# Patient Record
Sex: FEMALE | Race: WHITE | NOT HISPANIC OR LATINO | Employment: OTHER | URBAN - METROPOLITAN AREA
[De-identification: names, ages, dates, MRNs, and addresses within clinical notes are randomized per-mention and may not be internally consistent; named-entity substitution may affect disease eponyms.]

---

## 2023-10-22 PROBLEM — I25.118 ATHEROSCLEROTIC HEART DISEASE OF NATIVE CORONARY ARTERY WITH OTHER FORMS OF ANGINA PECTORIS (CMS-HCC): Status: ACTIVE | Noted: 2023-10-22

## 2023-10-22 PROBLEM — E78.5 HYPERLIPIDEMIA: Status: ACTIVE | Noted: 2023-10-22

## 2023-10-22 PROBLEM — R06.02 SHORTNESS OF BREATH: Status: ACTIVE | Noted: 2019-11-26

## 2023-10-22 PROBLEM — R00.1 SINUS BRADYCARDIA: Status: ACTIVE | Noted: 2023-10-22

## 2023-10-22 PROBLEM — I25.10 CORONARY ARTERIOSCLEROSIS: Status: ACTIVE | Noted: 2023-10-22

## 2023-10-22 PROBLEM — E78.00 PURE HYPERCHOLESTEROLEMIA, UNSPECIFIED: Status: ACTIVE | Noted: 2018-12-28

## 2023-10-22 PROBLEM — S40.012A CONTUSION OF LEFT SHOULDER: Status: ACTIVE | Noted: 2021-05-18

## 2023-10-22 PROBLEM — I49.5 SICK SINUS SYNDROME (MULTI): Status: ACTIVE | Noted: 2023-10-22

## 2023-10-22 PROBLEM — Z95.0 CARDIAC PACEMAKER IN SITU: Status: ACTIVE | Noted: 2023-10-22

## 2023-10-22 PROBLEM — D69.6 THROMBOCYTOPENIA (CMS-HCC): Status: ACTIVE | Noted: 2023-07-12

## 2023-10-22 PROBLEM — I25.10 CORONARY ATHEROSCLEROSIS: Status: ACTIVE | Noted: 2019-08-21

## 2023-10-22 PROBLEM — R14.2 BELCHING: Status: ACTIVE | Noted: 2019-09-05

## 2023-10-22 PROBLEM — M54.2 CERVICALGIA: Status: ACTIVE | Noted: 2018-09-03

## 2023-10-22 PROBLEM — I10 PRIMARY HYPERTENSION: Status: ACTIVE | Noted: 2018-12-28

## 2023-10-22 PROBLEM — E03.9 HYPOTHYROIDISM, UNSPECIFIED: Status: ACTIVE | Noted: 2018-12-28

## 2023-10-22 PROBLEM — R29.6 FREQUENT FALLS: Status: ACTIVE | Noted: 2020-12-03

## 2023-10-22 PROBLEM — K57.32 DIVERTICULITIS OF LARGE INTESTINE WITHOUT PERFORATION OR ABSCESS WITHOUT BLEEDING: Status: ACTIVE | Noted: 2018-09-06

## 2023-10-22 PROBLEM — I20.9 ANGINA PECTORIS (CMS-HCC): Status: ACTIVE | Noted: 2023-10-22

## 2023-10-22 PROBLEM — R94.31 ABNORMAL EKG: Status: ACTIVE | Noted: 2019-04-12

## 2023-10-22 PROBLEM — T14.8XXA HEMATOMA: Status: ACTIVE | Noted: 2023-10-22

## 2023-10-22 RX ORDER — DILTIAZEM HYDROCHLORIDE 120 MG/1
1 TABLET, FILM COATED ORAL 2 TIMES DAILY
COMMUNITY
Start: 2023-05-08 | End: 2024-05-15 | Stop reason: HOSPADM

## 2023-10-22 RX ORDER — SERTRALINE HYDROCHLORIDE 100 MG/1
100 TABLET, FILM COATED ORAL DAILY
COMMUNITY
Start: 2023-05-08 | End: 2024-02-14 | Stop reason: HOSPADM

## 2023-10-22 RX ORDER — CHOLECALCIFEROL (VITAMIN D3) 50 MCG
1 TABLET ORAL DAILY
COMMUNITY
Start: 2019-07-17 | End: 2024-06-07 | Stop reason: HOSPADM

## 2023-10-22 RX ORDER — SERTRALINE HYDROCHLORIDE 100 MG/1
100 TABLET, FILM COATED ORAL DAILY
COMMUNITY
End: 2023-10-24 | Stop reason: ALTCHOICE

## 2023-10-22 RX ORDER — NITROGLYCERIN 0.4 MG/1
0.4 TABLET SUBLINGUAL EVERY 5 MIN PRN
COMMUNITY
Start: 2019-06-26 | End: 2023-10-24 | Stop reason: ALTCHOICE

## 2023-10-22 RX ORDER — METOPROLOL SUCCINATE 25 MG/1
1 TABLET, EXTENDED RELEASE ORAL 2 TIMES DAILY
COMMUNITY
End: 2024-04-11 | Stop reason: HOSPADM

## 2023-10-22 RX ORDER — CLOPIDOGREL BISULFATE 75 MG/1
75 TABLET ORAL DAILY
Status: ON HOLD | COMMUNITY
Start: 2020-02-21 | End: 2024-02-01 | Stop reason: WASHOUT

## 2023-10-22 RX ORDER — OMEPRAZOLE 20 MG/1
CAPSULE, DELAYED RELEASE ORAL
COMMUNITY
Start: 2019-07-17 | End: 2023-10-24 | Stop reason: ALTCHOICE

## 2023-10-22 RX ORDER — ATORVASTATIN CALCIUM 80 MG/1
80 TABLET, FILM COATED ORAL DAILY
COMMUNITY
Start: 2023-05-08 | End: 2024-01-29 | Stop reason: WASHOUT

## 2023-10-22 RX ORDER — LISINOPRIL 20 MG/1
20 TABLET ORAL DAILY
COMMUNITY
Start: 2023-05-08 | End: 2024-02-14 | Stop reason: HOSPADM

## 2023-10-22 RX ORDER — LEVOTHYROXINE SODIUM 100 UG/1
1 TABLET ORAL DAILY
COMMUNITY
End: 2024-04-30 | Stop reason: SDUPTHER

## 2023-10-22 RX ORDER — ASPIRIN 81 MG/1
1 TABLET ORAL DAILY
COMMUNITY
End: 2023-12-15 | Stop reason: ALTCHOICE

## 2023-10-24 ENCOUNTER — OFFICE VISIT (OUTPATIENT)
Dept: HEMATOLOGY/ONCOLOGY | Facility: CLINIC | Age: 83
End: 2023-10-24
Payer: MEDICARE

## 2023-10-24 VITALS
OXYGEN SATURATION: 94 % | HEIGHT: 63 IN | TEMPERATURE: 96.6 F | BODY MASS INDEX: 26.64 KG/M2 | WEIGHT: 150.35 LBS | DIASTOLIC BLOOD PRESSURE: 72 MMHG | RESPIRATION RATE: 18 BRPM | HEART RATE: 60 BPM | SYSTOLIC BLOOD PRESSURE: 111 MMHG

## 2023-10-24 DIAGNOSIS — D69.6 THROMBOCYTOPENIA (CMS-HCC): Primary | ICD-10-CM

## 2023-10-24 LAB
ALBUMIN SERPL BCP-MCNC: 4.6 G/DL (ref 3.4–5)
ALP SERPL-CCNC: 87 U/L (ref 33–136)
ALT SERPL W P-5'-P-CCNC: 14 U/L (ref 7–45)
ANION GAP SERPL CALC-SCNC: 14 MMOL/L (ref 10–20)
AST SERPL W P-5'-P-CCNC: 19 U/L (ref 9–39)
BASOPHILS # BLD AUTO: 0.06 X10*3/UL (ref 0–0.1)
BASOPHILS NFR BLD AUTO: 1 %
BILIRUB SERPL-MCNC: 0.6 MG/DL (ref 0–1.2)
BUN SERPL-MCNC: 22 MG/DL (ref 6–23)
CALCIUM SERPL-MCNC: 9.8 MG/DL (ref 8.6–10.3)
CHLORIDE SERPL-SCNC: 105 MMOL/L (ref 98–107)
CO2 SERPL-SCNC: 27 MMOL/L (ref 21–32)
CREAT SERPL-MCNC: 1.44 MG/DL (ref 0.5–1.05)
EOSINOPHIL # BLD AUTO: 0.35 X10*3/UL (ref 0–0.4)
EOSINOPHIL NFR BLD AUTO: 5.8 %
ERYTHROCYTE [DISTWIDTH] IN BLOOD BY AUTOMATED COUNT: 13.3 % (ref 11.5–14.5)
GFR SERPL CREATININE-BSD FRML MDRD: 36 ML/MIN/1.73M*2
GLUCOSE SERPL-MCNC: 91 MG/DL (ref 74–99)
HCT VFR BLD AUTO: 38.4 % (ref 36–46)
HCV AB SER QL: NONREACTIVE
HGB BLD-MCNC: 12.3 G/DL (ref 12–16)
HIV 1+2 AB+HIV1 P24 AG SERPL QL IA: NONREACTIVE
IMM GRANULOCYTES # BLD AUTO: 0.02 X10*3/UL (ref 0–0.5)
IMM GRANULOCYTES NFR BLD AUTO: 0.3 % (ref 0–0.9)
LYMPHOCYTES # BLD AUTO: 1.13 X10*3/UL (ref 0.8–3)
LYMPHOCYTES NFR BLD AUTO: 18.7 %
MCH RBC QN AUTO: 29 PG (ref 26–34)
MCHC RBC AUTO-ENTMCNC: 32 G/DL (ref 32–36)
MCV RBC AUTO: 91 FL (ref 80–100)
MONOCYTES # BLD AUTO: 0.43 X10*3/UL (ref 0.05–0.8)
MONOCYTES NFR BLD AUTO: 7.1 %
NEUTROPHILS # BLD AUTO: 4.04 X10*3/UL (ref 1.6–5.5)
NEUTROPHILS NFR BLD AUTO: 67.1 %
NRBC BLD-RTO: 0 /100 WBCS (ref 0–0)
PLATELET # BLD AUTO: 144 X10*3/UL (ref 150–450)
PMV BLD AUTO: 9.6 FL (ref 7.5–11.5)
POTASSIUM SERPL-SCNC: 4.6 MMOL/L (ref 3.5–5.3)
PROT SERPL-MCNC: 7.6 G/DL (ref 6.4–8.2)
PROT SERPL-MCNC: 7.9 G/DL (ref 6.4–8.2)
RBC # BLD AUTO: 4.24 X10*6/UL (ref 4–5.2)
SODIUM SERPL-SCNC: 141 MMOL/L (ref 136–145)
VIT B12 SERPL-MCNC: 546 PG/ML (ref 211–911)
WBC # BLD AUTO: 6 X10*3/UL (ref 4.4–11.3)

## 2023-10-24 PROCEDURE — 36415 COLL VENOUS BLD VENIPUNCTURE: CPT | Performed by: INTERNAL MEDICINE

## 2023-10-24 PROCEDURE — 84165 PROTEIN E-PHORESIS SERUM: CPT | Performed by: INTERNAL MEDICINE

## 2023-10-24 PROCEDURE — 99214 OFFICE O/P EST MOD 30 MIN: CPT | Performed by: INTERNAL MEDICINE

## 2023-10-24 PROCEDURE — 99204 OFFICE O/P NEW MOD 45 MIN: CPT | Performed by: INTERNAL MEDICINE

## 2023-10-24 PROCEDURE — 83921 ORGANIC ACID SINGLE QUANT: CPT | Performed by: INTERNAL MEDICINE

## 2023-10-24 PROCEDURE — 3078F DIAST BP <80 MM HG: CPT | Performed by: INTERNAL MEDICINE

## 2023-10-24 PROCEDURE — 82607 VITAMIN B-12: CPT | Performed by: INTERNAL MEDICINE

## 2023-10-24 PROCEDURE — 85025 COMPLETE CBC W/AUTO DIFF WBC: CPT | Performed by: INTERNAL MEDICINE

## 2023-10-24 PROCEDURE — 3074F SYST BP LT 130 MM HG: CPT | Performed by: INTERNAL MEDICINE

## 2023-10-24 PROCEDURE — 84155 ASSAY OF PROTEIN SERUM: CPT | Performed by: INTERNAL MEDICINE

## 2023-10-24 PROCEDURE — 1126F AMNT PAIN NOTED NONE PRSNT: CPT | Performed by: INTERNAL MEDICINE

## 2023-10-24 PROCEDURE — 1159F MED LIST DOCD IN RCRD: CPT | Performed by: INTERNAL MEDICINE

## 2023-10-24 PROCEDURE — 87389 HIV-1 AG W/HIV-1&-2 AB AG IA: CPT | Performed by: INTERNAL MEDICINE

## 2023-10-24 PROCEDURE — 83521 IG LIGHT CHAINS FREE EACH: CPT | Performed by: INTERNAL MEDICINE

## 2023-10-24 PROCEDURE — 1036F TOBACCO NON-USER: CPT | Performed by: INTERNAL MEDICINE

## 2023-10-24 PROCEDURE — 80053 COMPREHEN METABOLIC PANEL: CPT | Performed by: INTERNAL MEDICINE

## 2023-10-24 PROCEDURE — 86803 HEPATITIS C AB TEST: CPT | Performed by: INTERNAL MEDICINE

## 2023-10-24 ASSESSMENT — PATIENT HEALTH QUESTIONNAIRE - PHQ9
1. LITTLE INTEREST OR PLEASURE IN DOING THINGS: NOT AT ALL
10. IF YOU CHECKED OFF ANY PROBLEMS, HOW DIFFICULT HAVE THESE PROBLEMS MADE IT FOR YOU TO DO YOUR WORK, TAKE CARE OF THINGS AT HOME, OR GET ALONG WITH OTHER PEOPLE: NOT DIFFICULT AT ALL
SUM OF ALL RESPONSES TO PHQ9 QUESTIONS 1 AND 2: 1
2. FEELING DOWN, DEPRESSED OR HOPELESS: SEVERAL DAYS

## 2023-10-24 ASSESSMENT — COLUMBIA-SUICIDE SEVERITY RATING SCALE - C-SSRS
2. HAVE YOU ACTUALLY HAD ANY THOUGHTS OF KILLING YOURSELF?: NO
1. IN THE PAST MONTH, HAVE YOU WISHED YOU WERE DEAD OR WISHED YOU COULD GO TO SLEEP AND NOT WAKE UP?: NO
6. HAVE YOU EVER DONE ANYTHING, STARTED TO DO ANYTHING, OR PREPARED TO DO ANYTHING TO END YOUR LIFE?: NO

## 2023-10-24 ASSESSMENT — ENCOUNTER SYMPTOMS
HEMOPTYSIS: 0
ARTHRALGIAS: 1
HOT FLASHES: 0
DIARRHEA: 1
NUMBNESS: 0
OCCASIONAL FEELINGS OF UNSTEADINESS: 1
DEPRESSION: 0
LEG SWELLING: 0
BLOOD IN STOOL: 1
DEPRESSION: 1
LOSS OF SENSATION IN FEET: 1
EYE PROBLEMS: 0
ADENOPATHY: 0
DIFFICULTY URINATING: 0
FATIGUE: 0

## 2023-10-24 ASSESSMENT — PAIN SCALES - GENERAL: PAINLEVEL: 0-NO PAIN

## 2023-10-24 NOTE — PROGRESS NOTES
"Patient ID: Linette Doyle is a 83 y.o. female.  Referring Physician: No referring provider defined for this encounter.  Primary Care Provider: Maritza Do MD      Subjective    HPI  83 year old woman with a history of hypothyroidism, HTN, cholesterol, depression, pacemaker, cardiac stent, IBS, referred for evaluation of chronic thrombocytopenia since 2018    Review of Systems   Constitutional:  Negative for fatigue.   HENT:   Negative for nosebleeds.    Eyes:  Negative for eye problems.   Respiratory:  Negative for hemoptysis.    Cardiovascular:  Negative for leg swelling.   Gastrointestinal:  Positive for blood in stool and diarrhea.        IBS  Hemorrhoidal bleeding   Endocrine: Negative for hot flashes.   Genitourinary:  Negative for difficulty urinating.    Musculoskeletal:  Positive for arthralgias.   Skin:  Negative for itching and rash.   Neurological:  Negative for numbness.   Hematological:  Negative for adenopathy.   Psychiatric/Behavioral:  Positive for depression.         SH: no smoking, no ETOH  FH: negative for blood disorder      Medications:  Aspirin  Atorvastatin  Brilinta  Diltiazem  Levothyroxine  Lisinopril  Metoprolol,  Ocuvite  Omeprazole  sertraline    Objective   BSA: 1.75 meters squared  /72 (BP Location: Left arm, Patient Position: Sitting, BP Cuff Size: Adult long)   Pulse 60   Temp 35.9 °C (96.6 °F) (Temporal)   Resp 18   Ht 1.608 m (5' 3.31\")   Wt 68.2 kg (150 lb 5.7 oz)   SpO2 94%   BMI 26.38 kg/m²     No family history on file.  Oncology History    No history exists.       Linette Doyle  reports that she has never smoked. She has never been exposed to tobacco smoke. She has never used smokeless tobacco.  She  reports no history of alcohol use.  She  reports no history of drug use.  Review of Systems   Constitutional:  Negative for fatigue.   HENT:  Negative for nosebleeds.    Respiratory:  Negative for hemoptysis.    Gastrointestinal:  Positive for blood in stool " and diarrhea.        IBS  Hemorrhoidal bleeding   Genitourinary:  Negative for difficulty urinating.   Musculoskeletal:  Positive for arthralgias.   Skin:  Negative for itching and rash.   Neurological:  Negative for numbness.   Hematological:  Negative for adenopathy.   Psychiatric/Behavioral:  Positive for depression.       Physical Exam  Constitutional:       Appearance: Normal appearance.   HENT:      Right Ear: External ear normal.      Left Ear: External ear normal.      Nose: Nose normal.      Mouth/Throat:      Mouth: Mucous membranes are moist.      Pharynx: Oropharynx is clear.   Eyes:      Extraocular Movements: Extraocular movements intact.      Pupils: Pupils are equal, round, and reactive to light.   Cardiovascular:      Rate and Rhythm: Normal rate and regular rhythm.   Pulmonary:      Effort: Pulmonary effort is normal.      Breath sounds: Normal breath sounds.   Abdominal:      General: Abdomen is flat. Bowel sounds are normal.      Palpations: Abdomen is soft.   Musculoskeletal:         General: Normal range of motion.      Cervical back: Normal range of motion and neck supple.   Skin:     General: Skin is warm.   Neurological:      General: No focal deficit present.      Mental Status: She is alert and oriented to person, place, and time.   Psychiatric:         Behavior: Behavior normal.         Performance Status:      Assessment/Plan    83 year old woman with a history of hypothyroidism, HTN, cholesterol, depression, pacemaker, cardiac stent, IBS, referred for evaluation of chronic thrombocytopenia since 2018    Chronic Acquired thrombocytopenia  First available CBC, 4/9/2018, platelet count 136  Since then platelet count has always been above 100  “Patients with platelets above 100 do not really have a problem.”   Platelet count 163 on 7/4/2019   Bleeding symptoms: Some bruises on hands, otherwise the patient denies easy bruising, petechiae, melena, rashes, fevers, epistaxis, and bleeding.      There are no medications correlating with the onset of thrombocytopenia in 2018 (that she would have stopped temporarily in June/July, 2019).  However, aspirin could be considered as a culprit.   Family history: There is no family history of thrombocytopenia or blood disorders.     Transfusion: There is no history of transfusion.     Alcohol: The patient denies alcohol use.     Hepatic: no history of hepatitis or hepatosplenomegaly.     There is no history of cirrhosis or hepatic failure.     Thrombosis: There is no history of thrombosis.     Autoimmune: The patient has no autoimmune condition.     The exam reveals no hepatomegaly, ascites, or jaundice.  No purpuric lesions are present on exam.     CBC: Isolated thrombocytopenia, without deficit in the WBC, RBC, or differential   The CBC reveals no atypical lymphocytosis (to suggest viral infection such as with EBV or CMV).  The peripheral smear does not reveal basophilic stippling (consistent with thalassemia, chronic alcohol use, lead or metal poisoning).  There are no nucleated red blood cells (present in hemolysis or myelofibrosis).     No oval macrocytosis (to suggest B12 or folate deficiency).     No platelet aggregation appreciated.     No platelet hypogranularity (to suggest MDS or myelofibrosis).     No schistocytosis (to suggest TTP, HUS, DIC, or a prosthetic heart valve).     No target cells to suggest chronic liver disease or hemoglobinopathies.        The most common causes for causes for acquired thrombocytopenia are a drug effect and Idiopathic/immune thrombocytopenic Purpura (ITP).   None of the patient’s present medication is correlating with this onset of the thrombocytopenia in 2018.  The differential for acquired thrombocytopenia includes [1] Nutrition deficiency of B12, or folate; [2] Infection with HIV, B19, HCV, Mono, Rubella, Mumps, chicken pox, Tuberculosis, Malaria, Leishmaniasis; [3] alcohol [4] radiation; [5] a large spleen; [6] certain  cancers (CLL, CML, Hodgkin lymphoma, NHL, Hairy cell leukemia, myelofibrosis); [7] sarcoidosis; [8] in the right setting: pregnancy; [9] particular other hematology conditions (Thalassemia, Hemoglobin SC, Hereditary spherocytosis, Antiphospholipid syndrome, DIC, TTP/HUS, HELLP syndrome, aplastic anemia, amegakaryocytic thrombocytopenia); [10] a prior blood transfusion; [11] a mechanical heart valve, or hemangioma (detected by urine hemosiderin); [12] a drug or herbal therapy; [13] and, after everything else has been ruled out: ITP.   Impression: chronic ITP  PLAN:  CBC, HIV, HCV, B12, MMA, SPEP, LDH, free light chains  US liver and spleen  Consider stopping aspirin for a month and rechecking CBC  RTC 1-2 months to review results         Ziggy Neal MD

## 2023-10-25 LAB
ALBUMIN: 4.9 G/DL (ref 3.4–5)
ALPHA 1 GLOBULIN: 0.4 G/DL (ref 0.2–0.6)
ALPHA 2 GLOBULIN: 0.9 G/DL (ref 0.4–1.1)
BETA GLOBULIN: 0.8 G/DL (ref 0.5–1.2)
GAMMA GLOBULIN: 0.9 G/DL (ref 0.5–1.4)
KAPPA LC SERPL-MCNC: 3.34 MG/DL (ref 0.33–1.94)
KAPPA LC/LAMBDA SER: 1.48 {RATIO} (ref 0.26–1.65)
LAMBDA LC SERPL-MCNC: 2.26 MG/DL (ref 0.57–2.63)
PATH REVIEW-SERUM PROTEIN ELECTROPHORESIS: NORMAL
PROTEIN ELECTROPHORESIS COMMENT: NORMAL

## 2023-10-27 LAB — METHYLMALONATE SERPL-SCNC: 0.29 UMOL/L (ref 0–0.4)

## 2023-11-13 ENCOUNTER — HOSPITAL ENCOUNTER (OUTPATIENT)
Dept: RADIOLOGY | Facility: HOSPITAL | Age: 83
Discharge: HOME | End: 2023-11-13
Payer: MEDICARE

## 2023-11-13 DIAGNOSIS — D69.6 THROMBOCYTOPENIA (CMS-HCC): ICD-10-CM

## 2023-11-13 PROCEDURE — 76705 ECHO EXAM OF ABDOMEN: CPT

## 2023-11-15 ENCOUNTER — TELEPHONE (OUTPATIENT)
Dept: HEMATOLOGY/ONCOLOGY | Facility: CLINIC | Age: 83
End: 2023-11-15
Payer: MEDICARE

## 2023-11-15 NOTE — TELEPHONE ENCOUNTER
IMPRESSION:  Normal ultrasound appearance of the spleen.      Signed by: Damon Reyna 11/13/2023 11:49  I read this result to Linette who was elated!  No further needs.

## 2023-12-15 ENCOUNTER — LAB (OUTPATIENT)
Dept: LAB | Facility: CLINIC | Age: 83
End: 2023-12-15
Payer: MEDICARE

## 2023-12-15 ENCOUNTER — OFFICE VISIT (OUTPATIENT)
Dept: HEMATOLOGY/ONCOLOGY | Facility: CLINIC | Age: 83
End: 2023-12-15
Payer: MEDICARE

## 2023-12-15 VITALS
TEMPERATURE: 96.6 F | WEIGHT: 151.79 LBS | OXYGEN SATURATION: 94 % | BODY MASS INDEX: 26.63 KG/M2 | RESPIRATION RATE: 18 BRPM | DIASTOLIC BLOOD PRESSURE: 77 MMHG | SYSTOLIC BLOOD PRESSURE: 168 MMHG | HEART RATE: 60 BPM

## 2023-12-15 DIAGNOSIS — D69.6 THROMBOCYTOPENIA (CMS-HCC): Primary | ICD-10-CM

## 2023-12-15 DIAGNOSIS — D69.6 THROMBOCYTOPENIA (CMS-HCC): ICD-10-CM

## 2023-12-15 LAB
BASOPHILS # BLD AUTO: 0.06 X10*3/UL (ref 0–0.1)
BASOPHILS NFR BLD AUTO: 0.8 %
EOSINOPHIL # BLD AUTO: 0.37 X10*3/UL (ref 0–0.4)
EOSINOPHIL NFR BLD AUTO: 4.6 %
ERYTHROCYTE [DISTWIDTH] IN BLOOD BY AUTOMATED COUNT: 12.9 % (ref 11.5–14.5)
HCT VFR BLD AUTO: 40.6 % (ref 36–46)
HGB BLD-MCNC: 12.9 G/DL (ref 12–16)
IMM GRANULOCYTES # BLD AUTO: 0.02 X10*3/UL (ref 0–0.5)
IMM GRANULOCYTES NFR BLD AUTO: 0.3 % (ref 0–0.9)
LYMPHOCYTES # BLD AUTO: 1.49 X10*3/UL (ref 0.8–3)
LYMPHOCYTES NFR BLD AUTO: 18.6 %
MCH RBC QN AUTO: 28.4 PG (ref 26–34)
MCHC RBC AUTO-ENTMCNC: 31.8 G/DL (ref 32–36)
MCV RBC AUTO: 89 FL (ref 80–100)
MONOCYTES # BLD AUTO: 0.62 X10*3/UL (ref 0.05–0.8)
MONOCYTES NFR BLD AUTO: 7.8 %
NEUTROPHILS # BLD AUTO: 5.44 X10*3/UL (ref 1.6–5.5)
NEUTROPHILS NFR BLD AUTO: 67.9 %
NRBC BLD-RTO: 0 /100 WBCS (ref 0–0)
PLATELET # BLD AUTO: 141 X10*3/UL (ref 150–450)
PROT SERPL-MCNC: 7.9 G/DL (ref 6.4–8.2)
RBC # BLD AUTO: 4.54 X10*6/UL (ref 4–5.2)
WBC # BLD AUTO: 8 X10*3/UL (ref 4.4–11.3)

## 2023-12-15 PROCEDURE — 3077F SYST BP >= 140 MM HG: CPT | Performed by: INTERNAL MEDICINE

## 2023-12-15 PROCEDURE — 86320 SERUM IMMUNOELECTROPHORESIS: CPT | Performed by: INTERNAL MEDICINE

## 2023-12-15 PROCEDURE — 84165 PROTEIN E-PHORESIS SERUM: CPT | Performed by: INTERNAL MEDICINE

## 2023-12-15 PROCEDURE — 1126F AMNT PAIN NOTED NONE PRSNT: CPT | Performed by: INTERNAL MEDICINE

## 2023-12-15 PROCEDURE — 84155 ASSAY OF PROTEIN SERUM: CPT | Performed by: INTERNAL MEDICINE

## 2023-12-15 PROCEDURE — 36415 COLL VENOUS BLD VENIPUNCTURE: CPT

## 2023-12-15 PROCEDURE — 99214 OFFICE O/P EST MOD 30 MIN: CPT | Performed by: INTERNAL MEDICINE

## 2023-12-15 PROCEDURE — 1159F MED LIST DOCD IN RCRD: CPT | Performed by: INTERNAL MEDICINE

## 2023-12-15 PROCEDURE — 99214 OFFICE O/P EST MOD 30 MIN: CPT | Mod: 25 | Performed by: INTERNAL MEDICINE

## 2023-12-15 PROCEDURE — 1036F TOBACCO NON-USER: CPT | Performed by: INTERNAL MEDICINE

## 2023-12-15 PROCEDURE — 3078F DIAST BP <80 MM HG: CPT | Performed by: INTERNAL MEDICINE

## 2023-12-15 PROCEDURE — 85025 COMPLETE CBC W/AUTO DIFF WBC: CPT

## 2023-12-15 ASSESSMENT — ENCOUNTER SYMPTOMS
BACK PAIN: 1
HEMATURIA: 0
BRUISES/BLEEDS EASILY: 0
FATIGUE: 1
OCCASIONAL FEELINGS OF UNSTEADINESS: 0
SHORTNESS OF BREATH: 0
DEPRESSION: 0
BLOOD IN STOOL: 0
LIGHT-HEADEDNESS: 0
LOSS OF SENSATION IN FEET: 0
SLEEP DISTURBANCE: 1

## 2023-12-15 ASSESSMENT — PATIENT HEALTH QUESTIONNAIRE - PHQ9
2. FEELING DOWN, DEPRESSED OR HOPELESS: NOT AT ALL
1. LITTLE INTEREST OR PLEASURE IN DOING THINGS: NOT AT ALL
SUM OF ALL RESPONSES TO PHQ9 QUESTIONS 1 AND 2: 0

## 2023-12-15 ASSESSMENT — COLUMBIA-SUICIDE SEVERITY RATING SCALE - C-SSRS
1. IN THE PAST MONTH, HAVE YOU WISHED YOU WERE DEAD OR WISHED YOU COULD GO TO SLEEP AND NOT WAKE UP?: NO
6. HAVE YOU EVER DONE ANYTHING, STARTED TO DO ANYTHING, OR PREPARED TO DO ANYTHING TO END YOUR LIFE?: NO
2. HAVE YOU ACTUALLY HAD ANY THOUGHTS OF KILLING YOURSELF?: NO

## 2023-12-15 ASSESSMENT — PAIN SCALES - GENERAL: PAINLEVEL: 0-NO PAIN

## 2023-12-15 NOTE — PROGRESS NOTES
Patient ID: Linette Doyle is a 83 y.o. female.    Subjective    HPI  83 year old woman with a history of hypothyroidism, HTN, cholesterol, depression, pacemaker, cardiac stent, IBS, referred for evaluation of chronic thrombocytopenia since 2018.    She denies bleeding, epistaxis, blood in the urine, hemoptysis.  She denies fever, drenching night sweats, or weight loss.       Objective    BSA: There is no height or weight on file to calculate BSA.  There were no vitals taken for this visit.   Review of Systems   Constitutional:  Positive for fatigue.   HENT:  Negative for nosebleeds.    Eyes:  Negative for visual disturbance.   Respiratory:  Negative for shortness of breath.    Cardiovascular:  Negative for leg swelling.   Gastrointestinal:  Negative for blood in stool.   Endocrine: Negative for cold intolerance.   Genitourinary:  Negative for hematuria.   Musculoskeletal:  Positive for back pain.   Skin:  Negative for rash.   Allergic/Immunologic: Negative for environmental allergies.   Neurological:  Negative for light-headedness.   Hematological:  Does not bruise/bleed easily.   Psychiatric/Behavioral:  Positive for sleep disturbance.        Physical Exam  Constitutional:       Appearance: She is obese.   HENT:      Head: Normocephalic and atraumatic.      Right Ear: External ear normal.      Left Ear: External ear normal.      Nose: Nose normal.      Mouth/Throat:      Mouth: Mucous membranes are moist.      Pharynx: Oropharynx is clear.   Eyes:      Extraocular Movements: Extraocular movements intact.      Pupils: Pupils are equal, round, and reactive to light.   Cardiovascular:      Rate and Rhythm: Normal rate and regular rhythm.   Pulmonary:      Effort: Pulmonary effort is normal.      Breath sounds: Normal breath sounds.   Abdominal:      General: Abdomen is flat. Bowel sounds are normal.   Musculoskeletal:         General: No swelling. Normal range of motion.      Cervical back: Normal range of motion and  neck supple.   Skin:     General: Skin is warm.      Coloration: Skin is not jaundiced.   Neurological:      General: No focal deficit present.      Mental Status: She is alert and oriented to person, place, and time.   Psychiatric:         Mood and Affect: Mood normal.         Behavior: Behavior normal.       Performance Status:        Assessment/Plan     83 year old woman with a history of hypothyroidism, HTN, cholesterol, depression, pacemaker, cardiac stent, IBS, referred for evaluation of chronic thrombocytopenia since 2018     Chronic Acquired thrombocytopenia, mild  First available CBC, 4/9/2018, platelet count 136  Since then platelet count has always been above 100  “Patients with platelets above 100 do not really have a problem.”   Platelet count 163 on 7/4/2019   Bleeding symptoms: Some bruises on hands, otherwise the patient denies easy bruising, petechiae, melena, rashes, fevers, epistaxis, and bleeding.     There are no medications correlating with the onset of thrombocytopenia in 2018 (that she would have stopped temporarily in June/July, 2019).  However, aspirin could be considered as a culprit.   Family history: There is no family history of thrombocytopenia or blood disorders.     Transfusion: There is no history of transfusion.     Alcohol: The patient denies alcohol use.     Hepatic: no history of hepatitis or hepatosplenomegaly.     There is no history of cirrhosis or hepatic failure.     Thrombosis: There is no history of thrombosis.     Autoimmune: The patient has no autoimmune condition.     The exam reveals no hepatomegaly, ascites, or jaundice.  No purpuric lesions are present on exam.     CBC: Isolated thrombocytopenia, without deficit in the WBC, RBC, or differential   The CBC reveals no atypical lymphocytosis (to suggest viral infection such as with EBV or CMV).  The peripheral smear does not reveal basophilic stippling (consistent with thalassemia, chronic alcohol use, lead or metal  poisoning).  There are no nucleated red blood cells (present in hemolysis or myelofibrosis).     No oval macrocytosis (to suggest B12 or folate deficiency).     No platelet aggregation appreciated.     No platelet hypogranularity (to suggest MDS or myelofibrosis).     No schistocytosis (to suggest TTP, HUS, DIC, or a prosthetic heart valve).     No target cells to suggest chronic liver disease or hemoglobinopathies.         The most common causes for causes for acquired thrombocytopenia are a drug effect and Idiopathic/immune thrombocytopenic Purpura (ITP).   None of the patient’s present medication is correlating with this onset of the thrombocytopenia in 2018.  The differential for acquired thrombocytopenia includes [1] Nutrition deficiency of B12, or folate; [2] Infection with HIV, B19, HCV, Mono, Rubella, Mumps, chicken pox, Tuberculosis, Malaria, Leishmaniasis; [3] alcohol [4] radiation; [5] a large spleen; [6] certain cancers (CLL, CML, Hodgkin lymphoma, NHL, Hairy cell leukemia, myelofibrosis); [7] sarcoidosis; [8] in the right setting: pregnancy; [9] particular other hematology conditions (Thalassemia, Hemoglobin SC, Hereditary spherocytosis, Antiphospholipid syndrome, DIC, TTP/HUS, HELLP syndrome, aplastic anemia, amegakaryocytic thrombocytopenia); [10] a prior blood transfusion; [11] a mechanical heart valve, or hemangioma (detected by urine hemosiderin); [12] a drug or herbal therapy; [13] and, after everything else has been ruled out: ITP.   10/24/2023: Platelets 144  HIV nonreactive, HCV nonreactive, B12 546, MMA 0.29,  Ultrasound, 11/13/2023,The spleen is normal in size, spanning 10.3 x 4.3 x 4.9 cm, estimated  volume 115 mL. No splenic lesion seen.  Impression: chronic ITP  Platelet 141 (12-15-23)   Ultrasound spleen normal (11-13-23)  Patient presents with a near normal platelet count.  This essentially means that she does not have a problem.  PLAN:  CBC,  SPEP, LDH, free light chains  Consider  stopping aspirin for a month and rechecking CBC  RTC 1 year to review results     Creat 1.44, 10-24-23  Patient knows  She was seeing a nephrologist  Now followed by PCP         Ziggy Neal MD

## 2023-12-15 NOTE — PROGRESS NOTES
Patient is here today for follow up. No new medical issues. She is using a new joint supplement, she notices a big difference and has less aches/pains. She wants to make sure it's ok to take.     Medications and allergies reviewed.     Physician updated.      Follow up in 1 year.

## 2023-12-15 NOTE — PATIENT INSTRUCTIONS
Follow up in 1 year.     Please feel free to call with any questions or concerns at [phone].

## 2023-12-19 LAB
ALBUMIN: 5 G/DL (ref 3.4–5)
ALPHA 1 GLOBULIN: 0.3 G/DL (ref 0.2–0.6)
ALPHA 2 GLOBULIN: 0.9 G/DL (ref 0.4–1.1)
BETA GLOBULIN: 0.8 G/DL (ref 0.5–1.2)
GAMMA GLOBULIN: 0.9 G/DL (ref 0.5–1.4)
IMMUNOFIXATION COMMENT: NORMAL
PATH REVIEW - SERUM IMMUNOFIXATION: NORMAL
PATH REVIEW-SERUM PROTEIN ELECTROPHORESIS: NORMAL
PROTEIN ELECTROPHORESIS COMMENT: NORMAL

## 2024-01-16 ENCOUNTER — APPOINTMENT (OUTPATIENT)
Dept: CARDIOLOGY | Facility: CLINIC | Age: 84
End: 2024-01-16
Payer: MEDICARE

## 2024-01-25 PROBLEM — R10.9 LEFT FLANK PAIN: Status: ACTIVE | Noted: 2021-12-13

## 2024-01-25 PROBLEM — N18.31 STAGE 3A CHRONIC KIDNEY DISEASE (MULTI): Status: ACTIVE | Noted: 2019-11-25

## 2024-01-25 PROBLEM — D63.8 ANEMIA OF CHRONIC DISEASE: Status: ACTIVE | Noted: 2021-03-01

## 2024-01-25 PROBLEM — E87.5 HYPERKALEMIA: Status: ACTIVE | Noted: 2021-12-13

## 2024-01-25 PROBLEM — N18.30 CHRONIC KIDNEY FAILURE, STAGE 3 (MODERATE) (MULTI): Status: ACTIVE | Noted: 2018-10-15

## 2024-01-29 ENCOUNTER — OFFICE VISIT (OUTPATIENT)
Dept: CARDIAC SURGERY | Facility: CLINIC | Age: 84
DRG: 321 | End: 2024-01-29
Payer: MEDICARE

## 2024-01-29 VITALS
WEIGHT: 150.6 LBS | OXYGEN SATURATION: 98 % | SYSTOLIC BLOOD PRESSURE: 134 MMHG | HEART RATE: 61 BPM | HEIGHT: 64 IN | RESPIRATION RATE: 18 BRPM | DIASTOLIC BLOOD PRESSURE: 58 MMHG | BODY MASS INDEX: 25.71 KG/M2 | TEMPERATURE: 98.9 F

## 2024-01-29 DIAGNOSIS — I20.9 ANGINA PECTORIS (CMS-HCC): Primary | ICD-10-CM

## 2024-01-29 DIAGNOSIS — I49.5 SICK SINUS SYNDROME (MULTI): ICD-10-CM

## 2024-01-29 DIAGNOSIS — I10 PRIMARY HYPERTENSION: ICD-10-CM

## 2024-01-29 DIAGNOSIS — E78.2 MIXED HYPERLIPIDEMIA: ICD-10-CM

## 2024-01-29 DIAGNOSIS — Z95.0 PRESENCE OF CARDIAC PACEMAKER: ICD-10-CM

## 2024-01-29 DIAGNOSIS — I25.118 ATHEROSCLEROTIC HEART DISEASE OF NATIVE CORONARY ARTERY WITH OTHER FORMS OF ANGINA PECTORIS (CMS-HCC): ICD-10-CM

## 2024-01-29 PROBLEM — M25.539 ARTHRALGIA OF WRIST: Status: ACTIVE | Noted: 2020-06-11

## 2024-01-29 PROBLEM — J00 COMMON COLD: Status: ACTIVE | Noted: 2023-06-06

## 2024-01-29 PROBLEM — B37.2 CANDIDIASIS OF SKIN: Status: ACTIVE | Noted: 2024-01-29

## 2024-01-29 PROBLEM — R73.01 IMPAIRED FASTING GLUCOSE: Status: ACTIVE | Noted: 2023-07-05

## 2024-01-29 PROBLEM — E66.3 OVERWEIGHT WITH BODY MASS INDEX (BMI) 25.0-29.9: Status: ACTIVE | Noted: 2024-01-29

## 2024-01-29 PROCEDURE — 1126F AMNT PAIN NOTED NONE PRSNT: CPT | Performed by: INTERNAL MEDICINE

## 2024-01-29 PROCEDURE — 1036F TOBACCO NON-USER: CPT | Performed by: INTERNAL MEDICINE

## 2024-01-29 PROCEDURE — 1159F MED LIST DOCD IN RCRD: CPT | Performed by: INTERNAL MEDICINE

## 2024-01-29 PROCEDURE — 3078F DIAST BP <80 MM HG: CPT | Performed by: INTERNAL MEDICINE

## 2024-01-29 PROCEDURE — 99213 OFFICE O/P EST LOW 20 MIN: CPT | Performed by: INTERNAL MEDICINE

## 2024-01-29 PROCEDURE — 3075F SYST BP GE 130 - 139MM HG: CPT | Performed by: INTERNAL MEDICINE

## 2024-01-29 RX ORDER — OMEPRAZOLE 20 MG/1
1 CAPSULE, DELAYED RELEASE ORAL DAILY
COMMUNITY
Start: 2019-07-17 | End: 2024-04-30 | Stop reason: SDUPTHER

## 2024-01-29 ASSESSMENT — PATIENT HEALTH QUESTIONNAIRE - PHQ9
1. LITTLE INTEREST OR PLEASURE IN DOING THINGS: NOT AT ALL
2. FEELING DOWN, DEPRESSED OR HOPELESS: NOT AT ALL
SUM OF ALL RESPONSES TO PHQ9 QUESTIONS 1 AND 2: 0

## 2024-01-29 ASSESSMENT — PAIN SCALES - GENERAL: PAINLEVEL: 0-NO PAIN

## 2024-01-29 NOTE — LETTER
January 29, 2024     Jay Garvey MD  [address] [place] [address]  [address]  [place] [address]    Patient: Linette Doyle   YOB: 1940   Date of Visit: 1/29/2024       Dear Dr. Jay Garvey MD:    Thank you for referring Linette Doyle to me for evaluation. Below are my notes for this consultation.  If you have questions, please do not hesitate to call me. I look forward to following your patient along with you.       Sincerely,     Drarius Ibrahim MD      CC: No Recipients  ______________________________________________________________________________________    Subjective  Chief Complaint   Patient presents with   • Follow-up     Mrs\Ms. Doyle is present for her 6 month Follow up with Dr. Ibrahim         83-year-old female with a multiple comorbid condition history of for hypertension, hyperlipidemia history of sick sinus syndrome history of permanent pacemaker placement past history of PCI in the past currently on lisinopril, levothyroxine, aspirin, beta-blocker and diltiazem with Lipitor.  No active angina or CHF signs symptoms.  Stable cardiac perspective.  Fairly active.    Past Medical History:   Diagnosis Date   • Angina pectoris (CMS/HCC) 10/22/2023   • Atherosclerosis of coronary artery 08/21/2019   • Atherosclerotic heart disease of native coronary artery with other forms of angina pectoris (CMS/HCC) 10/22/2023   • Hypercholesterolemia 12/28/2018   • Hyperlipidemia 10/22/2023   • Presence of cardiac pacemaker 10/22/2023   • Primary hypertension 12/28/2018   • Shortness of breath 11/26/2019   • Sick sinus syndrome (CMS/HCC) 10/22/2023   • Sinus bradycardia 10/22/2023   • Stage 3a chronic kidney disease (CMS/HCC) 11/25/2019     History reviewed. No pertinent surgical history.  No relevant family history has been documented for this patient.  Current Outpatient Medications   Medication Sig Dispense Refill   • cholecalciferol (Vitamin D-3) 50 MCG (2000 UT) tablet Take 1 tablet (2,000 Units) by mouth  "once daily.     • clopidogrel (Plavix) 75 mg tablet Take 1 tablet (75 mg) by mouth once daily.     • dilTIAZem (Cardizem) 120 mg immediate release tablet Take 1 tablet (120 mg) by mouth 2 times a day.     • levothyroxine (Synthroid, Levoxyl) 100 mcg tablet Take 1 tablet (100 mcg) by mouth once daily. 1 tablet in the morning on an empty stomach Orally Once a day for 30 day(s)     • lisinopril 2.5 mg tablet Take 1 tablet (2.5 mg) by mouth once daily.     • metoprolol succinate XL (Toprol-XL) 25 mg 24 hr tablet Take 1 tablet (25 mg) by mouth 2 times a day.     • multivitamin (MULTIPLE VITAMINS ORAL) Take 1 capsule by mouth once daily.     • NON FORMULARY Instaflex Advanced     • omeprazole (PriLOSEC) 20 mg DR capsule Take 1 capsule (20 mg) by mouth once daily.     • sertraline (Zoloft) 100 mg tablet Take 1.5 tablets (150 mg) by mouth once daily.       No current facility-administered medications for this visit.      reports that she has never smoked. She has never been exposed to tobacco smoke. She has never used smokeless tobacco. She reports that she does not drink alcohol and does not use drugs.  Iodinated contrast media  Angina pectoris (CMS/[facility])    Vitals:    01/29/24 1322   BP: 134/58   Pulse: 61   Resp: 18   Temp: 37.2 °C (98.9 °F)   TempSrc: Core   SpO2: 98%   Weight: 68.3 kg (150 lb 9.6 oz)   Height: 1.626 m (5' 4\")   PainSc: 0-No pain      BMI:Body mass index is 25.85 kg/m².   General Cardiology:  General Appearance: Alert, oriented and in no acute distress.  HEENT: extra ocular movements intact (EOMI), pupils equal,  round, reactive to light and accommodation (PERRLA).  Carotid Upstroke: no bruit, normal.  Jugular Venous Distention (JVD): flat.  Chest: normal.  Lungs: Clear to auscultation,   Heart Sounds: no S3 or S4, normal S1, S2, regular rate.  Murmur, Click, Gallop: no systolic murmur.  Abdomen: no hepatomegaly, no masses felt, soft.  Extremities: no leg edema.  Peripheral pulses: 2 plus " bilateral.  NEUROLOGY Cranial nerves II-XII grossly intact.     Patient Active Problem List   Diagnosis   • Abnormal EKG   • Angina pectoris (CMS/HCC)   • Burping   • Presence of cardiac pacemaker   • Neck pain   • Contusion of left shoulder   • Atherosclerotic heart disease of native coronary artery with other forms of angina pectoris (CMS/HCC)   • Coronary arteriosclerosis   • Atherosclerosis of coronary artery   • Diverticulitis of large intestine   • Recurrent falls   • Hematoma   • Hyperlipidemia   • Hypothyroidism   • Primary hypertension   • Hypercholesterolemia   • Shortness of breath   • Sick sinus syndrome (CMS/HCC)   • Sinus bradycardia   • Disorder involving thrombocytopenia (CMS/HCC)   • Anemia of chronic disease   • Chronic kidney failure, stage 3 (moderate) (CMS/HCC)   • Hyperkalemia   • Left flank pain   • Stage 3a chronic kidney disease (CMS/HCC)   • Arthralgia of wrist   • Candidiasis of skin   • Common cold   • Overweight with body mass index (BMI) 25.0-29.9   • Impaired fasting glucose       Problem List Items Addressed This Visit          Cardiac and Vasculature    Angina pectoris (CMS/HCC) - Primary    Presence of cardiac pacemaker    Atherosclerotic heart disease of native coronary artery with other forms of angina pectoris (CMS/HCC)    Hyperlipidemia    Primary hypertension    Sick sinus syndrome (CMS/HCC)      History of CAD patient with history of close CAD, hypertension hyperlipidemia sick sinus syndrome.  No active angina or CHF signs symptoms.  Stable cardiac perspective.  Currently on a rate control medication.  Also statin therapy.  Patient to take baby aspirin 81 mg once a day.  Modify risk factor.  Diet and exercise reviewed with patient..advice to walk about 10,000 steps or about 2 hours during day time. Cut back on salt, sugar and flour.    Darrius Ibrahim MD

## 2024-01-29 NOTE — LETTER
January 29, 2024       No Recipients    Patient: Linette Doyle   YOB: 1940   Date of Visit: 1/29/2024       Dear Dr. Palomino Recipients:    Thank you for referring Linette Doyle to me for evaluation. Below are my notes for this consultation.  If you have questions, please do not hesitate to call me. I look forward to following your patient along with you.       Sincerely,     Darrius Ibrahim MD      CC:   No Recipients  ______________________________________________________________________________________    Subjective  Chief Complaint   Patient presents with   • Follow-up     Mrs\Ms. Doyle is present for her 6 month Follow up with Dr. Ibrahim         83-year-old female with a multiple comorbid condition history of for hypertension, hyperlipidemia history of sick sinus syndrome history of permanent pacemaker placement past history of PCI in the past currently on lisinopril, levothyroxine, aspirin, beta-blocker and diltiazem with Lipitor.  No active angina or CHF signs symptoms.  Stable cardiac perspective.  Fairly active.    Past Medical History:   Diagnosis Date   • Angina pectoris (CMS/HCC) 10/22/2023   • Atherosclerosis of coronary artery 08/21/2019   • Atherosclerotic heart disease of native coronary artery with other forms of angina pectoris (CMS/HCC) 10/22/2023   • Hypercholesterolemia 12/28/2018   • Hyperlipidemia 10/22/2023   • Presence of cardiac pacemaker 10/22/2023   • Primary hypertension 12/28/2018   • Shortness of breath 11/26/2019   • Sick sinus syndrome (CMS/HCC) 10/22/2023   • Sinus bradycardia 10/22/2023   • Stage 3a chronic kidney disease (CMS/HCC) 11/25/2019     History reviewed. No pertinent surgical history.  No relevant family history has been documented for this patient.  Current Outpatient Medications   Medication Sig Dispense Refill   • cholecalciferol (Vitamin D-3) 50 MCG (2000 UT) tablet Take 1 tablet (2,000 Units) by mouth once daily.     • clopidogrel (Plavix) 75 mg tablet Take  "1 tablet (75 mg) by mouth once daily.     • dilTIAZem (Cardizem) 120 mg immediate release tablet Take 1 tablet (120 mg) by mouth 2 times a day.     • levothyroxine (Synthroid, Levoxyl) 100 mcg tablet Take 1 tablet (100 mcg) by mouth once daily. 1 tablet in the morning on an empty stomach Orally Once a day for 30 day(s)     • lisinopril 2.5 mg tablet Take 1 tablet (2.5 mg) by mouth once daily.     • metoprolol succinate XL (Toprol-XL) 25 mg 24 hr tablet Take 1 tablet (25 mg) by mouth 2 times a day.     • multivitamin (MULTIPLE VITAMINS ORAL) Take 1 capsule by mouth once daily.     • NON FORMULARY Instaflex Advanced     • omeprazole (PriLOSEC) 20 mg DR capsule Take 1 capsule (20 mg) by mouth once daily.     • sertraline (Zoloft) 100 mg tablet Take 1.5 tablets (150 mg) by mouth once daily.       No current facility-administered medications for this visit.      reports that she has never smoked. She has never been exposed to tobacco smoke. She has never used smokeless tobacco. She reports that she does not drink alcohol and does not use drugs.  Iodinated contrast media  Angina pectoris (CMS/[facility])    Vitals:    01/29/24 1322   BP: 134/58   Pulse: 61   Resp: 18   Temp: 37.2 °C (98.9 °F)   TempSrc: Core   SpO2: 98%   Weight: 68.3 kg (150 lb 9.6 oz)   Height: 1.626 m (5' 4\")   PainSc: 0-No pain      BMI:Body mass index is 25.85 kg/m².   General Cardiology:  General Appearance: Alert, oriented and in no acute distress.  HEENT: extra ocular movements intact (EOMI), pupils equal,  round, reactive to light and accommodation (PERRLA).  Carotid Upstroke: no bruit, normal.  Jugular Venous Distention (JVD): flat.  Chest: normal.  Lungs: Clear to auscultation,   Heart Sounds: no S3 or S4, normal S1, S2, regular rate.  Murmur, Click, Gallop: no systolic murmur.  Abdomen: no hepatomegaly, no masses felt, soft.  Extremities: no leg edema.  Peripheral pulses: 2 plus bilateral.  NEUROLOGY Cranial nerves II-XII grossly intact.     Patient " Active Problem List   Diagnosis   • Abnormal EKG   • Angina pectoris (CMS/HCC)   • Burping   • Presence of cardiac pacemaker   • Neck pain   • Contusion of left shoulder   • Atherosclerotic heart disease of native coronary artery with other forms of angina pectoris (CMS/HCC)   • Coronary arteriosclerosis   • Atherosclerosis of coronary artery   • Diverticulitis of large intestine   • Recurrent falls   • Hematoma   • Hyperlipidemia   • Hypothyroidism   • Primary hypertension   • Hypercholesterolemia   • Shortness of breath   • Sick sinus syndrome (CMS/HCC)   • Sinus bradycardia   • Disorder involving thrombocytopenia (CMS/HCC)   • Anemia of chronic disease   • Chronic kidney failure, stage 3 (moderate) (CMS/HCC)   • Hyperkalemia   • Left flank pain   • Stage 3a chronic kidney disease (CMS/HCC)   • Arthralgia of wrist   • Candidiasis of skin   • Common cold   • Overweight with body mass index (BMI) 25.0-29.9   • Impaired fasting glucose       Problem List Items Addressed This Visit          Cardiac and Vasculature    Angina pectoris (CMS/HCC) - Primary    Presence of cardiac pacemaker    Atherosclerotic heart disease of native coronary artery with other forms of angina pectoris (CMS/HCC)    Hyperlipidemia    Primary hypertension    Sick sinus syndrome (CMS/HCC)      History of CAD patient with history of close CAD, hypertension hyperlipidemia sick sinus syndrome.  No active angina or CHF signs symptoms.  Stable cardiac perspective.  Currently on a rate control medication.  Also statin therapy.  Patient to take baby aspirin 81 mg once a day.  Modify risk factor.  Diet and exercise reviewed with patient..advice to walk about 10,000 steps or about 2 hours during day time. Cut back on salt, sugar and flour.    Darrius Ibrahim MD

## 2024-01-29 NOTE — PROGRESS NOTES
Subjective   Chief Complaint   Patient presents with    Follow-up     Mrs\Ms. Doyle is present for her 6 month Follow up with Dr. Ibrahim         83-year-old female with a multiple comorbid condition history of for hypertension, hyperlipidemia history of sick sinus syndrome history of permanent pacemaker placement past history of PCI in the past currently on lisinopril, levothyroxine, aspirin, beta-blocker and diltiazem with Lipitor.  No active angina or CHF signs symptoms.  Stable cardiac perspective.  Fairly active.    Past Medical History:   Diagnosis Date    Angina pectoris (CMS/[facility]) 10/22/2023    Atherosclerosis of coronary artery 08/21/2019    Atherosclerotic heart disease of native coronary artery with other forms of angina pectoris (CMS/[facility]) 10/22/2023    Hypercholesterolemia 12/28/2018    Hyperlipidemia 10/22/2023    Presence of cardiac pacemaker 10/22/2023    Primary hypertension 12/28/2018    Shortness of breath 11/26/2019    Sick sinus syndrome (CMS/[facility]) 10/22/2023    Sinus bradycardia 10/22/2023    Stage 3a chronic kidney disease (CMS/[facility]) 11/25/2019     History reviewed. No pertinent surgical history.  No relevant family history has been documented for this patient.  Current Outpatient Medications   Medication Sig Dispense Refill    cholecalciferol (Vitamin D-3) 50 MCG (2000 UT) tablet Take 1 tablet (2,000 Units) by mouth once daily.      clopidogrel (Plavix) 75 mg tablet Take 1 tablet (75 mg) by mouth once daily.      dilTIAZem (Cardizem) 120 mg immediate release tablet Take 1 tablet (120 mg) by mouth 2 times a day.      levothyroxine (Synthroid, Levoxyl) 100 mcg tablet Take 1 tablet (100 mcg) by mouth once daily. 1 tablet in the morning on an empty stomach Orally Once a day for 30 day(s)      lisinopril 2.5 mg tablet Take 1 tablet (2.5 mg) by mouth once daily.      metoprolol succinate XL (Toprol-XL) 25 mg 24 hr tablet Take 1 tablet (25 mg) by mouth 2 times a day.      multivitamin (MULTIPLE VITAMINS  "ORAL) Take 1 capsule by mouth once daily.      NON FORMULARY Instaflex Advanced      omeprazole (PriLOSEC) 20 mg DR capsule Take 1 capsule (20 mg) by mouth once daily.      sertraline (Zoloft) 100 mg tablet Take 1.5 tablets (150 mg) by mouth once daily.       No current facility-administered medications for this visit.      reports that she has never smoked. She has never been exposed to tobacco smoke. She has never used smokeless tobacco. She reports that she does not drink alcohol and does not use drugs.  Iodinated contrast media  Angina pectoris (CMS/HCC)    Vitals:    01/29/24 1322   BP: 134/58   Pulse: 61   Resp: 18   Temp: 37.2 °C (98.9 °F)   TempSrc: Core   SpO2: 98%   Weight: 68.3 kg (150 lb 9.6 oz)   Height: 1.626 m (5' 4\")   PainSc: 0-No pain      BMI:Body mass index is 25.85 kg/m².   General Cardiology:  General Appearance: Alert, oriented and in no acute distress.  HEENT: extra ocular movements intact (EOMI), pupils equal,  round, reactive to light and accommodation (PERRLA).  Carotid Upstroke: no bruit, normal.  Jugular Venous Distention (JVD): flat.  Chest: normal.  Lungs: Clear to auscultation,   Heart Sounds: no S3 or S4, normal S1, S2, regular rate.  Murmur, Click, Gallop: no systolic murmur.  Abdomen: no hepatomegaly, no masses felt, soft.  Extremities: no leg edema.  Peripheral pulses: 2 plus bilateral.  NEUROLOGY Cranial nerves II-XII grossly intact.     Patient Active Problem List   Diagnosis    Abnormal EKG    Angina pectoris (CMS/HCC)    Burping    Presence of cardiac pacemaker    Neck pain    Contusion of left shoulder    Atherosclerotic heart disease of native coronary artery with other forms of angina pectoris (CMS/HCC)    Coronary arteriosclerosis    Atherosclerosis of coronary artery    Diverticulitis of large intestine    Recurrent falls    Hematoma    Hyperlipidemia    Hypothyroidism    Primary hypertension    Hypercholesterolemia    Shortness of breath    Sick sinus syndrome (CMS/HCC) "    Sinus bradycardia    Disorder involving thrombocytopenia (CMS/HCC)    Anemia of chronic disease    Chronic kidney failure, stage 3 (moderate) (CMS/HCC)    Hyperkalemia    Left flank pain    Stage 3a chronic kidney disease (CMS/HCC)    Arthralgia of wrist    Candidiasis of skin    Common cold    Overweight with body mass index (BMI) 25.0-29.9    Impaired fasting glucose       Problem List Items Addressed This Visit          Cardiac and Vasculature    Angina pectoris (CMS/HCC) - Primary    Presence of cardiac pacemaker    Atherosclerotic heart disease of native coronary artery with other forms of angina pectoris (CMS/HCC)    Hyperlipidemia    Primary hypertension    Sick sinus syndrome (CMS/HCC)      History of CAD patient with history of close CAD, hypertension hyperlipidemia sick sinus syndrome.  No active angina or CHF signs symptoms.  Stable cardiac perspective.  Currently on a rate control medication.  Also statin therapy.  Patient to take baby aspirin 81 mg once a day.  Modify risk factor.  Diet and exercise reviewed with patient..advice to walk about 10,000 steps or about 2 hours during day time. Cut back on salt, sugar and flour.    Darrius Ibrahim MD

## 2024-01-31 ENCOUNTER — APPOINTMENT (OUTPATIENT)
Dept: RADIOLOGY | Facility: HOSPITAL | Age: 84
DRG: 321 | End: 2024-01-31
Payer: MEDICARE

## 2024-01-31 ENCOUNTER — HOSPITAL ENCOUNTER (INPATIENT)
Facility: HOSPITAL | Age: 84
LOS: 13 days | Discharge: HOME | DRG: 321 | End: 2024-02-14
Attending: INTERNAL MEDICINE | Admitting: INTERNAL MEDICINE
Payer: MEDICARE

## 2024-01-31 ENCOUNTER — TELEPHONE (OUTPATIENT)
Dept: PRIMARY CARE | Facility: CLINIC | Age: 84
End: 2024-01-31
Payer: MEDICARE

## 2024-01-31 ENCOUNTER — APPOINTMENT (OUTPATIENT)
Dept: CARDIOLOGY | Facility: HOSPITAL | Age: 84
DRG: 321 | End: 2024-01-31
Payer: MEDICARE

## 2024-01-31 DIAGNOSIS — I25.118 ATHEROSCLEROTIC HEART DISEASE OF NATIVE CORONARY ARTERY WITH OTHER FORMS OF ANGINA PECTORIS (CMS-HCC): ICD-10-CM

## 2024-01-31 DIAGNOSIS — N18.30 CHRONIC KIDNEY FAILURE, STAGE 3 (MODERATE) (MULTI): ICD-10-CM

## 2024-01-31 DIAGNOSIS — R06.02 SHORTNESS OF BREATH: ICD-10-CM

## 2024-01-31 DIAGNOSIS — I50.31 ACUTE DIASTOLIC (CONGESTIVE) HEART FAILURE (MULTI): ICD-10-CM

## 2024-01-31 DIAGNOSIS — I22.2 SUBSEQUENT NON-ST ELEVATION (NSTEMI) MYOCARDIAL INFARCTION (MULTI): ICD-10-CM

## 2024-01-31 DIAGNOSIS — I25.110 ATHEROSCLEROSIS OF NATIVE CORONARY ARTERY OF NATIVE HEART WITH UNSTABLE ANGINA PECTORIS (MULTI): ICD-10-CM

## 2024-01-31 DIAGNOSIS — I48.20 CHRONIC ATRIAL FIBRILLATION, UNSPECIFIED (MULTI): ICD-10-CM

## 2024-01-31 DIAGNOSIS — I25.10 CORONARY ARTERIOSCLEROSIS: Primary | ICD-10-CM

## 2024-01-31 DIAGNOSIS — I25.112 ATHEROSCLEROTIC HEART DISEASE OF NATIVE CORONARY ARTERY WITH REFRACTORY ANGINA PECTORIS (CMS-HCC): ICD-10-CM

## 2024-01-31 DIAGNOSIS — I21.4 NSTEMI (NON-ST ELEVATED MYOCARDIAL INFARCTION) (MULTI): ICD-10-CM

## 2024-01-31 DIAGNOSIS — I20.9 ANGINA PECTORIS (CMS-HCC): ICD-10-CM

## 2024-01-31 LAB
ALBUMIN SERPL-MCNC: 4.7 G/DL (ref 3.5–5)
ALP BLD-CCNC: 101 U/L (ref 35–125)
ALT SERPL-CCNC: 19 U/L (ref 5–40)
ANION GAP SERPL CALC-SCNC: 15 MMOL/L
APTT PPP: 23.2 SECONDS (ref 22–32.5)
AST SERPL-CCNC: 59 U/L (ref 5–40)
BILIRUB SERPL-MCNC: 0.6 MG/DL (ref 0.1–1.2)
BUN SERPL-MCNC: 21 MG/DL (ref 8–25)
CALCIUM SERPL-MCNC: 9.6 MG/DL (ref 8.5–10.4)
CHLORIDE SERPL-SCNC: 100 MMOL/L (ref 97–107)
CO2 SERPL-SCNC: 25 MMOL/L (ref 24–31)
CREAT SERPL-MCNC: 1.4 MG/DL (ref 0.4–1.6)
EGFRCR SERPLBLD CKD-EPI 2021: 37 ML/MIN/1.73M*2
ERYTHROCYTE [DISTWIDTH] IN BLOOD BY AUTOMATED COUNT: 13.3 % (ref 11.5–14.5)
ERYTHROCYTE [DISTWIDTH] IN BLOOD BY AUTOMATED COUNT: 13.5 % (ref 11.5–14.5)
FLUAV RNA RESP QL NAA+PROBE: NOT DETECTED
FLUBV RNA RESP QL NAA+PROBE: NOT DETECTED
GLUCOSE SERPL-MCNC: 147 MG/DL (ref 65–99)
HCT VFR BLD AUTO: 39.4 % (ref 36–46)
HCT VFR BLD AUTO: 39.5 % (ref 36–46)
HGB BLD-MCNC: 12.9 G/DL (ref 12–16)
HGB BLD-MCNC: 12.9 G/DL (ref 12–16)
MCH RBC QN AUTO: 28.9 PG (ref 26–34)
MCH RBC QN AUTO: 29.1 PG (ref 26–34)
MCHC RBC AUTO-ENTMCNC: 32.7 G/DL (ref 32–36)
MCHC RBC AUTO-ENTMCNC: 32.7 G/DL (ref 32–36)
MCV RBC AUTO: 89 FL (ref 80–100)
MCV RBC AUTO: 89 FL (ref 80–100)
NRBC BLD-RTO: 0 /100 WBCS (ref 0–0)
NRBC BLD-RTO: 0 /100 WBCS (ref 0–0)
NT-PROBNP SERPL-MCNC: 1524 PG/ML (ref 0–624)
PLATELET # BLD AUTO: 162 X10*3/UL (ref 150–450)
PLATELET # BLD AUTO: 168 X10*3/UL (ref 150–450)
POTASSIUM SERPL-SCNC: 4.4 MMOL/L (ref 3.4–5.1)
PROT SERPL-MCNC: 7.5 G/DL (ref 5.9–7.9)
RBC # BLD AUTO: 4.44 X10*6/UL (ref 4–5.2)
RBC # BLD AUTO: 4.46 X10*6/UL (ref 4–5.2)
RSV RNA RESP QL NAA+PROBE: NOT DETECTED
SARS-COV-2 RNA RESP QL NAA+PROBE: NOT DETECTED
SODIUM SERPL-SCNC: 140 MMOL/L (ref 133–145)
TROPONIN T SERPL-MCNC: 351 NG/L
WBC # BLD AUTO: 12.2 X10*3/UL (ref 4.4–11.3)
WBC # BLD AUTO: 13.7 X10*3/UL (ref 4.4–11.3)

## 2024-01-31 PROCEDURE — 96375 TX/PRO/DX INJ NEW DRUG ADDON: CPT

## 2024-01-31 PROCEDURE — 84484 ASSAY OF TROPONIN QUANT: CPT | Performed by: NURSE PRACTITIONER

## 2024-01-31 PROCEDURE — 93005 ELECTROCARDIOGRAM TRACING: CPT

## 2024-01-31 PROCEDURE — 71045 X-RAY EXAM CHEST 1 VIEW: CPT | Mod: FR

## 2024-01-31 PROCEDURE — 80053 COMPREHEN METABOLIC PANEL: CPT | Performed by: NURSE PRACTITIONER

## 2024-01-31 PROCEDURE — 87637 SARSCOV2&INF A&B&RSV AMP PRB: CPT | Performed by: NURSE PRACTITIONER

## 2024-01-31 PROCEDURE — 99285 EMERGENCY DEPT VISIT HI MDM: CPT

## 2024-01-31 PROCEDURE — 85027 COMPLETE CBC AUTOMATED: CPT | Performed by: NURSE PRACTITIONER

## 2024-01-31 PROCEDURE — 2500000004 HC RX 250 GENERAL PHARMACY W/ HCPCS (ALT 636 FOR OP/ED): Performed by: NURSE PRACTITIONER

## 2024-01-31 PROCEDURE — 96374 THER/PROPH/DIAG INJ IV PUSH: CPT

## 2024-01-31 PROCEDURE — 85730 THROMBOPLASTIN TIME PARTIAL: CPT | Performed by: NURSE PRACTITIONER

## 2024-01-31 PROCEDURE — 83880 ASSAY OF NATRIURETIC PEPTIDE: CPT | Performed by: NURSE PRACTITIONER

## 2024-01-31 PROCEDURE — 36415 COLL VENOUS BLD VENIPUNCTURE: CPT | Performed by: NURSE PRACTITIONER

## 2024-01-31 PROCEDURE — 71045 X-RAY EXAM CHEST 1 VIEW: CPT | Mod: FOREIGN READ | Performed by: RADIOLOGY

## 2024-01-31 RX ORDER — HEPARIN SODIUM 5000 [USP'U]/ML
4000 INJECTION, SOLUTION INTRAVENOUS; SUBCUTANEOUS ONCE
Status: COMPLETED | OUTPATIENT
Start: 2024-01-31 | End: 2024-01-31

## 2024-01-31 RX ORDER — FAMOTIDINE 10 MG/ML
20 INJECTION INTRAVENOUS ONCE
Status: COMPLETED | OUTPATIENT
Start: 2024-01-31 | End: 2024-01-31

## 2024-01-31 RX ORDER — HEPARIN SODIUM 10000 [USP'U]/100ML
0-4000 INJECTION, SOLUTION INTRAVENOUS CONTINUOUS
Status: DISCONTINUED | OUTPATIENT
Start: 2024-01-31 | End: 2024-02-02

## 2024-01-31 RX ORDER — HEPARIN SODIUM 5000 [USP'U]/ML
INJECTION, SOLUTION INTRAVENOUS; SUBCUTANEOUS AS NEEDED
Status: DISCONTINUED | OUTPATIENT
Start: 2024-01-31 | End: 2024-02-06

## 2024-01-31 RX ORDER — ONDANSETRON HYDROCHLORIDE 2 MG/ML
4 INJECTION, SOLUTION INTRAVENOUS ONCE
Status: COMPLETED | OUTPATIENT
Start: 2024-01-31 | End: 2024-01-31

## 2024-01-31 RX ADMIN — FAMOTIDINE 20 MG: 10 INJECTION INTRAVENOUS at 21:33

## 2024-01-31 RX ADMIN — HEPARIN SODIUM 4000 UNITS: 5000 INJECTION, SOLUTION INTRAVENOUS; SUBCUTANEOUS at 23:44

## 2024-01-31 RX ADMIN — HEPARIN SODIUM 800 UNITS/HR: 10000 INJECTION, SOLUTION INTRAVENOUS at 23:43

## 2024-01-31 RX ADMIN — ONDANSETRON 4 MG: 2 INJECTION INTRAMUSCULAR; INTRAVENOUS at 21:33

## 2024-01-31 ASSESSMENT — PAIN DESCRIPTION - LOCATION: LOCATION: CHEST

## 2024-01-31 ASSESSMENT — PAIN - FUNCTIONAL ASSESSMENT: PAIN_FUNCTIONAL_ASSESSMENT: 0-10

## 2024-01-31 ASSESSMENT — LIFESTYLE VARIABLES
HAVE YOU EVER FELT YOU SHOULD CUT DOWN ON YOUR DRINKING: NO
HAVE PEOPLE ANNOYED YOU BY CRITICIZING YOUR DRINKING: NO
EVER HAD A DRINK FIRST THING IN THE MORNING TO STEADY YOUR NERVES TO GET RID OF A HANGOVER: NO
EVER FELT BAD OR GUILTY ABOUT YOUR DRINKING: NO

## 2024-01-31 ASSESSMENT — PAIN DESCRIPTION - PAIN TYPE: TYPE: ACUTE PAIN

## 2024-01-31 NOTE — TELEPHONE ENCOUNTER
Pt called said has had discomfort in her chest as of today right side was at her heart Dr Monday all was ok  she took 3 tums [place] better she ate 2 donuts, milk and a orange, little SOB said also was scammed this week and the police just left her house    Spoke to Layne advised to call cardiologist or go to ER    Call was lost , called pt back 3 x no answer

## 2024-02-01 ENCOUNTER — APPOINTMENT (OUTPATIENT)
Dept: CARDIOLOGY | Facility: HOSPITAL | Age: 84
DRG: 321 | End: 2024-02-01
Payer: MEDICARE

## 2024-02-01 PROBLEM — E78.5 HYPERLIPIDEMIA: Status: RESOLVED | Noted: 2023-10-22 | Resolved: 2024-02-01

## 2024-02-01 PROBLEM — I21.4 NSTEMI (NON-ST ELEVATED MYOCARDIAL INFARCTION) (MULTI): Status: ACTIVE | Noted: 2024-02-01

## 2024-02-01 PROBLEM — R14.2 BURPING: Status: RESOLVED | Noted: 2019-09-05 | Resolved: 2024-02-01

## 2024-02-01 PROBLEM — D72.829 LEUKOCYTOSIS: Status: ACTIVE | Noted: 2024-02-01

## 2024-02-01 LAB
ANION GAP SERPL CALC-SCNC: 13 MMOL/L
APTT PPP: 32.1 SECONDS (ref 22–32.5)
APTT PPP: 43.3 SECONDS (ref 22–32.5)
APTT PPP: 85.1 SECONDS (ref 22–32.5)
ATRIAL RATE: 60 BPM
BUN SERPL-MCNC: 20 MG/DL (ref 8–25)
CALCIUM SERPL-MCNC: 9.6 MG/DL (ref 8.5–10.4)
CHLORIDE SERPL-SCNC: 103 MMOL/L (ref 97–107)
CO2 SERPL-SCNC: 24 MMOL/L (ref 24–31)
CREAT SERPL-MCNC: 1.3 MG/DL (ref 0.4–1.6)
EGFRCR SERPLBLD CKD-EPI 2021: 40 ML/MIN/1.73M*2
ERYTHROCYTE [DISTWIDTH] IN BLOOD BY AUTOMATED COUNT: 13.4 % (ref 11.5–14.5)
EST. AVERAGE GLUCOSE BLD GHB EST-MCNC: 126 MG/DL
GLUCOSE SERPL-MCNC: 133 MG/DL (ref 65–99)
HBA1C MFR BLD: 6 %
HCT VFR BLD AUTO: 42.7 % (ref 36–46)
HGB BLD-MCNC: 13.8 G/DL (ref 12–16)
MCH RBC QN AUTO: 28.6 PG (ref 26–34)
MCHC RBC AUTO-ENTMCNC: 32.3 G/DL (ref 32–36)
MCV RBC AUTO: 88 FL (ref 80–100)
NRBC BLD-RTO: 0 /100 WBCS (ref 0–0)
P AXIS: 103 DEGREES
P OFFSET: 153 MS
P ONSET: 128 MS
PLATELET # BLD AUTO: 144 X10*3/UL (ref 150–450)
POTASSIUM SERPL-SCNC: 4.1 MMOL/L (ref 3.4–5.1)
PR INTERVAL: 212 MS
Q ONSET: 224 MS
QRS COUNT: 10 BEATS
QRS DURATION: 84 MS
QT INTERVAL: 432 MS
QTC CALCULATION(BAZETT): 432 MS
QTC FREDERICIA: 432 MS
R AXIS: 6 DEGREES
RBC # BLD AUTO: 4.83 X10*6/UL (ref 4–5.2)
SODIUM SERPL-SCNC: 140 MMOL/L (ref 133–145)
T AXIS: 67 DEGREES
T OFFSET: 440 MS
TROPONIN T SERPL-MCNC: 466 NG/L
TROPONIN T SERPL-MCNC: 650 NG/L
VENTRICULAR RATE: 60 BPM
WBC # BLD AUTO: 12.4 X10*3/UL (ref 4.4–11.3)

## 2024-02-01 PROCEDURE — 83036 HEMOGLOBIN GLYCOSYLATED A1C: CPT | Performed by: INTERNAL MEDICINE

## 2024-02-01 PROCEDURE — 85730 THROMBOPLASTIN TIME PARTIAL: CPT | Performed by: INTERNAL MEDICINE

## 2024-02-01 PROCEDURE — 99223 1ST HOSP IP/OBS HIGH 75: CPT | Performed by: INTERNAL MEDICINE

## 2024-02-01 PROCEDURE — 36415 COLL VENOUS BLD VENIPUNCTURE: CPT | Performed by: INTERNAL MEDICINE

## 2024-02-01 PROCEDURE — 85027 COMPLETE CBC AUTOMATED: CPT | Performed by: INTERNAL MEDICINE

## 2024-02-01 PROCEDURE — 2500000001 HC RX 250 WO HCPCS SELF ADMINISTERED DRUGS (ALT 637 FOR MEDICARE OP): Performed by: INTERNAL MEDICINE

## 2024-02-01 PROCEDURE — 2500000004 HC RX 250 GENERAL PHARMACY W/ HCPCS (ALT 636 FOR OP/ED): Mod: MUE | Performed by: INTERNAL MEDICINE

## 2024-02-01 PROCEDURE — 2060000001 HC INTERMEDIATE ICU ROOM DAILY

## 2024-02-01 PROCEDURE — 2500000004 HC RX 250 GENERAL PHARMACY W/ HCPCS (ALT 636 FOR OP/ED): Performed by: INTERNAL MEDICINE

## 2024-02-01 PROCEDURE — 93306 TTE W/DOPPLER COMPLETE: CPT

## 2024-02-01 PROCEDURE — C9113 INJ PANTOPRAZOLE SODIUM, VIA: HCPCS | Performed by: INTERNAL MEDICINE

## 2024-02-01 PROCEDURE — 84484 ASSAY OF TROPONIN QUANT: CPT | Performed by: NURSE PRACTITIONER

## 2024-02-01 PROCEDURE — 80048 BASIC METABOLIC PNL TOTAL CA: CPT | Performed by: INTERNAL MEDICINE

## 2024-02-01 PROCEDURE — 2500000004 HC RX 250 GENERAL PHARMACY W/ HCPCS (ALT 636 FOR OP/ED): Performed by: NURSE PRACTITIONER

## 2024-02-01 PROCEDURE — 93306 TTE W/DOPPLER COMPLETE: CPT | Performed by: INTERNAL MEDICINE

## 2024-02-01 RX ORDER — ACETAMINOPHEN 650 MG/1
650 SUPPOSITORY RECTAL EVERY 4 HOURS PRN
Status: DISCONTINUED | OUTPATIENT
Start: 2024-02-01 | End: 2024-02-14 | Stop reason: HOSPADM

## 2024-02-01 RX ORDER — BISMUTH SUBSALICYLATE 262 MG
1 TABLET,CHEWABLE ORAL DAILY
Status: DISCONTINUED | OUTPATIENT
Start: 2024-02-01 | End: 2024-02-14 | Stop reason: HOSPADM

## 2024-02-01 RX ORDER — LISINOPRIL 20 MG/1
20 TABLET ORAL DAILY
Status: DISCONTINUED | OUTPATIENT
Start: 2024-02-01 | End: 2024-02-03

## 2024-02-01 RX ORDER — LEVOTHYROXINE SODIUM 100 UG/1
100 TABLET ORAL
Status: DISCONTINUED | OUTPATIENT
Start: 2024-02-01 | End: 2024-02-14 | Stop reason: HOSPADM

## 2024-02-01 RX ORDER — ATORVASTATIN CALCIUM 80 MG/1
80 TABLET, FILM COATED ORAL NIGHTLY
Status: DISCONTINUED | OUTPATIENT
Start: 2024-02-01 | End: 2024-02-14 | Stop reason: HOSPADM

## 2024-02-01 RX ORDER — ACETAMINOPHEN 325 MG/1
650 TABLET ORAL EVERY 4 HOURS PRN
Status: DISCONTINUED | OUTPATIENT
Start: 2024-02-01 | End: 2024-02-14 | Stop reason: HOSPADM

## 2024-02-01 RX ORDER — POLYETHYLENE GLYCOL 3350 17 G/17G
17 POWDER, FOR SOLUTION ORAL DAILY PRN
Status: DISCONTINUED | OUTPATIENT
Start: 2024-02-01 | End: 2024-02-10

## 2024-02-01 RX ORDER — METOPROLOL SUCCINATE 25 MG/1
25 TABLET, EXTENDED RELEASE ORAL 2 TIMES DAILY
Status: DISCONTINUED | OUTPATIENT
Start: 2024-02-01 | End: 2024-02-06

## 2024-02-01 RX ORDER — GUAIFENESIN/DEXTROMETHORPHAN 100-10MG/5
5 SYRUP ORAL EVERY 4 HOURS PRN
Status: DISCONTINUED | OUTPATIENT
Start: 2024-02-01 | End: 2024-02-14 | Stop reason: HOSPADM

## 2024-02-01 RX ORDER — CLOPIDOGREL BISULFATE 75 MG/1
75 TABLET ORAL DAILY
Status: DISCONTINUED | OUTPATIENT
Start: 2024-02-01 | End: 2024-02-06

## 2024-02-01 RX ORDER — PANTOPRAZOLE SODIUM 40 MG/1
40 TABLET, DELAYED RELEASE ORAL
Status: DISCONTINUED | OUTPATIENT
Start: 2024-02-01 | End: 2024-02-14 | Stop reason: HOSPADM

## 2024-02-01 RX ORDER — CHOLECALCIFEROL (VITAMIN D3) 50 MCG
2000 TABLET ORAL DAILY
Status: DISCONTINUED | OUTPATIENT
Start: 2024-02-01 | End: 2024-02-14 | Stop reason: HOSPADM

## 2024-02-01 RX ORDER — DEXTROSE 50 % IN WATER (D50W) INTRAVENOUS SYRINGE
25
Status: DISCONTINUED | OUTPATIENT
Start: 2024-02-01 | End: 2024-02-14 | Stop reason: HOSPADM

## 2024-02-01 RX ORDER — DILTIAZEM HYDROCHLORIDE 60 MG/1
120 TABLET, FILM COATED ORAL 2 TIMES DAILY
Status: DISCONTINUED | OUTPATIENT
Start: 2024-02-01 | End: 2024-02-03

## 2024-02-01 RX ORDER — LISINOPRIL 2.5 MG/1
2.5 TABLET ORAL DAILY
Status: DISCONTINUED | OUTPATIENT
Start: 2024-02-01 | End: 2024-02-01

## 2024-02-01 RX ORDER — SERTRALINE HYDROCHLORIDE 100 MG/1
100 TABLET, FILM COATED ORAL DAILY
Status: DISCONTINUED | OUTPATIENT
Start: 2024-02-01 | End: 2024-02-06

## 2024-02-01 RX ORDER — ONDANSETRON 4 MG/1
4 TABLET, ORALLY DISINTEGRATING ORAL EVERY 8 HOURS PRN
Status: DISCONTINUED | OUTPATIENT
Start: 2024-02-01 | End: 2024-02-14 | Stop reason: HOSPADM

## 2024-02-01 RX ORDER — GUAIFENESIN 600 MG/1
600 TABLET, EXTENDED RELEASE ORAL EVERY 12 HOURS PRN
Status: DISCONTINUED | OUTPATIENT
Start: 2024-02-01 | End: 2024-02-14 | Stop reason: HOSPADM

## 2024-02-01 RX ORDER — ATORVASTATIN CALCIUM 80 MG/1
80 TABLET, FILM COATED ORAL DAILY
COMMUNITY
End: 2024-02-14 | Stop reason: HOSPADM

## 2024-02-01 RX ORDER — ONDANSETRON HYDROCHLORIDE 2 MG/ML
4 INJECTION, SOLUTION INTRAVENOUS EVERY 8 HOURS PRN
Status: DISCONTINUED | OUTPATIENT
Start: 2024-02-01 | End: 2024-02-13

## 2024-02-01 RX ORDER — PANTOPRAZOLE SODIUM 40 MG/10ML
40 INJECTION, POWDER, LYOPHILIZED, FOR SOLUTION INTRAVENOUS DAILY
Status: DISCONTINUED | OUTPATIENT
Start: 2024-02-01 | End: 2024-02-01

## 2024-02-01 RX ORDER — CALCIUM CARBONATE 200(500)MG
500 TABLET,CHEWABLE ORAL 4 TIMES DAILY PRN
Status: DISCONTINUED | OUTPATIENT
Start: 2024-02-01 | End: 2024-02-05 | Stop reason: SDUPTHER

## 2024-02-01 RX ORDER — SERTRALINE HYDROCHLORIDE 50 MG/1
150 TABLET, FILM COATED ORAL DAILY
Status: DISCONTINUED | OUTPATIENT
Start: 2024-02-01 | End: 2024-02-01

## 2024-02-01 RX ORDER — DEXTROSE MONOHYDRATE 100 MG/ML
0.3 INJECTION, SOLUTION INTRAVENOUS ONCE AS NEEDED
Status: DISCONTINUED | OUTPATIENT
Start: 2024-02-01 | End: 2024-02-14 | Stop reason: HOSPADM

## 2024-02-01 RX ORDER — PANTOPRAZOLE SODIUM 40 MG/10ML
40 INJECTION, POWDER, LYOPHILIZED, FOR SOLUTION INTRAVENOUS ONCE
Status: COMPLETED | OUTPATIENT
Start: 2024-02-01 | End: 2024-02-01

## 2024-02-01 RX ORDER — NAPROXEN SODIUM 220 MG/1
81 TABLET, FILM COATED ORAL DAILY
Status: DISCONTINUED | OUTPATIENT
Start: 2024-02-01 | End: 2024-02-14 | Stop reason: HOSPADM

## 2024-02-01 RX ORDER — ACETAMINOPHEN 160 MG/5ML
650 SOLUTION ORAL EVERY 4 HOURS PRN
Status: DISCONTINUED | OUTPATIENT
Start: 2024-02-01 | End: 2024-02-14 | Stop reason: HOSPADM

## 2024-02-01 RX ORDER — SODIUM CHLORIDE 9 MG/ML
75 INJECTION, SOLUTION INTRAVENOUS CONTINUOUS
Status: ACTIVE | OUTPATIENT
Start: 2024-02-01 | End: 2024-02-01

## 2024-02-01 RX ADMIN — PANTOPRAZOLE SODIUM 40 MG: 40 TABLET, DELAYED RELEASE ORAL at 06:56

## 2024-02-01 RX ADMIN — METOPROLOL SUCCINATE 25 MG: 25 TABLET, EXTENDED RELEASE ORAL at 21:22

## 2024-02-01 RX ADMIN — METOPROLOL SUCCINATE 25 MG: 25 TABLET, EXTENDED RELEASE ORAL at 01:10

## 2024-02-01 RX ADMIN — CLOPIDOGREL BISULFATE 75 MG: 75 TABLET ORAL at 09:46

## 2024-02-01 RX ADMIN — MULTIVITAMIN TABLET 1 TABLET: TABLET at 08:17

## 2024-02-01 RX ADMIN — PANTOPRAZOLE SODIUM 40 MG: 40 INJECTION, POWDER, FOR SOLUTION INTRAVENOUS at 01:10

## 2024-02-01 RX ADMIN — SODIUM CHLORIDE 75 ML/HR: 900 INJECTION, SOLUTION INTRAVENOUS at 01:18

## 2024-02-01 RX ADMIN — LEVOTHYROXINE SODIUM 100 MCG: 0.1 TABLET ORAL at 06:56

## 2024-02-01 RX ADMIN — SERTRALINE HYDROCHLORIDE 150 MG: 50 TABLET ORAL at 08:17

## 2024-02-01 RX ADMIN — HEPARIN SODIUM 1500 UNITS: 5000 INJECTION, SOLUTION INTRAVENOUS; SUBCUTANEOUS at 06:41

## 2024-02-01 RX ADMIN — DILTIAZEM HYDROCHLORIDE 120 MG: 60 TABLET ORAL at 01:09

## 2024-02-01 RX ADMIN — HEPARIN SODIUM 3000 UNITS: 5000 INJECTION, SOLUTION INTRAVENOUS; SUBCUTANEOUS at 22:01

## 2024-02-01 RX ADMIN — HEPARIN SODIUM 900 UNITS/HR: 10000 INJECTION, SOLUTION INTRAVENOUS at 06:38

## 2024-02-01 RX ADMIN — DILTIAZEM HYDROCHLORIDE 120 MG: 60 TABLET ORAL at 21:22

## 2024-02-01 RX ADMIN — ASPIRIN 81 MG 81 MG: 81 TABLET ORAL at 08:17

## 2024-02-01 RX ADMIN — ATORVASTATIN CALCIUM 80 MG: 80 TABLET, FILM COATED ORAL at 21:22

## 2024-02-01 RX ADMIN — Medication 2000 UNITS: at 08:17

## 2024-02-01 SDOH — SOCIAL STABILITY: SOCIAL INSECURITY: WERE YOU ABLE TO COMPLETE ALL THE BEHAVIORAL HEALTH SCREENINGS?: YES

## 2024-02-01 SDOH — SOCIAL STABILITY: SOCIAL INSECURITY: ARE YOU OR HAVE YOU BEEN THREATENED OR ABUSED PHYSICALLY, EMOTIONALLY, OR SEXUALLY BY ANYONE?: YES

## 2024-02-01 SDOH — SOCIAL STABILITY: SOCIAL INSECURITY: DO YOU FEEL ANYONE HAS EXPLOITED OR TAKEN ADVANTAGE OF YOU FINANCIALLY OR OF YOUR PERSONAL PROPERTY?: YES

## 2024-02-01 SDOH — SOCIAL STABILITY: SOCIAL INSECURITY: HAS ANYONE EVER THREATENED TO HURT YOUR FAMILY OR YOUR PETS?: NO

## 2024-02-01 SDOH — SOCIAL STABILITY: SOCIAL INSECURITY: DO YOU FEEL UNSAFE GOING BACK TO THE PLACE WHERE YOU ARE LIVING?: NO

## 2024-02-01 SDOH — SOCIAL STABILITY: SOCIAL INSECURITY: ARE THERE ANY APPARENT SIGNS OF INJURIES/BEHAVIORS THAT COULD BE RELATED TO ABUSE/NEGLECT?: NO

## 2024-02-01 SDOH — SOCIAL STABILITY: SOCIAL INSECURITY: HAVE YOU HAD THOUGHTS OF HARMING ANYONE ELSE?: NO

## 2024-02-01 SDOH — SOCIAL STABILITY: SOCIAL INSECURITY: DOES ANYONE TRY TO KEEP YOU FROM HAVING/CONTACTING OTHER FRIENDS OR DOING THINGS OUTSIDE YOUR HOME?: NO

## 2024-02-01 ASSESSMENT — COGNITIVE AND FUNCTIONAL STATUS - GENERAL
PATIENT BASELINE BEDBOUND: NO
MOBILITY SCORE: 24
DAILY ACTIVITIY SCORE: 24
DRESSING REGULAR LOWER BODY CLOTHING: A LITTLE
DAILY ACTIVITIY SCORE: 24
MOBILITY SCORE: 24
MOBILITY SCORE: 24
DAILY ACTIVITIY SCORE: 23

## 2024-02-01 ASSESSMENT — LIFESTYLE VARIABLES
HOW OFTEN DO YOU HAVE A DRINK CONTAINING ALCOHOL: NEVER
SKIP TO QUESTIONS 9-10: 1
PRESCIPTION_ABUSE_PAST_12_MONTHS: NO
HOW MANY STANDARD DRINKS CONTAINING ALCOHOL DO YOU HAVE ON A TYPICAL DAY: PATIENT DOES NOT DRINK
SUBSTANCE_ABUSE_PAST_12_MONTHS: NO
HOW OFTEN DO YOU HAVE 6 OR MORE DRINKS ON ONE OCCASION: NEVER
AUDIT-C TOTAL SCORE: 0
AUDIT-C TOTAL SCORE: 0

## 2024-02-01 ASSESSMENT — PATIENT HEALTH QUESTIONNAIRE - PHQ9
SUM OF ALL RESPONSES TO PHQ9 QUESTIONS 1 & 2: 0
2. FEELING DOWN, DEPRESSED OR HOPELESS: NOT AT ALL
1. LITTLE INTEREST OR PLEASURE IN DOING THINGS: NOT AT ALL

## 2024-02-01 ASSESSMENT — ACTIVITIES OF DAILY LIVING (ADL)
JUDGMENT_ADEQUATE_SAFELY_COMPLETE_DAILY_ACTIVITIES: YES
ADEQUATE_TO_COMPLETE_ADL: YES
DRESSING YOURSELF: INDEPENDENT
BATHING: INDEPENDENT
FEEDING YOURSELF: INDEPENDENT
LACK_OF_TRANSPORTATION: NO
TOILETING: INDEPENDENT
WALKS IN HOME: INDEPENDENT
HEARING - RIGHT EAR: FUNCTIONAL
GROOMING: INDEPENDENT
PATIENT'S MEMORY ADEQUATE TO SAFELY COMPLETE DAILY ACTIVITIES?: YES
HEARING - LEFT EAR: FUNCTIONAL

## 2024-02-01 ASSESSMENT — PAIN SCALES - GENERAL
PAINLEVEL_OUTOF10: 0 - NO PAIN

## 2024-02-01 ASSESSMENT — PAIN - FUNCTIONAL ASSESSMENT: PAIN_FUNCTIONAL_ASSESSMENT: 0-10

## 2024-02-01 NOTE — NURSING NOTE
Patient arrived to [facility] from ED at 1515.  Ambulated to bed.  3L oxygen via n/c intact and functioning.  Denies chest pain/SOB.  Oriented to room and introduced to staff.  Discussed plan of care.  Plan is for heart cath tomorrow.  Report given to [facility] by ED nurse.  Low level heparin gtt infusing and rate/dose verified.  Call light and commonly used items in reach,.  Bed locked and in low position.

## 2024-02-01 NOTE — PROGRESS NOTES
Occupational Therapy                 Therapy Communication Note    Patient Name: Linette Doyle  MRN: [id number]  Today's Date: 2/1/2024     Discipline: Occupational Therapy    Missed Visit Reason: Cancel (Pt admitted with NSTEMI and chest pain. Labs show upward trending Troponins and Cardiology Consult has not yet been completed. Will hold OT eval and await further medical workup.)    Missed Time: Cancel

## 2024-02-01 NOTE — CONSULTS
Inpatient consult to Cardiology  Consult performed by: Becky Vela MD  Consult ordered by: Sal Ibrahim MD  Reason for consult: Given his elevation myocardial infarction, hypertension, hyperlipidemia        History Of Present Illness:    Linette Doyle is a 83 y.o. female presenting with chest pain.  Patient apparently was involved in an following scan and lost more than $12,000.  For the last week has been thinking so much of biopsied.  For the last 2 to 3 days has been having episodes of pressure vague in her chest moderate intensity associated with shortness of breath.  The pain has no aggravating or alleviating factors.  No nausea or vomiting.  Patient sitting at overruled.  When she arrived to the emergency room her EKG showed normal sinus rhythm nonspecific ST-T wave abnormalities.  Troponin elevated.  Patient has history of coronary disease status post remote PCI in the past sees Dr. Ibrahim.    Review of systems.  10 point review systems otherwise negative  .     Last Recorded Vitals:  Vitals:    02/01/24 0109 02/01/24 0652 02/01/24 0730 02/01/24 0821   BP: (!) 189/63 145/55  166/72   Pulse: 60 60 60 60   Resp:  16 16    Temp:       TempSrc:       SpO2: 95% (!) 92% 98%    Weight:       Height:           Last Labs:  CBC - 2/1/2024:  5:58 AM  12.4 13.8 144    42.7      CMP - 2/1/2024:  5:58 AM  9.6 7.5 59 --- 0.6   _ 4.7 19 101      PTT - 2/1/2024:  5:59 AM  _   _ 43.3     Hemoglobin A1C   Date/Time Value Ref Range Status   02/01/2024 05:58 AM 6.0 (H) See below % Final   07/05/2023 08:49 AM 5.6 4.0 - 6.0 % Final     Comment:     Hemoglobin A1C levels are related to mean blood glucose during the   preceding 2-3 months. The relationship table below may be used as a   general guide. Each 1% increase in HGB A1C is a reflection of an   increase in mean glucose of approximately 30 mg/dl.   Reference: Diabetes Care, volume 29, supplement 1 Jan. 2006                        HGB A1C ................. Approx. Mean  "Glucose   _______________________________________________   6%   ...............................  120 mg/dl   7%   ...............................  150 mg/dl   8%   ...............................  180 mg/dl   9%   ...............................  210 mg/dl   10%  ...............................  240 mg/dl  Performed at [address] [address]     12/14/2022 09:13 AM 5.7 4.0 - 6.0 % Final     Comment:     Hemoglobin A1C levels are related to mean blood glucose during the   preceding 2-3 months. The relationship table below may be used as a   general guide. Each 1% increase in HGB A1C is a reflection of an   increase in mean glucose of approximately 30 mg/dl.   Reference: Diabetes Care, volume 29, supplement 1 Jan. 2006                        HGB A1C ................. Approx. Mean Glucose   _______________________________________________   6%   ...............................  120 mg/dl   7%   ...............................  150 mg/dl   8%   ...............................  180 mg/dl   9%   ...............................  210 mg/dl   10%  ...............................  240 mg/dl  Performed at [address] [address]       LDL Calculated   Date/Time Value Ref Range Status   07/05/2023 08:49 AM 57 (L) 65 - 130 MG/DL Final   12/14/2022 09:13 AM 64 (L) 65 - 130 MG/DL Final   06/29/2022 08:43 AM 58 (L) 65 - 130 MG/DL Final      Last I/O:  No intake/output data recorded.    Past Cardiology Tests (Last 3 Years):  EKG:  ECG 12 Lead 01/31/2024    Echo:  No results found for this or any previous visit from the past 1095 days.    Ejection Fractions:  No results found for: \"EF\"  Cath:  No results found for this or any previous visit from the past 1095 days.    Stress Test:  No results found for this or any previous visit from the past 1095 days.    Cardiac Imaging:  No results found for this or any previous visit from the past 1095 days.      Past Medical History:  She has a past " medical history of Angina pectoris (CMS/[facility]) (10/22/2023), Atherosclerosis of coronary artery (08/21/2019), Atherosclerotic heart disease of native coronary artery with other forms of angina pectoris (CMS/[facility]) (10/22/2023), Hypercholesterolemia (12/28/2018), Hyperlipidemia (10/22/2023), Presence of cardiac pacemaker (10/22/2023), Primary hypertension (12/28/2018), Shortness of breath (11/26/2019), Sick sinus syndrome (CMS/[facility]) (10/22/2023), Sinus bradycardia (10/22/2023), and Stage 3a chronic kidney disease (CMS/[facility]) (11/25/2019).    Past Surgical History:  She has no past surgical history on file.      Social History:  She reports that she has never smoked. She has never been exposed to tobacco smoke. She has never used smokeless tobacco. She reports that she does not drink alcohol and does not use drugs.    Family History:  No family history on file.     Allergies:  Iodinated contrast media    Inpatient Medications:  Scheduled medications   Medication Dose Route Frequency    aspirin  81 mg oral Daily    cholecalciferol  2,000 Units oral Daily    clopidogrel  75 mg oral Daily    dilTIAZem  120 mg oral BID    levothyroxine  100 mcg oral Daily before breakfast    lisinopril  2.5 mg oral Daily    metoprolol succinate XL  25 mg oral BID    multivitamin  1 tablet oral Daily    pantoprazole  40 mg oral Daily before breakfast    sertraline  150 mg oral Daily     PRN medications   Medication    acetaminophen    Or    acetaminophen    Or    acetaminophen    benzocaine-menthol    calcium carbonate    dextromethorphan-guaifenesin    dextrose 10 % in water (D10W)    dextrose    glucagon    guaiFENesin    heparin (porcine)    ondansetron ODT    Or    ondansetron    polyethylene glycol     Continuous Medications   Medication Dose Last Rate    heparin  0-4,000 Units/hr 900 Units/hr (02/01/24 0683)    sodium chloride 0.9%  75 mL/hr 75 mL/hr (02/01/24 0118)     Outpatient Medications:  Current Outpatient Medications   Medication  Instructions    cholecalciferol (VITAMIN D-3) 2,000 Units, oral, Daily    clopidogrel (PLAVIX) 75 mg, oral, Daily    dilTIAZem (CARDIZEM) 120 mg, oral, 2 times daily    levothyroxine (SYNTHROID, LEVOXYL) 100 mcg, oral, Daily, 1 tablet in the morning on an empty stomach Orally Once a day for 30 day(s)<BR>    lisinopril 2.5 mg, oral, Daily    metoprolol succinate XL (TOPROL-XL) 25 mg, oral, 2 times daily    multivitamin (MULTIPLE VITAMINS ORAL) 1 capsule, oral, Daily    NON FORMULARY Instaflex Advanced     omeprazole (PRILOSEC) 20 mg, oral, Daily    sertraline (Zoloft) 100 mg tablet 1.5 tablets, oral, Daily       Physical Exam:  General: Patient is in no acute distress.  HEENT: atraumatic normocephalic.  Neck: is supple jugular venous pressure within normal limits no thyromegaly.  Cardiovascular regular rate and rhythm normal heart sounds no murmurs rubs or gallops.  Lungs: clear to auscultation bilaterally.  Abdomen: is soft nontender.  Extremities warm to touch no edema.  Neurologic examination: patient is awake alert oriented to person, place, date/time.  Psychiatric examination: patient has good insight denies feeling suicidal and depressed.  Pulses 2+ intact bilaterally     Assessment/Plan   Acute non-ST elevation myocardial infarction.  Patient to with typical chest pain and elevated troponin.  No EKG changes.  Currently chest pain-free.  With her history of for coronary artery disease and risk factors we will proceed with cardiac catheterization.  Currently on aspirin, low intensity heparin drip.  Chest pain-free.  N.p.o. after midnight cardiac catheterization in AM.  Discussed with patient risks and benefits she is agreeable to proceed.  Recommend high intensity statin.  Hypertension.  Restart home medications.  Will reassess her in AM.  Hyperlipidemia continue high intensity statin.  Sinus syndrome status post pacemaker.  EKG showing a paced V sensed rhythm.  Remained stable.    Thank you for allowing to  participate in her care we will follow-up in a.m.    Code Status:  Full Code      Becky Vela MD

## 2024-02-01 NOTE — PROGRESS NOTES
Linette Doyle is a 83 y.o. female on day 0 of admission presenting with NSTEMI (non-ST elevated myocardial infarction) (CMS/HCC).      Subjective   Pain is much improved but there is still some lingering tightness/pulling in the mid sternum.  No worsening shortness of breath       Objective     Last Recorded Vitals  /55   Pulse 60   Temp 36.7 °C (98.1 °F) (Oral)   Resp 16   Wt 68 kg (150 lb)   SpO2 98%   Intake/Output last 3 Shifts:  No intake or output data in the 24 hours ending 02/01/24 0754    Admission Weight  Weight: 68 kg (150 lb) (01/31/24 2058)    Daily Weight  01/31/24 : 68 kg (150 lb)    Image Results  XR chest 1 view  Narrative: STUDY:  Chest Radiograph;  1/31/2024 9:47 PM  INDICATION:  Chest pain.  COMPARISON:  11/26/2019 XR Chest two view  ACCESSION NUMBER(S):  [id number]  ORDERING CLINICIAN:  CANDELARIO HIDALGO  TECHNIQUE:  Frontal chest was obtained at 21:47 hours.  FINDINGS:  CARDIOMEDIASTINAL SILHOUETTE:  Cardiomediastinal silhouette is enlarged.  Left-sided cardiac pacer is  well-positioned     LUNGS:  Interstitial edema suspected.     ABDOMEN:  No remarkable upper abdominal findings.     BONES:  No acute osseous changes.  Impression: The heart is enlarged. Interstitial edema is suspected.  Signed by Jordon Gomez II, MD      Physical Exam  HENT:      Head: Normocephalic.      Right Ear: External ear normal.      Left Ear: External ear normal.   Eyes:      Conjunctiva/sclera: Conjunctivae normal.   Cardiovascular:      Rate and Rhythm: Normal rate.   Pulmonary:      Effort: Pulmonary effort is normal.      Breath sounds: Normal breath sounds.   Abdominal:      General: Abdomen is flat.   Skin:     General: Skin is warm.   Neurological:      General: No focal deficit present.      Mental Status: She is alert.   Psychiatric:         Mood and Affect: Mood normal.         Relevant Results               Assessment/Plan                  Principal Problem:    NSTEMI (non-ST elevated myocardial  infarction) (CMS/HCC)  Active Problems:    Coronary arteriosclerosis    Atherosclerosis of coronary artery    Hypothyroidism    Hypercholesterolemia    Sick sinus syndrome (CMS/HCC)    Impaired fasting glucose    Leukocytosis    NSTEMI  -Troponin escalating and consistent pattern with nonspecific changes on EKG. consistent with ACS, but given the patient's recent stressful history, I also have a very high suspicion for Takotsubo cardiomyopathy.  -Continue dual antiplatelet.  Heparin drip.  -Lipid panel  -N.p.o.  Cardiology is aware of the consult.    Known CAD  -Continue therapy as above.    Hypertension  -Continue home antihypertensives.                      Henry Ricci, DO

## 2024-02-01 NOTE — H&P
History Of Present Illness      Linette Doyle is a 83 y.o. female presenting with Chest Pain.         Patient 83-year-old female history of CAD with stent placement, sick sinus syndrome with pacer placement presents to the ED today for evaluation of chest pain.  She reports to ED that she was scammed out of approximately $20,000 and she has been having a lot of stress with that.  She developed chest pain about an hour prior to arrival.  Some mild associated dyspnea.  No diaphoresis. No nausea or vomiting.  She does see Dr. Darrius Ibrahim from Cardiology.  Was given aspirin prior to arrival.       Case discussed among ED LALA and Dr. FANNIE Ibrahim.  Decision made to start patient on heparin drip prior to calling hospitalist group.      Patient's ED diagnostic workup was noted for a minimally elevated leukocytosis of 13.7.  Patient's H&H was stable at 12.9/39.5.  The patient's platelet count was 168.  Patient's blood chemistry was essentially within normal limits except for slightly low glucose of 147.  The AST level was slightly elevated to 59.  Rapid PCR corona/RSV/influenza testing were all negative.  Patient's proBNP was elevated to 1524.  First troponin level was 351.  Chest x-ray was read as the heart is enlarged.  Interstitial edema is suspected.  The patient's 2D echocardiogram August 6, 2008 read as quality.  The left ventricular chamber size, wall thickness and systolic function were all within normal limits.  There were no wall motion abnormalities observed.  Mild concentric left ventricular hypertrophy was observed.  The estimated ejection fraction was greater than 55%.  There is mild mitral regurgitation.  There is mild to moderate pulmonic regurgitation.      Appears the patient underwent left heart catheterization in 2019.        EKG shows atrial paced rhythm, nonspecific T wave flattening, rate of 60, left axis deviation, QTc normal at 432, no evidence of STEMI.     She was basically the victim of a scam  [employer] organization who convinced her to send them multiple gift card numbers via telephone and a cashiers check via mail.      Unfortunately she lost her  and son to COVID at [facility] a few years back.  Lives alone and she has 2 granddaughters that are very dear to her.        Past Medical History      Past Medical History:   Diagnosis Date    Angina pectoris (CMS/[facility]) 10/22/2023    Atherosclerosis of coronary artery 08/21/2019    Atherosclerotic heart disease of native coronary artery with other forms of angina pectoris (CMS/[facility]) 10/22/2023    Hypercholesterolemia 12/28/2018    Hyperlipidemia 10/22/2023    Presence of cardiac pacemaker 10/22/2023    Primary hypertension 12/28/2018    Shortness of breath 11/26/2019    Sick sinus syndrome (CMS/[facility]) 10/22/2023    Sinus bradycardia 10/22/2023    Stage 3a chronic kidney disease (CMS/[facility]) 11/25/2019         Surgical History        History reviewed. No pertinent surgical history.       Social History    She reports that she has never smoked. She has never been exposed to tobacco smoke. She has never used smokeless tobacco. She reports that she does not drink alcohol and does not use drugs.      Family History      No family history on file.       Allergies      Iodinated contrast media      Review of Systems    14-point ROS otherwise negative, as per HPI/Interval History.    General: No change in weight. No weakenss. No Fevers/Chills/Night Sweats   Skin: No skin/hair/nail changes. No rashes or sores.  Head:  No trauma. No Headache/nasuea/vomitting.   Eyes: No visual changes. No tearing. No itching.   Ears: No hearing loss. No tinnitus. No vertigo. No discharge.  Nose, Sinuses: No rhinorrhea, No nasal congestion. No epistaxis.  Mouth, Throat, Neck: No bleeding gums, hoarseness, sore throat or swollen neck  Cardiac: No palpitations. No ACUNA. No PND. No Orthopnea.   Respiratory: No Shortness of Breath. No wheezing. No cough. No hemoptysis.   GI: No nausea/vomiting. No  "indigestion. No diarrhea. No constipation.   Extremities: No numbness or tingling. No paresthesias.   Urinary: No change in urinary frequency. No change in hesitancy. No hematuria. No incontinence.       Physical Exam        Constitutional:  Pleasant  Eyes: PERRL, EOMI,   ENMT: mucous membranes moist  Head/Neck: Neck supple, No JVD,   Respiratory/Thorax: Patent airways, CTAB,   Cardiovascular: Regular, rate and rhythm, no murmurs  Gastrointestinal: Soft, non-distended, +BS.  Musculoskeletal: ROM intact, no joint swelling, normal strength  Extremities: peripheral pulses intact; no edema  Neurological: Alert and Oriented x 3; no focal deficits; gross motor and sensation intact; CN II-XII intact. No asterixis.  Psychological: Appropriate mood and behavior  Skin: No lesions, No rashes.         Last Recorded Vitals  Blood pressure (!) 183/75, pulse 60, temperature 36.7 °C (98.1 °F), temperature source Oral, resp. rate 18, height 1.626 m (5' 4\"), weight 68 kg (150 lb), SpO2 96 %.    Relevant Results    Lab Results   Component Value Date    WBC 13.7 (H) 01/31/2024    HGB 12.9 01/31/2024    HCT 39.4 01/31/2024    MCV 89 01/31/2024     01/31/2024       Lab Results   Component Value Date    GLUCOSE 147 (H) 01/31/2024    CALCIUM 9.6 01/31/2024     01/31/2024    K 4.4 01/31/2024    CO2 25 01/31/2024     01/31/2024    BUN 21 01/31/2024    CREATININE 1.40 01/31/2024       Lab Results   Component Value Date    HGBA1C 5.6 07/05/2023         No CT head results found for the past 12 months      Scheduled medications  cholecalciferol, 2,000 Units, oral, Daily  clopidogrel, 75 mg, oral, Daily  dilTIAZem, 120 mg, oral, BID  levothyroxine, 100 mcg, oral, Daily  lisinopril, 2.5 mg, oral, Daily  metoprolol succinate XL, 25 mg, oral, BID  multivitamin, 1 tablet, oral, Daily  [START ON 2/2/2024] pantoprazole, 40 mg, oral, Daily before breakfast  pantoprazole, 40 mg, intravenous, Once  sertraline, 150 mg, oral, " Daily      Continuous medications  heparin, 0-4,000 Units/hr, Last Rate: 800 Units/hr (01/31/24 9533)  sodium chloride 0.9%, 75 mL/hr      PRN medications  PRN medications: acetaminophen **OR** acetaminophen **OR** acetaminophen, benzocaine-menthol, calcium carbonate, dextromethorphan-guaifenesin, guaiFENesin, heparin (porcine), ondansetron **OR** ondansetron, polyethylene glycol        Assessment/Plan   Principal Problem:    NSTEMI (non-ST elevated myocardial infarction) (CMS/[facility])  Active Problems:    Coronary arteriosclerosis    Atherosclerosis of coronary artery    Hypothyroidism    Hypercholesterolemia    Sick sinus syndrome (CMS/HCC)    Impaired fasting glucose    Leukocytosis          Linette Doyle is a 83 y.o. female presenting with Chest Pain.  Patient admitted for further evaluation and management.          Chest Pain 2/2 NSTEMI    Admit patient to Telemetry service   Continuous Cardiac Monitor and BP Monitor Placement   Cardiology evaluation in AM.   Cardiac enzymes are positive x 1 set  Follow up second set CE (Tropinin + CK)   Monitor Electrolytes, Keep K+>4 + Mg++>2.   Serial EKGs   Will keep patient NPO  Start Statin in view of existing CAD   Defer repeat 2D-Echocardiography to evaluate for LVEF, Regional Wall motion abnormalities, and any Valvular defects to Cards      CAD s/p PCI w/ Stenting    Continue with DAPT and Statin Therapy     Hyperglycemia      Follow-up hemoglobin A1c level in the a.m.      Sick sinus syndrome    Status post PPM placement      Hypothyroidism    Continue with home levothyroxine therapy      Hypertension    Continue with home antihypertensive medications  Continue monitor BP and adjust hypertensive medications accordingly      Depression    Continue home SSRI therapy      GI + DVT PPX    Continue home oral PPI  Heparin drip low intensity as per cardiology          This Dictation was Transcribed using a Nuance Dragon Voice Recognition System Device (with Compatible Computer +  Software) and as such may contain Grammatical Errors and Unintentional Typing Misprints.        I spent 35 minutes in the professional and overall care of this patient.        Sal Ibrahim MD

## 2024-02-01 NOTE — ED PROVIDER NOTES
HPI   Chief Complaint   Patient presents with    Chest Pain     Started earlier today        HPI  See my MDM                  [place] Coma Scale Score: 15                  Patient History   Past Medical History:   Diagnosis Date    Angina pectoris (CMS/[facility]) 10/22/2023    Atherosclerosis of coronary artery 08/21/2019    Atherosclerotic heart disease of native coronary artery with other forms of angina pectoris (CMS/[facility]) 10/22/2023    Hypercholesterolemia 12/28/2018    Hyperlipidemia 10/22/2023    Presence of cardiac pacemaker 10/22/2023    Primary hypertension 12/28/2018    Shortness of breath 11/26/2019    Sick sinus syndrome (CMS/[facility]) 10/22/2023    Sinus bradycardia 10/22/2023    Stage 3a chronic kidney disease (CMS/[facility]) 11/25/2019     History reviewed. No pertinent surgical history.  No family history on file.  Social History     Tobacco Use    Smoking status: Never     Passive exposure: Never    Smokeless tobacco: Never   Vaping Use    Vaping Use: Never used   Substance Use Topics    Alcohol use: Never    Drug use: Never       Physical Exam   ED Triage Vitals [01/31/24 2058]   Temperature Heart Rate Respirations BP   36.7 °C (98.1 °F) 58 20 180/86      Pulse Ox Temp Source Heart Rate Source Patient Position   96 % Oral -- --      BP Location FiO2 (%)     -- --       Physical Exam  CONSTITUTIONAL: Vital signs reviewed as charted, well-developed and in no distress  Eyes: Extraocular muscles are intact. Pupils equal round and reactive to light. Conjunctiva are pink.    ENT: Mucous membranes are moist. Tongue in the midline. Pharynx was without erythema or exudates, uvula midline  LUNGS: Breath sounds equal and clear to auscultation. Good air exchange, no wheezes rales or retractions, pulse oximetry is charted.  HEART: Regular rate and rhythm without murmur thrill or rub, strong tones, auscultation is normal.  ABDOMEN: Soft and nontender without guarding rebound rigidity or mass. Bowel sounds are present and normal  in all quadrants. There is no palpable masses or aneurysms identified. No hepatosplenomegaly, normal abdominal exam.  Neuro: The patient is awake, alert and oriented ×3. Moving all 4 extremities and answering questions appropriately.   MUSCULOSKELETAL: The calves are nontender to palpation. Full gross active range of motion.   PSYCH: Awake alert oriented, normal mood and affect.  Skin:  Dry, normal color, warm to the touch, no rash present.      ED Course & MDM   ED Course as of 01/31/24 2357 Wed Jan 31, 2024 2100 EKG Time: 2058  EKG Interpretation time: 2100  EKG Interpretation: EKG shows atrial paced rhythm, nonspecific T wave flattening, rate of 60, left axis deviation, QTc normal at 432, no evidence of STEMI.    EKG was interpreted by myself independently [JL]   2340 I spoke with Dr. Darrius Ibrahim discussed the patient's case in detail in agreement with heparin protocol and admission to the hospital. [RJ]      ED Course User Index  [JL] Jordon Cervantes DO  [RJ] JUNG Ga-CNP         Diagnoses as of 01/31/24 2357   NSTEMI (non-ST elevated myocardial infarction) (CMS/[facility])       Medical Decision Making  History obtained from: patient    Vital signs, nursing notes, current medications, past medical history, Surgical history, allergies, social history, family History were reviewed.         HPI:  Patient 83-year-old female history of CAD with stent placement, sick sinus syndrome with pacer placement 70 ED today for evaluation of chest pain.  She reports her to scanned out of approximately $20,000 she has been having a lot of stress with that.  She developed chest pain about an hour prior to arrival.  Some mild associated dyspnea.  No diaphoresis.  No nausea or vomiting.  She does see Dr. Darrius Ibrahim and cardiology.  Was given aspirin prior to arrival.      10 point ROS was reviewed and negative except Noted above in HPI.  DDX: as listed above          MDM Summary/considerations:  EMERGENCY DEPARTMENT  "COURSE and DIFFERENTIAL DIAGNOSIS/MDM:        The patient presented with a chief complaint of chest pain. The differential diagnosis associated with this patient's presentation includes CAD, pleurisy, pneumonia, viral syndrome, epigastric abdominal pain.       Vitals:    Vitals:    01/31/24 2058 01/31/24 2300 01/31/24 2330   BP: 180/86 171/72 (!) 183/75   Pulse: 58 60 60   Resp: 20 18 18   Temp: 36.7 °C (98.1 °F)     TempSrc: Oral     SpO2: 96% 96% 96%   Weight: 68 kg (150 lb)     Height: 1.626 m (5' 4\")         ED Course as of 01/31/24 2357 Wed Jan 31, 2024 2100 EKG Time: 2058  EKG Interpretation time: 2100  EKG Interpretation: EKG shows atrial paced rhythm, nonspecific T wave flattening, rate of 60, left axis deviation, QTc normal at 432, no evidence of STEMI.    EKG was interpreted by myself independently [JL]   2340 I spoke with Dr. Darrius Ibrahim discussed the patient's case in detail in agreement with heparin protocol and admission to the hospital. [RJ]      ED Course User Index  [JL] Jordon Cervantes DO  [RJ] JUNG Ga-CNP         Diagnoses as of 01/31/24 2357   NSTEMI (non-ST elevated myocardial infarction) (CMS/[facility])       History Limited by:  None  None    Independent history obtained from:    None      External records reviewed:    Outpatient Note from cardiology in December      Diagnostics interpreted by me:    EKG by my attending physician and Xray(s) the heart is enlarged, interstitial edema suspected      Discussions with other clinicians:    Cardiologist Patrice    The hospitalist Dr. Ibrahim  Chronic conditions impacting care:    None      Social determinants of health affecting care:    None    Diagnostic tests considered but not performed: none    ED Medications managed:    Medications   heparin 25,000 Units in dextrose 5% 250 mL (100 Units/mL) infusion (premix) (800 Units/hr intravenous New Bag 1/31/24 2343)   heparin (porcine) injection 1,500-3,000 Units (has no administration in time " range)   famotidine PF (Pepcid) injection 20 mg (20 mg intravenous Given 1/31/24 2133)   ondansetron (Zofran) injection 4 mg (4 mg intravenous Given 1/31/24 2133)   heparin (porcine) injection 4,000 Units (4,000 Units intravenous Given 1/31/24 2344)         Prescription drugs considered:    None    I spoke with Dr. Ibrahim. We thoroughly discussed the history, physical exam, laboratory and imaging studies, as well as, emergency department course. Based upon that discussion, we've decided to admit for further observation and evaluation of their chest pain.  As I have deemed necessary from their history, physical, and studies, I have considered and evaluated for the following diagnoses: ACUTE CORONARY SYNDROME, PERICARDIAL TAMPONADE, PNEUMOTHORAX, P ULMONARY EMBOLISM, and THORACIC DISSECTION.     Patient was started on IV heparin, EKG was nonischemic, initial troponin was 351, proBNP 1524.  EKG nonischemic.  I did contact her cardiologist and will admit for further evaluation and care.    After reviewing patient's comorbidities, severity of history of presenting illness, labs and imaging if obtained in conjunction with physical exam and course in emergency department, deemed to have potential for deterioration/progression of symptoms that could lead to multiple morbidities or mortality, decision made that patient requires further observation/evaluation/treatment and patient admitted to appropriate service, patient/family understand and agree with plan.          Critical Care: CRITICAL CARE NOTE     The patient was reevaluated/re-examined multiple times during the visit. Critical care time includes management at bedside, discussion with other providers and consultants, family counseling and answering questions, and documentation. Care involves decision making of high complexity to assess, manipulate, and support vital organ system failure and/or to prevent further life threatening deterioration of the patient's  condition. Failure to initiate these interventions on an urgent basis would likely result in sudden, clinically significant or life threatening deterioration in the patient's condition of NSTEMI       Critical care time total at least 35 minutes of non concurrent critical care time provided by myself. This did not include any separate billable procedures.    I saw this patient in conjunction with Dr. Gunderson, please see her supervision note.          Prescriptions provided include: none    This chart was completed using voice recognition transcription software. Please excuse any errors of transcription including grammatical, punctuation, syntax and spelling errors.  Please contact me with any questions regarding this chart.    Procedure  Procedures     JUNG Ga-CNP  01/31/24 7117

## 2024-02-01 NOTE — NURSING NOTE
"Patient came out of room and removed her IV and tele leads.  Reoriented and taken back to room.  Patient states \"I thought that I was going to leave tonight and come back tomorrow.\"  Explained that she is on medication that requires her to be in the hospital.    New IV placed.  Heparin infusing.    "

## 2024-02-01 NOTE — PROGRESS NOTES
Physical Therapy                 Therapy Communication Note    Patient Name: Linette Doyle  MRN: [id number]  Today's Date: 2/1/2024     Discipline: Physical Therapy    Missed Visit Reason: Cancel (Order received and chart reviewed. Pt admitted for NSTEMI. Troponins were uptrending and cardiology consult was pending. Will hold eval at this time.)    Missed Time: Cancel

## 2024-02-02 ENCOUNTER — APPOINTMENT (OUTPATIENT)
Dept: RADIOLOGY | Facility: HOSPITAL | Age: 84
DRG: 321 | End: 2024-02-02
Payer: MEDICARE

## 2024-02-02 LAB
ACT BLD: 281 SEC (ref 89–169)
ALBUMIN SERPL-MCNC: 4 G/DL (ref 3.5–5)
ALP BLD-CCNC: 86 U/L (ref 35–125)
ALT SERPL-CCNC: 29 U/L (ref 5–40)
ANION GAP SERPL CALC-SCNC: 12 MMOL/L
AORTIC VALVE PEAK VELOCITY: 1.08 M/S
APTT PPP: 121 SECONDS (ref 22–32.5)
AST SERPL-CCNC: 157 U/L (ref 5–40)
AV PEAK GRADIENT: 4.7 MMHG
AVA (PEAK VEL): 1.89 CM2
BASOPHILS # BLD AUTO: 0.04 X10*3/UL (ref 0–0.1)
BASOPHILS NFR BLD AUTO: 0.3 %
BILIRUB SERPL-MCNC: 1.2 MG/DL (ref 0.1–1.2)
BUN SERPL-MCNC: 23 MG/DL (ref 8–25)
CALCIUM SERPL-MCNC: 9.1 MG/DL (ref 8.5–10.4)
CHLORIDE SERPL-SCNC: 103 MMOL/L (ref 97–107)
CO2 SERPL-SCNC: 26 MMOL/L (ref 24–31)
CREAT SERPL-MCNC: 1.5 MG/DL (ref 0.4–1.6)
EGFRCR SERPLBLD CKD-EPI 2021: 34 ML/MIN/1.73M*2
EJECTION FRACTION APICAL 4 CHAMBER: 56.8
EJECTION FRACTION: 59 %
EOSINOPHIL # BLD AUTO: 0 X10*3/UL (ref 0–0.4)
EOSINOPHIL NFR BLD AUTO: 0 %
ERYTHROCYTE [DISTWIDTH] IN BLOOD BY AUTOMATED COUNT: 13.6 % (ref 11.5–14.5)
GLUCOSE SERPL-MCNC: 159 MG/DL (ref 65–99)
HCT VFR BLD AUTO: 33.5 % (ref 36–46)
HGB BLD-MCNC: 11.2 G/DL (ref 12–16)
IMM GRANULOCYTES # BLD AUTO: 0.04 X10*3/UL (ref 0–0.5)
IMM GRANULOCYTES NFR BLD AUTO: 0.3 % (ref 0–0.9)
LEFT VENTRICLE INTERNAL DIMENSION DIASTOLE: 5.64 CM (ref 3.5–6)
LEFT VENTRICULAR OUTFLOW TRACT DIAMETER: 1.9 CM
LYMPHOCYTES # BLD AUTO: 0.71 X10*3/UL (ref 0.8–3)
LYMPHOCYTES NFR BLD AUTO: 5.9 %
MCH RBC QN AUTO: 29.1 PG (ref 26–34)
MCHC RBC AUTO-ENTMCNC: 33.4 G/DL (ref 32–36)
MCV RBC AUTO: 87 FL (ref 80–100)
MITRAL VALVE E/A RATIO: 3.49
MITRAL VALVE E/E' RATIO: 24.67
MONOCYTES # BLD AUTO: 0.92 X10*3/UL (ref 0.05–0.8)
MONOCYTES NFR BLD AUTO: 7.7 %
NEUTROPHILS # BLD AUTO: 10.31 X10*3/UL (ref 1.6–5.5)
NEUTROPHILS NFR BLD AUTO: 85.8 %
NRBC BLD-RTO: 0 /100 WBCS (ref 0–0)
PLATELET # BLD AUTO: 129 X10*3/UL (ref 150–450)
POTASSIUM SERPL-SCNC: 4.5 MMOL/L (ref 3.4–5.1)
PROT SERPL-MCNC: 6.5 G/DL (ref 5.9–7.9)
RBC # BLD AUTO: 3.85 X10*6/UL (ref 4–5.2)
RIGHT VENTRICLE FREE WALL PEAK S': 15.9 CM/S
RIGHT VENTRICLE PEAK SYSTOLIC PRESSURE: 61.6 MMHG
SODIUM SERPL-SCNC: 141 MMOL/L (ref 133–145)
WBC # BLD AUTO: 12 X10*3/UL (ref 4.4–11.3)

## 2024-02-02 PROCEDURE — 2500000004 HC RX 250 GENERAL PHARMACY W/ HCPCS (ALT 636 FOR OP/ED): Performed by: INTERNAL MEDICINE

## 2024-02-02 PROCEDURE — 71045 X-RAY EXAM CHEST 1 VIEW: CPT

## 2024-02-02 PROCEDURE — 2500000001 HC RX 250 WO HCPCS SELF ADMINISTERED DRUGS (ALT 637 FOR MEDICARE OP): Performed by: INTERNAL MEDICINE

## 2024-02-02 PROCEDURE — C1769 GUIDE WIRE: HCPCS | Performed by: INTERNAL MEDICINE

## 2024-02-02 PROCEDURE — 2720000007 HC OR 272 NO HCPCS: Performed by: INTERNAL MEDICINE

## 2024-02-02 PROCEDURE — 2500000005 HC RX 250 GENERAL PHARMACY W/O HCPCS: Performed by: INTERNAL MEDICINE

## 2024-02-02 PROCEDURE — C1760 CLOSURE DEV, VASC: HCPCS | Performed by: INTERNAL MEDICINE

## 2024-02-02 PROCEDURE — C1887 CATHETER, GUIDING: HCPCS | Performed by: INTERNAL MEDICINE

## 2024-02-02 PROCEDURE — 99152 MOD SED SAME PHYS/QHP 5/>YRS: CPT | Performed by: INTERNAL MEDICINE

## 2024-02-02 PROCEDURE — 85347 COAGULATION TIME ACTIVATED: CPT

## 2024-02-02 PROCEDURE — 99153 MOD SED SAME PHYS/QHP EA: CPT | Performed by: INTERNAL MEDICINE

## 2024-02-02 PROCEDURE — 93458 L HRT ARTERY/VENTRICLE ANGIO: CPT | Performed by: INTERNAL MEDICINE

## 2024-02-02 PROCEDURE — 2500000004 HC RX 250 GENERAL PHARMACY W/ HCPCS (ALT 636 FOR OP/ED)

## 2024-02-02 PROCEDURE — C1894 INTRO/SHEATH, NON-LASER: HCPCS | Performed by: INTERNAL MEDICINE

## 2024-02-02 PROCEDURE — 2500000004 HC RX 250 GENERAL PHARMACY W/ HCPCS (ALT 636 FOR OP/ED): Mod: MUE | Performed by: INTERNAL MEDICINE

## 2024-02-02 PROCEDURE — 80053 COMPREHEN METABOLIC PANEL: CPT | Performed by: INTERNAL MEDICINE

## 2024-02-02 PROCEDURE — 36415 COLL VENOUS BLD VENIPUNCTURE: CPT | Performed by: INTERNAL MEDICINE

## 2024-02-02 PROCEDURE — 2500000004 HC RX 250 GENERAL PHARMACY W/ HCPCS (ALT 636 FOR OP/ED): Performed by: FAMILY MEDICINE

## 2024-02-02 PROCEDURE — 2060000001 HC INTERMEDIATE ICU ROOM DAILY

## 2024-02-02 PROCEDURE — C1725 CATH, TRANSLUMIN NON-LASER: HCPCS | Performed by: INTERNAL MEDICINE

## 2024-02-02 PROCEDURE — 85730 THROMBOPLASTIN TIME PARTIAL: CPT | Performed by: INTERNAL MEDICINE

## 2024-02-02 PROCEDURE — 85025 COMPLETE CBC W/AUTO DIFF WBC: CPT | Performed by: INTERNAL MEDICINE

## 2024-02-02 PROCEDURE — 2780000003 HC OR 278 NO HCPCS: Performed by: INTERNAL MEDICINE

## 2024-02-02 PROCEDURE — 4A023N7 MEASUREMENT OF CARDIAC SAMPLING AND PRESSURE, LEFT HEART, PERCUTANEOUS APPROACH: ICD-10-PCS | Performed by: INTERNAL MEDICINE

## 2024-02-02 PROCEDURE — B2111ZZ FLUOROSCOPY OF MULTIPLE CORONARY ARTERIES USING LOW OSMOLAR CONTRAST: ICD-10-PCS | Performed by: INTERNAL MEDICINE

## 2024-02-02 PROCEDURE — 2550000001 HC RX 255 CONTRASTS: Performed by: INTERNAL MEDICINE

## 2024-02-02 PROCEDURE — 99233 SBSQ HOSP IP/OBS HIGH 50: CPT | Performed by: INTERNAL MEDICINE

## 2024-02-02 RX ORDER — HEPARIN SODIUM 1000 [USP'U]/ML
INJECTION, SOLUTION INTRAVENOUS; SUBCUTANEOUS AS NEEDED
Status: DISCONTINUED | OUTPATIENT
Start: 2024-02-02 | End: 2024-02-02 | Stop reason: HOSPADM

## 2024-02-02 RX ORDER — SODIUM CHLORIDE 9 MG/ML
75 INJECTION, SOLUTION INTRAVENOUS CONTINUOUS
Status: DISCONTINUED | OUTPATIENT
Start: 2024-02-02 | End: 2024-02-02

## 2024-02-02 RX ORDER — FUROSEMIDE 10 MG/ML
40 INJECTION INTRAMUSCULAR; INTRAVENOUS EVERY 12 HOURS
Status: DISCONTINUED | OUTPATIENT
Start: 2024-02-03 | End: 2024-02-04

## 2024-02-02 RX ORDER — IODIXANOL 320 MG/ML
INJECTION, SOLUTION INTRAVASCULAR AS NEEDED
Status: DISCONTINUED | OUTPATIENT
Start: 2024-02-02 | End: 2024-02-02 | Stop reason: HOSPADM

## 2024-02-02 RX ORDER — FUROSEMIDE 10 MG/ML
40 INJECTION INTRAMUSCULAR; INTRAVENOUS ONCE
Status: COMPLETED | OUTPATIENT
Start: 2024-02-02 | End: 2024-02-02

## 2024-02-02 RX ORDER — DIPHENHYDRAMINE HYDROCHLORIDE 50 MG/ML
INJECTION INTRAMUSCULAR; INTRAVENOUS
Status: COMPLETED
Start: 2024-02-02 | End: 2024-02-02

## 2024-02-02 RX ORDER — LIDOCAINE HYDROCHLORIDE 20 MG/ML
INJECTION, SOLUTION INFILTRATION; PERINEURAL AS NEEDED
Status: DISCONTINUED | OUTPATIENT
Start: 2024-02-02 | End: 2024-02-02 | Stop reason: HOSPADM

## 2024-02-02 RX ORDER — MIDAZOLAM HYDROCHLORIDE 1 MG/ML
INJECTION, SOLUTION INTRAMUSCULAR; INTRAVENOUS AS NEEDED
Status: DISCONTINUED | OUTPATIENT
Start: 2024-02-02 | End: 2024-02-02 | Stop reason: HOSPADM

## 2024-02-02 RX ORDER — DIPHENHYDRAMINE HYDROCHLORIDE 50 MG/ML
50 INJECTION INTRAMUSCULAR; INTRAVENOUS ONCE
Status: COMPLETED | OUTPATIENT
Start: 2024-02-02 | End: 2024-02-02

## 2024-02-02 RX ORDER — NITROGLYCERIN 40 MG/100ML
INJECTION INTRAVENOUS AS NEEDED
Status: DISCONTINUED | OUTPATIENT
Start: 2024-02-02 | End: 2024-02-02 | Stop reason: HOSPADM

## 2024-02-02 RX ORDER — FENTANYL CITRATE 50 UG/ML
INJECTION, SOLUTION INTRAMUSCULAR; INTRAVENOUS AS NEEDED
Status: DISCONTINUED | OUTPATIENT
Start: 2024-02-02 | End: 2024-02-02 | Stop reason: HOSPADM

## 2024-02-02 RX ADMIN — Medication 2000 UNITS: at 09:47

## 2024-02-02 RX ADMIN — HEPARIN SODIUM 1000 UNITS/HR: 10000 INJECTION, SOLUTION INTRAVENOUS at 02:54

## 2024-02-02 RX ADMIN — FUROSEMIDE 40 MG: 10 INJECTION, SOLUTION INTRAMUSCULAR; INTRAVENOUS at 15:31

## 2024-02-02 RX ADMIN — SODIUM CHLORIDE 75 ML/HR: 900 INJECTION, SOLUTION INTRAVENOUS at 06:58

## 2024-02-02 RX ADMIN — DIPHENHYDRAMINE HYDROCHLORIDE 50 MG: 50 INJECTION INTRAMUSCULAR; INTRAVENOUS at 10:45

## 2024-02-02 RX ADMIN — METOPROLOL SUCCINATE 25 MG: 25 TABLET, EXTENDED RELEASE ORAL at 21:25

## 2024-02-02 RX ADMIN — MULTIVITAMIN TABLET 1 TABLET: TABLET at 09:47

## 2024-02-02 RX ADMIN — SERTRALINE 100 MG: 100 TABLET, FILM COATED ORAL at 09:47

## 2024-02-02 RX ADMIN — ACETAMINOPHEN 650 MG: 325 TABLET ORAL at 21:28

## 2024-02-02 RX ADMIN — ATORVASTATIN CALCIUM 80 MG: 80 TABLET, FILM COATED ORAL at 21:24

## 2024-02-02 RX ADMIN — DILTIAZEM HYDROCHLORIDE 120 MG: 60 TABLET ORAL at 09:47

## 2024-02-02 RX ADMIN — ASPIRIN 81 MG 81 MG: 81 TABLET ORAL at 09:47

## 2024-02-02 RX ADMIN — DILTIAZEM HYDROCHLORIDE 120 MG: 60 TABLET ORAL at 21:24

## 2024-02-02 RX ADMIN — LISINOPRIL 20 MG: 20 TABLET ORAL at 09:47

## 2024-02-02 RX ADMIN — METHYLPREDNISOLONE SODIUM SUCCINATE 125 MG: 125 INJECTION, POWDER, FOR SOLUTION INTRAMUSCULAR; INTRAVENOUS at 10:45

## 2024-02-02 RX ADMIN — CLOPIDOGREL BISULFATE 75 MG: 75 TABLET ORAL at 09:47

## 2024-02-02 RX ADMIN — METOPROLOL SUCCINATE 25 MG: 25 TABLET, EXTENDED RELEASE ORAL at 09:47

## 2024-02-02 ASSESSMENT — COGNITIVE AND FUNCTIONAL STATUS - GENERAL
MOBILITY SCORE: 24
DRESSING REGULAR LOWER BODY CLOTHING: A LITTLE
DAILY ACTIVITIY SCORE: 22
HELP NEEDED FOR BATHING: A LITTLE

## 2024-02-02 ASSESSMENT — PAIN SCALES - GENERAL
PAINLEVEL_OUTOF10: 0 - NO PAIN

## 2024-02-02 ASSESSMENT — PAIN - FUNCTIONAL ASSESSMENT
PAIN_FUNCTIONAL_ASSESSMENT: 0-10

## 2024-02-02 NOTE — NURSING NOTE
Patient was out of bed took off her gown and heart monitor. she wanted to change her clothes. Assisted her to the bathroom then placed back in bed. Bed alarm set and reminded her of the call light.

## 2024-02-02 NOTE — PROGRESS NOTES
Linette Doyle is a 83 y.o. female on day 1 of admission presenting with NSTEMI (non-ST elevated myocardial infarction) (CMS/[facility]).      Subjective   Pain is much improved but there is still some lingering tightness/pulling in the mid sternum.  No worsening shortness of breath       Objective     Last Recorded Vitals  /63   Pulse 60   Temp 36.7 °C (98.1 °F) (Oral)   Resp 18   Wt 80.6 kg (177 lb 12.8 oz)   SpO2 94%   Intake/Output last 3 Shifts:    Intake/Output Summary (Last 24 hours) at 2/2/2024 1054  Last data filed at 2/2/2024 0046  Gross per 24 hour   Intake 195 ml   Output --   Net 195 ml       Admission Weight  Weight: 68 kg (150 lb) (01/31/24 2058)    Daily Weight  02/01/24 : 80.6 kg (177 lb 12.8 oz)    Image Results  Transthoracic Echo (TTE) [place] [address]             Phone [phone]    TRANSTHORACIC ECHOCARDIOGRAM REPORT       Patient Name:      LINETTE DOYLE         Reading Physician:    [id number] Becky Vela MD  Study Date:        2/1/2024              Ordering Provider:    [id number] MELANIE HARDWICK  MRN/PID:           [id number]              Fellow:  Accession#:        [id number]          Nurse:  Date of Birth/Age: 1940 / 83 years Sonographer:          Yamileth Houser RDCS  Gender:            F                     Additional Staff:  Height:            162.00 cm             Admit Date:  Weight:            68.00 kg              Admission Status:     Inpatient -                                                                 Routine  BSA:               1.73 m2               Department Location:  [facility] ER  Blood Pressure: 166 /72 mmHg    Study Type:    TRANSTHORACIC ECHO (TTE) COMPLETE  Diagnosis/ICD: Non ST elevation (NSTEMI)  myocardial infarction-I21.4  Indication:    nstemi  CPT Codes:     Echo Complete w Full Doppler-90173    Patient History:  BMI:               Overweight 25 - 30  Pacer/Defib:       Permanent pacemaker  Pertinent History: CAD and HTN. ckd,stent,sss,falls, sob.    Study Detail: The following Echo studies were performed: 2D, M-Mode, Doppler and                color flow. Technically challenging study due to prominent lung                artifact.       PHYSICIAN INTERPRETATION:  Left Ventricle: Left ventricular systolic function is normal, with an estimated ejection fraction of 55-60%. There are no regional wall motion abnormalities. The left ventricular cavity size is normal. Spectral Doppler shows a normal pattern of left ventricular diastolic filling. The inferior wall is mildly hypokinetic.  Left Atrium: The left atrium is mildly dilated.  Right Ventricle: The right ventricle is normal in size. There is normal right ventricular global systolic function. A device is visualized in the right ventricle.  Right Atrium: The right atrium is normal in size. There is a device visualized in the right atrium.  Aortic Valve: The aortic valve appears structurally normal. There is no evidence of aortic valve regurgitation. The peak instantaneous gradient of the aortic valve is 4.7 mmHg.  Mitral Valve: The mitral valve is normal in structure. There is moderate mitral valve regurgitation which is posteriorly directed.  Tricuspid Valve: The tricuspid valve is structurally normal. There is mild tricuspid regurgitation. The Doppler estimated RVSP is moderately elevated at 61.6 mmHg.  Pulmonic Valve: The pulmonic valve is structurally normal. There is mild pulmonic valve regurgitation.  Pericardium: There is a trivial pericardial effusion.  Aorta: The aortic root is normal.  Systemic Veins: The inferior vena cava appears to be of normal size. There is less than 50% IVC collapse with inspiration.       CONCLUSIONS:   1. Left  ventricular systolic function is normal with a 55-60% estimated ejection fraction.   2. The inferior wall is mildly hypokinetic.   3. Moderate mitral valve regurgitation.   4. Mild tricuspid regurgitation is visualized.   5. Moderately elevated right ventricular systolic pressure.    QUANTITATIVE DATA SUMMARY:  2D MEASUREMENTS:                           Normal Ranges:  LAs:           3.10 cm   (2.7-4.0cm)  IVSd:          0.63 cm   (0.6-1.1cm)  LVPWd:         0.64 cm   (0.6-1.1cm)  LVIDd:         5.64 cm   (3.9-5.9cm)  LV Mass Index: 72.6 g/m2    LV SYSTOLIC FUNCTION BY 2D PLANIMETRY (MOD):                      Normal Ranges:  EF-A4C View: 56.8 % (>=55%)  EF-A2C View: 61.9 %  EF-Biplane:  59.3 %    LV DIASTOLIC FUNCTION:                         Normal Ranges:  MV Peak E:    1.88 m/s (0.7-1.2 m/s)  MV Peak A:    0.54 m/s (0.42-0.7 m/s)  E/A Ratio:    3.49     (1.0-2.2)  MV e'         0.08 m/s (>8.0)  MV lateral e' 0.08 m/s  MV medial e'  0.08 m/s  E/e' Ratio:   24.67    (<8.0)  a'            0.08 m/s    MITRAL VALVE:                        Normal Ranges:  MV Vmax:    2.27 m/s  (<=1.3m/s)  MV peak P.6 mmHg (<5mmHg)  MV mean P.0 mmHg  (<48mmHg)  MV DT:      409 msec  (150-240msec)    AORTIC VALVE:                          Normal Ranges:  AoV Vmax:      1.08 m/s (<=1.7m/s)  AoV Peak P.7 mmHg (<20mmHg)  LVOT Max Yasir:  0.72 m/s (<=1.1m/s)  LVOT Diameter: 1.90 cm  (1.8-2.4cm)  AoV Area,Vmax: 1.89 cm2 (2.5-4.5cm2)       RIGHT VENTRICLE:  RV s' 0.16 m/s    TRICUSPID VALVE/RVSP:                              Normal Ranges:  Peak TR Velocity: 3.66 m/s  RV Syst Pressure: 61.6 mmHg (< 30mmHg)  IVC Diam:         1.80 cm       [id number] Becky Vela MD  Electronically signed on 2024 at 8:24:54 AM       ** Final **      Physical Exam  HENT:      Head: Normocephalic.      Right Ear: External ear normal.      Left Ear: External ear normal.   Eyes:      Conjunctiva/sclera: Conjunctivae normal.   Cardiovascular:      Rate  and Rhythm: Normal rate.   Pulmonary:      Effort: Pulmonary effort is normal.      Breath sounds: Normal breath sounds.   Abdominal:      General: Abdomen is flat.   Skin:     General: Skin is warm.   Neurological:      General: No focal deficit present.      Mental Status: She is alert.   Psychiatric:         Mood and Affect: Mood normal.         Relevant Results  Results for orders placed or performed during the hospital encounter of 01/31/24 (from the past 24 hour(s))   Transthoracic Echo (TTE) Complete   Result Value Ref Range    AV pk bernarda 1.08 m/s    LVOT diam 1.90 cm    MV E/A ratio 3.49     LV biplane EF 59 %    MV avg E/e' ratio 24.67     RV free wall pk S' 15.90 cm/s    RVSP 61.6 mmHg    LVIDd 5.64 cm    AV pk grad 4.7 mmHg    Aortic Valve Area by Continuity of Peak Velocity 1.89 cm2    LV A4C EF 56.8    APTT   Result Value Ref Range    aPTT 85.1 (H) 22.0 - 32.5 seconds   aPTT   Result Value Ref Range    aPTT 32.1 22.0 - 32.5 seconds   CBC and Auto Differential   Result Value Ref Range    WBC 12.0 (H) 4.4 - 11.3 x10*3/uL    nRBC 0.0 0.0 - 0.0 /100 WBCs    RBC 3.85 (L) 4.00 - 5.20 x10*6/uL    Hemoglobin 11.2 (L) 12.0 - 16.0 g/dL    Hematocrit 33.5 (L) 36.0 - 46.0 %    MCV 87 80 - 100 fL    MCH 29.1 26.0 - 34.0 pg    MCHC 33.4 32.0 - 36.0 g/dL    RDW 13.6 11.5 - 14.5 %    Platelets 129 (L) 150 - 450 x10*3/uL    Neutrophils % 85.8 40.0 - 80.0 %    Immature Granulocytes %, Automated 0.3 0.0 - 0.9 %    Lymphocytes % 5.9 13.0 - 44.0 %    Monocytes % 7.7 2.0 - 10.0 %    Eosinophils % 0.0 0.0 - 6.0 %    Basophils % 0.3 0.0 - 2.0 %    Neutrophils Absolute 10.31 (H) 1.60 - 5.50 x10*3/uL    Immature Granulocytes Absolute, Automated 0.04 0.00 - 0.50 x10*3/uL    Lymphocytes Absolute 0.71 (L) 0.80 - 3.00 x10*3/uL    Monocytes Absolute 0.92 (H) 0.05 - 0.80 x10*3/uL    Eosinophils Absolute 0.00 0.00 - 0.40 x10*3/uL    Basophils Absolute 0.04 0.00 - 0.10 x10*3/uL   Comprehensive Metabolic Panel   Result Value Ref Range     Glucose 159 (H) 65 - 99 mg/dL    Sodium 141 133 - 145 mmol/L    Potassium 4.5 3.4 - 5.1 mmol/L    Chloride 103 97 - 107 mmol/L    Bicarbonate 26 24 - 31 mmol/L    Urea Nitrogen 23 8 - 25 mg/dL    Creatinine 1.50 0.40 - 1.60 mg/dL    eGFR 34 (L) >60 mL/min/1.73m*2    Calcium 9.1 8.5 - 10.4 mg/dL    Albumin 4.0 3.5 - 5.0 g/dL    Alkaline Phosphatase 86 35 - 125 U/L    Total Protein 6.5 5.9 - 7.9 g/dL     (H) 5 - 40 U/L    Bilirubin, Total 1.2 0.1 - 1.2 mg/dL    ALT 29 5 - 40 U/L    Anion Gap 12 <=19 mmol/L   aPTT   Result Value Ref Range    aPTT 121.0 (HH) 22.0 - 32.5 seconds         Assessment/Plan     Principal Problem:    NSTEMI (non-ST elevated myocardial infarction) (CMS/HCC)  Active Problems:    Coronary arteriosclerosis    Atherosclerosis of coronary artery    Hypothyroidism    Hypercholesterolemia    Sick sinus syndrome (CMS/HCC)    Impaired fasting glucose    Leukocytosis    NSTEMI  -Troponin escalating and consistent pattern with nonspecific changes on EKG. consistent with ACS, but given the patient's recent stressful history, I also have a very high suspicion for Takotsubo cardiomyopathy.  -Continue dual antiplatelet.  Heparin drip.  -Lipid panel  -N.p.o.  [facility] today    Known CAD  -Continue therapy as above.    Hypertension  -Continue home antihypertensives.      Josee Kaur MD

## 2024-02-02 NOTE — PROGRESS NOTES
Physical Therapy                 Therapy Communication Note    Patient Name: Linette Doyle  MRN: [id number]  Today's Date: 2/2/2024     Discipline: Physical Therapy    Missed Visit Reason: Missed Visit Reason: Cancel    Missed Time: Cancel    Comment: Pt to have cardiac cath this AM. Continue to follow.   Addendum 1340: Pt requiring ongoing post cath assessment this PM. Cancel today.

## 2024-02-02 NOTE — PROGRESS NOTES
Occupational Therapy                 Therapy Communication Note    Patient Name: Linette Doyle  MRN: [id number]  Today's Date: 2/2/2024     Discipline: Occupational Therapy    Missed Visit Reason: Cancel (Pt scheduled for a cardiac catheterization this morning. Will hold OT eval at this time.)    Missed Time: Cancel

## 2024-02-02 NOTE — NURSING NOTE
Called patients sister Krista who agreed to patient having procedure and being the primary contact to be called. Dr Vela notified and Acacia Rodríguez.

## 2024-02-02 NOTE — POST-PROCEDURE NOTE
Physician Transition of Care Summary  Invasive Cardiovascular Lab    Procedure Date: 2/2/2024  Attending: Panel 1:     * Becky Vela - Primary  Panel 2:     * Becky Vela - Primary  Resident/Fellow/Other Assistant: Surgeon(s) and Role:    Indications:   Pre-op Diagnosis     * NSTEMI (non-ST elevated myocardial infarction) (CMS/HCC) [I21.4]    Post-procedure diagnosis:   Post-op Diagnosis     * NSTEMI (non-ST elevated myocardial infarction) (CMS/HCC) [I21.4]    Procedure(s):   Left Heart Cath  71319 - MO L HRT CATH W/NJX L VENTRICULOGRAPHY IMG S&I    PCI        Procedure Findings:   Cardiac catheterization showed chronically occluded mid left circumflex.  There is moderate to severe 60 to 70% disease in the proximal RCA as well as and distal RCA.  For now medical management given the patient chronic kidney disease creatinine being slightly worse than baseline we will follow-up as an outpatient consider PCI to RCA if she remains symptomatic.    Description of the Procedure:   Coronary angiography native vessels.  Left heart catheterization    Complications:   None    Stents/Implants:     None  Anticoagulation/Antiplatelet Plan:   10,000 units of heparin    Estimated Blood Loss:   5 mL    Anesthesia: Moderate Sedation Anesthesia Staff: No anesthesia staff entered.    Any Specimen(s) Removed:   No specimens collected during this procedure.    Disposition:   Patient will monitor overnight tomorrow be discharged home      Electronically signed by: Becky Vela MD, 2/2/2024 12:36 PM

## 2024-02-02 NOTE — SIGNIFICANT EVENT
I was called to evaluate the patient emergently due to cath site bleeding s/p heart catheterization   Dr Vela arrived to the bedside and repositioned the wrist lock with pressure adjusted  No bleeding was noted   Vitals are stable but the patient is hypoxic, improved with ventimask  Lungs bilateral crackles  JVP elevated     Will order a dose of lasix and order portable CXR   Continue close monitoring

## 2024-02-03 LAB
ALBUMIN SERPL-MCNC: 3.8 G/DL (ref 3.5–5)
ALP BLD-CCNC: 71 U/L (ref 35–125)
ALT SERPL-CCNC: 30 U/L (ref 5–40)
ANION GAP SERPL CALC-SCNC: 12 MMOL/L
AST SERPL-CCNC: 87 U/L (ref 5–40)
BASOPHILS # BLD AUTO: 0.01 X10*3/UL (ref 0–0.1)
BASOPHILS NFR BLD AUTO: 0.1 %
BILIRUB SERPL-MCNC: 0.9 MG/DL (ref 0.1–1.2)
BUN SERPL-MCNC: 48 MG/DL (ref 8–25)
CALCIUM SERPL-MCNC: 8.9 MG/DL (ref 8.5–10.4)
CHLORIDE SERPL-SCNC: 103 MMOL/L (ref 97–107)
CO2 SERPL-SCNC: 25 MMOL/L (ref 24–31)
CREAT SERPL-MCNC: 2.3 MG/DL (ref 0.4–1.6)
EGFRCR SERPLBLD CKD-EPI 2021: 20 ML/MIN/1.73M*2
EOSINOPHIL # BLD AUTO: 0 X10*3/UL (ref 0–0.4)
EOSINOPHIL NFR BLD AUTO: 0 %
ERYTHROCYTE [DISTWIDTH] IN BLOOD BY AUTOMATED COUNT: 13.8 % (ref 11.5–14.5)
GLUCOSE SERPL-MCNC: 152 MG/DL (ref 65–99)
HCT VFR BLD AUTO: 29.9 % (ref 36–46)
HGB BLD-MCNC: 9.5 G/DL (ref 12–16)
IMM GRANULOCYTES # BLD AUTO: 0.1 X10*3/UL (ref 0–0.5)
IMM GRANULOCYTES NFR BLD AUTO: 0.6 % (ref 0–0.9)
LYMPHOCYTES # BLD AUTO: 0.72 X10*3/UL (ref 0.8–3)
LYMPHOCYTES NFR BLD AUTO: 4.6 %
MCH RBC QN AUTO: 28.7 PG (ref 26–34)
MCHC RBC AUTO-ENTMCNC: 31.8 G/DL (ref 32–36)
MCV RBC AUTO: 90 FL (ref 80–100)
MONOCYTES # BLD AUTO: 0.69 X10*3/UL (ref 0.05–0.8)
MONOCYTES NFR BLD AUTO: 4.4 %
NEUTROPHILS # BLD AUTO: 14.07 X10*3/UL (ref 1.6–5.5)
NEUTROPHILS NFR BLD AUTO: 90.3 %
NRBC BLD-RTO: 0 /100 WBCS (ref 0–0)
OVALOCYTES BLD QL SMEAR: NORMAL
PLATELET # BLD AUTO: 120 X10*3/UL (ref 150–450)
POLYCHROMASIA BLD QL SMEAR: NORMAL
POTASSIUM SERPL-SCNC: 4.4 MMOL/L (ref 3.4–5.1)
PROT SERPL-MCNC: 6.2 G/DL (ref 5.9–7.9)
RBC # BLD AUTO: 3.31 X10*6/UL (ref 4–5.2)
RBC MORPH BLD: NORMAL
SODIUM SERPL-SCNC: 140 MMOL/L (ref 133–145)
WBC # BLD AUTO: 15.6 X10*3/UL (ref 4.4–11.3)

## 2024-02-03 PROCEDURE — 36415 COLL VENOUS BLD VENIPUNCTURE: CPT | Performed by: INTERNAL MEDICINE

## 2024-02-03 PROCEDURE — 97165 OT EVAL LOW COMPLEX 30 MIN: CPT | Mod: GO

## 2024-02-03 PROCEDURE — 97161 PT EVAL LOW COMPLEX 20 MIN: CPT | Mod: GP

## 2024-02-03 PROCEDURE — 2500000004 HC RX 250 GENERAL PHARMACY W/ HCPCS (ALT 636 FOR OP/ED): Mod: MUE | Performed by: INTERNAL MEDICINE

## 2024-02-03 PROCEDURE — 80053 COMPREHEN METABOLIC PANEL: CPT | Performed by: INTERNAL MEDICINE

## 2024-02-03 PROCEDURE — 2500000001 HC RX 250 WO HCPCS SELF ADMINISTERED DRUGS (ALT 637 FOR MEDICARE OP): Performed by: INTERNAL MEDICINE

## 2024-02-03 PROCEDURE — 2060000001 HC INTERMEDIATE ICU ROOM DAILY

## 2024-02-03 PROCEDURE — 2500000004 HC RX 250 GENERAL PHARMACY W/ HCPCS (ALT 636 FOR OP/ED): Performed by: INTERNAL MEDICINE

## 2024-02-03 PROCEDURE — 97535 SELF CARE MNGMENT TRAINING: CPT | Mod: GO

## 2024-02-03 PROCEDURE — 2500000004 HC RX 250 GENERAL PHARMACY W/ HCPCS (ALT 636 FOR OP/ED): Performed by: FAMILY MEDICINE

## 2024-02-03 PROCEDURE — 85025 COMPLETE CBC W/AUTO DIFF WBC: CPT | Performed by: INTERNAL MEDICINE

## 2024-02-03 RX ORDER — CEFTRIAXONE 1 G/50ML
1 INJECTION, SOLUTION INTRAVENOUS DAILY
Status: COMPLETED | OUTPATIENT
Start: 2024-02-03 | End: 2024-02-07

## 2024-02-03 RX ADMIN — METOPROLOL SUCCINATE 25 MG: 25 TABLET, EXTENDED RELEASE ORAL at 21:20

## 2024-02-03 RX ADMIN — SERTRALINE 100 MG: 100 TABLET, FILM COATED ORAL at 10:04

## 2024-02-03 RX ADMIN — CLOPIDOGREL BISULFATE 75 MG: 75 TABLET ORAL at 10:04

## 2024-02-03 RX ADMIN — LISINOPRIL 20 MG: 20 TABLET ORAL at 10:04

## 2024-02-03 RX ADMIN — ASPIRIN 81 MG 81 MG: 81 TABLET ORAL at 10:04

## 2024-02-03 RX ADMIN — Medication 2000 UNITS: at 10:04

## 2024-02-03 RX ADMIN — FUROSEMIDE 40 MG: 10 INJECTION, SOLUTION INTRAMUSCULAR; INTRAVENOUS at 13:12

## 2024-02-03 RX ADMIN — LEVOTHYROXINE SODIUM 100 MCG: 0.1 TABLET ORAL at 07:35

## 2024-02-03 RX ADMIN — MULTIVITAMIN TABLET 1 TABLET: TABLET at 10:04

## 2024-02-03 RX ADMIN — ATORVASTATIN CALCIUM 80 MG: 80 TABLET, FILM COATED ORAL at 21:20

## 2024-02-03 RX ADMIN — PANTOPRAZOLE SODIUM 40 MG: 40 TABLET, DELAYED RELEASE ORAL at 07:35

## 2024-02-03 RX ADMIN — CEFTRIAXONE SODIUM 1 G: 1 INJECTION, SOLUTION INTRAVENOUS at 16:27

## 2024-02-03 ASSESSMENT — PAIN SCALES - GENERAL
PAINLEVEL_OUTOF10: 0 - NO PAIN

## 2024-02-03 ASSESSMENT — PAIN - FUNCTIONAL ASSESSMENT
PAIN_FUNCTIONAL_ASSESSMENT: 0-10

## 2024-02-03 ASSESSMENT — COGNITIVE AND FUNCTIONAL STATUS - GENERAL
TOILETING: A LITTLE
CLIMB 3 TO 5 STEPS WITH RAILING: A LITTLE
MOVING TO AND FROM BED TO CHAIR: A LITTLE
HELP NEEDED FOR BATHING: A LITTLE
TURNING FROM BACK TO SIDE WHILE IN FLAT BAD: A LITTLE
STANDING UP FROM CHAIR USING ARMS: A LITTLE
DAILY ACTIVITIY SCORE: 19
WALKING IN HOSPITAL ROOM: A LITTLE
DRESSING REGULAR LOWER BODY CLOTHING: A LITTLE
MOVING FROM LYING ON BACK TO SITTING ON SIDE OF FLAT BED WITH BEDRAILS: A LITTLE
MOBILITY SCORE: 18
DRESSING REGULAR UPPER BODY CLOTHING: A LITTLE
PERSONAL GROOMING: A LITTLE

## 2024-02-03 ASSESSMENT — ACTIVITIES OF DAILY LIVING (ADL)
ADL_ASSISTANCE: INDEPENDENT
ADL_ASSISTANCE: INDEPENDENT
BATHING_ASSISTANCE: MINIMAL
HOME_MANAGEMENT_TIME_ENTRY: 10

## 2024-02-03 NOTE — NURSING NOTE
Reviewing patient's chart and after speaking with sister who also voices concerns about increasing confusion - made hospitalist aware that urine sample may be indicated for increasing confusion and increasing WBC count.  Awaiting new orders.

## 2024-02-03 NOTE — PROGRESS NOTES
Linette Doyle is a 83 y.o. female on day 2 of admission presenting with NSTEMI (non-ST elevated myocardial infarction) (CMS/[facility]).      Subjective   Shortness of breath has improved, no chest pain  Wants to go home        Objective     Last Recorded Vitals  /51 (BP Location: Left arm, Patient Position: Lying)   Pulse 60   Temp 36.8 °C (98.2 °F) (Axillary)   Resp 18   Wt 84 kg (185 lb 3.2 oz)   SpO2 93%   Intake/Output last 3 Shifts:    Intake/Output Summary (Last 24 hours) at 2/3/2024 0939  Last data filed at 2/3/2024 0403  Gross per 24 hour   Intake 0 ml   Output 5 ml   Net -5 ml         Admission Weight  Weight: 68 kg (150 lb) (01/31/24 2058)    Daily Weight  02/03/24 : 84 kg (185 lb 3.2 oz)    Image Results  XR chest 1 view  Narrative: Interpreted By:  Mc Carranza,   STUDY:  XR CHEST 1 VIEW; 2/2/2024 2:34 pm      INDICATION:  Signs/Symptoms:hypoxia.      COMPARISON:  01/31/2024      ACCESSION NUMBER(S):  [id number]      ORDERING CLINICIAN:  STORMY NAGEL      TECHNIQUE:  1 view of the chest was performed.      FINDINGS:  Left chest wall pacemaker and leads appear stable. There may be a  small left pleural effusion, no right pleural effusion. There is  increased perihilar airspace opacity which can be related to  congestive changes and increased pulmonary edema.. No pneumothorax.  The cardiomediastinal silhouette is borderline mildly enlarged but  stable      Impression: Increased perihilar airspace opacity which can be seen with pulmonary  edema. There may be a small left pleural effusion.      Signed by: Mc Carranza 2/2/2024 3:36 PM  Dictation workstation:   [id number]  Transthoracic Echo (TTE) [place] [address]             Phone [phone]    TRANSTHORACIC ECHOCARDIOGRAM REPORT       Patient Name:      LINETTE DOYLE         Reading Physician:    Zuri Pena                                                                  Dane BAJWA  Study Date:        2/1/2024              Ordering Provider:    [id number] MELANIE HARDWICK  MRN/PID:           [id number]              Fellow:  Accession#:        [id number]          Nurse:  Date of Birth/Age: 1940 / 83 years Sonographer:          Yamileth Houser RDCS  Gender:            F                     Additional Staff:  Height:            162.00 cm             Admit Date:  Weight:            68.00 kg              Admission Status:     Inpatient -                                                                 Routine  BSA:               1.73 m2               Department Location:  [facility]  Blood Pressure: 166 /72 mmHg    Study Type:    TRANSTHORACIC ECHO (TTE) COMPLETE  Diagnosis/ICD: Non ST elevation (NSTEMI) myocardial infarction-I21.4  Indication:    nstemi  CPT Codes:     Echo Complete w Full Doppler-[id number]    Patient History:  BMI:               Overweight 25 - 30  Pacer/Defib:       Permanent pacemaker  Pertinent History: CAD and HTN. ckd,stent,sss,falls, sob.    Study Detail: The following Echo studies were performed: 2D, M-Mode, Doppler and                color flow. Technically challenging study due to prominent lung                artifact.       PHYSICIAN INTERPRETATION:  Left Ventricle: Left ventricular systolic function is normal, with an estimated ejection fraction of 55-60%. There are no regional wall motion abnormalities. The left ventricular cavity size is normal. Spectral Doppler shows a normal pattern of left ventricular diastolic filling. The inferior wall is mildly hypokinetic.  Left Atrium: The left atrium is mildly dilated.  Right Ventricle: The right ventricle is normal in size. There is normal right ventricular global systolic function. A device is visualized in the right ventricle.  Right Atrium: The right atrium is normal in size. There  is a device visualized in the right atrium.  Aortic Valve: The aortic valve appears structurally normal. There is no evidence of aortic valve regurgitation. The peak instantaneous gradient of the aortic valve is 4.7 mmHg.  Mitral Valve: The mitral valve is normal in structure. There is moderate mitral valve regurgitation which is posteriorly directed.  Tricuspid Valve: The tricuspid valve is structurally normal. There is mild tricuspid regurgitation. The Doppler estimated RVSP is moderately elevated at 61.6 mmHg.  Pulmonic Valve: The pulmonic valve is structurally normal. There is mild pulmonic valve regurgitation.  Pericardium: There is a trivial pericardial effusion.  Aorta: The aortic root is normal.  Systemic Veins: The inferior vena cava appears to be of normal size. There is less than 50% IVC collapse with inspiration.       CONCLUSIONS:   1. Left ventricular systolic function is normal with a 55-60% estimated ejection fraction.   2. The inferior wall is mildly hypokinetic.   3. Moderate mitral valve regurgitation.   4. Mild tricuspid regurgitation is visualized.   5. Moderately elevated right ventricular systolic pressure.    QUANTITATIVE DATA SUMMARY:  2D MEASUREMENTS:                           Normal Ranges:  LAs:           3.10 cm   (2.7-4.0cm)  IVSd:          0.63 cm   (0.6-1.1cm)  LVPWd:         0.64 cm   (0.6-1.1cm)  LVIDd:         5.64 cm   (3.9-5.9cm)  LV Mass Index: 72.6 g/m2    LV SYSTOLIC FUNCTION BY 2D PLANIMETRY (MOD):                      Normal Ranges:  EF-A4C View: 56.8 % (>=55%)  EF-A2C View: 61.9 %  EF-Biplane:  59.3 %    LV DIASTOLIC FUNCTION:                         Normal Ranges:  MV Peak E:    1.88 m/s (0.7-1.2 m/s)  MV Peak A:    0.54 m/s (0.42-0.7 m/s)  E/A Ratio:    3.49     (1.0-2.2)  MV e'         0.08 m/s (>8.0)  MV lateral e' 0.08 m/s  MV medial e'  0.08 m/s  E/e' Ratio:   24.67    (<8.0)  a'            0.08 m/s    MITRAL VALVE:                        Normal Ranges:  MV Vmax:     2.27 m/s  (<=1.3m/s)  MV peak P.6 mmHg (<5mmHg)  MV mean P.0 mmHg  (<48mmHg)  MV DT:      409 msec  (150-240msec)    AORTIC VALVE:                          Normal Ranges:  AoV Vmax:      1.08 m/s (<=1.7m/s)  AoV Peak P.7 mmHg (<20mmHg)  LVOT Max Yasir:  0.72 m/s (<=1.1m/s)  LVOT Diameter: 1.90 cm  (1.8-2.4cm)  AoV Area,Vmax: 1.89 cm2 (2.5-4.5cm2)       RIGHT VENTRICLE:  RV s' 0.16 m/s    TRICUSPID VALVE/RVSP:                              Normal Ranges:  Peak TR Velocity: 3.66 m/s  RV Syst Pressure: 61.6 mmHg (< 30mmHg)  IVC Diam:         1.80 cm       [id number] Becky Vela MD  Electronically signed on 2024 at 8:24:54 AM       ** Final **      Physical Exam  HENT:      Head: Normocephalic.      Right Ear: External ear normal.      Left Ear: External ear normal.   Eyes:      Conjunctiva/sclera: Conjunctivae normal.   Cardiovascular:      Rate and Rhythm: Normal rate.   Pulmonary:      Effort: Pulmonary effort is normal.      Breath sounds: Normal breath sounds.   Abdominal:      General: Abdomen is flat.   Skin:     General: Skin is warm.   Neurological:      General: No focal deficit present.      Mental Status: She is alert.   Psychiatric:         Mood and Affect: Mood normal.         Relevant Results  Results for orders placed or performed during the hospital encounter of 24 (from the past 24 hour(s))   ACTIVATED CLOTTING TIME LOW   Result Value Ref Range    POCT Activated Clotting Time Low Range 281 (H) 89 - 169 sec   CBC and Auto Differential   Result Value Ref Range    WBC 15.6 (H) 4.4 - 11.3 x10*3/uL    nRBC 0.0 0.0 - 0.0 /100 WBCs    RBC 3.31 (L) 4.00 - 5.20 x10*6/uL    Hemoglobin 9.5 (L) 12.0 - 16.0 g/dL    Hematocrit 29.9 (L) 36.0 - 46.0 %    MCV 90 80 - 100 fL    MCH 28.7 26.0 - 34.0 pg    MCHC 31.8 (L) 32.0 - 36.0 g/dL    RDW 13.8 11.5 - 14.5 %    Platelets 120 (L) 150 - 450 x10*3/uL    Neutrophils % 90.3 40.0 - 80.0 %    Immature Granulocytes %, Automated 0.6 0.0 - 0.9 %    Lymphocytes  % 4.6 13.0 - 44.0 %    Monocytes % 4.4 2.0 - 10.0 %    Eosinophils % 0.0 0.0 - 6.0 %    Basophils % 0.1 0.0 - 2.0 %    Neutrophils Absolute 14.07 (H) 1.60 - 5.50 x10*3/uL    Immature Granulocytes Absolute, Automated 0.10 0.00 - 0.50 x10*3/uL    Lymphocytes Absolute 0.72 (L) 0.80 - 3.00 x10*3/uL    Monocytes Absolute 0.69 0.05 - 0.80 x10*3/uL    Eosinophils Absolute 0.00 0.00 - 0.40 x10*3/uL    Basophils Absolute 0.01 0.00 - 0.10 x10*3/uL   Comprehensive Metabolic Panel   Result Value Ref Range    Glucose 152 (H) 65 - 99 mg/dL    Sodium 140 133 - 145 mmol/L    Potassium 4.4 3.4 - 5.1 mmol/L    Chloride 103 97 - 107 mmol/L    Bicarbonate 25 24 - 31 mmol/L    Urea Nitrogen 48 (H) 8 - 25 mg/dL    Creatinine 2.30 (H) 0.40 - 1.60 mg/dL    eGFR 20 (L) >60 mL/min/1.73m*2    Calcium 8.9 8.5 - 10.4 mg/dL    Albumin 3.8 3.5 - 5.0 g/dL    Alkaline Phosphatase 71 35 - 125 U/L    Total Protein 6.2 5.9 - 7.9 g/dL    AST 87 (H) 5 - 40 U/L    Bilirubin, Total 0.9 0.1 - 1.2 mg/dL    ALT 30 5 - 40 U/L    Anion Gap 12 <=19 mmol/L   Morphology   Result Value Ref Range    RBC Morphology No significant RBC morphology present     Polychromasia Mild     Ovalocytes Few          Assessment/Plan     Principal Problem:    NSTEMI (non-ST elevated myocardial infarction) (CMS/HCC)  Active Problems:    Coronary arteriosclerosis    Atherosclerosis of coronary artery    Hypothyroidism    Hypercholesterolemia    Sick sinus syndrome (CMS/HCC)    Impaired fasting glucose    Leukocytosis    NSTEMI  -You have talked with disease control total occlusion of the circumflex and moderate disease in the RCA.  Will have outpatient coronary intervention  -Continue dual antiplatelet agents  -Chest x-ray yesterday showed pulmonary edema, continue IV Lasix, try to wean the patient off oxygen    Known CAD  -Continue therapy as above.    Hypertension  -Continue home antihypertensives.      Josee Kaur MD

## 2024-02-03 NOTE — NURSING NOTE
Assumed care of patient, awake and resting quietly in bed.  Patient pleasantly confused.  Offers no complaints of pain/discomfort.  R wrist site noted with CDD, bruising observed, soft to palpate.  3L O2 via n/c out of nose, re applied and educated patient.  Patient inquiring about discharging home, explained that we need to continue to monitor and that she is on O2 at this time.  Call light and commonly used items in reach.  Bed locked and in low position.  Bed alarm engaged for safety.

## 2024-02-03 NOTE — PROGRESS NOTES
Subjective Data:  Patient doing better.  Denies having chest pain.  Not feeling good overall.    Overnight Events:    Telemetry overnight reviewed no events     Objective Data:  Last Recorded Vitals:  Vitals:    02/02/24 1928 02/02/24 2300 02/03/24 0004 02/03/24 0403   BP: 121/52 107/60  101/52   BP Location: Left arm Left arm  Left arm   Patient Position: Lying Lying  Lying   Pulse: 60 60  60   Resp: 18 18  18   Temp: 36.9 °C (98.4 °F) 36 °C (96.8 °F)  37 °C (98.6 °F)   TempSrc: Temporal Temporal  Temporal   SpO2: 94% (!) 65% 94% 93%   Weight:    84 kg (185 lb 3.2 oz)   Height:           Last Labs:  CBC - 2/2/2024:  6:25 AM  12.0 11.2 129    33.5      CMP - 2/2/2024:  6:25 AM  9.1 6.5 157 --- 1.2   _ 4.0 29 86      PTT - 2/2/2024:  6:25 AM  _   _ 121.0     HGBA1C   Date/Time Value Ref Range Status   02/01/2024 05:58 AM 6.0 See below % Final   07/05/2023 08:49 AM 5.6 4.0 - 6.0 % Final     Comment:     Hemoglobin A1C levels are related to mean blood glucose during the   preceding 2-3 months. The relationship table below may be used as a   general guide. Each 1% increase in HGB A1C is a reflection of an   increase in mean glucose of approximately 30 mg/dl.   Reference: Diabetes [employer], volume 29, supplement 1 Jan. 2006                        HGB A1C ................. Approx. Mean Glucose   _______________________________________________   6%   ...............................  120 mg/dl   7%   ...............................  150 mg/dl   8%   ...............................  180 mg/dl   9%   ...............................  210 mg/dl   10%  ...............................  240 mg/dl  Performed at [address] [address]     12/14/2022 09:13 AM 5.7 4.0 - 6.0 % Final     Comment:     Hemoglobin A1C levels are related to mean blood glucose during the   preceding 2-3 months. The relationship table below may be used as a   general guide. Each 1% increase in HGB A1C is a reflection of an   increase in  mean glucose of approximately 30 mg/dl.   Reference: Diabetes Care, volume 29, supplement 1 Jan. 2006                        HGB A1C ................. Approx. Mean Glucose   _______________________________________________   6%   ...............................  120 mg/dl   7%   ...............................  150 mg/dl   8%   ...............................  180 mg/dl   9%   ...............................  210 mg/dl   10%  ...............................  240 mg/dl  Performed at [address] [address]       LDLCALC   Date/Time Value Ref Range Status   07/05/2023 08:49 AM 57 65 - 130 MG/DL Final   12/14/2022 09:13 AM 64 65 - 130 MG/DL Final   06/29/2022 08:43 AM 58 65 - 130 MG/DL Final      Last I/O:  I/O last 3 completed shifts:  In: 195 (2.4 mL/kg) [I.V.:195 (2.4 mL/kg)]  Out: 5 (0.1 mL/kg) [Blood:5]  Weight: 80.6 kg     Past Cardiology Tests (Last 3 Years):  EKG:  ECG 12 Lead 01/31/2024    Echo:  Transthoracic Echo (TTE) Complete 02/01/2024    Ejection Fractions:  EF   Date/Time Value Ref Range Status   02/01/2024 11:43 AM 59 %      Cath:  No results found for this or any previous visit from the past 1095 days.    Stress Test:  No results found for this or any previous visit from the past 1095 days.    Cardiac Imaging:  No results found for this or any previous visit from the past 1095 days.      Inpatient Medications:  Scheduled medications   Medication Dose Route Frequency    aspirin  81 mg oral Daily    atorvastatin  80 mg oral Nightly    cholecalciferol  2,000 Units oral Daily    clopidogrel  75 mg oral Daily    dilTIAZem  120 mg oral BID    furosemide  40 mg intravenous q12h    levothyroxine  100 mcg oral Daily before breakfast    lisinopril  20 mg oral Daily    metoprolol succinate XL  25 mg oral BID    multivitamin  1 tablet oral Daily    pantoprazole  40 mg oral Daily before breakfast    perflutren lipid microspheres  0.5-10 mL of dilution intravenous Once in imaging    perflutren  protein A microsphere  0.5 mL intravenous Once in imaging    sertraline  100 mg oral Daily    sulfur hexafluoride microsphr  2 mL intravenous Once in imaging     PRN medications   Medication    acetaminophen    Or    acetaminophen    Or    acetaminophen    benzocaine-menthol    calcium carbonate    dextromethorphan-guaifenesin    dextrose 10 % in water (D10W)    dextrose    glucagon    guaiFENesin    heparin (porcine)    ondansetron ODT    Or    ondansetron    polyethylene glycol     Continuous Medications   Medication Dose Last Rate       Physical Exam:  General: Patient is in mild respiratory distress  HEENT: atraumatic normocephalic.  Neck: is supple jugular venous pressure within normal limits no thyromegaly.  Cardiovascular regular rate and rhythm normal heart sounds no murmurs rubs or gallops.  Lungs: Scattered crackles at bases  Abdomen: is soft nontender.  Extremities warm to touch no edema.  Neurologic examination: patient is awake alert oriented to person, place, date/time.  Psychiatric examination: patient has good insight denies feeling suicidal and depressed.  Pulses 2+ intact bilaterally     Assessment/Plan   Acute non-ST elevation myocardial infarction.  Cardiac catheterization showed 100% occluded left circumflex in the proximal segment attempted wiring the artery and appears to be .  I had collaterals from right to left.  The right coronary artery  has 2 lesions also 70% and proximal and distal segments.  Due to her renal function and that use of contrast to the decision was made to intervene as an outpatient after optimizing patient medical status.  Patient did develop some shortness of breath after the procedure but she received a significant amount of fluids.  I agree with current diuretics.  2D echo showed normal ejection fraction and moderate mitral regurgitation and inferior wall moderate hypokinesis.  Recommend diuresis for the next 24 hours we will stop diltiazem and uptitrate metoprolol  to control her heart rate and blood pressure.  Continue lisinopril.  Likely discharge in 24 to 48 hours continue high intensity statin and dual antiplatelet therapy.    2.  Hypertension.  For now we will stop diltiazem uptitrate metoprolol to control her heart rate and blood pressure.  Continue lisinopril for now we will monitor.  3. Hyperlipidemia continue high intensity statin.  4.Sinus syndrome status post pacemaker.     Peripheral IV 02/01/24 22 G Left;Posterior Forearm (Active)   Site Assessment Clean;Dry;Intact 02/02/24 2100   Dressing Status Clean;Dry;Occlusive 02/02/24 2100   Number of days: 2       Code Status:  Full Code      Becky Vela MD

## 2024-02-03 NOTE — PROGRESS NOTES
Physical Therapy    Physical Therapy Evaluation    Patient Name: Linetet Doyle  MRN: [id number]  Today's Date: 2/3/2024   Time Calculation  Start Time: 0844  Stop Time: 0904  Time Calculation (min): 20 min    Assessment/Plan   PT Assessment  PT Assessment Results: Decreased strength, Decreased endurance, Impaired balance, Decreased mobility, Decreased safety awareness  Rehab Prognosis: Good  Evaluation/Treatment Tolerance: Patient tolerated treatment well  Medical Staff Made Aware: Yes  End of Session Communication: Bedside nurse  End of Session Patient Position: Bed, 3 rail up, Alarm on  IP OR SWING BED PT PLAN  Inpatient or Swing Bed: Inpatient  PT Plan  Treatment/Interventions: Bed mobility, Transfer training, Gait training, Stair training, Balance training, Strengthening, Endurance training, Therapeutic exercise, Therapeutic activity  PT Plan: Skilled PT  PT Frequency: 4 times per week  PT Discharge Recommendations: Low intensity level of continued care, 24 hr supervision due to cognition  PT Recommended Transfer Status: Contact guard  PT - OK to Discharge: Yes      Subjective   General Visit Information:  General  Reason for Referral: 82 y/o female with hx of stent placement, SSS with pacer placement admit with chest pain.  She reports to ED that she was scammed out of $20,000 and she has been having a lot of stress with that. s/p cardiac cath 2/2.  Referred By: Sal Ibrahim MD  Past Medical History Relevant to Rehab: CAD, cardiac stent, SSS with pacer, hypothyroidism, hypercholesterolemia, htn, anemia, CKD  Co-Treatment: OT  Prior to Session Communication: Bedside nurse  Patient Position Received: Bed, 3 rail up, Alarm off, not on at start of session  General Comment: agreeable to therapy, mildly anxious  Home Living:  Home Living  Type of Home: House  Lives With: Alone  Home Adaptive Equipment: None  Home Layout: One level  Home Access:  (threshold)  Bathroom Shower/Tub: Walk-in shower, Tub/shower  unit  Bathroom Toilet: Standard  Bathroom Equipment: Grab bars in shower  Prior Level of Function:  Prior Function Per Pt/Caregiver Report  Level of [place]: Independent with ADLs and functional transfers, Independent with homemaking with ambulation  ADL Assistance: Independent  Homemaking Assistance: Independent  Ambulatory Assistance: Independent  Vocational: Retired  Hand Dominance: Right  Prior Function Comments: denies falls  Precautions:  Precautions  Hearing/Visual Limitations: reading glasses  Medical Precautions: Fall precautions, Oxygen therapy device and L/min (3L O2 via NC)    Objective   Pain:  Pain Assessment  Pain Assessment: 0-10  Pain Score: 0 - No pain  Cognition:  Cognition  Overall Cognitive Status: Within Functional Limits  Orientation Level: Oriented X4    General Assessments:  General Observation  General Observation: NAD, ecchymosis. R TR band site hematoma - no drainage noted     Activity Tolerance  Endurance: Decreased tolerance for upright activites    Sensation  Light Touch: No apparent deficits  Sensation Comment: denies paresthesias    Strength  Strength Comments: strength grossly >4-/5    Coordination  Coordination Comment: decreased rate    Postural Control  Postural Control: Within Functional Limits  Posture Comment: rounded shoulders, forward head posture    Static Sitting Balance  Static Sitting-Balance Support: Feet supported, Bilateral upper extremity supported  Static Sitting-Level of Assistance: Distant supervision    Static Standing Balance  Static Standing-Balance Support: No upper extremity supported  Static Standing-Level of Assistance: Close supervision  Dynamic Standing Balance  Dynamic Standing-Balance Support: No upper extremity supported  Dynamic Standing-Balance: Forward lean, Reaching for objects, Turning  Dynamic Standing-Comments: supervision  Functional Assessments:  Bed Mobility  Bed Mobility: Yes  Bed Mobility 1  Bed Mobility 1: Supine to sitting, Sitting  to supine  Level of Assistance 1: Close supervision  Bed Mobility Comments 1: increased time/effort, HOB slightly elevated    Transfers  Transfer: Yes  Transfer 1  Technique 1: Sit to stand, Stand to sit  Transfer Level of Assistance 1: Contact guard    Ambulation/Gait Training  Ambulation/Gait Training Performed: Yes  Ambulation/Gait Training 1  Surface 1: Level tile  Device 1: No device  Assistance 1: Contact guard  Quality of Gait 1: Diminished heel strike, Decreased step length, Forward flexed posture (decreased gloria; difficulty with dual tasks.  PT managing O2 tubing.)  Comments/Distance (ft) 1: 25' x 2  Extremity/Trunk Assessments:  RLE   RLE : Within Functional Limits  LLE   LLE : Within Functional Limits  Outcome Measures:  [facility] Basic Mobility  Turning from your back to your side while in a flat bed without using bedrails: A little  Moving from lying on your back to sitting on the side of a flat bed without using bedrails: A little  Moving to and from bed to chair (including a wheelchair): A little  Standing up from a chair using your arms (e.g. wheelchair or bedside chair): A little  To walk in hospital room: A little  Climbing 3-5 steps with railing: A little  Basic Mobility - Total Score: 18    Encounter Problems       Encounter Problems (Active)       PT Problem       Patient will demonstrate improvements in strength  (Progressing)       Start:  02/03/24    Expected End:  02/24/24            Patient will ambulate 150 ft independently and use of no assistive device  (Progressing)       Start:  02/03/24    Expected End:  02/24/24            Patient will ascend/descend 1 step with no support on 0 rail(s) independently  (Progressing)       Start:  02/03/24    Expected End:  02/24/24            PT Goal 4       Start:  02/03/24    Expected End:  02/24/24               Pain - Adult              Education Documentation  Precautions, taught by Ariana Galaviz, PT at 2/3/2024  9:40 AM.  Learner:  Patient  Readiness: Acceptance  Method: Explanation  Response: Needs Reinforcement    Body Mechanics, taught by Ariana Galaviz, PT at 2/3/2024  9:40 AM.  Learner: Patient  Readiness: Acceptance  Method: Explanation  Response: Needs Reinforcement    Mobility Training, taught by Ariana Galaviz, PT at 2/3/2024  9:40 AM.  Learner: Patient  Readiness: Acceptance  Method: Explanation  Response: Needs Reinforcement    Education Comments  No comments found.

## 2024-02-03 NOTE — PROGRESS NOTES
1845 TR Band removed. Skin is soft. No hematoma noted. No numbness/tingling noted. Pulses palpable. Extremities warm to touch. No acute distress noted at this time. Care ongoing.       EDY LLAMAS RN

## 2024-02-03 NOTE — PROGRESS NOTES
Occupational Therapy    Evaluation/Treatment    Patient Name: Linette Doyle  MRN: [id number]  : 1940  Today's Date: 24  Time Calculation  Start Time: 851  Stop Time: 916  Time Calculation (min): 25 min       Assessment:  OT Assessment: Pt presents on eval with generalized weakness, cognitive/psychosocial deficits noted, decreased activity tolerance, and impaired standing balance affecting her self-care and functional transfers/mobility. Pt will benefit from continued skilled OT to address these deficits.  Prognosis: Good  Evaluation/Treatment Tolerance: Patient tolerated treatment well  End of Session Communication: Bedside nurse  End of Session Patient Position: Bed, 3 rail up, Alarm on (Needs in reach.)  OT Assessment Results: Decreased ADL status, Decreased safe judgment during ADL, Decreased cognition, Decreased endurance, Decreased functional mobility, Decreased IADLs    Plan:  Treatment Interventions: ADL retraining, Functional transfer training, Endurance training, Patient/family training, Equipment evaluation/education, Neuromuscular reeducation, Compensatory technique education  OT Frequency: 4 times per week  OT Discharge Recommendations: Low intensity level of continued care, 24 hr supervision due to cognition  OT Recommended Transfer Status: Minimal assist, Stand by assist (CGA to close S)  OT - OK to Discharge: Yes      Subjective     General:   OT Received On: 24  General  Reason for Referral: NSTEMI, weakness, confusion, and impaired ADL's/mobility  Referred By: Sal Ibrahim MD  Past Medical History Relevant to Rehab: CAD, cardiac stent, SSS with pacer, hypothyroidism, hypercholesterolemia, htn, anemia, CKD  Family/Caregiver Present: No  Co-Treatment: PT  Co-Treatment Reason: Medically complex pt with cognitive deficits  Prior to Session Communication: Bedside nurse  Patient Position Received: Bed, 3 rail up, Alarm off, not on at start of session  General Comment: 84 y/o female  with hx of stent placement, SSS with pacer placement admit with chest pain.  She reports to ED that she was scammed out of $20,000 and she has been having a lot of stress with that. s/p cardiac cath 2/2.    Precautions:  Hearing/Visual Limitations: reading glasses  Medical Precautions: Fall precautions, Oxygen therapy device and L/min (3L 02)    Pain:  Pain Assessment  Pain Assessment: 0-10  Pain Score: 0 - No pain    Objective     Cognition:  Overall Cognitive Status: Impaired  Orientation Level: Oriented X4  Cognition Comments: Pt presents on eval pleasantly confused and appears highly anxious regarding being scammed out of money prior to admit.    Home Living:  Type of Home: House  Lives With: Alone  Home Adaptive Equipment: None  Home Layout: One level  Home Access:  (threshold)  Bathroom Shower/Tub: Walk-in shower, Tub/shower unit  Bathroom Toilet: Standard  Bathroom Equipment: Grab bars in shower    Prior Function:  Level of [place]: Independent with ADLs and functional transfers, Independent with homemaking with ambulation  ADL Assistance: Independent  Homemaking Assistance: Independent  Ambulatory Assistance: Independent  Vocational: Retired  Hand Dominance: Right  Prior Function Comments: denies falls    ADL:  Eating Assistance: Independent  Grooming Assistance: Stand by  Grooming Deficit: Supervision/safety, Wash/dry hands, Teeth care (Pt stood at the sink to complete brushing her teeth and washing her hands after toileting.)  Bathing Assistance: Minimal  UE Dressing Assistance: Stand by  UE Dressing Deficit: Verbal cueing, Setup  LE Dressing Assistance: Minimal  Toileting Assistance with Device: Minimal  Toileting Deficit: Steadying, Clothing management up, Clothing management down (Pt able to complete pericare seated on toilet independently, but required steadying assist for balance in standing during clothing management.)    Activity Tolerance:  Activity Tolerance Comments: impaired due to  generalized weakness and limited activity since admit.    Bed Mobility/Transfers: Bed Mobility  Bed Mobility:  (Pt completed supine to sit in bed with close S for safety due to cognitive deficits noted.)    Transfers  Transfer:  (Pt completed sit<>stand from the bed, chair, and toilet with CGA for balance/safety.)    Therapy/Activity: Therapeutic Activity  Therapeutic Activity Performed:  (See grooming, toileting, bed mobility, and transfers. Pt completed functional mobility to/from the bathroom with CGA for balance.)    Sensation:  Sensation Comment:  (NAIN's WFL)    Strength:  Strength Comments: BRENNA not formally tested due to cath site at wrist, which had bleeding last night. Pt minimally using her RUE during brushing her teeth without issue. (NIA WFL)    Coordination:  Coordination Comment: CLAUDIAMALOU NT (NIA WFL)     Hand Function:  Hand Function  Gross Grasp: Functional  Coordination: Functional      Outcome Measures: [facility] Daily Activity  Putting on and taking off regular lower body clothing: A little  Bathing (including washing, rinsing, drying): A little  Putting on and taking off regular upper body clothing: A little  Toileting, which includes using toilet, bedpan or urinal: A little  Taking care of personal grooming such as brushing teeth: A little  Eating Meals: None  Daily Activity - Total Score: 19      Education Documentation  ADL Training, taught by Rayshawn Ureña Jr., OT at 2/3/2024 11:00 AM.  Learner: Patient  Readiness: Acceptance  Method: Explanation  Response: Needs Reinforcement    Education Comments  No comments found.         Goals:  Encounter Problems       Encounter Problems (Active)       OT Goals       Pt will complete all functional transfers and mobility with mod indep using a device or no device. (Progressing)       Start:  02/03/24    Expected End:  02/24/24            Pt will complete all ADL's/IADL's with mod indep using a device as needed. (Progressing)       Start:  02/03/24    Expected  End:  02/24/24            Pt will tolerate functional mobility for a household distance using a device or no device with mod indep. (Progressing)       Start:  02/03/24    Expected End:  02/24/24

## 2024-02-03 NOTE — NURSING NOTE
Patient given IV lasix.  Assisted to bathroom.  Hat was in toilet, no urine was noted in hat and none in toilet.  Bladder scan done, unable to locate bladder.  2nd nurse attempted.  Tried with 2 different bladder scans.  Made hospitalist aware.

## 2024-02-03 NOTE — PROGRESS NOTES
Subjective Data:    Symptoms of chest discomfort she is quite comfortable.  No shortness of breath.  Overnight Events:    none     Objective Data:  Last Recorded Vitals:  Vitals:    02/03/24 0700 02/03/24 1204 02/03/24 1212 02/03/24 1513   BP: 106/51 113/51  111/53   BP Location: Left arm Left arm  Left arm   Patient Position: Lying Lying  Lying   Pulse: 60 60  59   Resp: 18 18  18   Temp: 36.8 °C (98.2 °F) 36.6 °C (97.9 °F)  36.4 °C (97.6 °F)   TempSrc: Axillary Oral  Oral   SpO2:  94% 97% 95%   Weight:       Height:           Last Labs:  CBC - 2/3/2024:  7:55 AM  15.6 9.5 120    29.9      CMP - 2/3/2024:  7:55 AM  8.9 6.2 87 --- 0.9   _ 3.8 30 71      PTT - 2/2/2024:  6:25 AM  _   _ 121.0     HGBA1C   Date/Time Value Ref Range Status   02/01/2024 05:58 AM 6.0 See below % Final   07/05/2023 08:49 AM 5.6 4.0 - 6.0 % Final     Comment:     Hemoglobin A1C levels are related to mean blood glucose during the   preceding 2-3 months. The relationship table below may be used as a   general guide. Each 1% increase in HGB A1C is a reflection of an   increase in mean glucose of approximately 30 mg/dl.   Reference: Diabetes Care, volume 29, supplement 1 Jan. 2006                        HGB A1C ................. Approx. Mean Glucose   _______________________________________________   6%   ...............................  120 mg/dl   7%   ...............................  150 mg/dl   8%   ...............................  180 mg/dl   9%   ...............................  210 mg/dl   10%  ...............................  240 mg/dl  Performed at [address] [address]     12/14/2022 09:13 AM 5.7 4.0 - 6.0 % Final     Comment:     Hemoglobin A1C levels are related to mean blood glucose during the   preceding 2-3 months. The relationship table below may be used as a   general guide. Each 1% increase in HGB A1C is a reflection of an   increase in mean glucose of approximately 30 mg/dl.   Reference: Diabetes  Care, volume 29, supplement 1 Jan. 2006                        HGB A1C ................. Approx. Mean Glucose   _______________________________________________   6%   ...............................  120 mg/dl   7%   ...............................  150 mg/dl   8%   ...............................  180 mg/dl   9%   ...............................  210 mg/dl   10%  ...............................  240 mg/dl  Performed at [address] [address]       LDLCALC   Date/Time Value Ref Range Status   07/05/2023 08:49 AM 57 65 - 130 MG/DL Final   12/14/2022 09:13 AM 64 65 - 130 MG/DL Final   06/29/2022 08:43 AM 58 65 - 130 MG/DL Final      Last I/O:  I/O last 3 completed shifts:  In: 195 (2.3 mL/kg) [I.V.:195 (2.3 mL/kg)]  Out: 5 (0.1 mL/kg) [Blood:5]  Weight: 84 kg     Past Cardiology Tests (Last 3 Years):  EKG:  ECG 12 Lead 01/31/2024    Echo:  Transthoracic Echo (TTE) Complete 02/01/2024    Ejection Fractions:  EF   Date/Time Value Ref Range Status   02/01/2024 11:43 AM 59 %      Cath:  No results found for this or any previous visit from the past 1095 days.    Stress Test:  No results found for this or any previous visit from the past 1095 days.    Cardiac Imaging:  No results found for this or any previous visit from the past 1095 days.      Inpatient Medications:  Scheduled medications   Medication Dose Route Frequency    aspirin  81 mg oral Daily    atorvastatin  80 mg oral Nightly    cefTRIAXone  1 g intravenous Daily    cholecalciferol  2,000 Units oral Daily    clopidogrel  75 mg oral Daily    furosemide  40 mg intravenous q12h    levothyroxine  100 mcg oral Daily before breakfast    lisinopril  20 mg oral Daily    metoprolol succinate XL  25 mg oral BID    multivitamin  1 tablet oral Daily    pantoprazole  40 mg oral Daily before breakfast    perflutren lipid microspheres  0.5-10 mL of dilution intravenous Once in imaging    perflutren protein A microsphere  0.5 mL intravenous Once in  imaging    sertraline  100 mg oral Daily    sulfur hexafluoride microsphr  2 mL intravenous Once in imaging     PRN medications   Medication    acetaminophen    Or    acetaminophen    Or    acetaminophen    benzocaine-menthol    calcium carbonate    dextromethorphan-guaifenesin    dextrose 10 % in water (D10W)    dextrose    glucagon    guaiFENesin    heparin (porcine)    ondansetron ODT    Or    ondansetron    polyethylene glycol     Continuous Medications   Medication Dose Last Rate       Physical Exam:  She is resting comfortably    Assessment/Plan   Severe two-vessel coronary artery disease with chronic total occlusion of the circumflex coronary artery.  Patient be discharged home and will Be considered for staged PCI of the right coronary artery later date.  He is on aspirin and Plavix and though she was on metoprolol that has been on hold secondary low blood pressure.  Chronic kidney disease.  It appears to me that even though the contrast used was a very small amount due to her underlying chronic kidney disease she has evidence of mild contrast-induced nephropathy.  Will have nephrology evaluate her and follow through.  Will discontinue lisinopril for now and will reinitiate that once renal function is back to baseline  Code Status:  Full Code    I spent 35 minutes in the professional and overall care of this patient.        Moy Sanchez MD

## 2024-02-03 NOTE — NURSING NOTE
Patient fell asleep and O2 dropped to 70%.  Placed back on 2L, O2 came up to 85% while asleep.  Increased to 3L and O2 up to 88%.  Will make hospitalist aware.

## 2024-02-03 NOTE — NURSING NOTE
Notified hospitalist of increasing WBC.  Wanting to discharge patient home soon, need to wean O2.

## 2024-02-03 NOTE — NURSING NOTE
Assumed patient care. Daughter is at bedside radial site is clean and dry no signs of any bleeding. Patient has to be constantly reminded to not use the arm.

## 2024-02-03 NOTE — NURSING NOTE
O2 removed, placed on RA.  Cont POX in place to monitor.  Denies headache, fevers, chills, n/v/d, flu-like symptoms, denies difficulties urinating.

## 2024-02-04 LAB
ALBUMIN SERPL-MCNC: 3.6 G/DL (ref 3.5–5)
ALP BLD-CCNC: 70 U/L (ref 35–125)
ALT SERPL-CCNC: 31 U/L (ref 5–40)
ANION GAP SERPL CALC-SCNC: 16 MMOL/L
APPEARANCE UR: ABNORMAL
AST SERPL-CCNC: 68 U/L (ref 5–40)
BACTERIA #/AREA URNS AUTO: ABNORMAL /HPF
BASOPHILS # BLD AUTO: 0.02 X10*3/UL (ref 0–0.1)
BASOPHILS NFR BLD AUTO: 0.1 %
BILIRUB SERPL-MCNC: 0.8 MG/DL (ref 0.1–1.2)
BILIRUB UR STRIP.AUTO-MCNC: NEGATIVE MG/DL
BUN SERPL-MCNC: 70 MG/DL (ref 8–25)
CALCIUM SERPL-MCNC: 9.1 MG/DL (ref 8.5–10.4)
CHLORIDE SERPL-SCNC: 98 MMOL/L (ref 97–107)
CK SERPL-CCNC: 259 U/L (ref 24–195)
CO2 SERPL-SCNC: 23 MMOL/L (ref 24–31)
COLOR UR: ABNORMAL
CREAT SERPL-MCNC: 2.7 MG/DL (ref 0.4–1.6)
EGFRCR SERPLBLD CKD-EPI 2021: 17 ML/MIN/1.73M*2
EOSINOPHIL # BLD AUTO: 0 X10*3/UL (ref 0–0.4)
EOSINOPHIL NFR BLD AUTO: 0 %
ERYTHROCYTE [DISTWIDTH] IN BLOOD BY AUTOMATED COUNT: 14 % (ref 11.5–14.5)
GLUCOSE SERPL-MCNC: 137 MG/DL (ref 65–99)
GLUCOSE UR STRIP.AUTO-MCNC: NORMAL MG/DL
HCT VFR BLD AUTO: 28.4 % (ref 36–46)
HGB BLD-MCNC: 9 G/DL (ref 12–16)
HOLD SPECIMEN: NORMAL
HYALINE CASTS #/AREA URNS AUTO: ABNORMAL /LPF
IMM GRANULOCYTES # BLD AUTO: 0.11 X10*3/UL (ref 0–0.5)
IMM GRANULOCYTES NFR BLD AUTO: 0.8 % (ref 0–0.9)
KETONES UR STRIP.AUTO-MCNC: NEGATIVE MG/DL
LEUKOCYTE ESTERASE UR QL STRIP.AUTO: ABNORMAL
LYMPHOCYTES # BLD AUTO: 0.79 X10*3/UL (ref 0.8–3)
LYMPHOCYTES NFR BLD AUTO: 5.5 %
MCH RBC QN AUTO: 28.8 PG (ref 26–34)
MCHC RBC AUTO-ENTMCNC: 31.7 G/DL (ref 32–36)
MCV RBC AUTO: 91 FL (ref 80–100)
MONOCYTES # BLD AUTO: 0.81 X10*3/UL (ref 0.05–0.8)
MONOCYTES NFR BLD AUTO: 5.6 %
MUCOUS THREADS #/AREA URNS AUTO: ABNORMAL /LPF
NEUTROPHILS # BLD AUTO: 12.68 X10*3/UL (ref 1.6–5.5)
NEUTROPHILS NFR BLD AUTO: 88 %
NITRITE UR QL STRIP.AUTO: NEGATIVE
NRBC BLD-RTO: 0 /100 WBCS (ref 0–0)
PH UR STRIP.AUTO: 5 [PH]
PLATELET # BLD AUTO: 121 X10*3/UL (ref 150–450)
POTASSIUM SERPL-SCNC: 4.2 MMOL/L (ref 3.4–5.1)
PROT SERPL-MCNC: 6.1 G/DL (ref 5.9–7.9)
PROT UR STRIP.AUTO-MCNC: ABNORMAL MG/DL
RBC # BLD AUTO: 3.13 X10*6/UL (ref 4–5.2)
RBC # UR STRIP.AUTO: ABNORMAL /UL
RBC #/AREA URNS AUTO: >20 /HPF
SODIUM SERPL-SCNC: 137 MMOL/L (ref 133–145)
SP GR UR STRIP.AUTO: 1.02
SQUAMOUS #/AREA URNS AUTO: ABNORMAL /HPF
UROBILINOGEN UR STRIP.AUTO-MCNC: NORMAL MG/DL
WBC # BLD AUTO: 14.4 X10*3/UL (ref 4.4–11.3)
WBC #/AREA URNS AUTO: ABNORMAL /HPF
WBC CLUMPS #/AREA URNS AUTO: ABNORMAL /HPF

## 2024-02-04 PROCEDURE — 85025 COMPLETE CBC W/AUTO DIFF WBC: CPT | Performed by: INTERNAL MEDICINE

## 2024-02-04 PROCEDURE — 2500000001 HC RX 250 WO HCPCS SELF ADMINISTERED DRUGS (ALT 637 FOR MEDICARE OP): Performed by: INTERNAL MEDICINE

## 2024-02-04 PROCEDURE — 36415 COLL VENOUS BLD VENIPUNCTURE: CPT | Performed by: INTERNAL MEDICINE

## 2024-02-04 PROCEDURE — 87086 URINE CULTURE/COLONY COUNT: CPT | Mod: WESLAB

## 2024-02-04 PROCEDURE — 2060000001 HC INTERMEDIATE ICU ROOM DAILY

## 2024-02-04 PROCEDURE — 2500000004 HC RX 250 GENERAL PHARMACY W/ HCPCS (ALT 636 FOR OP/ED): Performed by: FAMILY MEDICINE

## 2024-02-04 PROCEDURE — 84075 ASSAY ALKALINE PHOSPHATASE: CPT | Performed by: INTERNAL MEDICINE

## 2024-02-04 PROCEDURE — 2500000005 HC RX 250 GENERAL PHARMACY W/O HCPCS: Performed by: FAMILY MEDICINE

## 2024-02-04 PROCEDURE — 81001 URINALYSIS AUTO W/SCOPE: CPT

## 2024-02-04 PROCEDURE — 2500000004 HC RX 250 GENERAL PHARMACY W/ HCPCS (ALT 636 FOR OP/ED): Performed by: INTERNAL MEDICINE

## 2024-02-04 PROCEDURE — 82550 ASSAY OF CK (CPK): CPT | Performed by: INTERNAL MEDICINE

## 2024-02-04 RX ORDER — SODIUM CHLORIDE 9 MG/ML
100 INJECTION, SOLUTION INTRAVENOUS CONTINUOUS
Status: DISCONTINUED | OUTPATIENT
Start: 2024-02-04 | End: 2024-02-04

## 2024-02-04 RX ADMIN — CEFTRIAXONE SODIUM 1 G: 1 INJECTION, SOLUTION INTRAVENOUS at 15:07

## 2024-02-04 RX ADMIN — SODIUM CHLORIDE 100 ML/HR: 900 INJECTION, SOLUTION INTRAVENOUS at 08:36

## 2024-02-04 RX ADMIN — SERTRALINE 100 MG: 100 TABLET, FILM COATED ORAL at 08:42

## 2024-02-04 RX ADMIN — METOPROLOL SUCCINATE 25 MG: 25 TABLET, EXTENDED RELEASE ORAL at 21:06

## 2024-02-04 RX ADMIN — LEVOTHYROXINE SODIUM 100 MCG: 0.1 TABLET ORAL at 05:53

## 2024-02-04 RX ADMIN — ASPIRIN 81 MG 81 MG: 81 TABLET ORAL at 08:42

## 2024-02-04 RX ADMIN — POLYETHYLENE GLYCOL 3350 17 G: 17 POWDER, FOR SOLUTION ORAL at 08:44

## 2024-02-04 RX ADMIN — SODIUM PHOSPHATE 1 ENEMA: 7; 19 ENEMA RECTAL at 10:51

## 2024-02-04 RX ADMIN — ATORVASTATIN CALCIUM 80 MG: 80 TABLET, FILM COATED ORAL at 21:06

## 2024-02-04 RX ADMIN — CLOPIDOGREL BISULFATE 75 MG: 75 TABLET ORAL at 08:42

## 2024-02-04 RX ADMIN — PANTOPRAZOLE SODIUM 40 MG: 40 TABLET, DELAYED RELEASE ORAL at 05:53

## 2024-02-04 RX ADMIN — MULTIVITAMIN TABLET 1 TABLET: TABLET at 08:42

## 2024-02-04 RX ADMIN — Medication 2000 UNITS: at 08:42

## 2024-02-04 ASSESSMENT — PAIN SCALES - GENERAL
PAINLEVEL_OUTOF10: 0 - NO PAIN

## 2024-02-04 ASSESSMENT — COGNITIVE AND FUNCTIONAL STATUS - GENERAL
DAILY ACTIVITIY SCORE: 21
MOVING TO AND FROM BED TO CHAIR: A LITTLE
MOBILITY SCORE: 23
PERSONAL GROOMING: A LOT
EATING MEALS: A LITTLE

## 2024-02-04 ASSESSMENT — PAIN - FUNCTIONAL ASSESSMENT
PAIN_FUNCTIONAL_ASSESSMENT: 0-10
PAIN_FUNCTIONAL_ASSESSMENT: WONG-BAKER FACES
PAIN_FUNCTIONAL_ASSESSMENT: 0-10
PAIN_FUNCTIONAL_ASSESSMENT: 0-10

## 2024-02-04 NOTE — CARE PLAN
The patient's goals for the shift include no chest pain    The clinical goals for the shift include bowel movement    Over the shift, the patient did not make progress toward the following goals. Barriers to progression include constipation. Recommendations to address these barriers include enema ordered.

## 2024-02-04 NOTE — PROGRESS NOTES
Linette Doyle is a 83 y.o. female on day 3 of admission presenting with NSTEMI (non-ST elevated myocardial infarction) (CMS/[facility]).      Subjective   Shortness of breath has improved, no chest pain  Constipated today     Objective     Last Recorded Vitals  BP (!) 111/49 (BP Location: Left arm, Patient Position: Lying)   Pulse 58   Temp 36.9 °C (98.4 °F) (Temporal)   Resp 18   Wt 84.5 kg (186 lb 4.6 oz)   SpO2 96%   Intake/Output last 3 Shifts:    Intake/Output Summary (Last 24 hours) at 2/4/2024 0951  Last data filed at 2/4/2024 0836  Gross per 24 hour   Intake 290 ml   Output 350 ml   Net -60 ml         Admission Weight  Weight: 68 kg (150 lb) (01/31/24 2058)    Daily Weight  02/04/24 : 84.5 kg (186 lb 4.6 oz)    Image Results  XR chest 1 view  Narrative: Interpreted By:  Mc Carranza,   STUDY:  XR CHEST 1 VIEW; 2/2/2024 2:34 pm      INDICATION:  Signs/Symptoms:hypoxia.      COMPARISON:  01/31/2024      ACCESSION NUMBER(S):  [id number]      ORDERING CLINICIAN:  STORMY NAGEL      TECHNIQUE:  1 view of the chest was performed.      FINDINGS:  Left chest wall pacemaker and leads appear stable. There may be a  small left pleural effusion, no right pleural effusion. There is  increased perihilar airspace opacity which can be related to  congestive changes and increased pulmonary edema.. No pneumothorax.  The cardiomediastinal silhouette is borderline mildly enlarged but  stable      Impression: Increased perihilar airspace opacity which can be seen with pulmonary  edema. There may be a small left pleural effusion.      Signed by: Mc Carranza 2/2/2024 3:36 PM  Dictation workstation:   [id number]  Transthoracic Echo (TTE) [place] [address]             Phone [phone]    TRANSTHORACIC ECHOCARDIOGRAM REPORT       Patient Name:      LINETTE DOYLE         Reading Physician:    Zuri Pena                                                                  Dane BAJWA  Study Date:        2/1/2024              Ordering Provider:    [id number] MELANIE DOENADEEM  MRN/PID:           [id number]              Fellow:  Accession#:        [id number]          Nurse:  Date of Birth/Age: 1940 / 83 years Sonographer:          Yamileth Houser RDCS  Gender:            F                     Additional Staff:  Height:            162.00 cm             Admit Date:  Weight:            68.00 kg              Admission Status:     Inpatient -                                                                 Routine  BSA:               1.73 m2               Department Location:  [facility]  Blood Pressure: 166 /72 mmHg    Study Type:    TRANSTHORACIC ECHO (TTE) COMPLETE  Diagnosis/ICD: Non ST elevation (NSTEMI) myocardial infarction-I21.4  Indication:    nstemi  CPT Codes:     Echo Complete w Full Doppler-10449    Patient History:  BMI:               Overweight 25 - 30  Pacer/Defib:       Permanent pacemaker  Pertinent History: CAD and HTN. ckd,stent,sss,falls, sob.    Study Detail: The following Echo studies were performed: 2D, M-Mode, Doppler and                color flow. Technically challenging study due to prominent lung                artifact.       PHYSICIAN INTERPRETATION:  Left Ventricle: Left ventricular systolic function is normal, with an estimated ejection fraction of 55-60%. There are no regional wall motion abnormalities. The left ventricular cavity size is normal. Spectral Doppler shows a normal pattern of left ventricular diastolic filling. The inferior wall is mildly hypokinetic.  Left Atrium: The left atrium is mildly dilated.  Right Ventricle: The right ventricle is normal in size. There is normal right ventricular global systolic function. A device is visualized in the right ventricle.  Right Atrium: The right atrium is normal in  size. There is a device visualized in the right atrium.  Aortic Valve: The aortic valve appears structurally normal. There is no evidence of aortic valve regurgitation. The peak instantaneous gradient of the aortic valve is 4.7 mmHg.  Mitral Valve: The mitral valve is normal in structure. There is moderate mitral valve regurgitation which is posteriorly directed.  Tricuspid Valve: The tricuspid valve is structurally normal. There is mild tricuspid regurgitation. The Doppler estimated RVSP is moderately elevated at 61.6 mmHg.  Pulmonic Valve: The pulmonic valve is structurally normal. There is mild pulmonic valve regurgitation.  Pericardium: There is a trivial pericardial effusion.  Aorta: The aortic root is normal.  Systemic Veins: The inferior vena cava appears to be of normal size. There is less than 50% IVC collapse with inspiration.       CONCLUSIONS:   1. Left ventricular systolic function is normal with a 55-60% estimated ejection fraction.   2. The inferior wall is mildly hypokinetic.   3. Moderate mitral valve regurgitation.   4. Mild tricuspid regurgitation is visualized.   5. Moderately elevated right ventricular systolic pressure.    QUANTITATIVE DATA SUMMARY:  2D MEASUREMENTS:                           Normal Ranges:  LAs:           3.10 cm   (2.7-4.0cm)  IVSd:          0.63 cm   (0.6-1.1cm)  LVPWd:         0.64 cm   (0.6-1.1cm)  LVIDd:         5.64 cm   (3.9-5.9cm)  LV Mass Index: 72.6 g/m2    LV SYSTOLIC FUNCTION BY 2D PLANIMETRY (MOD):                      Normal Ranges:  EF-A4C View: 56.8 % (>=55%)  EF-A2C View: 61.9 %  EF-Biplane:  59.3 %    LV DIASTOLIC FUNCTION:                         Normal Ranges:  MV Peak E:    1.88 m/s (0.7-1.2 m/s)  MV Peak A:    0.54 m/s (0.42-0.7 m/s)  E/A Ratio:    3.49     (1.0-2.2)  MV e'         0.08 m/s (>8.0)  MV lateral e' 0.08 m/s  MV medial e'  0.08 m/s  E/e' Ratio:   24.67    (<8.0)  a'            0.08 m/s    MITRAL VALVE:                        Normal  Ranges:  MV Vmax:    2.27 m/s  (<=1.3m/s)  MV peak P.6 mmHg (<5mmHg)  MV mean P.0 mmHg  (<48mmHg)  MV DT:      409 msec  (150-240msec)    AORTIC VALVE:                          Normal Ranges:  AoV Vmax:      1.08 m/s (<=1.7m/s)  AoV Peak P.7 mmHg (<20mmHg)  LVOT Max Yasir:  0.72 m/s (<=1.1m/s)  LVOT Diameter: 1.90 cm  (1.8-2.4cm)  AoV Area,Vmax: 1.89 cm2 (2.5-4.5cm2)       RIGHT VENTRICLE:  RV s' 0.16 m/s    TRICUSPID VALVE/RVSP:                              Normal Ranges:  Peak TR Velocity: 3.66 m/s  RV Syst Pressure: 61.6 mmHg (< 30mmHg)  IVC Diam:         1.80 cm       [id number] Becky Vela MD  Electronically signed on 2024 at 8:24:54 AM       ** Final **      Physical Exam  HENT:      Head: Normocephalic.      Right Ear: External ear normal.      Left Ear: External ear normal.   Eyes:      Conjunctiva/sclera: Conjunctivae normal.   Cardiovascular:      Rate and Rhythm: Normal rate.   Pulmonary:      Effort: Pulmonary effort is normal.      Breath sounds: Normal breath sounds.   Abdominal:      General: Abdomen is flat.   Skin:     General: Skin is warm.   Neurological:      General: No focal deficit present.      Mental Status: She is alert.   Psychiatric:         Mood and Affect: Mood normal.         Relevant Results  Results for orders placed or performed during the hospital encounter of 24 (from the past 24 hour(s))   CBC and Auto Differential   Result Value Ref Range    WBC 14.4 (H) 4.4 - 11.3 x10*3/uL    nRBC 0.0 0.0 - 0.0 /100 WBCs    RBC 3.13 (L) 4.00 - 5.20 x10*6/uL    Hemoglobin 9.0 (L) 12.0 - 16.0 g/dL    Hematocrit 28.4 (L) 36.0 - 46.0 %    MCV 91 80 - 100 fL    MCH 28.8 26.0 - 34.0 pg    MCHC 31.7 (L) 32.0 - 36.0 g/dL    RDW 14.0 11.5 - 14.5 %    Platelets 121 (L) 150 - 450 x10*3/uL    Neutrophils % 88.0 40.0 - 80.0 %    Immature Granulocytes %, Automated 0.8 0.0 - 0.9 %    Lymphocytes % 5.5 13.0 - 44.0 %    Monocytes % 5.6 2.0 - 10.0 %    Eosinophils % 0.0 0.0 - 6.0 %     Basophils % 0.1 0.0 - 2.0 %    Neutrophils Absolute 12.68 (H) 1.60 - 5.50 x10*3/uL    Immature Granulocytes Absolute, Automated 0.11 0.00 - 0.50 x10*3/uL    Lymphocytes Absolute 0.79 (L) 0.80 - 3.00 x10*3/uL    Monocytes Absolute 0.81 (H) 0.05 - 0.80 x10*3/uL    Eosinophils Absolute 0.00 0.00 - 0.40 x10*3/uL    Basophils Absolute 0.02 0.00 - 0.10 x10*3/uL   Comprehensive Metabolic Panel   Result Value Ref Range    Glucose 137 (H) 65 - 99 mg/dL    Sodium 137 133 - 145 mmol/L    Potassium 4.2 3.4 - 5.1 mmol/L    Chloride 98 97 - 107 mmol/L    Bicarbonate 23 (L) 24 - 31 mmol/L    Urea Nitrogen 70 (H) 8 - 25 mg/dL    Creatinine 2.70 (H) 0.40 - 1.60 mg/dL    eGFR 17 (L) >60 mL/min/1.73m*2    Calcium 9.1 8.5 - 10.4 mg/dL    Albumin 3.6 3.5 - 5.0 g/dL    Alkaline Phosphatase 70 35 - 125 U/L    Total Protein 6.1 5.9 - 7.9 g/dL    AST 68 (H) 5 - 40 U/L    Bilirubin, Total 0.8 0.1 - 1.2 mg/dL    ALT 31 5 - 40 U/L    Anion Gap 16 <=19 mmol/L         Assessment/Plan   NSTEMI  -Left heart catheterization showed total occlusion of the circumflex and moderate disease in the RCA.  Will have outpatient coronary intervention  -Continue dual antiplatelet agents    Known CAD  -Continue therapy as above.    GARY  - probably contrast nephropathy in the setting of diuretic use for pulmonary congestion. Will stop lasix, small fluid bolus, repeat renal function tomorrow     Possible pneumonia  - on IV ceftriaxone     Constipation, IBS  - Jerardo order a fleet enema     Hypertension  -Continue home antihypertensives.      Josee Kaur MD

## 2024-02-04 NOTE — CARE PLAN
The patient's goals for the shift include no chest pain    The clinical goals for the shift include provide emotional support/maintain SpO2 greater than 92%    Over the shift, the patient did not make progress toward the following goals. Barriers to progression include anxiety. Recommendations to address these barriers include NSTEMI dx.

## 2024-02-04 NOTE — NURSING NOTE
Enema given and effective.  Large formed - brown stool.  Patient voices relief and very thankful.  Nephrology consult completed.  UA ordered and collected & sent to lab.  Patient resting quietly and comfortably in bed.  Encouraged to rest.

## 2024-02-04 NOTE — NURSING NOTE
Dr. AISLINN Ibrahim notified of ordered IV lasix but pt had creatnine jump from 1.5 to 2.3. Holding at this time. Cardiology to address on dayshift.

## 2024-02-04 NOTE — CONSULTS
Reason For Consult  Chronic kidney disease stage III /GARY    History Of Present Illness  Linette Doyle is a 83 y.o. female presented to ED  for chest pain. with history of CAD with stent placement, sick sinus syndrome with pacer placement   She reports that she was scammed out of approximately $20,000 and she has been having a lot of stress with that.   Some mild associated dyspnea.  No diaphoresis. No nausea or vomiting. She also has a history of CKD stage 3 was following with Dr. Chan  which she reports she has not seen in roughly 6 to 7 months since she is moving of office.  Patient relays she was pretty stable is unaware of her numbers per se last that she was aware she was a stage III.  Upon review patient noted to have a baseline creatinine of 1.4 for which she is elevated today at 2.7 patient reports she has since developed some urinary retention upon exam patient noted to have a Pacheco catheter in place when speaking with nurse nurse relays patient was bladder scanned they were unable to visualize the bladder with the skin at which point they decided to place a Pacheco catheter.   Past Medical History  She has a past medical history of Angina pectoris (CMS/[facility]) (10/22/2023), Atherosclerosis of coronary artery (08/21/2019), Atherosclerotic heart disease of native coronary artery with other forms of angina pectoris (CMS/[facility]) (10/22/2023), Hypercholesterolemia (12/28/2018), Hyperlipidemia (10/22/2023), Presence of cardiac pacemaker (10/22/2023), Primary hypertension (12/28/2018), Shortness of breath (11/26/2019), Sick sinus syndrome (CMS/[facility]) (10/22/2023), Sinus bradycardia (10/22/2023), and Stage 3a chronic kidney disease (CMS/[facility]) (11/25/2019).    Surgical History  She has a past surgical history that includes Cardiac catheterization (N/A, 2/2/2024) and Cardiac catheterization (N/A, 2/2/2024).     Social History  She reports that she has never smoked. She has never been exposed to tobacco smoke. She has never  used smokeless tobacco. She reports that she does not drink alcohol and does not use drugs.    Family History  No family history on file.     Allergies  Iodinated contrast media    Review of Systems  Constitutional:  Negative for  fatigue, chills and fever.   HENT:  Negative for sinus pressure, sore throat and tinnitus.    Eyes:  Negative for photophobia and visual disturbance.   Respiratory: Negative for cough, Shortness of breath, wheezing, and  apnea.    Cardiovascular:  Negative for chest pain, palpitations and leg swelling.   Gastrointestinal:  Positive for abdominal pain, rectal pain, and constipation Negative for blood in stool, diarrhea, nausea, and vomiting.   Endocrine: Negative for cold intolerance, heat intolerance, polydipsia, polyphagia and polyuria.   Genitourinary:  Negative for, dysuria, frequency, hematuria and urgency. Positive for decreased urine production  Musculoskeletal:  Negative for arthralgias, back pain, joint swelling, myalgias and neck pain.   Skin:  Negative for color change, pallor, rash and wound.   Neurological:  Positive for weakness. Negative for dizziness, tremors, seizures, syncope, speech difficulty, light-headedness, numbness and headaches.   Hematological:  Negative for adenopathy. Does not bruise/bleed easily.   Psychiatric/Behavioral:   Positive for confusion, Negative for hallucinations, sleep disturbance and suicidal ideas.       Physical Exam   Constitutional:       General: She is not in acute distress.     Appearance: She is not toxic-appearing.   HENT:      Head: Normocephalic and atraumatic.   Eyes:      Extraocular Movements: Extraocular movements intact.      Pupils: Pupils are equal, round, and reactive to light.   Neck:      Vascular: No carotid bruit.   Cardiovascular:      Rate and Rhythm: Normal rate and rhythm.   Pulmonary:      Effort: No respiratory distress.      Breath sounds: No stridor. No wheezing, rhonchi or rales.   Chest:      Chest wall: No  tenderness.   Abdominal:      General: There is no distension.      Palpations: There is no mass.      Tenderness: There is no abdominal tenderness. There is no right CVA tenderness, left CVA tenderness or guarding.  Reports rectal pain from constipation      Hernia: No hernia is present.   Musculoskeletal:         General: No swelling or tenderness.      Cervical back: No rigidity.      Right lower leg: No edema.      Left lower leg: No edema.   Lymphadenopathy:      Cervical: No cervical adenopathy.   Skin:     General: Skin is warm and dry.      Coloration: Skin is not jaundiced or pale.      Findings: No bruising or erythema.   Neurological:      General: No focal deficit present.      Mental Status: She is alert and oriented to person, place, and time.   Psychiatric:         Mood and Affect: Mood normal.             I&O 24HR    Intake/Output Summary (Last 24 hours) at 2/4/2024 0954  Last data filed at 2/4/2024 0836  Gross per 24 hour   Intake 290 ml   Output 350 ml   Net -60 ml       Vitals 24HR  Heart Rate:  [58-60]   Temp:  [36.4 °C (97.6 °F)-36.9 °C (98.4 °F)]   Resp:  [18-24]   BP: (111-125)/(49-58)   Weight:  [84.5 kg (186 lb 4.6 oz)]   SpO2:  [93 %-97 %]         Relevant Results  Results for orders placed or performed during the hospital encounter of 01/31/24 (from the past 24 hour(s))   CBC and Auto Differential   Result Value Ref Range    WBC 14.4 (H) 4.4 - 11.3 x10*3/uL    nRBC 0.0 0.0 - 0.0 /100 WBCs    RBC 3.13 (L) 4.00 - 5.20 x10*6/uL    Hemoglobin 9.0 (L) 12.0 - 16.0 g/dL    Hematocrit 28.4 (L) 36.0 - 46.0 %    MCV 91 80 - 100 fL    MCH 28.8 26.0 - 34.0 pg    MCHC 31.7 (L) 32.0 - 36.0 g/dL    RDW 14.0 11.5 - 14.5 %    Platelets 121 (L) 150 - 450 x10*3/uL    Neutrophils % 88.0 40.0 - 80.0 %    Immature Granulocytes %, Automated 0.8 0.0 - 0.9 %    Lymphocytes % 5.5 13.0 - 44.0 %    Monocytes % 5.6 2.0 - 10.0 %    Eosinophils % 0.0 0.0 - 6.0 %    Basophils % 0.1 0.0 - 2.0 %    Neutrophils Absolute  12.68 (H) 1.60 - 5.50 x10*3/uL    Immature Granulocytes Absolute, Automated 0.11 0.00 - 0.50 x10*3/uL    Lymphocytes Absolute 0.79 (L) 0.80 - 3.00 x10*3/uL    Monocytes Absolute 0.81 (H) 0.05 - 0.80 x10*3/uL    Eosinophils Absolute 0.00 0.00 - 0.40 x10*3/uL    Basophils Absolute 0.02 0.00 - 0.10 x10*3/uL   Comprehensive Metabolic Panel   Result Value Ref Range    Glucose 137 (H) 65 - 99 mg/dL    Sodium 137 133 - 145 mmol/L    Potassium 4.2 3.4 - 5.1 mmol/L    Chloride 98 97 - 107 mmol/L    Bicarbonate 23 (L) 24 - 31 mmol/L    Urea Nitrogen 70 (H) 8 - 25 mg/dL    Creatinine 2.70 (H) 0.40 - 1.60 mg/dL    eGFR 17 (L) >60 mL/min/1.73m*2    Calcium 9.1 8.5 - 10.4 mg/dL    Albumin 3.6 3.5 - 5.0 g/dL    Alkaline Phosphatase 70 35 - 125 U/L    Total Protein 6.1 5.9 - 7.9 g/dL    AST 68 (H) 5 - 40 U/L    Bilirubin, Total 0.8 0.1 - 1.2 mg/dL    ALT 31 5 - 40 U/L    Anion Gap 16 <=19 mmol/L         Assessment/Plan   1.Acute Kidney injury-baseline creatinine 1.4, GFR noted to be 37 routinely-patient currently with elevated creatinine of 2.7 and GFR of 17 indicative of acute kidney injury possibly associated with IV contrast-will stop IV hydration as she has pulmonary edema, will obtain UA with culture, will obtain renal ultrasound,  and ck level, continue to avoid nephrotoxic medications, continue with renal friendly cardiac diet  2.Chronic Kidney disease stage III  3. NSTEMI- cardiology on board   4. Hypertension- stable on current regimen       Karon Walden, APRN-CNP

## 2024-02-04 NOTE — NURSING NOTE
Metoprolol held this AM due to low HR.    Patient started on IVF.  PO intake fair.  Remains on O2 at 3L via n/c.  Denies shortness of breath, chest pain.  R wrist site with bruising, no hematoma.  No bleeding and circulation checks are WNL.  Assisted patient to bathroom.  Very gassy, no BM since admission.  PRN miralax administered with breakfast to help facilitate bowel movement.

## 2024-02-04 NOTE — NURSING NOTE
Daughter in law at bedside wanting update.  Patient ok with updating family.  Then make nurse aware that we need to be calling grand daughter Velma Ayala because her  is a MD.  Explained that patient has one  currently and that she is her own decision maker, independent from home alone.  Will add granddaughter to list of contacts.  And make physician aware of request.

## 2024-02-04 NOTE — NURSING NOTE
Continues on IV ATB therapy.  No adverse side effects.  Patient has been resting quietly in bed.  Offers no complaints of pain/discomfort.

## 2024-02-05 ENCOUNTER — HOME HEALTH ADMISSION (OUTPATIENT)
Dept: HOME HEALTH SERVICES | Facility: HOME HEALTH | Age: 84
End: 2024-02-05
Payer: MEDICARE

## 2024-02-05 LAB
ALBUMIN SERPL-MCNC: 3.4 G/DL (ref 3.5–5)
ALP BLD-CCNC: 74 U/L (ref 35–125)
ALT SERPL-CCNC: 28 U/L (ref 5–40)
ANION GAP SERPL CALC-SCNC: 11 MMOL/L
AST SERPL-CCNC: 44 U/L (ref 5–40)
BACTERIA UR CULT: NO GROWTH
BASOPHILS # BLD AUTO: 0.02 X10*3/UL (ref 0–0.1)
BASOPHILS NFR BLD AUTO: 0.2 %
BILIRUB SERPL-MCNC: 0.7 MG/DL (ref 0.1–1.2)
BUN SERPL-MCNC: 65 MG/DL (ref 8–25)
CALCIUM SERPL-MCNC: 8.8 MG/DL (ref 8.5–10.4)
CHLORIDE SERPL-SCNC: 104 MMOL/L (ref 97–107)
CO2 SERPL-SCNC: 25 MMOL/L (ref 24–31)
CREAT SERPL-MCNC: 2 MG/DL (ref 0.4–1.6)
EGFRCR SERPLBLD CKD-EPI 2021: 24 ML/MIN/1.73M*2
EOSINOPHIL # BLD AUTO: 0.05 X10*3/UL (ref 0–0.4)
EOSINOPHIL NFR BLD AUTO: 0.6 %
ERYTHROCYTE [DISTWIDTH] IN BLOOD BY AUTOMATED COUNT: 13.9 % (ref 11.5–14.5)
FERRITIN SERPL-MCNC: 280 NG/ML (ref 13–150)
GLUCOSE BLD MANUAL STRIP-MCNC: 114 MG/DL (ref 74–99)
GLUCOSE SERPL-MCNC: 117 MG/DL (ref 65–99)
HCT VFR BLD AUTO: 29.3 % (ref 36–46)
HGB BLD-MCNC: 9.1 G/DL (ref 12–16)
IMM GRANULOCYTES # BLD AUTO: 0.04 X10*3/UL (ref 0–0.5)
IMM GRANULOCYTES NFR BLD AUTO: 0.5 % (ref 0–0.9)
IRON SATN MFR SERPL: 12 % (ref 12–50)
IRON SERPL-MCNC: 24 UG/DL (ref 30–160)
LYMPHOCYTES # BLD AUTO: 0.75 X10*3/UL (ref 0.8–3)
LYMPHOCYTES NFR BLD AUTO: 9 %
MCH RBC QN AUTO: 28.3 PG (ref 26–34)
MCHC RBC AUTO-ENTMCNC: 31.1 G/DL (ref 32–36)
MCV RBC AUTO: 91 FL (ref 80–100)
MONOCYTES # BLD AUTO: 0.56 X10*3/UL (ref 0.05–0.8)
MONOCYTES NFR BLD AUTO: 6.7 %
NEUTROPHILS # BLD AUTO: 6.88 X10*3/UL (ref 1.6–5.5)
NEUTROPHILS NFR BLD AUTO: 83 %
NRBC BLD-RTO: 0 /100 WBCS (ref 0–0)
PLATELET # BLD AUTO: 112 X10*3/UL (ref 150–450)
POTASSIUM SERPL-SCNC: 3.9 MMOL/L (ref 3.4–5.1)
PROT SERPL-MCNC: 5.8 G/DL (ref 5.9–7.9)
RBC # BLD AUTO: 3.21 X10*6/UL (ref 4–5.2)
SODIUM SERPL-SCNC: 140 MMOL/L (ref 133–145)
TIBC SERPL-MCNC: 208 UG/DL (ref 228–428)
UIBC SERPL-MCNC: 184 UG/DL (ref 110–370)
WBC # BLD AUTO: 8.3 X10*3/UL (ref 4.4–11.3)

## 2024-02-05 PROCEDURE — 2500000004 HC RX 250 GENERAL PHARMACY W/ HCPCS (ALT 636 FOR OP/ED): Performed by: INTERNAL MEDICINE

## 2024-02-05 PROCEDURE — 97110 THERAPEUTIC EXERCISES: CPT | Mod: GO,CO

## 2024-02-05 PROCEDURE — 2060000001 HC INTERMEDIATE ICU ROOM DAILY

## 2024-02-05 PROCEDURE — 2500000001 HC RX 250 WO HCPCS SELF ADMINISTERED DRUGS (ALT 637 FOR MEDICARE OP): Performed by: INTERNAL MEDICINE

## 2024-02-05 PROCEDURE — 2500000001 HC RX 250 WO HCPCS SELF ADMINISTERED DRUGS (ALT 637 FOR MEDICARE OP): Performed by: NURSE PRACTITIONER

## 2024-02-05 PROCEDURE — 82728 ASSAY OF FERRITIN: CPT | Performed by: NURSE PRACTITIONER

## 2024-02-05 PROCEDURE — 80053 COMPREHEN METABOLIC PANEL: CPT | Performed by: INTERNAL MEDICINE

## 2024-02-05 PROCEDURE — 82947 ASSAY GLUCOSE BLOOD QUANT: CPT

## 2024-02-05 PROCEDURE — 83540 ASSAY OF IRON: CPT | Performed by: NURSE PRACTITIONER

## 2024-02-05 PROCEDURE — 2500000004 HC RX 250 GENERAL PHARMACY W/ HCPCS (ALT 636 FOR OP/ED): Performed by: FAMILY MEDICINE

## 2024-02-05 PROCEDURE — 2500000004 HC RX 250 GENERAL PHARMACY W/ HCPCS (ALT 636 FOR OP/ED): Performed by: HOSPITALIST

## 2024-02-05 PROCEDURE — 97116 GAIT TRAINING THERAPY: CPT | Mod: GP

## 2024-02-05 PROCEDURE — 93010 ELECTROCARDIOGRAM REPORT: CPT | Performed by: INTERNAL MEDICINE

## 2024-02-05 PROCEDURE — 97530 THERAPEUTIC ACTIVITIES: CPT | Mod: GO,CO

## 2024-02-05 PROCEDURE — 2500000005 HC RX 250 GENERAL PHARMACY W/O HCPCS: Performed by: HOSPITALIST

## 2024-02-05 PROCEDURE — 36415 COLL VENOUS BLD VENIPUNCTURE: CPT | Performed by: INTERNAL MEDICINE

## 2024-02-05 PROCEDURE — 85025 COMPLETE CBC W/AUTO DIFF WBC: CPT | Performed by: INTERNAL MEDICINE

## 2024-02-05 PROCEDURE — 99233 SBSQ HOSP IP/OBS HIGH 50: CPT | Performed by: INTERNAL MEDICINE

## 2024-02-05 PROCEDURE — 97530 THERAPEUTIC ACTIVITIES: CPT | Mod: GP

## 2024-02-05 RX ORDER — CALCIUM CARBONATE 200(500)MG
500 TABLET,CHEWABLE ORAL 4 TIMES DAILY PRN
Status: DISCONTINUED | OUTPATIENT
Start: 2024-02-05 | End: 2024-02-14 | Stop reason: HOSPADM

## 2024-02-05 RX ORDER — DILTIAZEM HCL/D5W 125 MG/125
10 PLASTIC BAG, INJECTION (ML) INTRAVENOUS CONTINUOUS
Status: DISCONTINUED | OUTPATIENT
Start: 2024-02-05 | End: 2024-02-06

## 2024-02-05 RX ORDER — DILTIAZEM HYDROCHLORIDE 5 MG/ML
10 INJECTION INTRAVENOUS ONCE
Status: COMPLETED | OUTPATIENT
Start: 2024-02-05 | End: 2024-02-05

## 2024-02-05 RX ORDER — SODIUM CHLORIDE 9 MG/ML
100 INJECTION, SOLUTION INTRAVENOUS CONTINUOUS
Status: DISCONTINUED | OUTPATIENT
Start: 2024-02-05 | End: 2024-02-08

## 2024-02-05 RX ADMIN — ATORVASTATIN CALCIUM 80 MG: 80 TABLET, FILM COATED ORAL at 20:06

## 2024-02-05 RX ADMIN — METOPROLOL SUCCINATE 25 MG: 25 TABLET, EXTENDED RELEASE ORAL at 08:12

## 2024-02-05 RX ADMIN — LEVOTHYROXINE SODIUM 100 MCG: 0.1 TABLET ORAL at 05:46

## 2024-02-05 RX ADMIN — Medication 10 MG/HR: at 16:30

## 2024-02-05 RX ADMIN — MULTIVITAMIN TABLET 1 TABLET: TABLET at 08:12

## 2024-02-05 RX ADMIN — AMIODARONE HYDROCHLORIDE 150 MG: 1.5 INJECTION, SOLUTION INTRAVENOUS at 16:49

## 2024-02-05 RX ADMIN — PANTOPRAZOLE SODIUM 40 MG: 40 TABLET, DELAYED RELEASE ORAL at 05:46

## 2024-02-05 RX ADMIN — CALCIUM CARBONATE (ANTACID) CHEW TAB 500 MG 500 MG: 500 CHEW TAB at 16:43

## 2024-02-05 RX ADMIN — Medication 2000 UNITS: at 08:12

## 2024-02-05 RX ADMIN — SERTRALINE 100 MG: 100 TABLET, FILM COATED ORAL at 08:12

## 2024-02-05 RX ADMIN — CEFTRIAXONE SODIUM 1 G: 1 INJECTION, SOLUTION INTRAVENOUS at 12:30

## 2024-02-05 RX ADMIN — CLOPIDOGREL BISULFATE 75 MG: 75 TABLET ORAL at 08:12

## 2024-02-05 RX ADMIN — ASPIRIN 81 MG 81 MG: 81 TABLET ORAL at 08:12

## 2024-02-05 RX ADMIN — METOPROLOL SUCCINATE 25 MG: 25 TABLET, EXTENDED RELEASE ORAL at 20:06

## 2024-02-05 RX ADMIN — DILTIAZEM HYDROCHLORIDE 10 MG: 5 INJECTION, SOLUTION INTRAVENOUS at 16:09

## 2024-02-05 RX ADMIN — SODIUM CHLORIDE 100 ML/HR: 900 INJECTION, SOLUTION INTRAVENOUS at 12:25

## 2024-02-05 ASSESSMENT — COGNITIVE AND FUNCTIONAL STATUS - GENERAL
CLIMB 3 TO 5 STEPS WITH RAILING: A LOT
MOVING TO AND FROM BED TO CHAIR: A LITTLE
WALKING IN HOSPITAL ROOM: A LITTLE
HELP NEEDED FOR BATHING: A LITTLE
STANDING UP FROM CHAIR USING ARMS: A LITTLE
DAILY ACTIVITIY SCORE: 22
PERSONAL GROOMING: A LITTLE
MOVING TO AND FROM BED TO CHAIR: A LITTLE
TOILETING: A LITTLE
CLIMB 3 TO 5 STEPS WITH RAILING: A LITTLE
MOBILITY SCORE: 19
STANDING UP FROM CHAIR USING ARMS: A LITTLE
MOBILITY SCORE: 20
WALKING IN HOSPITAL ROOM: A LITTLE
TOILETING: A LITTLE
DAILY ACTIVITIY SCORE: 22

## 2024-02-05 ASSESSMENT — PAIN SCALES - GENERAL
PAINLEVEL_OUTOF10: 0 - NO PAIN

## 2024-02-05 ASSESSMENT — PAIN - FUNCTIONAL ASSESSMENT
PAIN_FUNCTIONAL_ASSESSMENT: WONG-BAKER FACES
PAIN_FUNCTIONAL_ASSESSMENT: 0-10

## 2024-02-05 NOTE — PROGRESS NOTES
[facility] spoke with pts sister Krista on 2/2. Pt lives alone in a ranch house, no steps to enter. Is normally independent for all mobility, drives, wears glasses. Pt has a pacemaker. Pts PCP is Dr Do. Krista believes the pts symptoms were caused by stress from a recent financial issue. [facility] will speak with the pt to see about setting up HHC, will need to see if she can stay with someone or someone stay with her initially as well.     Update: [facility] met with the pt at bedside to discuss dc planning. Pt stating she would prefer to dc home with [facility], provided ok for referral to  Homecare. Discussion around hospitals concern for her living alone and asked if she could stay with someone or have someone stay with her for a period of time, pt states her family is spread out with no one nearby and hesitant to ask her neighbor to check on her, she states she will be fine. Attending asked to input internal referral for homecare.

## 2024-02-05 NOTE — PROGRESS NOTES
Luis is a 52 year old who is being evaluated via a billable telephone visit.      What phone number would you like to be contacted at? [phone]  How would you like to obtain your AVS? MyChart    Assessment & Plan     Chronic obstructive pulmonary disease with acute exacerbation (H)  Patient had an exacerbation and is not feeling well.  Completing the prednisone taper, on abx for about 3 more days.  He is seeing pulmonary in a couple of weeks.    COPD exacerbation (H)      Essential hypertension, benign  controlled               See Patient Instructions    No follow-ups on file.    Dean Mariee MD  [facility]    Darell Smith is a 52 year old who presents for the following health issues     HPI   Hospital Follow-up Visit:  Pt states doing well, no lingering symptoms.  Hospital/Nursing Home/IP Rehab Facility: [facility]  Date of Admission: 8/22/21  Date of Discharge: 8/26/21  Reason(s) for Admission:    Acute on chronic respiratory failure with hypoxia and hypercapnia (H)     Acute respiratory acidosis     Acute on chronic respiratory failure         Was your hospitalization related to COVID-19? No   Problems taking medications regularly:  None  Medication changes since discharge: None  Problems adhering to non-medication therapy:  None    Summary of hospitalization:  [facility] discharge summary reviewed  Diagnostic Tests/Treatments reviewed.  Follow up needed: seeing pulmnonary  Other Healthcare Providers Involved in Patient s Care:         None  Update since discharge: improved.       Post Discharge Medication Reconciliation: discharge medications reconciled and changed, per note/orders.  Plan of care communicated with patient                    Review of Systems   Review Of Systems  Skin: negative  Eyes:   Ears/Nose/Throat: negative  Respiratory: breathing is better, basically at baseline, taking all of his medications  Cardiovascular:  Subjective Data:  83 female patient with underlying GARY, history of nonresolution MI with social stressful situation at home.  Underlying medical therapy for CAD.  No active chest pain tightness.  Slowly improving creatinine.  No chest pain or tightness.  Overnight Events:    None  Objective   Last Recorded Vitals  /63   Pulse 105   Temp 36.1 °C (97 °F) (Temporal)   Resp 22   Wt 84.7 kg (186 lb 11.7 oz)   SpO2 98%     Intake/Output Summary (Last 24 hours) at 2/5/2024 1134  Last data filed at 2/5/2024 0900  Gross per 24 hour   Intake 1380 ml   Output 750 ml   Net 630 ml     Physical Exam:  HEENT: Normocephalic/atraumatic pupils equal react light  Neck exam mild JVD, no bruit  Lung exam clear to auscultation, few crackles at the bases  Cardiac exam is regular rhythm S1-S2, soft slight murmur heard.  No S3 heard.  Abdomen soft nontender, nondistended  Extremities no clubbing, cyanosis but trace edema  Neuro exam grossly intact.  Image Results  XR chest 1 view  Narrative: Interpreted By:  Mc Carranza,   STUDY:  XR CHEST 1 VIEW; 2/2/2024 2:34 pm      INDICATION:  Signs/Symptoms:hypoxia.      COMPARISON:  01/31/2024      ACCESSION NUMBER(S):  [id number]      ORDERING CLINICIAN:  STORMY NAGEL      TECHNIQUE:  1 view of the chest was performed.      FINDINGS:  Left chest wall pacemaker and leads appear stable. There may be a  small left pleural effusion, no right pleural effusion. There is  increased perihilar airspace opacity which can be related to  congestive changes and increased pulmonary edema.. No pneumothorax.  The cardiomediastinal silhouette is borderline mildly enlarged but  stable      Impression: Increased perihilar airspace opacity which can be seen with pulmonary  edema. There may be a small left pleural effusion.      Signed by: Mc Carranza 2/2/2024 3:36 PM  Dictation workstation:   [id number]  Transthoracic Echo (TTE) [facility]  [id number]  [place] [id number]             Phone [phone]    TRANSTHORACIC ECHOCARDIOGRAM REPORT       Patient Name:      SAMUEL VILLALOBOS         Reading Physician:    [id number] Becky Vela MD  Study Date:        2/1/2024              Ordering Provider:    [id number] MELANIE HARDWICK  MRN/PID:           [id number]              Fellow:  Accession#:        [id number]          Nurse:  Date of Birth/Age: 1940 / 83 years Sonographer:          Yamileth Houser RDCS  Gender:            F                     Additional Staff:  Height:            162.00 cm             Admit Date:  Weight:            68.00 kg              Admission Status:     Inpatient -                                                                 Routine  BSA:               1.73 m2               Department Location:  [facility]  Blood Pressure: 166 /72 mmHg    Study Type:    TRANSTHORACIC ECHO (TTE) COMPLETE  Diagnosis/ICD: Non ST elevation (NSTEMI) myocardial infarction-I21.4  Indication:    nstemi  CPT Codes:     Echo Complete w Full Doppler-33150    Patient History:  BMI:               Overweight 25 - 30  Pacer/Defib:       Permanent pacemaker  Pertinent History: CAD and HTN. ckd,stent,sss,falls, sob.    Study Detail: The following Echo studies were performed: 2D, M-Mode, Doppler and                color flow. Technically challenging study due to prominent lung                artifact.       PHYSICIAN INTERPRETATION:  Left Ventricle: Left ventricular systolic function is normal, with an estimated ejection fraction of 55-60%. There are no regional wall motion abnormalities. The left ventricular cavity size is normal. Spectral Doppler shows a normal pattern of left ventricular diastolic filling. The inferior wall is mildly hypokinetic.  Left Atrium: The left atrium  negative  Gastrointestinal: negative  Genitourinary:   Musculoskeletal:   Neurologic:   Psychiatric: negative  Hematologic/Lymphatic/Immunologic:   Endocrine:         Objective           Vitals:  No vitals were obtained today due to virtual visit.    Physical Exam   healthy, alert and no distress  PSYCH: Alert and oriented times 3; coherent speech, normal   rate and volume, able to articulate logical thoughts, able   to abstract reason, no tangential thoughts, no hallucinations   or delusions  His affect is normal  RESP: No cough, no audible wheezing, able to talk in full sentences  Remainder of exam unable to be completed due to telephone visits                Phone call duration: 9 minutes   is mildly dilated.  Right Ventricle: The right ventricle is normal in size. There is normal right ventricular global systolic function. A device is visualized in the right ventricle.  Right Atrium: The right atrium is normal in size. There is a device visualized in the right atrium.  Aortic Valve: The aortic valve appears structurally normal. There is no evidence of aortic valve regurgitation. The peak instantaneous gradient of the aortic valve is 4.7 mmHg.  Mitral Valve: The mitral valve is normal in structure. There is moderate mitral valve regurgitation which is posteriorly directed.  Tricuspid Valve: The tricuspid valve is structurally normal. There is mild tricuspid regurgitation. The Doppler estimated RVSP is moderately elevated at 61.6 mmHg.  Pulmonic Valve: The pulmonic valve is structurally normal. There is mild pulmonic valve regurgitation.  Pericardium: There is a trivial pericardial effusion.  Aorta: The aortic root is normal.  Systemic Veins: The inferior vena cava appears to be of normal size. There is less than 50% IVC collapse with inspiration.       CONCLUSIONS:   1. Left ventricular systolic function is normal with a 55-60% estimated ejection fraction.   2. The inferior wall is mildly hypokinetic.   3. Moderate mitral valve regurgitation.   4. Mild tricuspid regurgitation is visualized.   5. Moderately elevated right ventricular systolic pressure.    QUANTITATIVE DATA SUMMARY:  2D MEASUREMENTS:                           Normal Ranges:  LAs:           3.10 cm   (2.7-4.0cm)  IVSd:          0.63 cm   (0.6-1.1cm)  LVPWd:         0.64 cm   (0.6-1.1cm)  LVIDd:         5.64 cm   (3.9-5.9cm)  LV Mass Index: 72.6 g/m2    LV SYSTOLIC FUNCTION BY 2D PLANIMETRY (MOD):                      Normal Ranges:  EF-A4C View: 56.8 % (>=55%)  EF-A2C View: 61.9 %  EF-Biplane:  59.3 %    LV DIASTOLIC FUNCTION:                         Normal Ranges:  MV Peak E:    1.88 m/s (0.7-1.2 m/s)  MV Peak A:    0.54 m/s (0.42-0.7  m/s)  E/A Ratio:    3.49     (1.0-2.2)  MV e'         0.08 m/s (>8.0)  MV lateral e' 0.08 m/s  MV medial e'  0.08 m/s  E/e' Ratio:   24.67    (<8.0)  a'            0.08 m/s    MITRAL VALVE:                        Normal Ranges:  MV Vmax:    2.27 m/s  (<=1.3m/s)  MV peak P.6 mmHg (<5mmHg)  MV mean P.0 mmHg  (<48mmHg)  MV DT:      409 msec  (150-240msec)    AORTIC VALVE:                          Normal Ranges:  AoV Vmax:      1.08 m/s (<=1.7m/s)  AoV Peak P.7 mmHg (<20mmHg)  LVOT Max Yasir:  0.72 m/s (<=1.1m/s)  LVOT Diameter: 1.90 cm  (1.8-2.4cm)  AoV Area,Vmax: 1.89 cm2 (2.5-4.5cm2)       RIGHT VENTRICLE:  RV s' 0.16 m/s    TRICUSPID VALVE/RVSP:                              Normal Ranges:  Peak TR Velocity: 3.66 m/s  RV Syst Pressure: 61.6 mmHg (< 30mmHg)  IVC Diam:         1.80 cm       [id number] Becky Vela MD  Electronically signed on 2024 at 8:24:54 AM       ** Final **    Last Labs:  CBC - 2024:  5:28 AM  8.3 9.1 112    29.3      CMP - 2024:  5:28 AM  8.8 5.8 44 --- 0.7   _ 3.4 28 74      PTT - 2024:  6:25 AM  _   _ 121.0     Inpatient Medications:  Scheduled medications   Medication Dose Route Frequency    aspirin  81 mg oral Daily    atorvastatin  80 mg oral Nightly    cefTRIAXone  1 g intravenous Daily    cholecalciferol  2,000 Units oral Daily    clopidogrel  75 mg oral Daily    levothyroxine  100 mcg oral Daily before breakfast    metoprolol succinate XL  25 mg oral BID    multivitamin  1 tablet oral Daily    pantoprazole  40 mg oral Daily before breakfast    perflutren lipid microspheres  0.5-10 mL of dilution intravenous Once in imaging    perflutren protein A microsphere  0.5 mL intravenous Once in imaging    sertraline  100 mg oral Daily    sulfur hexafluoride microsphr  2 mL intravenous Once in imaging     Principal Problem:    NSTEMI (non-ST elevated myocardial infarction) (CMS/HCC)  Active Problems:    Coronary arteriosclerosis    Atherosclerosis of coronary artery     Hypothyroidism    Hypercholesterolemia    Sick sinus syndrome (CMS/HCC)    Impaired fasting glucose    Leukocytosis    Assessment/Plan   Patient has above underlying multiple comorbid condition.  Status post non-STEMI.  Status post diagnostic catheterization showed total occlusion of left circumflex moderate disease RCA.  Medical therapy advised at the moment.  Currently on guideline directed medical therapy including nitroglycerin, aspirin, statin, beta-blocker and Plavix.  Patient not candidate for ACE or ARB due to increased creatinine.  Continue IV Rocephin for pneumonia.  Also patient with underlying herbal bowel syndrome.  Probably slow hydration to improve underlying GARY.  No obvious signs symptoms of CHF at the moment.  Probably discharge tomorrow once creatinine improved.  Although H&H is low.  Continue monitoring for anemia as outpatient.  Patient is at risk for underlying worsening creatinine as well as a possible CHF or CAD events.  Critical care time is spent at bedside includes review of diagnostic tests, labs, and radiographs, serial assessments and management of hemodynamics, EKGs, old echoes, cardiac work-up and coordination of care.  Assessment, impression and plans are reflected in the note above as well as the orders.    Code Status:  Full Code  I spent 60 minutes in the professional and overall care of this patient.  Darrius Ibrahim MD

## 2024-02-05 NOTE — SIGNIFICANT EVENT
I team called.    I team called for rapid heart rate, chest pain. Shortly before I team I was notified about rates and gave a diltiazem 10 mg IV push order. This was given just prior to I-team being called. Patient reports 4/10 burning chest pain, similar to the pain that brought her in. Accucheck normal. SBP 140s. Pulsox 98% on 2L. -160s.      Afib RVRj  - I started diltiazem drip at 10 mg/hr. Rate consistently more in 130-140s after this started. BP remained with systolics 140s  - discussed with Dr. Ibrahim; he recommended 150 mg amiodarone over 10 min; this was ordered.  - if rates still high after amiodarone, will increase diltiazem dosing  - CP improved after diltiazem given. Suspect rate related ischemic pain. EKG shows diffused ST Depression, somewhat worse than prior EKG on admission  - patient requesting Tums-- this was given as well.  - check electrolytes for am  - family updated bedside.      CCT: 35 min
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
## 2024-02-05 NOTE — PROGRESS NOTES
Occupational Therapy    OT Treatment    Patient Name: Linette Doyle  MRN: [id number]  Today's Date: 2/5/2024  Time Calculation  Start Time: 0924  Stop Time: 0947  Time Calculation (min): 23 min         Assessment:  OT Assessment: tolerated session fairly with LOB noted and decreased safety awareness.  End of Session Communication: Bedside nurse  End of Session Patient Position: Up in chair (unable to locate chair alarm in unit)  OT Assessment Results: Decreased ADL status, Decreased safe judgment during ADL, Decreased cognition, Decreased endurance, Decreased functional mobility, Decreased IADLs  Plan:  Treatment Interventions: ADL retraining, Functional transfer training, Endurance training, Patient/family training, Equipment evaluation/education, Neuromuscular reeducation, Compensatory technique education  OT Frequency: 4 times per week  OT Discharge Recommendations: Low intensity level of continued care, 24 hr supervision due to cognition  OT Recommended Transfer Status: Minimal assist, Stand by assist (CGA to close S)  OT - OK to Discharge: Yes  Treatment Interventions: ADL retraining, Functional transfer training, Endurance training, Patient/family training, Equipment evaluation/education, Neuromuscular reeducation, Compensatory technique education    Subjective   Previous Visit Info:  OT Last Visit  OT Received On: 02/05/24  General:  General  Prior to Session Communication: Bedside nurse  Patient Position Received: Up in chair  General Comment: cleared for therapy per RN. Pt seated in chair upon arrival and agreeable to tx session  Precautions:  Medical Precautions: Fall precautions, Oxygen therapy device and L/min (4L O2 nc)  Vital Signs:  Vital Signs  SpO2: 98 %  Pain:  Pain Assessment  Pain Assessment: 0-10  Pain Score: 0 - No pain    Objective    Cognition:  Cognition  Overall Cognitive Status: Within Functional Limits  Insight: Mild  Impulsive: Mildly    Activities of Daily Living:       Toileting  Toileting Level of Assistance: Close supervision  Toileting Comments: increased time required for task completion    Bed Mobility/Transfers:    Transfers  Transfer: Yes  Transfer 1  Transfer From 1: Sit to  Transfer to 1: Stand  Technique 1: Sit to stand, Stand to sit  Transfer Level of Assistance 1: Contact guard  Trials/Comments 1: VC for proper hand placement    Toilet Transfers  Toilet Transfer Type: To and from  Toilet Transfer to: Standard toilet  Toilet Transfers: Supervision  Toilet Transfers Comments: VC for use of grab bar for UE support with pt demonstrating LOB upon stand    Standing Balance:  Static Standing Balance  Static Standing-Level of Assistance: Close supervision  Static Standing-Comment/Number of Minutes: fair+, slight LOB noted with L lateral lean during functional mobility    Therapy/Activity: Therapeutic Exercise  Therapeutic Exercise Performed: Yes  Therapeutic Exercise Activity 1: participated in AROM BUE exercises 4x10 in all planes to improve strength to increase independence with transfers    Therapeutic Activity  Therapeutic Activity Performed: Yes  Therapeutic Activity 1: participated in functional mobility a short household distance at S with unsteadiness noted throughout with pt not aware. cues required to pace task for increased safety awareness    Outcome Measures:[facility] Daily Activity  Putting on and taking off regular lower body clothing: None  Bathing (including washing, rinsing, drying): A little  Putting on and taking off regular upper body clothing: None  Toileting, which includes using toilet, bedpan or urinal: A little  Taking care of personal grooming such as brushing teeth: None  Eating Meals: None  Daily Activity - Total Score: 22    Education Documentation  ADL Training, taught by COLLEEN Leonardo at 2/5/2024 10:43 AM.  Learner: Patient  Readiness: Acceptance  Method: Explanation  Response: Verbalizes Understanding    Education Comments  No  comments found.      Problem: OT Goals  Goal: Pt will tolerate functional mobility for a household distance using a device or no device with mod indep.  Outcome: Met     Problem: OT Goals  Goal: Pt will complete all functional transfers and mobility with mod indep using a device or no device.  Outcome: Progressing  Goal: Pt will complete all ADL's/IADL's with mod indep using a device as needed.  Outcome: Progressing

## 2024-02-05 NOTE — PROGRESS NOTES
Linette Doyle is a 83 y.o. female on day 4 of admission presenting with NSTEMI (non-ST elevated myocardial infarction) (CMS/[facility]).      Subjective   No complaints, creatinine trending down to 2.0.       Objective          Vitals 24HR  Heart Rate:  []   Temp:  [36.1 °C (97 °F)-37.2 °C (99 °F)]   Resp:  [18-22]   BP: (116-137)/(42-63)   Weight:  [84.7 kg (186 lb 11.7 oz)]   SpO2:  [97 %-100 %]       Intake/Output last 3 Shifts:    Intake/Output Summary (Last 24 hours) at 2/5/2024 1143  Last data filed at 2/5/2024 0900  Gross per 24 hour   Intake 1380 ml   Output 750 ml   Net 630 ml       Physical Exam  Constitutional:       General: She is awake. She is not in acute distress.  Cardiovascular:      Rate and Rhythm: Regular rhythm.      Heart sounds:      No friction rub.   Pulmonary:      Effort: Pulmonary effort is normal.      Breath sounds: Normal breath sounds.   Abdominal:      General: Bowel sounds are normal.      Palpations: Abdomen is soft.      Tenderness: There is no guarding or rebound.   Musculoskeletal:      Right lower leg: No edema.      Left lower leg: No edema.   Neurological:      Mental Status: She is alert.         Relevant Results  Results for orders placed or performed during the hospital encounter of 01/31/24 (from the past 24 hour(s))   CBC and Auto Differential   Result Value Ref Range    WBC 8.3 4.4 - 11.3 x10*3/uL    nRBC 0.0 0.0 - 0.0 /100 WBCs    RBC 3.21 (L) 4.00 - 5.20 x10*6/uL    Hemoglobin 9.1 (L) 12.0 - 16.0 g/dL    Hematocrit 29.3 (L) 36.0 - 46.0 %    MCV 91 80 - 100 fL    MCH 28.3 26.0 - 34.0 pg    MCHC 31.1 (L) 32.0 - 36.0 g/dL    RDW 13.9 11.5 - 14.5 %    Platelets 112 (L) 150 - 450 x10*3/uL    Neutrophils % 83.0 40.0 - 80.0 %    Immature Granulocytes %, Automated 0.5 0.0 - 0.9 %    Lymphocytes % 9.0 13.0 - 44.0 %    Monocytes % 6.7 2.0 - 10.0 %    Eosinophils % 0.6 0.0 - 6.0 %    Basophils % 0.2 0.0 - 2.0 %    Neutrophils Absolute 6.88 (H) 1.60 - 5.50 x10*3/uL    Immature  Granulocytes Absolute, Automated 0.04 0.00 - 0.50 x10*3/uL    Lymphocytes Absolute 0.75 (L) 0.80 - 3.00 x10*3/uL    Monocytes Absolute 0.56 0.05 - 0.80 x10*3/uL    Eosinophils Absolute 0.05 0.00 - 0.40 x10*3/uL    Basophils Absolute 0.02 0.00 - 0.10 x10*3/uL   Comprehensive Metabolic Panel   Result Value Ref Range    Glucose 117 (H) 65 - 99 mg/dL    Sodium 140 133 - 145 mmol/L    Potassium 3.9 3.4 - 5.1 mmol/L    Chloride 104 97 - 107 mmol/L    Bicarbonate 25 24 - 31 mmol/L    Urea Nitrogen 65 (H) 8 - 25 mg/dL    Creatinine 2.00 (H) 0.40 - 1.60 mg/dL    eGFR 24 (L) >60 mL/min/1.73m*2    Calcium 8.8 8.5 - 10.4 mg/dL    Albumin 3.4 (L) 3.5 - 5.0 g/dL    Alkaline Phosphatase 74 35 - 125 U/L    Total Protein 5.8 (L) 5.9 - 7.9 g/dL    AST 44 (H) 5 - 40 U/L    Bilirubin, Total 0.7 0.1 - 1.2 mg/dL    ALT 28 5 - 40 U/L    Anion Gap 11 <=19 mmol/L   Iron and TIBC   Result Value Ref Range    Iron 24 (L) 30 - 160 ug/dL    UIBC 184 110 - 370 ug/dL    TIBC 208 (L) 228 - 428 ug/dL    % Saturation 12 12 - 50 %   Ferritin   Result Value Ref Range    Ferritin 280 (H) 13 - 150 ng/mL            Assessment/Plan   Acute Kidney injury-likely secondary to IV contrast- stopped IV hydration as she has pulmonary edema, pending urine culture,  renal ultrasound, continue to avoid nephrotoxic medications  2.Chronic Kidney disease stage III  - baseline creatinine 1.4  3. NSTEMI- cardiology on board   4. Hypertension- stable    Yelena Giang MD

## 2024-02-05 NOTE — PROGRESS NOTES
Physical Therapy    Physical Therapy Treatment    Patient Name: Linette Doyle  MRN: [id number]  Today's Date: 2/5/2024  Time Calculation  Start Time: 0842  Stop Time: 0905  Time Calculation (min): 23 min     Assessment/Plan   PT Assessment  PT Assessment Results: Decreased strength, Decreased endurance, Impaired balance, Decreased mobility, Decreased coordination, Impaired judgement, Decreased safety awareness  Rehab Prognosis: Good  Evaluation/Treatment Tolerance: Patient tolerated treatment well  Medical Staff Made Aware: Yes  Strengths: Ability to acquire knowledge, Premorbid level of function  Barriers to Participation: Insight into problems  End of Session Communication: Bedside nurse  Assessment Comment: Improved gait tolerance, intermittent cueing for sequencing/safety throughout session, will continue to treat as tolerated.  End of Session Patient Position: Up in chair, Alarm off, not on at start of session (unable to locate chair alarm on unit; pt instructed in use of call button for assist with transferring with good agreement/understanding demonstrated)  PT Plan  Inpatient/Swing Bed or Outpatient: Inpatient  PT Plan  Treatment/Interventions: Bed mobility, Transfer training, Gait training, Stair training, Balance training, Neuromuscular re-education, Strengthening, Endurance training, Therapeutic exercise, Therapeutic activity, Home exercise program  PT Plan: Skilled PT  PT Frequency: 4 times per week  PT Discharge Recommendations: Low intensity level of continued care, 24 hr supervision due to cognition  PT Recommended Transfer Status: Contact guard  PT - OK to Discharge: Yes    General Visit Information:   PT  Visit  PT Received On: 02/05/24  Response to Previous Treatment: Patient with no complaints from previous session.  General  Reason for Referral: impaired functional mobility  Referred By: Sal Ibrahim MD  Past Medical History Relevant to Rehab: CAD, cardiac stent, SSS with pacer, hypothyroidism,  hypercholesterolemia, htn, anemia, CKD  Missed Visit: No  Missed Visit Reason: Other (Comment)  Family/Caregiver Present: Yes  Co-Treatment:  (no)  Co-Treatment Reason: n/a  Prior to Session Communication: Bedside nurse  Patient Position Received: Bed, 3 rail up, Alarm off, not on at start of session  Preferred Learning Style: verbal  General Comment: Pt cleared for therapy via RN, received in supine, NAD, agreeable to participate in therapy. (+) telemetry, 4L O2 via NC, [place]    Subjective   Precautions:  Precautions  Hearing/Visual Limitations: reading glasses  Vital Signs:  Vital Signs  SpO2: 99 %    Objective   Pain:  Pain Assessment  Pain Assessment: 0-10  Pain Score: 0 - No pain  Cognition:  Cognition  Overall Cognitive Status: Within Functional Limits  Orientation Level: Oriented X4  Insight: Mild  Impulsive: Mildly  Postural Control:  Postural Control  Postural Control: Within Functional Limits  Static Sitting Balance  Static Sitting-Balance Support: Feet supported, No upper extremity supported  Static Sitting-Level of Assistance: Independent  Static Standing Balance  Static Standing-Balance Support: No upper extremity supported  Static Standing-Level of Assistance: Contact guard  Extremity/Trunk Assessments:  RLE   RLE : Within Functional Limits  LLE   LLE : Within Functional Limits  Activity Tolerance:  Activity Tolerance  Endurance: Tolerates 10 - 20 min exercise with multiple rests  Treatments:  Therapeutic Exercise  Therapeutic Exercise Performed: Yes  Therapeutic Exercise Activity 1: B ankle pumps x 10  Therapeutic Exercise Activity 2: B seated knee ext x 10  Therapeutic Exercise Activity 3: B seated hip flex x 10    Therapeutic Activity  Therapeutic Activity Performed: Yes  Therapeutic Activity 1: stand <> toilet with grab bars, supervision for safety, assist for line management; distant supervision for static/dynamic seated balance on toilet for ~8-10 minutes; (+) BM, pt able to perform self  pavan-area cleanse; contact guard assist to perform dynamic standing activity in bathroom    Bed Mobility  Bed Mobility: Yes  Bed Mobility 1  Bed Mobility 1: Supine to sitting  Level of Assistance 1: Close supervision  Bed Mobility Comments 1: HOB elevated, use of bedrails, assist for line management    Ambulation/Gait Training  Ambulation/Gait Training Performed: Yes  Ambulation/Gait Training 1  Surface 1: Level tile  Device 1: No device  Assistance 1: Contact guard  Quality of Gait 1: Decreased step length, Forward flexed posture, Diminished heel strike (decreased gloria, lines managed via PT, cueing to remain on task)  Comments/Distance (ft) 1: 40' x 2  Transfers  Transfer: Yes  Transfer 1  Transfer From 1: Sit to  Transfer to 1: Stand  Technique 1: Sit to stand  Transfer Level of Assistance 1: Contact guard  Trials/Comments 1: assist for safety  Transfers 2  Transfer From 2: Stand to  Transfer to 2: Sit  Technique 2: Stand to sit  Transfer Level of Assistance 2: Contact guard  Trials/Comments 2: assist for safe eccentric lowering into chair    Stairs  Stairs: No    Outcome Measures:  [facility] Basic Mobility  Turning from your back to your side while in a flat bed without using bedrails: None  Moving from lying on your back to sitting on the side of a flat bed without using bedrails: None  Moving to and from bed to chair (including a wheelchair): A little  Standing up from a chair using your arms (e.g. wheelchair or bedside chair): A little  To walk in hospital room: A little  Climbing 3-5 steps with railing: A lot  Basic Mobility - Total Score: 19    Education Documentation  Precautions, taught by Mariluz Headley PT at 2/5/2024 10:23 AM.  Learner: Patient  Readiness: Acceptance  Method: Explanation, Demonstration  Response: Needs Reinforcement    Body Mechanics, taught by Mariluz Headley PT at 2/5/2024 10:23 AM.  Learner: Patient  Readiness: Acceptance  Method: Explanation, Demonstration  Response: Needs  Reinforcement    Mobility Training, taught by Mariluz Headley, PT at 2/5/2024 10:23 AM.  Learner: Patient  Readiness: Acceptance  Method: Explanation, Demonstration  Response: Needs Reinforcement    Education Comments  No comments found.      OP EDUCATION:  Outpatient Education  Individual(s) Educated: Patient  Education Provided: Fall Risk, Home Exercise Program  Risk and Benefits Discussed with Patient/Caregiver/Other: yes  Patient/Caregiver Demonstrated Understanding: yes  Plan of Care Discussed and Agreed Upon: yes  Patient Response to Education: Patient/Caregiver Verbalized Understanding of Information    Encounter Problems       Encounter Problems (Active)       PT Problem       Patient will demonstrate improvements in strength  (Progressing)       Start:  02/03/24    Expected End:  02/24/24            Patient will ambulate 150 ft independently and use of no assistive device  (Progressing)       Start:  02/03/24    Expected End:  02/24/24            Patient will ascend/descend 1 step with no support on 0 rail(s) independently  (Progressing)       Start:  02/03/24    Expected End:  02/24/24               Pain - Adult

## 2024-02-05 NOTE — CARE PLAN
The patient's goals for the shift include no chest pain    The clinical goals for the shift include maintain SpO2 greater than 92%    Over the shift, the patient did not make progress toward the following goals. Barriers to progression include NSTEMI. Recommendations to address these barriers include administer medications/interventions as ordered.

## 2024-02-05 NOTE — PROGRESS NOTES
Linette Doyle is a 83 y.o. female on day 4 of admission presenting with NSTEMI (non-ST elevated myocardial infarction) (CMS/HCC).      Subjective   Patient reports that she is feeling a little anxious today. Says she is tired and wants to take a nap. Cannot get her brother on the phone (angel distance call). I showed her how to call the  for this.    No pain, cough, SOB.       Objective     Last Recorded Vitals  /63   Pulse 105   Temp 36.1 °C (97 °F) (Temporal)   Resp 22   Wt 84.7 kg (186 lb 11.7 oz)   SpO2 98%   Intake/Output last 3 Shifts:    Intake/Output Summary (Last 24 hours) at 2/5/2024 1135  Last data filed at 2/5/2024 0900  Gross per 24 hour   Intake 1380 ml   Output 750 ml   Net 630 ml       Admission Weight  Weight: 68 kg (150 lb) (01/31/24 2058)    Daily Weight  02/05/24 : 84.7 kg (186 lb 11.7 oz)    Image Results  XR chest 1 view  Narrative: Interpreted By:  Mc Carranza,   STUDY:  XR CHEST 1 VIEW; 2/2/2024 2:34 pm      INDICATION:  Signs/Symptoms:hypoxia.      COMPARISON:  01/31/2024      ACCESSION NUMBER(S):  [id number]      ORDERING CLINICIAN:  STORMY NAGEL      TECHNIQUE:  1 view of the chest was performed.      FINDINGS:  Left chest wall pacemaker and leads appear stable. There may be a  small left pleural effusion, no right pleural effusion. There is  increased perihilar airspace opacity which can be related to  congestive changes and increased pulmonary edema.. No pneumothorax.  The cardiomediastinal silhouette is borderline mildly enlarged but  stable      Impression: Increased perihilar airspace opacity which can be seen with pulmonary  edema. There may be a small left pleural effusion.      Signed by: Mc Carranza 2/2/2024 3:36 PM  Dictation workstation:   [id number]  Transthoracic Echo (TTE) [place] [id number]             Phone [phone]    TRANSTHORACIC ECHOCARDIOGRAM REPORT       Patient  Name:      SAMUEL Buenrostro Physician:    [id number] Becky Vela MD  Study Date:        2/1/2024              Ordering Provider:    [id number] MELANIE HARDWICK  MRN/PID:           [id number]              Fellow:  Accession#:        [id number]          Nurse:  Date of Birth/Age: 1940 / 83 years Sonographer:          Yamileth Houser RDCS  Gender:            F                     Additional Staff:  Height:            162.00 cm             Admit Date:  Weight:            68.00 kg              Admission Status:     Inpatient -                                                                 Routine  BSA:               1.73 m2               Department Location:  [facility]  Blood Pressure: 166 /72 mmHg    Study Type:    TRANSTHORACIC ECHO (TTE) COMPLETE  Diagnosis/ICD: Non ST elevation (NSTEMI) myocardial infarction-I21.4  Indication:    nstemi  CPT Codes:     Echo Complete w Full Doppler-[id number]    Patient History:  BMI:               Overweight 25 - 30  Pacer/Defib:       Permanent pacemaker  Pertinent History: CAD and HTN. ckd,stent,sss,falls, sob.    Study Detail: The following Echo studies were performed: 2D, M-Mode, Doppler and                color flow. Technically challenging study due to prominent lung                artifact.       PHYSICIAN INTERPRETATION:  Left Ventricle: Left ventricular systolic function is normal, with an estimated ejection fraction of 55-60%. There are no regional wall motion abnormalities. The left ventricular cavity size is normal. Spectral Doppler shows a normal pattern of left ventricular diastolic filling. The inferior wall is mildly hypokinetic.  Left Atrium: The left atrium is mildly dilated.  Right Ventricle: The right ventricle is normal in size. There is normal right ventricular  global systolic function. A device is visualized in the right ventricle.  Right Atrium: The right atrium is normal in size. There is a device visualized in the right atrium.  Aortic Valve: The aortic valve appears structurally normal. There is no evidence of aortic valve regurgitation. The peak instantaneous gradient of the aortic valve is 4.7 mmHg.  Mitral Valve: The mitral valve is normal in structure. There is moderate mitral valve regurgitation which is posteriorly directed.  Tricuspid Valve: The tricuspid valve is structurally normal. There is mild tricuspid regurgitation. The Doppler estimated RVSP is moderately elevated at 61.6 mmHg.  Pulmonic Valve: The pulmonic valve is structurally normal. There is mild pulmonic valve regurgitation.  Pericardium: There is a trivial pericardial effusion.  Aorta: The aortic root is normal.  Systemic Veins: The inferior vena cava appears to be of normal size. There is less than 50% IVC collapse with inspiration.       CONCLUSIONS:   1. Left ventricular systolic function is normal with a 55-60% estimated ejection fraction.   2. The inferior wall is mildly hypokinetic.   3. Moderate mitral valve regurgitation.   4. Mild tricuspid regurgitation is visualized.   5. Moderately elevated right ventricular systolic pressure.    QUANTITATIVE DATA SUMMARY:  2D MEASUREMENTS:                           Normal Ranges:  LAs:           3.10 cm   (2.7-4.0cm)  IVSd:          0.63 cm   (0.6-1.1cm)  LVPWd:         0.64 cm   (0.6-1.1cm)  LVIDd:         5.64 cm   (3.9-5.9cm)  LV Mass Index: 72.6 g/m2    LV SYSTOLIC FUNCTION BY 2D PLANIMETRY (MOD):                      Normal Ranges:  EF-A4C View: 56.8 % (>=55%)  EF-A2C View: 61.9 %  EF-Biplane:  59.3 %    LV DIASTOLIC FUNCTION:                         Normal Ranges:  MV Peak E:    1.88 m/s (0.7-1.2 m/s)  MV Peak A:    0.54 m/s (0.42-0.7 m/s)  E/A Ratio:    3.49     (1.0-2.2)  MV e'         0.08 m/s (>8.0)  MV lateral e' 0.08 m/s  MV medial e'   0.08 m/s  E/e' Ratio:   24.67    (<8.0)  a'            0.08 m/s    MITRAL VALVE:                        Normal Ranges:  MV Vmax:    2.27 m/s  (<=1.3m/s)  MV peak P.6 mmHg (<5mmHg)  MV mean P.0 mmHg  (<48mmHg)  MV DT:      409 msec  (150-240msec)    AORTIC VALVE:                          Normal Ranges:  AoV Vmax:      1.08 m/s (<=1.7m/s)  AoV Peak P.7 mmHg (<20mmHg)  LVOT Max Yasir:  0.72 m/s (<=1.1m/s)  LVOT Diameter: 1.90 cm  (1.8-2.4cm)  AoV Area,Vmax: 1.89 cm2 (2.5-4.5cm2)       RIGHT VENTRICLE:  RV s' 0.16 m/s    TRICUSPID VALVE/RVSP:                              Normal Ranges:  Peak TR Velocity: 3.66 m/s  RV Syst Pressure: 61.6 mmHg (< 30mmHg)  IVC Diam:         1.80 cm       [id number] Becky Vela MD  Electronically signed on 2024 at 8:24:54 AM       ** Final **      Physical Exam  General: alert, no diaphoresis   Lungs: CTA BL   Heart: RRR, no murmurs, no LE edema BL   GI: abdomen soft, nontender, nondistended, BS present   MSK: no joint effusion or deformity   Skin: no rashes, erythema, or ecchymosis   Neuro: grossly normal cognition, motor strength, sensation      Relevant Results               Assessment/Plan          This patient has a urinary catheter   Reason for the urinary catheter remaining today? critically ill patient who need accurate urinary output measurements          Principal Problem:    NSTEMI (non-ST elevated myocardial infarction) (CMS/HCC)  Active Problems:    Coronary arteriosclerosis    Atherosclerosis of coronary artery    Hypothyroidism    Hypercholesterolemia    Sick sinus syndrome (CMS/HCC)    Impaired fasting glucose    Leukocytosis    NSTEMI  -Left heart catheterization showed total occlusion of the circumflex and moderate disease in the RCA.  Will have outpatient coronary intervention possible surgery  -Continue clopidogrel and aspirin     Known CAD  -Continue therapy as above.     GARY- improving today  - probably contrast nephropathy in the setting of diuretic use  for pulmonary congestion.   - dr. Giang following     Possible pneumonia  - on IV ceftriaxone      Constipation, IBS  - Jerardo order a fleet enema      Hypertension  -Continue home antihypertensives.     Discussed with Gina Bourgeois, care coordinator. Patient is not ready for discharge yet.  PT recommending low level of care with 24 hr supervision, and she currently is at home alone.       Stefanie Verma, DO

## 2024-02-05 NOTE — PROGRESS NOTES
Subjective Data:  Patient denies any symptoms of chest discomfort or shortness of breath.  She is slightly confused    Overnight Events:    none     Objective Data:  Last Recorded Vitals:  Vitals:    02/04/24 0728 02/04/24 1241 02/04/24 1535 02/04/24 1932   BP: (!) 111/49 (!) 116/42 (!) 137/45 117/50   BP Location: Left arm Left arm Left arm Left arm   Patient Position: Lying Lying Lying Lying   Pulse: 58  (!) 40 83   Resp: 18 18 18 20   Temp: 36.9 °C (98.4 °F) 36.6 °C (97.9 °F) 37.2 °C (99 °F) 36.9 °C (98.4 °F)   TempSrc: Temporal Oral Oral Oral   SpO2: 96% 98% 98% 98%   Weight:       Height:           Last Labs:  CBC - 2/4/2024:  6:08 AM  14.4 9.0 121    28.4      CMP - 2/4/2024:  6:08 AM  9.1 6.1 68 --- 0.8   _ 3.6 31 70      PTT - 2/2/2024:  6:25 AM  _   _ 121.0     HGBA1C   Date/Time Value Ref Range Status   02/01/2024 05:58 AM 6.0 See below % Final   07/05/2023 08:49 AM 5.6 4.0 - 6.0 % Final     Comment:     Hemoglobin A1C levels are related to mean blood glucose during the   preceding 2-3 months. The relationship table below may be used as a   general guide. Each 1% increase in HGB A1C is a reflection of an   increase in mean glucose of approximately 30 mg/dl.   Reference: Diabetes Care, volume 29, supplement 1 Jan. 2006                        HGB A1C ................. Approx. Mean Glucose   _______________________________________________   6%   ...............................  120 mg/dl   7%   ...............................  150 mg/dl   8%   ...............................  180 mg/dl   9%   ...............................  210 mg/dl   10%  ...............................  240 mg/dl  Performed at [address] [address]     12/14/2022 09:13 AM 5.7 4.0 - 6.0 % Final     Comment:     Hemoglobin A1C levels are related to mean blood glucose during the   preceding 2-3 months. The relationship table below may be used as a   general guide. Each 1% increase in HGB A1C is a reflection of an    increase in mean glucose of approximately 30 mg/dl.   Reference: Diabetes Care, volume 29, supplement 1 Jan. 2006                        HGB A1C ................. Approx. Mean Glucose   _______________________________________________   6%   ...............................  120 mg/dl   7%   ...............................  150 mg/dl   8%   ...............................  180 mg/dl   9%   ...............................  210 mg/dl   10%  ...............................  240 mg/dl  Performed at [address] [address]       LDLCALC   Date/Time Value Ref Range Status   07/05/2023 08:49 AM 57 65 - 130 MG/DL Final   12/14/2022 09:13 AM 64 65 - 130 MG/DL Final   06/29/2022 08:43 AM 58 65 - 130 MG/DL Final      Last I/O:  I/O last 3 completed shifts:  In: 1290 (15.3 mL/kg) [P.O.:820; I.V.:420 (5 mL/kg); IV Piggyback:50]  Out: 500 (5.9 mL/kg) [Urine:500 (0.2 mL/kg/hr)]  Weight: 84.5 kg     Past Cardiology Tests (Last 3 Years):  EKG:  ECG 12 Lead 01/31/2024    Echo:  Transthoracic Echo (TTE) Complete 02/01/2024    Ejection Fractions:  EF   Date/Time Value Ref Range Status   02/01/2024 11:43 AM 59 %      Cath:  No results found for this or any previous visit from the past 1095 days.    Stress Test:  No results found for this or any previous visit from the past 1095 days.    Cardiac Imaging:  No results found for this or any previous visit from the past 1095 days.      Inpatient Medications:  Scheduled medications   Medication Dose Route Frequency    aspirin  81 mg oral Daily    atorvastatin  80 mg oral Nightly    cefTRIAXone  1 g intravenous Daily    cholecalciferol  2,000 Units oral Daily    clopidogrel  75 mg oral Daily    levothyroxine  100 mcg oral Daily before breakfast    metoprolol succinate XL  25 mg oral BID    multivitamin  1 tablet oral Daily    pantoprazole  40 mg oral Daily before breakfast    perflutren lipid microspheres  0.5-10 mL of dilution intravenous Once in imaging    perflutren  protein A microsphere  0.5 mL intravenous Once in imaging    sertraline  100 mg oral Daily    sulfur hexafluoride microsphr  2 mL intravenous Once in imaging     PRN medications   Medication    acetaminophen    Or    acetaminophen    Or    acetaminophen    benzocaine-menthol    calcium carbonate    dextromethorphan-guaifenesin    dextrose 10 % in water (D10W)    dextrose    glucagon    guaiFENesin    heparin (porcine)    ondansetron ODT    Or    ondansetron    polyethylene glycol     Continuous Medications   Medication Dose Last Rate       Physical Exam:  HEENT PRL neck supple lungs clear to auscultation bilaterally with good air entry heart S1-S2 present with no murmurs abdominal soft and nontender extremity shows no evidence of pedal edema neuro grossly nonfocal     Assessment/Plan   Severe two-vessel coronary disease patient will be having elective angioplasty of the right coronary artery in the next 2 weeks electively  Contrast-induced nephropathy secondary to pre-existing underlying chronic kidney disease stage III.  Appreciated nephrology consultation renal function was slightly worse compared to yesterday with a creatinine of 2.7 and BUN went up to 70.  Patient is making decent amount of urine.  Will encourage oral fluids and once renal function starts improving patient will be discharges home and will have repeat BMP done as an outpatient within 4 days posthospital discharge.  Peripheral IV 02/03/24 22 G Left Antecubital (Active)   Site Assessment Clean;Dry;Intact 02/04/24 0800   Dressing Status Clean;Dry;Occlusive 02/04/24 0800   Number of days: 1       Urethral Catheter (Active)   Output (mL) 150 mL 02/04/24 1535   Number of days: 1       Code Status:  Full Code    I spent 25 minutes in the professional and overall care of this patient.        Moy Sanchez MD

## 2024-02-06 ENCOUNTER — APPOINTMENT (OUTPATIENT)
Dept: CARDIOLOGY | Facility: HOSPITAL | Age: 84
DRG: 321 | End: 2024-02-06
Payer: MEDICARE

## 2024-02-06 LAB
ALBUMIN SERPL-MCNC: 3.2 G/DL (ref 3.5–5)
ALP BLD-CCNC: 77 U/L (ref 35–125)
ALT SERPL-CCNC: 30 U/L (ref 5–40)
ANION GAP SERPL CALC-SCNC: 11 MMOL/L
AST SERPL-CCNC: 39 U/L (ref 5–40)
BASOPHILS # BLD AUTO: 0.03 X10*3/UL (ref 0–0.1)
BASOPHILS NFR BLD AUTO: 0.4 %
BILIRUB SERPL-MCNC: 0.5 MG/DL (ref 0.1–1.2)
BUN SERPL-MCNC: 46 MG/DL (ref 8–25)
CALCIUM SERPL-MCNC: 9.1 MG/DL (ref 8.5–10.4)
CHLORIDE SERPL-SCNC: 106 MMOL/L (ref 97–107)
CO2 SERPL-SCNC: 25 MMOL/L (ref 24–31)
CREAT SERPL-MCNC: 1.5 MG/DL (ref 0.4–1.6)
EGFRCR SERPLBLD CKD-EPI 2021: 34 ML/MIN/1.73M*2
EOSINOPHIL # BLD AUTO: 0.22 X10*3/UL (ref 0–0.4)
EOSINOPHIL NFR BLD AUTO: 2.7 %
ERYTHROCYTE [DISTWIDTH] IN BLOOD BY AUTOMATED COUNT: 13.8 % (ref 11.5–14.5)
GLUCOSE SERPL-MCNC: 128 MG/DL (ref 65–99)
HCT VFR BLD AUTO: 28.9 % (ref 36–46)
HGB BLD-MCNC: 9.5 G/DL (ref 12–16)
IMM GRANULOCYTES # BLD AUTO: 0.04 X10*3/UL (ref 0–0.5)
IMM GRANULOCYTES NFR BLD AUTO: 0.5 % (ref 0–0.9)
LYMPHOCYTES # BLD AUTO: 0.77 X10*3/UL (ref 0.8–3)
LYMPHOCYTES NFR BLD AUTO: 9.3 %
MAGNESIUM SERPL-MCNC: 2.3 MG/DL (ref 1.6–3.1)
MCH RBC QN AUTO: 28.4 PG (ref 26–34)
MCHC RBC AUTO-ENTMCNC: 32.9 G/DL (ref 32–36)
MCV RBC AUTO: 87 FL (ref 80–100)
MONOCYTES # BLD AUTO: 0.62 X10*3/UL (ref 0.05–0.8)
MONOCYTES NFR BLD AUTO: 7.5 %
NEUTROPHILS # BLD AUTO: 6.6 X10*3/UL (ref 1.6–5.5)
NEUTROPHILS NFR BLD AUTO: 79.6 %
NRBC BLD-RTO: 0 /100 WBCS (ref 0–0)
PLATELET # BLD AUTO: 129 X10*3/UL (ref 150–450)
POTASSIUM SERPL-SCNC: 3.8 MMOL/L (ref 3.4–5.1)
PROT SERPL-MCNC: 6 G/DL (ref 5.9–7.9)
RBC # BLD AUTO: 3.34 X10*6/UL (ref 4–5.2)
SODIUM SERPL-SCNC: 142 MMOL/L (ref 133–145)
WBC # BLD AUTO: 8.3 X10*3/UL (ref 4.4–11.3)

## 2024-02-06 PROCEDURE — 2500000001 HC RX 250 WO HCPCS SELF ADMINISTERED DRUGS (ALT 637 FOR MEDICARE OP): Performed by: INTERNAL MEDICINE

## 2024-02-06 PROCEDURE — 83735 ASSAY OF MAGNESIUM: CPT | Performed by: HOSPITALIST

## 2024-02-06 PROCEDURE — 85025 COMPLETE CBC W/AUTO DIFF WBC: CPT | Performed by: INTERNAL MEDICINE

## 2024-02-06 PROCEDURE — 2500000004 HC RX 250 GENERAL PHARMACY W/ HCPCS (ALT 636 FOR OP/ED): Performed by: INTERNAL MEDICINE

## 2024-02-06 PROCEDURE — 93005 ELECTROCARDIOGRAM TRACING: CPT

## 2024-02-06 PROCEDURE — 2500000001 HC RX 250 WO HCPCS SELF ADMINISTERED DRUGS (ALT 637 FOR MEDICARE OP): Performed by: NURSE PRACTITIONER

## 2024-02-06 PROCEDURE — 99232 SBSQ HOSP IP/OBS MODERATE 35: CPT | Performed by: INTERNAL MEDICINE

## 2024-02-06 PROCEDURE — 2500000004 HC RX 250 GENERAL PHARMACY W/ HCPCS (ALT 636 FOR OP/ED): Performed by: FAMILY MEDICINE

## 2024-02-06 PROCEDURE — 84075 ASSAY ALKALINE PHOSPHATASE: CPT | Performed by: INTERNAL MEDICINE

## 2024-02-06 PROCEDURE — 36415 COLL VENOUS BLD VENIPUNCTURE: CPT | Performed by: INTERNAL MEDICINE

## 2024-02-06 PROCEDURE — 2500000004 HC RX 250 GENERAL PHARMACY W/ HCPCS (ALT 636 FOR OP/ED): Performed by: HOSPITALIST

## 2024-02-06 PROCEDURE — 2060000001 HC INTERMEDIATE ICU ROOM DAILY

## 2024-02-06 RX ORDER — ATENOLOL 25 MG/1
25 TABLET ORAL 2 TIMES DAILY
Status: DISCONTINUED | OUTPATIENT
Start: 2024-02-06 | End: 2024-02-08

## 2024-02-06 RX ORDER — SERTRALINE HYDROCHLORIDE 50 MG/1
150 TABLET, FILM COATED ORAL DAILY
Status: DISCONTINUED | OUTPATIENT
Start: 2024-02-07 | End: 2024-02-14 | Stop reason: HOSPADM

## 2024-02-06 RX ORDER — ATENOLOL 25 MG/1
25 TABLET ORAL 2 TIMES DAILY
Status: DISCONTINUED | OUTPATIENT
Start: 2024-02-06 | End: 2024-02-06

## 2024-02-06 RX ORDER — DILTIAZEM HYDROCHLORIDE 120 MG/1
120 CAPSULE, COATED, EXTENDED RELEASE ORAL 2 TIMES DAILY
Status: DISCONTINUED | OUTPATIENT
Start: 2024-02-06 | End: 2024-02-14 | Stop reason: HOSPADM

## 2024-02-06 RX ORDER — ATENOLOL 50 MG/1
50 TABLET ORAL 2 TIMES DAILY
Status: DISCONTINUED | OUTPATIENT
Start: 2024-02-06 | End: 2024-02-06

## 2024-02-06 RX ADMIN — METOPROLOL SUCCINATE 25 MG: 25 TABLET, EXTENDED RELEASE ORAL at 09:01

## 2024-02-06 RX ADMIN — DILTIAZEM HYDROCHLORIDE 120 MG: 120 CAPSULE, COATED, EXTENDED RELEASE ORAL at 14:50

## 2024-02-06 RX ADMIN — SERTRALINE 100 MG: 100 TABLET, FILM COATED ORAL at 09:01

## 2024-02-06 RX ADMIN — PANTOPRAZOLE SODIUM 40 MG: 40 TABLET, DELAYED RELEASE ORAL at 06:19

## 2024-02-06 RX ADMIN — DILTIAZEM HYDROCHLORIDE 120 MG: 120 CAPSULE, COATED, EXTENDED RELEASE ORAL at 20:50

## 2024-02-06 RX ADMIN — ASPIRIN 81 MG 81 MG: 81 TABLET ORAL at 09:01

## 2024-02-06 RX ADMIN — APIXABAN 5 MG: 5 TABLET, FILM COATED ORAL at 14:50

## 2024-02-06 RX ADMIN — Medication 10 MG/HR: at 04:19

## 2024-02-06 RX ADMIN — CALCIUM CARBONATE (ANTACID) CHEW TAB 500 MG 500 MG: 500 CHEW TAB at 18:22

## 2024-02-06 RX ADMIN — CEFTRIAXONE SODIUM 1 G: 1 INJECTION, SOLUTION INTRAVENOUS at 12:17

## 2024-02-06 RX ADMIN — Medication 2000 UNITS: at 09:01

## 2024-02-06 RX ADMIN — MULTIVITAMIN TABLET 1 TABLET: TABLET at 09:01

## 2024-02-06 RX ADMIN — ATENOLOL 25 MG: 25 TABLET ORAL at 15:24

## 2024-02-06 RX ADMIN — CLOPIDOGREL BISULFATE 75 MG: 75 TABLET ORAL at 09:01

## 2024-02-06 RX ADMIN — ATORVASTATIN CALCIUM 80 MG: 80 TABLET, FILM COATED ORAL at 20:50

## 2024-02-06 RX ADMIN — LEVOTHYROXINE SODIUM 100 MCG: 0.1 TABLET ORAL at 06:19

## 2024-02-06 ASSESSMENT — COGNITIVE AND FUNCTIONAL STATUS - GENERAL
TOILETING: A LITTLE
MOVING TO AND FROM BED TO CHAIR: A LITTLE
WALKING IN HOSPITAL ROOM: A LITTLE
CLIMB 3 TO 5 STEPS WITH RAILING: A LOT
DAILY ACTIVITIY SCORE: 22
STANDING UP FROM CHAIR USING ARMS: A LITTLE
HELP NEEDED FOR BATHING: A LITTLE
MOBILITY SCORE: 19

## 2024-02-06 ASSESSMENT — PAIN SCALES - GENERAL
PAINLEVEL_OUTOF10: 0 - NO PAIN
PAINLEVEL_OUTOF10: 0 - NO PAIN

## 2024-02-06 NOTE — CARE PLAN
The patient's goals for the shift include no chest pain    The clinical goals for the shift include decrease HR to WNL    Over the shift, the patient did not make progress toward the following goals. Barriers to progression include afib RVR. Recommendations to address these barriers include administer medications/interventions as ordered.

## 2024-02-06 NOTE — PROGRESS NOTES
Physical Therapy                 Therapy Communication Note    Patient Name: Linette Doyle  MRN: [id number]  Today's Date: 2/6/2024     Discipline: Physical Therapy    Missed Visit Reason: Missed Visit Reason: Cancel (Per RN, pt not appropriate for PT at this time d/t elevated HR (on cardizem drip) and low BP; will hold at this time.)    Missed Time: Cancel    Comment:

## 2024-02-06 NOTE — PROGRESS NOTES
Subjective Data:  Patient with history of tacky bradycardia arrhythmia, A-fib RVR.  History of non-ST elevation MI MI.  Under emotional stress situation.  A-fib RVR due to anxiety episode.  No active chest pain tightness currently on rate control occasion.  Overnight Events:    None  Objective   Last Recorded Vitals  /76 (BP Location: Left arm, Patient Position: Lying)   Pulse (!) 129   Temp 36.6 °C (97.9 °F) (Temporal)   Resp 20   Wt 84.7 kg (186 lb 11.7 oz)   SpO2 96%     Intake/Output Summary (Last 24 hours) at 2/6/2024 1553  Last data filed at 2/6/2024 1547  Gross per 24 hour   Intake 210.17 ml   Output 700 ml   Net -489.83 ml     Physical Exam:  HEENT: Normocephalic/atraumatic pupils equal react light  Neck exam mild JVD, no bruit  Lung exam clear to auscultation, few crackles at the bases  Cardiac exam is irregular rhythm S1-S2, soft slight murmur heard.  No S3 heard.  Abdomen soft nontender, nondistended  Extremities no clubbing, cyanosis but trace edema  Neuro exam grossly intact.  Image Results  XR chest 1 view  Narrative: Interpreted By:  Mc Carranza,   STUDY:  XR CHEST 1 VIEW; 2/2/2024 2:34 pm      INDICATION:  Signs/Symptoms:hypoxia.      COMPARISON:  01/31/2024      ACCESSION NUMBER(S):  [id number]      ORDERING CLINICIAN:  STORMY NAGEL      TECHNIQUE:  1 view of the chest was performed.      FINDINGS:  Left chest wall pacemaker and leads appear stable. There may be a  small left pleural effusion, no right pleural effusion. There is  increased perihilar airspace opacity which can be related to  congestive changes and increased pulmonary edema.. No pneumothorax.  The cardiomediastinal silhouette is borderline mildly enlarged but  stable      Impression: Increased perihilar airspace opacity which can be seen with pulmonary  edema. There may be a small left pleural effusion.      Signed by: Mc Carranza 2/2/2024 3:36 PM  Dictation workstation:   [id number]  Transthoracic Echo (TTE)  Complete             [facility]  [address] [place] [address]             Phone [phone]    TRANSTHORACIC ECHOCARDIOGRAM REPORT       Patient Name:      SAMUELMALOU IVLLALOBOS         Reading Physician:    [id number] Becky Vela MD  Study Date:        2/1/2024              Ordering Provider:    [id number] MELANIE HARDWICK  MRN/PID:           [id number]              Fellow:  Accession#:        [id number]          Nurse:  Date of Birth/Age: 1940 / 83 years Sonographer:          Yamileth Houser RDCS  Gender:            F                     Additional Staff:  Height:            162.00 cm             Admit Date:  Weight:            68.00 kg              Admission Status:     Inpatient -                                                                 Routine  BSA:               1.73 m2               Department Location:  [facility]  Blood Pressure: 166 /72 mmHg    Study Type:    TRANSTHORACIC ECHO (TTE) COMPLETE  Diagnosis/ICD: Non ST elevation (NSTEMI) myocardial infarction-I21.4  Indication:    nstemi  CPT Codes:     Echo Complete w Full Doppler-29500    Patient History:  BMI:               Overweight 25 - 30  Pacer/Defib:       Permanent pacemaker  Pertinent History: CAD and HTN. ckd,stent,sss,falls, sob.    Study Detail: The following Echo studies were performed: 2D, M-Mode, Doppler and                color flow. Technically challenging study due to prominent lung                artifact.       PHYSICIAN INTERPRETATION:  Left Ventricle: Left ventricular systolic function is normal, with an estimated ejection fraction of 55-60%. There are no regional wall motion abnormalities. The left ventricular cavity size is normal. Spectral Doppler shows a normal pattern of left ventricular diastolic filling. The  inferior wall is mildly hypokinetic.  Left Atrium: The left atrium is mildly dilated.  Right Ventricle: The right ventricle is normal in size. There is normal right ventricular global systolic function. A device is visualized in the right ventricle.  Right Atrium: The right atrium is normal in size. There is a device visualized in the right atrium.  Aortic Valve: The aortic valve appears structurally normal. There is no evidence of aortic valve regurgitation. The peak instantaneous gradient of the aortic valve is 4.7 mmHg.  Mitral Valve: The mitral valve is normal in structure. There is moderate mitral valve regurgitation which is posteriorly directed.  Tricuspid Valve: The tricuspid valve is structurally normal. There is mild tricuspid regurgitation. The Doppler estimated RVSP is moderately elevated at 61.6 mmHg.  Pulmonic Valve: The pulmonic valve is structurally normal. There is mild pulmonic valve regurgitation.  Pericardium: There is a trivial pericardial effusion.  Aorta: The aortic root is normal.  Systemic Veins: The inferior vena cava appears to be of normal size. There is less than 50% IVC collapse with inspiration.       CONCLUSIONS:   1. Left ventricular systolic function is normal with a 55-60% estimated ejection fraction.   2. The inferior wall is mildly hypokinetic.   3. Moderate mitral valve regurgitation.   4. Mild tricuspid regurgitation is visualized.   5. Moderately elevated right ventricular systolic pressure.    QUANTITATIVE DATA SUMMARY:  2D MEASUREMENTS:                           Normal Ranges:  LAs:           3.10 cm   (2.7-4.0cm)  IVSd:          0.63 cm   (0.6-1.1cm)  LVPWd:         0.64 cm   (0.6-1.1cm)  LVIDd:         5.64 cm   (3.9-5.9cm)  LV Mass Index: 72.6 g/m2    LV SYSTOLIC FUNCTION BY 2D PLANIMETRY (MOD):                      Normal Ranges:  EF-A4C View: 56.8 % (>=55%)  EF-A2C View: 61.9 %  EF-Biplane:  59.3 %    LV DIASTOLIC FUNCTION:                         Normal Ranges:  MV  Peak E:    1.88 m/s (0.7-1.2 m/s)  MV Peak A:    0.54 m/s (0.42-0.7 m/s)  E/A Ratio:    3.49     (1.0-2.2)  MV e'         0.08 m/s (>8.0)  MV lateral e' 0.08 m/s  MV medial e'  0.08 m/s  E/e' Ratio:   24.67    (<8.0)  a'            0.08 m/s    MITRAL VALVE:                        Normal Ranges:  MV Vmax:    2.27 m/s  (<=1.3m/s)  MV peak P.6 mmHg (<5mmHg)  MV mean P.0 mmHg  (<48mmHg)  MV DT:      409 msec  (150-240msec)    AORTIC VALVE:                          Normal Ranges:  AoV Vmax:      1.08 m/s (<=1.7m/s)  AoV Peak P.7 mmHg (<20mmHg)  LVOT Max Yasir:  0.72 m/s (<=1.1m/s)  LVOT Diameter: 1.90 cm  (1.8-2.4cm)  AoV Area,Vmax: 1.89 cm2 (2.5-4.5cm2)       RIGHT VENTRICLE:  RV s' 0.16 m/s    TRICUSPID VALVE/RVSP:                              Normal Ranges:  Peak TR Velocity: 3.66 m/s  RV Syst Pressure: 61.6 mmHg (< 30mmHg)  IVC Diam:         1.80 cm       [id number] Becky Vela MD  Electronically signed on 2024 at 8:24:54 AM       ** Final **    Last Labs:  CBC - 2024:  5:16 AM  8.3 9.5 129    28.9      CMP - 2024:  5:16 AM  9.1 6.0 39 --- 0.5   _ 3.2 30 77      PTT - 2024:  6:25 AM  _   _ 121.0     Inpatient Medications:  Scheduled medications   Medication Dose Route Frequency    apixaban  5 mg oral q12h    aspirin  81 mg oral Daily    atenolol  25 mg oral BID    atorvastatin  80 mg oral Nightly    cefTRIAXone  1 g intravenous Daily    cholecalciferol  2,000 Units oral Daily    dilTIAZem CD  120 mg oral BID    levothyroxine  100 mcg oral Daily before breakfast    multivitamin  1 tablet oral Daily    pantoprazole  40 mg oral Daily before breakfast    perflutren lipid microspheres  0.5-10 mL of dilution intravenous Once in imaging    perflutren protein A microsphere  0.5 mL intravenous Once in imaging    [START ON 2024] sertraline  150 mg oral Daily    sulfur hexafluoride microsphr  2 mL intravenous Once in imaging     Principal Problem:    NSTEMI (non-ST elevated myocardial infarction)  (CMS/[facility])  Active Problems:    Coronary arteriosclerosis    Atherosclerosis of coronary artery    Hypothyroidism    Hypercholesterolemia    Sick sinus syndrome (CMS/HCC)    Impaired fasting glucose    Leukocytosis    Assessment/Plan   Patient has above history of recent acute non-ST elation MI.  Status post diagnostic catheterization total occluded left circumflex artery.  Moderate lesions in the proximal and mid to distal RCA at the medical therapy advised.  Recently patient had a stressful event.  Patient now back in A-fib RVR.  Continue current rate control vacation.  Continue current diltiazem atenolol and Eliquis for better rate control for atrial fibrillation.  Modify risk factor.  Creatinine improved to 1.5.  Continue current monitoring for A-fib RVR.  High risk for worsening A-fib RVR.    Code Status:  Full Code  I spent 40 minutes in the professional and overall care of this patient.  Darrius Ibrahim MD

## 2024-02-06 NOTE — PROGRESS NOTES
Occupational Therapy                 Therapy Communication Note    Patient Name: Linette Doyle  MRN: [id number]  Today's Date: 2/6/2024     Discipline: Occupational Therapy    Missed Visit Reason: Missed Visit Reason: Cancel (Per RN, hold OT tx at this time d/t elevated HR and low BP (on cardizem drip))    Missed Time: Cancel    Comment:

## 2024-02-06 NOTE — PROGRESS NOTES
Pt with I team previous day, went into afib, on cardizem.     02/06/24 1419   Discharge Planning   Patient expects to be discharged to: Home with  Homecare

## 2024-02-06 NOTE — PROGRESS NOTES
Linette Doyle is a 83 y.o. female on day 5 of admission presenting with NSTEMI (non-ST elevated myocardial infarction) (CMS/[facility]).      Subjective   Patient remains in atrial fibrillation with variable rates.  Heart rate between 90 and 140.  She is asymptomatic with it at this time.  Blood pressure overall lower, systolic in the 90s when I saw her.  She remains on the diltiazem drip at 10 mg/h.    Patient overall says that she is upset and anxious about the events leading up to her hospitalization (pacifically she was the victim of a scamming phone call and lost a lot of money).       Objective     Last Recorded Vitals  /76 (BP Location: Left arm, Patient Position: Lying)   Pulse (!) 129   Temp 36.6 °C (97.9 °F) (Temporal)   Resp 20   Wt 84.7 kg (186 lb 11.7 oz)   SpO2 96%   Intake/Output last 3 Shifts:    Intake/Output Summary (Last 24 hours) at 2/6/2024 1330  Last data filed at 2/6/2024 0608  Gross per 24 hour   Intake 311.17 ml   Output 1150 ml   Net -838.83 ml         Admission Weight  Weight: 68 kg (150 lb) (01/31/24 2058)    Daily Weight  02/05/24 : 84.7 kg (186 lb 11.7 oz)    Image Results  XR chest 1 view  Narrative: Interpreted By:  Mc Carranza,   STUDY:  XR CHEST 1 VIEW; 2/2/2024 2:34 pm      INDICATION:  Signs/Symptoms:hypoxia.      COMPARISON:  01/31/2024      ACCESSION NUMBER(S):  [id number]      ORDERING CLINICIAN:  STORMY NAGEL      TECHNIQUE:  1 view of the chest was performed.      FINDINGS:  Left chest wall pacemaker and leads appear stable. There may be a  small left pleural effusion, no right pleural effusion. There is  increased perihilar airspace opacity which can be related to  congestive changes and increased pulmonary edema.. No pneumothorax.  The cardiomediastinal silhouette is borderline mildly enlarged but  stable      Impression: Increased perihilar airspace opacity which can be seen with pulmonary  edema. There may be a small left pleural effusion.      Signed by:  Mc Carranza 2/2/2024 3:36 PM  Dictation workstation:   [id number]  Transthoracic Echo (TTE) Complete             [place] [id number]             Phone [phone]    TRANSTHORACIC ECHOCARDIOGRAM REPORT       Patient Name:      SAMUEL VILLALOBOS         Reading Physician:    [id number] Becky Vela MD  Study Date:        2/1/2024              Ordering Provider:    [id number] MELANIE HARDWICK  MRN/PID:           [id number]              Fellow:  Accession#:        [id number]          Nurse:  Date of Birth/Age: 1940 / 83 years Sonographer:          Yamileth Houser RDCS  Gender:            F                     Additional Staff:  Height:            162.00 cm             Admit Date:  Weight:            68.00 kg              Admission Status:     Inpatient -                                                                 Routine  BSA:               1.73 m2               Department Location:  [facility] ER  Blood Pressure: 166 /72 mmHg    Study Type:    TRANSTHORACIC ECHO (TTE) COMPLETE  Diagnosis/ICD: Non ST elevation (NSTEMI) myocardial infarction-I21.4  Indication:    nstemi  CPT Codes:     Echo Complete w Full Doppler-99870    Patient History:  BMI:               Overweight 25 - 30  Pacer/Defib:       Permanent pacemaker  Pertinent History: CAD and HTN. ckd,stent,sss,falls, sob.    Study Detail: The following Echo studies were performed: 2D, M-Mode, Doppler and                color flow. Technically challenging study due to prominent lung                artifact.       PHYSICIAN INTERPRETATION:  Left Ventricle: Left ventricular systolic function is normal, with an estimated ejection fraction of 55-60%. There are no regional wall motion abnormalities. The left ventricular cavity size is  normal. Spectral Doppler shows a normal pattern of left ventricular diastolic filling. The inferior wall is mildly hypokinetic.  Left Atrium: The left atrium is mildly dilated.  Right Ventricle: The right ventricle is normal in size. There is normal right ventricular global systolic function. A device is visualized in the right ventricle.  Right Atrium: The right atrium is normal in size. There is a device visualized in the right atrium.  Aortic Valve: The aortic valve appears structurally normal. There is no evidence of aortic valve regurgitation. The peak instantaneous gradient of the aortic valve is 4.7 mmHg.  Mitral Valve: The mitral valve is normal in structure. There is moderate mitral valve regurgitation which is posteriorly directed.  Tricuspid Valve: The tricuspid valve is structurally normal. There is mild tricuspid regurgitation. The Doppler estimated RVSP is moderately elevated at 61.6 mmHg.  Pulmonic Valve: The pulmonic valve is structurally normal. There is mild pulmonic valve regurgitation.  Pericardium: There is a trivial pericardial effusion.  Aorta: The aortic root is normal.  Systemic Veins: The inferior vena cava appears to be of normal size. There is less than 50% IVC collapse with inspiration.       CONCLUSIONS:   1. Left ventricular systolic function is normal with a 55-60% estimated ejection fraction.   2. The inferior wall is mildly hypokinetic.   3. Moderate mitral valve regurgitation.   4. Mild tricuspid regurgitation is visualized.   5. Moderately elevated right ventricular systolic pressure.    QUANTITATIVE DATA SUMMARY:  2D MEASUREMENTS:                           Normal Ranges:  LAs:           3.10 cm   (2.7-4.0cm)  IVSd:          0.63 cm   (0.6-1.1cm)  LVPWd:         0.64 cm   (0.6-1.1cm)  LVIDd:         5.64 cm   (3.9-5.9cm)  LV Mass Index: 72.6 g/m2    LV SYSTOLIC FUNCTION BY 2D PLANIMETRY (MOD):                      Normal Ranges:  EF-A4C View: 56.8 % (>=55%)  EF-A2C View: 61.9  %  EF-Biplane:  59.3 %    LV DIASTOLIC FUNCTION:                         Normal Ranges:  MV Peak E:    1.88 m/s (0.7-1.2 m/s)  MV Peak A:    0.54 m/s (0.42-0.7 m/s)  E/A Ratio:    3.49     (1.0-2.2)  MV e'         0.08 m/s (>8.0)  MV lateral e' 0.08 m/s  MV medial e'  0.08 m/s  E/e' Ratio:   24.67    (<8.0)  a'            0.08 m/s    MITRAL VALVE:                        Normal Ranges:  MV Vmax:    2.27 m/s  (<=1.3m/s)  MV peak P.6 mmHg (<5mmHg)  MV mean P.0 mmHg  (<48mmHg)  MV DT:      409 msec  (150-240msec)    AORTIC VALVE:                          Normal Ranges:  AoV Vmax:      1.08 m/s (<=1.7m/s)  AoV Peak P.7 mmHg (<20mmHg)  LVOT Max Yasir:  0.72 m/s (<=1.1m/s)  LVOT Diameter: 1.90 cm  (1.8-2.4cm)  AoV Area,Vmax: 1.89 cm2 (2.5-4.5cm2)       RIGHT VENTRICLE:  RV s' 0.16 m/s    TRICUSPID VALVE/RVSP:                              Normal Ranges:  Peak TR Velocity: 3.66 m/s  RV Syst Pressure: 61.6 mmHg (< 30mmHg)  IVC Diam:         1.80 cm       [id number] Becky Vela MD  Electronically signed on 2024 at 8:24:54 AM       ** Final **      Physical Exam  General: alert, no diaphoresis   Lungs: CTA BL   Heart: Newly irregular and fast, no LE edema BL   GI: abdomen soft, nontender, nondistended, BS present   MSK: no joint effusion or deformity   Skin: no rashes, erythema, or ecchymosis   Neuro: grossly normal cognition, motor strength, sensation      Relevant Results               Assessment/Plan          This patient has a urinary catheter   Reason for the urinary catheter remaining today? critically ill patient who need accurate urinary output measurements          Principal Problem:    NSTEMI (non-ST elevated myocardial infarction) (CMS/HCC)  Active Problems:    Coronary arteriosclerosis    Atherosclerosis of coronary artery    Hypothyroidism    Hypercholesterolemia    Sick sinus syndrome (CMS/HCC)    Impaired fasting glucose    Leukocytosis    NSTEMI  -Left heart catheterization showed total occlusion of  the circumflex and moderate disease in the RCA.  Dr. Vela recommended staged heart catheterizations with PCI to address the lesion as an outpatient  -Continue clopidogrel and aspirin    A-fib with RVR  -New diagnosis during this hospital stay.  Had I team last night for rapid heart rate.  Started on diltiazem and got a dose of IV amiodarone 150 mg.  -With rate control issues and blood pressure is lower.  She is getting metoprolol as well orally.  Discussed with electrophysiology and will ask them to help further manage  -She will need anticoagulation although this is complex in the setting of a patient who may need further heart catheterizations, of unclear timing.  Will await further recommendations from electrophysiology  -Discussed with Dr. bIrahim as well.     Known CAD  -Continue therapy as above.     GARY-resolved today  - probably contrast nephropathy in the setting of diuretic use for pulmonary congestion.   - dr. Giang signed off     Possible pneumonia  - on IV ceftriaxone      Constipation, IBS  - Jerardo order a fleet enema      Hypertension  -Continue home antihypertensives.    Anxiety  - having a lot of anxiety regarding recently being scammed out of significant money; she tends to perseverate on this. Also with recent loss of family members.  - on zoloft    Anemia  - hemoglobin in the 9's for past few days. She did get IV hydration which could explain some dilution. No obvious signs of bleeding. Her baseline hemoglobin normally around 12. Will check iron, B12, folate     Is not ready for discharge at this time.       Stefanie Verma,

## 2024-02-06 NOTE — CONSULTS
Consults  History Of Present Illness:    Linette Doyle is a 83 y.o. female with a history of coronary artery disease status post remote LAD stenting, sick sinus syndrome 2019 dual-chamber [employer] pacemaker implantation stage III kidney disease who 2 days prior to admission developed mid chest pressure/indigestion type symptoms that did not keshia with antiacid tablets and presented to the emergency room.     Last Recorded Vitals:  Vitals:    02/06/24 0700 02/06/24 0800 02/06/24 0900 02/06/24 1100   BP: 115/78 98/78 112/81 102/76   BP Location: Left arm   Left arm   Patient Position: Lying   Lying   Pulse: (!) 121 (!) 125 (!) 141 (!) 129   Resp: 20   20   Temp: 36.8 °C (98.2 °F)   36.6 °C (97.9 °F)   TempSrc: Temporal   Temporal   SpO2: 95% 97%  96%   Weight:       Height:           Last Labs:  CBC - 2/6/2024:  5:16 AM  8.3 9.5 129    28.9      CMP - 2/6/2024:  5:16 AM  9.1 6.0 39 --- 0.5   _ 3.2 30 77      PTT - 2/2/2024:  6:25 AM  _   _ 121.0     Hemoglobin A1C   Date/Time Value Ref Range Status   02/01/2024 05:58 AM 6.0 (H) See below % Final   07/05/2023 08:49 AM 5.6 4.0 - 6.0 % Final     Comment:     Hemoglobin A1C levels are related to mean blood glucose during the   preceding 2-3 months. The relationship table below may be used as a   general guide. Each 1% increase in HGB A1C is a reflection of an   increase in mean glucose of approximately 30 mg/dl.   Reference: Diabetes [employer], volume 29, supplement 1 Jan. 2006                        HGB A1C ................. Approx. Mean Glucose   _______________________________________________   6%   ...............................  120 mg/dl   7%   ...............................  150 mg/dl   8%   ...............................  180 mg/dl   9%   ...............................  210 mg/dl   10%  ...............................  240 mg/dl  Performed at [address] [address]     12/14/2022 09:13 AM 5.7 4.0 - 6.0 % Final     Comment:     Hemoglobin  A1C levels are related to mean blood glucose during the   preceding 2-3 months. The relationship table below may be used as a   general guide. Each 1% increase in HGB A1C is a reflection of an   increase in mean glucose of approximately 30 mg/dl.   Reference: Diabetes Care, volume 29, supplement 1 Jan. 2006                        HGB A1C ................. Approx. Mean Glucose   _______________________________________________   6%   ...............................  120 mg/dl   7%   ...............................  150 mg/dl   8%   ...............................  180 mg/dl   9%   ...............................  210 mg/dl   10%  ...............................  240 mg/dl  Performed at [address] [address]       LDL Calculated   Date/Time Value Ref Range Status   07/05/2023 08:49 AM 57 (L) 65 - 130 MG/DL Final   12/14/2022 09:13 AM 64 (L) 65 - 130 MG/DL Final   06/29/2022 08:43 AM 58 (L) 65 - 130 MG/DL Final      Last I/O:  I/O last 3 completed shifts:  In: 899.5 (10.6 mL/kg) [P.O.:720; I.V.:129.5 (1.5 mL/kg); IV Piggyback:50]  Out: 1750 (20.7 mL/kg) [Urine:1750 (0.6 mL/kg/hr)]  Weight: 84.7 kg     Past Cardiology Tests (Last 3 Years):  EKG:  ECG 12 Lead 01/31/2024    Echo:  Transthoracic Echo (TTE) Complete 02/01/2024    Ejection Fractions:  EF   Date/Time Value Ref Range Status   02/01/2024 11:43 AM 59 %      Cath:  2/2/24  Cardiac catheterization showed chronically occluded mid left circumflex, moderate to severe 60 to 70% disease in the proximal RCA and distal Right to left collaterals     Past Medical History:  She has a past medical history of Angina pectoris (CMS/Formerly [facility]) (10/22/2023), Atherosclerosis of coronary artery (08/21/2019), Atherosclerotic heart disease of native coronary artery with other forms of angina pectoris (CMS/Formerly [facility]) (10/22/2023), Hypercholesterolemia (12/28/2018), Hyperlipidemia (10/22/2023), Presence of cardiac pacemaker (10/22/2023), Primary hypertension (12/28/2018),  Shortness of breath (11/26/2019), Sick sinus syndrome (CMS/HCC) (10/22/2023), Sinus bradycardia (10/22/2023), and Stage 3a chronic kidney disease (CMS/HCC) (11/25/2019).    Past Surgical History:  She has a past surgical history that includes Cardiac catheterization (N/A, 2/2/2024) and Cardiac catheterization (N/A, 2/2/2024).      Social History:  She reports that she has never smoked. She has never been exposed to tobacco smoke. She has never used smokeless tobacco. She reports that she does not drink alcohol and does not use drugs.    Family History:  No family history on file.     Allergies:  Iodinated contrast media    Inpatient Medications:  Scheduled medications   Medication Dose Route Frequency   • apixaban  5 mg oral q12h   • aspirin  81 mg oral Daily   • atenolol  25 mg oral BID   • atorvastatin  80 mg oral Nightly   • cefTRIAXone  1 g intravenous Daily   • cholecalciferol  2,000 Units oral Daily   • dilTIAZem CD  120 mg oral BID   • levothyroxine  100 mcg oral Daily before breakfast   • multivitamin  1 tablet oral Daily   • pantoprazole  40 mg oral Daily before breakfast   • perflutren lipid microspheres  0.5-10 mL of dilution intravenous Once in imaging   • perflutren protein A microsphere  0.5 mL intravenous Once in imaging   • [START ON 2/7/2024] sertraline  150 mg oral Daily   • sulfur hexafluoride microsphr  2 mL intravenous Once in imaging     PRN medications   Medication   • acetaminophen    Or   • acetaminophen    Or   • acetaminophen   • benzocaine-menthol   • calcium carbonate   • dextromethorphan-guaifenesin   • dextrose 10 % in water (D10W)   • dextrose   • glucagon   • guaiFENesin   • ondansetron ODT    Or   • ondansetron   • polyethylene glycol     Continuous Medications   Medication Dose Last Rate   • sodium chloride 0.9%  100 mL/hr Stopped (02/05/24 1300)     Outpatient Medications:  Current Outpatient Medications   Medication Instructions   • atorvastatin (LIPITOR) 80 mg, oral, Daily   •  cholecalciferol (VITAMIN D-3) 2,000 Units, oral, Daily   • dilTIAZem (CARDIZEM) 120 mg, oral, 2 times daily   • levothyroxine (SYNTHROID, LEVOXYL) 100 mcg, oral, Daily, 1 tablet in the morning on an empty stomach Orally Once a day for 30 day(s)<BR>   • lisinopril 20 mg, oral, Daily   • metoprolol succinate XL (TOPROL-XL) 25 mg, oral, 2 times daily   • multivitamin (MULTIPLE VITAMINS ORAL) 1 capsule, oral, Daily   • NON FORMULARY Instaflex Advanced    • omeprazole (PRILOSEC) 20 mg, oral, Daily   • sertraline (ZOLOFT) 100 mg, oral, Daily       Physical Exam:  Gen: A+O x 3, no acute distress  HEENT: Normocephalic/atraumatic pupils equal react light  Neck: Neck supple, no JVD, upstrokes and volumes normal  Lung: CTA  CV:  nl sounding S1, S2, no S3, 2/6 MR, PMI nondisplaced  Abdomen: soft, non tender, BS x 4 quads  Extremities: no edema, feet warm, good pulses       Assessment/Plan   83-year-old female with a history of atherosclerotic heart disease, remote LAD stenting, SSS 2019 DDD PPM  implantation presents with non-ST elevation MI, peak troponin of 650, cardiac catheterization with evidence for  of LCX, RCA with 2-70% lesions, subsequent kidney  injury secondary to IV contrast w/ decision to intervene w/recurrent sx, developed new onset AF rate 160 bpm, some demand ischemia w/ recurrent sx, initial episode was given a IV amiodarone bolus with recurrent rapid rates started on dilt gtt. echocardiogram with preserved EF, wall and inferior wall hypokinesis, moderate MR, normal LA size  -Suspect A-fib in the setting of recent non-STEMI, agree with increasing AV toshia agents, metoprolol changed to atenolol  -stop diltiazem drip, resume PO  -if cr remains stable resume low dose ace w/ MR   -[id number] 4,  started on apixaban  -DAPT w/ asa and apixaban  -watch H/H   -Additionally, If BP allows consider low dose nitrate   -In terms of AF treatment, will hold on use of AAD at this time  -Will have patient follow-up in  device clinic,  can then follow AF burden on current regimen  -Appointment scheduled for February 23. 2024  1030 am       Peripheral IV 02/05/24 20 G Left;Anterior Forearm (Active)   Site Assessment Clean;Dry;Intact 02/06/24 0900   Dressing Status Dry;Clean 02/06/24 0900   Number of days: 1       Urethral Catheter (Active)   Site Assessment Clean;Skin intact 02/06/24 1442   Number of days: 3       Code Status:  Full Code      45 min spent in professional and overall care of this patient.        Claudia Caicedo, JUNG-CNP

## 2024-02-06 NOTE — PROGRESS NOTES
GARY resolved, Will sign off at this time, please call back with any questions, thank you. Continue to hold ace inhibitor. Patient can follow-up with us with a renal function panel in 1 week and office visit in 2 weeks.     Yelena Giang MD

## 2024-02-06 NOTE — PROGRESS NOTES
Linette Doyle is a 83 y.o. female on day 5 of admission presenting with NSTEMI (non-ST elevated myocardial infarction) (CMS/[facility]).      Subjective   Patient was seen and examined resting comfortably in bed. States she is feeling fine. She is still experiencing moderate anxiety and stated she is still being harassed by scammers. Desires to be off nasal cannula as it is irritating her nose and neck.  Denies any chest pain since her episode of chest pain yesterday. Denies shortness of breath, wheezing, dizziness, loss of sensation, muscle weakness.  Admits to trouble staying asleep and falling back to sleep and loss of appetite. Heart rate was hovering between low 100s and 150s in the room. Patient is currently asymptomatic. Currently on diltiazem 10mg/hr        Objective     Last Recorded Vitals  /81   Pulse (!) 141   Temp 36.8 °C (98.2 °F) (Temporal)   Resp 20   Wt 84.7 kg (186 lb 11.7 oz)   SpO2 97%   Intake/Output last 3 Shifts:    Intake/Output Summary (Last 24 hours) at 2/6/2024 1102  Last data filed at 2/6/2024 0608  Gross per 24 hour   Intake 419.5 ml   Output 1150 ml   Net -730.5 ml       Admission Weight  Weight: 68 kg (150 lb) (01/31/24 2058)    Daily Weight  02/05/24 : 84.7 kg (186 lb 11.7 oz)    Image Results  XR chest 1 view  Narrative: Interpreted By:  Mc Carranza,   STUDY:  XR CHEST 1 VIEW; 2/2/2024 2:34 pm      INDICATION:  Signs/Symptoms:hypoxia.      COMPARISON:  01/31/2024      ACCESSION NUMBER(S):  [id number]      ORDERING CLINICIAN:  STORMY NAGEL      TECHNIQUE:  1 view of the chest was performed.      FINDINGS:  Left chest wall pacemaker and leads appear stable. There may be a  small left pleural effusion, no right pleural effusion. There is  increased perihilar airspace opacity which can be related to  congestive changes and increased pulmonary edema.. No pneumothorax.  The cardiomediastinal silhouette is borderline mildly enlarged but  stable      Impression: Increased  perihilar airspace opacity which can be seen with pulmonary  edema. There may be a small left pleural effusion.      Signed by: Mc Carranza 2/2/2024 3:36 PM  Dictation workstation:   [id number]  Transthoracic Echo (TTE) Complete             [place] [id number]             Phone [phone]    TRANSTHORACIC ECHOCARDIOGRAM REPORT       Patient Name:      SAMUEL LANZA WILFREDO         Reading Physician:    [id number] Becky Vela MD  Study Date:        2/1/2024              Ordering Provider:    [id number] MELANIE HARDWICK  MRN/PID:           [id number]              Fellow:  Accession#:        [id number]          Nurse:  Date of Birth/Age: 1940 / 83 years Sonographer:          Yamileth Houser RDCS  Gender:            F                     Additional Staff:  Height:            162.00 cm             Admit Date:  Weight:            68.00 kg              Admission Status:     Inpatient -                                                                 Routine  BSA:               1.73 m2               Department Location:  [facility] ER  Blood Pressure: 166 /72 mmHg    Study Type:    TRANSTHORACIC ECHO (TTE) COMPLETE  Diagnosis/ICD: Non ST elevation (NSTEMI) myocardial infarction-I21.4  Indication:    nstemi  CPT Codes:     Echo Complete w Full [id number]    Patient History:  BMI:               Overweight 25 - 30  Pacer/Defib:       Permanent pacemaker  Pertinent History: CAD and HTN. ckd,stent,sss,falls, sob.    Study Detail: The following Echo studies were performed: 2D, M-Mode, Doppler and                color flow. Technically challenging study due to prominent lung                artifact.       PHYSICIAN INTERPRETATION:  Left Ventricle: Left ventricular systolic function is normal,  with an estimated ejection fraction of 55-60%. There are no regional wall motion abnormalities. The left ventricular cavity size is normal. Spectral Doppler shows a normal pattern of left ventricular diastolic filling. The inferior wall is mildly hypokinetic.  Left Atrium: The left atrium is mildly dilated.  Right Ventricle: The right ventricle is normal in size. There is normal right ventricular global systolic function. A device is visualized in the right ventricle.  Right Atrium: The right atrium is normal in size. There is a device visualized in the right atrium.  Aortic Valve: The aortic valve appears structurally normal. There is no evidence of aortic valve regurgitation. The peak instantaneous gradient of the aortic valve is 4.7 mmHg.  Mitral Valve: The mitral valve is normal in structure. There is moderate mitral valve regurgitation which is posteriorly directed.  Tricuspid Valve: The tricuspid valve is structurally normal. There is mild tricuspid regurgitation. The Doppler estimated RVSP is moderately elevated at 61.6 mmHg.  Pulmonic Valve: The pulmonic valve is structurally normal. There is mild pulmonic valve regurgitation.  Pericardium: There is a trivial pericardial effusion.  Aorta: The aortic root is normal.  Systemic Veins: The inferior vena cava appears to be of normal size. There is less than 50% IVC collapse with inspiration.       CONCLUSIONS:   1. Left ventricular systolic function is normal with a 55-60% estimated ejection fraction.   2. The inferior wall is mildly hypokinetic.   3. Moderate mitral valve regurgitation.   4. Mild tricuspid regurgitation is visualized.   5. Moderately elevated right ventricular systolic pressure.    QUANTITATIVE DATA SUMMARY:  2D MEASUREMENTS:                           Normal Ranges:  LAs:           3.10 cm   (2.7-4.0cm)  IVSd:          0.63 cm   (0.6-1.1cm)  LVPWd:         0.64 cm   (0.6-1.1cm)  LVIDd:         5.64 cm   (3.9-5.9cm)  LV Mass Index: 72.6  g/m2    LV SYSTOLIC FUNCTION BY 2D PLANIMETRY (MOD):                      Normal Ranges:  EF-A4C View: 56.8 % (>=55%)  EF-A2C View: 61.9 %  EF-Biplane:  59.3 %    LV DIASTOLIC FUNCTION:                         Normal Ranges:  MV Peak E:    1.88 m/s (0.7-1.2 m/s)  MV Peak A:    0.54 m/s (0.42-0.7 m/s)  E/A Ratio:    3.49     (1.0-2.2)  MV e'         0.08 m/s (>8.0)  MV lateral e' 0.08 m/s  MV medial e'  0.08 m/s  E/e' Ratio:   24.67    (<8.0)  a'            0.08 m/s    MITRAL VALVE:                        Normal Ranges:  MV Vmax:    2.27 m/s  (<=1.3m/s)  MV peak P.6 mmHg (<5mmHg)  MV mean P.0 mmHg  (<48mmHg)  MV DT:      409 msec  (150-240msec)    AORTIC VALVE:                          Normal Ranges:  AoV Vmax:      1.08 m/s (<=1.7m/s)  AoV Peak P.7 mmHg (<20mmHg)  LVOT Max Yasir:  0.72 m/s (<=1.1m/s)  LVOT Diameter: 1.90 cm  (1.8-2.4cm)  AoV Area,Vmax: 1.89 cm2 (2.5-4.5cm2)       RIGHT VENTRICLE:  RV s' 0.16 m/s    TRICUSPID VALVE/RVSP:                              Normal Ranges:  Peak TR Velocity: 3.66 m/s  RV Syst Pressure: 61.6 mmHg (< 30mmHg)  IVC Diam:         1.80 cm       [id number] Becky Vela MD  Electronically signed on 2024 at 8:24:54 AM       ** Final **      Physical Exam  Vitals reviewed.   Constitutional:       Appearance: She is not toxic-appearing or diaphoretic.   HENT:      Head: Normocephalic and atraumatic.      Mouth/Throat:      Mouth: Mucous membranes are moist.   Eyes:      Extraocular Movements: Extraocular movements intact.      Pupils: Pupils are equal, round, and reactive to light.   Cardiovascular:      Rate and Rhythm: Tachycardia present. Rhythm irregular.      Pulses: Normal pulses.   Pulmonary:      Effort: Pulmonary effort is normal.      Breath sounds: Normal breath sounds.   Abdominal:      General: Abdomen is flat. There is no distension.      Palpations: Abdomen is soft.   Musculoskeletal:      Right lower leg: No edema.      Left lower leg: No edema.   Skin:      General: Skin is warm and dry.      Capillary Refill: Capillary refill takes less than 2 seconds.      Findings: Bruising present.   Neurological:      General: No focal deficit present.      Mental Status: She is alert and oriented to person, place, and time.   Psychiatric:         Attention and Perception: Attention normal.         Mood and Affect: Mood is anxious.         Speech: Speech normal.         Behavior: Behavior is cooperative.       Scheduled medications  aspirin, 81 mg, oral, Daily  atorvastatin, 80 mg, oral, Nightly  cefTRIAXone, 1 g, intravenous, Daily  cholecalciferol, 2,000 Units, oral, Daily  clopidogrel, 75 mg, oral, Daily  levothyroxine, 100 mcg, oral, Daily before breakfast  metoprolol succinate XL, 25 mg, oral, BID  multivitamin, 1 tablet, oral, Daily  pantoprazole, 40 mg, oral, Daily before breakfast  perflutren lipid microspheres, 0.5-10 mL of dilution, intravenous, Once in imaging  perflutren protein A microsphere, 0.5 mL, intravenous, Once in imaging  sertraline, 100 mg, oral, Daily  sulfur hexafluoride microsphr, 2 mL, intravenous, Once in imaging      Continuous medications  dilTIAZem, 10 mg/hr, Last Rate: 10 mg/hr (02/06/24 0419)  sodium chloride 0.9%, 100 mL/hr, Last Rate: Stopped (02/05/24 1300)      PRN medications  PRN medications: acetaminophen **OR** acetaminophen **OR** acetaminophen, benzocaine-menthol, calcium carbonate, dextromethorphan-guaifenesin, dextrose 10 % in water (D10W), dextrose, glucagon, guaiFENesin, heparin (porcine), ondansetron ODT **OR** ondansetron, polyethylene glycol    No current facility-administered medications on file prior to encounter.     Current Outpatient Medications on File Prior to Encounter   Medication Sig Dispense Refill    atorvastatin (Lipitor) 80 mg tablet Take 1 tablet (80 mg) by mouth once daily.      cholecalciferol (Vitamin D-3) 50 MCG (2000 UT) tablet Take 1 tablet (2,000 Units) by mouth once daily.      dilTIAZem (Cardizem) 120 mg  immediate release tablet Take 1 tablet (120 mg) by mouth 2 times a day.      levothyroxine (Synthroid, Levoxyl) 100 mcg tablet Take 1 tablet (100 mcg) by mouth once daily. 1 tablet in the morning on an empty stomach Orally Once a day for 30 day(s)      lisinopril 20 mg tablet Take 1 tablet (20 mg) by mouth once daily.      metoprolol succinate XL (Toprol-XL) 25 mg 24 hr tablet Take 1 tablet (25 mg) by mouth 2 times a day.      multivitamin (MULTIPLE VITAMINS ORAL) Take 1 capsule by mouth once daily.      NON FORMULARY Instaflex Advanced      omeprazole (PriLOSEC) 20 mg DR capsule Take 1 capsule (20 mg) by mouth once daily.      sertraline (Zoloft) 100 mg tablet Take 1 tablet (100 mg) by mouth once daily.      [DISCONTINUED] clopidogrel (Plavix) 75 mg tablet Take 1 tablet (75 mg) by mouth once daily.       Results for orders placed or performed during the hospital encounter of 01/31/24 (from the past 24 hour(s))   POCT GLUCOSE   Result Value Ref Range    POCT Glucose 114 (H) 74 - 99 mg/dL   CBC and Auto Differential   Result Value Ref Range    WBC 8.3 4.4 - 11.3 x10*3/uL    nRBC 0.0 0.0 - 0.0 /100 WBCs    RBC 3.34 (L) 4.00 - 5.20 x10*6/uL    Hemoglobin 9.5 (L) 12.0 - 16.0 g/dL    Hematocrit 28.9 (L) 36.0 - 46.0 %    MCV 87 80 - 100 fL    MCH 28.4 26.0 - 34.0 pg    MCHC 32.9 32.0 - 36.0 g/dL    RDW 13.8 11.5 - 14.5 %    Platelets 129 (L) 150 - 450 x10*3/uL    Neutrophils % 79.6 40.0 - 80.0 %    Immature Granulocytes %, Automated 0.5 0.0 - 0.9 %    Lymphocytes % 9.3 13.0 - 44.0 %    Monocytes % 7.5 2.0 - 10.0 %    Eosinophils % 2.7 0.0 - 6.0 %    Basophils % 0.4 0.0 - 2.0 %    Neutrophils Absolute 6.60 (H) 1.60 - 5.50 x10*3/uL    Immature Granulocytes Absolute, Automated 0.04 0.00 - 0.50 x10*3/uL    Lymphocytes Absolute 0.77 (L) 0.80 - 3.00 x10*3/uL    Monocytes Absolute 0.62 0.05 - 0.80 x10*3/uL    Eosinophils Absolute 0.22 0.00 - 0.40 x10*3/uL    Basophils Absolute 0.03 0.00 - 0.10 x10*3/uL   Comprehensive Metabolic  Panel   Result Value Ref Range    Glucose 128 (H) 65 - 99 mg/dL    Sodium 142 133 - 145 mmol/L    Potassium 3.8 3.4 - 5.1 mmol/L    Chloride 106 97 - 107 mmol/L    Bicarbonate 25 24 - 31 mmol/L    Urea Nitrogen 46 (H) 8 - 25 mg/dL    Creatinine 1.50 0.40 - 1.60 mg/dL    eGFR 34 (L) >60 mL/min/1.73m*2    Calcium 9.1 8.5 - 10.4 mg/dL    Albumin 3.2 (L) 3.5 - 5.0 g/dL    Alkaline Phosphatase 77 35 - 125 U/L    Total Protein 6.0 5.9 - 7.9 g/dL    AST 39 5 - 40 U/L    Bilirubin, Total 0.5 0.1 - 1.2 mg/dL    ALT 30 5 - 40 U/L    Anion Gap 11 <=19 mmol/L   Magnesium   Result Value Ref Range    Magnesium 2.30 1.60 - 3.10 mg/dL         Assessment/Plan          Principal Problem:    NSTEMI (non-ST elevated myocardial infarction) (CMS/HCC)  Active Problems:    Coronary arteriosclerosis    Atherosclerosis of coronary artery    Hypothyroidism    Hypercholesterolemia    Sick sinus syndrome (CMS/HCC)    Impaired fasting glucose    Leukocytosis    NSTEMI -- Multi-vessel disease with total occlusion of circumflex. Planning for outpatient intervention. Continue dual-antiplatelet therapy. PT evaluation.   A. Fib with RVR -- continue diltiazem and metoprolol. Consider rhythm control strategy if rate control agents are not sufficient  CAD  Anxiety -- normal stress response vs adjustment disorder vs MDD  Recommend CBT  Continue zoloft, follow-up with primary care for medication management  GARY on CKD III -- GARY has resolved. Cr and gfr are back at patient's baseline.   Anemia -- likely secondary to CKD.  Hemoglobin improving, currently at 9.5.  Possible pneumonia -- continue ceftriaxone and repeat CXR tomorrow.   Hypertension -- controlled    Wean O2 today as tolerated with goal of returning to room air.               SARABJIT Osuna  2/6/2024  11:25 AM

## 2024-02-07 ENCOUNTER — APPOINTMENT (OUTPATIENT)
Dept: CARDIOLOGY | Facility: HOSPITAL | Age: 84
DRG: 321 | End: 2024-02-07
Payer: MEDICARE

## 2024-02-07 ENCOUNTER — APPOINTMENT (OUTPATIENT)
Dept: RADIOLOGY | Facility: HOSPITAL | Age: 84
DRG: 321 | End: 2024-02-07
Payer: MEDICARE

## 2024-02-07 LAB
ALBUMIN SERPL-MCNC: 3.3 G/DL (ref 3.5–5)
ALP BLD-CCNC: 77 U/L (ref 35–125)
ALT SERPL-CCNC: 42 U/L (ref 5–40)
ANION GAP SERPL CALC-SCNC: 13 MMOL/L
APTT PPP: 26 SECONDS (ref 22–32.5)
AST SERPL-CCNC: 41 U/L (ref 5–40)
BASOPHILS # BLD AUTO: 0.04 X10*3/UL (ref 0–0.1)
BASOPHILS NFR BLD AUTO: 0.4 %
BILIRUB SERPL-MCNC: 0.5 MG/DL (ref 0.1–1.2)
BUN SERPL-MCNC: 43 MG/DL (ref 8–25)
CALCIUM SERPL-MCNC: 9 MG/DL (ref 8.5–10.4)
CHLORIDE SERPL-SCNC: 104 MMOL/L (ref 97–107)
CO2 SERPL-SCNC: 23 MMOL/L (ref 24–31)
CREAT SERPL-MCNC: 1.7 MG/DL (ref 0.4–1.6)
EGFRCR SERPLBLD CKD-EPI 2021: 29 ML/MIN/1.73M*2
EOSINOPHIL # BLD AUTO: 0.38 X10*3/UL (ref 0–0.4)
EOSINOPHIL NFR BLD AUTO: 4.2 %
ERYTHROCYTE [DISTWIDTH] IN BLOOD BY AUTOMATED COUNT: 13.5 % (ref 11.5–14.5)
ERYTHROCYTE [DISTWIDTH] IN BLOOD BY AUTOMATED COUNT: 13.7 % (ref 11.5–14.5)
FOLATE SERPL-MCNC: >20 NG/ML (ref 4.2–19.9)
GLUCOSE SERPL-MCNC: 118 MG/DL (ref 65–99)
HCT VFR BLD AUTO: 29.7 % (ref 36–46)
HCT VFR BLD AUTO: 30.8 % (ref 36–46)
HGB BLD-MCNC: 9.1 G/DL (ref 12–16)
HGB BLD-MCNC: 9.3 G/DL (ref 12–16)
IMM GRANULOCYTES # BLD AUTO: 0.05 X10*3/UL (ref 0–0.5)
IMM GRANULOCYTES NFR BLD AUTO: 0.6 % (ref 0–0.9)
IRON SATN MFR SERPL: 10 % (ref 12–50)
IRON SERPL-MCNC: 21 UG/DL (ref 30–160)
LYMPHOCYTES # BLD AUTO: 1.12 X10*3/UL (ref 0.8–3)
LYMPHOCYTES NFR BLD AUTO: 12.5 %
MCH RBC QN AUTO: 28.2 PG (ref 26–34)
MCH RBC QN AUTO: 28.7 PG (ref 26–34)
MCHC RBC AUTO-ENTMCNC: 29.5 G/DL (ref 32–36)
MCHC RBC AUTO-ENTMCNC: 31.3 G/DL (ref 32–36)
MCV RBC AUTO: 92 FL (ref 80–100)
MCV RBC AUTO: 95 FL (ref 80–100)
MONOCYTES # BLD AUTO: 0.71 X10*3/UL (ref 0.05–0.8)
MONOCYTES NFR BLD AUTO: 7.9 %
NEUTROPHILS # BLD AUTO: 6.65 X10*3/UL (ref 1.6–5.5)
NEUTROPHILS NFR BLD AUTO: 74.4 %
NRBC BLD-RTO: 0 /100 WBCS (ref 0–0)
NRBC BLD-RTO: 0 /100 WBCS (ref 0–0)
PLATELET # BLD AUTO: 148 X10*3/UL (ref 150–450)
PLATELET # BLD AUTO: 163 X10*3/UL (ref 150–450)
POTASSIUM SERPL-SCNC: 4.3 MMOL/L (ref 3.4–5.1)
PROT SERPL-MCNC: 6.1 G/DL (ref 5.9–7.9)
RBC # BLD AUTO: 3.23 X10*6/UL (ref 4–5.2)
RBC # BLD AUTO: 3.24 X10*6/UL (ref 4–5.2)
SODIUM SERPL-SCNC: 140 MMOL/L (ref 133–145)
TIBC SERPL-MCNC: 211 UG/DL (ref 228–428)
TROPONIN T SERPL-MCNC: 3879 NG/L
TSH SERPL DL<=0.05 MIU/L-ACNC: 3.89 MIU/L (ref 0.27–4.2)
UIBC SERPL-MCNC: 190 UG/DL (ref 110–370)
VIT B12 SERPL-MCNC: 700 PG/ML (ref 211–946)
WBC # BLD AUTO: 9 X10*3/UL (ref 4.4–11.3)
WBC # BLD AUTO: 9.5 X10*3/UL (ref 4.4–11.3)

## 2024-02-07 PROCEDURE — 99231 SBSQ HOSP IP/OBS SF/LOW 25: CPT

## 2024-02-07 PROCEDURE — 2500000001 HC RX 250 WO HCPCS SELF ADMINISTERED DRUGS (ALT 637 FOR MEDICARE OP): Performed by: INTERNAL MEDICINE

## 2024-02-07 PROCEDURE — 2500000004 HC RX 250 GENERAL PHARMACY W/ HCPCS (ALT 636 FOR OP/ED): Performed by: HOSPITALIST

## 2024-02-07 PROCEDURE — 97110 THERAPEUTIC EXERCISES: CPT | Mod: GO,CO

## 2024-02-07 PROCEDURE — 2500000004 HC RX 250 GENERAL PHARMACY W/ HCPCS (ALT 636 FOR OP/ED): Mod: MUE | Performed by: HOSPITALIST

## 2024-02-07 PROCEDURE — 93010 ELECTROCARDIOGRAM REPORT: CPT | Performed by: INTERNAL MEDICINE

## 2024-02-07 PROCEDURE — 99233 SBSQ HOSP IP/OBS HIGH 50: CPT | Performed by: INTERNAL MEDICINE

## 2024-02-07 PROCEDURE — 85730 THROMBOPLASTIN TIME PARTIAL: CPT | Performed by: HOSPITALIST

## 2024-02-07 PROCEDURE — 97116 GAIT TRAINING THERAPY: CPT | Mod: GP

## 2024-02-07 PROCEDURE — 82607 VITAMIN B-12: CPT | Performed by: HOSPITALIST

## 2024-02-07 PROCEDURE — 2500000004 HC RX 250 GENERAL PHARMACY W/ HCPCS (ALT 636 FOR OP/ED): Performed by: INTERNAL MEDICINE

## 2024-02-07 PROCEDURE — 93005 ELECTROCARDIOGRAM TRACING: CPT

## 2024-02-07 PROCEDURE — 80053 COMPREHEN METABOLIC PANEL: CPT | Performed by: INTERNAL MEDICINE

## 2024-02-07 PROCEDURE — 84484 ASSAY OF TROPONIN QUANT: CPT | Performed by: HOSPITALIST

## 2024-02-07 PROCEDURE — 2500000001 HC RX 250 WO HCPCS SELF ADMINISTERED DRUGS (ALT 637 FOR MEDICARE OP): Performed by: NURSE PRACTITIONER

## 2024-02-07 PROCEDURE — 97535 SELF CARE MNGMENT TRAINING: CPT | Mod: GO,CO

## 2024-02-07 PROCEDURE — 2500000005 HC RX 250 GENERAL PHARMACY W/O HCPCS: Performed by: INTERNAL MEDICINE

## 2024-02-07 PROCEDURE — 82746 ASSAY OF FOLIC ACID SERUM: CPT | Performed by: HOSPITALIST

## 2024-02-07 PROCEDURE — 83540 ASSAY OF IRON: CPT | Performed by: HOSPITALIST

## 2024-02-07 PROCEDURE — 85025 COMPLETE CBC W/AUTO DIFF WBC: CPT | Performed by: INTERNAL MEDICINE

## 2024-02-07 PROCEDURE — 36415 COLL VENOUS BLD VENIPUNCTURE: CPT | Performed by: HOSPITALIST

## 2024-02-07 PROCEDURE — 84443 ASSAY THYROID STIM HORMONE: CPT

## 2024-02-07 PROCEDURE — 2500000001 HC RX 250 WO HCPCS SELF ADMINISTERED DRUGS (ALT 637 FOR MEDICARE OP): Performed by: HOSPITALIST

## 2024-02-07 PROCEDURE — 36415 COLL VENOUS BLD VENIPUNCTURE: CPT | Performed by: INTERNAL MEDICINE

## 2024-02-07 PROCEDURE — 71045 X-RAY EXAM CHEST 1 VIEW: CPT

## 2024-02-07 PROCEDURE — 2060000001 HC INTERMEDIATE ICU ROOM DAILY

## 2024-02-07 PROCEDURE — 85027 COMPLETE CBC AUTOMATED: CPT | Performed by: HOSPITALIST

## 2024-02-07 RX ORDER — DIPHENHYDRAMINE HCL 50 MG
50 CAPSULE ORAL ONCE
Status: COMPLETED | OUTPATIENT
Start: 2024-02-07 | End: 2024-02-07

## 2024-02-07 RX ORDER — HEPARIN SODIUM 5000 [USP'U]/ML
2000-4000 INJECTION, SOLUTION INTRAVENOUS; SUBCUTANEOUS AS NEEDED
Status: DISCONTINUED | OUTPATIENT
Start: 2024-02-08 | End: 2024-02-12

## 2024-02-07 RX ORDER — HEPARIN SODIUM 5000 [USP'U]/ML
4000 INJECTION, SOLUTION INTRAVENOUS; SUBCUTANEOUS ONCE
Status: COMPLETED | OUTPATIENT
Start: 2024-02-08 | End: 2024-02-08

## 2024-02-07 RX ORDER — PREDNISONE 20 MG/1
50 TABLET ORAL EVERY 6 HOURS
Status: DISPENSED | OUTPATIENT
Start: 2024-02-07 | End: 2024-02-08

## 2024-02-07 RX ORDER — FAMOTIDINE 20 MG/1
20 TABLET, FILM COATED ORAL ONCE
Status: COMPLETED | OUTPATIENT
Start: 2024-02-07 | End: 2024-02-07

## 2024-02-07 RX ORDER — HEPARIN SODIUM 10000 [USP'U]/100ML
0-4000 INJECTION, SOLUTION INTRAVENOUS CONTINUOUS
Status: DISCONTINUED | OUTPATIENT
Start: 2024-02-08 | End: 2024-02-09

## 2024-02-07 RX ORDER — ISOSORBIDE DINITRATE 10 MG/1
10 TABLET ORAL
Status: DISCONTINUED | OUTPATIENT
Start: 2024-02-07 | End: 2024-02-09

## 2024-02-07 RX ADMIN — DILTIAZEM HYDROCHLORIDE 120 MG: 120 CAPSULE, COATED, EXTENDED RELEASE ORAL at 21:03

## 2024-02-07 RX ADMIN — ATENOLOL 25 MG: 25 TABLET ORAL at 10:04

## 2024-02-07 RX ADMIN — PREDNISONE 50 MG: 20 TABLET ORAL at 22:52

## 2024-02-07 RX ADMIN — DILTIAZEM HYDROCHLORIDE 120 MG: 120 CAPSULE, COATED, EXTENDED RELEASE ORAL at 10:03

## 2024-02-07 RX ADMIN — SERTRALINE HYDROCHLORIDE 150 MG: 50 TABLET ORAL at 10:03

## 2024-02-07 RX ADMIN — ATENOLOL 25 MG: 25 TABLET ORAL at 21:03

## 2024-02-07 RX ADMIN — DIPHENHYDRAMINE HYDROCHLORIDE 50 MG: 50 CAPSULE ORAL at 22:54

## 2024-02-07 RX ADMIN — Medication: at 22:30

## 2024-02-07 RX ADMIN — ISOSORBIDE DINITRATE 10 MG: 10 TABLET ORAL at 18:00

## 2024-02-07 RX ADMIN — APIXABAN 5 MG: 5 TABLET, FILM COATED ORAL at 02:46

## 2024-02-07 RX ADMIN — CALCIUM CARBONATE (ANTACID) CHEW TAB 500 MG 500 MG: 500 CHEW TAB at 16:08

## 2024-02-07 RX ADMIN — CEFTRIAXONE SODIUM 1 G: 1 INJECTION, SOLUTION INTRAVENOUS at 14:14

## 2024-02-07 RX ADMIN — MULTIVITAMIN TABLET 1 TABLET: TABLET at 10:04

## 2024-02-07 RX ADMIN — PANTOPRAZOLE SODIUM 40 MG: 40 TABLET, DELAYED RELEASE ORAL at 05:30

## 2024-02-07 RX ADMIN — APIXABAN 5 MG: 5 TABLET, FILM COATED ORAL at 14:08

## 2024-02-07 RX ADMIN — ASPIRIN 81 MG 81 MG: 81 TABLET ORAL at 10:03

## 2024-02-07 RX ADMIN — ATORVASTATIN CALCIUM 80 MG: 80 TABLET, FILM COATED ORAL at 21:03

## 2024-02-07 RX ADMIN — LEVOTHYROXINE SODIUM 100 MCG: 0.1 TABLET ORAL at 05:30

## 2024-02-07 RX ADMIN — FAMOTIDINE 20 MG: 20 TABLET, FILM COATED ORAL at 22:55

## 2024-02-07 RX ADMIN — Medication 2000 UNITS: at 10:03

## 2024-02-07 ASSESSMENT — COGNITIVE AND FUNCTIONAL STATUS - GENERAL
STANDING UP FROM CHAIR USING ARMS: A LITTLE
CLIMB 3 TO 5 STEPS WITH RAILING: A LOT
STANDING UP FROM CHAIR USING ARMS: A LITTLE
WALKING IN HOSPITAL ROOM: A LITTLE
MOVING TO AND FROM BED TO CHAIR: A LITTLE
HELP NEEDED FOR BATHING: A LITTLE
MOBILITY SCORE: 19
WALKING IN HOSPITAL ROOM: A LITTLE
DAILY ACTIVITIY SCORE: 22
TOILETING: A LITTLE
DAILY ACTIVITIY SCORE: 22
CLIMB 3 TO 5 STEPS WITH RAILING: A LOT
MOVING TO AND FROM BED TO CHAIR: A LITTLE
TOILETING: A LITTLE
HELP NEEDED FOR BATHING: A LITTLE
MOBILITY SCORE: 19

## 2024-02-07 ASSESSMENT — PAIN SCALES - GENERAL
PAINLEVEL_OUTOF10: 0 - NO PAIN

## 2024-02-07 ASSESSMENT — PAIN - FUNCTIONAL ASSESSMENT
PAIN_FUNCTIONAL_ASSESSMENT: 0-10
PAIN_FUNCTIONAL_ASSESSMENT: 0-10

## 2024-02-07 ASSESSMENT — ACTIVITIES OF DAILY LIVING (ADL): HOME_MANAGEMENT_TIME_ENTRY: 12

## 2024-02-07 NOTE — PROGRESS NOTES
Linette Doyle is a 83 y.o. female on day 6 of admission presenting with NSTEMI (non-ST elevated myocardial infarction) (CMS/HCC).      Subjective   Patient reports she is feeling a lot better today.  She is happy to be off oxygen.  Tells me that yesterday when she was having epigastric pain it resolved after getting a dose of Tums.  She tells me her appetite is low and that the food is very poor here.  She says she does not eat a lot at home either.  Says she is drinking a lot of water.  Remains in sinus rhythm.  Blood pressures are normal.    Objective     Last Recorded Vitals  /58 (BP Location: Right arm, Patient Position: Sitting)   Pulse 63   Temp 36.6 °C (97.9 °F) (Temporal)   Resp 16   Wt 85.5 kg (188 lb 7.9 oz)   SpO2 100%   Intake/Output last 3 Shifts:    Intake/Output Summary (Last 24 hours) at 2/7/2024 1220  Last data filed at 2/6/2024 2347  Gross per 24 hour   Intake 222.33 ml   Output 400 ml   Net -177.67 ml         Admission Weight  Weight: 68 kg (150 lb) (01/31/24 2058)    Daily Weight  02/07/24 : 85.5 kg (188 lb 7.9 oz)    Image Results  XR chest 1 view  Narrative: Interpreted By:  Mc Carranza,   STUDY:  XR CHEST 1 VIEW; 2/2/2024 2:34 pm      INDICATION:  Signs/Symptoms:hypoxia.      COMPARISON:  01/31/2024      ACCESSION NUMBER(S):  [id number]      ORDERING CLINICIAN:  STORMY NAGEL      TECHNIQUE:  1 view of the chest was performed.      FINDINGS:  Left chest wall pacemaker and leads appear stable. There may be a  small left pleural effusion, no right pleural effusion. There is  increased perihilar airspace opacity which can be related to  congestive changes and increased pulmonary edema.. No pneumothorax.  The cardiomediastinal silhouette is borderline mildly enlarged but  stable      Impression: Increased perihilar airspace opacity which can be seen with pulmonary  edema. There may be a small left pleural effusion.      Signed by: Mc Carranza 2/2/2024 3:36 PM  Dictation  workstation:   [id number]  Transthoracic Echo (TTE) Complete             [facility]  [id number] [place], [place] [address]             Phone [phone]    TRANSTHORACIC ECHOCARDIOGRAM REPORT       Patient Name:      SAMUEL VILLALOBOS         Porter Physician:    [id number] Becky Vela MD  Study Date:        2/1/2024              Ordering Provider:    [id number] MELANIE HARDWICK  MRN/PID:           [id number]              Fellow:  Accession#:        [id number]          Nurse:  Date of Birth/Age: 1940 / 83 years Sonographer:          Yamileth Houser RDCS  Gender:            F                     Additional Staff:  Height:            162.00 cm             Admit Date:  Weight:            68.00 kg              Admission Status:     Inpatient -                                                                 Routine  BSA:               1.73 m2               Department Location:  [facility] ER  Blood Pressure: 166 /72 mmHg    Study Type:    TRANSTHORACIC ECHO (TTE) COMPLETE  Diagnosis/ICD: Non ST elevation (NSTEMI) myocardial infarction-I21.4  Indication:    nstemi  CPT Codes:     Echo Complete w Full Doppler-[id number]    Patient History:  BMI:               Overweight 25 - 30  Pacer/Defib:       Permanent pacemaker  Pertinent History: CAD and HTN. ckd,stent,sss,falls, sob.    Study Detail: The following Echo studies were performed: 2D, M-Mode, Doppler and                color flow. Technically challenging study due to prominent lung                artifact.       PHYSICIAN INTERPRETATION:  Left Ventricle: Left ventricular systolic function is normal, with an estimated ejection fraction of 55-60%. There are no regional wall motion abnormalities. The left ventricular cavity size is normal. Spectral Doppler shows a normal  pattern of left ventricular diastolic filling. The inferior wall is mildly hypokinetic.  Left Atrium: The left atrium is mildly dilated.  Right Ventricle: The right ventricle is normal in size. There is normal right ventricular global systolic function. A device is visualized in the right ventricle.  Right Atrium: The right atrium is normal in size. There is a device visualized in the right atrium.  Aortic Valve: The aortic valve appears structurally normal. There is no evidence of aortic valve regurgitation. The peak instantaneous gradient of the aortic valve is 4.7 mmHg.  Mitral Valve: The mitral valve is normal in structure. There is moderate mitral valve regurgitation which is posteriorly directed.  Tricuspid Valve: The tricuspid valve is structurally normal. There is mild tricuspid regurgitation. The Doppler estimated RVSP is moderately elevated at 61.6 mmHg.  Pulmonic Valve: The pulmonic valve is structurally normal. There is mild pulmonic valve regurgitation.  Pericardium: There is a trivial pericardial effusion.  Aorta: The aortic root is normal.  Systemic Veins: The inferior vena cava appears to be of normal size. There is less than 50% IVC collapse with inspiration.       CONCLUSIONS:   1. Left ventricular systolic function is normal with a 55-60% estimated ejection fraction.   2. The inferior wall is mildly hypokinetic.   3. Moderate mitral valve regurgitation.   4. Mild tricuspid regurgitation is visualized.   5. Moderately elevated right ventricular systolic pressure.    QUANTITATIVE DATA SUMMARY:  2D MEASUREMENTS:                           Normal Ranges:  LAs:           3.10 cm   (2.7-4.0cm)  IVSd:          0.63 cm   (0.6-1.1cm)  LVPWd:         0.64 cm   (0.6-1.1cm)  LVIDd:         5.64 cm   (3.9-5.9cm)  LV Mass Index: 72.6 g/m2    LV SYSTOLIC FUNCTION BY 2D PLANIMETRY (MOD):                      Normal Ranges:  EF-A4C View: 56.8 % (>=55%)  EF-A2C View: 61.9 %  EF-Biplane:  59.3 %    LV DIASTOLIC  FUNCTION:                         Normal Ranges:  MV Peak E:    1.88 m/s (0.7-1.2 m/s)  MV Peak A:    0.54 m/s (0.42-0.7 m/s)  E/A Ratio:    3.49     (1.0-2.2)  MV e'         0.08 m/s (>8.0)  MV lateral e' 0.08 m/s  MV medial e'  0.08 m/s  E/e' Ratio:   24.67    (<8.0)  a'            0.08 m/s    MITRAL VALVE:                        Normal Ranges:  MV Vmax:    2.27 m/s  (<=1.3m/s)  MV peak P.6 mmHg (<5mmHg)  MV mean P.0 mmHg  (<48mmHg)  MV DT:      409 msec  (150-240msec)    AORTIC VALVE:                          Normal Ranges:  AoV Vmax:      1.08 m/s (<=1.7m/s)  AoV Peak P.7 mmHg (<20mmHg)  LVOT Max Yasir:  0.72 m/s (<=1.1m/s)  LVOT Diameter: 1.90 cm  (1.8-2.4cm)  AoV Area,Vmax: 1.89 cm2 (2.5-4.5cm2)       RIGHT VENTRICLE:  RV s' 0.16 m/s    TRICUSPID VALVE/RVSP:                              Normal Ranges:  Peak TR Velocity: 3.66 m/s  RV Syst Pressure: 61.6 mmHg (< 30mmHg)  IVC Diam:         1.80 cm       [id number] Becky Vela MD  Electronically signed on 2024 at 8:24:54 AM       ** Final **      Physical Exam  General: alert, no diaphoresis   Lungs: CTA BL   Heart: RRR, no LE edema   GI: abdomen soft, nontender, nondistended, BS present   MSK: no joint effusion or deformity   Skin: no rashes, erythema, or ecchymosis   Neuro: grossly normal cognition, motor strength, sensation      Relevant Results               Assessment/Plan          This patient has a urinary catheter   Reason for the urinary catheter remaining today? critically ill patient who need accurate urinary output measurements          Principal Problem:    NSTEMI (non-ST elevated myocardial infarction) (CMS/HCC)  Active Problems:    Coronary arteriosclerosis    Atherosclerosis of coronary artery    Hypothyroidism    Hypercholesterolemia    Sick sinus syndrome (CMS/HCC)    Impaired fasting glucose    Leukocytosis    NSTEMI  -Left heart catheterization showed total occlusion of the circumflex and moderate disease in the RCA.  Dr. Vela  recommended staged heart catheterizations with PCI to address the lesion as an outpatient  -Continue clopidogrel and aspirin  -Need to follow-up with cardiology    A-fib with RVR-now in normal sinus rhythm  -New diagnosis during this hospital stay.  Had I team last night for rapid heart rate.  Started on diltiazem and got a dose of IV amiodarone 150 mg.  -Difficulties with rate control initially.  Remained on Cardizem drip.  She converted yesterday afternoon.  She is now on oral atenolol and Cardizem per Dr. Ibrahim.  She is in a paced rhythm.  Will need to follow-up with electrophysiology and they will be able to interrogate her device and determine further management from there.  Discussed with Lata Sherman CNP.  -Started on Eliquis    Known CAD  -Continue therapy as above.     GARY-resolved yesterday but creatinine is a little higher today.  Likely from hypotension yesterday.  Will continue to monitor and encouraging p.o. intake  - probably initially GARY was from contrast nephropathy in the setting of diuretic use for pulmonary congestion.   - dr. Giang signed off--I will him reinvolved if creatinine continues to climb     Possible pneumonia-bacterial, unspecified organism  - on IV ceftriaxone -final dose will be tomorrow.  He is now off oxygen    Acute respiratory failure with hypoxia  -Now resolved.  On room air.  Unclear if this is related to atelectasis versus bacterial pneumonia.  Again finishing treatment of antibiotics and encouraging movement     Constipation, IBS  - Jerardo order a fleet enema      Anxiety  - having a lot of anxiety regarding recently being scammed out of significant money; she tends to perseverate on this. Also with recent loss of family members.  - on zoloft    Anemia  - hemoglobin in the 9's for past few days. She did get IV hydration which could explain some dilution. No obvious signs of bleeding. Her baseline hemoglobin normally around 12. Will check iron, B12, folate.  Iron is somewhat  low.  Will give Venofer while here.     Is not ready for discharge at this time.   Remove Pacheco catheter  Encourage activity  If renal function stabilizes tomorrow anticipate discharge in the next 1 to 2 days           Stefanie Verma DO

## 2024-02-07 NOTE — SIGNIFICANT EVENT
"Called by RN.    Patient having substernal chest pressure. Desaturated around the same time to mid 80s, back on 2L NC. Remains in sinus rhythm that is paced.    I went to see patient, reporting chest pressure and frequent belching. She says she is very worried because she doesn't know what's wrong. Pain is different than last night; last night pain was epigastric and crampy \"like gas\". She admits to feeling anxious since the pain started.      Discussed with Dr. Ibrahim.  Stat troponin and will trend.  Will get chest xray.  Start isordil.  Per Dr. Ibrahim's recommendations, will hold eliquis, NPO at midnight in case needs a procedure.  Discussed with bedside RN as well.    Update:  Troponin elevated at 3800. Will recheck in 2 hrs.  Discussed with Dr. Ibrahim.  Heparin drip to start at midnight.  CXR reviewed.    CCT: 40 min  "

## 2024-02-07 NOTE — PROGRESS NOTES
Pt is off cardizem drip. Pt started on atenolol, creatnine being monitored.      02/07/24 1326   Discharge Planning   Patient expects to be discharged to: Home with [facility]

## 2024-02-07 NOTE — PROGRESS NOTES
"Linette Doyle is a 83 y.o. female on day 6 of admission presenting with NSTEMI (non-ST elevated myocardial infarction) (CMS/HCC).    Subjective   No complaints. Patient is feeling well. Denies palpitations, shortness of breath.     Objective     Physical Exam  Vitals and nursing note reviewed.   Constitutional:       Appearance: Normal appearance.   Cardiovascular:      Rate and Rhythm: Normal rate and regular rhythm.      Pulses: Normal pulses.      Heart sounds: Normal heart sounds.   Pulmonary:      Effort: Pulmonary effort is normal.      Breath sounds: Normal breath sounds.   Abdominal:      General: Bowel sounds are normal.      Palpations: Abdomen is soft.   Musculoskeletal:         General: Normal range of motion.   Skin:     General: Skin is warm and dry.   Neurological:      General: No focal deficit present.      Mental Status: She is alert and oriented to person, place, and time.     Last Recorded Vitals  Blood pressure 121/62, pulse 60, temperature 36.6 °C (97.9 °F), temperature source Temporal, resp. rate 21, height 1.651 m (5' 5\"), weight 85.5 kg (188 lb 7.9 oz), SpO2 93 %.    Intake/Output last 3 Shifts:  I/O last 3 completed shifts:  In: 293.5 (3.4 mL/kg) [I.V.:293.5 (3.4 mL/kg)]  Out: 1100 (12.9 mL/kg) [Urine:1100 (0.4 mL/kg/hr)]  Weight: 85.5 kg     apixaban, 5 mg, oral, q12h  aspirin, 81 mg, oral, Daily  atenolol, 25 mg, oral, BID  atorvastatin, 80 mg, oral, Nightly  cefTRIAXone, 1 g, intravenous, Daily  cholecalciferol, 2,000 Units, oral, Daily  dilTIAZem CD, 120 mg, oral, BID  levothyroxine, 100 mcg, oral, Daily before breakfast  multivitamin, 1 tablet, oral, Daily  pantoprazole, 40 mg, oral, Daily before breakfast  perflutren lipid microspheres, 0.5-10 mL of dilution, intravenous, Once in imaging  perflutren protein A microsphere, 0.5 mL, intravenous, Once in imaging  sertraline, 150 mg, oral, Daily  sulfur hexafluoride microsphr, 2 mL, intravenous, Once in imaging     Relevant " Results  Results for orders placed or performed during the hospital encounter of 01/31/24 (from the past 24 hour(s))   CBC and Auto Differential   Result Value Ref Range    WBC 9.0 4.4 - 11.3 x10*3/uL    nRBC 0.0 0.0 - 0.0 /100 WBCs    RBC 3.24 (L) 4.00 - 5.20 x10*6/uL    Hemoglobin 9.3 (L) 12.0 - 16.0 g/dL    Hematocrit 29.7 (L) 36.0 - 46.0 %    MCV 92 80 - 100 fL    MCH 28.7 26.0 - 34.0 pg    MCHC 31.3 (L) 32.0 - 36.0 g/dL    RDW 13.7 11.5 - 14.5 %    Platelets 148 (L) 150 - 450 x10*3/uL    Neutrophils % 74.4 40.0 - 80.0 %    Immature Granulocytes %, Automated 0.6 0.0 - 0.9 %    Lymphocytes % 12.5 13.0 - 44.0 %    Monocytes % 7.9 2.0 - 10.0 %    Eosinophils % 4.2 0.0 - 6.0 %    Basophils % 0.4 0.0 - 2.0 %    Neutrophils Absolute 6.65 (H) 1.60 - 5.50 x10*3/uL    Immature Granulocytes Absolute, Automated 0.05 0.00 - 0.50 x10*3/uL    Lymphocytes Absolute 1.12 0.80 - 3.00 x10*3/uL    Monocytes Absolute 0.71 0.05 - 0.80 x10*3/uL    Eosinophils Absolute 0.38 0.00 - 0.40 x10*3/uL    Basophils Absolute 0.04 0.00 - 0.10 x10*3/uL   Comprehensive Metabolic Panel   Result Value Ref Range    Glucose 118 (H) 65 - 99 mg/dL    Sodium 140 133 - 145 mmol/L    Potassium 4.3 3.4 - 5.1 mmol/L    Chloride 104 97 - 107 mmol/L    Bicarbonate 23 (L) 24 - 31 mmol/L    Urea Nitrogen 43 (H) 8 - 25 mg/dL    Creatinine 1.70 (H) 0.40 - 1.60 mg/dL    eGFR 29 (L) >60 mL/min/1.73m*2    Calcium 9.0 8.5 - 10.4 mg/dL    Albumin 3.3 (L) 3.5 - 5.0 g/dL    Alkaline Phosphatase 77 35 - 125 U/L    Total Protein 6.1 5.9 - 7.9 g/dL    AST 41 (H) 5 - 40 U/L    Bilirubin, Total 0.5 0.1 - 1.2 mg/dL    ALT 42 (H) 5 - 40 U/L    Anion Gap 13 <=19 mmol/L   Iron and TIBC   Result Value Ref Range    Iron 21 (L) 30 - 160 ug/dL    UIBC 190 110 - 370 ug/dL    TIBC 211 (L) 228 - 428 ug/dL    % Saturation 10 (L) 12 - 50 %   Folate   Result Value Ref Range    Folate, Serum        Assessment/Plan   Principal Problem:    NSTEMI (non-ST elevated myocardial infarction)  (CMS/HCC)  Active Problems:    Coronary arteriosclerosis    Atherosclerosis of coronary artery    Hypothyroidism    Hypercholesterolemia    Sick sinus syndrome (CMS/HCC)    Impaired fasting glucose    Leukocytosis    2/6: 83-year-old female with a history of atherosclerotic heart disease, remote LAD stenting, SSS 2019 DDD PPM  implantation presents with non-ST elevation MI, peak troponin of 650, cardiac catheterization with evidence for  of LCX, RCA with 2-70% lesions, subsequent kidney  injury secondary to IV contrast w/ decision to intervene w/recurrent sx, developed new onset AF rate 160 bpm, some demand ischemia w/ recurrent sx, initial episode was given a IV amiodarone bolus with recurrent rapid rates started on dilt gtt. echocardiogram with preserved EF, wall and inferior wall hypokinesis, moderate MR, normal LA size  -Suspect A-fib in the setting of recent non-STEMI, agree with increasing AV toshia agents, stop diltiazem drip  -[id number],  start apixaban  -cardiology to discontinue clopidogrel, will c/w aspirin  -watch H/H   -If BP allows consider nitrate at low-dose   -In terms of AF treatment, will hold on use of AAD at this time  -Will have patient follow-up in device clinic,  will be able to assess AF burden on AV toshia agents  -Appointment scheduled for February 23. 2024  1030 am    2/7: Patient spontaneously converted back to sinus rhythm yesterday.  She remains atrial paced ventricular sensed at 60 bpm.  Patient denies palpitations, shortness of breath.  Atenolol 25 mg twice daily as it is renally cleared.  Will monitor renal function with apixaban dosing.  Baseline creatinine 1.3, currently 1.7.  Will check TSH with new atrial fibrillation and history of hypothyroidism.  Patient to follow-up on February 23, 2024 10:30 AM in device clinic.  Will assess atrial fibrillation burden at that time and consider antiarrhythmic drug if necessary.    Discussed in detail with Dr. Carolina HUMMEL  Whit, APRN-CNP

## 2024-02-07 NOTE — PROGRESS NOTES
Linette Doyle is a 83 y.o. female on day 6 of admission presenting with NSTEMI (non-ST elevated myocardial infarction) (CMS/HCC).      Subjective   Patient seen and examined resting comfortably in bed.  States she feels much better today.  Denies chest pain, palpitations, tachycardia, shortness of breath, abdominal pain, diarrhea, vomiting. She has been able to get out of bed and move around without complication.  Had a regular bowel movement recently.        Objective     Last Recorded Vitals  /58 (BP Location: Right arm, Patient Position: Sitting)   Pulse 60   Temp 36.6 °C (97.9 °F) (Temporal)   Resp 16   Wt 85.5 kg (188 lb 7.9 oz)   SpO2 100%   Intake/Output last 3 Shifts:    Intake/Output Summary (Last 24 hours) at 2/7/2024 1133  Last data filed at 2/6/2024 2347  Gross per 24 hour   Intake 222.33 ml   Output 400 ml   Net -177.67 ml       Admission Weight  Weight: 68 kg (150 lb) (01/31/24 2058)    Daily Weight  02/07/24 : 85.5 kg (188 lb 7.9 oz)    Image Results  XR chest 1 view  Narrative: Interpreted By:  Mc Carranza,   STUDY:  XR CHEST 1 VIEW; 2/2/2024 2:34 pm      INDICATION:  Signs/Symptoms:hypoxia.      COMPARISON:  01/31/2024      ACCESSION NUMBER(S):  [id number]      ORDERING CLINICIAN:  STORMY NAGEL      TECHNIQUE:  1 view of the chest was performed.      FINDINGS:  Left chest wall pacemaker and leads appear stable. There may be a  small left pleural effusion, no right pleural effusion. There is  increased perihilar airspace opacity which can be related to  congestive changes and increased pulmonary edema.. No pneumothorax.  The cardiomediastinal silhouette is borderline mildly enlarged but  stable      Impression: Increased perihilar airspace opacity which can be seen with pulmonary  edema. There may be a small left pleural effusion.      Signed by: Mc Carranza 2/2/2024 3:36 PM  Dictation workstation:   [id number]  Transthoracic Echo (TTE) [facility]  [place] [id number]             Phone [phone]    TRANSTHORACIC ECHOCARDIOGRAM REPORT       Patient Name:      SAMUEL LANZA WILFREDO         Reading Physician:    [id number] Becky Vela MD  Study Date:        2/1/2024              Ordering Provider:    [id number] MELANIE HARDWICK  MRN/PID:           [id number]              Fellow:  Accession#:        [id number]          Nurse:  Date of Birth/Age: 1940 / 83 years Sonographer:          Yamileth Houser RDCS  Gender:            F                     Additional Staff:  Height:            162.00 cm             Admit Date:  Weight:            68.00 kg              Admission Status:     Inpatient -                                                                 Routine  BSA:               1.73 m2               Department Location:  [facility]  Blood Pressure: 166 /72 mmHg    Study Type:    TRANSTHORACIC ECHO (TTE) COMPLETE  Diagnosis/ICD: Non ST elevation (NSTEMI) myocardial infarction-I21.4  Indication:    nstemi  CPT Codes:     Echo Complete w Full [id number]    Patient History:  BMI:               Overweight 25 - 30  Pacer/Defib:       Permanent pacemaker  Pertinent History: CAD and HTN. ckd,stent,sss,falls, sob.    Study Detail: The following Echo studies were performed: 2D, M-Mode, Doppler and                color flow. Technically challenging study due to prominent lung                artifact.       PHYSICIAN INTERPRETATION:  Left Ventricle: Left ventricular systolic function is normal, with an estimated ejection fraction of 55-60%. There are no regional wall motion abnormalities. The left ventricular cavity size is normal. Spectral Doppler shows a normal pattern of left ventricular diastolic filling. The inferior wall is mildly hypokinetic.  Left  Atrium: The left atrium is mildly dilated.  Right Ventricle: The right ventricle is normal in size. There is normal right ventricular global systolic function. A device is visualized in the right ventricle.  Right Atrium: The right atrium is normal in size. There is a device visualized in the right atrium.  Aortic Valve: The aortic valve appears structurally normal. There is no evidence of aortic valve regurgitation. The peak instantaneous gradient of the aortic valve is 4.7 mmHg.  Mitral Valve: The mitral valve is normal in structure. There is moderate mitral valve regurgitation which is posteriorly directed.  Tricuspid Valve: The tricuspid valve is structurally normal. There is mild tricuspid regurgitation. The Doppler estimated RVSP is moderately elevated at 61.6 mmHg.  Pulmonic Valve: The pulmonic valve is structurally normal. There is mild pulmonic valve regurgitation.  Pericardium: There is a trivial pericardial effusion.  Aorta: The aortic root is normal.  Systemic Veins: The inferior vena cava appears to be of normal size. There is less than 50% IVC collapse with inspiration.       CONCLUSIONS:   1. Left ventricular systolic function is normal with a 55-60% estimated ejection fraction.   2. The inferior wall is mildly hypokinetic.   3. Moderate mitral valve regurgitation.   4. Mild tricuspid regurgitation is visualized.   5. Moderately elevated right ventricular systolic pressure.    QUANTITATIVE DATA SUMMARY:  2D MEASUREMENTS:                           Normal Ranges:  LAs:           3.10 cm   (2.7-4.0cm)  IVSd:          0.63 cm   (0.6-1.1cm)  LVPWd:         0.64 cm   (0.6-1.1cm)  LVIDd:         5.64 cm   (3.9-5.9cm)  LV Mass Index: 72.6 g/m2    LV SYSTOLIC FUNCTION BY 2D PLANIMETRY (MOD):                      Normal Ranges:  EF-A4C View: 56.8 % (>=55%)  EF-A2C View: 61.9 %  EF-Biplane:  59.3 %    LV DIASTOLIC FUNCTION:                         Normal Ranges:  MV Peak E:    1.88 m/s (0.7-1.2 m/s)  MV Peak A:     0.54 m/s (0.42-0.7 m/s)  E/A Ratio:    3.49     (1.0-2.2)  MV e'         0.08 m/s (>8.0)  MV lateral e' 0.08 m/s  MV medial e'  0.08 m/s  E/e' Ratio:   24.67    (<8.0)  a'            0.08 m/s    MITRAL VALVE:                        Normal Ranges:  MV Vmax:    2.27 m/s  (<=1.3m/s)  MV peak P.6 mmHg (<5mmHg)  MV mean P.0 mmHg  (<48mmHg)  MV DT:      409 msec  (150-240msec)    AORTIC VALVE:                          Normal Ranges:  AoV Vmax:      1.08 m/s (<=1.7m/s)  AoV Peak P.7 mmHg (<20mmHg)  LVOT Max Yasir:  0.72 m/s (<=1.1m/s)  LVOT Diameter: 1.90 cm  (1.8-2.4cm)  AoV Area,Vmax: 1.89 cm2 (2.5-4.5cm2)       RIGHT VENTRICLE:  RV s' 0.16 m/s    TRICUSPID VALVE/RVSP:                              Normal Ranges:  Peak TR Velocity: 3.66 m/s  RV Syst Pressure: 61.6 mmHg (< 30mmHg)  IVC Diam:         1.80 cm       [id number] Becky Vela MD  Electronically signed on 2024 at 8:24:54 AM       ** Final **      Physical Exam  Vitals reviewed.   Constitutional:       General: She is not in acute distress.     Appearance: Normal appearance.   HENT:      Head: Normocephalic and atraumatic.      Mouth/Throat:      Mouth: Mucous membranes are moist.   Eyes:      Extraocular Movements: Extraocular movements intact.      Pupils: Pupils are equal, round, and reactive to light.   Cardiovascular:      Rate and Rhythm: Normal rate and regular rhythm.      Pulses: Normal pulses.      Heart sounds: Normal heart sounds.   Pulmonary:      Effort: Pulmonary effort is normal.      Breath sounds: Normal breath sounds.   Abdominal:      General: Abdomen is flat. There is no distension.      Palpations: Abdomen is soft.      Tenderness: There is no abdominal tenderness.   Musculoskeletal:         General: Normal range of motion.      Right lower leg: No edema.      Left lower leg: No edema.   Skin:     General: Skin is warm and dry.      Capillary Refill: Capillary refill takes less than 2 seconds.   Neurological:      General: No  focal deficit present.      Mental Status: She is alert and oriented to person, place, and time.   Psychiatric:         Mood and Affect: Mood normal.         Behavior: Behavior normal.         Thought Content: Thought content normal.         Judgment: Judgment normal.         Relevant Results             Scheduled medications  apixaban, 5 mg, oral, q12h  aspirin, 81 mg, oral, Daily  atenolol, 25 mg, oral, BID  atorvastatin, 80 mg, oral, Nightly  cefTRIAXone, 1 g, intravenous, Daily  cholecalciferol, 2,000 Units, oral, Daily  dilTIAZem CD, 120 mg, oral, BID  levothyroxine, 100 mcg, oral, Daily before breakfast  multivitamin, 1 tablet, oral, Daily  pantoprazole, 40 mg, oral, Daily before breakfast  perflutren lipid microspheres, 0.5-10 mL of dilution, intravenous, Once in imaging  perflutren protein A microsphere, 0.5 mL, intravenous, Once in imaging  sertraline, 150 mg, oral, Daily  sulfur hexafluoride microsphr, 2 mL, intravenous, Once in imaging      Continuous medications  sodium chloride 0.9%, 100 mL/hr, Last Rate: Stopped (02/05/24 1300)      PRN medications  PRN medications: acetaminophen **OR** acetaminophen **OR** acetaminophen, benzocaine-menthol, calcium carbonate, dextromethorphan-guaifenesin, dextrose 10 % in water (D10W), dextrose, glucagon, guaiFENesin, ondansetron ODT **OR** ondansetron, polyethylene glycol  Results for orders placed or performed during the hospital encounter of 01/31/24 (from the past 24 hour(s))   CBC and Auto Differential   Result Value Ref Range    WBC 9.0 4.4 - 11.3 x10*3/uL    nRBC 0.0 0.0 - 0.0 /100 WBCs    RBC 3.24 (L) 4.00 - 5.20 x10*6/uL    Hemoglobin 9.3 (L) 12.0 - 16.0 g/dL    Hematocrit 29.7 (L) 36.0 - 46.0 %    MCV 92 80 - 100 fL    MCH 28.7 26.0 - 34.0 pg    MCHC 31.3 (L) 32.0 - 36.0 g/dL    RDW 13.7 11.5 - 14.5 %    Platelets 148 (L) 150 - 450 x10*3/uL    Neutrophils % 74.4 40.0 - 80.0 %    Immature Granulocytes %, Automated 0.6 0.0 - 0.9 %    Lymphocytes % 12.5 13.0 -  44.0 %    Monocytes % 7.9 2.0 - 10.0 %    Eosinophils % 4.2 0.0 - 6.0 %    Basophils % 0.4 0.0 - 2.0 %    Neutrophils Absolute 6.65 (H) 1.60 - 5.50 x10*3/uL    Immature Granulocytes Absolute, Automated 0.05 0.00 - 0.50 x10*3/uL    Lymphocytes Absolute 1.12 0.80 - 3.00 x10*3/uL    Monocytes Absolute 0.71 0.05 - 0.80 x10*3/uL    Eosinophils Absolute 0.38 0.00 - 0.40 x10*3/uL    Basophils Absolute 0.04 0.00 - 0.10 x10*3/uL   Comprehensive Metabolic Panel   Result Value Ref Range    Glucose 118 (H) 65 - 99 mg/dL    Sodium 140 133 - 145 mmol/L    Potassium 4.3 3.4 - 5.1 mmol/L    Chloride 104 97 - 107 mmol/L    Bicarbonate 23 (L) 24 - 31 mmol/L    Urea Nitrogen 43 (H) 8 - 25 mg/dL    Creatinine 1.70 (H) 0.40 - 1.60 mg/dL    eGFR 29 (L) >60 mL/min/1.73m*2    Calcium 9.0 8.5 - 10.4 mg/dL    Albumin 3.3 (L) 3.5 - 5.0 g/dL    Alkaline Phosphatase 77 35 - 125 U/L    Total Protein 6.1 5.9 - 7.9 g/dL    AST 41 (H) 5 - 40 U/L    Bilirubin, Total 0.5 0.1 - 1.2 mg/dL    ALT 42 (H) 5 - 40 U/L    Anion Gap 13 <=19 mmol/L   Iron and TIBC   Result Value Ref Range    Iron 21 (L) 30 - 160 ug/dL    UIBC 190 110 - 370 ug/dL    TIBC 211 (L) 228 - 428 ug/dL    % Saturation 10 (L) 12 - 50 %   Vitamin B12   Result Value Ref Range    Vitamin B12 700 211 - 946 pg/mL   Folate   Result Value Ref Range    Folate, Serum >20.0 (H) 4.2 - 19.9 ng/mL   TSH with reflex to Free T4 if abnormal   Result Value Ref Range    Thyroid Stimulating Hormone 3.89 0.27 - 4.20 mIU/L         Assessment/Plan        Principal Problem:    NSTEMI (non-ST elevated myocardial infarction) (CMS/HCC)  Active Problems:    Coronary arteriosclerosis    Atherosclerosis of coronary artery    Hypothyroidism    Hypercholesterolemia    Sick sinus syndrome (CMS/HCC)    Impaired fasting glucose    Leukocytosis    NSTEMI  Left heart cath revealed total occlusion of circumflex and moderate occlusion of RCA. Cardio recommended PCI outpatient.  Continue dual antiplatelet therapy  A-fib with  RVR  Resolved.  Patient is currently in normal sinus rhythm with HR of 60-70.   Continue diltiazem, atenolol, apixaban.  Consider stopping one of the two rate control agents. Follow up with cardiology  CAD  Management plan above  Anxiety  Patient more likely experiencing normal stress response to recent major life events. Seemed much more centered today  GARY on CKD III   Stable.  Creatinine had slight bump today to 1.7 from previous 1.5 yesterday. Continue to monitor.  Anemia  Stable. Hemoglobin currently 9.3  Possible pneumonia  Continue ceftriaxone  Repeat chest xray before discharge  Hypertension  Controlled     Patient appears to be much better today clinically, both physically and mentally.  Monitor for at least one more day, with possible discharge tomorrow if all labs remain stable and with plan for cardiology follow-up outpatient        SARABJIT Osuna  2/7/2024  11:50 AM

## 2024-02-07 NOTE — PROGRESS NOTES
Physical Therapy    Physical Therapy Treatment    Patient Name: Linette Doyle  MRN: [id number]  Today's Date: 2/7/2024  Time Calculation  Start Time: 1047  Stop Time: 1101  Time Calculation (min): 14 min     Assessment/Plan   PT Assessment  PT Assessment Results: Decreased strength, Decreased endurance, Impaired balance, Decreased mobility, Decreased safety awareness, Impaired judgement  Rehab Prognosis: Good  Evaluation/Treatment Tolerance: Patient tolerated treatment well  Medical Staff Made Aware: Yes  Strengths: Ability to acquire knowledge, Premorbid level of function  Barriers to Participation: Insight into problems  End of Session Communication: Bedside nurse  Assessment Comment: Improved gait quality/tolerance, remains deconditioned, will continue to treat as tolerated.  End of Session Patient Position: Up in chair, Alarm off, not on at start of session (unable to locate chair alarm on unit; use of call button for transfers reviewed with pt with good agreement verbalized; RN aware)  PT Plan  Inpatient/Swing Bed or Outpatient: Inpatient  PT Plan  Treatment/Interventions: Bed mobility, Transfer training, Gait training, Stair training, Balance training, Neuromuscular re-education, Endurance training, Strengthening, Therapeutic exercise, Therapeutic activity  PT Plan: Skilled PT  PT Frequency: 4 times per week  PT Discharge Recommendations: Low intensity level of continued care, 24 hr supervision due to cognition  PT Recommended Transfer Status: Contact guard  PT - OK to Discharge: Yes    General Visit Information:   PT  Visit  PT Received On: 02/07/24  Response to Previous Treatment: Patient with no complaints from previous session.  General  Reason for Referral: impaired functional mobility  Referred By: Sal Ibrahim MD  Past Medical History Relevant to Rehab: CAD, cardiac stent, SSS with pacer, hypothyroidism, hypercholesterolemia, htn, anemia, CKD  Missed Visit: No  Missed Visit Reason: Other  (Comment)  Family/Caregiver Present: No  Co-Treatment:  (no)  Co-Treatment Reason: n/a  Prior to Session Communication: Bedside nurse  Patient Position Received: Bed, 3 rail up, Alarm off, not on at start of session  Preferred Learning Style: verbal  General Comment: Pt cleared for therapy via RN, received in supine, NAD, agreeable to participate in therapy. (+) P[place]co, telemetry    Subjective   Precautions:  Precautions  Hearing/Visual Limitations: reading glasses  Medical Precautions: Fall precautions  Vital Signs:  Vital Signs  Heart Rate: 63  Heart Rate Source: Monitor  SpO2: 100 %    Objective   Pain:  Pain Assessment  Pain Assessment: 0-10  Pain Score: 0 - No pain  Cognition:  Cognition  Overall Cognitive Status: Within Functional Limits  Orientation Level: Oriented X4  Postural Control:  Postural Control  Postural Control: Within Functional Limits  Static Sitting Balance  Static Sitting-Balance Support: Bilateral upper extremity supported, Feet supported  Static Sitting-Level of Assistance: Independent  Static Standing Balance  Static Standing-Balance Support: No upper extremity supported  Static Standing-Level of Assistance: Contact guard  Extremity/Trunk Assessments:  RLE   RLE : Within Functional Limits  LLE   LLE : Within Functional Limits  Activity Tolerance:  Activity Tolerance  Endurance: Tolerates 10 - 20 min exercise with multiple rests  Treatments:  Therapeutic Exercise  Therapeutic Exercise Performed: Yes (declined further therex d/t c/o fatigue)  Therapeutic Exercise Activity 1: B ankle pumps x 10  Therapeutic Exercise Activity 2: B seated knee ext x 10  Therapeutic Exercise Activity 3: B seated hip flex x 10  Therapeutic Exercise Activity 4: B seated isometric hip abduction x 10  Therapeutic Exercise Activity 5: B seated isometric hip adduction x 10    Bed Mobility  Bed Mobility: Yes  Bed Mobility 1  Bed Mobility 1: Supine to sitting  Level of Assistance 1: Close supervision  Bed Mobility Comments  1: supervision for safety    Ambulation/Gait Training  Ambulation/Gait Training Performed: Yes  Ambulation/Gait Training 1  Surface 1: Level tile  Device 1: No device  Assistance 1: Contact guard  Quality of Gait 1: Narrow base of support, Decreased step length, Forward flexed posture (decreased gloria, lines managed via PT; SpO2/HR stable throughout)  Comments/Distance (ft) 1: 50' x 2  Transfers  Transfer: Yes  Transfer 1  Transfer From 1: Sit to  Transfer to 1: Stand  Technique 1: Sit to stand  Transfer Level of Assistance 1: Close supervision  Transfers 2  Transfer From 2: Stand to  Transfer to 2: Sit  Technique 2: Stand to sit  Transfer Level of Assistance 2: Contact guard  Trials/Comments 2: assist to steady    Stairs  Stairs: No    Outcome Measures:  [facility] Basic Mobility  Turning from your back to your side while in a flat bed without using bedrails: None  Moving from lying on your back to sitting on the side of a flat bed without using bedrails: None  Moving to and from bed to chair (including a wheelchair): A little  Standing up from a chair using your arms (e.g. wheelchair or bedside chair): A little  To walk in hospital room: A little  Climbing 3-5 steps with railing: A lot  Basic Mobility - Total Score: 19    Education Documentation  Precautions, taught by Mariluz Headley PT at 2/7/2024 11:46 AM.  Learner: Patient  Readiness: Acceptance  Method: Explanation, Demonstration  Response: Demonstrated Understanding, Needs Reinforcement    Body Mechanics, taught by Mariluz Headley PT at 2/7/2024 11:46 AM.  Learner: Patient  Readiness: Acceptance  Method: Explanation, Demonstration  Response: Demonstrated Understanding, Needs Reinforcement    Mobility Training, taught by Mariluz Headley PT at 2/7/2024 11:46 AM.  Learner: Patient  Readiness: Acceptance  Method: Explanation, Demonstration  Response: Demonstrated Understanding, Needs Reinforcement    Education Comments  No comments found.      OP  EDUCATION:  Outpatient Education  Individual(s) Educated: Patient  Education Provided: Fall Risk, POC  Risk and Benefits Discussed with Patient/Caregiver/Other: yes  Patient/Caregiver Demonstrated Understanding: yes  Plan of Care Discussed and Agreed Upon: yes  Patient Response to Education: Patient/Caregiver Verbalized Understanding of Information    Encounter Problems       Encounter Problems (Active)       PT Problem       Patient will demonstrate improvements in strength  (Progressing)       Start:  02/03/24    Expected End:  02/24/24            Patient will ambulate 150 ft independently and use of no assistive device  (Progressing)       Start:  02/03/24    Expected End:  02/24/24            Patient will ascend/descend 1 step with no support on 0 rail(s) independently  (Progressing)       Start:  02/03/24    Expected End:  02/24/24               Pain - Adult

## 2024-02-07 NOTE — PROGRESS NOTES
Medication Education     Medication education for Linette Doyle was provided to the patient  for the following medication(s):    Apixaban  Atenolol    Medication education provided by a Pharmacist:  ADR Counseling Dose, frequency, storage How the medication works and benefits of taking it Importance of compliance Any drug interactions (including OTCs and herbvals) and importance of notifying a healthcare provider of any medication changes    Identified potential barriers to education:  None    Method(s) of Education:  Verbal Written materials provided and reviewed    An opportunity to ask questions and receive answers was provided.     Assessment of understanding the patient :  1= partially meets; needs review    Additional Notes (if applicable): provided pill box    Shahnaz Sahu, PharmD

## 2024-02-07 NOTE — CONSULTS
"Nutrition Assessement Note    Nutrition Assessment    Reason for Assessment: Provider consult order (Education)    Reason for Hospital Admission:  Linette Doyle is a 83 y.o. female who is admitted for evaluation of chest pain. PMHx of HLD, Atherosclerotic heart disease, Hypercholesterolemia, Hyperlipidemia, Primary hypertension, Presence of cardiac pacemaker. Pt does not like food provided here. Discussed Heart-healthy diet.     Nutrition History:  Food and Nutrient History: Pt stated that appetite has been low due to food not tasting well.  Energy Intake: Good > 75 %  Food Allergies/Intolerances:  None  GI Symptoms: None  Oral Problems: None    Anthropometrics:  Ht: 165.1 cm (5' 5\"), Wt: 85.5 kg (188 lb 7.9 oz), BMI: 31.37  IBW/kg (Dietitian Calculated): 56.82 kg  Percent of IBW: 150.48 %  Adjusted Body Weight (kg): 64.09 kg    Weight Change:  Daily Weight  02/07/24 : 85.5 kg (188 lb 7.9 oz)  01/29/24 : 68.3 kg (150 lb 9.6 oz)  12/15/23 : 68.9 kg (151 lb 12.6 oz)  10/24/23 : 68.2 kg (150 lb 5.7 oz)  07/12/23 : 68.9 kg (152 lb)  06/06/23 : 68.5 kg (151 lb)  06/06/23 : 69.4 kg (153 lb)  01/09/23 : 69.5 kg (153 lb 3.2 oz)  12/20/22 : 69.9 kg (154 lb)  07/06/22 : 71.2 kg (157 lb)     Weight History / % Weight Change: Pt stated that she lost 2-3#  within a few days. Pt stated that UBW is 150#, per chart Pt has maintained weight between 150-157# for ~1.5 years. Question accuracy of recent reading of 188#.             Nutrition Focused Physical Exam Findings:   Subcutaneous Fat Loss  Orbital Fat Pads: Well nourshed (slightly bulging fat pads)  Buccal Fat Pads: Well nourished (full, rounded cheeks)  Triceps: Defer  Ribs: Defer    Muscle Wasting  Temporalis: Well nourished (well-defined muscle)  Pectoralis (Clavicular Region): Well nourished (clavicle not visible)  Deltoid/Trapezius: Defer  Interosseous: Defer  Trapezius/Infraspinatus/Supraspinatus (Scapular Region): Defer  Quadriceps: Defer  Gastrocnemius: Defer          "     Nutrition Significant Labs:  Lab Results   Component Value Date    WBC 9.0 02/07/2024    HGB 9.3 (L) 02/07/2024    HCT 29.7 (L) 02/07/2024     (L) 02/07/2024    CHOL 138 07/05/2023    TRIG 188 (H) 07/05/2023    HDL 43 (L) 07/05/2023    ALT 42 (H) 02/07/2024    AST 41 (H) 02/07/2024     02/07/2024    K 4.3 02/07/2024     02/07/2024    CREATININE 1.70 (H) 02/07/2024    BUN 43 (H) 02/07/2024    CO2 23 (L) 02/07/2024    TSH 3.89 02/07/2024    HGBA1C 6.0 (H) 02/01/2024    ALBUR 17 07/05/2023       Current Facility-Administered Medications:     acetaminophen (Tylenol) tablet 650 mg, 650 mg, oral, q4h PRN, 650 mg at 02/02/24 2128 **OR** acetaminophen (Tylenol) oral liquid 650 mg, 650 mg, nasogastric tube, q4h PRN **OR** acetaminophen (Tylenol) suppository 650 mg, 650 mg, rectal, q4h PRN, Sal Ibrahim MD    apixaban (Eliquis) tablet 5 mg, 5 mg, oral, q12h, Darrius Ibrahim MD, 5 mg at 02/07/24 1408    aspirin chewable tablet 81 mg, 81 mg, oral, Daily, Sal Ibrahim MD, 81 mg at 02/07/24 1003    atenolol (Tenormin) tablet 25 mg, 25 mg, oral, BID, Darrius Ibrahim MD, 25 mg at 02/07/24 1004    atorvastatin (Lipitor) tablet 80 mg, 80 mg, oral, Nightly, Henry Ricci DO, 80 mg at 02/06/24 2050    benzocaine-menthol (Cepastat Sore Throat) 15-3.6 mg lozenge 1 lozenge, 1 lozenge, Mouth/Throat, q2h PRN, Sal Ibrhaim MD    calcium carbonate (Tums) chewable tablet 500 mg, 500 mg, oral, 4x daily PRN, Girma Vanegas, JUNG-CNP, 500 mg at 02/06/24 1822    cholecalciferol (Vitamin D-3) tablet 2,000 Units, 2,000 Units, oral, Daily, Sal Ibrahim MD, 2,000 Units at 02/07/24 1003    dextromethorphan-guaifenesin (Robitussin DM)  mg/5 mL oral liquid 5 mL, 5 mL, oral, q4h PRN, Sal Ibrahim MD    dextrose 10 % in water (D10W) infusion, 0.3 g/kg/hr, intravenous, Once PRN, Sal Ibrahim MD    dextrose 50 % injection 25 g, 25 g, intravenous, q15 min PRN, Sal Ibrahim MD    dilTIAZem CD (Cardizem CD)  24 hr capsule 120 mg, 120 mg, oral, BID, Darrius Ibrahim MD, 120 mg at 02/07/24 1003    glucagon (Glucagen) injection 1 mg, 1 mg, intramuscular, q15 min PRN, Sal Ibrahim MD    guaiFENesin (Mucinex) 12 hr tablet 600 mg, 600 mg, oral, q12h PRN, Sal Ibrahim MD    levothyroxine (Synthroid, Levoxyl) tablet 100 mcg, 100 mcg, oral, Daily before breakfast, Sal Ibrahim MD, 100 mcg at 02/07/24 0530    multivitamin 1 tablet, 1 tablet, oral, Daily, Sal Ibrahim MD, 1 tablet at 02/07/24 1004    ondansetron ODT (Zofran-ODT) disintegrating tablet 4 mg, 4 mg, oral, q8h PRN **OR** ondansetron (Zofran) injection 4 mg, 4 mg, intravenous, q8h PRN, Sal Ibrahim MD    pantoprazole (ProtoNix) EC tablet 40 mg, 40 mg, oral, Daily before breakfast, Sal Ibrahim MD, 40 mg at 02/07/24 0530    perflutren lipid microspheres (Definity) injection 0.5-10 mL of dilution, 0.5-10 mL of dilution, intravenous, Once in imaging, Henry Ricci DO    perflutren protein A microsphere (Optison) injection 0.5 mL, 0.5 mL, intravenous, Once in imaging, Henry Ricci DO    polyethylene glycol (Glycolax, Miralax) packet 17 g, 17 g, oral, Daily PRN, Sal Ibrahim MD, 17 g at 02/04/24 0844    sertraline (Zoloft) tablet 150 mg, 150 mg, oral, Daily, Stefanie Verma DO, 150 mg at 02/07/24 1003    sodium chloride 0.9% infusion, 100 mL/hr, intravenous, Continuous, Darrius Ibrahim MD, Stopped at 02/05/24 1300    sulfur hexafluoride microsphr (Lumason) injection 24.28 mg, 2 mL, intravenous, Once in imaging, Henry Ricci DO    Dietary Orders (From admission, onward)       Start     Ordered    02/07/24 1539  Oral nutritional supplements  Until discontinued        Comments: Please send chocolate and vanilla   Question Answer Comment   Deliver with All meals    Select supplement: Ensure Compact        02/07/24 1538    02/02/24 1609  Adult diet Regular  Diet effective now        Question:  Diet type  Answer:  Regular    02/02/24  1608    02/01/24 1601  May Participate in Room Service  Once        Question:  .  Answer:  Yes    02/01/24 1601                  Estimated Needs:   Estimated Energy Needs  Total Energy Estimated Needs (kCal):  (0662-6696)  Total Estimated Energy Need per Day (kCal/kg):  (25-30)  Method for Estimating Needs: ABW    Estimated Protein Needs  Total Protein Estimated Needs (g):  (51-64)  Total Protein Estimated Needs (g/kg):  (0.8-1)  Method for Estimating Needs: ABW    Estimated Fluid Needs  Total Fluid Estimated Needs (mL):  (0499-5081)  Method for Estimating Needs: 1 mL/kcal        Nutrition Diagnosis   Nutrition Diagnosis:       Nutrition Diagnosis  Patient has Nutrition Diagnosis: Yes  Diagnosis Status (1): New  Nutrition Diagnosis 1: Inadequate energy intake  Related to (1): Decreased ability to consume sufficient energy  As Evidenced by (1): Poor appetite       Nutrition Interventions/Recommendations   Nutrition Interventions and Recommendations:    Nutrition Prescription:  Individualized Nutrition Prescription Provided for : 1889-3329 kcals, 51-64 gm protein via diet    Nutrition Interventions:   Food and/or Nutrient Delivery Interventions  Interventions: Meals and snacks, Medical food supplement  Meals and Snacks: General healthful diet  Goal: Provide diet as ordered  Medical Food Supplement: Commercial beverage  Goal: Will provide Ensure Compact TID to increase PO intake-- provides 220 kcals, 9 gm protein per serving  Additional Interventions: Discussed heart-healthy diet    Education Documentation  Nutrition Care Manual, taught by Ema Gresham RDN, LD at 2/7/2024  3:03 PM.  Learner: Patient  Readiness: Acceptance  Method: Explanation, Handout  Response: Verbalizes Understanding  Comment: Heart-healthy diet             Nutrition Monitoring and Evaluation   Monitoring/Evaluation:   Food/Nutrient Related History Monitoring  Monitoring and Evaluation Plan: Energy intake  Energy Intake: Estimated energy  intake  Criteria: Pt to consume >/= 75% of estimated needs         Time Spent/Follow-up:   Follow Up  Time Spent (min): 20 minutes  Last Date of Nutrition Visit: 02/07/24  Nutrition Follow-Up Needed?: 5-7 days  Follow up Comment: 2/13/24

## 2024-02-07 NOTE — CARE PLAN
The patient's goals for the shift include no chest pain    The clinical goals for the shift include monitor heart rate    Over the shift, the patient did not make progress toward the following goals. Barriers to progression include . Recommendations to address these barriers include .

## 2024-02-07 NOTE — PROGRESS NOTES
Occupational Therapy    OT Treatment    Patient Name: Linette Doyle  MRN: [id number]  Today's Date: 2/7/2024  Time Calculation  Start Time: 0810  Stop Time: 0834  Time Calculation (min): 24 min         Assessment:  OT Assessment: tolerated session well, demonstrating increased activity tolerance with decreased anxiety present during tasks.  End of Session Communication: Bedside nurse  End of Session Patient Position: Up in chair  OT Assessment Results: Decreased ADL status, Decreased safe judgment during ADL, Decreased cognition, Decreased endurance, Decreased functional mobility, Decreased IADLs  Plan:  Treatment Interventions: ADL retraining, Functional transfer training, Endurance training, Patient/family training, Equipment evaluation/education, Neuromuscular reeducation, Compensatory technique education  OT Frequency: 4 times per week  OT Discharge Recommendations: Low intensity level of continued care, 24 hr supervision due to cognition  OT Recommended Transfer Status: Minimal assist, Stand by assist (CGA to close S)  OT - OK to Discharge: Yes  Treatment Interventions: ADL retraining, Functional transfer training, Endurance training, Patient/family training, Equipment evaluation/education, Neuromuscular reeducation, Compensatory technique education    Subjective   Previous Visit Info:  OT Last Visit  OT Received On: 02/07/24  General:  General  Missed Visit: Yes  Missed Visit Reason: Cancel (Per RN, hold OT tx at this time d/t elevated HR and low BP (on cardizem drip))  Prior to Session Communication: Bedside nurse  Patient Position Received: Bed, 3 rail up  General Comment: cleared for therapy per RN. Pt supine in bed and agreeable to tx session  Precautions:  Medical Precautions: Fall precautions  Vital Signs:  Vital Signs  Heart Rate: 70  SpO2: 99 %  Pain:  Pain Assessment  Pain Assessment: 0-10  Pain Score: 0 - No pain    Objective    Cognition:  Cognition  Overall Cognitive Status: Within Functional  Limits    Activities of Daily Living: Grooming  Grooming Level of Assistance: Close supervision  Grooming Where Assessed: Standing sinkside  Grooming Comments: oral hygiene and face washing tasks  Functional Standing Tolerance:  Time: 6 min  Functional Standing Tolerance Comments: tolerated standing sinkside during ADL tasks  Bed Mobility/Transfers: Bed Mobility  Bed Mobility: Yes  Bed Mobility 1  Bed Mobility 1: Supine to sitting  Level of Assistance 1: Close supervision  Bed Mobility Comments 1: VC to scoot hips forward    Transfers  Transfer: Yes  Transfer 1  Transfer From 1: Sit to  Transfer to 1: Stand  Technique 1: Sit to stand  Transfer Level of Assistance 1: Contact guard  Trials/Comments 1: CGA sit>stand from bed with VC for proper hand placement  Transfers 2  Transfer From 2: Stand to  Transfer to 2: Sit  Technique 2: Stand to sit  Transfer Level of Assistance 2: Close supervision  Trials/Comments 2: VC for eccentric control    Standing Balance:  Static Standing Balance  Static Standing-Level of Assistance: Contact guard  Static Standing-Comment/Number of Minutes: fair    Therapy/Activity: Therapeutic Exercise  Therapeutic Exercise Activity 1: participated in AROM BUE exercises 4x12 in all planes to improve strength to increase independence with transfers    Therapeutic Activity  Therapeutic Activity Performed: Yes  Therapeutic Activity 1: participated in functional mobility   a short household distance with CGA with VC to pace task to increase safety awareness      Outcome Measures:[facility] Daily Activity  Putting on and taking off regular lower body clothing: None  Bathing (including washing, rinsing, drying): A little  Putting on and taking off regular upper body clothing: None  Toileting, which includes using toilet, bedpan or urinal: A little  Taking care of personal grooming such as brushing teeth: None  Eating Meals: None  Daily Activity - Total Score: 22        Education Documentation  ADL Training,  taught by COLLEEN Leonardo at 2/7/2024  9:24 AM.  Learner: Patient  Readiness: Acceptance  Method: Explanation  Response: Verbalizes Understanding    Education Comments  No comments found.    Problem: OT Goals  Goal: Pt will complete all functional transfers and mobility with mod indep using a device or no device.  Outcome: Progressing  Goal: Pt will complete all ADL's/IADL's with mod indep using a device as needed.  Outcome: Progressing

## 2024-02-07 NOTE — PROGRESS NOTES
Subjective Data:  83-year-old patient with a history of prior UTI, short of breath, acute non-ST elation MI.  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  Medical therapy at the moment.  With a normal ejection fraction.  Episode of atrial fibrillation has resolved currently in a paced rhythm  Patient has underlying history of tacky bradycardia arrhythmias.  Continue current rate control medication including atenolol and Cardizem.  Overnight Events:    None  Objective   Last Recorded Vitals  /62 (BP Location: Right arm, Patient Position: Lying)   Pulse 62   Temp 36.8 °C (98.2 °F) (Temporal)   Resp 20   Wt 85.5 kg (188 lb 7.9 oz)   SpO2 96%     Intake/Output Summary (Last 24 hours) at 2/7/2024 1347  Last data filed at 2/6/2024 2347  Gross per 24 hour   Intake 222.33 ml   Output 400 ml   Net -177.67 ml     Physical Exam:  HEENT: Normocephalic/atraumatic pupils equal react light  Neck exam mild JVD, no bruit  Lung exam clear to auscultation, few crackles at the bases  Cardiac exam is regular rhythm S1-S2, soft slight murmur heard.  No S3 heard.  Abdomen soft nontender, nondistended  Extremities no clubbing, cyanosis but trace edema  Neuro exam grossly intact.  Image Results  XR chest 1 view  Narrative: Interpreted By:  Mc Carranza,   STUDY:  XR CHEST 1 VIEW; 2/2/2024 2:34 pm      INDICATION:  Signs/Symptoms:hypoxia.      COMPARISON:  01/31/2024      ACCESSION NUMBER(S):  [id number]      ORDERING CLINICIAN:  STORMY NAGEL      TECHNIQUE:  1 view of the chest was performed.      FINDINGS:  Left chest wall pacemaker and leads appear stable. There may be a  small left pleural effusion, no right pleural effusion. There is  increased perihilar airspace opacity which can be related to  congestive changes and increased pulmonary edema.. No pneumothorax.  The cardiomediastinal silhouette is borderline mildly enlarged but  stable      Impression: Increased  perihilar airspace opacity which can be seen with pulmonary  edema. There may be a small left pleural effusion.      Signed by: Mc Carranza 2/2/2024 3:36 PM  Dictation workstation:   [id number]  Transthoracic Echo (TTE) Complete             [place] [id number]             Phone [phone]    TRANSTHORACIC ECHOCARDIOGRAM REPORT       Patient Name:      SAMUEL LANZA WILFREDO         Reading Physician:    [id number] Becky Vela MD  Study Date:        2/1/2024              Ordering Provider:    [id number] MELANIE HARDWICK  MRN/PID:           [id number]              Fellow:  Accession#:        [id number]          Nurse:  Date of Birth/Age: 1940 / 83 years Sonographer:          Yamileth Houser RDCS  Gender:            F                     Additional Staff:  Height:            162.00 cm             Admit Date:  Weight:            68.00 kg              Admission Status:     Inpatient -                                                                 Routine  BSA:               1.73 m2               Department Location:  [facility] ER  Blood Pressure: 166 /72 mmHg    Study Type:    TRANSTHORACIC ECHO (TTE) COMPLETE  Diagnosis/ICD: Non ST elevation (NSTEMI) myocardial infarction-I21.4  Indication:    nstemi  CPT Codes:     Echo Complete w Full Doppler-[id number]    Patient History:  BMI:               Overweight 25 - 30  Pacer/Defib:       Permanent pacemaker  Pertinent History: CAD and HTN. ckd,stent,sss,falls, sob.    Study Detail: The following Echo studies were performed: 2D, M-Mode, Doppler and                color flow. Technically challenging study due to prominent lung                artifact.       PHYSICIAN INTERPRETATION:  Left Ventricle: Left ventricular systolic function is normal,  with an estimated ejection fraction of 55-60%. There are no regional wall motion abnormalities. The left ventricular cavity size is normal. Spectral Doppler shows a normal pattern of left ventricular diastolic filling. The inferior wall is mildly hypokinetic.  Left Atrium: The left atrium is mildly dilated.  Right Ventricle: The right ventricle is normal in size. There is normal right ventricular global systolic function. A device is visualized in the right ventricle.  Right Atrium: The right atrium is normal in size. There is a device visualized in the right atrium.  Aortic Valve: The aortic valve appears structurally normal. There is no evidence of aortic valve regurgitation. The peak instantaneous gradient of the aortic valve is 4.7 mmHg.  Mitral Valve: The mitral valve is normal in structure. There is moderate mitral valve regurgitation which is posteriorly directed.  Tricuspid Valve: The tricuspid valve is structurally normal. There is mild tricuspid regurgitation. The Doppler estimated RVSP is moderately elevated at 61.6 mmHg.  Pulmonic Valve: The pulmonic valve is structurally normal. There is mild pulmonic valve regurgitation.  Pericardium: There is a trivial pericardial effusion.  Aorta: The aortic root is normal.  Systemic Veins: The inferior vena cava appears to be of normal size. There is less than 50% IVC collapse with inspiration.       CONCLUSIONS:   1. Left ventricular systolic function is normal with a 55-60% estimated ejection fraction.   2. The inferior wall is mildly hypokinetic.   3. Moderate mitral valve regurgitation.   4. Mild tricuspid regurgitation is visualized.   5. Moderately elevated right ventricular systolic pressure.    QUANTITATIVE DATA SUMMARY:  2D MEASUREMENTS:                           Normal Ranges:  LAs:           3.10 cm   (2.7-4.0cm)  IVSd:          0.63 cm   (0.6-1.1cm)  LVPWd:         0.64 cm   (0.6-1.1cm)  LVIDd:         5.64 cm   (3.9-5.9cm)  LV Mass Index: 72.6  g/m2    LV SYSTOLIC FUNCTION BY 2D PLANIMETRY (MOD):                      Normal Ranges:  EF-A4C View: 56.8 % (>=55%)  EF-A2C View: 61.9 %  EF-Biplane:  59.3 %    LV DIASTOLIC FUNCTION:                         Normal Ranges:  MV Peak E:    1.88 m/s (0.7-1.2 m/s)  MV Peak A:    0.54 m/s (0.42-0.7 m/s)  E/A Ratio:    3.49     (1.0-2.2)  MV e'         0.08 m/s (>8.0)  MV lateral e' 0.08 m/s  MV medial e'  0.08 m/s  E/e' Ratio:   24.67    (<8.0)  a'            0.08 m/s    MITRAL VALVE:                        Normal Ranges:  MV Vmax:    2.27 m/s  (<=1.3m/s)  MV peak P.6 mmHg (<5mmHg)  MV mean P.0 mmHg  (<48mmHg)  MV DT:      409 msec  (150-240msec)    AORTIC VALVE:                          Normal Ranges:  AoV Vmax:      1.08 m/s (<=1.7m/s)  AoV Peak P.7 mmHg (<20mmHg)  LVOT Max Yasir:  0.72 m/s (<=1.1m/s)  LVOT Diameter: 1.90 cm  (1.8-2.4cm)  AoV Area,Vmax: 1.89 cm2 (2.5-4.5cm2)       RIGHT VENTRICLE:  RV s' 0.16 m/s    TRICUSPID VALVE/RVSP:                              Normal Ranges:  Peak TR Velocity: 3.66 m/s  RV Syst Pressure: 61.6 mmHg (< 30mmHg)  IVC Diam:         1.80 cm       [id number] Becky Vela MD  Electronically signed on 2024 at 8:24:54 AM       ** Final **    Last Labs:  CBC - 2024:  4:47 AM  9.0 9.3 148    29.7      CMP - 2024:  4:47 AM  9.0 6.1 41 --- 0.5   _ 3.3 42 77      PTT - 2024:  6:25 AM  _   _ 121.0     Inpatient Medications:  Scheduled medications   Medication Dose Route Frequency    apixaban  5 mg oral q12h    aspirin  81 mg oral Daily    atenolol  25 mg oral BID    atorvastatin  80 mg oral Nightly    cefTRIAXone  1 g intravenous Daily    cholecalciferol  2,000 Units oral Daily    dilTIAZem CD  120 mg oral BID    levothyroxine  100 mcg oral Daily before breakfast    multivitamin  1 tablet oral Daily    pantoprazole  40 mg oral Daily before breakfast    perflutren lipid microspheres  0.5-10 mL of dilution intravenous Once in imaging    perflutren protein A microsphere   0.5 mL intravenous Once in imaging    sertraline  150 mg oral Daily    sulfur hexafluoride microsphr  2 mL intravenous Once in imaging     Principal Problem:    NSTEMI (non-ST elevated myocardial infarction) (CMS/HCC)  Active Problems:    Coronary arteriosclerosis    Atherosclerosis of coronary artery    Hypothyroidism    Hypercholesterolemia    Sick sinus syndrome (CMS/HCC)    Impaired fasting glucose    Leukocytosis    Assessment/Plan   Patient has above history of tacky bradycardia arrhythmia, status post permanent pacemaker now with A-fib RVR.  Converted back into sinus rhythm but p.o. atenolol as well as Cardizem.  Patient high risk for recurrence of A-fib RVR  Underlying anxiety issue due to recently personal financial crisis.  Currently stable at the moment.  Continue current Eliquis as well as aspirin.  Modify risk factor.  Continue guideline directed medical therapy for atrial fibrillation rate control and rhythm control as well as anticoagulation.  Modify risk factor.  Continue guideline directed medical therapy for acute non-ST elevation MI.  Critical care time is spent at bedside includes review of diagnostic tests, labs, and radiographs, serial assessments and management of hemodynamics, EKGs, old echoes, cardiac work-up and coordination of care.  Assessment, impression and plans are reflected in the note above as well as the orders.  Patient is at high risk for readmissions due to ongoing situation.  Outpatient office follow-up in about 2 weeks.  Code Status:  Full Code  I spent 60 minutes in the professional and overall care of this patient.  Darrius Ibrahim MD

## 2024-02-07 NOTE — CARE PLAN
The patient's goals for the shift include no chest pain    The clinical goals for the shift include monitor heart rate and rest    Over the shift, the patient did not make progress toward the following goals. Barriers to progression include n/a. Recommendations to address these barriers include rest.

## 2024-02-08 LAB
ALBUMIN SERPL-MCNC: 3.7 G/DL (ref 3.5–5)
ALP BLD-CCNC: 85 U/L (ref 35–125)
ALT SERPL-CCNC: 46 U/L (ref 5–40)
ANION GAP SERPL CALC-SCNC: 14 MMOL/L
ANION GAP SERPL CALC-SCNC: 16 MMOL/L
APTT PPP: 115.8 SECONDS (ref 22–32.5)
APTT PPP: 45 SECONDS (ref 22–32.5)
AST SERPL-CCNC: 36 U/L (ref 5–40)
BASOPHILS # BLD AUTO: 0.04 X10*3/UL (ref 0–0.1)
BASOPHILS NFR BLD AUTO: 0.4 %
BILIRUB SERPL-MCNC: 0.5 MG/DL (ref 0.1–1.2)
BUN SERPL-MCNC: 45 MG/DL (ref 8–25)
BUN SERPL-MCNC: 47 MG/DL (ref 8–25)
CALCIUM SERPL-MCNC: 9 MG/DL (ref 8.5–10.4)
CALCIUM SERPL-MCNC: 9.1 MG/DL (ref 8.5–10.4)
CHLORIDE SERPL-SCNC: 103 MMOL/L (ref 97–107)
CHLORIDE SERPL-SCNC: 98 MMOL/L (ref 97–107)
CO2 SERPL-SCNC: 20 MMOL/L (ref 24–31)
CO2 SERPL-SCNC: 21 MMOL/L (ref 24–31)
CREAT SERPL-MCNC: 1.5 MG/DL (ref 0.4–1.6)
CREAT SERPL-MCNC: 1.7 MG/DL (ref 0.4–1.6)
EGFRCR SERPLBLD CKD-EPI 2021: 29 ML/MIN/1.73M*2
EGFRCR SERPLBLD CKD-EPI 2021: 34 ML/MIN/1.73M*2
EOSINOPHIL # BLD AUTO: 0.04 X10*3/UL (ref 0–0.4)
EOSINOPHIL NFR BLD AUTO: 0.4 %
ERYTHROCYTE [DISTWIDTH] IN BLOOD BY AUTOMATED COUNT: 13.6 % (ref 11.5–14.5)
GLUCOSE SERPL-MCNC: 198 MG/DL (ref 65–99)
GLUCOSE SERPL-MCNC: 201 MG/DL (ref 65–99)
HCT VFR BLD AUTO: 30.7 % (ref 36–46)
HGB BLD-MCNC: 9.5 G/DL (ref 12–16)
IMM GRANULOCYTES # BLD AUTO: 0.13 X10*3/UL (ref 0–0.5)
IMM GRANULOCYTES NFR BLD AUTO: 1.2 % (ref 0–0.9)
INR PPP: 1.1 (ref 0.9–1.2)
LYMPHOCYTES # BLD AUTO: 0.73 X10*3/UL (ref 0.8–3)
LYMPHOCYTES NFR BLD AUTO: 6.8 %
MCH RBC QN AUTO: 28.4 PG (ref 26–34)
MCHC RBC AUTO-ENTMCNC: 30.9 G/DL (ref 32–36)
MCV RBC AUTO: 92 FL (ref 80–100)
MONOCYTES # BLD AUTO: 0.14 X10*3/UL (ref 0.05–0.8)
MONOCYTES NFR BLD AUTO: 1.3 %
NEUTROPHILS # BLD AUTO: 9.59 X10*3/UL (ref 1.6–5.5)
NEUTROPHILS NFR BLD AUTO: 89.9 %
NRBC BLD-RTO: 0 /100 WBCS (ref 0–0)
PLATELET # BLD AUTO: 218 X10*3/UL (ref 150–450)
POTASSIUM SERPL-SCNC: 4.6 MMOL/L (ref 3.4–5.1)
POTASSIUM SERPL-SCNC: 4.6 MMOL/L (ref 3.4–5.1)
PROT SERPL-MCNC: 6.8 G/DL (ref 5.9–7.9)
PROTHROMBIN TIME: 11.9 SECONDS (ref 9.3–12.7)
RBC # BLD AUTO: 3.35 X10*6/UL (ref 4–5.2)
SODIUM SERPL-SCNC: 135 MMOL/L (ref 133–145)
SODIUM SERPL-SCNC: 137 MMOL/L (ref 133–145)
TROPONIN T SERPL-MCNC: 3769 NG/L
WBC # BLD AUTO: 10.7 X10*3/UL (ref 4.4–11.3)

## 2024-02-08 PROCEDURE — 2720000007 HC OR 272 NO HCPCS: Performed by: INTERNAL MEDICINE

## 2024-02-08 PROCEDURE — 2780000003 HC OR 278 NO HCPCS: Performed by: INTERNAL MEDICINE

## 2024-02-08 PROCEDURE — C1769 GUIDE WIRE: HCPCS | Performed by: INTERNAL MEDICINE

## 2024-02-08 PROCEDURE — 85347 COAGULATION TIME ACTIVATED: CPT

## 2024-02-08 PROCEDURE — 99222 1ST HOSP IP/OBS MODERATE 55: CPT | Performed by: INTERNAL MEDICINE

## 2024-02-08 PROCEDURE — 99152 MOD SED SAME PHYS/QHP 5/>YRS: CPT | Performed by: INTERNAL MEDICINE

## 2024-02-08 PROCEDURE — 2550000001 HC RX 255 CONTRASTS: Performed by: INTERNAL MEDICINE

## 2024-02-08 PROCEDURE — 2500000001 HC RX 250 WO HCPCS SELF ADMINISTERED DRUGS (ALT 637 FOR MEDICARE OP): Performed by: INTERNAL MEDICINE

## 2024-02-08 PROCEDURE — 36415 COLL VENOUS BLD VENIPUNCTURE: CPT | Performed by: HOSPITALIST

## 2024-02-08 PROCEDURE — 2500000001 HC RX 250 WO HCPCS SELF ADMINISTERED DRUGS (ALT 637 FOR MEDICARE OP): Performed by: REGISTERED NURSE

## 2024-02-08 PROCEDURE — C9600 PERC DRUG-EL COR STENT SING: HCPCS | Performed by: INTERNAL MEDICINE

## 2024-02-08 PROCEDURE — 84484 ASSAY OF TROPONIN QUANT: CPT | Performed by: HOSPITALIST

## 2024-02-08 PROCEDURE — 2500000004 HC RX 250 GENERAL PHARMACY W/ HCPCS (ALT 636 FOR OP/ED): Mod: MUE | Performed by: INTERNAL MEDICINE

## 2024-02-08 PROCEDURE — 2060000001 HC INTERMEDIATE ICU ROOM DAILY

## 2024-02-08 PROCEDURE — 93454 CORONARY ARTERY ANGIO S&I: CPT | Performed by: INTERNAL MEDICINE

## 2024-02-08 PROCEDURE — 2500000005 HC RX 250 GENERAL PHARMACY W/O HCPCS: Performed by: INTERNAL MEDICINE

## 2024-02-08 PROCEDURE — 027035Z DILATION OF CORONARY ARTERY, ONE ARTERY WITH TWO DRUG-ELUTING INTRALUMINAL DEVICES, PERCUTANEOUS APPROACH: ICD-10-PCS | Performed by: INTERNAL MEDICINE

## 2024-02-08 PROCEDURE — 99153 MOD SED SAME PHYS/QHP EA: CPT | Performed by: INTERNAL MEDICINE

## 2024-02-08 PROCEDURE — 85025 COMPLETE CBC W/AUTO DIFF WBC: CPT | Performed by: HOSPITALIST

## 2024-02-08 PROCEDURE — 80048 BASIC METABOLIC PNL TOTAL CA: CPT | Mod: CCI | Performed by: INTERNAL MEDICINE

## 2024-02-08 PROCEDURE — C1760 CLOSURE DEV, VASC: HCPCS | Performed by: INTERNAL MEDICINE

## 2024-02-08 PROCEDURE — 80053 COMPREHEN METABOLIC PANEL: CPT | Performed by: HOSPITALIST

## 2024-02-08 PROCEDURE — 85610 PROTHROMBIN TIME: CPT | Performed by: INTERNAL MEDICINE

## 2024-02-08 PROCEDURE — C1887 CATHETER, GUIDING: HCPCS | Performed by: INTERNAL MEDICINE

## 2024-02-08 PROCEDURE — 92928 PRQ TCAT PLMT NTRAC ST 1 LES: CPT | Performed by: INTERNAL MEDICINE

## 2024-02-08 PROCEDURE — 2500000004 HC RX 250 GENERAL PHARMACY W/ HCPCS (ALT 636 FOR OP/ED): Performed by: INTERNAL MEDICINE

## 2024-02-08 PROCEDURE — G0269 OCCLUSIVE DEVICE IN VEIN ART: HCPCS | Mod: TC | Performed by: INTERNAL MEDICINE

## 2024-02-08 PROCEDURE — 2500000004 HC RX 250 GENERAL PHARMACY W/ HCPCS (ALT 636 FOR OP/ED): Performed by: HOSPITALIST

## 2024-02-08 PROCEDURE — C1874 STENT, COATED/COV W/DEL SYS: HCPCS | Performed by: INTERNAL MEDICINE

## 2024-02-08 PROCEDURE — 85730 THROMBOPLASTIN TIME PARTIAL: CPT | Performed by: INTERNAL MEDICINE

## 2024-02-08 PROCEDURE — 36415 COLL VENOUS BLD VENIPUNCTURE: CPT | Performed by: INTERNAL MEDICINE

## 2024-02-08 DEVICE — STENT ONYXNG25018UX ONYX 2.50X18RX
Type: IMPLANTABLE DEVICE | Site: HEART | Status: FUNCTIONAL
Brand: ONYX FRONTIER™

## 2024-02-08 RX ORDER — SODIUM CHLORIDE 9 MG/ML
100 INJECTION, SOLUTION INTRAVENOUS CONTINUOUS
Status: DISCONTINUED | OUTPATIENT
Start: 2024-02-08 | End: 2024-02-08

## 2024-02-08 RX ORDER — CLOPIDOGREL BISULFATE 75 MG/1
75 TABLET ORAL DAILY
Status: DISCONTINUED | OUTPATIENT
Start: 2024-02-09 | End: 2024-02-14 | Stop reason: HOSPADM

## 2024-02-08 RX ORDER — DEXTROSE MONOHYDRATE AND SODIUM CHLORIDE 5; .45 G/100ML; G/100ML
100 INJECTION, SOLUTION INTRAVENOUS CONTINUOUS
Status: DISCONTINUED | OUTPATIENT
Start: 2024-02-08 | End: 2024-02-08

## 2024-02-08 RX ORDER — METOPROLOL SUCCINATE 25 MG/1
25 TABLET, EXTENDED RELEASE ORAL 2 TIMES DAILY
Status: DISCONTINUED | OUTPATIENT
Start: 2024-02-08 | End: 2024-02-09

## 2024-02-08 RX ORDER — FENTANYL CITRATE 50 UG/ML
INJECTION, SOLUTION INTRAMUSCULAR; INTRAVENOUS AS NEEDED
Status: DISCONTINUED | OUTPATIENT
Start: 2024-02-08 | End: 2024-02-08 | Stop reason: HOSPADM

## 2024-02-08 RX ORDER — NITROGLYCERIN 40 MG/100ML
INJECTION INTRAVENOUS AS NEEDED
Status: DISCONTINUED | OUTPATIENT
Start: 2024-02-08 | End: 2024-02-08 | Stop reason: HOSPADM

## 2024-02-08 RX ORDER — HEPARIN SODIUM 1000 [USP'U]/ML
INJECTION, SOLUTION INTRAVENOUS; SUBCUTANEOUS AS NEEDED
Status: DISCONTINUED | OUTPATIENT
Start: 2024-02-08 | End: 2024-02-08 | Stop reason: HOSPADM

## 2024-02-08 RX ORDER — LIDOCAINE HYDROCHLORIDE 10 MG/ML
INJECTION, SOLUTION EPIDURAL; INFILTRATION; INTRACAUDAL; PERINEURAL AS NEEDED
Status: DISCONTINUED | OUTPATIENT
Start: 2024-02-08 | End: 2024-02-08 | Stop reason: HOSPADM

## 2024-02-08 RX ORDER — MIDAZOLAM HYDROCHLORIDE 1 MG/ML
INJECTION, SOLUTION INTRAMUSCULAR; INTRAVENOUS AS NEEDED
Status: DISCONTINUED | OUTPATIENT
Start: 2024-02-08 | End: 2024-02-08 | Stop reason: HOSPADM

## 2024-02-08 RX ORDER — SODIUM CHLORIDE 9 MG/ML
INJECTION, SOLUTION INTRAVENOUS CONTINUOUS PRN
Status: DISCONTINUED | OUTPATIENT
Start: 2024-02-08 | End: 2024-02-08 | Stop reason: HOSPADM

## 2024-02-08 RX ORDER — IODIXANOL 320 MG/ML
INJECTION, SOLUTION INTRAVASCULAR AS NEEDED
Status: DISCONTINUED | OUTPATIENT
Start: 2024-02-08 | End: 2024-02-08 | Stop reason: HOSPADM

## 2024-02-08 RX ADMIN — METOPROLOL SUCCINATE 25 MG: 25 TABLET, EXTENDED RELEASE ORAL at 21:35

## 2024-02-08 RX ADMIN — Medication 2000 UNITS: at 14:48

## 2024-02-08 RX ADMIN — MULTIVITAMIN TABLET 1 TABLET: TABLET at 14:48

## 2024-02-08 RX ADMIN — SERTRALINE HYDROCHLORIDE 150 MG: 50 TABLET ORAL at 14:48

## 2024-02-08 RX ADMIN — Medication: at 08:00

## 2024-02-08 RX ADMIN — SODIUM CHLORIDE 100 ML/HR: 900 INJECTION, SOLUTION INTRAVENOUS at 10:12

## 2024-02-08 RX ADMIN — ISOSORBIDE DINITRATE 10 MG: 10 TABLET ORAL at 21:35

## 2024-02-08 RX ADMIN — HEPARIN SODIUM 4000 UNITS: 5000 INJECTION, SOLUTION INTRAVENOUS; SUBCUTANEOUS at 00:01

## 2024-02-08 RX ADMIN — ASPIRIN 81 MG 81 MG: 81 TABLET ORAL at 14:48

## 2024-02-08 RX ADMIN — ATORVASTATIN CALCIUM 80 MG: 80 TABLET, FILM COATED ORAL at 21:35

## 2024-02-08 RX ADMIN — HEPARIN SODIUM AND DEXTROSE 1000 UNITS/HR: 10000; 5 INJECTION INTRAVENOUS at 00:00

## 2024-02-08 RX ADMIN — DILTIAZEM HYDROCHLORIDE 120 MG: 120 CAPSULE, COATED, EXTENDED RELEASE ORAL at 21:35

## 2024-02-08 RX ADMIN — ISOSORBIDE DINITRATE 10 MG: 10 TABLET ORAL at 14:48

## 2024-02-08 ASSESSMENT — COGNITIVE AND FUNCTIONAL STATUS - GENERAL
STANDING UP FROM CHAIR USING ARMS: A LITTLE
WALKING IN HOSPITAL ROOM: A LITTLE
WALKING IN HOSPITAL ROOM: A LITTLE
MOBILITY SCORE: 21
STANDING UP FROM CHAIR USING ARMS: A LITTLE
CLIMB 3 TO 5 STEPS WITH RAILING: A LITTLE
DAILY ACTIVITIY SCORE: 24
CLIMB 3 TO 5 STEPS WITH RAILING: A LITTLE
MOBILITY SCORE: 21
DAILY ACTIVITIY SCORE: 24

## 2024-02-08 ASSESSMENT — PAIN - FUNCTIONAL ASSESSMENT
PAIN_FUNCTIONAL_ASSESSMENT: 0-10

## 2024-02-08 ASSESSMENT — PAIN SCALES - GENERAL
PAINLEVEL_OUTOF10: 0 - NO PAIN

## 2024-02-08 NOTE — NURSING NOTE
Assumed care of this patient, A&O x4, sitting on side of bed, no c/o pain or discomfort, will be NPO at midnight with sips of water for left heart cath on 2/8/24, on 3L of O2, no skin issues, will continue with POC

## 2024-02-08 NOTE — PROGRESS NOTES
Linette Doyle is a 83 y.o. female on day 7 of admission presenting with NSTEMI (non-ST elevated myocardial infarction) (CMS/HCC).      Subjective   Tearful and crying when I saw her. Says she is so anxious and doesn't feel right. Says she just wishes her  could be here.    I reassured her, explained what happened in cath lab, and answered questions. She started to settle, said she knows that she just needs to try to relax. Says she is going to eat a snack and try to take a nap. Calmer when I left.    Objective     Last Recorded Vitals  /66 (BP Location: Right arm, Patient Position: Lying)   Pulse 60   Temp 36.5 °C (97.7 °F) (Oral)   Resp 16   Wt 85.5 kg (188 lb 7.9 oz)   SpO2 93%   Intake/Output last 3 Shifts:    Intake/Output Summary (Last 24 hours) at 2/8/2024 1428  Last data filed at 2/8/2024 1142  Gross per 24 hour   Intake 158.72 ml   Output 10 ml   Net 148.72 ml         Admission Weight  Weight: 68 kg (150 lb) (01/31/24 2058)    Daily Weight  02/07/24 : 85.5 kg (188 lb 7.9 oz)    Image Results  [place] [address]             Phone [phone]    Cardiovascular Catheterization Report    Patient Name:     LINETTE DOYLE        Performing           [id number] Darrius Easton                                         Physician:           MD  Study Date:       2/8/2024             Verifying Physician: Jaci Easton MD  MRN/PID:          [id number]             Cardiologist:  Accession#:       [id number]         Ordering Provider:   [id number] DARRIUS EASTON  Date of           1940 / 83      Fellow:  Birth/Age:        years  Gender:           F                    Fellow:  Encounter#:       [id number]       Study:            Left Heart Cath no LV  Additional Study: PCI - Percutaneous Coronary Intervention       Indications:  LINETTE DOYLE is a 83  year old female who presents with atrial fibrillation, coronary artery disease, dyslipidemia, hypertension, prior percutaneous coronary intervention, prior myocardial infarction and a chest pain assessment of typical angina. New onset angina <=2 months, worsening angina, suspected coronary artery disease and NSTE - ACS.     Appropriate Use Criteria:  Unstable angina with high risk score; AUC score = 9. Non ST elevation myocardial infarction with high risk score; AUC score = 9.  Stress test performed: No. CTA performed: No. [place]at[place] accessed: No. LVEF  Assessed: Yes. LVEF = 55%.  Cardiac arrest: No responsive following cardiac arrest.  Cardiac surgical consult: No cardiac surgery not recommended.  Cardiovascular Instability: Yes. Acute heart failure and persistent ischemic  symptoms.  Frailty status of patient entering lab: 6 = Moderately frail.       Procedure Description:  After infiltration with 2% Lidocaine, the right femoral artery was cannulated with a modified Seldinger technique. Subsequently a 6 Czech sheath was placed retrograde in the right femoral artery. Selective coronary catheterization was performed using a 6 Fr catheter(s) exchanged over a guide wire to cannulate the coronary arteries. A 4 tip catheter was used for left coronary injections.  Additional catheter(s) used to visualize the coronary arteries were: AR2 Mod. Multiple injections of contrast were made into the left and right coronary arteries with angiograms recorded in multiple projections. After completion of the procedure, femoral artery angiography was performed. This demonstrated a common femoral artery puncture appropriate for closure. An Angio-Seal Evolution 6F ([employer]) vascular closure device was placed per protocol.     Coronary Angiography:  The coronary circulation is right dominant.     Left Main Coronary Artery:  The left main coronary artery is a normal caliber vessel. The left main arises normally from the left  coronary sinus of Valsalva and bifurcates into the LAD and circumflex coronary arteries. The left main coronary artery showed no significant disease or stenosis greater than 30%.     Left Anterior Descending Coronary Artery Distribution:  The left anterior descending coronary artery is a normal caliber vessel. The LAD arises normally from the left main coronary artery. The LAD demonstrated no significant disease or stenosis greater than 30% and a previous patent stent.     Circumflex Coronary Artery Distribution:  The circumflex coronary artery is a small caliber vessel. The circumflex arises normally from the left main coronary artery. The circumflex revealed total occlusion and total occlusion originating at the proximal segment.     Right Coronary Artery Distribution:    The right coronary artery is a medium-sized caliber vessel. The RCA arises normally from the right sinus of Valsalva. The RCA showed significant proximal obstruction and significant mid to distal obstruction. The proximal third of the proximal and mid to distal right coronary artery showed 80% stenosis. This lesion was tubular, long, diffuse and concentric. This lesion's characteristics fall within the ACC/AHA type B (moderate success, medium risk) classification. This lesion determined to be suitable for percutaneous coronary intervention, suitable for coronary stenting and better suited for coronary stenting.     Coronary Interventions:  Angiography reveals a 80% stenosis of the proximal third of the proximal and mid to distal right coronary artery coronary artery. Pre-intervention KAILA flow was 2. Percutaneous coronary intervention was performed within the proximal third of the proximal and mid to distal right coronary artery. [place] Hennessey drug-eluting stent 2.5 mm x 18 mm was advanced to the lesion and implanted at 12 DEANDRE. A second [place] Deng drug-eluting stent 2.5 mm x 18 mm was implanted in overlap at 12 DEANDRE. The stenosis was  successfully reduced from 80% to 0%. Post-intervention KAILA flow was 3. We used no torque right coronary guide. PT graphic wire was placed in the distal right coronary artery  Over the wire we applied first 2.5 x 18 mm drug-eluting stents at the proximal right coronary artery deployed for 12 cindy.  After that we applied second stents 2.5 x 18 mm distal RCA segment which is about 80% lesions. Stent was deployed for about 12 cindy  Post PCI excellent flows and distal right coronary.  Patient received 180 mg Brilinta in the Cath Lab.  Patient also received about 5000's of heparin and Cath Lab.  Advised patient to continue dual antiplatelet epi with Eliquis for first month after 1 month continue only on Plavix and Eliquis.  Post PCI patient probably needs a cardiac rehab after 2 weeks.  Sheath was removed and Angio-Seal applied to right groin.  Patient tolerated procedure fairly well.  Patient had a diagnostic catheterization without LV gram and a PCI of her proximal and mid to distal RCA done.  Patient also received 400 mics of intracoronary nitroglycerin.     Coronary Intervention Comments:  Total contrast used 50 mL due to underlying CKD.     Coronary Lesion Summary:  Vessel   Stenosis                    Vessel Segment  RCA    80% stenosis proximal third of the proximal and mid to distal       Hemo Personnel:  +------------------+---------+  Name              Duty       +------------------+---------+  Darrius Ibrahim MDPROC MD 1  +------------------+---------+       Hemodynamic Pressures:     +----+--------------------+----------+-------------+--------------+---------+  Site     Date Time      Phase NameSystolic mmHgDiastolic mmHgMean mmHg  +----+--------------------+----------+-------------+--------------+---------+    AO2/8/2024 11:07:53 AM  AIR REST          119            48       73  +----+--------------------+----------+-------------+--------------+---------+    AO2/8/2024 11:16:49 AM  AIR  REST          125            47       80  +----+--------------------+----------+-------------+--------------+---------+    AO2/8/2024 11:18:47 AM  AIR REST          112            47       74  +----+--------------------+----------+-------------+--------------+---------+       Cardiac Cath Post Procedure Notes:  Post Procedure Diagnosis: Double vessel disease.  Blood Loss:               Estimated blood loss during the procedure was 10 mls.  Specimens Removed:        Number of specimen(s) removed: none.    ____________________________________________________________________________________  CONCLUSIONS:   1. Total contrast used 50 mL due to underlying CKD.   2. We used no torque right coronary guide. PT graphic wire was placed in the distal right coronary artery      Over the wire we applied first 2.5 x 18 mm drug-eluting stents at the proximal right coronary artery deployed for 12 cindy.      After that we applied second stents 2.5 x 18 mm distal RCA segment which is about 80% lesions. Stent was deployed for about 12 cindy      Post PCI excellent flows and distal right coronary.      Patient received 180 mg Brilinta in the Cath Lab.      Patient also received about 5000's of heparin and Cath Lab.      Advised patient to continue dual antiplatelet epi with Eliquis for first month after 1 month continue only on Plavix and Eliquis.      Post PCI patient probably needs a cardiac rehab after 2 weeks.      Sheath was removed and Angio-Seal applied to right groin.      Patient tolerated procedure fairly well.      Patient had a diagnostic catheterization without LV gram and a PCI of her proximal and mid to distal RCA done.      Patient also received 400 mics of intracoronary nitroglycerin.    ICD 10 Codes:  Atherosclerosic heart disease of native coronary artery with refractory angina pectoris-I25.112; Shortness of breath-R06.02; Chronic atrial fibrillation-I48.20; Acute diastolic (congestive) heart failure  (CHF)-I50.31; Non ST elevation (NSTEMI) myocardial infarction-I21.4; Subsequent non ST elevation (NSTEMI) myocardial infarction-I22.2     CPT Codes:  Left Heart Cath (visualization of coronaries) and LV-29792; Stent Right Coronary ea addl branch of major Artery (PCI)-80890.     [id number] Darrius Ibrahim MD  Performing Physician  Electronically signed by [id number] Darrius Ibrahim MD on 2/8/2024 at 1:07:05 PM         ** Final **  Cardiac catheterization - coronary  Recommendations:  Cardiac catheterization - coronary  Recommendations:      Physical Exam  General: alert, no diaphoresis   Lungs: CTA BL   Heart: RRR, no LE edema   GI: abdomen soft, nontender, nondistended, BS present   MSK: no joint effusion or deformity   Skin: no rashes, erythema, or ecchymosis   Neuro: grossly normal cognition, motor strength, sensation   Psych: anxious, tearful      Relevant Results               Assessment/Plan          This patient has a urinary catheter   Reason for the urinary catheter remaining today? critically ill patient who need accurate urinary output measurements          Principal Problem:    NSTEMI (non-ST elevated myocardial infarction) (CMS/HCC)  Active Problems:    Angina pectoris (CMS/HCC)    Coronary arteriosclerosis    Atherosclerosis of coronary artery    Hypothyroidism    Hypercholesterolemia    Shortness of breath    Sick sinus syndrome (CMS/HCC)    Chronic kidney failure, stage 3 (moderate) (CMS/HCC)    Impaired fasting glucose    Leukocytosis    NSTEMI  -Left heart catheterization showed total occlusion of the circumflex and moderate disease in the RCA.  Dr. Vela recommended staged heart catheterizations with PCI to address the lesion as an outpatient  -Having chest pain the last 3 days in a row.  I checked troponin levels yesterday which were almost 4000.  She was started on a heparin drip last night and Eliquis was stopped.  She went for heart cath today.  2 stents were placed by Dr. Ibrahim being minimal  contrast.  -Discussed with Dr. Ibrahim; start Plavix and Eliquis tomorrow.  Already on aspirin  -Will need triple therapy for unclear amount of time, will address with Dr. Ibrahim.  After 1 month will be transitioned to Eliquis plus Plavix alone    A-fib with RVR-now in normal sinus rhythm  -New diagnosis during this hospital stay.  Had I team last night for rapid heart rate.  Started on diltiazem and got a dose of IV amiodarone 150 mg.  -Difficulties with rate control initially.  Remained on Cardizem drip.  She converted yesterday afternoon.  She is now on oral atenolol and Cardizem per Dr. Ibrahim.  She is in a paced rhythm.  Will need to follow-up with electrophysiology and they will be able to interrogate her device and determine further management from there.  Discussed with Lata Sherman CNP.  -Started on Eliquis    Known CAD  -Continue therapy as above.     GRAY-creatinine stable today but slightly above baseline.  Likely from hypotension Tuesday.  Will continue to monitor and encouraging p.o. intake  - probably initially GARY was from contrast nephropathy in the setting of diuretic use for pulmonary congestion.   - dr. Giang signed off--I will him reinvolved if creatinine continues to climb    Dyspnea  - ?secondary from brillinta. CXR yesterday with questionable interstitial prominence. Stop fluids and monitor. Transitioning to plavix tomorrow. Hopefully she starts feeling better. She is intermittently on oxygen, tends to drop quickly when she falls asleep but also with intermittent SOB not associated with hypoxia. Will follow closely; discussed with patient and nursing    Acute respiratory failure with hypoxia  -on 1L now, dropped last night when she had chest pain and went back on 2L.  Completed abx. Still likely some atelectasis. See above under dyspnea     Possible pneumonia-bacterial, unspecified organism  - completed treatment today.      Acute respiratory failure with hypoxia  -on 1L now, dropped last night  when she had chest pain and went back on 2L.  Completed abx. Still likely some atelectasis.     Anxiety  - having a lot of anxiety regarding recently being scammed out of significant money; she tends to perseverate on this. Also with recent loss of family members.  - on zoloft  - she's refusing further meds at this time but continues to struggle with anxiety. Following this closely     Anemia  - hemoglobin in the 9's for past few days. She did get IV hydration which could explain some dilution. No obvious signs of bleeding. Her baseline hemoglobin normally around 12. Will check iron, B12, folate.  Iron is somewhat low.  Will give Venofer while here.       Weaning O2 as able.  Recheck labs in the morning  Successful placement of 2 stents by cardiology           Stefanie Verma DO

## 2024-02-08 NOTE — PROGRESS NOTES
Pt with elevated troponins, heart cath today.     Plan remains for pt to return home with  Homecare, this worker has discussed option of staying with someone initially or having someone stay with her.      02/08/24 7614   Discharge Planning   Patient expects to be discharged to: Home with  Homecare

## 2024-02-08 NOTE — CARE PLAN
The patient's goals for the shift include no chest pain    The clinical goals for the shift include no bleeding at cath site    Over the shift, the patient did not make progress toward the following goals. Barriers to progression include none. Recommendations to address these barriers include monitor frequently.

## 2024-02-08 NOTE — PROGRESS NOTES
Occupational Therapy                 Therapy Communication Note    Patient Name: Linette Doyle  MRN: [id number]  Today's Date: 2/8/2024     Discipline: Occupational Therapy    Missed Visit Reason: Missed Visit Reason: Cancel (supratheraputic PTT:115.5 and to go to L heart cath this date-hold OT)    Missed Time: Cancel    Comment:

## 2024-02-08 NOTE — NURSING NOTE
Lab reported critical Aptt of 115.8 order is to hold heparin for 1 hour and decrease by 300. Hold started at 630am

## 2024-02-08 NOTE — PROGRESS NOTES
"Linette Doyle is a 83 y.o. female on day 7 of admission presenting with NSTEMI (non-ST elevated myocardial infarction) (CMS/[facility]).    Subjective   Back from cardiac catheterization received 2 drug-eluting stents in the RCA no complaint       Objective     Physical Exam  Gen: A+O x 3, no acute distress  HEENT: Normocephalic/atraumatic pupils equal react light  Neck: Neck veins not elevated, upstrokes and volumes normal, no bruits   Lung: Clear throughout all lobes, nl AP diameter  CV:  nl sounding S1, S2, no S3, no murmur, PMI nondisplaced, no RV lift   Abdomen: soft, non tender, BS heard throughout all quadrants  Extremities: pulses palpable bilaterally, no edema, warm to touch  Neuro: no neurologic deficits  Last Recorded Vitals  Blood pressure 120/66, pulse 60, temperature 36.5 °C (97.7 °F), temperature source Oral, resp. rate 16, height 1.651 m (5' 5\"), weight 85.5 kg (188 lb 7.9 oz), SpO2 (S) 94 %.  Intake/Output last 3 Shifts:  I/O last 3 completed shifts:  In: 203.3 (2.4 mL/kg) [P.O.:120; I.V.:83.3 (1 mL/kg)]  Out: 400 (4.7 mL/kg) [Urine:400 (0.1 mL/kg/hr)]  Weight: 85.5 kg     Relevant Results  Results for orders placed or performed during the hospital encounter of 01/31/24 (from the past 24 hour(s))   aPTT   Result Value Ref Range    aPTT 26.0 22.0 - 32.5 seconds   Troponin T   Result Value Ref Range    Troponin T, High Sensitivity 3,769 (HH) <=14 ng/L   aPTT   Result Value Ref Range    aPTT 115.8 (HH) 22.0 - 32.5 seconds   CBC and Auto Differential   Result Value Ref Range    WBC 10.7 4.4 - 11.3 x10*3/uL    nRBC 0.0 0.0 - 0.0 /100 WBCs    RBC 3.35 (L) 4.00 - 5.20 x10*6/uL    Hemoglobin 9.5 (L) 12.0 - 16.0 g/dL    Hematocrit 30.7 (L) 36.0 - 46.0 %    MCV 92 80 - 100 fL    MCH 28.4 26.0 - 34.0 pg    MCHC 30.9 (L) 32.0 - 36.0 g/dL    RDW 13.6 11.5 - 14.5 %    Platelets 218 150 - 450 x10*3/uL    Neutrophils % 89.9 40.0 - 80.0 %    Immature Granulocytes %, Automated 1.2 (H) 0.0 - 0.9 %    Lymphocytes % 6.8 13.0 - " 44.0 %    Monocytes % 1.3 2.0 - 10.0 %    Eosinophils % 0.4 0.0 - 6.0 %    Basophils % 0.4 0.0 - 2.0 %    Neutrophils Absolute 9.59 (H) 1.60 - 5.50 x10*3/uL    Immature Granulocytes Absolute, Automated 0.13 0.00 - 0.50 x10*3/uL    Lymphocytes Absolute 0.73 (L) 0.80 - 3.00 x10*3/uL    Monocytes Absolute 0.14 0.05 - 0.80 x10*3/uL    Eosinophils Absolute 0.04 0.00 - 0.40 x10*3/uL    Basophils Absolute 0.04 0.00 - 0.10 x10*3/uL   Comprehensive Metabolic Panel   Result Value Ref Range    Glucose 198 (H) 65 - 99 mg/dL    Sodium 135 133 - 145 mmol/L    Potassium 4.6 3.4 - 5.1 mmol/L    Chloride 98 97 - 107 mmol/L    Bicarbonate 21 (L) 24 - 31 mmol/L    Urea Nitrogen 47 (H) 8 - 25 mg/dL    Creatinine 1.70 (H) 0.40 - 1.60 mg/dL    eGFR 29 (L) >60 mL/min/1.73m*2    Calcium 9.1 8.5 - 10.4 mg/dL    Albumin 3.7 3.5 - 5.0 g/dL    Alkaline Phosphatase 85 35 - 125 U/L    Total Protein 6.8 5.9 - 7.9 g/dL    AST 36 5 - 40 U/L    Bilirubin, Total 0.5 0.1 - 1.2 mg/dL    ALT 46 (H) 5 - 40 U/L    Anion Gap 16 <=19 mmol/L   Coagulation Screen   Result Value Ref Range    Protime 11.9 9.3 - 12.7 seconds    INR 1.1 0.9 - 1.2    aPTT 45.0 (H) 22.0 - 32.5 seconds   Basic Metabolic Panel   Result Value Ref Range    Glucose 201 (H) 65 - 99 mg/dL    Sodium 137 133 - 145 mmol/L    Potassium 4.6 3.4 - 5.1 mmol/L    Chloride 103 97 - 107 mmol/L    Bicarbonate 20 (L) 24 - 31 mmol/L    Urea Nitrogen 45 (H) 8 - 25 mg/dL    Creatinine 1.50 0.40 - 1.60 mg/dL    eGFR 34 (L) >60 mL/min/1.73m*2    Calcium 9.0 8.5 - 10.4 mg/dL    Anion Gap 14 <=19 mmol/L               Assessment/Plan   Principal Problem:    NSTEMI (non-ST elevated myocardial infarction) (CMS/[facility])  Active Problems:    Angina pectoris (CMS/HCC)    Coronary arteriosclerosis    Atherosclerosis of coronary artery    Hypothyroidism    Hypercholesterolemia    Shortness of breath    Sick sinus syndrome (CMS/HCC)    Chronic kidney failure, stage 3 (moderate) (CMS/[facility])    Impaired fasting glucose     Leukocytosis    2/6: 83-year-old female with a history of atherosclerotic heart disease, remote LAD stenting, SSS 2019 DDD PPM  implantation presents with non-ST elevation MI, peak troponin of 650, cardiac catheterization with evidence for  of LCX, RCA with 2-70% lesions, subsequent kidney  injury secondary to IV contrast w/ decision to intervene w/recurrent sx, developed new onset AF rate 160 bpm, some demand ischemia w/ recurrent sx, initial episode was given a IV amiodarone bolus with recurrent rapid rates started on dilt gtt. echocardiogram with preserved EF, wall and inferior wall hypokinesis, moderate MR, normal LA size  -Suspect A-fib in the setting of recent non-STEMI, agree with increasing AV toshia agents, stop diltiazem drip  -[id number] 4,  start apixaban  -cardiology to discontinue clopidogrel, will c/w aspirin  -watch H/H   -If BP allows consider nitrate at low-dose   -In terms of AF treatment, will hold on use of AAD at this time  -Will have patient follow-up in device clinic,  will be able to assess AF burden on AV toshia agents  -Appointment scheduled for February 23. 2024  1030 am     2/7: Patient spontaneously converted back to sinus rhythm yesterday.  She remains atrial paced ventricular sensed at 60 bpm.  Patient denies palpitations, shortness of breath.  Atenolol 25 mg twice daily as it is renally cleared.  Will monitor renal function with apixaban dosing.  Baseline creatinine 1.3, currently 1.7.  Will check TSH with new atrial fibrillation and history of hypothyroidism.  Patient to follow-up on February 23, 2024 10:30 AM in device clinic.  Will assess atrial fibrillation burden at that time and consider antiarrhythmic drug if necessary.     2/8:  -Continued anginal type symptoms with an increase in troponin yesterday, underwent stenting of both RCA lesions with  KAILA-3  -Antiplatelet therapy aspirin, clopidogrel and apixaban with discontinuation of asa in 1 month  -Concerned about atenolol  with renal disease, hopefully will be okay with cardiology to change to metoprolol slightly lower dose that can be increased, 50 mL of contrast during procedure  -Nicely remains in sinus rhythm  -Limited echo in a.m.      JUNG Bonner-CNP

## 2024-02-08 NOTE — PROGRESS NOTES
Subjective Data:  83-year-old female patient with multiple comorbid condition, patient history of tachybradycardia arrhythmias recently admitted with acute non-ST elevation MI with elevated troponin.  Patient had a diagnostic catheterization about a week ago found to having total occluded left circumflex artery that critical lesion seen in right coronary artery.  Patient was treated with medical therapy due to CKD and creatinine was about 2.0.  Further hospitalization patient had a few episode of worsening chest pain tightness.  As well as A-fib RVR and flash pulmonary edema  Yesterday troponin was elevated to 3000+.  With ongoing refractory angina and ischemia patient brought to Cath Lab for repeat diagnostic catheterization and PCI of proximal RCA as well as the mid to distal RCA lesions.  Overnight Events:    Episodes of chest pain  Objective   Last Recorded Vitals  /66 (BP Location: Right arm, Patient Position: Lying)   Pulse 60   Temp 36.5 °C (97.7 °F) (Oral)   Resp 16   Wt 85.5 kg (188 lb 7.9 oz)   SpO2 93%     Intake/Output Summary (Last 24 hours) at 2/8/2024 1221  Last data filed at 2/8/2024 1142  Gross per 24 hour   Intake 158.72 ml   Output 10 ml   Net 148.72 ml     Physical Exam:  HEENT: Normocephalic/atraumatic pupils equal react light  Neck exam mild JVD, no bruit  Lung exam clear to auscultation, few crackles at the bases  Cardiac exam is regular rhythm S1-S2, soft slight murmur heard.  No S3 heard.  Abdomen soft nontender, nondistended  Extremities no clubbing, cyanosis but trace edema  Neuro exam grossly intact.  Image Results  Cardiac catheterization - coronary  Recommendations:  1.  Continue aspirin 81 mg, Plavix as well as Eliquis for a month.  After   the month continue only Plavix and Eliquis.    2.  Continue antianginal therapy, rate control medication as well as a   rhythm control medication with Eliquis due to atrial fibrillation.    Guideline directed medical therapy for diastolic  CHF.  Cardiac rehab upon   discharge.  Cardiac catheterization - coronary  Recommendations:  Cardiac catheterization - coronary  Recommendations:    Last Labs:  CBC - 2/8/2024:  6:06 AM  10.7 9.5 218    30.7      CMP - 2/8/2024:  6:06 AM  9.1 6.8 36 --- 0.5   _ 3.7 46 85      PTT - 2/8/2024:  6:00 AM  _   _ 115.8     Inpatient Medications:  Scheduled medications   Medication Dose Route Frequency    aspirin  81 mg oral Daily    atenolol  25 mg oral BID    atorvastatin  80 mg oral Nightly    cholecalciferol  2,000 Units oral Daily    dilTIAZem CD  120 mg oral BID    isosorbide dinitrate  10 mg oral TID    levothyroxine  100 mcg oral Daily before breakfast    multivitamin  1 tablet oral Daily    oxygen   inhalation Continuous - 02/gases    pantoprazole  40 mg oral Daily before breakfast    perflutren lipid microspheres  0.5-10 mL of dilution intravenous Once in imaging    perflutren protein A microsphere  0.5 mL intravenous Once in imaging    predniSONE  50 mg oral q6h    sertraline  150 mg oral Daily    sulfur hexafluoride microsphr  2 mL intravenous Once in imaging     Principal Problem:    NSTEMI (non-ST elevated myocardial infarction) (CMS/HCC)  Active Problems:    Angina pectoris (CMS/HCC)    Coronary arteriosclerosis    Atherosclerosis of coronary artery    Hypothyroidism    Hypercholesterolemia    Shortness of breath    Sick sinus syndrome (CMS/HCC)    Chronic kidney failure, stage 3 (moderate) (CMS/HCC)    Impaired fasting glucose    Leukocytosis    Assessment/Plan   Patient has above history of acute non-ST elevation MI now with refractory angina, worsening troponin value.  Radial cath with PCI of proximal as well as the mid to distal RCA done using 2 drug-eluting stents.  Continue guideline directed medical therapy for acute non-ST elevation MI.  According to nitroglycerin, aspirin, statin, asymmetry, beta-blocker and antiplatelet therapy  Continue triple blood thinner for first 1 after that discontinue  aspirin and continue only on the Plavix and Eliquis for underlying atrial fibrillation  Underlying atrial fibrillation continue current rate control medication as well as Eliquis.  Discussed with the patient about optimizing statin therapy as well as outpatient cardiac rehab.  Critical care time is spent at bedside includes review of diagnostic tests, labs, and radiographs, serial assessments and management of hemodynamics, EKGs, old echoes, cardiac work-up and coordination of care. Assessment, impression and plans are reflected in the note above as well as the orders.  Code Status:  Prior  I spent 60 minutes in the professional and overall care of this patient.  Darrius Ibrahim MD

## 2024-02-08 NOTE — NURSING NOTE
Patient reports feeling less SOB.  Mood improved after talking with sister and brother on the phone.  Sat up in chair for about 2 hours.  R groin site WNL, circulation checks unchanged.

## 2024-02-08 NOTE — NURSING NOTE
C/o SOB and anxiety reported to MD.  Patient did receive brilinta 180mg in [facility] this AM.  May be Potential side effect...will monitor.  HR 60 bpm and O2 1L %.

## 2024-02-08 NOTE — NURSING NOTE
Stat Aptt drawn and sent lab, results are 26 seconds. Low level protocol Heparin drip started at 10ml/hr (1000 units). Troponin was also collected and sent to lab. Resident NPO with sips of water for heart cath procedure

## 2024-02-08 NOTE — PROGRESS NOTES
Physical Therapy                 Therapy Communication Note    Patient Name: Linette Doyle  MRN: [id number]  Today's Date: 2/8/2024     Discipline: Physical Therapy    Missed Visit Reason: Missed Visit Reason: Cancel (Pt with supratherapeutic PTT of 115.8; also NPO for L heart cath today; will hold PT at this time.)    Missed Time: Cancel    Comment:

## 2024-02-08 NOTE — CARE PLAN
The patient's goals for the shift include no chest pain    The clinical goals for the shift include npo at midnight for procedure and rest    Over the shift, the patient did not make progress toward the following goals. Barriers to progression include n/a. Recommendations to address these barriers include rest.

## 2024-02-08 NOTE — NURSING NOTE
Patient ambulated to bathroom with no difficulties.  Slight SOB upon returning.  O2 98% on RA.  Patient anxious and tearful.  1 on 1 and reassurance provided by hospitalist MD.  Patient thankful.  IVF infusing at this time.  Circulation checks remain WNL.    Call light and commonly used items in reach.  Bed locked and in low position.

## 2024-02-08 NOTE — POST-PROCEDURE NOTE
48 physician Transition of Care Summary  Invasive Cardiovascular Lab    Procedure Date: 2/8/2024  Attending:    * Darrius Ibrahim - Primary  Resident/Fellow/Other Assistant: Surgeon(s) and Role:    Indications:   Pre-op Diagnosis     * NSTEMI (non-ST elevated myocardial infarction) (CMS/HCC) [I21.4]     * Coronary arteriosclerosis [I25.10]     * Shortness of breath [R06.02]     * Chronic kidney failure, stage 3 (moderate) (CMS/HCC) [N18.30]     * Angina pectoris (CMS/HCC) [I20.9]    Post-procedure diagnosis:   Post-op Diagnosis     * NSTEMI (non-ST elevated myocardial infarction) (CMS/HCC) [I21.4]     * Coronary arteriosclerosis [I25.10]     * Shortness of breath [R06.02]     * Chronic kidney failure, stage 3 (moderate) (CMS/HCC) [N18.30]     * Angina pectoris (CMS/HCC) [I20.9]    Procedure(s):   Left Heart Cath  44237 - MA L HRT CATH W/NJX L VENTRICULOGRAPHY IMG S&I    PCI        Procedure Findings:   Patient with a status post diagnostic catheterization coronary angiogram was done no LV gram was done.  Proximal right coronary about 80% as well as distal right coronary about 80% lesion seen.    Description of the Procedure:   Diagnostic coronary angiogram without LV and PCI of proximal and mid to distal RCA using 2 drug-eluting stents 2.5 x 18 mm stents.    Complications:   None    Stents/Implants:   Cardiovascular Implants       Stent    Stent, [place] Sheldon, 2.50 X 18rx - [id number] - Implanted        Inventory item: STENT, URBANO FRONTIER SHELDON, 2.50 X 18RX Model/Cat number: [id number]    : [employer] Lot number: [id number]    Device identifier: [id number]        As of 2/8/2024       Status: Implanted                      Stent, [place] Sheldon, 2.50 X 18rx - [id number] - Implanted        Inventory item: STENT, URBANO FRONTIER SHELDON, 2.50 X 18RX Model/Cat number: [id number]    : [employer] Lot number: [id number]    Device identifier: [id number]        As of 2/8/2024        Status: Implanted                              Anticoagulation/Antiplatelet Plan:   Brilinta 90 mg 2 tablets was given p.o. in the Cath Lab with 5000's of heparin and Cath Lab.  Total contrast was given by 50 mL.    Estimated Blood Loss:   10 mL    Anesthesia: Moderate Sedation Anesthesia Staff: No anesthesia staff entered.    Any Specimen(s) Removed:   No specimens collected during this procedure.    Disposition:   Back to heart and vascular Center recovery unit.    Electronically signed by: Darrius Ibrahim MD, 2/8/2024 11:41 AM

## 2024-02-08 NOTE — NURSING NOTE
Returned to room at 1155.  Bedside shift report given.  Came up on RA with POX 93% but quickly desat when patient started to fall asleep.  Placed on 1.5L O2 via n/c and POX up to 98%.  VSS.  R groin assessed, soft and no bruising observed.  Circulation checks WNL.  Patient in flat position x 1 hour.  Bed alarm engaged.  Call light in reach.

## 2024-02-09 ENCOUNTER — APPOINTMENT (OUTPATIENT)
Dept: CARDIOLOGY | Facility: HOSPITAL | Age: 84
DRG: 321 | End: 2024-02-09
Payer: MEDICARE

## 2024-02-09 LAB
ACT BLD: 283 SEC (ref 89–169)
ALBUMIN SERPL-MCNC: 3.4 G/DL (ref 3.5–5)
ALP BLD-CCNC: 74 U/L (ref 35–125)
ALT SERPL-CCNC: 50 U/L (ref 5–40)
ANION GAP SERPL CALC-SCNC: 12 MMOL/L
AORTIC VALVE MEAN GRADIENT: 3 MMHG
AORTIC VALVE PEAK VELOCITY: 1.26 M/S
APPEARANCE UR: ABNORMAL
APTT PPP: 24.6 SECONDS (ref 22–32.5)
AST SERPL-CCNC: 35 U/L (ref 5–40)
AV PEAK GRADIENT: 6.4 MMHG
AVA (PEAK VEL): 2.27 CM2
AVA (VTI): 2.77 CM2
BACTERIA #/AREA URNS AUTO: ABNORMAL /HPF
BASOPHILS # BLD AUTO: 0.02 X10*3/UL (ref 0–0.1)
BASOPHILS NFR BLD AUTO: 0.1 %
BILIRUB SERPL-MCNC: 0.5 MG/DL (ref 0.1–1.2)
BILIRUB UR STRIP.AUTO-MCNC: NEGATIVE MG/DL
BUN SERPL-MCNC: 50 MG/DL (ref 8–25)
CALCIUM SERPL-MCNC: 8.9 MG/DL (ref 8.5–10.4)
CHLORIDE SERPL-SCNC: 105 MMOL/L (ref 97–107)
CO2 SERPL-SCNC: 21 MMOL/L (ref 24–31)
COLOR UR: YELLOW
CREAT SERPL-MCNC: 1.6 MG/DL (ref 0.4–1.6)
EGFRCR SERPLBLD CKD-EPI 2021: 31 ML/MIN/1.73M*2
EJECTION FRACTION APICAL 4 CHAMBER: 58.4
EJECTION FRACTION: 58 %
EOSINOPHIL # BLD AUTO: 0 X10*3/UL (ref 0–0.4)
EOSINOPHIL NFR BLD AUTO: 0 %
ERYTHROCYTE [DISTWIDTH] IN BLOOD BY AUTOMATED COUNT: 13.5 % (ref 11.5–14.5)
GLUCOSE SERPL-MCNC: 148 MG/DL (ref 65–99)
GLUCOSE UR STRIP.AUTO-MCNC: NORMAL MG/DL
HCT VFR BLD AUTO: 25.4 % (ref 36–46)
HEMOCCULT SP1 STL QL: POSITIVE
HGB BLD-MCNC: 8.5 G/DL (ref 12–16)
IMM GRANULOCYTES # BLD AUTO: 0.16 X10*3/UL (ref 0–0.5)
IMM GRANULOCYTES NFR BLD AUTO: 1.2 % (ref 0–0.9)
KETONES UR STRIP.AUTO-MCNC: NEGATIVE MG/DL
LEFT VENTRICULAR OUTFLOW TRACT DIAMETER: 1.9 CM
LEUKOCYTE ESTERASE UR QL STRIP.AUTO: ABNORMAL
LYMPHOCYTES # BLD AUTO: 0.62 X10*3/UL (ref 0.8–3)
LYMPHOCYTES NFR BLD AUTO: 4.5 %
MCH RBC QN AUTO: 28.2 PG (ref 26–34)
MCHC RBC AUTO-ENTMCNC: 33.5 G/DL (ref 32–36)
MCV RBC AUTO: 84 FL (ref 80–100)
MONOCYTES # BLD AUTO: 0.76 X10*3/UL (ref 0.05–0.8)
MONOCYTES NFR BLD AUTO: 5.6 %
MUCOUS THREADS #/AREA URNS AUTO: ABNORMAL /LPF
NEUTROPHILS # BLD AUTO: 12.13 X10*3/UL (ref 1.6–5.5)
NEUTROPHILS NFR BLD AUTO: 88.6 %
NITRITE UR QL STRIP.AUTO: NEGATIVE
NRBC BLD-RTO: 0 /100 WBCS (ref 0–0)
PH UR STRIP.AUTO: 5 [PH]
PLATELET # BLD AUTO: 196 X10*3/UL (ref 150–450)
POTASSIUM SERPL-SCNC: 4.4 MMOL/L (ref 3.4–5.1)
PROT SERPL-MCNC: 6.3 G/DL (ref 5.9–7.9)
PROT UR STRIP.AUTO-MCNC: NEGATIVE MG/DL
RBC # BLD AUTO: 3.01 X10*6/UL (ref 4–5.2)
RBC # UR STRIP.AUTO: ABNORMAL /UL
RBC #/AREA URNS AUTO: ABNORMAL /HPF
RIGHT VENTRICLE PEAK SYSTOLIC PRESSURE: 61.1 MMHG
SODIUM SERPL-SCNC: 138 MMOL/L (ref 133–145)
SP GR UR STRIP.AUTO: 1.01
SQUAMOUS #/AREA URNS AUTO: ABNORMAL /HPF
TRANS CELLS #/AREA UR COMP ASSIST: ABNORMAL /HPF
UROBILINOGEN UR STRIP.AUTO-MCNC: NORMAL MG/DL
WBC # BLD AUTO: 13.7 X10*3/UL (ref 4.4–11.3)
WBC #/AREA URNS AUTO: ABNORMAL /HPF

## 2024-02-09 PROCEDURE — 97535 SELF CARE MNGMENT TRAINING: CPT | Mod: GO,CO

## 2024-02-09 PROCEDURE — 93308 TTE F-UP OR LMTD: CPT | Performed by: INTERNAL MEDICINE

## 2024-02-09 PROCEDURE — 2500000001 HC RX 250 WO HCPCS SELF ADMINISTERED DRUGS (ALT 637 FOR MEDICARE OP): Performed by: INTERNAL MEDICINE

## 2024-02-09 PROCEDURE — 84075 ASSAY ALKALINE PHOSPHATASE: CPT | Performed by: INTERNAL MEDICINE

## 2024-02-09 PROCEDURE — 87086 URINE CULTURE/COLONY COUNT: CPT | Mod: WESLAB | Performed by: HOSPITALIST

## 2024-02-09 PROCEDURE — 36415 COLL VENOUS BLD VENIPUNCTURE: CPT | Performed by: INTERNAL MEDICINE

## 2024-02-09 PROCEDURE — 93325 DOPPLER ECHO COLOR FLOW MAPG: CPT | Performed by: INTERNAL MEDICINE

## 2024-02-09 PROCEDURE — 2060000001 HC INTERMEDIATE ICU ROOM DAILY

## 2024-02-09 PROCEDURE — 85730 THROMBOPLASTIN TIME PARTIAL: CPT | Performed by: INTERNAL MEDICINE

## 2024-02-09 PROCEDURE — 81001 URINALYSIS AUTO W/SCOPE: CPT | Performed by: HOSPITALIST

## 2024-02-09 PROCEDURE — 85025 COMPLETE CBC W/AUTO DIFF WBC: CPT | Performed by: INTERNAL MEDICINE

## 2024-02-09 PROCEDURE — 99231 SBSQ HOSP IP/OBS SF/LOW 25: CPT

## 2024-02-09 PROCEDURE — 2500000005 HC RX 250 GENERAL PHARMACY W/O HCPCS: Performed by: INTERNAL MEDICINE

## 2024-02-09 PROCEDURE — 2500000001 HC RX 250 WO HCPCS SELF ADMINISTERED DRUGS (ALT 637 FOR MEDICARE OP): Performed by: REGISTERED NURSE

## 2024-02-09 PROCEDURE — 93325 DOPPLER ECHO COLOR FLOW MAPG: CPT

## 2024-02-09 PROCEDURE — 97530 THERAPEUTIC ACTIVITIES: CPT | Mod: GO,CO

## 2024-02-09 PROCEDURE — 99232 SBSQ HOSP IP/OBS MODERATE 35: CPT | Performed by: INTERNAL MEDICINE

## 2024-02-09 PROCEDURE — 2500000004 HC RX 250 GENERAL PHARMACY W/ HCPCS (ALT 636 FOR OP/ED): Performed by: INTERNAL MEDICINE

## 2024-02-09 PROCEDURE — 82270 OCCULT BLOOD FECES: CPT | Performed by: HOSPITALIST

## 2024-02-09 PROCEDURE — 93321 DOPPLER ECHO F-UP/LMTD STD: CPT | Performed by: INTERNAL MEDICINE

## 2024-02-09 RX ORDER — ATENOLOL 25 MG/1
25 TABLET ORAL 2 TIMES DAILY
Status: DISCONTINUED | OUTPATIENT
Start: 2024-02-09 | End: 2024-02-13

## 2024-02-09 RX ORDER — METOPROLOL TARTRATE 1 MG/ML
5 INJECTION, SOLUTION INTRAVENOUS ONCE
Status: DISCONTINUED | OUTPATIENT
Start: 2024-02-09 | End: 2024-02-09 | Stop reason: SDUPTHER

## 2024-02-09 RX ORDER — METOPROLOL TARTRATE 1 MG/ML
5 INJECTION, SOLUTION INTRAVENOUS ONCE
Status: COMPLETED | OUTPATIENT
Start: 2024-02-09 | End: 2024-02-09

## 2024-02-09 RX ADMIN — ATORVASTATIN CALCIUM 80 MG: 80 TABLET, FILM COATED ORAL at 20:34

## 2024-02-09 RX ADMIN — MULTIVITAMIN TABLET 1 TABLET: TABLET at 10:24

## 2024-02-09 RX ADMIN — Medication 2000 UNITS: at 10:24

## 2024-02-09 RX ADMIN — METOPROLOL SUCCINATE 25 MG: 25 TABLET, EXTENDED RELEASE ORAL at 20:34

## 2024-02-09 RX ADMIN — METOPROLOL TARTRATE 5 MG: 5 INJECTION INTRAVENOUS at 22:45

## 2024-02-09 RX ADMIN — METOPROLOL SUCCINATE 25 MG: 25 TABLET, EXTENDED RELEASE ORAL at 10:24

## 2024-02-09 RX ADMIN — DILTIAZEM HYDROCHLORIDE 120 MG: 120 CAPSULE, COATED, EXTENDED RELEASE ORAL at 20:35

## 2024-02-09 RX ADMIN — ONDANSETRON 4 MG: 2 INJECTION INTRAMUSCULAR; INTRAVENOUS at 22:27

## 2024-02-09 RX ADMIN — ISOSORBIDE DINITRATE 10 MG: 10 TABLET ORAL at 10:24

## 2024-02-09 RX ADMIN — DILTIAZEM HYDROCHLORIDE 120 MG: 120 CAPSULE, COATED, EXTENDED RELEASE ORAL at 10:26

## 2024-02-09 RX ADMIN — LEVOTHYROXINE SODIUM 100 MCG: 0.1 TABLET ORAL at 05:39

## 2024-02-09 RX ADMIN — PANTOPRAZOLE SODIUM 40 MG: 40 TABLET, DELAYED RELEASE ORAL at 05:39

## 2024-02-09 RX ADMIN — SERTRALINE HYDROCHLORIDE 150 MG: 50 TABLET ORAL at 10:24

## 2024-02-09 RX ADMIN — ATENOLOL 25 MG: 25 TABLET ORAL at 22:45

## 2024-02-09 RX ADMIN — ISOSORBIDE DINITRATE 10 MG: 10 TABLET ORAL at 16:46

## 2024-02-09 RX ADMIN — CLOPIDOGREL BISULFATE 75 MG: 75 TABLET ORAL at 11:13

## 2024-02-09 RX ADMIN — ISOSORBIDE DINITRATE 10 MG: 10 TABLET ORAL at 20:34

## 2024-02-09 RX ADMIN — ASPIRIN 81 MG 81 MG: 81 TABLET ORAL at 11:13

## 2024-02-09 ASSESSMENT — ACTIVITIES OF DAILY LIVING (ADL)
HOME_MANAGEMENT_TIME_ENTRY: 20
BATHING_LEVEL_OF_ASSISTANCE: CLOSE SUPERVISION

## 2024-02-09 ASSESSMENT — COGNITIVE AND FUNCTIONAL STATUS - GENERAL
DAILY ACTIVITIY SCORE: 24
MOBILITY SCORE: 21
DAILY ACTIVITIY SCORE: 24
STANDING UP FROM CHAIR USING ARMS: A LITTLE
WALKING IN HOSPITAL ROOM: A LITTLE
CLIMB 3 TO 5 STEPS WITH RAILING: A LITTLE

## 2024-02-09 ASSESSMENT — PAIN SCALES - GENERAL
PAINLEVEL_OUTOF10: 0 - NO PAIN

## 2024-02-09 ASSESSMENT — PAIN - FUNCTIONAL ASSESSMENT
PAIN_FUNCTIONAL_ASSESSMENT: FLACC (FACE, LEGS, ACTIVITY, CRY, CONSOLABILITY)
PAIN_FUNCTIONAL_ASSESSMENT: 0-10
PAIN_FUNCTIONAL_ASSESSMENT: 0-10
PAIN_FUNCTIONAL_ASSESSMENT: FLACC (FACE, LEGS, ACTIVITY, CRY, CONSOLABILITY)
PAIN_FUNCTIONAL_ASSESSMENT: 0-10
PAIN_FUNCTIONAL_ASSESSMENT: FLACC (FACE, LEGS, ACTIVITY, CRY, CONSOLABILITY)

## 2024-02-09 NOTE — NURSING NOTE
Assumed care of patient.    Resting quietly in bed with eyes closed.  Respirations even and unlabored on 2L.    R groin site dressing in place.    Call light in reach.    Bed locked and in low position.

## 2024-02-09 NOTE — PROGRESS NOTES
Linette Doyle is a 83 y.o. female on day 8 of admission presenting with NSTEMI (non-ST elevated myocardial infarction) (CMS/[facility]).      Subjective   Patient reports she is feeling better today. Less anxious but still has moments of this. Denies any chest pain.    Had an episode of bright red in stool after urinating overnight. Night nurse made a note. Day RN saw toilet after she went and toilet water was red. No blood noted when she wiped. Patient reports history of hemorrhoids but she says this isn't like that.    Objective     Last Recorded Vitals  /65 (BP Location: Left arm, Patient Position: Sitting)   Pulse 59   Temp 36.5 °C (97.7 °F) (Oral)   Resp 20   Wt 86 kg (189 lb 9.5 oz)   SpO2 91%   Intake/Output last 3 Shifts:    Intake/Output Summary (Last 24 hours) at 2/9/2024 1154  Last data filed at 2/9/2024 0702  Gross per 24 hour   Intake 441.67 ml   Output 0 ml   Net 441.67 ml         Admission Weight  Weight: 68 kg (150 lb) (01/31/24 2058)    Daily Weight  02/09/24 : 86 kg (189 lb 9.5 oz)    Image Results  Cardiac catheterization [place] [address]             Phone [phone]    Cardiovascular Catheterization Report    Patient Name:     LINETTE DOYLE        Performing           [id number] Darrius Easton                                         Physician:           MD  Study Date:       2/8/2024             Verifying Physician: Jaci Easton MD  MRN/PID:          [id number]             Cardiologist:  Accession#:       [id number]         Ordering Provider:   [id number] DARRIUS EASTON  Date of           1940 / 83      Fellow:  Birth/Age:        years  Gender:           F                    Fellow:  Encounter#:       [id number]       Study:            Left Heart Cath no LV  Additional Study: PCI - Percutaneous Coronary Intervention       Indications:  LINETTE DOYLE is a 83  year old female who presents with atrial fibrillation, coronary artery disease, dyslipidemia, hypertension, prior percutaneous coronary intervention, prior myocardial infarction and a chest pain assessment of typical angina. New onset angina <=2 months, worsening angina, suspected coronary artery disease and NSTE - ACS.     Appropriate Use Criteria:  Unstable angina with high risk score; AUC score = 9. Non ST elevation myocardial infarction with high risk score; AUC score = 9.  Stress test performed: No. CTA performed: No. Agatston accessed: No. LVEF  Assessed: Yes. LVEF = 55%.  Cardiac arrest: No responsive following cardiac arrest.  Cardiac surgical consult: No cardiac surgery not recommended.  Cardiovascular Instability: Yes. Acute heart failure and persistent ischemic  symptoms.  Frailty status of patient entering lab: 6 = Moderately frail.       Procedure Description:  After infiltration with 2% Lidocaine, the right femoral artery was cannulated with a modified Seldinger technique. Subsequently a 6 Chinese sheath was placed retrograde in the right femoral artery. Selective coronary catheterization was performed using a 6 Fr catheter(s) exchanged over a guide wire to cannulate the coronary arteries. A 4 tip catheter was used for left coronary injections.  Additional catheter(s) used to visualize the coronary arteries were: AR2 Mod. Multiple injections of contrast were made into the left and right coronary arteries with angiograms recorded in multiple projections. After completion of the procedure, femoral artery angiography was performed. This demonstrated a common femoral artery puncture appropriate for closure. An Angio-Seal Evolution 6F ([employer]) vascular closure device was placed per protocol.     Coronary Angiography:  The coronary circulation is right dominant.     Left Main Coronary Artery:  The left main coronary artery is a normal caliber vessel. The left main arises normally from the left  coronary sinus of Valsalva and bifurcates into the LAD and circumflex coronary arteries. The left main coronary artery showed no significant disease or stenosis greater than 30%.     Left Anterior Descending Coronary Artery Distribution:  The left anterior descending coronary artery is a normal caliber vessel. The LAD arises normally from the left main coronary artery. The LAD demonstrated no significant disease or stenosis greater than 30% and a previous patent stent.     Circumflex Coronary Artery Distribution:  The circumflex coronary artery is a small caliber vessel. The circumflex arises normally from the left main coronary artery. The circumflex revealed total occlusion and total occlusion originating at the proximal segment.     Right Coronary Artery Distribution:    The right coronary artery is a medium-sized caliber vessel. The RCA arises normally from the right sinus of Valsalva. The RCA showed significant proximal obstruction and significant mid to distal obstruction. The proximal third of the proximal and mid to distal right coronary artery showed 80% stenosis. This lesion was tubular, long, diffuse and concentric. This lesion's characteristics fall within the ACC/AHA type B (moderate success, medium risk) classification. This lesion determined to be suitable for percutaneous coronary intervention, suitable for coronary stenting and better suited for coronary stenting.     Coronary Interventions:  Angiography reveals a 80% stenosis of the proximal third of the proximal and mid to distal right coronary artery coronary artery. Pre-intervention KAILA flow was 2. Percutaneous coronary intervention was performed within the proximal third of the proximal and mid to distal right coronary artery. Blair Garfield drug-eluting stent 2.5 mm x 18 mm was advanced to the lesion and implanted at 12 DEANDRE. A second Blair Deng drug-eluting stent 2.5 mm x 18 mm was implanted in overlap at 12 DEANDRE. The stenosis was  successfully reduced from 80% to 0%. Post-intervention KAILA flow was 3. We used no torque right coronary guide. PT graphic wire was placed in the distal right coronary artery  Over the wire we applied first 2.5 x 18 mm drug-eluting stents at the proximal right coronary artery deployed for 12 cindy.  After that we applied second stents 2.5 x 18 mm distal RCA segment which is about 80% lesions. Stent was deployed for about 12 cindy  Post PCI excellent flows and distal right coronary.  Patient received 180 mg Brilinta in the Cath Lab.  Patient also received about 5000's of heparin and Cath Lab.  Advised patient to continue dual antiplatelet epi with Eliquis for first month after 1 month continue only on Plavix and Eliquis.  Post PCI patient probably needs a cardiac rehab after 2 weeks.  Sheath was removed and Angio-Seal applied to right groin.  Patient tolerated procedure fairly well.  Patient had a diagnostic catheterization without LV gram and a PCI of her proximal and mid to distal RCA done.  Patient also received 400 mics of intracoronary nitroglycerin.     Coronary Intervention Comments:  Total contrast used 50 mL due to underlying CKD.     Coronary Lesion Summary:  Vessel   Stenosis                    Vessel Segment  RCA    80% stenosis proximal third of the proximal and mid to distal       Hemo Personnel:  +------------------+---------+  Name              Duty       +------------------+---------+  Darrius Ibrahim MDPROC MD 1  +------------------+---------+       Hemodynamic Pressures:     +----+--------------------+----------+-------------+--------------+---------+  Site     Date Time      Phase NameSystolic mmHgDiastolic mmHgMean mmHg  +----+--------------------+----------+-------------+--------------+---------+    AO2/8/2024 11:07:53 AM  AIR REST          119            48       73  +----+--------------------+----------+-------------+--------------+---------+    AO2/8/2024 11:16:49 AM  AIR  REST          125            47       80  +----+--------------------+----------+-------------+--------------+---------+    AO2/8/2024 11:18:47 AM  AIR REST          112            47       74  +----+--------------------+----------+-------------+--------------+---------+       Cardiac Cath Post Procedure Notes:  Post Procedure Diagnosis: Double vessel disease.  Blood Loss:               Estimated blood loss during the procedure was 10 mls.  Specimens Removed:        Number of specimen(s) removed: none.    ____________________________________________________________________________________  CONCLUSIONS:   1. Total contrast used 50 mL due to underlying CKD.   2. We used no torque right coronary guide. PT graphic wire was placed in the distal right coronary artery      Over the wire we applied first 2.5 x 18 mm drug-eluting stents at the proximal right coronary artery deployed for 12 cindy.      After that we applied second stents 2.5 x 18 mm distal RCA segment which is about 80% lesions. Stent was deployed for about 12 cindy      Post PCI excellent flows and distal right coronary.      Patient received 180 mg Brilinta in the Cath Lab.      Patient also received about 5000's of heparin and Cath Lab.      Advised patient to continue dual antiplatelet epi with Eliquis for first month after 1 month continue only on Plavix and Eliquis.      Post PCI patient probably needs a cardiac rehab after 2 weeks.      Sheath was removed and Angio-Seal applied to right groin.      Patient tolerated procedure fairly well.      Patient had a diagnostic catheterization without LV gram and a PCI of her proximal and mid to distal RCA done.      Patient also received 400 mics of intracoronary nitroglycerin.    ICD 10 Codes:  Atherosclerosic heart disease of native coronary artery with refractory angina pectoris-I25.112; Shortness of breath-R06.02; Chronic atrial fibrillation-I48.20; Acute diastolic (congestive) heart failure  (CHF)-I50.31; Non ST elevation (NSTEMI) myocardial infarction-I21.4; Subsequent non ST elevation (NSTEMI) myocardial infarction-I22.2     CPT Codes:  Left Heart Cath (visualization of coronaries) and LV-82515; Stent Right Coronary ea addl branch of major Artery (PCI)-91695.     [id number] Darrius Ibrahim MD  Performing Physician  Electronically signed by [id number] Darrius Ibrahim MD on 2/8/2024 at 1:07:05 PM         ** Final **  Cardiac catheterization - coronary  Recommendations:  Cardiac catheterization - coronary  Recommendations:      Physical Exam  General: alert, no diaphoresis   Lungs: CTA BL   Heart: RRR, no LE edema   GI: abdomen soft, nontender, nondistended, BS present   MSK: no joint effusion or deformity   Skin: no rashes, erythema, or ecchymosis   Neuro: grossly normal cognition, motor strength, sensation   Psych: anxious, tearful      Relevant Results               Assessment/Plan          This patient has a urinary catheter   Reason for the urinary catheter remaining today? critically ill patient who need accurate urinary output measurements          Principal Problem:    NSTEMI (non-ST elevated myocardial infarction) (CMS/HCC)  Active Problems:    Angina pectoris (CMS/HCC)    Coronary arteriosclerosis    Atherosclerosis of coronary artery    Hypothyroidism    Hypercholesterolemia    Shortness of breath    Sick sinus syndrome (CMS/HCC)    Chronic kidney failure, stage 3 (moderate) (CMS/HCC)    Impaired fasting glucose    Leukocytosis    NSTEMI  -Left heart catheterization showed total occlusion of the circumflex and moderate disease in the RCA.  Dr. Vela recommended staged heart catheterizations with PCI to address the lesion as an outpatient  -Having chest pain the last 3 days in a row.  I checked troponin levels yesterday which were almost 4000.  She was started on a heparin drip last night and Eliquis was stopped.  She went for heart cath today.  2 stents were placed by Dr. Ibrahim being minimal  contrast.  -Discussed with Dr. Ibrahim; aspirin and plavix started today  -Will need triple therapy for unclear amount of time, will address with Dr. Ibrahim.  After 1 month will be transitioned to Eliquis plus Plavix alone, now with concerns of bleeding, eliquis is on hold    A-fib with RVR-now in normal sinus rhythm  -New diagnosis during this hospital stay.    -Started on Eliquis but on hold right now d/t concerns for bleeding  - on cardizem and metoprolol now. Still in paced rhythm; to follow up with EP for device check and further management    Anemia  - hemoglobin had been in the 9's for last several days, today hemoglobin is lower at 8.5 and now with blood in toilet. Unclear if hemorrhoidal vs hematuria. She had a mcgovern catheter removed recently and then just started on triple therapy. Eliquis now hold  - check UA. Guaiac all stools. Spoke with RN and patient  - could have local trauma from recent mcgovern in addition to her blood thinners  - probably some iatrogenic blood loss as well in setting of two stents, prolonged hospital stay, frequent blood draws.       Known CAD  -Continue therapy as above.     GARY-creatinine improving again today  - probably initially GARY was from contrast nephropathy in the setting of diuretic use for pulmonary congestion.   - dr. Giang signed off--I will him reinvolved if creatinine continues to climb    Dyspnea- improved today  - ?secondary from brillinta. CXR yesterday with questionable interstitial prominence. Stop fluids and monitor. Transitioning to plavix tomorrow. Hopefully she starts feeling better. She is intermittently on oxygen, tends to drop quickly when she falls asleep but also with intermittent SOB not associated with hypoxia. Will follow closely; discussed with patient and nursing    Acute respiratory failure with hypoxia- resolved again, on room air. Suspect some underlying KATELYN that should be followed with PSG as outpatient     Possible pneumonia-bacterial, unspecified  organism  - completed treatment.      Anxiety  - having a lot of anxiety regarding recently being scammed out of significant money; she tends to perseverate on this. Also with recent loss of family members.  - on zoloft  - she's refusing further meds at this time but continues to struggle with anxiety. Following this closely         Discussed discharge planning-- anticipate home with home health, does not qualify for SNF    Monitoring for bleeding at this time and trying to determine source of bleeding. Eliquis on hold               Stefanie Verma,

## 2024-02-09 NOTE — NURSING NOTE
Stent education/card/handout given to patient. Discussed importance of taking medication as prescribed/following up with physician appointments. NSTEMI education/handout given to patient. Discussed benefits of heart healthy diet/importance of maintaining a healthy weight. Discussed cardiac rehab. Cardiac rehab staff will contact pt following discharge for enrollment in program.

## 2024-02-09 NOTE — CARE PLAN
Problem: Pain - Adult  Goal: Verbalizes/displays adequate comfort level or baseline comfort level  Outcome: Progressing     Problem: Safety - Adult  Goal: Free from fall injury  Outcome: Progressing     Problem: Discharge Planning  Goal: Discharge to home or other facility with appropriate resources  Outcome: Progressing     Problem: Chronic Conditions and Co-morbidities  Goal: Patient's chronic conditions and co-morbidity symptoms are monitored and maintained or improved  Outcome: Progressing     Problem: Fall/Injury  Goal: Verbalize understanding of personal risk factors for fall in the hospital  Outcome: Progressing  Goal: Verbalize understanding of risk factor reduction measures to prevent injury from fall in the home  Outcome: Progressing  Goal: Use assistive devices by end of the shift  Outcome: Progressing  Goal: Pace activities to prevent fatigue by end of the shift  Outcome: Progressing     Problem: Skin  Goal: Participates in plan/prevention/treatment measures  Outcome: Progressing  Goal: Prevent/manage excess moisture  Outcome: Progressing  Goal: Prevent/minimize sheer/friction injuries  Outcome: Progressing  Flowsheets (Taken 2/9/2024 8282)  Prevent/minimize sheer/friction injuries: Use pull sheet  Goal: Promote/optimize nutrition  Outcome: Progressing  Goal: Promote skin healing  Outcome: Progressing   The patient's goals for the shift include no chest pain    The clinical goals for the shift include stable respiratory status, no bleeding from cath site    Over the shift, the patient did make progress toward the goals.

## 2024-02-09 NOTE — NURSING NOTE
Rounded on pt. Pt laying in bed, eyes closed, respirations even and unlabored. Pt appears to be sleeping and in no apparent pain or distress. With last walk to bathroom, right groin site dressing c/d/I. Urine with last BRP was bright red, no ooze from groin puncture. Call bell and belongings are placed in reach. Telemetry leads connected and reading on monitor. Bed alarm set. Continuing to monitor.

## 2024-02-09 NOTE — NURSING NOTE
Rounded on pt. Pt laying in bed, eyes closed, respirations even and unlabored. Pt appears to be sleeping and in no apparent pain or distress. Besides the need for NC O2 during the night and one BRP with bloody output, uneventful night with no changes since prior assessment. Right puncture site remains c/d/I, area soft, no visible bruising. Call bell and belongings are placed in reach. Telemetry leads connected and reading on monitor. NC O2 in place and connected to flowmeter. Bed alarm set. Continuing to monitor.

## 2024-02-09 NOTE — NURSING NOTE
Hospitalist and cardio rounding.  MD states give plavix and ASA.  Given at this time, eliquis placed on hold.  No urine or stool at this time to collect samples.

## 2024-02-09 NOTE — NURSING NOTE
This morning at 0800, patient went to bathroom,  toilet water was red.  Assessed pavan area and rectum, no signs of acute bleeding/drainage observed.  Made hospitalist aware.  At this time, holding anticoag until specimens are collected.  MD aware.

## 2024-02-09 NOTE — NURSING NOTE
Took over care of pt at this time. Pt laying in bed, alert, aox4. Pt denies pain and has no needs or complaints at this time. Right groin puncture dressing saturated. Dressing removed. Puncture site mostly soft with slightly firm area directly under puncture with scant bruising at puncture site. Pressure held for 5 minutes, slow ooze of dark red blood. Site redressed with 2x2 and tegaderm. Pt oriented to call bell and it and belongings are placed in reach. Telemetry leads connected and reading on monitor. Bed alarm set. Continuing to monitor.

## 2024-02-09 NOTE — PROGRESS NOTES
Pt with bleeding this morning, source of this not immediately known.     Pt is very anxious understandably. She currently does not qualify to go to SNF. Referral has been sent to  Homecare.     Pending: Medical Clearance, please let the care coordinator know when the pt is discharging as  homecare will need to be notified.      02/09/24 1523   Discharge Planning   Patient expects to be discharged to: Home with  Homecare

## 2024-02-09 NOTE — NURSING NOTE
Assumed care of patient.    Heparin gtt infusing.    Resting quietly in bed on side.  No complaints of pain/discomfort.     at bedside, call light in reach.  NPO for procedure today.

## 2024-02-09 NOTE — PROGRESS NOTES
Occupational Therapy    OT Treatment    Patient Name: Linette Doyle  MRN: [id number]  Today's Date: 2/9/2024  Time Calculation  Start Time: 0930  Stop Time: 1000  Time Calculation (min): 30 min         Assessment:  OT Assessment: tolerated session well with education provided for EC for homegoing tasks with pt reaching baseline independence with all goals at this time  End of Session Communication: Bedside nurse  End of Session Patient Position: Bed, 2 rail up  OT Assessment Results: Decreased ADL status, Decreased endurance  Plan:  Treatment Interventions: ADL retraining, Functional transfer training, Endurance training, Patient/family training, Equipment evaluation/education, Neuromuscular reeducation, Compensatory technique education  OT Frequency: 4 times per week  OT Discharge Recommendations: Low intensity level of continued care, 24 hr supervision due to cognition  OT Recommended Transfer Status: Independent/Modified Independent  OT - OK to Discharge: Yes  Treatment Interventions: ADL retraining, Functional transfer training, Endurance training, Patient/family training, Equipment evaluation/education, Neuromuscular reeducation, Compensatory technique education    Subjective   Previous Visit Info:  OT Last Visit  OT Received On: 02/09/24  General:  General  Prior to Session Communication: Bedside nurse  Patient Position Received: Bed, 2 rail up (seated EOB)  General Comment: cleared for therapy per RN. Pt standing at bedside with PCA, agreebale to tx session expressing increased fatigue  Precautions:  Medical Precautions: Fall precautions, Oxygen therapy device and L/min (2L O2 nc)    Pain:  Pain Assessment  Pain Assessment: 0-10  Pain Score: 0 - No pain    Objective    Cognition:  Cognition  Overall Cognitive Status: Within Functional Limits    Activities of Daily Living:    Grooming  Grooming Level of Assistance: Setup, Independent  Grooming Where Assessed: Edge of bed  Grooming Comments: face washing  task    UE Bathing  UE Bathing Level of Assistance: Setup, Independent  UE Bathing Where Assessed: Edge of bed  UE Bathing Comments: S/u for UB bathing with increased time    LE Bathing  LE Bathing Level of Assistance: Close supervision  LE Bathing Comments: pavan area cleanse standing bedside    UE Dressing  UE Dressing Level of Assistance: Setup, Independent  UE Dressing Comments: s/u to don/doff gown for line management line management    LE Dressing  LE Dressing: Yes  Pants Level of Assistance: Independent  Sock Level of Assistance: Independent  LE Dressing Comments: pt independently donning socks, underwear, and pants sitting/standing EOB    Bed Mobility/Transfers: Bed Mobility  Bed Mobility: Yes  Bed Mobility 1  Bed Mobility 1: Sitting to supine  Level of Assistance 1: Modified independent  Bed Mobility Comments 1: use of bed rail for UE support    Transfers  Transfer: Yes  Transfer 1  Transfer From 1: Sit to  Transfer to 1: Stand  Technique 1: Sit to stand, Stand to sit  Transfer Level of Assistance 1: Independent  Trials/Comments 1: pt independently completing safe transfers with cues to pace movement for energy conservation    Standing Balance:  Static Standing Balance  Static Standing-Level of Assistance: Independent  Static Standing-Comment/Number of Minutes: fair+/good  Dynamic Standing Balance  Dynamic Standing-Balance: Forward lean, Reaching for objects, Reaching across midline  Dynamic Standing-Comments: fair+/good during LB dressing    Outcome Measures:[facility] Daily Activity  Putting on and taking off regular lower body clothing: None  Bathing (including washing, rinsing, drying): None  Putting on and taking off regular upper body clothing: None  Toileting, which includes using toilet, bedpan or urinal: None  Taking care of personal grooming such as brushing teeth: None  Eating Meals: None  Daily Activity - Total Score: 24    Education Documentation  ADL Training, taught by COLLEEN Leonardo  at 2/9/2024 11:39 AM.  Learner: Patient  Readiness: Acceptance  Method: Explanation  Response: Verbalizes Understanding, Demonstrated Understanding    Education Comments  No comments found.    Problem: OT Goals  Goal: Pt will complete all functional transfers and mobility with mod indep using a device or no device.  Outcome: Met  Goal: Pt will complete all ADL's/IADL's with mod indep using a device as needed.  Outcome: Met

## 2024-02-09 NOTE — NURSING NOTE
Patient anxious and tearful.  Overwhelmed with current situation.  1 on 1 provided.    Refusing tele due to leads are aggravating her.  Will re attempt to apply.

## 2024-02-09 NOTE — PROGRESS NOTES
"Linette Doyle is a 83 y.o. female on day 8 of admission presenting with NSTEMI (non-ST elevated myocardial infarction) (CMS/[facility]).    Subjective   No complaints.  Patient denies chest pain, palpitations, shortness of breath.  Did experience bright red blood in stool.  Fecal occult positive.  Apixaban on hold.     Objective     Physical Exam  Vitals and nursing note reviewed.   Constitutional:       Appearance: Normal appearance.   Cardiovascular:      Rate and Rhythm: Normal rate and regular rhythm.      Pulses: Normal pulses.      Heart sounds: Normal heart sounds.   Pulmonary:      Effort: Pulmonary effort is normal.      Breath sounds: Normal breath sounds.   Abdominal:      General: Bowel sounds are normal.      Palpations: Abdomen is soft.   Musculoskeletal:         General: Normal range of motion.   Skin:     General: Skin is warm and dry.   Neurological:      General: No focal deficit present.      Mental Status: She is alert and oriented to person, place, and time.     Last Recorded Vitals  Blood pressure 116/65, pulse 59, temperature 36.5 °C (97.7 °F), temperature source Oral, resp. rate 20, height 1.651 m (5' 5\"), weight 86 kg (189 lb 9.5 oz), SpO2 91 %.    Intake/Output last 3 Shifts:  I/O last 3 completed shifts:  In: 600.4 (7 mL/kg) [P.O.:50; I.V.:550.4 (6.4 mL/kg)]  Out: 10 (0.1 mL/kg) [Blood:10]  Weight: 86 kg     [Held by provider] apixaban, 2.5 mg, oral, q12h  aspirin, 81 mg, oral, Daily  atorvastatin, 80 mg, oral, Nightly  cholecalciferol, 2,000 Units, oral, Daily  clopidogrel, 75 mg, oral, Daily  dilTIAZem CD, 120 mg, oral, BID  isosorbide dinitrate, 10 mg, oral, TID  levothyroxine, 100 mcg, oral, Daily before breakfast  metoprolol succinate XL, 25 mg, oral, BID  multivitamin, 1 tablet, oral, Daily  oxygen, , inhalation, Continuous - 02/gases  pantoprazole, 40 mg, oral, Daily before breakfast  perflutren lipid microspheres, 0.5-10 mL of dilution, intravenous, Once in imaging  perflutren lipid " microspheres, 0.5-10 mL of dilution, intravenous, Once in imaging  perflutren protein A microsphere, 0.5 mL, intravenous, Once in imaging  sertraline, 150 mg, oral, Daily  sulfur hexafluoride microsphr, 2 mL, intravenous, Once in imaging     Relevant Results  Results for orders placed or performed during the hospital encounter of 01/31/24 (from the past 24 hour(s))   Coagulation Screen   Result Value Ref Range    Protime 11.9 9.3 - 12.7 seconds    INR 1.1 0.9 - 1.2    aPTT 45.0 (H) 22.0 - 32.5 seconds   Basic Metabolic Panel   Result Value Ref Range    Glucose 201 (H) 65 - 99 mg/dL    Sodium 137 133 - 145 mmol/L    Potassium 4.6 3.4 - 5.1 mmol/L    Chloride 103 97 - 107 mmol/L    Bicarbonate 20 (L) 24 - 31 mmol/L    Urea Nitrogen 45 (H) 8 - 25 mg/dL    Creatinine 1.50 0.40 - 1.60 mg/dL    eGFR 34 (L) >60 mL/min/1.73m*2    Calcium 9.0 8.5 - 10.4 mg/dL    Anion Gap 14 <=19 mmol/L   aPTT   Result Value Ref Range    aPTT 24.6 22.0 - 32.5 seconds   CBC and Auto Differential   Result Value Ref Range    WBC 13.7 (H) 4.4 - 11.3 x10*3/uL    nRBC 0.0 0.0 - 0.0 /100 WBCs    RBC 3.01 (L) 4.00 - 5.20 x10*6/uL    Hemoglobin 8.5 (L) 12.0 - 16.0 g/dL    Hematocrit 25.4 (L) 36.0 - 46.0 %    MCV 84 80 - 100 fL    MCH 28.2 26.0 - 34.0 pg    MCHC 33.5 32.0 - 36.0 g/dL    RDW 13.5 11.5 - 14.5 %    Platelets 196 150 - 450 x10*3/uL    Neutrophils % 88.6 40.0 - 80.0 %    Immature Granulocytes %, Automated 1.2 (H) 0.0 - 0.9 %    Lymphocytes % 4.5 13.0 - 44.0 %    Monocytes % 5.6 2.0 - 10.0 %    Eosinophils % 0.0 0.0 - 6.0 %    Basophils % 0.1 0.0 - 2.0 %    Neutrophils Absolute 12.13 (H) 1.60 - 5.50 x10*3/uL    Immature Granulocytes Absolute, Automated 0.16 0.00 - 0.50 x10*3/uL    Lymphocytes Absolute 0.62 (L) 0.80 - 3.00 x10*3/uL    Monocytes Absolute 0.76 0.05 - 0.80 x10*3/uL    Eosinophils Absolute 0.00 0.00 - 0.40 x10*3/uL    Basophils Absolute 0.02 0.00 - 0.10 x10*3/uL   Comprehensive Metabolic Panel   Result Value Ref Range    Glucose  148 (H) 65 - 99 mg/dL    Sodium 138 133 - 145 mmol/L    Potassium 4.4 3.4 - 5.1 mmol/L    Chloride 105 97 - 107 mmol/L    Bicarbonate 21 (L) 24 - 31 mmol/L    Urea Nitrogen 50 (H) 8 - 25 mg/dL    Creatinine 1.60 0.40 - 1.60 mg/dL    eGFR 31 (L) >60 mL/min/1.73m*2    Calcium 8.9 8.5 - 10.4 mg/dL    Albumin 3.4 (L) 3.5 - 5.0 g/dL    Alkaline Phosphatase 74 35 - 125 U/L    Total Protein 6.3 5.9 - 7.9 g/dL    AST 35 5 - 40 U/L    Bilirubin, Total 0.5 0.1 - 1.2 mg/dL    ALT 50 (H) 5 - 40 U/L    Anion Gap 12 <=19 mmol/L   Transthoracic Echo (TTE) Limited   Result Value Ref Range    BSA 1.99 m2   Occult Blood, Stool    Specimen: Stool   Result Value Ref Range    Occult Blood, Stool X1 Positive (A) Negative   Urinalysis with Reflex Culture and Microscopic   Result Value Ref Range    Color, Urine Yellow Light-Yellow, Yellow, Dark-Yellow    Appearance, Urine Turbid (N) Clear    Specific Gravity, Urine 1.013 1.005 - 1.035    pH, Urine 5.0 5.0, 5.5, 6.0, 6.5, 7.0, 7.5, 8.0    Protein, Urine NEGATIVE NEGATIVE, 10 (TRACE), 20 (TRACE) mg/dL    Glucose, Urine Normal Normal mg/dL    Blood, Urine 0.5 (2+) (A) NEGATIVE    Ketones, Urine NEGATIVE NEGATIVE mg/dL    Bilirubin, Urine NEGATIVE NEGATIVE    Urobilinogen, Urine Normal Normal mg/dL    Nitrite, Urine NEGATIVE NEGATIVE    Leukocyte Esterase, Urine 250 Rick/µL (A) NEGATIVE   Microscopic Only, Urine   Result Value Ref Range    WBC, Urine 6-10 (A) 1-5, NONE /HPF    RBC, Urine 6-10 (A) NONE, 1-2, 3-5 /HPF    Squamous Epithelial Cells, Urine 1-9 (SPARSE) Reference range not established. /HPF    Transitional Epithelial Cells, Urine 1-2 (FEW) Reference range not established. /HPF    Bacteria, Urine 1+ (A) NONE SEEN /HPF    Mucus, Urine FEW Reference range not established. /LPF      Assessment/Plan   Principal Problem:    NSTEMI (non-ST elevated myocardial infarction) (CMS/[facility])  Active Problems:    Angina pectoris (CMS/[facility])    Coronary arteriosclerosis    Atherosclerosis of coronary  artery    Hypothyroidism    Hypercholesterolemia    Shortness of breath    Sick sinus syndrome (CMS/HCC)    Chronic kidney failure, stage 3 (moderate) (CMS/HCC)    Impaired fasting glucose    Leukocytosis    2/6: 83-year-old female with a history of atherosclerotic heart disease, remote LAD stenting, SSS 2019 DDD PPM  implantation presents with non-ST elevation MI, peak troponin of 650, cardiac catheterization with evidence for  of LCX, RCA with 2-70% lesions, subsequent kidney  injury secondary to IV contrast w/ decision to intervene w/recurrent sx, developed new onset AF rate 160 bpm, some demand ischemia w/ recurrent sx, initial episode was given a IV amiodarone bolus with recurrent rapid rates started on dilt gtt. echocardiogram with preserved EF, wall and inferior wall hypokinesis, moderate MR, normal LA size  -Suspect A-fib in the setting of recent non-STEMI, agree with increasing AV toshia agents, stop diltiazem drip  -[id number],  start apixaban  -cardiology to discontinue clopidogrel, will c/w aspirin  -watch H/H   -If BP allows consider nitrate at low-dose   -In terms of AF treatment, will hold on use of AAD at this time  -Will have patient follow-up in device clinic,  will be able to assess AF burden on AV toshia agents  -Appointment scheduled for February 23. 2024  1030 am     2/7: Patient spontaneously converted back to sinus rhythm yesterday.  She remains atrial paced ventricular sensed at 60 bpm.  Patient denies palpitations, shortness of breath.  Atenolol 25 mg twice daily as it is renally cleared.  Will monitor renal function with apixaban dosing.  Baseline creatinine 1.3, currently 1.7.  Will check TSH with new atrial fibrillation and history of hypothyroidism.  Patient to follow-up on February 23, 2024 10:30 AM in device clinic.  Will assess atrial fibrillation burden at that time and consider antiarrhythmic drug if necessary.     2/8:  -Continued anginal type symptoms with an increase in  troponin yesterday, underwent stenting of both RCA lesions with  KAILA-3  -Antiplatelet therapy aspirin, clopidogrel and apixaban with discontinuation of asa in 1 month  -Concerned about atenolol with renal disease, hopefully will be okay with cardiology to change to metoprolol slightly lower dose that can be increased, 50 mL of contrast during procedure  -Nicely remains in sinus rhythm  -Limited echo in a.m.    2/9: Patient remains in sinus rhythm with no further evidence of atrial fibrillation.  Apixaban on hold secondary to positive fecal occult.  Echocardiogram pending.  Would resume apixaban when able. Patient will need triple therapy of aspirin Plavix and apixaban for 1 month post PCI to the RCA.    Discussed in detail with Dr. Carolina Sherman, APRN-CNP

## 2024-02-09 NOTE — PROGRESS NOTES
Subjective Data:  Patient with recently not ST elevation MI.  Hemorrhoidal bleeding more likely.  Continue current aspirin as well as Plavix.  Continue monitor H&H.  No active chest pain tightness.  Patient had a PCI of 2 stents in RCA.  Overnight Events:    Bright red blood per rectum  Objective   Last Recorded Vitals  /65 (BP Location: Left arm, Patient Position: Sitting)   Pulse 59   Temp 36.5 °C (97.7 °F) (Oral)   Resp 20   Wt 86 kg (189 lb 9.5 oz)   SpO2 91%     Intake/Output Summary (Last 24 hours) at 2/9/2024 1226  Last data filed at 2/9/2024 0702  Gross per 24 hour   Intake 441.67 ml   Output 0 ml   Net 441.67 ml     Physical Exam:  HEENT: Normocephalic/atraumatic pupils equal react light  Neck exam mild JVD, no bruit  Lung exam clear to auscultation, few crackles at the bases  Cardiac exam is regular rhythm S1-S2, soft slight murmur heard.  No S3 heard.  Abdomen soft nontender, nondistended  Extremities no clubbing, cyanosis but trace edema  Neuro exam grossly intact.  Image Results  Cardiac catheterization - coronary             [place] [address]             Phone [phone]    Cardiovascular Catheterization Report    Patient Name:     SAMUEL VILLALOBOS        Performing           [id number] Darrius Easton                                         Physician:           MD  Study Date:       2/8/2024             Verifying Physician: Jaci Easton MD  MRN/PID:          [id number]             Cardiologist:  Accession#:       [id number]         Ordering Provider:   [id number] EASTON  Date of           1940 / 83      Fellow:  Birth/Age:        years  Gender:           F                    Fellow:  Encounter#:       [id number]       Study:            Left Heart Cath no LV  Additional Study: PCI - Percutaneous Coronary Intervention       Indications:  SAMUEL VILLALOBOS is a 83 year old female  who presents with atrial fibrillation, coronary artery disease, dyslipidemia, hypertension, prior percutaneous coronary intervention, prior myocardial infarction and a chest pain assessment of typical angina. New onset angina <=2 months, worsening angina, suspected coronary artery disease and NSTE - ACS.     Appropriate Use Criteria:  Unstable angina with high risk score; AUC score = 9. Non ST elevation myocardial infarction with high risk score; AUC score = 9.  Stress test performed: No. CTA performed: No. Agatston accessed: No. LVEF  Assessed: Yes. LVEF = 55%.  Cardiac arrest: No responsive following cardiac arrest.  Cardiac surgical consult: No cardiac surgery not recommended.  Cardiovascular Instability: Yes. Acute heart failure and persistent ischemic  symptoms.  Frailty status of patient entering lab: 6 = Moderately frail.       Procedure Description:  After infiltration with 2% Lidocaine, the right femoral artery was cannulated with a modified Seldinger technique. Subsequently a 6 Nepali sheath was placed retrograde in the right femoral artery. Selective coronary catheterization was performed using a 6 Fr catheter(s) exchanged over a guide wire to cannulate the coronary arteries. A 4 tip catheter was used for left coronary injections.  Additional catheter(s) used to visualize the coronary arteries were: AR2 Mod. Multiple injections of contrast were made into the left and right coronary arteries with angiograms recorded in multiple projections. After completion of the procedure, femoral artery angiography was performed. This demonstrated a common femoral artery puncture appropriate for closure. An Angio-Seal Evolution 6F ([employer]) vascular closure device was placed per protocol.     Coronary Angiography:  The coronary circulation is right dominant.     Left Main Coronary Artery:  The left main coronary artery is a normal caliber vessel. The left main arises normally from the left coronary sinus of  Valsalva and bifurcates into the LAD and circumflex coronary arteries. The left main coronary artery showed no significant disease or stenosis greater than 30%.     Left Anterior Descending Coronary Artery Distribution:  The left anterior descending coronary artery is a normal caliber vessel. The LAD arises normally from the left main coronary artery. The LAD demonstrated no significant disease or stenosis greater than 30% and a previous patent stent.     Circumflex Coronary Artery Distribution:  The circumflex coronary artery is a small caliber vessel. The circumflex arises normally from the left main coronary artery. The circumflex revealed total occlusion and total occlusion originating at the proximal segment.     Right Coronary Artery Distribution:    The right coronary artery is a medium-sized caliber vessel. The RCA arises normally from the right sinus of Valsalva. The RCA showed significant proximal obstruction and significant mid to distal obstruction. The proximal third of the proximal and mid to distal right coronary artery showed 80% stenosis. This lesion was tubular, long, diffuse and concentric. This lesion's characteristics fall within the ACC/AHA type B (moderate success, medium risk) classification. This lesion determined to be suitable for percutaneous coronary intervention, suitable for coronary stenting and better suited for coronary stenting.     Coronary Interventions:  Angiography reveals a 80% stenosis of the proximal third of the proximal and mid to distal right coronary artery coronary artery. Pre-intervention KAILA flow was 2. Percutaneous coronary intervention was performed within the proximal third of the proximal and mid to distal right coronary artery. [employer] drug-eluting stent 2.5 mm x 18 mm was advanced to the lesion and implanted at 12 DEANDRE. A second [employer] drug-eluting stent 2.5 mm x 18 mm was implanted in overlap at 12 DEANDRE. The stenosis was successfully reduced from  80% to 0%. Post-intervention KAILA flow was 3. We used no torque right coronary guide. PT graphic wire was placed in the distal right coronary artery  Over the wire we applied first 2.5 x 18 mm drug-eluting stents at the proximal right coronary artery deployed for 12 cindy.  After that we applied second stents 2.5 x 18 mm distal RCA segment which is about 80% lesions. Stent was deployed for about 12 cindy  Post PCI excellent flows and distal right coronary.  Patient received 180 mg Brilinta in the Cath Lab.  Patient also received about 5000's of heparin and Cath Lab.  Advised patient to continue dual antiplatelet epi with Eliquis for first month after 1 month continue only on Plavix and Eliquis.  Post PCI patient probably needs a cardiac rehab after 2 weeks.  Sheath was removed and Angio-Seal applied to right groin.  Patient tolerated procedure fairly well.  Patient had a diagnostic catheterization without LV gram and a PCI of her proximal and mid to distal RCA done.  Patient also received 400 mics of intracoronary nitroglycerin.     Coronary Intervention Comments:  Total contrast used 50 mL due to underlying CKD.     Coronary Lesion Summary:  Vessel   Stenosis                    Vessel Segment  RCA    80% stenosis proximal third of the proximal and mid to distal       Hemo Personnel:  +------------------+---------+  Name              Duty       +------------------+---------+  Darrius Ibrahim MDPROC MD 1  +------------------+---------+       Hemodynamic Pressures:     +----+--------------------+----------+-------------+--------------+---------+  Site     Date Time      Phase NameSystolic mmHgDiastolic mmHgMean mmHg  +----+--------------------+----------+-------------+--------------+---------+    AO2/8/2024 11:07:53 AM  AIR REST          119            48       73  +----+--------------------+----------+-------------+--------------+---------+    AO2/8/2024 11:16:49 AM  AIR REST          125             47       80  +----+--------------------+----------+-------------+--------------+---------+    AO2/8/2024 11:18:47 AM  AIR REST          112            47       74  +----+--------------------+----------+-------------+--------------+---------+       Cardiac Cath Post Procedure Notes:  Post Procedure Diagnosis: Double vessel disease.  Blood Loss:               Estimated blood loss during the procedure was 10 mls.  Specimens Removed:        Number of specimen(s) removed: none.    ____________________________________________________________________________________  CONCLUSIONS:   1. Total contrast used 50 mL due to underlying CKD.   2. We used no torque right coronary guide. PT graphic wire was placed in the distal right coronary artery      Over the wire we applied first 2.5 x 18 mm drug-eluting stents at the proximal right coronary artery deployed for 12 cindy.      After that we applied second stents 2.5 x 18 mm distal RCA segment which is about 80% lesions. Stent was deployed for about 12 cindy      Post PCI excellent flows and distal right coronary.      Patient received 180 mg Brilinta in the Cath Lab.      Patient also received about 5000's of heparin and Cath Lab.      Advised patient to continue dual antiplatelet epi with Eliquis for first month after 1 month continue only on Plavix and Eliquis.      Post PCI patient probably needs a cardiac rehab after 2 weeks.      Sheath was removed and Angio-Seal applied to right groin.      Patient tolerated procedure fairly well.      Patient had a diagnostic catheterization without LV gram and a PCI of her proximal and mid to distal RCA done.      Patient also received 400 mics of intracoronary nitroglycerin.    ICD 10 Codes:  Atherosclerosic heart disease of native coronary artery with refractory angina pectoris-I25.112; Shortness of breath-R06.02; Chronic atrial fibrillation-I48.20; Acute diastolic (congestive) heart failure (CHF)-I50.31; Non ST elevation  (NSTEMI) myocardial infarction-I21.4; Subsequent non ST elevation (NSTEMI) myocardial infarction-I22.2     CPT Codes:  Left Heart Cath (visualization of coronaries) and LV-85223; Stent Right Coronary ea addl branch of major Artery (PCI)-03759.     [id number] Darrius Ibrahim MD  Performing Physician  Electronically signed by Jaci Ibrahim MD on 2/8/2024 at 1:07:05 PM         ** Final **  Cardiac catheterization - coronary  Recommendations:  Cardiac catheterization - coronary  Recommendations:    Last Labs:  CBC - 2/9/2024:  4:59 AM  13.7 8.5 196    25.4      CMP - 2/9/2024:  4:59 AM  8.9 6.3 35 --- 0.5   _ 3.4 50 74      PTT - 2/9/2024:  4:59 AM  1.1   11.9 24.6     Inpatient Medications:  Scheduled medications   Medication Dose Route Frequency    [Held by provider] apixaban  2.5 mg oral q12h    aspirin  81 mg oral Daily    atorvastatin  80 mg oral Nightly    cholecalciferol  2,000 Units oral Daily    clopidogrel  75 mg oral Daily    dilTIAZem CD  120 mg oral BID    isosorbide dinitrate  10 mg oral TID    levothyroxine  100 mcg oral Daily before breakfast    metoprolol succinate XL  25 mg oral BID    multivitamin  1 tablet oral Daily    oxygen   inhalation Continuous - 02/gases    pantoprazole  40 mg oral Daily before breakfast    perflutren lipid microspheres  0.5-10 mL of dilution intravenous Once in imaging    perflutren lipid microspheres  0.5-10 mL of dilution intravenous Once in imaging    perflutren protein A microsphere  0.5 mL intravenous Once in imaging    sertraline  150 mg oral Daily    sulfur hexafluoride microsphr  2 mL intravenous Once in imaging     Principal Problem:    NSTEMI (non-ST elevated myocardial infarction) (CMS/HCC)  Active Problems:    Angina pectoris (CMS/HCC)    Coronary arteriosclerosis    Atherosclerosis of coronary artery    Hypothyroidism    Hypercholesterolemia    Shortness of breath    Sick sinus syndrome (CMS/HCC)    Chronic kidney failure, stage 3 (moderate) (CMS/HCC)    Impaired  fasting glucose    Leukocytosis    Assessment/Plan   Patient as above multiple comorbid condition as well as risk factors  1.  Acute non-ST elevation MI, status post PCI of right coronary 2 drug-eluting stents was placed sequentially in the proximal as well as the mid to distal area.  Continue current guideline directed medical therapy including nitroglycerin, aspirin, statin, ACE inhibitor, beta-blocker as well as Plavix.  Advised patient to continue dual endplate therapy aspirin and Plavix at least for 1 year post PCI.  2.  Patient with atrial fibrillation now with bright red blood per rectum at the moment we will stop the Eliquis.  Outpatient cardiac rhythm monitoring.  Eventually once cleared from GI perspective restart Eliquis.  3.  Stable from cardiac respected the moment.  Continue current rate control education.    Code Status:  Full Code  I spent 40 minutes in the professional and overall care of this patient.  Darrius Ibrahim MD

## 2024-02-10 LAB
ALBUMIN SERPL-MCNC: 3.5 G/DL (ref 3.5–5)
ALP BLD-CCNC: 70 U/L (ref 35–125)
ALT SERPL-CCNC: 43 U/L (ref 5–40)
ANION GAP SERPL CALC-SCNC: 14 MMOL/L
AST SERPL-CCNC: 33 U/L (ref 5–40)
BASOPHILS # BLD AUTO: 0.02 X10*3/UL (ref 0–0.1)
BASOPHILS NFR BLD AUTO: 0.2 %
BILIRUB SERPL-MCNC: 0.5 MG/DL (ref 0.1–1.2)
BUN SERPL-MCNC: 53 MG/DL (ref 8–25)
CALCIUM SERPL-MCNC: 9 MG/DL (ref 8.5–10.4)
CHLORIDE SERPL-SCNC: 105 MMOL/L (ref 97–107)
CO2 SERPL-SCNC: 21 MMOL/L (ref 24–31)
CREAT SERPL-MCNC: 1.8 MG/DL (ref 0.4–1.6)
EGFRCR SERPLBLD CKD-EPI 2021: 27 ML/MIN/1.73M*2
EOSINOPHIL # BLD AUTO: 0.06 X10*3/UL (ref 0–0.4)
EOSINOPHIL NFR BLD AUTO: 0.6 %
ERYTHROCYTE [DISTWIDTH] IN BLOOD BY AUTOMATED COUNT: 13.9 % (ref 11.5–14.5)
GLUCOSE SERPL-MCNC: 134 MG/DL (ref 65–99)
HCT VFR BLD AUTO: 25.9 % (ref 36–46)
HGB BLD-MCNC: 8 G/DL (ref 12–16)
IMM GRANULOCYTES # BLD AUTO: 0.15 X10*3/UL (ref 0–0.5)
IMM GRANULOCYTES NFR BLD AUTO: 1.4 % (ref 0–0.9)
LYMPHOCYTES # BLD AUTO: 1.12 X10*3/UL (ref 0.8–3)
LYMPHOCYTES NFR BLD AUTO: 10.4 %
MCH RBC QN AUTO: 28.4 PG (ref 26–34)
MCHC RBC AUTO-ENTMCNC: 30.9 G/DL (ref 32–36)
MCV RBC AUTO: 92 FL (ref 80–100)
MONOCYTES # BLD AUTO: 0.9 X10*3/UL (ref 0.05–0.8)
MONOCYTES NFR BLD AUTO: 8.4 %
NEUTROPHILS # BLD AUTO: 8.47 X10*3/UL (ref 1.6–5.5)
NEUTROPHILS NFR BLD AUTO: 79 %
NRBC BLD-RTO: 0 /100 WBCS (ref 0–0)
PLATELET # BLD AUTO: 198 X10*3/UL (ref 150–450)
POTASSIUM SERPL-SCNC: 4.5 MMOL/L (ref 3.4–5.1)
PROT SERPL-MCNC: 6 G/DL (ref 5.9–7.9)
RBC # BLD AUTO: 2.82 X10*6/UL (ref 4–5.2)
SODIUM SERPL-SCNC: 140 MMOL/L (ref 133–145)
WBC # BLD AUTO: 10.7 X10*3/UL (ref 4.4–11.3)

## 2024-02-10 PROCEDURE — 80053 COMPREHEN METABOLIC PANEL: CPT | Performed by: INTERNAL MEDICINE

## 2024-02-10 PROCEDURE — 2500000001 HC RX 250 WO HCPCS SELF ADMINISTERED DRUGS (ALT 637 FOR MEDICARE OP): Performed by: INTERNAL MEDICINE

## 2024-02-10 PROCEDURE — 99232 SBSQ HOSP IP/OBS MODERATE 35: CPT | Performed by: INTERNAL MEDICINE

## 2024-02-10 PROCEDURE — 2500000005 HC RX 250 GENERAL PHARMACY W/O HCPCS: Performed by: INTERNAL MEDICINE

## 2024-02-10 PROCEDURE — 36415 COLL VENOUS BLD VENIPUNCTURE: CPT | Performed by: INTERNAL MEDICINE

## 2024-02-10 PROCEDURE — 2500000004 HC RX 250 GENERAL PHARMACY W/ HCPCS (ALT 636 FOR OP/ED): Performed by: HOSPITALIST

## 2024-02-10 PROCEDURE — 2500000002 HC RX 250 W HCPCS SELF ADMINISTERED DRUGS (ALT 637 FOR MEDICARE OP, ALT 636 FOR OP/ED): Performed by: INTERNAL MEDICINE

## 2024-02-10 PROCEDURE — 2060000001 HC INTERMEDIATE ICU ROOM DAILY

## 2024-02-10 PROCEDURE — 85025 COMPLETE CBC W/AUTO DIFF WBC: CPT | Performed by: INTERNAL MEDICINE

## 2024-02-10 RX ORDER — POLYETHYLENE GLYCOL 3350 17 G/17G
17 POWDER, FOR SOLUTION ORAL DAILY
Status: DISCONTINUED | OUTPATIENT
Start: 2024-02-10 | End: 2024-02-14 | Stop reason: HOSPADM

## 2024-02-10 RX ADMIN — MULTIVITAMIN TABLET 1 TABLET: TABLET at 08:42

## 2024-02-10 RX ADMIN — ATENOLOL 25 MG: 25 TABLET ORAL at 08:42

## 2024-02-10 RX ADMIN — ATENOLOL 25 MG: 25 TABLET ORAL at 21:17

## 2024-02-10 RX ADMIN — DILTIAZEM HYDROCHLORIDE 120 MG: 120 CAPSULE, COATED, EXTENDED RELEASE ORAL at 08:42

## 2024-02-10 RX ADMIN — DILTIAZEM HYDROCHLORIDE 120 MG: 120 CAPSULE, COATED, EXTENDED RELEASE ORAL at 21:17

## 2024-02-10 RX ADMIN — Medication: at 08:00

## 2024-02-10 RX ADMIN — ASPIRIN 81 MG 81 MG: 81 TABLET ORAL at 08:42

## 2024-02-10 RX ADMIN — SERTRALINE HYDROCHLORIDE 150 MG: 50 TABLET ORAL at 08:42

## 2024-02-10 RX ADMIN — IRON SUCROSE 100 MG: 20 INJECTION, SOLUTION INTRAVENOUS at 15:15

## 2024-02-10 RX ADMIN — ATORVASTATIN CALCIUM 80 MG: 80 TABLET, FILM COATED ORAL at 21:17

## 2024-02-10 RX ADMIN — CLOPIDOGREL BISULFATE 75 MG: 75 TABLET ORAL at 08:42

## 2024-02-10 RX ADMIN — Medication 2000 UNITS: at 08:42

## 2024-02-10 RX ADMIN — LEVOTHYROXINE SODIUM 100 MCG: 0.1 TABLET ORAL at 05:48

## 2024-02-10 RX ADMIN — PANTOPRAZOLE SODIUM 40 MG: 40 TABLET, DELAYED RELEASE ORAL at 05:48

## 2024-02-10 RX ADMIN — POLYETHYLENE GLYCOL 3350 17 G: 17 POWDER, FOR SOLUTION ORAL at 15:15

## 2024-02-10 ASSESSMENT — PAIN SCALES - GENERAL
PAINLEVEL_OUTOF10: 0 - NO PAIN

## 2024-02-10 ASSESSMENT — PAIN - FUNCTIONAL ASSESSMENT
PAIN_FUNCTIONAL_ASSESSMENT: 0-10
PAIN_FUNCTIONAL_ASSESSMENT: FLACC (FACE, LEGS, ACTIVITY, CRY, CONSOLABILITY)

## 2024-02-10 NOTE — NURSING NOTE
Rounded on pt. Pt laying in bed, eyes closed, respirations even and unlabored. Pt appears to be sleeping and in no apparent pain or distress. Heart rate has been a-paced since about 0100 - converting about 0020. Besides rhythm changes, uneventful night with no changes since prior assessment. R groin puncture site dressing c/d/intact, area soft, no visible bruising. Call bell and belongings are placed in reach. Telemetry leads connected and reading on monitor. NC O2 in place and connected to flowmeter. Bed alarm set. Continuing to monitor.

## 2024-02-10 NOTE — NURSING NOTE
Rounded on pt. Pt requested to walk to BR. PT voided clear, yellow urine. Pt denies pain and has no needs or complaints at this time. Pt's heart rate still sinus tach in mid 130s, but after pt is settled into bed, it appears rhythm broke and pt is back to a-paced at 60. Pt oriented to call bell and it and belongings are placed in reach. Telemetry leads connected and reading on monitor. Bed alarm set. NC O2 in place and connected to flowmeter. Continuing to monitor.

## 2024-02-10 NOTE — NURSING NOTE
Spoke with Dr. Hudson and informed him of pt's cardiac rhythm returning to sinus tach from 140s to 160s bpm. I informed him that she seems very anxious as well. I thought I heard pt gag or cough, pt didn't say which it was but acknowledged that she did feel a little sick to her stomach. I was about to administer zofran when Dr. Hudson returned my call. I informed him of her BP. Received orders for metoprolol 5mg IV push.

## 2024-02-10 NOTE — NURSING NOTE
Took over care of pt at this time. Pt laying in bed, alert, aox3-4. Pt seems less aware of situation, such as removing her telemetry because she thought we were done with it. Pt denies pain and has no needs or complaints at this time. Pt oriented to call bell and it and belongings are placed in reach. Telemetry leads are laying on the chair by the window, so I reconnect them at this time. Pt comments how she'd like to wash her hair. I suggest that some people are able to do it in the sink. Bed alarm set.  Continuing to monitor.

## 2024-02-10 NOTE — PROGRESS NOTES
Subjective Data:  Patient needed history of acute non-ST elevation MI.  Also A-fib RVR.  Now with low H&H due to blood light rate blood per rectum.  More likely hemorrhoidal bleeding.  Currently on aspirin Plavix as well as Eliquis.  No chest pain or tightness frequent episode of tachycardia at the nighttime.  Overnight Events:    Last night patient had episode of SVT.  Objective   Last Recorded Vitals  /55 (BP Location: Left arm, Patient Position: Lying)   Pulse 60   Temp 36.3 °C (97.3 °F) (Oral)   Resp 16   Wt 84.4 kg (186 lb 1.1 oz)   SpO2 92%     Intake/Output Summary (Last 24 hours) at 2/10/2024 1227  Last data filed at 2/10/2024 1153  Gross per 24 hour   Intake 520 ml   Output 600 ml   Net -80 ml     Physical Exam:  HEENT: Normocephalic/atraumatic pupils equal react light  Neck exam mild JVD, no bruit  Lung exam clear to auscultation, few crackles at the bases  Cardiac exam is regular rhythm S1-S2, soft slight murmur heard.  No S3 heard.  Abdomen soft nontender, nondistended  Extremities no clubbing, cyanosis but trace edema  Neuro exam grossly intact.  Image Results  Transthoracic Echo (TTE) [place] [address]             Phone [phone]    TRANSTHORACIC ECHOCARDIOGRAM REPORT       Patient Name:     SAMUEL LANZA WILFREDO Buenrostro Physician:   [id number] Becky Vela MD  Study Date:       2/9/2024             Ordering Provider:   [id number] JHONY VILLALBA  MRN/PID:          [id number]             Fellow:  Accession#:       [id number]         Nurse:  Date of           1940 / 83      Sonographer:         Jordy Perales RDCS  Birth/Age:        years  Gender:           F                    Additional Staff:  Height:           165.00 cm            Admit Date:  Weight:           87.00 kg             Admission Status:    Inpatient - Routine  BSA:              1.94 m2               Department Location: [facility]  Blood Pressure: 126 /86 mmHg    Study Type:    TRANSTHORACIC ECHO (TTE) LIMITED  Diagnosis/ICD: Non ST elevation (NSTEMI) myocardial infarction-I21.4  Indication:    NSTEMI/PCI    Patient History:  Pertinent History: CAD, Chest Pain, CHF, Dyspnea, HTN and Hyperlipidemia.                     NSTEMI, Pacemaker, Renal dx III, PCI.    Study Detail: The following Echo studies were performed: 2D, M-Mode, Doppler and                color flow.       PHYSICIAN INTERPRETATION:  Left Ventricle: Left ventricular systolic function is normal, with an estimated ejection fraction of 55-60%. There are no regional wall motion abnormalities. The left ventricular cavity size is normal. Left ventricular diastolic filling was indeterminate.  Left Atrium: The left atrium is moderately dilated.  Right Ventricle: The right ventricle is normal in size. There is normal right ventricular global systolic function. A device is visualized in the right ventricle.  Right Atrium: The right atrium is normal in size. There is a device visualized in the right atrium.  Aortic Valve: The aortic valve appears structurally normal. There is no evidence of aortic valve regurgitation. The peak instantaneous gradient of the aortic valve is 6.4 mmHg. The mean gradient of the aortic valve is 3.0 mmHg.  Mitral Valve: The mitral valve is moderately thickened. There is moderate to severe mitral valve regurgitation which is centrally directed.  Tricuspid Valve: The tricuspid valve is structurally normal. There is mild tricuspid regurgitation. The Doppler estimated RVSP is moderately elevated at 61.1 mmHg.  Pulmonic Valve: The pulmonic valve is structurally normal. There is physiologic pulmonic valve regurgitation.  Pericardium: There is a trivial pericardial effusion.  Aorta: The aortic root is normal.       CONCLUSIONS:   1. Left ventricular systolic function is normal with a 55-60% estimated ejection fraction.   2. The left  atrium is moderately dilated.   3. The mitral valve is moderately thickened.   4. Moderate to severe mitral valve regurgitation.   5. Mild tricuspid regurgitation is visualized.   6. Moderately elevated right ventricular systolic pressure.    QUANTITATIVE DATA SUMMARY:  LV SYSTOLIC FUNCTION BY 2D PLANIMETRY (MOD):                      Normal Ranges:  EF-A4C View: 58.4 % (>=55%)  EF-A2C View: 58.5 %  EF-Biplane:  57.9 %    LV DIASTOLIC FUNCTION:                      Normal Ranges:  MV Peak E: 2.10 m/s (0.7-1.2 m/s)    MITRAL VALVE:                        Normal Ranges:  MV Vmax:    2.50 m/s  (<=1.3m/s)  MV peak P.0 mmHg (<5mmHg)  MV mean P.0 mmHg  (<48mmHg)  MV DT:      486 msec  (150-240msec)    MITRAL INSUFFICIENCY:                            Normal Ranges:  PISA Radius:  0.9 cm  MR VTI:       156.00 cm  MR Vmax:      547.00 cm/s  MR Alias Yasir: 38.5 cm/s  MR Volume:    55.88 ml  MR Flow Rt:   195.94 ml/s  MR EROA:      0.36 cm2    AORTIC VALVE:                                    Normal Ranges:  AoV Vmax:                1.26 m/s (<=1.7m/s)  AoV Peak P.4 mmHg (<20mmHg)  AoV Mean PG:             3.0 mmHg (1.7-11.5mmHg)  LVOT Max Yasir:            1.01 m/s (<=1.1m/s)  AoV VTI:                 21.60 cm (18-25cm)  LVOT VTI:                21.10 cm  LVOT Diameter:           1.90 cm  (1.8-2.4cm)  AoV Area, VTI:           2.77 cm2 (2.5-5.5cm2)  AoV Area,Vmax:           2.27 cm2 (2.5-4.5cm2)  AoV Dimensionless Index: 0.98    TRICUSPID VALVE/RVSP:                              Normal Ranges:  Peak TR Velocity: 3.81 m/s  RV Syst Pressure: 61.1 mmHg (< 30mmHg)       [id number] Becky Vela MD  Electronically signed on 2024 at 2:12:13 PM       ** Final **    Last Labs:  CBC - 2/10/2024:  4:55 AM  10.7 8.0 198    25.9      CMP - 2/10/2024:  4:55 AM  9.0 6.0 33 --- 0.5   _ 3.5 43 70      PTT - 2024:  4:59 AM  1.1   11.9 24.6     Inpatient Medications:  Scheduled medications   Medication Dose Route  Frequency    [Held by provider] apixaban  2.5 mg oral q12h    aspirin  81 mg oral Daily    atenolol  25 mg oral BID    atorvastatin  80 mg oral Nightly    cholecalciferol  2,000 Units oral Daily    clopidogrel  75 mg oral Daily    dilTIAZem CD  120 mg oral BID    levothyroxine  100 mcg oral Daily before breakfast    multivitamin  1 tablet oral Daily    pantoprazole  40 mg oral Daily before breakfast    perflutren lipid microspheres  0.5-10 mL of dilution intravenous Once in imaging    perflutren lipid microspheres  0.5-10 mL of dilution intravenous Once in imaging    perflutren protein A microsphere  0.5 mL intravenous Once in imaging    sertraline  150 mg oral Daily    sulfur hexafluoride microsphr  2 mL intravenous Once in imaging     Principal Problem:    NSTEMI (non-ST elevated myocardial infarction) (CMS/HCC)  Active Problems:    Angina pectoris (CMS/HCC)    Coronary arteriosclerosis    Atherosclerosis of coronary artery    Hypothyroidism    Hypercholesterolemia    Shortness of breath    Sick sinus syndrome (CMS/HCC)    Chronic kidney failure, stage 3 (moderate) (CMS/HCC)    Impaired fasting glucose    Leukocytosis    Assessment/Plan   Patient with multiple comorbid condition.  1.  Non-STEMI, patient status post PCI of right coronary artery to drug-eluting stent was placed in the proximal as well as the mid to distal right coronary artery.  Total occluded left circumflex artery.  Continue current guideline directed medical therapy including nitroglycerin, aspirin, statin, beta-blocker as well as Plavix.  Advised patient to continue dual antiplatelet 81-year post PCI.  2.  Atrial fibrillation rapid ventricular rate at the moment continue current rate control education including atenolol and Cardizem.  Patient does need Eliquis although risk of lower GI bleed as well as hemorrhoidal bleeding with low H&H.  Continue low-dose of Eliquis 2.5 mg p.o. twice daily if tolerable.  If patient continue to bleed then have  patient seen GI for hemorrhoidal treatment plan.  Also stool softener might beneficial.    Critical care time is spent at bedside includes review of diagnostic tests, labs, and radiographs, serial assessments and management of hemodynamics, EKGs, old echoes, cardiac work-up and coordination of care.  Assessment, impression and plans are reflected in the note above as well as the orders.    Code Status:  Full Code  I spent 40 minutes in the professional and overall care of this patient.  Darrius Ibrahim MD

## 2024-02-10 NOTE — NURSING NOTE
12 lead EKG performed at this time. It appears pt's rhythm changed about 2000. 12 lead  shows sinus tachycardia at 140 bpm. Will administer pt's HS meds, continue to monitor, and call cardiology if rate does not improve. Continuing to monitor.

## 2024-02-10 NOTE — NURSING NOTE
Paged Dr. Vela's office at this time to inform them of pt's EKG results from earlier and her return to that rhythm. Continuing to monitor.

## 2024-02-10 NOTE — CARE PLAN
Problem: Pain - Adult  Goal: Verbalizes/displays adequate comfort level or baseline comfort level  Outcome: Progressing     Problem: Safety - Adult  Goal: Free from fall injury  Outcome: Progressing     Problem: Discharge Planning  Goal: Discharge to home or other facility with appropriate resources  Outcome: Progressing     Problem: Chronic Conditions and Co-morbidities  Goal: Patient's chronic conditions and co-morbidity symptoms are monitored and maintained or improved  Outcome: Progressing     Problem: Fall/Injury  Goal: Verbalize understanding of personal risk factors for fall in the hospital  Outcome: Progressing  Goal: Verbalize understanding of risk factor reduction measures to prevent injury from fall in the home  Outcome: Progressing  Goal: Use assistive devices by end of the shift  Outcome: Progressing  Goal: Pace activities to prevent fatigue by end of the shift  Outcome: Progressing     Problem: Skin  Goal: Participates in plan/prevention/treatment measures  Outcome: Progressing  Goal: Prevent/manage excess moisture  Outcome: Progressing  Goal: Prevent/minimize sheer/friction injuries  Outcome: Progressing  Flowsheets (Taken 2/10/2024 7748)  Prevent/minimize sheer/friction injuries: Use pull sheet  Goal: Promote/optimize nutrition  Outcome: Progressing  Goal: Promote skin healing  Outcome: Progressing   The patient's goals for the shift include no chest pain    The clinical goals for the shift include no bleeding from cath site, remain hemodynamically stable    Over the shift, the patient did not make progress toward the goals in that her heart rate sustained 140 bpm for at least an hour, converted self back to paced, then back to sinus tach, then with additional interventions eventually converted back to paced a couple hours later.

## 2024-02-10 NOTE — CARE PLAN
The patient's goals for the shift include no chest pain    The clinical goals for the shift include remain hemodynamically stable    Over the shift, the patient did not make progress toward the following goals. Barriers to progression include n/a. Recommendations to address these barriers include n/a.

## 2024-02-10 NOTE — NURSING NOTE
Pt had BM, small amount of bright red blood in toilet water. Dr MARCUS Ibrahim states it is just hemorrhoids

## 2024-02-10 NOTE — PROGRESS NOTES
Linette Doyle is a 83 y.o. female on day 9 of admission presenting with NSTEMI (non-ST elevated myocardial infarction) (CMS/HCC).      Subjective   Patient says no further chest pain and no shortness of breath today.  Says overall she is feeling pretty well.  She continues to be very overwhelmed feeling and says that she just cannot grasp what has happened to her in the last week.  Answered questions again and we discussed what is been going on at length.    A-fib with RVR overnight ultimately put back on atenolol and metoprolol stopped again.  No further signs of bleeding.    Objective     Last Recorded Vitals  /55 (BP Location: Left arm, Patient Position: Lying)   Pulse 60   Temp 36.3 °C (97.3 °F) (Oral)   Resp 16   Wt 84.4 kg (186 lb 1.1 oz)   SpO2 92%   Intake/Output last 3 Shifts:    Intake/Output Summary (Last 24 hours) at 2/10/2024 1311  Last data filed at 2/10/2024 1153  Gross per 24 hour   Intake 520 ml   Output 600 ml   Net -80 ml         Admission Weight  Weight: 68 kg (150 lb) (01/31/24 2058)    Daily Weight  02/10/24 : 84.4 kg (186 lb 1.1 oz)    Image Results  Transthoracic Echo (TTE) [place] [address]             Phone [phone]    TRANSTHORACIC ECHOCARDIOGRAM REPORT       Patient Name:     LINETTE DOYLE        Reading Physician:   [id number] Becky Vela MD  Study Date:       2/9/2024             Ordering Provider:   [id number] JHONY VILLALBA  MRN/PID:          [id number]             Fellow:  Accession#:       [id number]         Nurse:  Date of           1940 / 83      Sonographer:         Jordy Perales RDCS  Birth/Age:        years  Gender:           F                    Additional Staff:  Height:           165.00 cm            Admit Date:  Weight:           87.00 kg             Admission Status:    Inpatient - Routine  BSA:              1.94 m2               Department Location: [facility]  Blood Pressure: 126 /86 mmHg    Study Type:    TRANSTHORACIC ECHO (TTE) LIMITED  Diagnosis/ICD: Non ST elevation (NSTEMI) myocardial infarction-I21.4  Indication:    NSTEMI/PCI    Patient History:  Pertinent History: CAD, Chest Pain, CHF, Dyspnea, HTN and Hyperlipidemia.                     NSTEMI, Pacemaker, Renal dx III, PCI.    Study Detail: The following Echo studies were performed: 2D, M-Mode, Doppler and                color flow.       PHYSICIAN INTERPRETATION:  Left Ventricle: Left ventricular systolic function is normal, with an estimated ejection fraction of 55-60%. There are no regional wall motion abnormalities. The left ventricular cavity size is normal. Left ventricular diastolic filling was indeterminate.  Left Atrium: The left atrium is moderately dilated.  Right Ventricle: The right ventricle is normal in size. There is normal right ventricular global systolic function. A device is visualized in the right ventricle.  Right Atrium: The right atrium is normal in size. There is a device visualized in the right atrium.  Aortic Valve: The aortic valve appears structurally normal. There is no evidence of aortic valve regurgitation. The peak instantaneous gradient of the aortic valve is 6.4 mmHg. The mean gradient of the aortic valve is 3.0 mmHg.  Mitral Valve: The mitral valve is moderately thickened. There is moderate to severe mitral valve regurgitation which is centrally directed.  Tricuspid Valve: The tricuspid valve is structurally normal. There is mild tricuspid regurgitation. The Doppler estimated RVSP is moderately elevated at 61.1 mmHg.  Pulmonic Valve: The pulmonic valve is structurally normal. There is physiologic pulmonic valve regurgitation.  Pericardium: There is a trivial pericardial effusion.  Aorta: The aortic root is normal.       CONCLUSIONS:   1. Left ventricular systolic function is normal with a 55-60% estimated ejection fraction.   2. The left  atrium is moderately dilated.   3. The mitral valve is moderately thickened.   4. Moderate to severe mitral valve regurgitation.   5. Mild tricuspid regurgitation is visualized.   6. Moderately elevated right ventricular systolic pressure.    QUANTITATIVE DATA SUMMARY:  LV SYSTOLIC FUNCTION BY 2D PLANIMETRY (MOD):                      Normal Ranges:  EF-A4C View: 58.4 % (>=55%)  EF-A2C View: 58.5 %  EF-Biplane:  57.9 %    LV DIASTOLIC FUNCTION:                      Normal Ranges:  MV Peak E: 2.10 m/s (0.7-1.2 m/s)    MITRAL VALVE:                        Normal Ranges:  MV Vmax:    2.50 m/s  (<=1.3m/s)  MV peak P.0 mmHg (<5mmHg)  MV mean P.0 mmHg  (<48mmHg)  MV DT:      486 msec  (150-240msec)    MITRAL INSUFFICIENCY:                            Normal Ranges:  PISA Radius:  0.9 cm  MR VTI:       156.00 cm  MR Vmax:      547.00 cm/s  MR Alias Yasir: 38.5 cm/s  MR Volume:    55.88 ml  MR Flow Rt:   195.94 ml/s  MR EROA:      0.36 cm2    AORTIC VALVE:                                    Normal Ranges:  AoV Vmax:                1.26 m/s (<=1.7m/s)  AoV Peak P.4 mmHg (<20mmHg)  AoV Mean PG:             3.0 mmHg (1.7-11.5mmHg)  LVOT Max Yasir:            1.01 m/s (<=1.1m/s)  AoV VTI:                 21.60 cm (18-25cm)  LVOT VTI:                21.10 cm  LVOT Diameter:           1.90 cm  (1.8-2.4cm)  AoV Area, VTI:           2.77 cm2 (2.5-5.5cm2)  AoV Area,Vmax:           2.27 cm2 (2.5-4.5cm2)  AoV Dimensionless Index: 0.98    TRICUSPID VALVE/RVSP:                              Normal Ranges:  Peak TR Velocity: 3.81 m/s  RV Syst Pressure: 61.1 mmHg (< 30mmHg)       [id number] Becky Vela MD  Electronically signed on 2024 at 2:12:13 PM       ** Final **      Physical Exam  General: alert, no diaphoresis   Lungs: CTA BL   Heart: RRR, no LE edema   GI: abdomen soft, nontender, nondistended, BS present   MSK: no joint effusion or deformity   Skin: no rashes, erythema, or ecchymosis   Neuro: grossly normal  cognition, motor strength, sensation   Psych: anxious      Relevant Results               Assessment/Plan          This patient has a urinary catheter   Reason for the urinary catheter remaining today? critically ill patient who need accurate urinary output measurements          Principal Problem:    NSTEMI (non-ST elevated myocardial infarction) (CMS/HCC)  Active Problems:    Angina pectoris (CMS/HCC)    Coronary arteriosclerosis    Atherosclerosis of coronary artery    Hypothyroidism    Hypercholesterolemia    Shortness of breath    Sick sinus syndrome (CMS/HCC)    Chronic kidney failure, stage 3 (moderate) (CMS/HCC)    Impaired fasting glucose    Leukocytosis    NSTEMI  -Left heart catheterization showed total occlusion of the circumflex and moderate disease in the RCA.  Dr. Vela recommended staged heart catheterizations with PCI to address the lesion as an outpatient  -Having chest pain the last 3 days in a row.  I checked troponin levels yesterday which were almost 4000.  She was started on a heparin drip last night and Eliquis was stopped.  She went for heart cath today.  2 stents were placed by Dr. Ibrahim being minimal contrast.  -Discussed with Dr. Ibrahim; aspirin and plavix started today  -Will need triple therapy for unclear amount of time, will address with Dr. Ibrahim.  After 1 month will be transitioned to Eliquis plus Plavix alone, now with concerns of bleeding, eliquis is on hold    A-fib with RVR-now in normal sinus rhythm  -New diagnosis during this hospital stay.    -Started on Eliquis but on hold right now d/t concerns for bleeding  - on cardizem and metoprolol now. Still in paced rhythm; to follow up with EP for device check and further management    Anemia  - hemoglobin had been in the 9's for last several days, today hemoglobin is lower at 8.5 and now with blood in toilet. Unclear if hemorrhoidal vs hematuria. She had a mcgovern catheter removed recently and then just started on triple therapy.  Eliquis now hold  - check UA. Hemoccult positive Spoke with RN and patient  - could have local trauma from recent mcgovern in addition to her blood thinners  - probably some iatrogenic blood loss as well in setting of two stents, prolonged hospital stay, frequent blood draws.  - iron low-- venofer       Known CAD  -Continue therapy as above.     GARY-a little worse today; expect from contrast  - probably initially GARY was from contrast nephropathy in the setting of diuretic use for pulmonary congestion.   - dr. Giang signed off--I will him reinvolved if creatinine continues to climb    Dyspnea- resolved today  - ?secondary from brillinta. CXR yesterday with questionable interstitial prominence. Stop fluids and monitor. Transitioning to plavix tomorrow. Hopefully she starts feeling better. She is intermittently on oxygen, tends to drop quickly when she falls asleep but also with intermittent SOB not associated with hypoxia. Will follow closely; discussed with patient and nursing    Acute respiratory failure with hypoxia- resolved again, on room air. Suspect some underlying KATELYN that should be followed with PS[facility] as outpatient     Possible pneumonia-bacterial, unspecified organism  - completed treatment.      Anxiety  - having a lot of anxiety regarding recently being scammed out of significant money; she tends to perseverate on this. Also with recent loss of family members.  - on zoloft  - she's refusing further meds at this time but continues to struggle with anxiety. Following this closely -- offered again today; she's thinking about it        Discussed discharge planning-- anticipate home with home health, does not qualify for SNF    Monitoring for bleeding at this time and trying to determine source of bleeding. Eliquis on hold  If counts stabilize, will resume eliquis. If drop further or more bleeding seen, will ask GI to see               Stefanie Verma, DO

## 2024-02-10 NOTE — NURSING NOTE
I see pt is disconnected from telemetry. I round on pt, and she is in the process of taking them off. She tells me she was going to wash her hair. I remind her we can't do it in the shower, and we need to keep her telemetry in place. Furthermore, I inform her we will have to hold off for now on washing her hair as her HR is still elevated. I put the monitor back in place and assist her back to bed. I inform her we will put the NC O2 back in place until we get her heart rate better controlled. NC O2 in place and connected to flowmeter. Bed alarm set. Continuing to monitor.

## 2024-02-10 NOTE — NURSING NOTE
Spoke to Dr. Ibrahim, cardiology, and informed him of pt's rhythm at 2000, the administration of her HS meds, relayed him her vitals, and her return to sinus tach about an hour after meds. I also explained that just as I left pts room from administering zofran to answer Dr. Ibrahim's phone call, it appears pt is back to an A-paced rhythm in low 70s bpm. Gave him her recent BP and that I spoke with hospitalist and received orders for metoprolol IV 5 mg push x1. Received orders for atenolol 25 mg bid first dose now. I clarified if I should still administer metoprolol IV push. He pointed out metoprolol is IV and atenolol is PO and ordered to give both. Will administer both and continue to monitor.

## 2024-02-11 LAB
ALBUMIN SERPL-MCNC: 3.6 G/DL (ref 3.5–5)
ALP BLD-CCNC: 76 U/L (ref 35–125)
ALT SERPL-CCNC: 44 U/L (ref 5–40)
ANION GAP SERPL CALC-SCNC: 12 MMOL/L
AST SERPL-CCNC: 30 U/L (ref 5–40)
BASOPHILS # BLD AUTO: 0.06 X10*3/UL (ref 0–0.1)
BASOPHILS NFR BLD AUTO: 0.7 %
BILIRUB SERPL-MCNC: 0.6 MG/DL (ref 0.1–1.2)
BUN SERPL-MCNC: 45 MG/DL (ref 8–25)
CALCIUM SERPL-MCNC: 9.2 MG/DL (ref 8.5–10.4)
CHLORIDE SERPL-SCNC: 105 MMOL/L (ref 97–107)
CO2 SERPL-SCNC: 23 MMOL/L (ref 24–31)
CREAT SERPL-MCNC: 1.7 MG/DL (ref 0.4–1.6)
EGFRCR SERPLBLD CKD-EPI 2021: 29 ML/MIN/1.73M*2
EOSINOPHIL # BLD AUTO: 0.17 X10*3/UL (ref 0–0.4)
EOSINOPHIL NFR BLD AUTO: 1.9 %
ERYTHROCYTE [DISTWIDTH] IN BLOOD BY AUTOMATED COUNT: 13.9 % (ref 11.5–14.5)
GLUCOSE SERPL-MCNC: 110 MG/DL (ref 65–99)
HCT VFR BLD AUTO: 27.8 % (ref 36–46)
HGB BLD-MCNC: 8.6 G/DL (ref 12–16)
IMM GRANULOCYTES # BLD AUTO: 0.44 X10*3/UL (ref 0–0.5)
IMM GRANULOCYTES NFR BLD AUTO: 4.8 % (ref 0–0.9)
LYMPHOCYTES # BLD AUTO: 1.44 X10*3/UL (ref 0.8–3)
LYMPHOCYTES NFR BLD AUTO: 15.8 %
MCH RBC QN AUTO: 28.5 PG (ref 26–34)
MCHC RBC AUTO-ENTMCNC: 30.9 G/DL (ref 32–36)
MCV RBC AUTO: 92 FL (ref 80–100)
MONOCYTES # BLD AUTO: 0.71 X10*3/UL (ref 0.05–0.8)
MONOCYTES NFR BLD AUTO: 7.8 %
NEUTROPHILS # BLD AUTO: 6.32 X10*3/UL (ref 1.6–5.5)
NEUTROPHILS NFR BLD AUTO: 69 %
NRBC BLD-RTO: 0.4 /100 WBCS (ref 0–0)
PLATELET # BLD AUTO: 203 X10*3/UL (ref 150–450)
POTASSIUM SERPL-SCNC: 4.4 MMOL/L (ref 3.4–5.1)
PROT SERPL-MCNC: 6.4 G/DL (ref 5.9–7.9)
RBC # BLD AUTO: 3.02 X10*6/UL (ref 4–5.2)
SODIUM SERPL-SCNC: 140 MMOL/L (ref 133–145)
WBC # BLD AUTO: 9.1 X10*3/UL (ref 4.4–11.3)

## 2024-02-11 PROCEDURE — 2500000001 HC RX 250 WO HCPCS SELF ADMINISTERED DRUGS (ALT 637 FOR MEDICARE OP): Performed by: INTERNAL MEDICINE

## 2024-02-11 PROCEDURE — 85025 COMPLETE CBC W/AUTO DIFF WBC: CPT | Performed by: INTERNAL MEDICINE

## 2024-02-11 PROCEDURE — 80053 COMPREHEN METABOLIC PANEL: CPT | Performed by: INTERNAL MEDICINE

## 2024-02-11 PROCEDURE — 2500000002 HC RX 250 W HCPCS SELF ADMINISTERED DRUGS (ALT 637 FOR MEDICARE OP, ALT 636 FOR OP/ED): Performed by: INTERNAL MEDICINE

## 2024-02-11 PROCEDURE — 2060000001 HC INTERMEDIATE ICU ROOM DAILY

## 2024-02-11 PROCEDURE — 2500000001 HC RX 250 WO HCPCS SELF ADMINISTERED DRUGS (ALT 637 FOR MEDICARE OP): Performed by: SURGERY

## 2024-02-11 PROCEDURE — 36415 COLL VENOUS BLD VENIPUNCTURE: CPT | Performed by: INTERNAL MEDICINE

## 2024-02-11 PROCEDURE — 99232 SBSQ HOSP IP/OBS MODERATE 35: CPT | Performed by: INTERNAL MEDICINE

## 2024-02-11 PROCEDURE — 2500000004 HC RX 250 GENERAL PHARMACY W/ HCPCS (ALT 636 FOR OP/ED): Performed by: HOSPITALIST

## 2024-02-11 RX ORDER — CIPROFLOXACIN 500 MG/1
500 TABLET ORAL EVERY 12 HOURS SCHEDULED
Status: DISCONTINUED | OUTPATIENT
Start: 2024-02-11 | End: 2024-02-12

## 2024-02-11 RX ADMIN — Medication 2000 UNITS: at 08:47

## 2024-02-11 RX ADMIN — CLOPIDOGREL BISULFATE 75 MG: 75 TABLET ORAL at 08:47

## 2024-02-11 RX ADMIN — MULTIVITAMIN TABLET 1 TABLET: TABLET at 08:47

## 2024-02-11 RX ADMIN — PANTOPRAZOLE SODIUM 40 MG: 40 TABLET, DELAYED RELEASE ORAL at 06:09

## 2024-02-11 RX ADMIN — SERTRALINE HYDROCHLORIDE 150 MG: 50 TABLET ORAL at 08:47

## 2024-02-11 RX ADMIN — ATENOLOL 25 MG: 25 TABLET ORAL at 21:18

## 2024-02-11 RX ADMIN — CIPROFLOXACIN HYDROCHLORIDE 500 MG: 500 TABLET, FILM COATED ORAL at 21:18

## 2024-02-11 RX ADMIN — POLYETHYLENE GLYCOL 3350 17 G: 17 POWDER, FOR SOLUTION ORAL at 08:47

## 2024-02-11 RX ADMIN — DILTIAZEM HYDROCHLORIDE 120 MG: 120 CAPSULE, COATED, EXTENDED RELEASE ORAL at 21:18

## 2024-02-11 RX ADMIN — IRON SUCROSE 100 MG: 20 INJECTION, SOLUTION INTRAVENOUS at 06:09

## 2024-02-11 RX ADMIN — DILTIAZEM HYDROCHLORIDE 120 MG: 120 CAPSULE, COATED, EXTENDED RELEASE ORAL at 08:47

## 2024-02-11 RX ADMIN — LEVOTHYROXINE SODIUM 100 MCG: 0.1 TABLET ORAL at 06:09

## 2024-02-11 RX ADMIN — ATENOLOL 25 MG: 25 TABLET ORAL at 08:47

## 2024-02-11 RX ADMIN — ASPIRIN 81 MG 81 MG: 81 TABLET ORAL at 08:47

## 2024-02-11 RX ADMIN — ATORVASTATIN CALCIUM 80 MG: 80 TABLET, FILM COATED ORAL at 21:18

## 2024-02-11 ASSESSMENT — COGNITIVE AND FUNCTIONAL STATUS - GENERAL
DRESSING REGULAR LOWER BODY CLOTHING: A LITTLE
MOBILITY SCORE: 21
DAILY ACTIVITIY SCORE: 23
WALKING IN HOSPITAL ROOM: A LITTLE
CLIMB 3 TO 5 STEPS WITH RAILING: A LOT

## 2024-02-11 ASSESSMENT — PAIN SCALES - GENERAL
PAINLEVEL_OUTOF10: 0 - NO PAIN
PAINLEVEL_OUTOF10: 0 - NO PAIN

## 2024-02-11 NOTE — NURSING NOTE
Dr Ibrahim in to see pt. States she is good to discharge from cardiac standpoint. Also states pt told him she is afraid to go home and be alone, after she was just recently scammed out of money.

## 2024-02-11 NOTE — NURSING NOTE
Assumed care of pt. Pt alert X4. Assisted pt to restroom. Pt urinated, clear yellow urine. Pt denies pain. NAD. Breakfast ordered. BSSR completed

## 2024-02-11 NOTE — CARE PLAN
The patient's goals for the shift include no chest pain    The clinical goals for the shift include see urology, stable vitals    Over the shift, the patient did not make progress toward the following goals. Barriers to progression include n/a. Recommendations to address these barriers include n/a.

## 2024-02-11 NOTE — HOSPITAL COURSE
This is an 83-year-old woman with history of sick sinus syndrome status post pacemaker who presents to the emergency room with chest pain. She reported to ED that she was scammed out of approximately $20,000 and she has been having a lot of stress with that.  Found to have elevated troponins.  Started on a heparin drip.  She underwent left heart catheterization with Dr. Vela who found an RCA lesion.  Patient had acute kidney injury at the time of the heart cath and decision was made not to place a stent at that time in order to reduce contrast exposure.  Plan was for outpatient staged stent placement once her kidney function had resolved.  Her GARY resolved over the next few days.  She then developed atrial fibrillation with RVR.  She had associated chest pain with this.  She has no prior history of atrial fibrillation.  She was started on Eliquis.  She was seen by Dr. Figueroa's team and Dr. Ibrahim for this.  Medications were adjusted and ultimately she converted back into a paced rhythm.  Continue to have chest pain over the upcoming few days.  She also was having a lot of anxiety.  Repeat troponins were checked that showed troponins greater than 3500.  The decision was made to put her back on a heparin drip, stop the Eliquis, and repeat the heart catheterization.  2 stents were placed on the second heart catheterization on 2/8/2024.  She was started on aspirin and Plavix.  Her Eliquis was resumed.  She then developed some hematuria.  Urology was consulted.  She also developed GARY after her second heart cath.

## 2024-02-11 NOTE — PROGRESS NOTES
Subjective Data:  Patient stable cardiac wise no active chest pain tightness.  Patient is scared to go home and lives alone.  Ongoing workup for possible hematuria.  Continue current aspirin Plavix and Eliquis due to paroxysmal atrial fibrillation.  Overnight Events:    None  Objective   Last Recorded Vitals  BP (!) 117/48 (BP Location: Left arm, Patient Position: Lying)   Pulse 60   Temp 36.7 °C (98.1 °F) (Temporal)   Resp 16   Wt 83.2 kg (183 lb 6.8 oz)   SpO2 100%     Intake/Output Summary (Last 24 hours) at 2/11/2024 1543  Last data filed at 2/11/2024 1500  Gross per 24 hour   Intake 600 ml   Output --   Net 600 ml     Physical Exam:  HEENT: Normocephalic/atraumatic pupils equal react light  Neck exam mild JVD, no bruit  Lung exam clear to auscultation, few crackles at the bases  Cardiac exam is regular rhythm S1-S2, soft slight murmur heard.  No S3 heard.  Abdomen soft nontender, nondistended  Extremities no clubbing, cyanosis but trace edema  Neuro exam grossly intact.  Image Results  Transthoracic Echo ([place] [address]             Phone [phone]    TRANSTHORACIC ECHOCARDIOGRAM REPORT       Patient Name:     SAMUEL Buenrostro Physician:   [id number] Becky Vela MD  Study Date:       2/9/2024             Ordering Provider:   [id number] JHONY VILLALBA  MRN/PID:          [id number]             Fellow:  Accession#:       [id number]         Nurse:  Date of           1940 / 83      Sonographer:         Jordy Perales RDCS  Birth/Age:        years  Gender:           F                    Additional Staff:  Height:           165.00 cm            Admit Date:  Weight:           87.00 kg             Admission Status:    Inpatient - Routine  BSA:              1.94 m2              Department Location: [facility]  Blood Pressure: 126 /86 mmHg    Study Type:     TRANSTHORACIC ECHO (TTE) LIMITED  Diagnosis/ICD: Non ST elevation (NSTEMI) myocardial infarction-I21.4  Indication:    NSTEMI/PCI    Patient History:  Pertinent History: CAD, Chest Pain, CHF, Dyspnea, HTN and Hyperlipidemia.                     NSTEMI, Pacemaker, Renal dx III, PCI.    Study Detail: The following Echo studies were performed: 2D, M-Mode, Doppler and                color flow.       PHYSICIAN INTERPRETATION:  Left Ventricle: Left ventricular systolic function is normal, with an estimated ejection fraction of 55-60%. There are no regional wall motion abnormalities. The left ventricular cavity size is normal. Left ventricular diastolic filling was indeterminate.  Left Atrium: The left atrium is moderately dilated.  Right Ventricle: The right ventricle is normal in size. There is normal right ventricular global systolic function. A device is visualized in the right ventricle.  Right Atrium: The right atrium is normal in size. There is a device visualized in the right atrium.  Aortic Valve: The aortic valve appears structurally normal. There is no evidence of aortic valve regurgitation. The peak instantaneous gradient of the aortic valve is 6.4 mmHg. The mean gradient of the aortic valve is 3.0 mmHg.  Mitral Valve: The mitral valve is moderately thickened. There is moderate to severe mitral valve regurgitation which is centrally directed.  Tricuspid Valve: The tricuspid valve is structurally normal. There is mild tricuspid regurgitation. The Doppler estimated RVSP is moderately elevated at 61.1 mmHg.  Pulmonic Valve: The pulmonic valve is structurally normal. There is physiologic pulmonic valve regurgitation.  Pericardium: There is a trivial pericardial effusion.  Aorta: The aortic root is normal.       CONCLUSIONS:   1. Left ventricular systolic function is normal with a 55-60% estimated ejection fraction.   2. The left atrium is moderately dilated.   3. The mitral valve is moderately thickened.   4.  Moderate to severe mitral valve regurgitation.   5. Mild tricuspid regurgitation is visualized.   6. Moderately elevated right ventricular systolic pressure.    QUANTITATIVE DATA SUMMARY:  LV SYSTOLIC FUNCTION BY 2D PLANIMETRY (MOD):                      Normal Ranges:  EF-A4C View: 58.4 % (>=55%)  EF-A2C View: 58.5 %  EF-Biplane:  57.9 %    LV DIASTOLIC FUNCTION:                      Normal Ranges:  MV Peak E: 2.10 m/s (0.7-1.2 m/s)    MITRAL VALVE:                        Normal Ranges:  MV Vmax:    2.50 m/s  (<=1.3m/s)  MV peak P.0 mmHg (<5mmHg)  MV mean P.0 mmHg  (<48mmHg)  MV DT:      486 msec  (150-240msec)    MITRAL INSUFFICIENCY:                            Normal Ranges:  PISA Radius:  0.9 cm  MR VTI:       156.00 cm  MR Vmax:      547.00 cm/s  MR Alias Yasir: 38.5 cm/s  MR Volume:    55.88 ml  MR Flow Rt:   195.94 ml/s  MR EROA:      0.36 cm2    AORTIC VALVE:                                    Normal Ranges:  AoV Vmax:                1.26 m/s (<=1.7m/s)  AoV Peak P.4 mmHg (<20mmHg)  AoV Mean PG:             3.0 mmHg (1.7-11.5mmHg)  LVOT Max Yasir:            1.01 m/s (<=1.1m/s)  AoV VTI:                 21.60 cm (18-25cm)  LVOT VTI:                21.10 cm  LVOT Diameter:           1.90 cm  (1.8-2.4cm)  AoV Area, VTI:           2.77 cm2 (2.5-5.5cm2)  AoV Area,Vmax:           2.27 cm2 (2.5-4.5cm2)  AoV Dimensionless Index: 0.98    TRICUSPID VALVE/RVSP:                              Normal Ranges:  Peak TR Velocity: 3.81 m/s  RV Syst Pressure: 61.1 mmHg (< 30mmHg)       [id number] Becky Vela MD  Electronically signed on 2024 at 2:12:13 PM       ** Final **    Last Labs:  CBC - 2024:  4:45 AM  9.1 8.6 203    27.8      CMP - 2024:  4:45 AM  9.2 6.4 30 --- 0.6   _ 3.6 44 76      PTT - 2024:  4:59 AM  1.1   11.9 24.6     Inpatient Medications:  Scheduled medications   Medication Dose Route Frequency    [Held by provider] apixaban  2.5 mg oral q12h    aspirin  81 mg oral Daily     atenolol  25 mg oral BID    atorvastatin  80 mg oral Nightly    cholecalciferol  2,000 Units oral Daily    ciprofloxacin  500 mg oral q12h JAQUI    clopidogrel  75 mg oral Daily    dilTIAZem CD  120 mg oral BID    iron sucrose  100 mg intravenous Daily    levothyroxine  100 mcg oral Daily before breakfast    multivitamin  1 tablet oral Daily    pantoprazole  40 mg oral Daily before breakfast    perflutren lipid microspheres  0.5-10 mL of dilution intravenous Once in imaging    perflutren lipid microspheres  0.5-10 mL of dilution intravenous Once in imaging    perflutren protein A microsphere  0.5 mL intravenous Once in imaging    polyethylene glycol  17 g oral Daily    sertraline  150 mg oral Daily    sulfur hexafluoride microsphr  2 mL intravenous Once in imaging     Principal Problem:    NSTEMI (non-ST elevated myocardial infarction) (CMS/HCC)  Active Problems:    Angina pectoris (CMS/HCC)    Coronary arteriosclerosis    Atherosclerosis of coronary artery    Hypothyroidism    Hypercholesterolemia    Shortness of breath    Sick sinus syndrome (CMS/HCC)    Chronic kidney failure, stage 3 (moderate) (CMS/HCC)    Impaired fasting glucose    Leukocytosis    Assessment/Plan   Patient has above history of non-STEMI.  No active chest pain tightness.  Patient is refusing to go home alone.  Continue current dual antiplatelet and Eliquis due to atrial fibrillation.  Modify risk factor.  Okay to discharge on current meds.  Code Status:  Full Code  I spent 35 minutes in the professional and overall care of this patient.  Darrius Ibrahim MD

## 2024-02-11 NOTE — NURSING NOTE
Spoke with Dr Holley. He states he will be in tomorrow to see pt, however he thinks that this hematuria may be r/t UTI and is ordering PO ATB

## 2024-02-11 NOTE — PROGRESS NOTES
Linette Doyle is a 83 y.o. female on day 10 of admission presenting with NSTEMI (non-ST elevated myocardial infarction) (CMS/HCC).      Subjective   Patient just got back from bathroom. Had a few episodes of hematuria yesterday-- one with red clots, another pink. Today urine is clear.     No pain or SOB. Continues to endorse feeling overwhelmed, but seems less anxious today.    Objective     Last Recorded Vitals  /60 (BP Location: Left arm, Patient Position: Lying)   Pulse 60   Temp 36.8 °C (98.2 °F) (Temporal)   Resp 16   Wt 83.2 kg (183 lb 6.8 oz)   SpO2 90%   Intake/Output last 3 Shifts:    Intake/Output Summary (Last 24 hours) at 2/11/2024 1106  Last data filed at 2/11/2024 0900  Gross per 24 hour   Intake 880 ml   Output 0 ml   Net 880 ml         Admission Weight  Weight: 68 kg (150 lb) (01/31/24 2058)    Daily Weight  02/11/24 : 83.2 kg (183 lb 6.8 oz)    Image Results  Transthoracic Echo ([facility]) [place] [address]             Phone [phone]    TRANSTHORACIC ECHOCARDIOGRAM REPORT       Patient Name:     LINETTE DOYLE        Reading Physician:   [id number] Becky Vela MD  Study Date:       2/9/2024             Ordering Provider:   [id number] JHONY VILLALBA  MRN/PID:          [id number]             Fellow:  Accession#:       [id number]         Nurse:  Date of           1940 / 83      Sonographer:         Jordy Perales RDCS  Birth/Age:        years  Gender:           F                    Additional Staff:  Height:           165.00 cm            Admit Date:  Weight:           87.00 kg             Admission Status:    Inpatient - Routine  BSA:              1.94 m2              Department Location: [facility]  Blood Pressure: 126 /86 mmHg    Study Type:    TRANSTHORACIC ECHO (TTE) LIMITED  Diagnosis/ICD: Non ST elevation (NSTEMI) myocardial infarction-I21.4  Indication:     NSTEMI/PCI    Patient History:  Pertinent History: CAD, Chest Pain, CHF, Dyspnea, HTN and Hyperlipidemia.                     NSTEMI, Pacemaker, Renal dx III, PCI.    Study Detail: The following Echo studies were performed: 2D, M-Mode, Doppler and                color flow.       PHYSICIAN INTERPRETATION:  Left Ventricle: Left ventricular systolic function is normal, with an estimated ejection fraction of 55-60%. There are no regional wall motion abnormalities. The left ventricular cavity size is normal. Left ventricular diastolic filling was indeterminate.  Left Atrium: The left atrium is moderately dilated.  Right Ventricle: The right ventricle is normal in size. There is normal right ventricular global systolic function. A device is visualized in the right ventricle.  Right Atrium: The right atrium is normal in size. There is a device visualized in the right atrium.  Aortic Valve: The aortic valve appears structurally normal. There is no evidence of aortic valve regurgitation. The peak instantaneous gradient of the aortic valve is 6.4 mmHg. The mean gradient of the aortic valve is 3.0 mmHg.  Mitral Valve: The mitral valve is moderately thickened. There is moderate to severe mitral valve regurgitation which is centrally directed.  Tricuspid Valve: The tricuspid valve is structurally normal. There is mild tricuspid regurgitation. The Doppler estimated RVSP is moderately elevated at 61.1 mmHg.  Pulmonic Valve: The pulmonic valve is structurally normal. There is physiologic pulmonic valve regurgitation.  Pericardium: There is a trivial pericardial effusion.  Aorta: The aortic root is normal.       CONCLUSIONS:   1. Left ventricular systolic function is normal with a 55-60% estimated ejection fraction.   2. The left atrium is moderately dilated.   3. The mitral valve is moderately thickened.   4. Moderate to severe mitral valve regurgitation.   5. Mild tricuspid regurgitation is visualized.   6. Moderately elevated  right ventricular systolic pressure.    QUANTITATIVE DATA SUMMARY:  LV SYSTOLIC FUNCTION BY 2D PLANIMETRY (MOD):                      Normal Ranges:  EF-A4C View: 58.4 % (>=55%)  EF-A2C View: 58.5 %  EF-Biplane:  57.9 %    LV DIASTOLIC FUNCTION:                      Normal Ranges:  MV Peak E: 2.10 m/s (0.7-1.2 m/s)    MITRAL VALVE:                        Normal Ranges:  MV Vmax:    2.50 m/s  (<=1.3m/s)  MV peak P.0 mmHg (<5mmHg)  MV mean P.0 mmHg  (<48mmHg)  MV DT:      486 msec  (150-240msec)    MITRAL INSUFFICIENCY:                            Normal Ranges:  PISA Radius:  0.9 cm  MR VTI:       156.00 cm  MR Vmax:      547.00 cm/s  MR Alias Yasir: 38.5 cm/s  MR Volume:    55.88 ml  MR Flow Rt:   195.94 ml/s  MR EROA:      0.36 cm2    AORTIC VALVE:                                    Normal Ranges:  AoV Vmax:                1.26 m/s (<=1.7m/s)  AoV Peak P.4 mmHg (<20mmHg)  AoV Mean PG:             3.0 mmHg (1.7-11.5mmHg)  LVOT Max Yasir:            1.01 m/s (<=1.1m/s)  AoV VTI:                 21.60 cm (18-25cm)  LVOT VTI:                21.10 cm  LVOT Diameter:           1.90 cm  (1.8-2.4cm)  AoV Area, VTI:           2.77 cm2 (2.5-5.5cm2)  AoV Area,Vmax:           2.27 cm2 (2.5-4.5cm2)  AoV Dimensionless Index: 0.98    TRICUSPID VALVE/RVSP:                              Normal Ranges:  Peak TR Velocity: 3.81 m/s  RV Syst Pressure: 61.1 mmHg (< 30mmHg)       [id number] Becky Vela MD  Electronically signed on 2024 at 2:12:13 PM       ** Final **      Physical Exam  General: alert, no diaphoresis   Lungs: CTA BL   Heart: RRR, no LE edema   GI: abdomen soft, nontender, nondistended, BS present   MSK: no joint effusion or deformity   Skin: no rashes, erythema, or ecchymosis   Neuro: grossly normal cognition, motor strength, sensation   Psych: anxious      Relevant Results               Assessment/Plan          This patient has a urinary catheter   Reason for the urinary catheter remaining today?  critically ill patient who need accurate urinary output measurements          Principal Problem:    NSTEMI (non-ST elevated myocardial infarction) (CMS/HCC)  Active Problems:    Angina pectoris (CMS/HCC)    Coronary arteriosclerosis    Atherosclerosis of coronary artery    Hypothyroidism    Hypercholesterolemia    Shortness of breath    Sick sinus syndrome (CMS/HCC)    Chronic kidney failure, stage 3 (moderate) (CMS/HCC)    Impaired fasting glucose    Leukocytosis    NSTEMI  -Left heart catheterization showed total occlusion of the circumflex and moderate disease in the RCA.  Dr. Vela recommended staged heart catheterizations with PCI to address the lesion as an outpatient  - She went for heart cath Thurs 2/8.  2 stents were placed by Dr. Ibrahim being minimal contrast.  -Discussed with Dr. Ibrahim; aspirin and plavix   -Will need triple therapy for unclear amount of time, will address with Dr. Ibrahim.  After 1 month will be transitioned to Eliquis plus Plavix alone, now with concerns of bleeding, eliquis is on hold    A-fib with RVR-now in normal sinus rhythm  -New diagnosis during this hospital stay.    -Started on Eliquis but on hold right now d/t concerns for bleeding  - on cardizem and metoprolol now. Still in paced rhythm; to follow up with EP for device check and further management    Anemia- acute blood loss  Hematuria  - hemoglobin had been in the 9's for last several days, today hemoglobin is lower at 8.5 and now with blood in toilet. Unclear if hemorrhoidal vs hematuria. She had a mcgovern catheter removed recently and then just started on triple therapy. Eliquis now hold  - could have local trauma from recent mcgovern in addition to her blood thinners  - probably some iatrogenic blood loss as well in setting of two stents, prolonged hospital stay, frequent blood draws.  - iron low-- venofer  - urology to see; although hematuria currently cleared up     Known CAD  -Continue therapy as above.     GARY-stable from  yesterday; expect from contrast  - probably initially GARY was from contrast nephropathy in the setting of diuretic use for pulmonary congestion.   - dr. Giang signed off--I will reinvolve him if creatinine continues to climb    Dyspnea- resolved today  - ?secondary from brillinta. CXR yesterday with questionable interstitial prominence. Stop fluids and monitor. Transitioning to plavix tomorrow. Hopefully she starts feeling better. She is intermittently on oxygen, tends to drop quickly when she falls asleep but also with intermittent SOB not associated with hypoxia. Will follow closely; discussed with patient and nursing    Acute respiratory failure with hypoxia- resolved again, on room air. Suspect some underlying KATELYN that should be followed with PSG as outpatient     Possible pneumonia-bacterial, unspecified organism  - completed treatment.      Anxiety  - having a lot of anxiety regarding recently being scammed out of significant money; she tends to perseverate on this. Also with recent loss of family members.  - on zoloft  - she's refusing further meds at this time but continues to struggle with anxiety. Following this closely -- offered again today; she's thinking about it        Discussed discharge planning-- anticipate home with home health, does not qualify for SNF    Monitoring for bleeding at this time and trying to determine source of bleeding. Eliquis on hold. Will send out with eliquis if safe/bleeding resolves.         Anticipate discharge home in next 1-2 days if renal function stabilizes. [facility].               Stefanie Verma,

## 2024-02-12 LAB
ALBUMIN SERPL-MCNC: 3.6 G/DL (ref 3.5–5)
ALP BLD-CCNC: 82 U/L (ref 35–125)
ALT SERPL-CCNC: 36 U/L (ref 5–40)
ANION GAP SERPL CALC-SCNC: 12 MMOL/L
AST SERPL-CCNC: 22 U/L (ref 5–40)
BACTERIA UR CULT: ABNORMAL
BASOPHILS # BLD AUTO: 0.07 X10*3/UL (ref 0–0.1)
BASOPHILS NFR BLD AUTO: 0.6 %
BILIRUB SERPL-MCNC: 0.5 MG/DL (ref 0.1–1.2)
BUN SERPL-MCNC: 32 MG/DL (ref 8–25)
CALCIUM SERPL-MCNC: 9.2 MG/DL (ref 8.5–10.4)
CHLORIDE SERPL-SCNC: 108 MMOL/L (ref 97–107)
CO2 SERPL-SCNC: 23 MMOL/L (ref 24–31)
CREAT SERPL-MCNC: 1.5 MG/DL (ref 0.4–1.6)
EGFRCR SERPLBLD CKD-EPI 2021: 34 ML/MIN/1.73M*2
EOSINOPHIL # BLD AUTO: 0.23 X10*3/UL (ref 0–0.4)
EOSINOPHIL NFR BLD AUTO: 2 %
ERYTHROCYTE [DISTWIDTH] IN BLOOD BY AUTOMATED COUNT: 14.1 % (ref 11.5–14.5)
GLUCOSE SERPL-MCNC: 117 MG/DL (ref 65–99)
HCT VFR BLD AUTO: 27.5 % (ref 36–46)
HGB BLD-MCNC: 8.5 G/DL (ref 12–16)
IMM GRANULOCYTES # BLD AUTO: 0.33 X10*3/UL (ref 0–0.5)
IMM GRANULOCYTES NFR BLD AUTO: 2.9 % (ref 0–0.9)
LYMPHOCYTES # BLD AUTO: 1.16 X10*3/UL (ref 0.8–3)
LYMPHOCYTES NFR BLD AUTO: 10.2 %
MCH RBC QN AUTO: 28.5 PG (ref 26–34)
MCHC RBC AUTO-ENTMCNC: 30.9 G/DL (ref 32–36)
MCV RBC AUTO: 92 FL (ref 80–100)
MONOCYTES # BLD AUTO: 0.69 X10*3/UL (ref 0.05–0.8)
MONOCYTES NFR BLD AUTO: 6.1 %
NEUTROPHILS # BLD AUTO: 8.86 X10*3/UL (ref 1.6–5.5)
NEUTROPHILS NFR BLD AUTO: 78.2 %
NRBC BLD-RTO: 0.8 /100 WBCS (ref 0–0)
PLATELET # BLD AUTO: 191 X10*3/UL (ref 150–450)
POTASSIUM SERPL-SCNC: 4.6 MMOL/L (ref 3.4–5.1)
PROT SERPL-MCNC: 6.3 G/DL (ref 5.9–7.9)
RBC # BLD AUTO: 2.98 X10*6/UL (ref 4–5.2)
SODIUM SERPL-SCNC: 143 MMOL/L (ref 133–145)
WBC # BLD AUTO: 11.3 X10*3/UL (ref 4.4–11.3)

## 2024-02-12 PROCEDURE — 85025 COMPLETE CBC W/AUTO DIFF WBC: CPT | Performed by: INTERNAL MEDICINE

## 2024-02-12 PROCEDURE — 2500000002 HC RX 250 W HCPCS SELF ADMINISTERED DRUGS (ALT 637 FOR MEDICARE OP, ALT 636 FOR OP/ED): Performed by: INTERNAL MEDICINE

## 2024-02-12 PROCEDURE — 80053 COMPREHEN METABOLIC PANEL: CPT | Performed by: INTERNAL MEDICINE

## 2024-02-12 PROCEDURE — 2500000001 HC RX 250 WO HCPCS SELF ADMINISTERED DRUGS (ALT 637 FOR MEDICARE OP): Performed by: INTERNAL MEDICINE

## 2024-02-12 PROCEDURE — 2500000004 HC RX 250 GENERAL PHARMACY W/ HCPCS (ALT 636 FOR OP/ED): Performed by: HOSPITALIST

## 2024-02-12 PROCEDURE — 99232 SBSQ HOSP IP/OBS MODERATE 35: CPT | Performed by: INTERNAL MEDICINE

## 2024-02-12 PROCEDURE — 36415 COLL VENOUS BLD VENIPUNCTURE: CPT | Performed by: INTERNAL MEDICINE

## 2024-02-12 PROCEDURE — 97116 GAIT TRAINING THERAPY: CPT | Mod: GP

## 2024-02-12 PROCEDURE — 2060000001 HC INTERMEDIATE ICU ROOM DAILY

## 2024-02-12 PROCEDURE — 2500000001 HC RX 250 WO HCPCS SELF ADMINISTERED DRUGS (ALT 637 FOR MEDICARE OP): Performed by: SURGERY

## 2024-02-12 PROCEDURE — 97530 THERAPEUTIC ACTIVITIES: CPT | Mod: GP

## 2024-02-12 RX ORDER — NITROFURANTOIN MACROCRYSTALS 50 MG/1
100 CAPSULE ORAL 2 TIMES DAILY
Status: DISCONTINUED | OUTPATIENT
Start: 2024-02-12 | End: 2024-02-12

## 2024-02-12 RX ORDER — LINEZOLID 600 MG/1
600 TABLET, FILM COATED ORAL EVERY 12 HOURS SCHEDULED
Status: DISCONTINUED | OUTPATIENT
Start: 2024-02-12 | End: 2024-02-14 | Stop reason: HOSPADM

## 2024-02-12 RX ADMIN — LEVOTHYROXINE SODIUM 100 MCG: 0.1 TABLET ORAL at 06:47

## 2024-02-12 RX ADMIN — DILTIAZEM HYDROCHLORIDE 120 MG: 120 CAPSULE, COATED, EXTENDED RELEASE ORAL at 20:01

## 2024-02-12 RX ADMIN — IRON SUCROSE 100 MG: 20 INJECTION, SOLUTION INTRAVENOUS at 06:47

## 2024-02-12 RX ADMIN — PANTOPRAZOLE SODIUM 40 MG: 40 TABLET, DELAYED RELEASE ORAL at 06:47

## 2024-02-12 RX ADMIN — MULTIVITAMIN TABLET 1 TABLET: TABLET at 08:59

## 2024-02-12 RX ADMIN — DILTIAZEM HYDROCHLORIDE 120 MG: 120 CAPSULE, COATED, EXTENDED RELEASE ORAL at 08:59

## 2024-02-12 RX ADMIN — SERTRALINE HYDROCHLORIDE 150 MG: 50 TABLET ORAL at 08:59

## 2024-02-12 RX ADMIN — ATENOLOL 25 MG: 25 TABLET ORAL at 08:59

## 2024-02-12 RX ADMIN — Medication 2000 UNITS: at 08:59

## 2024-02-12 RX ADMIN — CLOPIDOGREL BISULFATE 75 MG: 75 TABLET ORAL at 08:59

## 2024-02-12 RX ADMIN — ASPIRIN 81 MG 81 MG: 81 TABLET ORAL at 08:59

## 2024-02-12 RX ADMIN — APIXABAN 2.5 MG: 2.5 TABLET, FILM COATED ORAL at 20:00

## 2024-02-12 RX ADMIN — LINEZOLID 600 MG: 600 TABLET, FILM COATED ORAL at 13:37

## 2024-02-12 RX ADMIN — ATENOLOL 25 MG: 25 TABLET ORAL at 20:01

## 2024-02-12 RX ADMIN — LINEZOLID 600 MG: 600 TABLET, FILM COATED ORAL at 20:02

## 2024-02-12 RX ADMIN — ATORVASTATIN CALCIUM 80 MG: 80 TABLET, FILM COATED ORAL at 20:00

## 2024-02-12 ASSESSMENT — COGNITIVE AND FUNCTIONAL STATUS - GENERAL
MOBILITY SCORE: 21
WALKING IN HOSPITAL ROOM: A LITTLE
STANDING UP FROM CHAIR USING ARMS: A LITTLE
DAILY ACTIVITIY SCORE: 24
CLIMB 3 TO 5 STEPS WITH RAILING: A LITTLE
MOBILITY SCORE: 24

## 2024-02-12 ASSESSMENT — PAIN - FUNCTIONAL ASSESSMENT
PAIN_FUNCTIONAL_ASSESSMENT: 0-10

## 2024-02-12 ASSESSMENT — ENCOUNTER SYMPTOMS
RESPIRATORY NEGATIVE: 1
ABDOMINAL PAIN: 0
FLANK PAIN: 0
VOMITING: 0
DYSURIA: 0
NAUSEA: 0
HEMATURIA: 1

## 2024-02-12 ASSESSMENT — PAIN SCALES - GENERAL
PAINLEVEL_OUTOF10: 0 - NO PAIN

## 2024-02-12 NOTE — PROGRESS NOTES
Pt on po bactrim.      02/12/24 1124   Discharge Planning   Patient expects to be discharged to: Home with  Homecare

## 2024-02-12 NOTE — PROGRESS NOTES
Linette Doyle is a 83 y.o. female on day 11 of admission presenting with NSTEMI (non-ST elevated myocardial infarction) (CMS/HCC).      Subjective   Had a cardiac cath and stent with postop course complicated by A-fib acute kidney failure and hematuria.  Still had some pink-tinged urine this morning.  Started on antibiotics for a Enterococcus UTI.  Seen by urology who recommended restarting Eliquis.       Objective   Alert oriented undistressed  Heart regular rate and rhythm  Lungs clear to auscultation  Abdomen soft nontender nondistended  Extremities no significant edema    Last Recorded Vitals  BP (!) 127/49 (BP Location: Left arm, Patient Position: Sitting)   Pulse 63   Temp 36.6 °C (97.9 °F) (Oral)   Resp 20   Wt 82.1 kg (181 lb)   SpO2 94%   Intake/Output last 3 Shifts:    Intake/Output Summary (Last 24 hours) at 2/12/2024 1515  Last data filed at 2/12/2024 1155  Gross per 24 hour   Intake 720 ml   Output --   Net 720 ml       Admission Weight  Weight: 68 kg (150 lb) (01/31/24 2058)    Daily Weight  02/12/24 : 82.1 kg (181 lb)    Image Results  [place] [address]             Phone [phone]    Cardiovascular Catheterization Report    Patient Name:     LINETTE DOYLE        Performing           Zuri Vela                                         Physician:           MD  Study Date:       2/2/2024             Verifying Physician: Zuri Vela MD  MRN/PID:          [id number]             Cardiologist:  Accession#:       [id number]         Ordering Provider:   Zuri VELA  Date of           1940 / 83      Fellow:  Birth/Age:        years  Gender:           F                    Fellow:  Encounter#:       [id number]       Study: Left Heart Cath       Indications:  LINETTE DOYLE is a 83 year old female who presents with  dyslipidemia, hypertension, prior percutaneous coronary intervention and a chest pain assessment of typical angina. Acute coronary syndrome - STEMI. Patient with remote PCI to LAD multiple risk factors coronary disease admitted to the hospital with chest pain. Patient has chronic kidney disease. Patient was made to proceed with cardiac catheterization. Patient has elevated troponin acute non-ST elevation myocardial infarction.  Stress test performed: No. CTA performed: No. Agatston accessed: No. LVEF  Assessed: Yes. LVEF = 55%.  Cardiac arrest: No.  Cardiac surgical consult: No.  Cardiovascular Instability: No  Frailty status of patient entering lab: 4 = Vulnerable.       Procedure Description:  After infiltration with 2% Lidocaine, the right radial artery was cannulated with a modified Seldinger technique. Subsequently a 6 Portuguese sheath was placed in the right radial artery. Selective coronary catheterization was performed using a 5 Fr catheter(s) exchanged over a guide wire to cannulate the coronary arteries.  Additional catheter(s) used to visualize the coronary arteries were: TIG4.     Procedure Description Comments:  Attempted PCI of the left circumflex artery. The left circumflex artery is 100% occluded with 3 right to left collaterals was unclear if it is the culprit of patient chest pain or it is the RCA. We attempted wiring the left circumflex. We used CLS 3.0 guide catheter for intervention. Heparin was used anticoagulant and dual antiplatelet therapy was given before procedure. We attempted to wire the left circumflex but there is a very tortuous lesion with multiple branches coming up there and despite multiple attempts were unable to advance or cross the lesion completely. We used Monorail balloon to see if that will straighten the tip of the wire to be able to push down into the lesion and to circumflex but due to the 90 degrees takeoff of the circumflex artery were unable to achieve good support. The  distal vessel which was faint when we injected was about 2 mm in size the artery overall small and at this point I do not believe this is acute I think it is more  of the left circumflex   and recommend staged PCI of the RCA since we used a significant amount of contrast to avoid contrast-induced nephropathy.     Coronary Angiography:  The coronary circulation is right dominant.     Left Main Coronary Artery:  Left main is moderately calcified artery with nonobstructive coronary disease.     Left Anterior Descending Coronary Artery Distribution:  Left anterior descending artery is heavily calcified artery with mild diffuse disease and patent stent in the midsegment. KAILA-3 flow. Diagonal 1 patent.     Circumflex Coronary Artery Distribution:  The left circumflex is very small vessel of 2.5 mm with 100% occluded segment in the proximal mid region. There is are right to left collaterals.     Right Coronary Artery Distribution:    The right coronary artery is a right dominant vessel has significant lesion of 70% in the junction between proximal mid segment and 70% mid distal segment. KAILA-3 flow.       Left Ventriculography:  The left ventricular end-diastolic pressure was mildly elevated at 20 mmHg. LV gram was not obtained to avoid excess use of contrast. No gradient across aortic valve.     Coronary Interventions:  Angiography reveals a 100% stenosis of the proximal to mid circumflex coronary artery. Pre-intervention KAILA flow was 0. Percutaneous coronary intervention was performed within the proximal to mid circumflex. The stenosis was successfully reduced from 100% to 100%. Post-intervention KAILA flow was 0.     Unsuccessful coronary revascularization of the proximal to mid circumflex due to inability to pass the wire.     Hemo Personnel:  +----------------+---------+  Name            Duty       +----------------+---------+  Becky Vela MD 1  +----------------+---------+       Hemodynamic  Pressures:     +----+-------------------+---------+------------+-------------+------+---------+  Site     Date Time       Phase    Systolic    Diastolic    ED  Mean mmHg                           Name       mmHg        mmHg      mmHg            +----+-------------------+---------+------------+-------------+------+---------+    LV  2/2/2024 11:31:07  O2 REST         145            7    24                                AM                                                   +----+-------------------+---------+------------+-------------+------+---------+    LV  2/2/2024 11:31:14  O2 REST         144            6    20                                AM                                                   +----+-------------------+---------+------------+-------------+------+---------+   LVp  2/2/2024 11:31:19  O2 REST         145            6    18                                AM                                                   +----+-------------------+---------+------------+-------------+------+---------+   AOp  2/2/2024 11:31:26  O2 REST         147           51             86                       AM                                                   +----+-------------------+---------+------------+-------------+------+---------+    AO  2/2/2024 11:32:14  O2 REST         113           56             84                       AM                                                   +----+-------------------+---------+------------+-------------+------+---------+    AO  2/2/2024 11:47:03  O2 REST         134           62             84                       AM                                                   +----+-------------------+---------+------------+-------------+------+---------+    AO  2/2/2024 12:02:04  O2 REST         140           56             83                       PM                                                    +----+-------------------+---------+------------+-------------+------+---------+    AO  2/2/2024 12:06:29  O2 REST         129           49             77                       PM                                                   +----+-------------------+---------+------------+-------------+------+---------+    AO  2/2/2024 12:09:01  O2 REST         145           47             81                       PM                                                   +----+-------------------+---------+------------+-------------+------+---------+       Cardiac Cath Post Procedure Notes:  Post Procedure Diagnosis: Double vessel disease.  Blood Loss:               Estimated blood loss during the procedure was 10 cc                            mls.  Specimens Removed:        Number of specimen(s) removed: none.    ____________________________________________________________________________________  CONCLUSIONS:   1. There is 100% occluded mid left circumflex attempted PCI unsuccessful likely chronically occluded small vessel. There is severe.   2. Severe mid RCA disease 2 tandem lesions in the proximal mid segment and mid to distal segment of 70% staged PCI recommended.    ICD 10 Codes:  Non ST elevation (NSTEMI) myocardial infarction-I21.4     CPT Codes:  Left Heart Cath (visualization of coronaries) and [id number]; Moderate Sedation Services initial 15 minutes patient >5 years-[id number]     [id number] Becky Vela MD  Performing Physician  Electronically signed by [id number] Becky Vela MD on 2/12/2024 at 1:19:48 PM         ** Final **          Assessment/Plan      #1-hematuria.  Continue aspirin Plavix.  Will resume Eliquis but at lower dose given her advanced age and her impaired kidney function.  2 -urinary tract infection.  Finished a course of either linezolid or Macrobid.  3 continue other cardiac meds.  Repeat labs in the morning.  Will try to discharge tomorrow if blood  counts stable and hematuria remains relatively unimpressive.           Jony Roca MD

## 2024-02-12 NOTE — NURSING NOTE
Cardio rounding.  Urology rounded - changed oral ATB therapy.    OK to resume eliquis and OK to discharge from uro standpoint.  Patient up ambulating in room independently.    Still remains anxious - 1on 1 provided.    Assessment unremarkable.

## 2024-02-12 NOTE — NURSING NOTE
Assumed care of patient, resting quietly in bed with eyes closed.  No visible distress observed.  Call light and commonly used items in reach.  Bed locked and in low position.

## 2024-02-12 NOTE — NURSING NOTE
Assumed patient care. Patient is sitting in chair. Complaints of arm pain, IV infiltrated. New IV placed by rapid response nurse

## 2024-02-12 NOTE — PROGRESS NOTES
Physical Therapy    Physical Therapy Treatment    Patient Name: Linette Doyle  MRN: [id number]  Today's Date: 2/12/2024  Time Calculation  Start Time: 1040  Stop Time: 1108  Time Calculation (min): 28 min       Assessment/Plan   PT Assessment  PT Assessment Results: Decreased strength, Decreased endurance, Impaired balance, Decreased mobility, Decreased safety awareness, Impaired judgement  Rehab Prognosis: Good  Evaluation/Treatment Tolerance: Patient tolerated treatment well  Medical Staff Made Aware: Yes  Strengths: Ability to acquire knowledge  Barriers to Participation: Comorbidities  End of Session Communication: Bedside nurse  Assessment Comment: The patient continues to require CGA for ambulation with no AD; pt anxious throughout session with verbal encouragement provided. Pt remains appropriate for therapy services to address functional deficits.  End of Session Patient Position: Up in chair, Alarm off, not on at start of session  PT Plan  Inpatient/Swing Bed or Outpatient: Inpatient  PT Plan  Treatment/Interventions: Bed mobility, Transfer training, Gait training, Stair training, Balance training, Neuromuscular re-education, Strengthening, Endurance training, Range of motion, Therapeutic exercise, Therapeutic activity, Home exercise program  PT Plan: Skilled PT  PT Frequency: 4 times per week  PT Discharge Recommendations: Low intensity level of continued care, 24 hr supervision due to cognition  PT Recommended Transfer Status: Contact guard  PT - OK to Discharge: Yes      General Visit Information:   PT  Visit  PT Received On: 02/12/24  Response to Previous Treatment: Patient with no complaints from previous session.  General  Prior to Session Communication: Bedside nurse  Patient Position Received: Bed, 3 rail up  Preferred Learning Style: verbal  General Comment: Patient cleared for therapy follow-up by nsg. Pt presented supine in bed and agreeable to session.    Subjective    Precautions:  Precautions  Hearing/Visual Limitations: reading glasses  Medical Precautions: Fall precautions    Objective   Pain:  Pain Assessment  Pain Assessment: 0-10  Pain Score: 0 - No pain  Cognition:  Cognition  Overall Cognitive Status: Within Functional Limits  Orientation Level: Oriented X4  Cognition Comments: mild confusion noted  Postural Control:  Postural Control  Postural Control: Within Functional Limits  Extremity/Trunk Assessments:  RLE   RLE : Within Functional Limits  LLE   LLE : Within Functional Limits  Activity Tolerance:  Activity Tolerance  Endurance: Tolerates 10 - 20 min exercise with multiple rests  Treatments:     Therapeutic Activity  Therapeutic Activity Performed: Yes  Therapeutic Activity 1: VCs and education provided on safety awareness and safe sequencing of activity; importance of mobility stressed to assist with maintaining endurance and strength    Bed Mobility  Bed Mobility: Yes  Bed Mobility 1  Bed Mobility 1: Supine to sitting  Level of Assistance 1: Modified independent  Bed Mobility Comments 1: use of bed rails    Ambulation/Gait Training  Ambulation/Gait Training Performed: Yes  Ambulation/Gait Training 1  Surface 1: Level tile  Device 1: No device  Assistance 1: Contact guard  Quality of Gait 1: Narrow base of support, Decreased step length, Forward flexed posture  Comments/Distance (ft) 1: 75ftx2 with no AD and CGA. Forward flexed posture; decreased gloria. Intermittent VCs for safety awareness.  Transfers  Transfer: Yes  Transfer 1  Transfer From 1: Bed to, Stand to  Transfer to 1: Stand, Chair with arms  Technique 1: Sit to stand, Stand to sit  Transfer Level of Assistance 1: Contact guard  Trials/Comments 1: VCs for safe sequencing    Outcome Measures:  [facility] Basic Mobility  Turning from your back to your side while in a flat bed without using bedrails: None  Moving from lying on your back to sitting on the side of a flat bed without using bedrails: None  Moving  to and from bed to chair (including a wheelchair): None  Standing up from a chair using your arms (e.g. wheelchair or bedside chair): A little  To walk in hospital room: A little  Climbing 3-5 steps with railing: A little  Basic Mobility - Total Score: 21      Encounter Problems       Encounter Problems (Active)       PT Problem       Patient will demonstrate improvements in strength  (Progressing)       Start:  02/03/24    Expected End:  02/24/24            Patient will ambulate 150 ft independently and use of no assistive device  (Progressing)       Start:  02/03/24    Expected End:  02/24/24            Patient will ascend/descend 1 step with no support on 0 rail(s) independently  (Progressing)       Start:  02/03/24    Expected End:  02/24/24               Pain - Adult

## 2024-02-12 NOTE — PROGRESS NOTES
Subjective Data:  Patient is doing better.  Still having weakness overall does not want to go home.    Overnight Events:    Telemetry reviewed no events     Objective Data:  Last Recorded Vitals:  Vitals:    02/12/24 0341 02/12/24 0718 02/12/24 1155 02/12/24 1551   BP: (!) 118/49 124/54 (!) 127/49 134/52   BP Location: Left arm Left arm Left arm Left arm   Patient Position: Lying Lying Sitting Lying   Pulse: 60 60 63 59   Resp: 20 20 20 20   Temp: 36.9 °C (98.4 °F) 36.7 °C (98.1 °F) 36.6 °C (97.9 °F) 36.6 °C (97.9 °F)   TempSrc: Temporal Oral Oral Oral   SpO2: 96% 94% 94% 92%   Weight: 82.1 kg (181 lb)      Height:           Last Labs:  CBC - 2/12/2024:  5:34 AM  11.3 8.5 191    27.5      CMP - 2/12/2024:  5:34 AM  9.2 6.3 22 --- 0.5   _ 3.6 36 82      PTT - 2/9/2024:  4:59 AM  1.1   11.9 24.6     HGBA1C   Date/Time Value Ref Range Status   02/01/2024 05:58 AM 6.0 See below % Final   07/05/2023 08:49 AM 5.6 4.0 - 6.0 % Final     Comment:     Hemoglobin A1C levels are related to mean blood glucose during the   preceding 2-3 months. The relationship table below may be used as a   general guide. Each 1% increase in HGB A1C is a reflection of an   increase in mean glucose of approximately 30 mg/dl.   Reference: Diabetes Care, volume 29, supplement 1 Jan. 2006                        HGB A1C ................. Approx. Mean Glucose   _______________________________________________   6%   ...............................  120 mg/dl   7%   ...............................  150 mg/dl   8%   ...............................  180 mg/dl   9%   ...............................  210 mg/dl   10%  ...............................  240 mg/dl  Performed at [address] [address]     12/14/2022 09:13 AM 5.7 4.0 - 6.0 % Final     Comment:     Hemoglobin A1C levels are related to mean blood glucose during the   preceding 2-3 months. The relationship table below may be used as a   general guide. Each 1% increase in HGB  A1C is a reflection of an   increase in mean glucose of approximately 30 mg/dl.   Reference: Diabetes Care, volume 29, supplement 1 Jan. 2006                        HGB A1C ................. Approx. Mean Glucose   _______________________________________________   6%   ...............................  120 mg/dl   7%   ...............................  150 mg/dl   8%   ...............................  180 mg/dl   9%   ...............................  210 mg/dl   10%  ...............................  240 mg/dl  Performed at [address] [address]       LDLCALC   Date/Time Value Ref Range Status   07/05/2023 08:49 AM 57 65 - 130 MG/DL Final   12/14/2022 09:13 AM 64 65 - 130 MG/DL Final   06/29/2022 08:43 AM 58 65 - 130 MG/DL Final      Last I/O:  I/O last 3 completed shifts:  In: 840 (10.2 mL/kg) [P.O.:840]  Out: - (0 mL/kg)   Weight: 82.1 kg     Past Cardiology Tests (Last 3 Years):  EKG:  ECG 12 Lead 01/31/2024    Echo:  Transthoracic Echo (TTE) Limited 02/09/2024      Transthoracic Echo (TTE) Complete 02/01/2024    Ejection Fractions:  EF   Date/Time Value Ref Range Status   02/09/2024 08:59 AM 58 %    02/01/2024 11:43 AM 59 %      Cath:  Cardiac catheterization - coronary 02/08/2024      Cardiac catheterization - coronary 02/02/2024      Cardiac catheterization - coronary 02/02/2024    Stress Test:  No results found for this or any previous visit from the past 1095 days.    Cardiac Imaging:  No results found for this or any previous visit from the past 1095 days.      Inpatient Medications:  Scheduled medications   Medication Dose Route Frequency    apixaban  2.5 mg oral BID    aspirin  81 mg oral Daily    atenolol  25 mg oral BID    atorvastatin  80 mg oral Nightly    cholecalciferol  2,000 Units oral Daily    clopidogrel  75 mg oral Daily    dilTIAZem CD  120 mg oral BID    levothyroxine  100 mcg oral Daily before breakfast    linezolid  600 mg oral q12h JAQUI    multivitamin  1 tablet oral  Daily    pantoprazole  40 mg oral Daily before breakfast    perflutren lipid microspheres  0.5-10 mL of dilution intravenous Once in imaging    perflutren lipid microspheres  0.5-10 mL of dilution intravenous Once in imaging    perflutren protein A microsphere  0.5 mL intravenous Once in imaging    polyethylene glycol  17 g oral Daily    sertraline  150 mg oral Daily    sulfur hexafluoride microsphr  2 mL intravenous Once in imaging     PRN medications   Medication    acetaminophen    Or    acetaminophen    Or    acetaminophen    benzocaine-menthol    calcium carbonate    dextromethorphan-guaifenesin    dextrose 10 % in water (D10W)    dextrose    glucagon    guaiFENesin    ondansetron ODT    Or    ondansetron    oxygen     Continuous Medications   Medication Dose Last Rate       Physical Exam:  General: Patient is in no acute distress.  HEENT: atraumatic normocephalic.  Neck: is supple jugular venous pressure within normal limits no thyromegaly.  Cardiovascular regular rate and rhythm normal heart sounds no murmurs rubs or gallops.  Lungs: clear to auscultation bilaterally.  Abdomen: is soft nontender.  Extremities warm to touch no edema.       Assessment/Plan   Acute non-ST elevation myocardial infarction.  Status post PCI to RCA with 2 drug-eluting stents.  Chronically occluded left circumflex.  Recommend aspirin and Plavix.  May stop aspirin after 1 month since patient will be on Eliquis for atrial fibrillation.  Recommend also to patient to be on PPI  New onset atrial fibrillation on Eliquis.  Continue rate control with beta-blockers.  Monitor blood pressure and heart rate.    Hypertension.  Continue rate control strategy will monitor.     Hyperlipidemia continue high intensity statin.  5. Sinus syndrome status post pacemaker.   Peripheral IV 02/11/24 20 G Left Antecubital (Active)   Site Assessment Clean;Dry;Intact 02/12/24 0900   Dressing Status Clean;Dry;Occlusive 02/12/24 0900   Number of days: 1       Code  Status:  Full Code      Becky Vela MD

## 2024-02-12 NOTE — PROGRESS NOTES
"Linette Doyle is a 83 y.o. female on day 11 of admission presenting with NSTEMI (non-ST elevated myocardial infarction) (CMS/[facility]).    Subjective   Feels well       Objective     Physical Exam  Gen: A+O x 3, no acute distress  HEENT: Normocephalic/atraumatic pupils equal react light  Neck: Neck veins not elevated, upstrokes and volumes normal, no bruits   Lung: Clear throughout all lobes, nl AP diameter  CV:  nl sounding S1, S2, no S3, no murmur  Abdomen: soft, non tender, BS +  Extremities: pulses palpable bilaterally, no edema, warm to touch  Neuro: no neurologic deficits      Last Recorded Vitals  Blood pressure 134/52, pulse 59, temperature 36.6 °C (97.9 °F), temperature source Oral, resp. rate 20, height 1.651 m (5' 5\"), weight 82.1 kg (181 lb), SpO2 92 %.  Intake/Output last 3 Shifts:  I/O last 3 completed shifts:  In: 840 (10.2 mL/kg) [P.O.:840]  Out: - (0 mL/kg)   Weight: 82.1 kg     Relevant Results  Results for orders placed or performed during the hospital encounter of 01/31/24 (from the past 24 hour(s))   CBC and Auto Differential   Result Value Ref Range    WBC 11.3 4.4 - 11.3 x10*3/uL    nRBC 0.8 (H) 0.0 - 0.0 /100 WBCs    RBC 2.98 (L) 4.00 - 5.20 x10*6/uL    Hemoglobin 8.5 (L) 12.0 - 16.0 g/dL    Hematocrit 27.5 (L) 36.0 - 46.0 %    MCV 92 80 - 100 fL    MCH 28.5 26.0 - 34.0 pg    MCHC 30.9 (L) 32.0 - 36.0 g/dL    RDW 14.1 11.5 - 14.5 %    Platelets 191 150 - 450 x10*3/uL    Neutrophils % 78.2 40.0 - 80.0 %    Immature Granulocytes %, Automated 2.9 (H) 0.0 - 0.9 %    Lymphocytes % 10.2 13.0 - 44.0 %    Monocytes % 6.1 2.0 - 10.0 %    Eosinophils % 2.0 0.0 - 6.0 %    Basophils % 0.6 0.0 - 2.0 %    Neutrophils Absolute 8.86 (H) 1.60 - 5.50 x10*3/uL    Immature Granulocytes Absolute, Automated 0.33 0.00 - 0.50 x10*3/uL    Lymphocytes Absolute 1.16 0.80 - 3.00 x10*3/uL    Monocytes Absolute 0.69 0.05 - 0.80 x10*3/uL    Eosinophils Absolute 0.23 0.00 - 0.40 x10*3/uL    Basophils Absolute 0.07 0.00 - 0.10 " x10*3/uL   Comprehensive Metabolic Panel   Result Value Ref Range    Glucose 117 (H) 65 - 99 mg/dL    Sodium 143 133 - 145 mmol/L    Potassium 4.6 3.4 - 5.1 mmol/L    Chloride 108 (H) 97 - 107 mmol/L    Bicarbonate 23 (L) 24 - 31 mmol/L    Urea Nitrogen 32 (H) 8 - 25 mg/dL    Creatinine 1.50 0.40 - 1.60 mg/dL    eGFR 34 (L) >60 mL/min/1.73m*2    Calcium 9.2 8.5 - 10.4 mg/dL    Albumin 3.6 3.5 - 5.0 g/dL    Alkaline Phosphatase 82 35 - 125 U/L    Total Protein 6.3 5.9 - 7.9 g/dL    AST 22 5 - 40 U/L    Bilirubin, Total 0.5 0.1 - 1.2 mg/dL    ALT 36 5 - 40 U/L    Anion Gap 12 <=19 mmol/L          Assessment/Plan   Principal Problem:    NSTEMI (non-ST elevated myocardial infarction) (CMS/HCC)  Active Problems:    Angina pectoris (CMS/HCC)    Coronary arteriosclerosis    Atherosclerosis of coronary artery    Hypothyroidism    Hypercholesterolemia    Shortness of breath    Sick sinus syndrome (CMS/HCC)    Chronic kidney failure, stage 3 (moderate) (CMS/HCC)    Impaired fasting glucose    Leukocytosis    2/6: 83-year-old female with a history of atherosclerotic heart disease, remote LAD stenting, SSS 2019 DDD PPM  implantation presents with non-ST elevation MI, peak troponin of 650, cardiac catheterization with evidence for  of LCX, RCA with 2-70% lesions, subsequent kidney  injury secondary to IV contrast w/ decision to intervene w/recurrent sx, developed new onset AF rate 160 bpm, some demand ischemia w/ recurrent sx, initial episode was given a IV amiodarone bolus with recurrent rapid rates started on dilt gtt. echocardiogram with preserved EF, wall and inferior wall hypokinesis, moderate MR, normal LA size  -Suspect A-fib in the setting of recent non-STEMI, agree with increasing AV toshia agents, stop diltiazem drip  -[id number] 4,  start apixaban  -cardiology to discontinue clopidogrel, will c/w aspirin  -watch H/H   -If BP allows consider nitrate at low-dose   -In terms of AF treatment, will hold on use of AAD  at this time  -Will have patient follow-up in device clinic,  will be able to assess AF burden on AV toshia agents  -Appointment scheduled for February 23. 2024  1030 am     2/7: Patient spontaneously converted back to sinus rhythm yesterday.  She remains atrial paced ventricular sensed at 60 bpm.  Patient denies palpitations, shortness of breath.  Atenolol 25 mg twice daily as it is renally cleared.  Will monitor renal function with apixaban dosing.  Baseline creatinine 1.3, currently 1.7.  Will check TSH with new atrial fibrillation and history of hypothyroidism.  Patient to follow-up on February 23, 2024 10:30 AM in device clinic.  Will assess atrial fibrillation burden at that time and consider antiarrhythmic drug if necessary.     2/8:  -Continued anginal type symptoms with an increase in troponin yesterday, underwent stenting of both RCA lesions with  KAILA-3  -Antiplatelet therapy aspirin, clopidogrel and apixaban with discontinuation of asa in 1 month  -Concerned about atenolol with renal disease, hopefully will be okay with cardiology to change to metoprolol slightly lower dose that can be increased, 50 mL of contrast during procedure  -Nicely remains in sinus rhythm  -Limited echo in a.m.     2/9: Patient remains in sinus rhythm with no further evidence of atrial fibrillation.  Apixaban on hold secondary to positive fecal occult.  Echocardiogram pending.  Would resume apixaban when able. Patient will need triple therapy of aspirin Plavix and apixaban for 1 month post PCI to the RCA.    2/12:  -Remains in sinus rhythm, atrially paced, no further chest pain  -H/H stabilized in the setting of hematuria from triple agent therapy  -Additionally, found to have UTI, receiving antibiotics  -Apixaban being resumed at a lower dose  -Also in terms of atrial fibrillation believe always in the setting of non-STEMI, ongoing chest pain, prior to stenting though still does need anticoagulation when able to tolerate          Claudia Caicedo, APRN-CNP

## 2024-02-12 NOTE — CONSULTS
Reason For Consult  Hematuria    History Of Present Illness  Linette Doyle is a 83 y.o. female presenting with chest pain, acute MI.  She underwent PCI w/ stenting last week.  She is now on Plavix and aspirin.  During her stay shed noticed keri hematuria.  She did not see any clots.  She denies any flank or abdominal pain.  Her urine this morning has cleared.  Her culture is positive for Enterococcus.       Past Medical History  She has a past medical history of Angina pectoris (CMS/[facility]) (10/22/2023), Atherosclerosis of coronary artery (08/21/2019), Atherosclerotic heart disease of native coronary artery with other forms of angina pectoris (CMS/[facility]) (10/22/2023), Hypercholesterolemia (12/28/2018), Hyperlipidemia (10/22/2023), Presence of cardiac pacemaker (10/22/2023), Primary hypertension (12/28/2018), Shortness of breath (11/26/2019), Sick sinus syndrome (CMS/[facility]) (10/22/2023), Sinus bradycardia (10/22/2023), and Stage 3a chronic kidney disease (CMS/[facility]) (11/25/2019).    Surgical History  She has a past surgical history that includes Cardiac catheterization (N/A, 2/2/2024); Cardiac catheterization (N/A, 2/2/2024); Cardiac catheterization (N/A, 2/8/2024); and Cardiac catheterization (N/A, 2/8/2024).     Social History  She reports that she has never smoked. She has never been exposed to tobacco smoke. She has never used smokeless tobacco. She reports that she does not drink alcohol and does not use drugs.    Family History  No family history on file.     Allergies  Iodinated contrast media    Review of Systems  Review of Systems   Cardiovascular:  Positive for chest pain.   Respiratory: Negative.     Gastrointestinal:  Negative for abdominal pain, nausea and vomiting.   Genitourinary:  Positive for hematuria. Negative for dysuria and flank pain.          Physical Exam  Awake, alert, no distress  Breathing comfortably  Abdomen mildly obese, soft, ND, NT  Ext warm, well perfused       Last Recorded Vitals  Blood  "pressure 124/54, pulse 60, temperature 36.7 °C (98.1 °F), temperature source Oral, resp. rate 20, height 1.651 m (5' 5\"), weight 82.1 kg (181 lb), SpO2 94 %.    Relevant Results      Results for orders placed or performed during the hospital encounter of 01/31/24 (from the past 24 hour(s))   CBC and Auto Differential   Result Value Ref Range    WBC 11.3 4.4 - 11.3 x10*3/uL    nRBC 0.8 (H) 0.0 - 0.0 /100 WBCs    RBC 2.98 (L) 4.00 - 5.20 x10*6/uL    Hemoglobin 8.5 (L) 12.0 - 16.0 g/dL    Hematocrit 27.5 (L) 36.0 - 46.0 %    MCV 92 80 - 100 fL    MCH 28.5 26.0 - 34.0 pg    MCHC 30.9 (L) 32.0 - 36.0 g/dL    RDW 14.1 11.5 - 14.5 %    Platelets 191 150 - 450 x10*3/uL    Neutrophils % 78.2 40.0 - 80.0 %    Immature Granulocytes %, Automated 2.9 (H) 0.0 - 0.9 %    Lymphocytes % 10.2 13.0 - 44.0 %    Monocytes % 6.1 2.0 - 10.0 %    Eosinophils % 2.0 0.0 - 6.0 %    Basophils % 0.6 0.0 - 2.0 %    Neutrophils Absolute 8.86 (H) 1.60 - 5.50 x10*3/uL    Immature Granulocytes Absolute, Automated 0.33 0.00 - 0.50 x10*3/uL    Lymphocytes Absolute 1.16 0.80 - 3.00 x10*3/uL    Monocytes Absolute 0.69 0.05 - 0.80 x10*3/uL    Eosinophils Absolute 0.23 0.00 - 0.40 x10*3/uL    Basophils Absolute 0.07 0.00 - 0.10 x10*3/uL   Comprehensive Metabolic Panel   Result Value Ref Range    Glucose 117 (H) 65 - 99 mg/dL    Sodium 143 133 - 145 mmol/L    Potassium 4.6 3.4 - 5.1 mmol/L    Chloride 108 (H) 97 - 107 mmol/L    Bicarbonate 23 (L) 24 - 31 mmol/L    Urea Nitrogen 32 (H) 8 - 25 mg/dL    Creatinine 1.50 0.40 - 1.60 mg/dL    eGFR 34 (L) >60 mL/min/1.73m*2    Calcium 9.2 8.5 - 10.4 mg/dL    Albumin 3.6 3.5 - 5.0 g/dL    Alkaline Phosphatase 82 35 - 125 U/L    Total Protein 6.3 5.9 - 7.9 g/dL    AST 22 5 - 40 U/L    Bilirubin, Total 0.5 0.1 - 1.2 mg/dL    ALT 36 5 - 40 U/L    Anion Gap 12 <=19 mmol/L          Assessment/Plan     Acute cystitis w/ hematuria.     Culture reviewed.  I will start her on Macrobid BID x 7 days.    May follow up in the " office 2 weeks after discharge.   No objection to resume anti-coagulation.       I spent 30 minutes in the professional and overall care of this patient.      Suman Holley MD

## 2024-02-13 LAB
ALBUMIN SERPL-MCNC: 3.3 G/DL (ref 3.5–5)
ALP BLD-CCNC: 78 U/L (ref 35–125)
ALT SERPL-CCNC: 26 U/L (ref 5–40)
ANION GAP SERPL CALC-SCNC: 9 MMOL/L
AST SERPL-CCNC: 17 U/L (ref 5–40)
BASOPHILS # BLD AUTO: 0.05 X10*3/UL (ref 0–0.1)
BASOPHILS NFR BLD AUTO: 0.4 %
BILIRUB SERPL-MCNC: 0.7 MG/DL (ref 0.1–1.2)
BUN SERPL-MCNC: 23 MG/DL (ref 8–25)
CALCIUM SERPL-MCNC: 9 MG/DL (ref 8.5–10.4)
CHLORIDE SERPL-SCNC: 109 MMOL/L (ref 97–107)
CO2 SERPL-SCNC: 22 MMOL/L (ref 24–31)
CREAT SERPL-MCNC: 1.5 MG/DL (ref 0.4–1.6)
EGFRCR SERPLBLD CKD-EPI 2021: 34 ML/MIN/1.73M*2
EOSINOPHIL # BLD AUTO: 0.14 X10*3/UL (ref 0–0.4)
EOSINOPHIL NFR BLD AUTO: 1.2 %
ERYTHROCYTE [DISTWIDTH] IN BLOOD BY AUTOMATED COUNT: 14.1 % (ref 11.5–14.5)
GLUCOSE SERPL-MCNC: 128 MG/DL (ref 65–99)
HCT VFR BLD AUTO: 25.6 % (ref 36–46)
HGB BLD-MCNC: 7.9 G/DL (ref 12–16)
IMM GRANULOCYTES # BLD AUTO: 0.26 X10*3/UL (ref 0–0.5)
IMM GRANULOCYTES NFR BLD AUTO: 2.2 % (ref 0–0.9)
LYMPHOCYTES # BLD AUTO: 1.12 X10*3/UL (ref 0.8–3)
LYMPHOCYTES NFR BLD AUTO: 9.3 %
MCH RBC QN AUTO: 28.6 PG (ref 26–34)
MCHC RBC AUTO-ENTMCNC: 30.9 G/DL (ref 32–36)
MCV RBC AUTO: 93 FL (ref 80–100)
MONOCYTES # BLD AUTO: 0.75 X10*3/UL (ref 0.05–0.8)
MONOCYTES NFR BLD AUTO: 6.2 %
NEUTROPHILS # BLD AUTO: 9.75 X10*3/UL (ref 1.6–5.5)
NEUTROPHILS NFR BLD AUTO: 80.7 %
NRBC BLD-RTO: 0.2 /100 WBCS (ref 0–0)
PLATELET # BLD AUTO: 166 X10*3/UL (ref 150–450)
POTASSIUM SERPL-SCNC: 4.5 MMOL/L (ref 3.4–5.1)
PROT SERPL-MCNC: 5.8 G/DL (ref 5.9–7.9)
RBC # BLD AUTO: 2.76 X10*6/UL (ref 4–5.2)
SODIUM SERPL-SCNC: 140 MMOL/L (ref 133–145)
WBC # BLD AUTO: 12.1 X10*3/UL (ref 4.4–11.3)

## 2024-02-13 PROCEDURE — 99232 SBSQ HOSP IP/OBS MODERATE 35: CPT | Performed by: INTERNAL MEDICINE

## 2024-02-13 PROCEDURE — 84075 ASSAY ALKALINE PHOSPHATASE: CPT | Performed by: INTERNAL MEDICINE

## 2024-02-13 PROCEDURE — 2500000001 HC RX 250 WO HCPCS SELF ADMINISTERED DRUGS (ALT 637 FOR MEDICARE OP): Performed by: REGISTERED NURSE

## 2024-02-13 PROCEDURE — 2500000001 HC RX 250 WO HCPCS SELF ADMINISTERED DRUGS (ALT 637 FOR MEDICARE OP): Performed by: INTERNAL MEDICINE

## 2024-02-13 PROCEDURE — 36415 COLL VENOUS BLD VENIPUNCTURE: CPT | Performed by: INTERNAL MEDICINE

## 2024-02-13 PROCEDURE — 2060000001 HC INTERMEDIATE ICU ROOM DAILY

## 2024-02-13 PROCEDURE — 2500000004 HC RX 250 GENERAL PHARMACY W/ HCPCS (ALT 636 FOR OP/ED): Performed by: HOSPITALIST

## 2024-02-13 PROCEDURE — 2500000001 HC RX 250 WO HCPCS SELF ADMINISTERED DRUGS (ALT 637 FOR MEDICARE OP): Performed by: SURGERY

## 2024-02-13 PROCEDURE — 2500000002 HC RX 250 W HCPCS SELF ADMINISTERED DRUGS (ALT 637 FOR MEDICARE OP, ALT 636 FOR OP/ED): Performed by: INTERNAL MEDICINE

## 2024-02-13 PROCEDURE — 85025 COMPLETE CBC W/AUTO DIFF WBC: CPT | Performed by: INTERNAL MEDICINE

## 2024-02-13 RX ORDER — LOSARTAN POTASSIUM 25 MG/1
12.5 TABLET ORAL NIGHTLY
Status: DISCONTINUED | OUTPATIENT
Start: 2024-02-13 | End: 2024-02-14 | Stop reason: HOSPADM

## 2024-02-13 RX ORDER — METOPROLOL SUCCINATE 25 MG/1
25 TABLET, EXTENDED RELEASE ORAL 2 TIMES DAILY
Status: DISCONTINUED | OUTPATIENT
Start: 2024-02-13 | End: 2024-02-14 | Stop reason: HOSPADM

## 2024-02-13 RX ADMIN — POLYETHYLENE GLYCOL 3350 17 G: 17 POWDER, FOR SOLUTION ORAL at 09:40

## 2024-02-13 RX ADMIN — CLOPIDOGREL BISULFATE 75 MG: 75 TABLET ORAL at 09:39

## 2024-02-13 RX ADMIN — APIXABAN 2.5 MG: 2.5 TABLET, FILM COATED ORAL at 09:40

## 2024-02-13 RX ADMIN — ATENOLOL 25 MG: 25 TABLET ORAL at 09:40

## 2024-02-13 RX ADMIN — Medication 2000 UNITS: at 09:40

## 2024-02-13 RX ADMIN — ASPIRIN 81 MG 81 MG: 81 TABLET ORAL at 09:40

## 2024-02-13 RX ADMIN — ATORVASTATIN CALCIUM 80 MG: 80 TABLET, FILM COATED ORAL at 20:34

## 2024-02-13 RX ADMIN — LINEZOLID 600 MG: 600 TABLET, FILM COATED ORAL at 09:40

## 2024-02-13 RX ADMIN — LINEZOLID 600 MG: 600 TABLET, FILM COATED ORAL at 20:34

## 2024-02-13 RX ADMIN — APIXABAN 2.5 MG: 2.5 TABLET, FILM COATED ORAL at 20:34

## 2024-02-13 RX ADMIN — SERTRALINE HYDROCHLORIDE 150 MG: 50 TABLET ORAL at 09:40

## 2024-02-13 RX ADMIN — LOSARTAN POTASSIUM 12.5 MG: 25 TABLET, FILM COATED ORAL at 20:33

## 2024-02-13 RX ADMIN — MULTIVITAMIN TABLET 1 TABLET: TABLET at 09:40

## 2024-02-13 RX ADMIN — METOPROLOL SUCCINATE 25 MG: 25 TABLET, EXTENDED RELEASE ORAL at 20:34

## 2024-02-13 RX ADMIN — LEVOTHYROXINE SODIUM 100 MCG: 0.1 TABLET ORAL at 05:13

## 2024-02-13 RX ADMIN — PANTOPRAZOLE SODIUM 40 MG: 40 TABLET, DELAYED RELEASE ORAL at 05:13

## 2024-02-13 RX ADMIN — DILTIAZEM HYDROCHLORIDE 120 MG: 120 CAPSULE, COATED, EXTENDED RELEASE ORAL at 20:34

## 2024-02-13 RX ADMIN — DILTIAZEM HYDROCHLORIDE 120 MG: 120 CAPSULE, COATED, EXTENDED RELEASE ORAL at 09:40

## 2024-02-13 ASSESSMENT — COGNITIVE AND FUNCTIONAL STATUS - GENERAL
DAILY ACTIVITIY SCORE: 24
MOBILITY SCORE: 24

## 2024-02-13 ASSESSMENT — PAIN - FUNCTIONAL ASSESSMENT
PAIN_FUNCTIONAL_ASSESSMENT: 0-10

## 2024-02-13 ASSESSMENT — PAIN SCALES - GENERAL
PAINLEVEL_OUTOF10: 0 - NO PAIN

## 2024-02-13 NOTE — PROGRESS NOTES
Linette Doyle is a 83 y.o. female on day 12 of admission presenting with NSTEMI (non-ST elevated myocardial infarction) (CMS/[facility]).      Subjective   I put her back on adjusted dose Eliquis for her A-fib.  Already on aspirin and Plavix for her coronary disease.  She is no longer having any hematuria.  Her chief complaint is the swollen red area on her left forearm.       Objective   Indurated area on upper left forearm red tender about 3 x 5 inches    Last Recorded Vitals  /56 (BP Location: Left arm, Patient Position: Lying)   Pulse 60   Temp 37 °C (98.6 °F) (Temporal)   Resp 20   Wt 83.4 kg (183 lb 13.8 oz)   SpO2 91%   Intake/Output last 3 Shifts:    Intake/Output Summary (Last 24 hours) at 2/13/2024 1020  Last data filed at 2/13/2024 0900  Gross per 24 hour   Intake 1210 ml   Output --   Net 1210 ml       Admission Weight  Weight: 68 kg (150 lb) (01/31/24 2058)    Daily Weight  02/13/24 : 83.4 kg (183 lb 13.8 oz)    Image Results      Assessment/Plan      -Coronary disease-stable.  Continue current meds.  Follow-up with Dr. Ibrahim.    D-olv-aicvrb.  She is tolerating Eliquis.  Follow-up with Dr. Figueroa.    Hematuria-seems to resolved.  Treat Enterococcus UTI.  Currently on linezolid.  Follow-up with Dr. Hernadez.    Arm cellulitis-I am having the nurse remove the IV that is adjacent to this area.  She is on linezolid to cover Enterococcus UTI.  This should be adequate to treat skin cellulitis.  Will give her 1 more day of antibiotics and observation in the hospital.  I have outlined this area with a pen to see if it is getting better or worse.  I certainly do not expect this to resolve in a day or 2 but I want to convince myself and the patient that it is either stable or improving before discharging her.      Jony Roca MD

## 2024-02-13 NOTE — PROGRESS NOTES
Pts H&H lower today 7.9&25.6, attending Dr birmingham. JAS on hold.      02/13/24 1019   Discharge Planning   Patient expects to be discharged to: Home with  Homecare

## 2024-02-13 NOTE — NURSING NOTE
Assumed care of patient.  Awake and alert, resting quietly in bed.  Respirations even and unlabored on RA.  No complaints of pain/discomfort.  Call light in reach.

## 2024-02-13 NOTE — NURSING NOTE
Spoke with sister and updated that discharge will not be today.    Patient with redness and inflammation to L FA from IV infiltration (?).  Per Dr Roca, remove peripheral IV and no IV access indicated at this time.   L FA cleaned with NS and patted dry.  Encouraged to keep area elevated.    Continues on PO ATB therapy for UTI.  No further episodes of hematuria.    Call light in reach.

## 2024-02-13 NOTE — PROGRESS NOTES
Subjective Data:  Patient is doing better.  Denies having chest pain.    Overnight Events:    Telemetry reviewed no events.     Objective Data:  Last Recorded Vitals:  Vitals:    02/12/24 1930 02/12/24 2328 02/13/24 0400 02/13/24 0700   BP: 133/57 119/55 124/56 133/56   BP Location: Left arm Left arm Left arm Left arm   Patient Position: Lying Lying Lying Lying   Pulse: 60   60   Resp: 22 20 20 20   Temp: 36.4 °C (97.5 °F) 36.7 °C (98.1 °F) 36.6 °C (97.9 °F) 37 °C (98.6 °F)   TempSrc: Oral Oral Oral Temporal   SpO2: 95% 99% 97% 91%   Weight:   83.4 kg (183 lb 13.8 oz)    Height:           Last Labs:  CBC - 2/13/2024:  5:03 AM  12.1 7.9 166    25.6      CMP - 2/13/2024:  5:03 AM  9.0 5.8 17 --- 0.7   _ 3.3 26 78      PTT - 2/9/2024:  4:59 AM  1.1   11.9 24.6     HGBA1C   Date/Time Value Ref Range Status   02/01/2024 05:58 AM 6.0 See below % Final   07/05/2023 08:49 AM 5.6 4.0 - 6.0 % Final     Comment:     Hemoglobin A1C levels are related to mean blood glucose during the   preceding 2-3 months. The relationship table below may be used as a   general guide. Each 1% increase in HGB A1C is a reflection of an   increase in mean glucose of approximately 30 mg/dl.   Reference: [employer], volume 29, supplement 1 Jan. 2006                        HGB A1C ................. Approx. Mean Glucose   _______________________________________________   6%   ...............................  120 mg/dl   7%   ...............................  150 mg/dl   8%   ...............................  180 mg/dl   9%   ...............................  210 mg/dl   10%  ...............................  240 mg/dl  Performed at [address] [address]     12/14/2022 09:13 AM 5.7 4.0 - 6.0 % Final     Comment:     Hemoglobin A1C levels are related to mean blood glucose during the   preceding 2-3 months. The relationship table below may be used as a   general guide. Each 1% increase in HGB A1C is a reflection of an   increase  in mean glucose of approximately 30 mg/dl.   Reference: Diabetes [employer], volume 29, supplement 1 Jan. 2006                        HGB A1C ................. Approx. Mean Glucose   _______________________________________________   6%   ...............................  120 mg/dl   7%   ...............................  150 mg/dl   8%   ...............................  180 mg/dl   9%   ...............................  210 mg/dl   10%  ...............................  240 mg/dl  Performed at [address] [address]       LDLCALC   Date/Time Value Ref Range Status   07/05/2023 08:49 AM 57 65 - 130 MG/DL Final   12/14/2022 09:13 AM 64 65 - 130 MG/DL Final   06/29/2022 08:43 AM 58 65 - 130 MG/DL Final      Last I/O:  I/O last 3 completed shifts:  In: 970 (11.6 mL/kg) [P.O.:970]  Out: - (0 mL/kg)   Weight: 83.4 kg     Past Cardiology Tests (Last 3 Years):  EKG:  ECG 12 Lead 01/31/2024    Echo:  Transthoracic Echo (TTE) Limited 02/09/2024      Transthoracic Echo (TTE) Complete 02/01/2024    Ejection Fractions:  EF   Date/Time Value Ref Range Status   02/09/2024 08:59 AM 58 %    02/01/2024 11:43 AM 59 %      Cath:  Cardiac catheterization - coronary 02/08/2024      Cardiac catheterization - coronary 02/02/2024      Cardiac catheterization - coronary 02/02/2024    Stress Test:  No results found for this or any previous visit from the past 1095 days.    Cardiac Imaging:  No results found for this or any previous visit from the past 1095 days.      Inpatient Medications:  Scheduled medications   Medication Dose Route Frequency    apixaban  2.5 mg oral BID    aspirin  81 mg oral Daily    atenolol  25 mg oral BID    atorvastatin  80 mg oral Nightly    cholecalciferol  2,000 Units oral Daily    clopidogrel  75 mg oral Daily    dilTIAZem CD  120 mg oral BID    levothyroxine  100 mcg oral Daily before breakfast    linezolid  600 mg oral q12h JAQUI    multivitamin  1 tablet oral Daily    pantoprazole  40 mg oral Daily  before breakfast    perflutren lipid microspheres  0.5-10 mL of dilution intravenous Once in imaging    perflutren lipid microspheres  0.5-10 mL of dilution intravenous Once in imaging    perflutren protein A microsphere  0.5 mL intravenous Once in imaging    polyethylene glycol  17 g oral Daily    sertraline  150 mg oral Daily    sulfur hexafluoride microsphr  2 mL intravenous Once in imaging     PRN medications   Medication    acetaminophen    Or    acetaminophen    Or    acetaminophen    benzocaine-menthol    calcium carbonate    dextromethorphan-guaifenesin    dextrose 10 % in water (D10W)    dextrose    glucagon    guaiFENesin    ondansetron ODT    oxygen     Continuous Medications   Medication Dose Last Rate       Physical Exam:  General: Patient is in no acute distress.  HEENT: atraumatic normocephalic.  Neck: is supple jugular venous pressure within normal limits no thyromegaly.  Cardiovascular regular rate and rhythm normal heart sounds no murmurs rubs or gallops.  Lungs: clear to auscultation bilaterally.  Abdomen: is soft nontender.  Extremities warm to touch no edema.           Assessment/Plan   Acute non-ST elevation myocardial infarction.  Status post PCI to RCA with 2 drug-eluting stents.  Chronically occluded left circumflex.  Recommend aspirin and Plavix.  May stop aspirin after 1 month since patient will be on Eliquis for atrial fibrillation.  Recommend also to patient to be on PPI  New onset atrial fibrillation on Eliquis.  Continue rate control with beta-blockers.  Monitor blood pressure and heart rate.    Hypertension.  Continue rate control strategy will monitor.     Hyperlipidemia continue high intensity statin.  5. Sinus syndrome status post pacemaker.   Peripheral IV 02/11/24 20 G Left Antecubital (Active)   Site Assessment Clean;Dry;Intact 02/13/24 0900   Dressing Status Clean;Dry;Occlusive 02/13/24 0900   Number of days: 2       Code Status:  Full Code          Becky Vela MD

## 2024-02-13 NOTE — PROGRESS NOTES
"Nutrition Follow up Note    Nutrition Assessment      Encouraged Pt to drink Ensures and eat daily. Pt stated that she will try to eat and drink the Ensure Compacts. Pt stated that her appetite has been bad since admission. Will continue to provide Ensure Compact TID to aid in PO intake.     Nutrition History:  Food and Nutrient History: Per chart, pt is eating > 75% of meals. Pt stated that her appetite is terrible but tries to eat a few bites of food.  Energy Intake: Good > 75 %  Food Allergies/Intolerances:  None    Anthropometrics:  Ht: 165.1 cm (5' 5\"), Wt: 83.4 kg (183 lb 13.8 oz), BMI: 30.6  IBW/kg (Dietitian Calculated): 56.82 kg  Percent of IBW: 150.48 %  Adjusted Body Weight (kg): 64.09 kg    Weight Change:  Daily Weight  02/13/24 : 83.4 kg (183 lb 13.8 oz)  01/29/24 : 68.3 kg (150 lb 9.6 oz)  12/15/23 : 68.9 kg (151 lb 12.6 oz)  10/24/23 : 68.2 kg (150 lb 5.7 oz)  07/12/23 : 68.9 kg (152 lb)  06/06/23 : 68.5 kg (151 lb)  06/06/23 : 69.4 kg (153 lb)  01/09/23 : 69.5 kg (153 lb 3.2 oz)  12/20/22 : 69.9 kg (154 lb)  07/06/22 : 71.2 kg (157 lb)     Weight History / % Weight Change: Pt stated that she lost 2-3#  within a few days. Pt stated that UBW is 150#, per chart Pt has maintained weight between 150-157# for ~1.5 years. Question accuracy of recent reading of 188#.             Nutrition Focused Physical Exam Findings:   Subcutaneous Fat Loss  Orbital Fat Pads: Well nourshed (slightly bulging fat pads)  Buccal Fat Pads: Well nourished (full, rounded cheeks)  Triceps: Defer  Ribs: Defer    Muscle Wasting  Temporalis: Well nourished (well-defined muscle)  Pectoralis (Clavicular Region): Well nourished (clavicle not visible)  Deltoid/Trapezius: Defer  Interosseous: Defer  Trapezius/Infraspinatus/Supraspinatus (Scapular Region): Defer  Quadriceps: Defer  Gastrocnemius: Defer              Nutrition Significant Labs:  Lab Results   Component Value Date    WBC 12.1 (H) 02/13/2024    HGB 7.9 (L) 02/13/2024    HCT " 25.6 (L) 02/13/2024     02/13/2024    CHOL 138 07/05/2023    TRIG 188 (H) 07/05/2023    HDL 43 (L) 07/05/2023    ALT 26 02/13/2024    AST 17 02/13/2024     02/13/2024    K 4.5 02/13/2024     (H) 02/13/2024    CREATININE 1.50 02/13/2024    BUN 23 02/13/2024    CO2 22 (L) 02/13/2024    TSH 3.89 02/07/2024    INR 1.1 02/08/2024    HGBA1C 6.0 (H) 02/01/2024    ALBUR 17 07/05/2023       Current Facility-Administered Medications:     acetaminophen (Tylenol) tablet 650 mg, 650 mg, oral, q4h PRN, 650 mg at 02/02/24 2128 **OR** acetaminophen (Tylenol) oral liquid 650 mg, 650 mg, nasogastric tube, q4h PRN **OR** acetaminophen (Tylenol) suppository 650 mg, 650 mg, rectal, q4h PRN, Darrius Ibrahim MD    apixaban (Eliquis) tablet 2.5 mg, 2.5 mg, oral, BID, Jony Roca MD, 2.5 mg at 02/13/24 0940    aspirin chewable tablet 81 mg, 81 mg, oral, Daily, Darrius Ibrahim MD, 81 mg at 02/13/24 0940    atenolol (Tenormin) tablet 25 mg, 25 mg, oral, BID, Darrius Ibrahim MD, 25 mg at 02/13/24 0940    atorvastatin (Lipitor) tablet 80 mg, 80 mg, oral, Nightly, Darrius Ibrahim MD, 80 mg at 02/12/24 2000    benzocaine-menthol (Cepastat Sore Throat) 15-3.6 mg lozenge 1 lozenge, 1 lozenge, Mouth/Throat, q2h PRN, Darrius Ibrahim MD    calcium carbonate (Tums) chewable tablet 500 mg, 500 mg, oral, 4x daily PRN, Darrius Ibrahim MD, 500 mg at 02/07/24 1608    cholecalciferol (Vitamin D-3) tablet 2,000 Units, 2,000 Units, oral, Daily, Darrius Ibrahim MD, 2,000 Units at 02/13/24 0940    clopidogrel (Plavix) tablet 75 mg, 75 mg, oral, Daily, Darrius Ibrahim MD, 75 mg at 02/13/24 0939    dextromethorphan-guaifenesin (Robitussin DM)  mg/5 mL oral liquid 5 mL, 5 mL, oral, q4h PRN, Darrius Ibrahim MD    dextrose 10 % in water (D10W) infusion, 0.3 g/kg/hr, intravenous, Once PRN, Darrius Ibrahim MD    dextrose 50 % injection 25 g, 25 g, intravenous, q15 min PRN, Darrius Ibrahim MD    dilTIAZem CD (Cardizem CD) 24 hr capsule 120 mg,  120 mg, oral, BID, Darrius Ibrahim MD, 120 mg at 02/13/24 0940    glucagon (Glucagen) injection 1 mg, 1 mg, intramuscular, q15 min PRN, Darrius Ibrahim MD    guaiFENesin (Mucinex) 12 hr tablet 600 mg, 600 mg, oral, q12h PRN, Darrius Ibrahim MD    levothyroxine (Synthroid, Levoxyl) tablet 100 mcg, 100 mcg, oral, Daily before breakfast, Darrius Ibrahim MD, 100 mcg at 02/13/24 0513    linezolid (Zyvox) tablet 600 mg, 600 mg, oral, q12h JAQUI, Suman Holley MD, 600 mg at 02/13/24 0940    multivitamin 1 tablet, 1 tablet, oral, Daily, Darrius Ibrahim MD, 1 tablet at 02/13/24 0940    ondansetron ODT (Zofran-ODT) disintegrating tablet 4 mg, 4 mg, oral, q8h PRN **OR** [DISCONTINUED] ondansetron (Zofran) injection 4 mg, 4 mg, intravenous, q8h PRN, Darrius Ibrahim MD, 4 mg at 02/09/24 2227    oxygen (O2) therapy, , inhalation, Continuous PRN - O2/gases, Stefanie Verma DO    pantoprazole (ProtoNix) EC tablet 40 mg, 40 mg, oral, Daily before breakfast, Darrius Ibrahim MD, 40 mg at 02/13/24 0513    perflutren lipid microspheres (Definity) injection 0.5-10 mL of dilution, 0.5-10 mL of dilution, intravenous, Once in imaging, Darrius Ibrahim MD    perflutren lipid microspheres (Definity) injection 0.5-10 mL of dilution, 0.5-10 mL of dilution, intravenous, Once in imaging, JUNG Bonner-CNP    perflutren protein A microsphere (Optison) injection 0.5 mL, 0.5 mL, intravenous, Once in imaging, Darrius Ibrahim MD    polyethylene glycol (Glycolax, Miralax) packet 17 g, 17 g, oral, Daily, Stefanie Verma DO, 17 g at 02/13/24 0940    sertraline (Zoloft) tablet 150 mg, 150 mg, oral, Daily, Darrius Ibrahim MD, 150 mg at 02/13/24 0940    sulfur hexafluoride microsphr (Lumason) injection 24.28 mg, 2 mL, intravenous, Once in imaging, Darrius Ibrahim MD    Dietary Orders (From admission, onward)       Start     Ordered    02/08/24 3933  Adult diet Cardiac; 70 gm fat; 2 - 3 grams Sodium  Diet effective now        Question Answer  Comment   Diet type Cardiac    Fat restriction: 70 gm fat    Sodium restriction: 2 - 3 grams Sodium        02/08/24 1758    02/07/24 1539  Oral nutritional supplements  Until discontinued        Comments: Please send chocolate and vanilla   Question Answer Comment   Deliver with All meals    Select supplement: Ensure Compact        02/07/24 1538    02/01/24 1601  May Participate in Room Service  Once        Question:  .  Answer:  Yes    02/01/24 1601                  Estimated Needs:   Estimated Energy Needs  Total Energy Estimated Needs (kCal):  (2507-2061)  Total Estimated Energy Need per Day (kCal/kg):  (25-30)  Method for Estimating Needs: ABW    Estimated Protein Needs  Total Protein Estimated Needs (g):  (51-64)  Total Protein Estimated Needs (g/kg):  (0.8-1)  Method for Estimating Needs: ABW    Estimated Fluid Needs  Total Fluid Estimated Needs (mL):  (4388-0716)  Method for Estimating Needs: 1 mL/kcal        Nutrition Diagnosis   Nutrition Diagnosis:       Nutrition Diagnosis  Patient has Nutrition Diagnosis: Yes  Diagnosis Status (1): Ongoing  Nutrition Diagnosis 1: Inadequate energy intake  Related to (1): Decreased ability to consume sufficient energy  As Evidenced by (1): Poor appetite       Nutrition Interventions/Recommendations   Nutrition Interventions and Recommendations:    Nutrition Prescription:  Individualized Nutrition Prescription Provided for : 8840-7533 kcals, 51-64 gm protein via diet    Nutrition Interventions:   Food and/or Nutrient Delivery Interventions  Interventions: Meals and snacks, Medical food supplement  Meals and Snacks: Fat-modified diet, Mineral-modified diet  Goal: Provide diet as ordered  Medical Food Supplement: Commercial beverage  Goal: Will provide Ensure Compact TID to increase PO intake-- provides 220 kcals, 9 gm protein per serving    Education Documentation  No documentation found.           Nutrition Monitoring and Evaluation   Monitoring/Evaluation:   Food/Nutrient  Related History Monitoring  Monitoring and Evaluation Plan: Energy intake  Energy Intake: Estimated energy intake  Criteria: Pt to consume >/= 75% of estimated needs         Time Spent/Follow-up:   Follow Up  Time Spent (min): 20 minutes  Last Date of Nutrition Visit: 02/13/24  Nutrition Follow-Up Needed?: 5-7 days  Follow up Comment: 2/19/24

## 2024-02-13 NOTE — CARE PLAN
The patient's goals for the shift include no chest pain    The clinical goals for the shift include maintain VS WNL, rest    Over the shift, the patient did not make progress toward the following goals. Barriers to progression include anxiety. Recommendations to address these barriers include administer medications/interventions as ordered, decrease external stimuli.

## 2024-02-13 NOTE — PROGRESS NOTES
"Linette Doyle is a 83 y.o. female on day 12 of admission presenting with NSTEMI (non-ST elevated myocardial infarction) (CMS/[facility]).    Subjective          Objective     Physical Exam  Gen: A+O x 3, no acute distress  HEENT: Normocephalic/atraumatic pupils equal react light  Neck: Neck veins not elevated, upstrokes and volumes normal, no bruits   Lung: Clear throughout all lobes, nl AP diameter  CV:  nl sounding S1, S2, no S3, no murmur, PMI nondisplaced, no RV lift   Abdomen: soft, non tender, BS heard throughout all quadrants  Extremities: pulses palpable bilaterally, no edema, warm to touch  Neuro: no neurologic deficits        Last Recorded Vitals  Blood pressure 123/59, pulse 60, temperature 36.8 °C (98.2 °F), temperature source Oral, resp. rate 16, height 1.651 m (5' 5\"), weight 83.4 kg (183 lb 13.8 oz), SpO2 92 %.  Intake/Output last 3 Shifts:  I/O last 3 completed shifts:  In: 970 (11.6 mL/kg) [P.O.:970]  Out: - (0 mL/kg)   Weight: 83.4 kg     Relevant Results  Results for orders placed or performed during the hospital encounter of 01/31/24 (from the past 24 hour(s))   CBC and Auto Differential   Result Value Ref Range    WBC 12.1 (H) 4.4 - 11.3 x10*3/uL    nRBC 0.2 (H) 0.0 - 0.0 /100 WBCs    RBC 2.76 (L) 4.00 - 5.20 x10*6/uL    Hemoglobin 7.9 (L) 12.0 - 16.0 g/dL    Hematocrit 25.6 (L) 36.0 - 46.0 %    MCV 93 80 - 100 fL    MCH 28.6 26.0 - 34.0 pg    MCHC 30.9 (L) 32.0 - 36.0 g/dL    RDW 14.1 11.5 - 14.5 %    Platelets 166 150 - 450 x10*3/uL    Neutrophils % 80.7 40.0 - 80.0 %    Immature Granulocytes %, Automated 2.2 (H) 0.0 - 0.9 %    Lymphocytes % 9.3 13.0 - 44.0 %    Monocytes % 6.2 2.0 - 10.0 %    Eosinophils % 1.2 0.0 - 6.0 %    Basophils % 0.4 0.0 - 2.0 %    Neutrophils Absolute 9.75 (H) 1.60 - 5.50 x10*3/uL    Immature Granulocytes Absolute, Automated 0.26 0.00 - 0.50 x10*3/uL    Lymphocytes Absolute 1.12 0.80 - 3.00 x10*3/uL    Monocytes Absolute 0.75 0.05 - 0.80 x10*3/uL    Eosinophils Absolute 0.14 " 0.00 - 0.40 x10*3/uL    Basophils Absolute 0.05 0.00 - 0.10 x10*3/uL   Comprehensive Metabolic Panel   Result Value Ref Range    Glucose 128 (H) 65 - 99 mg/dL    Sodium 140 133 - 145 mmol/L    Potassium 4.5 3.4 - 5.1 mmol/L    Chloride 109 (H) 97 - 107 mmol/L    Bicarbonate 22 (L) 24 - 31 mmol/L    Urea Nitrogen 23 8 - 25 mg/dL    Creatinine 1.50 0.40 - 1.60 mg/dL    eGFR 34 (L) >60 mL/min/1.73m*2    Calcium 9.0 8.5 - 10.4 mg/dL    Albumin 3.3 (L) 3.5 - 5.0 g/dL    Alkaline Phosphatase 78 35 - 125 U/L    Total Protein 5.8 (L) 5.9 - 7.9 g/dL    AST 17 5 - 40 U/L    Bilirubin, Total 0.7 0.1 - 1.2 mg/dL    ALT 26 5 - 40 U/L    Anion Gap 9 <=19 mmol/L                  Assessment/Plan   Principal Problem:    NSTEMI (non-ST elevated myocardial infarction) (CMS/HCC)  Active Problems:    Angina pectoris (CMS/HCC)    Coronary arteriosclerosis    Atherosclerosis of coronary artery    Hypothyroidism    Hypercholesterolemia    Shortness of breath    Sick sinus syndrome (CMS/HCC)    Chronic kidney failure, stage 3 (moderate) (CMS/HCC)    Impaired fasting glucose    Leukocytosis  2/6: 83-year-old female with a history of atherosclerotic heart disease, remote LAD stenting, SSS 2019 DDD PPM  implantation presents with non-ST elevation MI, peak troponin of 650, cardiac catheterization with evidence for  of LCX, RCA with 2-70% lesions, subsequent kidney  injury secondary to IV contrast w/ decision to intervene w/recurrent sx, developed new onset AF rate 160 bpm, some demand ischemia w/ recurrent sx, initial episode was given a IV amiodarone bolus with recurrent rapid rates started on dilt gtt. echocardiogram with preserved EF, wall and inferior wall hypokinesis, moderate MR, normal LA size  -Suspect A-fib in the setting of recent non-STEMI, agree with increasing AV toshia agents, stop diltiazem drip  -[id number],  start apixaban  -cardiology to discontinue clopidogrel, will c/w aspirin  -watch H/H   -If BP allows consider  nitrate at low-dose   -In terms of AF treatment, will hold on use of AAD at this time  -Will have patient follow-up in device clinic,  will be able to assess AF burden on AV toshia agents  -Appointment scheduled for February 23. 2024  1030 am     2/7: Patient spontaneously converted back to sinus rhythm yesterday.  She remains atrial paced ventricular sensed at 60 bpm.  Patient denies palpitations, shortness of breath.  Atenolol 25 mg twice daily as it is renally cleared.  Will monitor renal function with apixaban dosing.  Baseline creatinine 1.3, currently 1.7.  Will check TSH with new atrial fibrillation and history of hypothyroidism.  Patient to follow-up on February 23, 2024 10:30 AM in device clinic.  Will assess atrial fibrillation burden at that time and consider antiarrhythmic drug if necessary.     2/8:  -Continued anginal type symptoms with an increase in troponin yesterday, underwent stenting of both RCA lesions with  KAILA-3  -Antiplatelet therapy aspirin, clopidogrel and apixaban with discontinuation of asa in 1 month  -Concerned about atenolol with renal disease, hopefully will be okay with cardiology to change to metoprolol slightly lower dose that can be increased, 50 mL of contrast during procedure  -Nicely remains in sinus rhythm  -Limited echo in a.m.     2/9: Patient remains in sinus rhythm with no further evidence of atrial fibrillation.  Apixaban on hold secondary to positive fecal occult.  Echocardiogram pending.  Would resume apixaban when able. Patient will need triple therapy of aspirin Plavix and apixaban for 1 month post PCI to the RCA.   2/12:  -Remains in sinus rhythm, atrially paced, no further chest pain  -H/H stabilized in the setting of hematuria from triple agent therapy  -Additionally, found to have UTI, receiving antibiotics  -Apixaban being resumed at a lower dose  -Also in terms of atrial fibrillation believe always in the setting of non-STEMI, ongoing chest pain, prior to  stenting though still does need anticoagulation when able to tolerate  2/13:  -Remains in sinus rhythm  -H&H stable, resumed apixaban at lower dose  -Reviewed limited echo, moderate to severe MR, would start a tiny dose of ARB for unloading, check cr in am, was on ace prior to admit  -Additionally may have some chronicity to atrial fibrillation prior to admission with dilated left atrium  -Will be able to assess burden w/device checks, appt in chart   D/w  Dr. Dane Caicedo, APRN-CNP

## 2024-02-13 NOTE — CARE PLAN
The patient's goals for the shift include no chest pain    The clinical goals for the shift include discharge home    Over the shift, the patient did not make progress toward the following goals. Barriers to progression include n/a. Recommendations to address these barriers include n/a.

## 2024-02-14 ENCOUNTER — HOME HEALTH ADMISSION (OUTPATIENT)
Dept: HOME HEALTH SERVICES | Facility: HOME HEALTH | Age: 84
End: 2024-02-14
Payer: MEDICARE

## 2024-02-14 VITALS
DIASTOLIC BLOOD PRESSURE: 53 MMHG | OXYGEN SATURATION: 97 % | RESPIRATION RATE: 19 BRPM | SYSTOLIC BLOOD PRESSURE: 125 MMHG | HEART RATE: 60 BPM | WEIGHT: 181 LBS | BODY MASS INDEX: 30.16 KG/M2 | HEIGHT: 65 IN | TEMPERATURE: 97.7 F

## 2024-02-14 PROBLEM — I20.9 ANGINA PECTORIS (CMS-HCC): Status: RESOLVED | Noted: 2023-10-22 | Resolved: 2024-02-14

## 2024-02-14 PROBLEM — D72.829 LEUKOCYTOSIS: Status: RESOLVED | Noted: 2024-02-01 | Resolved: 2024-02-14

## 2024-02-14 PROBLEM — I21.4 NSTEMI (NON-ST ELEVATED MYOCARDIAL INFARCTION) (MULTI): Status: RESOLVED | Noted: 2024-02-01 | Resolved: 2024-02-14

## 2024-02-14 PROBLEM — R73.01 IMPAIRED FASTING GLUCOSE: Status: RESOLVED | Noted: 2023-07-05 | Resolved: 2024-02-14

## 2024-02-14 PROBLEM — N18.30 CHRONIC KIDNEY FAILURE, STAGE 3 (MODERATE) (MULTI): Status: RESOLVED | Noted: 2018-10-15 | Resolved: 2024-02-14

## 2024-02-14 PROBLEM — R06.02 SHORTNESS OF BREATH: Status: RESOLVED | Noted: 2019-11-26 | Resolved: 2024-02-14

## 2024-02-14 LAB
ALBUMIN SERPL-MCNC: 3.4 G/DL (ref 3.5–5)
ALP BLD-CCNC: 74 U/L (ref 35–125)
ALT SERPL-CCNC: 22 U/L (ref 5–40)
ANION GAP SERPL CALC-SCNC: 11 MMOL/L
AST SERPL-CCNC: 15 U/L (ref 5–40)
BASOPHILS # BLD AUTO: 0.04 X10*3/UL (ref 0–0.1)
BASOPHILS NFR BLD AUTO: 0.3 %
BILIRUB SERPL-MCNC: 0.7 MG/DL (ref 0.1–1.2)
BUN SERPL-MCNC: 20 MG/DL (ref 8–25)
CALCIUM SERPL-MCNC: 8.9 MG/DL (ref 8.5–10.4)
CHLORIDE SERPL-SCNC: 105 MMOL/L (ref 97–107)
CO2 SERPL-SCNC: 23 MMOL/L (ref 24–31)
CREAT SERPL-MCNC: 1.6 MG/DL (ref 0.4–1.6)
EGFRCR SERPLBLD CKD-EPI 2021: 31 ML/MIN/1.73M*2
EOSINOPHIL # BLD AUTO: 0.16 X10*3/UL (ref 0–0.4)
EOSINOPHIL NFR BLD AUTO: 1.3 %
ERYTHROCYTE [DISTWIDTH] IN BLOOD BY AUTOMATED COUNT: 15.1 % (ref 11.5–14.5)
GLUCOSE SERPL-MCNC: 133 MG/DL (ref 65–99)
HCT VFR BLD AUTO: 25.2 % (ref 36–46)
HGB BLD-MCNC: 7.7 G/DL (ref 12–16)
IMM GRANULOCYTES # BLD AUTO: 0.16 X10*3/UL (ref 0–0.5)
IMM GRANULOCYTES NFR BLD AUTO: 1.3 % (ref 0–0.9)
LYMPHOCYTES # BLD AUTO: 1.09 X10*3/UL (ref 0.8–3)
LYMPHOCYTES NFR BLD AUTO: 9.2 %
MCH RBC QN AUTO: 28.7 PG (ref 26–34)
MCHC RBC AUTO-ENTMCNC: 30.6 G/DL (ref 32–36)
MCV RBC AUTO: 94 FL (ref 80–100)
MONOCYTES # BLD AUTO: 0.65 X10*3/UL (ref 0.05–0.8)
MONOCYTES NFR BLD AUTO: 5.5 %
NEUTROPHILS # BLD AUTO: 9.79 X10*3/UL (ref 1.6–5.5)
NEUTROPHILS NFR BLD AUTO: 82.4 %
NRBC BLD-RTO: 0 /100 WBCS (ref 0–0)
PLATELET # BLD AUTO: 180 X10*3/UL (ref 150–450)
POTASSIUM SERPL-SCNC: 4.5 MMOL/L (ref 3.4–5.1)
PROT SERPL-MCNC: 6 G/DL (ref 5.9–7.9)
RBC # BLD AUTO: 2.68 X10*6/UL (ref 4–5.2)
SODIUM SERPL-SCNC: 139 MMOL/L (ref 133–145)
WBC # BLD AUTO: 11.9 X10*3/UL (ref 4.4–11.3)

## 2024-02-14 PROCEDURE — 97116 GAIT TRAINING THERAPY: CPT | Mod: GP

## 2024-02-14 PROCEDURE — 2500000004 HC RX 250 GENERAL PHARMACY W/ HCPCS (ALT 636 FOR OP/ED): Performed by: HOSPITALIST

## 2024-02-14 PROCEDURE — 2500000002 HC RX 250 W HCPCS SELF ADMINISTERED DRUGS (ALT 637 FOR MEDICARE OP, ALT 636 FOR OP/ED): Performed by: INTERNAL MEDICINE

## 2024-02-14 PROCEDURE — 2500000001 HC RX 250 WO HCPCS SELF ADMINISTERED DRUGS (ALT 637 FOR MEDICARE OP): Performed by: INTERNAL MEDICINE

## 2024-02-14 PROCEDURE — 2500000001 HC RX 250 WO HCPCS SELF ADMINISTERED DRUGS (ALT 637 FOR MEDICARE OP): Performed by: REGISTERED NURSE

## 2024-02-14 PROCEDURE — 80053 COMPREHEN METABOLIC PANEL: CPT | Performed by: INTERNAL MEDICINE

## 2024-02-14 PROCEDURE — 97110 THERAPEUTIC EXERCISES: CPT | Mod: GP

## 2024-02-14 PROCEDURE — 85025 COMPLETE CBC W/AUTO DIFF WBC: CPT | Performed by: INTERNAL MEDICINE

## 2024-02-14 PROCEDURE — 99232 SBSQ HOSP IP/OBS MODERATE 35: CPT | Performed by: INTERNAL MEDICINE

## 2024-02-14 PROCEDURE — 36415 COLL VENOUS BLD VENIPUNCTURE: CPT | Performed by: INTERNAL MEDICINE

## 2024-02-14 PROCEDURE — 99231 SBSQ HOSP IP/OBS SF/LOW 25: CPT

## 2024-02-14 PROCEDURE — 2500000001 HC RX 250 WO HCPCS SELF ADMINISTERED DRUGS (ALT 637 FOR MEDICARE OP): Performed by: SURGERY

## 2024-02-14 RX ORDER — NITROFURANTOIN 25; 75 MG/1; MG/1
100 CAPSULE ORAL 2 TIMES DAILY
Qty: 14 CAPSULE | Refills: 0 | Status: SHIPPED | OUTPATIENT
Start: 2024-02-14 | End: 2024-02-29 | Stop reason: ALTCHOICE

## 2024-02-14 RX ORDER — ATORVASTATIN CALCIUM 80 MG/1
80 TABLET, FILM COATED ORAL NIGHTLY
Qty: 30 TABLET | Refills: 3 | Status: SHIPPED | OUTPATIENT
Start: 2024-02-14 | End: 2024-06-07 | Stop reason: HOSPADM

## 2024-02-14 RX ORDER — NAPROXEN SODIUM 220 MG/1
81 TABLET, FILM COATED ORAL DAILY
Start: 2024-02-15 | End: 2024-02-14 | Stop reason: SDUPTHER

## 2024-02-14 RX ORDER — LOSARTAN POTASSIUM 25 MG/1
12.5 TABLET ORAL NIGHTLY
Qty: 30 TABLET | Refills: 2 | Status: SHIPPED | OUTPATIENT
Start: 2024-02-14 | End: 2024-02-29 | Stop reason: SINTOL

## 2024-02-14 RX ORDER — DOXYCYCLINE 100 MG/1
100 CAPSULE ORAL 2 TIMES DAILY
Qty: 14 CAPSULE | Refills: 0 | Status: SHIPPED | OUTPATIENT
Start: 2024-02-14 | End: 2024-02-21

## 2024-02-14 RX ORDER — NAPROXEN SODIUM 220 MG/1
81 TABLET, FILM COATED ORAL DAILY
Start: 2024-02-15 | End: 2024-03-14 | Stop reason: WASHOUT

## 2024-02-14 RX ORDER — CLOPIDOGREL BISULFATE 75 MG/1
75 TABLET ORAL DAILY
Qty: 30 TABLET | Refills: 3 | Status: SHIPPED | OUTPATIENT
Start: 2024-02-15

## 2024-02-14 RX ORDER — SERTRALINE HYDROCHLORIDE 50 MG/1
150 TABLET, FILM COATED ORAL DAILY
Qty: 30 TABLET | Refills: 3 | Status: SHIPPED | OUTPATIENT
Start: 2024-02-15 | End: 2024-02-22 | Stop reason: WASHOUT

## 2024-02-14 RX ADMIN — SERTRALINE HYDROCHLORIDE 150 MG: 50 TABLET ORAL at 10:00

## 2024-02-14 RX ADMIN — APIXABAN 2.5 MG: 2.5 TABLET, FILM COATED ORAL at 10:00

## 2024-02-14 RX ADMIN — METOPROLOL SUCCINATE 25 MG: 25 TABLET, EXTENDED RELEASE ORAL at 10:00

## 2024-02-14 RX ADMIN — DILTIAZEM HYDROCHLORIDE 120 MG: 120 CAPSULE, COATED, EXTENDED RELEASE ORAL at 10:00

## 2024-02-14 RX ADMIN — MULTIVITAMIN TABLET 1 TABLET: TABLET at 10:00

## 2024-02-14 RX ADMIN — ASPIRIN 81 MG 81 MG: 81 TABLET ORAL at 10:00

## 2024-02-14 RX ADMIN — CLOPIDOGREL BISULFATE 75 MG: 75 TABLET ORAL at 10:00

## 2024-02-14 RX ADMIN — POLYETHYLENE GLYCOL 3350 17 G: 17 POWDER, FOR SOLUTION ORAL at 10:00

## 2024-02-14 RX ADMIN — LINEZOLID 600 MG: 600 TABLET, FILM COATED ORAL at 10:00

## 2024-02-14 RX ADMIN — PANTOPRAZOLE SODIUM 40 MG: 40 TABLET, DELAYED RELEASE ORAL at 05:34

## 2024-02-14 RX ADMIN — Medication 2000 UNITS: at 10:00

## 2024-02-14 RX ADMIN — LEVOTHYROXINE SODIUM 100 MCG: 0.1 TABLET ORAL at 05:33

## 2024-02-14 ASSESSMENT — COGNITIVE AND FUNCTIONAL STATUS - GENERAL
WALKING IN HOSPITAL ROOM: A LITTLE
MOBILITY SCORE: 20
DAILY ACTIVITIY SCORE: 24
MOBILITY SCORE: 24
MOVING TO AND FROM BED TO CHAIR: A LITTLE
CLIMB 3 TO 5 STEPS WITH RAILING: A LITTLE
STANDING UP FROM CHAIR USING ARMS: A LITTLE

## 2024-02-14 ASSESSMENT — PAIN - FUNCTIONAL ASSESSMENT
PAIN_FUNCTIONAL_ASSESSMENT: WONG-BAKER FACES
PAIN_FUNCTIONAL_ASSESSMENT: 0-10

## 2024-02-14 ASSESSMENT — PAIN SCALES - GENERAL
PAINLEVEL_OUTOF10: 0 - NO PAIN
PAINLEVEL_OUTOF10: 0 - NO PAIN

## 2024-02-14 NOTE — CARE PLAN
The patient's goals for the shift include no chest pain    The clinical goals for the shift include maintain vital signs WNL    Over the shift, the patient did not make progress toward the following goals. Barriers to progression include anxiety, cardiac hx. Recommendations to address these barriers include providing emotional support and encouragement, administer medications/interventions as ordered.

## 2024-02-14 NOTE — NURSING NOTE
Pt lying quietly in Lt side with eyes closed. Even unlabored breaths observed. No signs of distress noted. No change from previous assessment. No needs identified at this time. Call light within reach. Continuing with plan of care.

## 2024-02-14 NOTE — PROGRESS NOTES
"Linette Doyle is a 83 y.o. female on day 13 of admission presenting with NSTEMI (non-ST elevated myocardial infarction) (CMS/HCC).    Subjective   No complaints.  Patient is feeling well.    Objective     Physical Exam  Vitals and nursing note reviewed.   Constitutional:       Appearance: Normal appearance.   Cardiovascular:      Rate and Rhythm: Normal rate and regular rhythm.      Pulses: Normal pulses.      Heart sounds: Normal heart sounds.   Pulmonary:      Effort: Pulmonary effort is normal.      Breath sounds: Normal breath sounds.   Abdominal:      General: Bowel sounds are normal.      Palpations: Abdomen is soft.   Musculoskeletal:         General: Normal range of motion.   Skin:     General: Skin is warm and dry.   Neurological:      General: No focal deficit present.      Mental Status: She is alert and oriented to person, place, and time.     Last Recorded Vitals  Blood pressure 132/51, pulse 65, temperature 36.8 °C (98.2 °F), temperature source Temporal, resp. rate 16, height 1.651 m (5' 5\"), weight 82.1 kg (181 lb), SpO2 94 %.    Intake/Output last 3 Shifts:  I/O last 3 completed shifts:  In: 1510 (18.4 mL/kg) [P.O.:1510]  Out: 0 (0 mL/kg)   Weight: 82.1 kg     apixaban, 2.5 mg, oral, BID  aspirin, 81 mg, oral, Daily  atorvastatin, 80 mg, oral, Nightly  cholecalciferol, 2,000 Units, oral, Daily  clopidogrel, 75 mg, oral, Daily  dilTIAZem CD, 120 mg, oral, BID  levothyroxine, 100 mcg, oral, Daily before breakfast  linezolid, 600 mg, oral, q12h JAQUI  losartan, 12.5 mg, oral, Nightly  metoprolol succinate XL, 25 mg, oral, BID  multivitamin, 1 tablet, oral, Daily  pantoprazole, 40 mg, oral, Daily before breakfast  perflutren lipid microspheres, 0.5-10 mL of dilution, intravenous, Once in imaging  perflutren lipid microspheres, 0.5-10 mL of dilution, intravenous, Once in imaging  perflutren protein A microsphere, 0.5 mL, intravenous, Once in imaging  polyethylene glycol, 17 g, oral, Daily  sertraline, 150 " mg, oral, Daily  sulfur hexafluoride microsphr, 2 mL, intravenous, Once in imaging     Relevant Results  Results for orders placed or performed during the hospital encounter of 01/31/24 (from the past 24 hour(s))   CBC and Auto Differential   Result Value Ref Range    WBC 11.9 (H) 4.4 - 11.3 x10*3/uL    nRBC 0.0 0.0 - 0.0 /100 WBCs    RBC 2.68 (L) 4.00 - 5.20 x10*6/uL    Hemoglobin 7.7 (L) 12.0 - 16.0 g/dL    Hematocrit 25.2 (L) 36.0 - 46.0 %    MCV 94 80 - 100 fL    MCH 28.7 26.0 - 34.0 pg    MCHC 30.6 (L) 32.0 - 36.0 g/dL    RDW 15.1 (H) 11.5 - 14.5 %    Platelets 180 150 - 450 x10*3/uL    Neutrophils % 82.4 40.0 - 80.0 %    Immature Granulocytes %, Automated 1.3 (H) 0.0 - 0.9 %    Lymphocytes % 9.2 13.0 - 44.0 %    Monocytes % 5.5 2.0 - 10.0 %    Eosinophils % 1.3 0.0 - 6.0 %    Basophils % 0.3 0.0 - 2.0 %    Neutrophils Absolute 9.79 (H) 1.60 - 5.50 x10*3/uL    Immature Granulocytes Absolute, Automated 0.16 0.00 - 0.50 x10*3/uL    Lymphocytes Absolute 1.09 0.80 - 3.00 x10*3/uL    Monocytes Absolute 0.65 0.05 - 0.80 x10*3/uL    Eosinophils Absolute 0.16 0.00 - 0.40 x10*3/uL    Basophils Absolute 0.04 0.00 - 0.10 x10*3/uL   Comprehensive Metabolic Panel   Result Value Ref Range    Glucose 133 (H) 65 - 99 mg/dL    Sodium 139 133 - 145 mmol/L    Potassium 4.5 3.4 - 5.1 mmol/L    Chloride 105 97 - 107 mmol/L    Bicarbonate 23 (L) 24 - 31 mmol/L    Urea Nitrogen 20 8 - 25 mg/dL    Creatinine 1.60 0.40 - 1.60 mg/dL    eGFR 31 (L) >60 mL/min/1.73m*2    Calcium 8.9 8.5 - 10.4 mg/dL    Albumin 3.4 (L) 3.5 - 5.0 g/dL    Alkaline Phosphatase 74 35 - 125 U/L    Total Protein 6.0 5.9 - 7.9 g/dL    AST 15 5 - 40 U/L    Bilirubin, Total 0.7 0.1 - 1.2 mg/dL    ALT 22 5 - 40 U/L    Anion Gap 11 <=19 mmol/L      Assessment/Plan   Principal Problem:    NSTEMI (non-ST elevated myocardial infarction) (CMS/[facility])  Active Problems:    Angina pectoris (CMS/HCC)    Coronary arteriosclerosis    Atherosclerosis of coronary artery     Hypothyroidism    Hypercholesterolemia    Shortness of breath    Sick sinus syndrome (CMS/HCC)    Chronic kidney failure, stage 3 (moderate) (CMS/HCC)    Impaired fasting glucose    Leukocytosis    2/6: 83-year-old female with a history of atherosclerotic heart disease, remote LAD stenting, SSS 2019 DDD PPM  implantation presents with non-ST elevation MI, peak troponin of 650, cardiac catheterization with evidence for  of LCX, RCA with 2-70% lesions, subsequent kidney  injury secondary to IV contrast w/ decision to intervene w/recurrent sx, developed new onset AF rate 160 bpm, some demand ischemia w/ recurrent sx, initial episode was given a IV amiodarone bolus with recurrent rapid rates started on dilt gtt. echocardiogram with preserved EF, wall and inferior wall hypokinesis, moderate MR, normal LA size  -Suspect A-fib in the setting of recent non-STEMI, agree with increasing AV toshia agents, stop diltiazem drip  -[id number] 4,  start apixaban  -cardiology to discontinue clopidogrel, will c/w aspirin  -watch H/H   -If BP allows consider nitrate at low-dose   -In terms of AF treatment, will hold on use of AAD at this time  -Will have patient follow-up in device clinic,  will be able to assess AF burden on AV toshia agents  -Appointment scheduled for February 23. 2024  1030 am     2/7: Patient spontaneously converted back to sinus rhythm yesterday.  She remains atrial paced ventricular sensed at 60 bpm.  Patient denies palpitations, shortness of breath.  Atenolol 25 mg twice daily as it is renally cleared.  Will monitor renal function with apixaban dosing.  Baseline creatinine 1.3, currently 1.7.  Will check TSH with new atrial fibrillation and history of hypothyroidism.  Patient to follow-up on February 23, 2024 10:30 AM in device clinic.  Will assess atrial fibrillation burden at that time and consider antiarrhythmic drug if necessary.     2/8:  -Continued anginal type symptoms with an increase in troponin  yesterday, underwent stenting of both RCA lesions with  KAILA-3  -Antiplatelet therapy aspirin, clopidogrel and apixaban with discontinuation of asa in 1 month  -Concerned about atenolol with renal disease, hopefully will be okay with cardiology to change to metoprolol slightly lower dose that can be increased, 50 mL of contrast during procedure  -Nicely remains in sinus rhythm  -Limited echo in a.m.     2/9: Patient remains in sinus rhythm with no further evidence of atrial fibrillation.  Apixaban on hold secondary to positive fecal occult.  Echocardiogram pending.  Would resume apixaban when able. Patient will need triple therapy of aspirin Plavix and apixaban for 1 month post PCI to the RCA.   2/12:  -Remains in sinus rhythm, atrially paced, no further chest pain  -H/H stabilized in the setting of hematuria from triple agent therapy  -Additionally, found to have UTI, receiving antibiotics  -Apixaban being resumed at a lower dose  -Also in terms of atrial fibrillation believe always in the setting of non-STEMI, ongoing chest pain, prior to stenting though still does need anticoagulation when able to tolerate  2/13:  -Remains in sinus rhythm  -H&H stable, resumed apixaban at lower dose  -Reviewed limited echo, moderate to severe MR, would start a tiny dose of ARB for unloading, check cr in am, was on ace prior to admit  -Additionally may have some chronicity to atrial fibrillation prior to admission with dilated left atrium  -Will be able to assess burden w/device checks, appt in chart   D/w  Dr. Vela    2/14: No further episodes of atrial fibrillation.  Patient remains atrial paced ventricular sensed on the monitor.  H&H stable at 7.7/25.2.  Would plan to repeat check CBC at follow-up appointment.  Tolerating losartan.  Patient is cleared for discharge from electrophysiology perspective.  Patient to follow-up outpatient at scheduled appointment.    Discussed in detail with Dr. Carolina Sherman,  APRN-CNP

## 2024-02-14 NOTE — DOCUMENTATION CLARIFICATION NOTE
"    PATIENT:               SAMUEL VILLALOBOS  ACCT #:                  [id number]  MRN:                       [id number]  :                       1940  ADMIT DATE:       2024 8:44 PM  DISCH DATE:  RESPONDING PROVIDER #:        [id number]          PROVIDER RESPONSE TEXT:    Cardiogenic acute pulmonary edema    CDI QUERY TEXT:    [facility]_Pulmonary Edema    Instruction:    Based on your assessment of the patient and the clinical information, please provide the requested documentation by clicking on the appropriate radio button and enter any additional information if prompted.    Question: Please further clarify the etiology of pulmonary edema      When answering this query, please exercise your independent professional judgment. The fact that a question is being asked, does not imply that any particular answer is desired or expected.    The patient's clinical indicators include:  Clinical Information: 83 Y/F presenting with chest pain    Clinical Indicators:   14:00  T=36.4, HR=58, RR=18, XD=098/52, SpO2=93% on 5L/min improved to 100% on 100%NRB,     Significant Event:\" Vitals are stable but the patient is hypoxic, improved with ventimask, Lungs bilateral crackles, JVP elevated\"     Cardiology Note: \"...Patient did develop some shortness of breath after the procedure but she received a significant amount of fluids.  I agree with current diuretics.  2D echo showed normal ejection fraction and moderate mitral regurgitation and inferior wall moderate hypokinesis.  Recommend diuresis for the next 24 hours...\"     CXR: \" Increased perihilar airspace opacity which can be seen with pulmonary edema. There may be a  small left pleural effusion.\"    2/3 IM Progress Note: \"Shortness of breath has improved, no chest pain...Chest x-ray yesterday showed pulmonary edema, continue IV Lasix, try to wean the patient off oxygen\"     Renal Consult:\"... will stop IV hydration as she has pulmonary edema\"...    Treatment: " increased O2, Lasix 40mg IV x2 doses    Risk Factors: advanced age, acute NSTEMI, chronic kidney disease  Options provided:  -- Non Cardiogenic acute pulmonary edema  -- Cardiogenic acute pulmonary edema  -- Other - I will add my own diagnosis  -- Refer to Clinical Documentation Reviewer    Query created by: Hailee Ko on 2/6/2024 4:06 PM      Electronically signed by:  STORMY NAGEL MD 2/13/2024 7:05 PM

## 2024-02-14 NOTE — NURSING NOTE
Assume care of patient. Pt lying quietly in bed. Pt denies any pain or needs at this time. Call light within reach. Continuing with plan of care.

## 2024-02-14 NOTE — PROGRESS NOTES
Pt with dc order. [facility] made  Homecare aware pt will return home today.      02/14/24 9453   Discharge Planning   Patient expects to be discharged to: Home with  Homecare

## 2024-02-14 NOTE — DISCHARGE SUMMARY
Discharge Diagnosis  NSTEMI (non-ST elevated myocardial infarction) (CMS/[facility])    Issues Requiring Follow-Up  Atrial fibrillation, hematuria, coronary artery disease    Discharge Meds     Your medication list        START taking these medications        Instructions Last Dose Given Next Dose Due   apixaban 2.5 mg tablet  Commonly known as: Eliquis      Take 1 tablet (2.5 mg) by mouth 2 times a day.       aspirin 81 mg chewable tablet  Start taking on: February 15, 2024      Chew 1 tablet (81 mg) once daily. Do not start before February 15, 2024.       clopidogrel 75 mg tablet  Commonly known as: Plavix  Start taking on: February 15, 2024      Take 1 tablet (75 mg) by mouth once daily. Do not start before February 15, 2024.       doxycycline 100 mg capsule  Commonly known as: Vibramycin      Take 1 capsule (100 mg) by mouth 2 times a day for 7 days. Take with at least 8 ounces (large glass) of water, do not lie down for 30 minutes after       losartan 25 mg tablet  Commonly known as: Cozaar      Take 0.5 tablets (12.5 mg) by mouth once daily at bedtime.       nitrofurantoin (macrocrystal-monohydrate) 100 mg capsule  Commonly known as: Macrobid      Take 1 capsule (100 mg) by mouth 2 times a day.              CHANGE how you take these medications        Instructions Last Dose Given Next Dose Due   atorvastatin 80 mg tablet  Commonly known as: Lipitor  What changed: when to take this      Take 1 tablet (80 mg) by mouth once daily at bedtime.       sertraline 50 mg tablet  Commonly known as: Zoloft  Start taking on: February 15, 2024  What changed:   medication strength  how much to take      Take 3 tablets (150 mg) by mouth once daily. Do not start before February 15, 2024.              CONTINUE taking these medications        Instructions Last Dose Given Next Dose Due   cholecalciferol 50 MCG (2000 UT) tablet  Commonly known as: Vitamin D-3           dilTIAZem 120 mg immediate release tablet  Commonly known as:  Cardizem           levothyroxine 100 mcg tablet  Commonly known as: Synthroid, Levoxyl           metoprolol succinate XL 25 mg 24 hr tablet  Commonly known as: Toprol-XL           MULTIPLE VITAMINS ORAL           omeprazole 20 mg DR capsule  Commonly known as: PriLOSEC                  STOP taking these medications      lisinopril 20 mg tablet        NON FORMULARY                  Where to Get Your Medications        These medications were sent to [facility] [place] [place] - [address]  [address] [place] [address]      Phone: [phone]   apixaban 2.5 mg tablet  atorvastatin 80 mg tablet  clopidogrel 75 mg tablet  doxycycline 100 mg capsule  losartan 25 mg tablet  nitrofurantoin (macrocrystal-monohydrate) 100 mg capsule  sertraline 50 mg tablet       Information about where to get these medications is not yet available    Ask your nurse or doctor about these medications  aspirin 81 mg chewable tablet         Test Results Pending At Discharge  Pending Labs       Order Current Status    Extra Urine Gray Tube Collected (02/09/24 1222)    Urinalysis with Reflex Culture and Microscopic In process            Hospital Course  This is an 83-year-old woman with history of sick sinus syndrome status post pacemaker who presents to the emergency room with chest pain. She reported to ED that she was scammed out of approximately $20,000 and she has been having a lot of stress with that.  Found to have elevated troponins.  Started on a heparin drip.  She underwent left heart catheterization with Dr. Vela who found an RCA lesion.  Patient had acute kidney injury at the time of the heart cath and decision was made not to place a stent at that time in order to reduce contrast exposure.  Plan was for outpatient staged stent placement once her kidney function had resolved.  Her GARY resolved over the next few days.  She then developed atrial fibrillation with RVR.  She had associated chest pain with this.  She has no prior  history of atrial fibrillation.  She was started on Eliquis.  She was seen by Dr. Figueroa's team and Dr. Ibrahim for this.  Medications were adjusted and ultimately she converted back into a paced rhythm.  Continue to have chest pain over the upcoming few days.  She also was having a lot of anxiety.  Repeat troponins were checked that showed troponins greater than 3500.  The decision was made to put her back on a heparin drip, stop the Eliquis, and repeat the heart catheterization.  2 stents were placed on the second heart catheterization on 2/8/2024.  She was started on aspirin and Plavix.  Her Eliquis was resumed.  She then developed some hematuria.  Urology was consulted.  She also developed GARY after her second heart cath.      Addendum--  Problems with hematuria on aspirin Plavix and Eliquis.  Treated for Enterococcus UTI.  Back on all 3 blood thinners.  Hematuria resolved    Suffered complication of IV access in left arm with infiltration and cellulitis.  That is improving.    Discharged to home today with home care.  Revised cardiac regimen.  Finish course of nitrofurantoin for UTI.  Finished course of doxycycline for cellulitis    Home care arranged    Follow-up with Dr. Ibrahim cardiology and Dr. Do primary care.  Follow-up with Dr. Hernadez urology    35 minutes spent on this discharge today.    Pertinent Physical Exam At Time of Discharge  Physical Exam    Outpatient Follow-Up  Future Appointments   Date Time Provider Department Center   2/23/2024 10:30 AM [facility] [id number] [id number] [facility]   8/26/2024  1:30 PM Darrius Ibrahim MD [id number] [facility]   12/16/2024 11:30 AM Ziggy Neal MD [id number] [facility]         Jony Roca MD

## 2024-02-14 NOTE — DOCUMENTATION CLARIFICATION NOTE
"    PATIENT:               SAMUEL VILLALOBOS  ACCT #:                  [id number]  MRN:                       [id number]  :                       1940  ADMIT DATE:       2024 8:44 PM  DISCH DATE:  RESPONDING PROVIDER #:        [id number]          PROVIDER RESPONSE TEXT:    Acute Hypoxemic Respiratory Failure    CDI QUERY TEXT:    [facility]_Respiratory Failure Acute    Instruction:    Based on your assessment of the patient and the clinical information, please provide the requested documentation by clicking on the appropriate radio button and enter any additional information if prompted.    Question: Is there a diagnosis indicative of the clinical information    When answering this query, please exercise your independent professional judgment. The fact that a question is being asked, does not imply that any particular answer is desired or expected.    The patient's clinical indicators include:  Clinical Information: 83 Y/F presenting with chest pain    Clinical Indicators:   14:00  T=36.4, HR=58, RR=18, YT=088/52, SpO2=93% on 5L/min improved to 100% on 100%NRB,     Significant Event:\" Vitals are stable but the patient is hypoxic, improved with ventimask, Lungs bilateral crackles, JVP elevated\"     Cardiology Note: \"...Patient did develop some shortness of breath after the procedure but she received a significant amount of fluids.  I agree with current diuretics.  2D echo showed normal ejection fraction and moderate mitral regurgitation and inferior wall moderate hypokinesis.  Recommend diuresis for the next 24 hours...\"     CXR: \" Increased perihilar airspace opacity which can be seen with pulmonary edema. There may be a  small left pleural effusion.\"    2/3 IM Progress Note: \"Shortness of breath has improved, no chest pain...Chest x-ray yesterday showed pulmonary edema, continue IV Lasix, try to wean the patient off oxygen\"      Treatment: increased O2, Lasix 40mg IV x2 doses    Risk Factors: advanced age, " acute NSTEMI, chronic kidney disease  Options provided:  -- Acute Hypoxemic Respiratory Failure  -- No acute respiratory failure  -- Other - I will add my own diagnosis  -- Refer to Clinical Documentation Reviewer    Query created by: Hailee Ko on 2/6/2024 4:09 PM      Electronically signed by:  STORMY NAGEL MD 2/13/2024 7:05 PM

## 2024-02-14 NOTE — PROGRESS NOTES
Subjective Data:  Patient is doing good.  Denies having chest pain.  Slightly confused    Overnight Events:    Telemetry overnight reviewed no events     Objective Data:  Last Recorded Vitals:  Vitals:    02/13/24 2323 02/14/24 0451 02/14/24 0714 02/14/24 1121   BP: 116/66 130/71 132/51 125/53   BP Location: Left arm Left arm Left arm Left arm   Patient Position: Lying Lying Lying Sitting   Pulse:  74 65 60   Resp: 22 24 16 19   Temp: 36.9 °C (98.4 °F) 37.1 °C (98.8 °F) 36.8 °C (98.2 °F) 36.5 °C (97.7 °F)   TempSrc: Temporal Temporal Temporal Temporal   SpO2:  92% 94% 97%   Weight:  82.1 kg (181 lb)     Height:           Last Labs:  CBC - 2/14/2024:  5:10 AM  11.9 7.7 180    25.2      CMP - 2/14/2024:  5:10 AM  8.9 6.0 15 --- 0.7   _ 3.4 22 74      PTT - 2/9/2024:  4:59 AM  1.1   11.9 24.6     HGBA1C   Date/Time Value Ref Range Status   02/01/2024 05:58 AM 6.0 See below % Final   07/05/2023 08:49 AM 5.6 4.0 - 6.0 % Final     Comment:     Hemoglobin A1C levels are related to mean blood glucose during the   preceding 2-3 months. The relationship table below may be used as a   general guide. Each 1% increase in HGB A1C is a reflection of an   increase in mean glucose of approximately 30 mg/dl.   Reference: Diabetes Care, volume 29, supplement 1 Jan. 2006                        HGB A1C ................. Approx. Mean Glucose   _______________________________________________   6%   ...............................  120 mg/dl   7%   ...............................  150 mg/dl   8%   ...............................  180 mg/dl   9%   ...............................  210 mg/dl   10%  ...............................  240 mg/dl  Performed at [address] [address]     12/14/2022 09:13 AM 5.7 4.0 - 6.0 % Final     Comment:     Hemoglobin A1C levels are related to mean blood glucose during the   preceding 2-3 months. The relationship table below may be used as a   general guide. Each 1% increase in HGB A1C  is a reflection of an   increase in mean glucose of approximately 30 mg/dl.   Reference: Diabetes Care, volume 29, supplement 1 Jan. 2006                        HGB A1C ................. Approx. Mean Glucose   _______________________________________________   6%   ...............................  120 mg/dl   7%   ...............................  150 mg/dl   8%   ...............................  180 mg/dl   9%   ...............................  210 mg/dl   10%  ...............................  240 mg/dl  Performed at [address] [address]       LDLCALC   Date/Time Value Ref Range Status   07/05/2023 08:49 AM 57 65 - 130 MG/DL Final   12/14/2022 09:13 AM 64 65 - 130 MG/DL Final   06/29/2022 08:43 AM 58 65 - 130 MG/DL Final      Last I/O:  I/O last 3 completed shifts:  In: 1510 (18.4 mL/kg) [P.O.:1510]  Out: 0 (0 mL/kg)   Weight: 82.1 kg     Past Cardiology Tests (Last 3 Years):  EKG:  ECG 12 Lead 01/31/2024    Echo:  Transthoracic Echo (TTE) Limited 02/09/2024      Transthoracic Echo (TTE) Complete 02/01/2024    Ejection Fractions:  EF   Date/Time Value Ref Range Status   02/09/2024 08:59 AM 58 %    02/01/2024 11:43 AM 59 %      Cath:  Cardiac catheterization - coronary 02/08/2024      Cardiac catheterization - coronary 02/02/2024      Cardiac catheterization - coronary 02/02/2024    Stress Test:  No results found for this or any previous visit from the past 1095 days.    Cardiac Imaging:  No results found for this or any previous visit from the past 1095 days.      Inpatient Medications:  Scheduled medications   Medication Dose Route Frequency    apixaban  2.5 mg oral BID    aspirin  81 mg oral Daily    atorvastatin  80 mg oral Nightly    cholecalciferol  2,000 Units oral Daily    clopidogrel  75 mg oral Daily    dilTIAZem CD  120 mg oral BID    levothyroxine  100 mcg oral Daily before breakfast    linezolid  600 mg oral q12h JAQUI    losartan  12.5 mg oral Nightly    metoprolol succinate XL   25 mg oral BID    multivitamin  1 tablet oral Daily    pantoprazole  40 mg oral Daily before breakfast    perflutren lipid microspheres  0.5-10 mL of dilution intravenous Once in imaging    perflutren lipid microspheres  0.5-10 mL of dilution intravenous Once in imaging    perflutren protein A microsphere  0.5 mL intravenous Once in imaging    polyethylene glycol  17 g oral Daily    sertraline  150 mg oral Daily    sulfur hexafluoride microsphr  2 mL intravenous Once in imaging     PRN medications   Medication    acetaminophen    Or    acetaminophen    Or    acetaminophen    benzocaine-menthol    calcium carbonate    dextromethorphan-guaifenesin    dextrose 10 % in water (D10W)    dextrose    glucagon    guaiFENesin    ondansetron ODT    oxygen     Continuous Medications   Medication Dose Last Rate       Physical Exam:  General: Patient is in no acute distress.  HEENT: atraumatic normocephalic.  Neck: is supple jugular venous pressure within normal limits no thyromegaly.  Cardiovascular regular rate and rhythm normal heart sounds no murmurs rubs or gallops.  Lungs: clear to auscultation bilaterally.  Abdomen: is soft nontender.  Extremities warm to touch no edema.           Assessment/Plan   Acute non-ST elevation myocardial infarction.  Status post PCI to RCA with 2 drug-eluting stents.  Chronically occluded left circumflex.  Recommend aspirin and Plavix.  May stop aspirin after 1 month since patient will be on Eliquis for atrial fibrillation.  Recommend also to patient to be on PPI  New onset atrial fibrillation on Eliquis.  Continue rate control with beta-blockers.  Monitor blood pressure and heart rate.    Hypertension.  Continue rate control strategy will monitor.     Hyperlipidemia continue high intensity statin.  5. Sinus syndrome status post pacemaker.        Code Status:  Full Code      Becky Vela MD

## 2024-02-14 NOTE — PROGRESS NOTES
Physical Therapy    Physical Therapy Treatment    Patient Name: Linette Doyle  MRN: [id number]  Today's Date: 2/14/2024  Time Calculation  Start Time: 0847  Stop Time: 0913  Time Calculation (min): 26 min       Assessment/Plan   PT Assessment  PT Assessment Results: Decreased strength, Decreased endurance, Impaired balance, Decreased mobility, Decreased safety awareness, Impaired judgement  Rehab Prognosis: Good  Evaluation/Treatment Tolerance: Patient tolerated treatment well  End of Session Communication: Bedside nurse  Assessment Comment: increased gait distance with mild unsteadiness noted. pt would benefit from using cane vs rolling walker for longer distances.  recommend family assistance/support due to mild confusion  End of Session Patient Position: Up in chair, Alarm on (call button in reach)  PT Plan  Inpatient/Swing Bed or Outpatient: Inpatient  PT Plan  Treatment/Interventions: Bed mobility, Transfer training, Gait training, Stair training, Balance training, Neuromuscular re-education, Strengthening, Endurance training, Range of motion, Therapeutic exercise, Therapeutic activity, Home exercise program  PT Plan: Skilled PT  PT Frequency: 4 times per week  PT Discharge Recommendations: Low intensity level of continued care, 24 hr supervision due to cognition  Equipment Recommended upon Discharge:  (cane vs RW)  PT Recommended Transfer Status: Contact guard  PT - OK to Discharge: Yes      General Visit Information:   PT  Visit  PT Received On: 02/14/24  General  Reason for Referral: NSTEMI; impaired mobility  Past Medical History Relevant to Rehab: CAD, cardiac stent, SSS with pacer, hypothyroidism, hypercholesterolemia, htn, anemia, CKD  Prior to Session Communication: Bedside nurse  Patient Position Received: Bed, 2 rail up  General Comment: pt alert and willing to work with therapy    Subjective   Precautions:  Precautions  Medical Precautions: Fall precautions    Vital Signs:  Vital Signs  Heart Rate:   (60-78 bpm)  SpO2:  (94% on room air)    Objective   Pain:  Pain Assessment  Pain Assessment:  (0/10)    Cognition:  Cognition  Overall Cognitive Status: Within Functional Limits  Cognition Comments: mild confusion noted. pt appeared to have difficulty word finding and staying on task and a delay in motor processing.    Postural Control:  Postural Control  Posture Comment: rounded shoulders, forward head posture    Activity Tolerance:  Activity Tolerance  Endurance:  (Good activity tolerance)    Treatments:    Therapeutic Exercise Performed:  (B LE AROM therex: AP, GS, QS, HS, ABD, LAQ, hip flexion and calf raises x 10-15 reps. slow steady reps noted)        Bed Mobility  Bed Mobility:  (supine to sit with SUPERVISION; good+ sitting balance on EOB)    Ambulation/Gait Training Performed:  pt amb 100' x 1 with no AD. MIN A/ CGA (Handheld A) given. pt presented with slow gloria, occasional lateral sway and short shuffling steps. VC to increase B step length. no major LOB noted    Transfer:  sit <-> stand with MIN A; pt appeared guarded upon standing. pt declined using rolling walker      Stairs:  pt negotiated 2 stairs using 1 railing with MIN A. pt had difficulty stepping up while ascending. pt guarded while descending. fair safety awareness noted      Outcome Measures:  [facility] Basic Mobility  Turning from your back to your side while in a flat bed without using bedrails: None  Moving from lying on your back to sitting on the side of a flat bed without using bedrails: None  Moving to and from bed to chair (including a wheelchair): A little  Standing up from a chair using your arms (e.g. wheelchair or bedside chair): A little  To walk in hospital room: A little  Climbing 3-5 steps with railing: A little  Basic Mobility - Total Score: 20    OP EDUCATION:  Outpatient Education  Education Comment: educated pt on safety precautions and benefits of using assistive device    Encounter Problems       Encounter Problems  (Active)       PT Problem       Patient will demonstrate improvements in strength  (Progressing)       Start:  02/03/24    Expected End:  02/24/24            Patient will ambulate 150 ft independently and use of no assistive device  (Progressing)       Start:  02/03/24    Expected End:  02/24/24            Patient will ascend/descend 1 step with no support on 0 rail(s) independently  (Progressing)       Start:  02/03/24    Expected End:  02/24/24               Pain - Adult

## 2024-02-15 ENCOUNTER — PHARMACY VISIT (OUTPATIENT)
Dept: PHARMACY | Facility: CLINIC | Age: 84
End: 2024-02-15
Payer: COMMERCIAL

## 2024-02-15 ENCOUNTER — DOCUMENTATION (OUTPATIENT)
Dept: PRIMARY CARE | Facility: CLINIC | Age: 84
End: 2024-02-15
Payer: MEDICARE

## 2024-02-15 ENCOUNTER — DOCUMENTATION (OUTPATIENT)
Dept: HOME HEALTH SERVICES | Facility: HOME HEALTH | Age: 84
End: 2024-02-15
Payer: MEDICARE

## 2024-02-15 ENCOUNTER — PATIENT OUTREACH (OUTPATIENT)
Dept: PRIMARY CARE | Facility: CLINIC | Age: 84
End: 2024-02-15
Payer: MEDICARE

## 2024-02-15 PROCEDURE — RXMED WILLOW AMBULATORY MEDICATION CHARGE

## 2024-02-15 RX ORDER — APIXABAN 2.5 MG/1
2.5 TABLET, FILM COATED ORAL 2 TIMES DAILY
Qty: 60 TABLET | Refills: 3 | OUTPATIENT
Start: 2024-02-15 | End: 2024-02-29 | Stop reason: SDUPTHER

## 2024-02-15 NOTE — HH CARE COORDINATION
Home Care received a Referral for Nursing, Physical Therapy, and Occupational Therapy. We have processed the referral for a Start of Care on 02/16.     If you have any questions or concerns, please feel free to contact us at [phone]. Follow the prompts, enter your five digit zip code, and you will be directed to your care team on EAST 1.

## 2024-02-15 NOTE — PROGRESS NOTES
Discharge Facility: [facility]  Discharge Diagnosis: NSTEMI  Admission Date: 02/01/2024  Discharge Date: 02/14/2024    PCP Appointment Date: 02/20/2024, scheduled by this CM  Specialist Appointment Date: Cardiology 02/23/2024  Hospital Encounter and Summary: Linked    See discharge assessment below for further details    Engagement  Call Start Time: 1000 (2/15/2024 10:24 AM)    Medications  Medications reviewed with patient/caregiver?: Yes (2/15/2024 10:24 AM)  Is the patient having any side effects they believe may be caused by any medication additions or changes?: No (2/15/2024 10:24 AM)  Does the patient have all medications ordered at discharge?: Yes (2/15/2024 10:24 AM)  Prescription Comments: Scripts given at discharge for Sertraline, Eliquis, Plavix, Doxycycline, Losartan, Macrobid and Lipitor (2/15/2024 10:24 AM)  Is the patient taking all medications as directed (includes completed medication regime)?: Yes (2/15/2024 10:24 AM)  Medication Comments: Patient states that there are affordability issues with Eliquis. This CM contacted the [facility] who states that they can get the first 30 days at no cost for the patient. She has all other discharge medication. (2/15/2024 10:24 AM)    Appointments  Does the patient have a primary care provider?: Yes (2/15/2024 10:24 AM)  Care Management Interventions: Verified appointment date/time/provider (Scheduled by this CM) (2/15/2024 10:24 AM)  Has the patient kept scheduled appointments due by today?: Yes (2/15/2024 10:24 AM)    Self Management  What is the home health agency?: [facility] (2/15/2024 10:24 AM)  Has home health visited the patient within 72 hours of discharge?: Yes (2/15/2024 10:24 AM)  What Durable Medical Equipment (DME) was ordered?: N/A (2/15/2024 10:24 AM)    Patient Teaching  Does the patient have access to their discharge instructions?: Yes (2/15/2024 10:24 AM)  Care Management Interventions: Reviewed instructions with patient  (2/15/2024 10:24 AM)  What is the patient's perception of their health status since discharge?: Improving (2/15/2024 10:24 AM)  Is the patient/caregiver able to teach back the hierarchy of who to call/visit for symptoms/problems? PCP, Specialist, Home Health nurse, Urgent Care, ED, 911: Yes (2/15/2024 10:24 AM)  Patient/Caregiver Education Comments: CM spoke to patient and brother Ede via phone. She states that she is doing okay at home so far. Patient states that there are affordability issues with Eliquis. This CM contacted the [facility] who states that they can get the first 30 days at no cost for the patient. She has all other discharge medication. This CM was able to schedule a PCP follow up appointment. They were very thankful for this call and assistance. (2/15/2024 10:24 AM)

## 2024-02-16 ENCOUNTER — HOME CARE VISIT (OUTPATIENT)
Dept: HOME HEALTH SERVICES | Facility: HOME HEALTH | Age: 84
End: 2024-02-16
Payer: MEDICARE

## 2024-02-16 VITALS
RESPIRATION RATE: 16 BRPM | TEMPERATURE: 97.6 F | OXYGEN SATURATION: 97 % | HEART RATE: 60 BPM | DIASTOLIC BLOOD PRESSURE: 60 MMHG | SYSTOLIC BLOOD PRESSURE: 128 MMHG

## 2024-02-16 PROCEDURE — 1090000001 HH PPS REVENUE CREDIT

## 2024-02-16 PROCEDURE — 169592 NO-PAY CLAIM PROCEDURE

## 2024-02-16 PROCEDURE — 0023 HH SOC

## 2024-02-16 PROCEDURE — 1090000002 HH PPS REVENUE DEBIT

## 2024-02-16 PROCEDURE — G0299 HHS/HOSPICE OF RN EA 15 MIN: HCPCS | Mod: HHH

## 2024-02-16 ASSESSMENT — ENCOUNTER SYMPTOMS
LAST BOWEL MOVEMENT: 66886
PERSON REPORTING PAIN: PATIENT
MUSCLE WEAKNESS: 1
CHANGE IN APPETITE: UNCHANGED
APPETITE LEVEL: FAIR
DENIES PAIN: 1
DIARRHEA: 1

## 2024-02-16 ASSESSMENT — ACTIVITIES OF DAILY LIVING (ADL)
ENTERING_EXITING_HOME: SUPERVISION
OASIS_M1830: 03

## 2024-02-17 PROCEDURE — 1090000002 HH PPS REVENUE DEBIT

## 2024-02-17 PROCEDURE — 1090000001 HH PPS REVENUE CREDIT

## 2024-02-18 PROCEDURE — 1090000001 HH PPS REVENUE CREDIT

## 2024-02-18 PROCEDURE — 1090000002 HH PPS REVENUE DEBIT

## 2024-02-19 ENCOUNTER — HOME CARE VISIT (OUTPATIENT)
Dept: HOME HEALTH SERVICES | Facility: HOME HEALTH | Age: 84
End: 2024-02-19
Payer: MEDICARE

## 2024-02-19 VITALS
RESPIRATION RATE: 18 BRPM | HEART RATE: 58 BPM | OXYGEN SATURATION: 98 % | SYSTOLIC BLOOD PRESSURE: 140 MMHG | TEMPERATURE: 98 F | DIASTOLIC BLOOD PRESSURE: 70 MMHG

## 2024-02-19 VITALS
TEMPERATURE: 96.3 F | SYSTOLIC BLOOD PRESSURE: 130 MMHG | DIASTOLIC BLOOD PRESSURE: 70 MMHG | HEART RATE: 60 BPM | OXYGEN SATURATION: 94 % | RESPIRATION RATE: 18 BRPM

## 2024-02-19 PROCEDURE — 1090000002 HH PPS REVENUE DEBIT

## 2024-02-19 PROCEDURE — G0151 HHCP-SERV OF PT,EA 15 MIN: HCPCS | Mod: HHH

## 2024-02-19 PROCEDURE — G0300 HHS/HOSPICE OF LPN EA 15 MIN: HCPCS | Mod: HHH

## 2024-02-19 PROCEDURE — 1090000001 HH PPS REVENUE CREDIT

## 2024-02-19 SDOH — HEALTH STABILITY: PHYSICAL HEALTH: EXERCISE COMMENTS: CUES AS NEEDED FOR ACCURACY AND PACING.

## 2024-02-19 SDOH — HEALTH STABILITY: PHYSICAL HEALTH: PHYSICAL EXERCISE: 10

## 2024-02-19 SDOH — HEALTH STABILITY: PHYSICAL HEALTH

## 2024-02-19 SDOH — HEALTH STABILITY: PHYSICAL HEALTH: EXERCISE ACTIVITIES SETS: 1

## 2024-02-19 SDOH — HEALTH STABILITY: PHYSICAL HEALTH: EXERCISE ACTIVITY: APS, MARCHING, LAQ, HIP ABD/ADD

## 2024-02-19 SDOH — HEALTH STABILITY: PHYSICAL HEALTH: PHYSICAL EXERCISE: SITTING

## 2024-02-19 ASSESSMENT — ACTIVITIES OF DAILY LIVING (ADL)
AMBULATION_DISTANCE/DURATION_TOLERATED: 50 FEET
AMBULATION ASSISTANCE ON FLAT SURFACES: 1

## 2024-02-19 ASSESSMENT — ENCOUNTER SYMPTOMS
CHANGE IN APPETITE: UNCHANGED
DESCRIPTION OF MEMORY LOSS: LONG TERM
DENIES PAIN: 1
DENIES PAIN: 1
APPETITE LEVEL: POOR
DIARRHEA: 1
LAST BOWEL MOVEMENT: 66889
STOOL FREQUENCY: MORE THAN TWICE DAILY
OCCASIONAL FEELINGS OF UNSTEADINESS: 1

## 2024-02-19 NOTE — CASE COMMUNICATION
PATIENT WITH COMPLAINT OF DIARRHEA MULTIPLE TIMES A DAY SINCE COMING HOME FROM THE HOSPITAL STILL TAKING ANTIBIOTIC HAS APPT WITH YOU TOMORROW

## 2024-02-20 ENCOUNTER — OFFICE VISIT (OUTPATIENT)
Dept: PRIMARY CARE | Facility: CLINIC | Age: 84
End: 2024-02-20
Payer: MEDICARE

## 2024-02-20 VITALS
SYSTOLIC BLOOD PRESSURE: 136 MMHG | WEIGHT: 149 LBS | DIASTOLIC BLOOD PRESSURE: 74 MMHG | HEART RATE: 60 BPM | OXYGEN SATURATION: 90 % | BODY MASS INDEX: 24.79 KG/M2

## 2024-02-20 DIAGNOSIS — N30.00 ACUTE CYSTITIS WITHOUT HEMATURIA: ICD-10-CM

## 2024-02-20 DIAGNOSIS — I10 PRIMARY HYPERTENSION: ICD-10-CM

## 2024-02-20 DIAGNOSIS — R19.7 DIARRHEA OF PRESUMED INFECTIOUS ORIGIN: Primary | ICD-10-CM

## 2024-02-20 DIAGNOSIS — D64.9 ANEMIA, UNSPECIFIED TYPE: ICD-10-CM

## 2024-02-20 DIAGNOSIS — N18.31 STAGE 3A CHRONIC KIDNEY DISEASE (MULTI): ICD-10-CM

## 2024-02-20 DIAGNOSIS — I25.118 ATHEROSCLEROTIC HEART DISEASE OF NATIVE CORONARY ARTERY WITH OTHER FORMS OF ANGINA PECTORIS (CMS-HCC): ICD-10-CM

## 2024-02-20 PROCEDURE — 1090000001 HH PPS REVENUE CREDIT

## 2024-02-20 PROCEDURE — 1111F DSCHRG MED/CURRENT MED MERGE: CPT | Performed by: FAMILY MEDICINE

## 2024-02-20 PROCEDURE — 1126F AMNT PAIN NOTED NONE PRSNT: CPT | Performed by: FAMILY MEDICINE

## 2024-02-20 PROCEDURE — 1036F TOBACCO NON-USER: CPT | Performed by: FAMILY MEDICINE

## 2024-02-20 PROCEDURE — 3078F DIAST BP <80 MM HG: CPT | Performed by: FAMILY MEDICINE

## 2024-02-20 PROCEDURE — 1160F RVW MEDS BY RX/DR IN RCRD: CPT | Performed by: FAMILY MEDICINE

## 2024-02-20 PROCEDURE — 3075F SYST BP GE 130 - 139MM HG: CPT | Performed by: FAMILY MEDICINE

## 2024-02-20 PROCEDURE — 99214 OFFICE O/P EST MOD 30 MIN: CPT | Performed by: FAMILY MEDICINE

## 2024-02-20 PROCEDURE — 1159F MED LIST DOCD IN RCRD: CPT | Performed by: FAMILY MEDICINE

## 2024-02-20 PROCEDURE — 1158F ADVNC CARE PLAN TLK DOCD: CPT | Performed by: FAMILY MEDICINE

## 2024-02-20 PROCEDURE — 1123F ACP DISCUSS/DSCN MKR DOCD: CPT | Performed by: FAMILY MEDICINE

## 2024-02-20 PROCEDURE — 1090000002 HH PPS REVENUE DEBIT

## 2024-02-20 ASSESSMENT — PAIN SCALES - GENERAL: PAINLEVEL: 0-NO PAIN

## 2024-02-20 NOTE — PROGRESS NOTES
Subjective   Patient ID: Linette Doyle is a 83 y.o. female who presents for Hospital Follow-up.    ERIN Sue presents for follow up from her hospital stay. Per discharge - This is an 83-year-old woman with history of sick sinus syndrome status post pacemaker who presents to the emergency room with chest pain. She reported to ED that she was scammed out of approximately $20,000 and she has been having a lot of stress with that.  Found to have elevated troponins.  Started on a heparin drip.  She underwent left heart catheterization with Dr. Vela who found an RCA lesion.  Patient had acute kidney injury at the time of the heart cath and decision was made not to place a stent at that time in order to reduce contrast exposure.  Plan was for outpatient staged stent placement once her kidney function had resolved.  Her GARY resolved over the next few days.  She then developed atrial fibrillation with RVR.  She had associated chest pain with this.  She has no prior history of atrial fibrillation.  She was started on Eliquis.  She was seen by Dr. Figueroa's team and Dr. Ibrahim for this.  Medications were adjusted and ultimately she converted back into a paced rhythm.  Continue to have chest pain over the upcoming few days.  She also was having a lot of anxiety.  Repeat troponins were checked that showed troponins greater than 3500.  The decision was made to put her back on a heparin drip, stop the Eliquis, and repeat the heart catheterization.  2 stents were placed on the second heart catheterization on 2/8/2024.  She was started on aspirin and Plavix.  Her Eliquis was resumed.  She then developed some hematuria.  Urology was consulted.  She also developed GARY after her second heart cath.  Problems with hematuria on aspirin Plavix and Eliquis.  Treated for Enterococcus UTI.  Back on all 3 blood thinners.  Hematuria resolved  Feels she is doing better.  Brother came up to help take care of her along with one of her sisters.  Issue with diarrhea for the past 4-5 days. Loose stools. No blood or black stools.    Review of Systems  All other systems have been reviewed and are negative except as noted in the HPI.       Objective   /74   Pulse 60   Wt 67.6 kg (149 lb)   LMP  (LMP Unknown)   SpO2 90%   BMI 24.79 kg/m²     Physical Exam  Vitals and nursing note reviewed.   Constitutional:       Appearance: Normal appearance.   Eyes:      Extraocular Movements: Extraocular movements intact.      Conjunctiva/sclera: Conjunctivae normal.      Pupils: Pupils are equal, round, and reactive to light.   Cardiovascular:      Rate and Rhythm: Normal rate and regular rhythm.   Pulmonary:      Effort: Pulmonary effort is normal.      Breath sounds: Normal breath sounds.   Skin:     Findings: No rash.   Neurological:      General: No focal deficit present.      Mental Status: She is alert.   Psychiatric:         Mood and Affect: Mood normal.         Assessment/Plan   Problem List Items Addressed This Visit             ICD-10-CM    Atherosclerotic heart disease of native coronary artery with other forms of angina pectoris (CMS/[facility])  Continue on current medication.  Will assist with cost of Eliquis. Follow up with cardiologist I25.118    Primary hypertension  Controlled.  Continue current medication as prescribed.  DASH diet. Exercise at least 4 times per week.    I10    Stage 3a chronic kidney disease (CMS/HCC)  Plan to recheck to ensure renal function has returned to baseline. N18.31    Relevant Orders    Renal function panel     Other Visit Diagnoses         Codes    Diarrhea of presumed infectious origin    -  Primary  BRAT diet R19.7    Relevant Orders    C. difficile, PCR    Anemia, unspecified type     D64.9    Relevant Orders    CBC and Auto Differential    Acute cystitis without hematuria     N30.00    Relevant Orders    Urinalysis with Reflex Culture and Microscopic  Recheck to ensure clearance

## 2024-02-21 ENCOUNTER — PATIENT OUTREACH (OUTPATIENT)
Dept: PRIMARY CARE | Facility: CLINIC | Age: 84
End: 2024-02-21
Payer: MEDICARE

## 2024-02-21 DIAGNOSIS — Z95.5 STENTED CORONARY ARTERY: Primary | ICD-10-CM

## 2024-02-21 PROCEDURE — 1090000001 HH PPS REVENUE CREDIT

## 2024-02-21 PROCEDURE — 1090000002 HH PPS REVENUE DEBIT

## 2024-02-21 NOTE — PROGRESS NOTES
Call regarding appt. with PCP on 02/20/2024 after hospitalization.  At time of outreach call the patient feels as if their condition has returned to baseline since last visit.  Reviewed the PCP appointment with the pt and addressed any questions or concerns.

## 2024-02-22 ENCOUNTER — HOME CARE VISIT (OUTPATIENT)
Dept: HOME HEALTH SERVICES | Facility: HOME HEALTH | Age: 84
End: 2024-02-22
Payer: MEDICARE

## 2024-02-22 VITALS
RESPIRATION RATE: 18 BRPM | TEMPERATURE: 97 F | DIASTOLIC BLOOD PRESSURE: 70 MMHG | SYSTOLIC BLOOD PRESSURE: 130 MMHG | HEART RATE: 60 BPM

## 2024-02-22 VITALS
HEART RATE: 60 BPM | OXYGEN SATURATION: 95 % | RESPIRATION RATE: 18 BRPM | TEMPERATURE: 97 F | DIASTOLIC BLOOD PRESSURE: 70 MMHG | SYSTOLIC BLOOD PRESSURE: 130 MMHG

## 2024-02-22 DIAGNOSIS — I49.5 SICK SINUS SYNDROME (MULTI): ICD-10-CM

## 2024-02-22 DIAGNOSIS — Z95.0 PRESENCE OF CARDIAC PACEMAKER: Primary | ICD-10-CM

## 2024-02-22 PROCEDURE — G0157 HHC PT ASSISTANT EA 15: HCPCS | Mod: HHH

## 2024-02-22 PROCEDURE — 1090000001 HH PPS REVENUE CREDIT

## 2024-02-22 PROCEDURE — 1090000002 HH PPS REVENUE DEBIT

## 2024-02-22 PROCEDURE — G0299 HHS/HOSPICE OF RN EA 15 MIN: HCPCS | Mod: HHH

## 2024-02-22 SDOH — ECONOMIC STABILITY: GENERAL

## 2024-02-22 ASSESSMENT — ENCOUNTER SYMPTOMS
DIARRHEA: 1
PAIN: 1
LOWEST PAIN SEVERITY IN PAST 24 HOURS: 0/10
DENIES PAIN: 1
APPETITE LEVEL: GOOD
PERSON REPORTING PAIN: PATIENT
LAST BOWEL MOVEMENT: 66892
HIGHEST PAIN SEVERITY IN PAST 24 HOURS: 2/10

## 2024-02-22 ASSESSMENT — ACTIVITIES OF DAILY LIVING (ADL): DRESSING_LB_CURRENT_FUNCTION: STAND BY ASSIST

## 2024-02-23 ENCOUNTER — HOSPITAL ENCOUNTER (OUTPATIENT)
Dept: CARDIOLOGY | Facility: CLINIC | Age: 84
Discharge: HOME | End: 2024-02-23
Payer: MEDICARE

## 2024-02-23 ENCOUNTER — APPOINTMENT (OUTPATIENT)
Dept: CARDIOLOGY | Facility: CLINIC | Age: 84
End: 2024-02-23
Payer: MEDICARE

## 2024-02-23 DIAGNOSIS — Z95.0 PRESENCE OF CARDIAC PACEMAKER: Primary | ICD-10-CM

## 2024-02-23 DIAGNOSIS — I49.5 SICK SINUS SYNDROME (MULTI): ICD-10-CM

## 2024-02-23 DIAGNOSIS — Z95.0 PRESENCE OF CARDIAC PACEMAKER: ICD-10-CM

## 2024-02-23 PROCEDURE — 93280 PM DEVICE PROGR EVAL DUAL: CPT | Performed by: INTERNAL MEDICINE

## 2024-02-23 PROCEDURE — 1090000002 HH PPS REVENUE DEBIT

## 2024-02-23 PROCEDURE — 93280 PM DEVICE PROGR EVAL DUAL: CPT

## 2024-02-23 PROCEDURE — 1090000001 HH PPS REVENUE CREDIT

## 2024-02-23 PROCEDURE — 93290 INTERROG DEV EVAL ICPMS IP: CPT | Performed by: INTERNAL MEDICINE

## 2024-02-24 PROCEDURE — 1090000001 HH PPS REVENUE CREDIT

## 2024-02-24 PROCEDURE — 1090000002 HH PPS REVENUE DEBIT

## 2024-02-25 LAB
ATRIAL RATE: 62 BPM
ATRIAL RATE: 94 BPM
PR INTERVAL: 222 MS
Q ONSET: 224 MS
Q ONSET: 224 MS
QRS COUNT: 10 BEATS
QRS COUNT: 21 BEATS
QRS DURATION: 86 MS
QRS DURATION: 96 MS
QT INTERVAL: 328 MS
QT INTERVAL: 426 MS
QTC CALCULATION(BAZETT): 426 MS
QTC CALCULATION(BAZETT): 476 MS
QTC FREDERICIA: 421 MS
QTC FREDERICIA: 426 MS
R AXIS: 48 DEGREES
R AXIS: 49 DEGREES
T AXIS: 104 DEGREES
T AXIS: 230 DEGREES
T OFFSET: 388 MS
T OFFSET: 437 MS
VENTRICULAR RATE: 127 BPM
VENTRICULAR RATE: 60 BPM

## 2024-02-25 PROCEDURE — 1090000002 HH PPS REVENUE DEBIT

## 2024-02-25 PROCEDURE — 1090000001 HH PPS REVENUE CREDIT

## 2024-02-26 PROCEDURE — 1090000001 HH PPS REVENUE CREDIT

## 2024-02-26 PROCEDURE — 1090000002 HH PPS REVENUE DEBIT

## 2024-02-27 ENCOUNTER — HOME CARE VISIT (OUTPATIENT)
Dept: HOME HEALTH SERVICES | Facility: HOME HEALTH | Age: 84
End: 2024-02-27
Payer: MEDICARE

## 2024-02-27 VITALS
TEMPERATURE: 96.3 F | DIASTOLIC BLOOD PRESSURE: 82 MMHG | OXYGEN SATURATION: 94 % | SYSTOLIC BLOOD PRESSURE: 132 MMHG | HEART RATE: 68 BPM | RESPIRATION RATE: 24 BRPM

## 2024-02-27 VITALS — HEART RATE: 82 BPM | SYSTOLIC BLOOD PRESSURE: 150 MMHG | OXYGEN SATURATION: 95 % | DIASTOLIC BLOOD PRESSURE: 80 MMHG

## 2024-02-27 PROCEDURE — G0157 HHC PT ASSISTANT EA 15: HCPCS | Mod: HHH

## 2024-02-27 PROCEDURE — 1090000002 HH PPS REVENUE DEBIT

## 2024-02-27 PROCEDURE — 1090000001 HH PPS REVENUE CREDIT

## 2024-02-27 PROCEDURE — G0300 HHS/HOSPICE OF LPN EA 15 MIN: HCPCS | Mod: HHH

## 2024-02-27 SDOH — ECONOMIC STABILITY: HOUSING INSECURITY
HOME SAFETY: LAST VISIT EDUCATED PATIENT ON REMOVING THROW RUGS , SHE STATES SHE JUST WALKS AROUND THEM . WILL CONTINUE TO EDUCATE . END OF SESSION PATIENT ALLOWED THIS THERAPIST TO REMOVE 5 THROW RUGS FROM DINING AREA , BATHROOM AND KITCHEN RUGS REMAINED PER HER

## 2024-02-27 SDOH — ECONOMIC STABILITY: HOUSING INSECURITY: HOME SAFETY: REQUEST

## 2024-02-27 ASSESSMENT — ENCOUNTER SYMPTOMS
DYSPNEA ACTIVITY LEVEL: AFTER AMBULATING 10 - 20 FT
LAST BOWEL MOVEMENT: 66896
FORGETFULNESS: 1
SHORTNESS OF BREATH: 1
DENIES PAIN: 1
APPETITE LEVEL: FAIR
DENIES PAIN: 1
STOOL FREQUENCY: DAILY
CHANGE IN APPETITE: INCREASED
PERSON REPORTING PAIN: PATIENT
DIARRHEA: 1

## 2024-02-28 ENCOUNTER — OFFICE VISIT (OUTPATIENT)
Dept: CARDIOLOGY | Facility: CLINIC | Age: 84
End: 2024-02-28
Payer: MEDICARE

## 2024-02-28 VITALS
HEIGHT: 65 IN | DIASTOLIC BLOOD PRESSURE: 64 MMHG | TEMPERATURE: 98.6 F | SYSTOLIC BLOOD PRESSURE: 118 MMHG | WEIGHT: 147 LBS | HEART RATE: 60 BPM | OXYGEN SATURATION: 95 % | RESPIRATION RATE: 18 BRPM | BODY MASS INDEX: 24.49 KG/M2

## 2024-02-28 DIAGNOSIS — I50.31 ACUTE DIASTOLIC HEART FAILURE (MULTI): ICD-10-CM

## 2024-02-28 DIAGNOSIS — I25.118 ATHEROSCLEROTIC HEART DISEASE OF NATIVE CORONARY ARTERY WITH OTHER FORMS OF ANGINA PECTORIS (CMS-HCC): ICD-10-CM

## 2024-02-28 DIAGNOSIS — I25.10 CORONARY ARTERIOSCLEROSIS: ICD-10-CM

## 2024-02-28 DIAGNOSIS — I25.118 ATHEROSCLEROSIS OF NATIVE CORONARY ARTERY OF NATIVE HEART WITH STABLE ANGINA PECTORIS (CMS-HCC): Primary | ICD-10-CM

## 2024-02-28 DIAGNOSIS — I10 PRIMARY HYPERTENSION: ICD-10-CM

## 2024-02-28 DIAGNOSIS — I49.5 SICK SINUS SYNDROME (MULTI): ICD-10-CM

## 2024-02-28 DIAGNOSIS — I25.118 ATHEROSCLEROSIS OF NATIVE CORONARY ARTERY OF NATIVE HEART WITH STABLE ANGINA PECTORIS (CMS-HCC): ICD-10-CM

## 2024-02-28 DIAGNOSIS — Z95.0 PRESENCE OF CARDIAC PACEMAKER: Primary | ICD-10-CM

## 2024-02-28 PROCEDURE — 1126F AMNT PAIN NOTED NONE PRSNT: CPT | Performed by: INTERNAL MEDICINE

## 2024-02-28 PROCEDURE — 99214 OFFICE O/P EST MOD 30 MIN: CPT | Performed by: INTERNAL MEDICINE

## 2024-02-28 PROCEDURE — 1159F MED LIST DOCD IN RCRD: CPT | Performed by: INTERNAL MEDICINE

## 2024-02-28 PROCEDURE — 1090000001 HH PPS REVENUE CREDIT

## 2024-02-28 PROCEDURE — 3074F SYST BP LT 130 MM HG: CPT | Performed by: INTERNAL MEDICINE

## 2024-02-28 PROCEDURE — 1090000002 HH PPS REVENUE DEBIT

## 2024-02-28 PROCEDURE — 3078F DIAST BP <80 MM HG: CPT | Performed by: INTERNAL MEDICINE

## 2024-02-28 PROCEDURE — 1123F ACP DISCUSS/DSCN MKR DOCD: CPT | Performed by: INTERNAL MEDICINE

## 2024-02-28 PROCEDURE — 1111F DSCHRG MED/CURRENT MED MERGE: CPT | Performed by: INTERNAL MEDICINE

## 2024-02-28 PROCEDURE — 1036F TOBACCO NON-USER: CPT | Performed by: INTERNAL MEDICINE

## 2024-02-28 PROCEDURE — 1160F RVW MEDS BY RX/DR IN RCRD: CPT | Performed by: INTERNAL MEDICINE

## 2024-02-28 RX ORDER — CALCIUM CARBONATE 200(500)MG
500 TABLET,CHEWABLE ORAL 4 TIMES DAILY PRN
COMMUNITY
Start: 2024-02-05 | End: 2024-02-29 | Stop reason: ALTCHOICE

## 2024-02-28 RX ORDER — TORSEMIDE 20 MG/1
20 TABLET ORAL DAILY
Qty: 30 TABLET | Refills: 11 | Status: SHIPPED | OUTPATIENT
Start: 2024-02-28 | End: 2024-03-19 | Stop reason: SDUPTHER

## 2024-02-28 ASSESSMENT — PAIN SCALES - GENERAL: PAINLEVEL: 0-NO PAIN

## 2024-02-28 NOTE — ADDENDUM NOTE
Addended by: JOVITA EASTON on: 2/28/2024 03:43 PM     Modules accepted: Orders, Level of Service

## 2024-02-28 NOTE — PROGRESS NOTES
Subjective   Chief Complaint   Patient presents with    Hospital Follow-up     Mrs. Doyle presents to the office today for a hospital follow up from January 31, 2024 for N-STEMI.       83 female patient with a multiple comorbid condition history of hypertension hyperlipidemia.  Patient history of CAD, history of paroxysmal atrial fibrillation and sick sinus syndrome.  History of old CKD also.  Patient had echocardiogram done during last hospitalization ejection fraction was normal with a moderate to severe mitral regurgitation seen with the mild MR seen.  Patient had a status post PCI of right coronary done mid to distal RCA done with history of non-STEMI.  Patient had a recent BMP done essentially showed creatinine 1.6 which is baseline 2 weeks ago was about 1.5.  Potassium is 4.5. Patient here for routine evaluation.  Patient currently on Eliquis, aspirin, Lipitor, platelet therapy Plavix.  Patient was recently discharged from the hospital status post PCI of RCA with non-STEMI.  On and off leg edema with a short of breath with activity.  Currently on aspirin, Eliquis, Lipitor with the Plavix diltiazem losartan and Toprol.    Past Medical History:   Diagnosis Date    Angina pectoris (CMS/[facility]) 10/22/2023    Atherosclerosis of coronary artery 08/21/2019    Atherosclerotic heart disease of native coronary artery with other forms of angina pectoris (CMS/[facility]) 10/22/2023    Hypercholesterolemia 12/28/2018    Hyperlipidemia 10/22/2023    Presence of cardiac pacemaker 10/22/2023    Primary hypertension 12/28/2018    Shortness of breath 11/26/2019    Sick sinus syndrome (CMS/[facility]) 10/22/2023    Sinus bradycardia 10/22/2023    Stage 3a chronic kidney disease (CMS/[facility]) 11/25/2019     Past Surgical History:   Procedure Laterality Date    CARDIAC CATHETERIZATION N/A 02/02/2024    Procedure: Left Heart Cath;  Surgeon: Becky Vela MD;  Location: [facility];  Service: Cardiovascular;  Laterality: N/A;    CARDIAC  CATHETERIZATION N/A 02/02/2024    Procedure: PCI;  Surgeon: Becky Vela MD;  Location: [facility];  Service: Cardiovascular;  Laterality: N/A;    CARDIAC CATHETERIZATION N/A 02/08/2024    Procedure: Left Heart Cath;  Surgeon: Darrius Ibrahim MD;  Location: [facility];  Service: Cardiovascular;  Laterality: N/A;    CARDIAC CATHETERIZATION N/A 02/08/2024    Procedure: PCI;  Surgeon: Darrius Ibrahim MD;  Location: [facility];  Service: Cardiovascular;  Laterality: N/A;    PACEMAKER PLACEMENT  07/04/2019    [employer] PACEMAKE AND STENT PLACEMENT     No relevant family history has been documented for this patient.  Current Outpatient Medications   Medication Sig Dispense Refill    apixaban (Eliquis) 2.5 mg tablet Take 1 tablet (2.5 mg) by mouth 2 times a day. 60 tablet 3    aspirin 81 mg chewable tablet Chew 1 tablet (81 mg) once daily. Stop aspirin after 4 weeks Do not start before February 15, 2024.      atorvastatin (Lipitor) 80 mg tablet Take 1 tablet (80 mg) by mouth once daily at bedtime. 30 tablet 3    cholecalciferol (Vitamin D-3) 50 MCG (2000 UT) tablet Take 1 tablet (2,000 Units) by mouth once daily.      clopidogrel (Plavix) 75 mg tablet Take 1 tablet (75 mg) by mouth once daily. Do not start before February 15, 2024. 30 tablet 3    dilTIAZem (Cardizem) 120 mg immediate release tablet Take 1 tablet (120 mg) by mouth 2 times a day.      levothyroxine (Synthroid, Levoxyl) 100 mcg tablet Take 1 tablet (100 mcg) by mouth once daily. 1 tablet in the morning on an empty stomach Orally Once a day for 30 day(s)      losartan (Cozaar) 25 mg tablet Take 0.5 tablets (12.5 mg) by mouth once daily at bedtime. 30 tablet 2    metoprolol succinate XL (Toprol-XL) 25 mg 24 hr tablet Take 1 tablet (25 mg) by mouth 2 times a day.      multivitamin (MULTIPLE VITAMINS ORAL) Take 1 capsule by mouth once daily.      omeprazole (PriLOSEC) 20 mg DR capsule Take 1 capsule (20 mg) by mouth once daily.    "   sertraline (Zoloft) 100 mg tablet Take 1.5 tablets (150 mg) by mouth once daily.       No current facility-administered medications for this visit.      reports that she has never smoked. She has never been exposed to tobacco smoke. She has never used smokeless tobacco. She reports that she does not drink alcohol and does not use drugs.  Iodinated contrast media  Presence of cardiac pacemaker    Vitals:    02/28/24 1502   BP: 118/64   Pulse: 60   Resp: 18   Temp: 37 °C (98.6 °F)   SpO2: 95%   Weight: 66.7 kg (147 lb)   Height: 1.651 m (5' 5\")   PainSc: 0-No pain      BMI:Body mass index is 24.46 kg/m².   General Cardiology:  General Appearance: Alert, oriented and in no acute distress.  HEENT: extra ocular movements intact (EOMI), pupils equal,  round, reactive to light and accommodation (PERRLA).  Carotid Upstroke: no bruit, normal.  Jugular Venous Distention (JVD): flat.  Chest: normal.  Lungs: Clear to auscultation,   Heart Sounds: no S3 or S4, normal S1, S2, regular rate.  Murmur, Click, Gallop: soft systolic murmur.  Abdomen: no hepatomegaly, no masses felt, soft.  Extremities: 2+ leg edema.  Peripheral pulses: 2 plus bilateral.  NEUROLOGY Cranial nerves II-XII grossly intact.     Patient Active Problem List   Diagnosis    Abnormal EKG    Presence of cardiac pacemaker    Neck pain    Contusion of left shoulder    Atherosclerotic heart disease of native coronary artery with other forms of angina pectoris (CMS/HCC)    Coronary arteriosclerosis    Atherosclerosis of coronary artery    Diverticulitis of large intestine    Recurrent falls    Hematoma    Hypothyroidism    Primary hypertension    Hypercholesterolemia    Sick sinus syndrome (CMS/HCC)    Sinus bradycardia    Disorder involving thrombocytopenia (CMS/HCC)    Anemia of chronic disease    Hyperkalemia    Left flank pain    Stage 3a chronic kidney disease (CMS/HCC)    Arthralgia of wrist    Candidiasis of skin    Common cold    Overweight with body mass " index (BMI) 25.0-29.9    Acute diastolic heart failure (CMS/HCC)       Problem List Items Addressed This Visit          Cardiac and Vasculature    Presence of cardiac pacemaker - Primary    Atherosclerotic heart disease of native coronary artery with other forms of angina pectoris (CMS/HCC)    Coronary arteriosclerosis    Atherosclerosis of coronary artery    Primary hypertension    Sick sinus syndrome (CMS/HCC)    Acute diastolic heart failure (CMS/HCC)      Patient as above history of paroxysmal atrial fibrillation, recently CAD with a non-STEMI  1.  Non-STEMI: Continue current guideline directed medical therapy continue current aspirin as well as a Plavix continue dual doublet therapy.  Patient creatinine 1.6 will discontinue losartan at the moment.  Probably better benefit from the low-dose diuretics due to ongoing short of breath more likely acute on chronic diastolic CHF class III.  2.  Diastolic CHF: Continue current rate control medication as well as beta-blocker.  Add patient on low-dose diuretics.  3.  Atrial fibrillation: Continue current aspirin as well as Eliquis.  Also continue current diltiazem and Toprol for rate control.  Advised patient continue only on Eliquis and Plavix.  Discontinue aspirin due to bruising.  Modify risk factor.  Add patient is on a low-dose of diuretics torsemide 20 mg tablet p.o. daily.  Advised patient to avoid lunch meats, canned soups, pizzas, bread rolls, and sandwiches. Advised patient to limit salt intake 1,500 mg daily. Advised patient to exercise 30 mins/3 times a week including treadmill or aerobic type, Goal to achieve 65% target HR.  Pt. care time is spent includes review of diagnostic tests, labs, radiographs, EKGs, old echoes, cardiac work-up and coordination of care. Assessment, impression and plans are reflected in the note above as well as the orders.    Darrius Ibrahim MD

## 2024-02-29 ENCOUNTER — LAB (OUTPATIENT)
Dept: LAB | Facility: LAB | Age: 84
End: 2024-02-29
Payer: MEDICARE

## 2024-02-29 ENCOUNTER — HOME CARE VISIT (OUTPATIENT)
Dept: HOME HEALTH SERVICES | Facility: HOME HEALTH | Age: 84
End: 2024-02-29
Payer: MEDICARE

## 2024-02-29 ENCOUNTER — TELEMEDICINE (OUTPATIENT)
Dept: PHARMACY | Facility: HOSPITAL | Age: 84
End: 2024-02-29
Payer: MEDICARE

## 2024-02-29 VITALS
HEART RATE: 76 BPM | DIASTOLIC BLOOD PRESSURE: 70 MMHG | SYSTOLIC BLOOD PRESSURE: 132 MMHG | OXYGEN SATURATION: 92 % | RESPIRATION RATE: 20 BRPM

## 2024-02-29 DIAGNOSIS — I25.118 ATHEROSCLEROSIS OF NATIVE CORONARY ARTERY OF NATIVE HEART WITH STABLE ANGINA PECTORIS (CMS-HCC): ICD-10-CM

## 2024-02-29 DIAGNOSIS — N30.00 ACUTE CYSTITIS WITHOUT HEMATURIA: ICD-10-CM

## 2024-02-29 DIAGNOSIS — N18.31 STAGE 3A CHRONIC KIDNEY DISEASE (MULTI): ICD-10-CM

## 2024-02-29 DIAGNOSIS — D64.9 ANEMIA, UNSPECIFIED TYPE: ICD-10-CM

## 2024-02-29 LAB
ALBUMIN SERPL-MCNC: 4.1 G/DL (ref 3.5–5)
ANION GAP SERPL CALC-SCNC: 13 MMOL/L
APPEARANCE UR: CLEAR
BASOPHILS # BLD AUTO: 0.05 X10*3/UL (ref 0–0.1)
BASOPHILS NFR BLD AUTO: 1 %
BILIRUB UR STRIP.AUTO-MCNC: NEGATIVE MG/DL
BUN SERPL-MCNC: 12 MG/DL (ref 8–25)
CALCIUM SERPL-MCNC: 9.4 MG/DL (ref 8.5–10.4)
CHLORIDE SERPL-SCNC: 108 MMOL/L (ref 97–107)
CO2 SERPL-SCNC: 24 MMOL/L (ref 24–31)
COLOR UR: YELLOW
CREAT SERPL-MCNC: 1.3 MG/DL (ref 0.4–1.6)
EGFRCR SERPLBLD CKD-EPI 2021: 40 ML/MIN/1.73M*2
EOSINOPHIL # BLD AUTO: 0.17 X10*3/UL (ref 0–0.4)
EOSINOPHIL NFR BLD AUTO: 3.5 %
ERYTHROCYTE [DISTWIDTH] IN BLOOD BY AUTOMATED COUNT: 17.9 % (ref 11.5–14.5)
GLUCOSE SERPL-MCNC: 120 MG/DL (ref 65–99)
GLUCOSE UR STRIP.AUTO-MCNC: NEGATIVE MG/DL
HCT VFR BLD AUTO: 31 % (ref 36–46)
HGB BLD-MCNC: 9.2 G/DL (ref 12–16)
HOLD SPECIMEN: NORMAL
IMM GRANULOCYTES # BLD AUTO: 0.02 X10*3/UL (ref 0–0.5)
IMM GRANULOCYTES NFR BLD AUTO: 0.4 % (ref 0–0.9)
KETONES UR STRIP.AUTO-MCNC: NEGATIVE MG/DL
LEUKOCYTE ESTERASE UR QL STRIP.AUTO: ABNORMAL
LYMPHOCYTES # BLD AUTO: 0.9 X10*3/UL (ref 0.8–3)
LYMPHOCYTES NFR BLD AUTO: 18.3 %
MCH RBC QN AUTO: 29.2 PG (ref 26–34)
MCHC RBC AUTO-ENTMCNC: 29.7 G/DL (ref 32–36)
MCV RBC AUTO: 98 FL (ref 80–100)
MONOCYTES # BLD AUTO: 0.44 X10*3/UL (ref 0.05–0.8)
MONOCYTES NFR BLD AUTO: 9 %
NEUTROPHILS # BLD AUTO: 3.33 X10*3/UL (ref 1.6–5.5)
NEUTROPHILS NFR BLD AUTO: 67.8 %
NITRITE UR QL STRIP.AUTO: NEGATIVE
NRBC BLD-RTO: 0 /100 WBCS (ref 0–0)
PH UR STRIP.AUTO: 5 [PH]
PHOSPHATE SERPL-MCNC: 3 MG/DL (ref 2.5–4.5)
PLATELET # BLD AUTO: 195 X10*3/UL (ref 150–450)
POTASSIUM SERPL-SCNC: 3.9 MMOL/L (ref 3.4–5.1)
PROT UR STRIP.AUTO-MCNC: NEGATIVE MG/DL
RBC # BLD AUTO: 3.15 X10*6/UL (ref 4–5.2)
RBC # UR STRIP.AUTO: ABNORMAL /UL
RBC #/AREA URNS AUTO: NORMAL /HPF
SODIUM SERPL-SCNC: 145 MMOL/L (ref 133–145)
SP GR UR STRIP.AUTO: 1.01
UROBILINOGEN UR STRIP.AUTO-MCNC: 0.2 MG/DL
WBC # BLD AUTO: 4.9 X10*3/UL (ref 4.4–11.3)
WBC #/AREA URNS AUTO: NORMAL /HPF

## 2024-02-29 PROCEDURE — 36415 COLL VENOUS BLD VENIPUNCTURE: CPT

## 2024-02-29 PROCEDURE — 1090000002 HH PPS REVENUE DEBIT

## 2024-02-29 PROCEDURE — 1090000001 HH PPS REVENUE CREDIT

## 2024-02-29 PROCEDURE — G0157 HHC PT ASSISTANT EA 15: HCPCS | Mod: HHH

## 2024-02-29 PROCEDURE — 80069 RENAL FUNCTION PANEL: CPT

## 2024-02-29 PROCEDURE — 81001 URINALYSIS AUTO W/SCOPE: CPT

## 2024-02-29 PROCEDURE — 87086 URINE CULTURE/COLONY COUNT: CPT

## 2024-02-29 PROCEDURE — 85025 COMPLETE CBC W/AUTO DIFF WBC: CPT

## 2024-02-29 SDOH — ECONOMIC STABILITY: HOUSING INSECURITY: HOME SAFETY: PATIENT ALLOWED REMOVAL OF THROW RUGS IN DINING ROOM , KITCHEN RUGS REMAIN RUBBER BACKED

## 2024-02-29 ASSESSMENT — ENCOUNTER SYMPTOMS
DENIES PAIN: 1
PERSON REPORTING PAIN: PATIENT

## 2024-02-29 NOTE — PROGRESS NOTES
Please review for internal [employer] (VAF) eligibility. Prescriptions have been sent to [facility].     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  Household size: 1  Financial Documents To Be Collected By: Documents attached to email  Medication(s):    Eliquis 2.5mg    *I have confirmed the above medications are listed on the current [facility] formulary: [email or web address]    I acknowledge the [facility] staff will further reach out to the patient to confirm additional details as needed, including but not limited to collecting financial documentation, confirming delivery information, obtaining payment method for non-VAF medications.      Marcelle Johnson, [facility]

## 2024-02-29 NOTE — PROGRESS NOTES
MEDICATION MANAGEMENT    Linette Doyle is a 83 y.o. female who was referred by Maritza Do MD to complete medication reconciliation and review with the clinical pharmacist.  A comprehensive medication review was completed with the patient and her brother, Ede Bae, via telephone today.  Patient reports financial barrier with current medication.      MEDICATION HISTORY  Allergies   Allergen Reactions    Iodinated Contrast Media Anaphylaxis     Current Outpatient Medications on File Prior to Visit   Medication Sig Dispense Refill    apixaban (Eliquis) 2.5 mg tablet Take 1 tablet (2.5 mg) by mouth 2 times a day. 60 tablet 3    aspirin 81 mg chewable tablet Chew 1 tablet (81 mg) once daily. Stop aspirin after 4 weeks Do not start before February 15, 2024.      atorvastatin (Lipitor) 80 mg tablet Take 1 tablet (80 mg) by mouth once daily at bedtime. 30 tablet 3    cholecalciferol (Vitamin D-3) 50 MCG (2000 UT) tablet Take 1 tablet (2,000 Units) by mouth once daily.      clopidogrel (Plavix) 75 mg tablet Take 1 tablet (75 mg) by mouth once daily. Do not start before February 15, 2024. 30 tablet 3    dilTIAZem (Cardizem) 120 mg immediate release tablet Take 1 tablet (120 mg) by mouth 2 times a day.      levothyroxine (Synthroid, Levoxyl) 100 mcg tablet Take 1 tablet (100 mcg) by mouth once daily. 1 tablet in the morning on an empty stomach Orally Once a day for 30 day(s)      metoprolol succinate XL (Toprol-XL) 25 mg 24 hr tablet Take 1 tablet (25 mg) by mouth 2 times a day.      multivitamin (MULTIPLE VITAMINS ORAL) Take 1 capsule by mouth once daily.      omeprazole (PriLOSEC) 20 mg DR capsule Take 1 capsule (20 mg) by mouth once daily.      sertraline (Zoloft) 100 mg tablet Take 1.5 tablets (150 mg) by mouth once daily.      torsemide (Demadex) 20 mg tablet Take 1 tablet (20 mg) by mouth once daily. 30 tablet 11    [DISCONTINUED] benzocaine-menthol (Cepastat Sore Throat) 15-3.6 mg lozenge Dissolve 1 lozenge  in the mouth every 2 hours if needed for sore throat.      [DISCONTINUED] calcium carbonate (Tums) 200 mg calcium chewable tablet Chew 1 tablet (500 mg) 4 times a day as needed.      [DISCONTINUED] losartan (Cozaar) 25 mg tablet Take 0.5 tablets (12.5 mg) by mouth once daily at bedtime. 30 tablet 2    [DISCONTINUED] nitrofurantoin, macrocrystal-monohydrate, (Macrobid) 100 mg capsule Take 1 capsule (100 mg) by mouth 2 times a day. (Patient not taking: Reported on 2/28/2024) 14 capsule 0     No current facility-administered medications on file prior to visit.        Patient Assistance Screening (VAF)  Patient verbally reports monthly or yearly income which is less than 400% federal poverty level.  Application for program has been submitted for the following medications: Eliquis   Patient has been informed that program team will be reaching out to them to discuss necessary documentation, instructed to answer phone/return voicemail.   Patient aware this process may take up to 6 weeks.   If approved medication must be filled through [facility] pharmacy and may be picked up or mailed to patient.       LABS  LMP  (LMP Unknown)    Lab Results   Component Value Date    HGBA1C 6.0 (H) 02/01/2024    HGBA1C 5.6 07/05/2023    HGBA1C 5.7 12/14/2022     Lab Results   Component Value Date    BILITOT 0.7 02/14/2024    CALCIUM 8.9 02/14/2024    CO2 23 (L) 02/14/2024     02/14/2024    CREATININE 1.60 02/14/2024    GLUCOSE 133 (H) 02/14/2024    ALKPHOS 74 02/14/2024    K 4.5 02/14/2024    PROT 6.0 02/14/2024     02/14/2024    AST 15 02/14/2024    ALT 22 02/14/2024    BUN 20 02/14/2024    ANIONGAP 11 02/14/2024    MG 2.30 02/06/2024    PHOS 3.7 12/22/2021    ALBUMIN 3.4 (L) 02/14/2024     Lab Results   Component Value Date    TRIG 188 (H) 07/05/2023    CHOL 138 07/05/2023    LDLCALC 57 (L) 07/05/2023    HDL 43 (L) 07/05/2023         Assessment/Plan    Comments/Recommendations to PCP:  Reconciled medications with patient  via telephone today.  Reviewed instructions, MOA, SE and dose for Eliquis.  Pt denies SE with medication.  Patient verbalizes understanding regarding plan of care and all questions answered   4.   Pt will follow up with PCP as scheduled    Eliquis counseling:  -  Be sure to take ELIQUIS twice every day exactly as prescribed by your doctor. If you have difficulty swallowing the tablet whole, talk to your doctor about other ways to take ELIQUIS. Do not change your dose or stop taking ELIQUIS unless your doctor tells you to.  -  Take it either with or without food and store at room temperature. There are no restrictions or “watch-outs” for foods like green leafy vegetables  - If you miss a dose of ELIQUIS, take it as soon as you remember and do not take more than one dose at the same time.   -   Your doctor will decide how long you should take ELIQUIS. Don’t change your dose or stop taking ELIQUIS without first talking with your doctor. If you are taking ELIQUIS for atrial fibrillation, stopping ELIQUIS may increase your risk of having a stroke.  -  Do not run out of ELIQUIS. Refill your prescription before you run out.           Marcelle Johnson, [facility]    Verbal consent to manage patient's drug therapy was obtained from the patient and/or an individual authorized to act on behalf of a patient. They were informed they may decline to participate or withdraw from participation in pharmacy services at any time.

## 2024-03-01 LAB — BACTERIA UR CULT: ABNORMAL

## 2024-03-01 PROCEDURE — 1090000001 HH PPS REVENUE CREDIT

## 2024-03-01 PROCEDURE — 1090000002 HH PPS REVENUE DEBIT

## 2024-03-02 ENCOUNTER — HOME CARE VISIT (OUTPATIENT)
Dept: HOME HEALTH SERVICES | Facility: HOME HEALTH | Age: 84
End: 2024-03-02
Payer: MEDICARE

## 2024-03-02 VITALS
DIASTOLIC BLOOD PRESSURE: 78 MMHG | TEMPERATURE: 95.6 F | HEART RATE: 64 BPM | OXYGEN SATURATION: 99 % | SYSTOLIC BLOOD PRESSURE: 130 MMHG | RESPIRATION RATE: 16 BRPM

## 2024-03-02 PROCEDURE — G0300 HHS/HOSPICE OF LPN EA 15 MIN: HCPCS | Mod: HHH

## 2024-03-02 PROCEDURE — 1090000002 HH PPS REVENUE DEBIT

## 2024-03-02 PROCEDURE — 1090000001 HH PPS REVENUE CREDIT

## 2024-03-02 ASSESSMENT — ENCOUNTER SYMPTOMS
LOWER EXTREMITY EDEMA: 1
CHANGE IN APPETITE: UNCHANGED
APPETITE LEVEL: GOOD
FORGETFULNESS: 1
DESCRIPTION OF MEMORY LOSS: SHORT TERM

## 2024-03-03 ENCOUNTER — TELEPHONE (OUTPATIENT)
Dept: CARDIOLOGY | Facility: CLINIC | Age: 84
End: 2024-03-03
Payer: MEDICARE

## 2024-03-03 PROCEDURE — 1090000001 HH PPS REVENUE CREDIT

## 2024-03-03 PROCEDURE — 1090000002 HH PPS REVENUE DEBIT

## 2024-03-04 PROCEDURE — 1090000001 HH PPS REVENUE CREDIT

## 2024-03-04 PROCEDURE — 1090000002 HH PPS REVENUE DEBIT

## 2024-03-04 NOTE — TELEPHONE ENCOUNTER
Patient called the office wanting to ask if there are any restrictions that she has for activities that may hurt her heart after her heart attack. Please advise.

## 2024-03-05 ENCOUNTER — HOME CARE VISIT (OUTPATIENT)
Dept: HOME HEALTH SERVICES | Facility: HOME HEALTH | Age: 84
End: 2024-03-05
Payer: MEDICARE

## 2024-03-05 ENCOUNTER — TELEPHONE (OUTPATIENT)
Dept: PRIMARY CARE | Facility: CLINIC | Age: 84
End: 2024-03-05
Payer: MEDICARE

## 2024-03-05 VITALS — SYSTOLIC BLOOD PRESSURE: 124 MMHG | RESPIRATION RATE: 18 BRPM | DIASTOLIC BLOOD PRESSURE: 52 MMHG | HEART RATE: 80 BPM

## 2024-03-05 VITALS
OXYGEN SATURATION: 94 % | HEART RATE: 70 BPM | SYSTOLIC BLOOD PRESSURE: 130 MMHG | DIASTOLIC BLOOD PRESSURE: 78 MMHG | RESPIRATION RATE: 18 BRPM | TEMPERATURE: 97.2 F

## 2024-03-05 PROCEDURE — 1090000002 HH PPS REVENUE DEBIT

## 2024-03-05 PROCEDURE — 1090000001 HH PPS REVENUE CREDIT

## 2024-03-05 PROCEDURE — G0299 HHS/HOSPICE OF RN EA 15 MIN: HCPCS | Mod: HHH

## 2024-03-05 PROCEDURE — G0157 HHC PT ASSISTANT EA 15: HCPCS | Mod: HHH

## 2024-03-05 SDOH — ECONOMIC STABILITY: GENERAL

## 2024-03-05 ASSESSMENT — ENCOUNTER SYMPTOMS
APPETITE LEVEL: GOOD
DENIES PAIN: 1
PERSON REPORTING PAIN: PATIENT
DENIES PAIN: 1

## 2024-03-05 ASSESSMENT — ACTIVITIES OF DAILY LIVING (ADL)
AMBULATION ASSISTANCE: STAND BY ASSIST
CURRENT_FUNCTION: STAND BY ASSIST

## 2024-03-05 NOTE — TELEPHONE ENCOUNTER
PATIENT'S HOME NURSE, SHANICE, CALLED WITH QUESTIONS ABOUT THE ELOQUIS.  PATIENT SAID THE RX IS OVER $600 BUT THAT YOU WERE GOING TO HELP HER SIGN UP FOR DISCOUNT.  SHE WONDERED IF THAT WAS THE CASE.  ALSO THE MOST RECENT RX WAS SENT TO [facility] AND THAT IS TOO FAR FOR PATIENT.  THEY WOULD LIKE IT SENT TO [facility].  PATIENT HAS ENOUGH FOR THIS WEEK AND NEXT WEEK

## 2024-03-06 PROCEDURE — 1090000001 HH PPS REVENUE CREDIT

## 2024-03-06 PROCEDURE — 1090000002 HH PPS REVENUE DEBIT

## 2024-03-07 ENCOUNTER — HOME CARE VISIT (OUTPATIENT)
Dept: HOME HEALTH SERVICES | Facility: HOME HEALTH | Age: 84
End: 2024-03-07
Payer: MEDICARE

## 2024-03-07 PROCEDURE — 1090000001 HH PPS REVENUE CREDIT

## 2024-03-07 PROCEDURE — G0157 HHC PT ASSISTANT EA 15: HCPCS | Mod: HHH

## 2024-03-07 PROCEDURE — 1090000002 HH PPS REVENUE DEBIT

## 2024-03-08 VITALS — HEART RATE: 60 BPM | DIASTOLIC BLOOD PRESSURE: 68 MMHG | SYSTOLIC BLOOD PRESSURE: 114 MMHG | RESPIRATION RATE: 18 BRPM

## 2024-03-08 PROCEDURE — 1090000001 HH PPS REVENUE CREDIT

## 2024-03-08 PROCEDURE — 1090000002 HH PPS REVENUE DEBIT

## 2024-03-08 ASSESSMENT — ENCOUNTER SYMPTOMS
PERSON REPORTING PAIN: PATIENT
DENIES PAIN: 1

## 2024-03-08 NOTE — TELEPHONE ENCOUNTER
I have made 2 calls to the patient to try and give her Dr. Ibrahim's response but she does not answer and does not have a vm set up.

## 2024-03-09 PROCEDURE — 1090000002 HH PPS REVENUE DEBIT

## 2024-03-09 PROCEDURE — 1090000001 HH PPS REVENUE CREDIT

## 2024-03-10 PROCEDURE — 1090000001 HH PPS REVENUE CREDIT

## 2024-03-10 PROCEDURE — 1090000002 HH PPS REVENUE DEBIT

## 2024-03-11 ENCOUNTER — HOME CARE VISIT (OUTPATIENT)
Dept: HOME HEALTH SERVICES | Facility: HOME HEALTH | Age: 84
End: 2024-03-11
Payer: MEDICARE

## 2024-03-11 VITALS
TEMPERATURE: 95.8 F | BODY MASS INDEX: 22.13 KG/M2 | SYSTOLIC BLOOD PRESSURE: 150 MMHG | HEART RATE: 64 BPM | OXYGEN SATURATION: 93 % | RESPIRATION RATE: 20 BRPM | WEIGHT: 133 LBS | DIASTOLIC BLOOD PRESSURE: 72 MMHG

## 2024-03-11 PROCEDURE — 1090000002 HH PPS REVENUE DEBIT

## 2024-03-11 PROCEDURE — 1090000001 HH PPS REVENUE CREDIT

## 2024-03-11 PROCEDURE — G0300 HHS/HOSPICE OF LPN EA 15 MIN: HCPCS | Mod: HHH

## 2024-03-11 ASSESSMENT — ENCOUNTER SYMPTOMS
DENIES PAIN: 1
CHANGE IN APPETITE: UNCHANGED
APPETITE LEVEL: FAIR
DYSPNEA ACTIVITY LEVEL: AFTER AMBULATING 10 - 20 FT
SHORTNESS OF BREATH: 1
DESCRIPTION OF MEMORY LOSS: SHORT TERM

## 2024-03-12 ENCOUNTER — HOME CARE VISIT (OUTPATIENT)
Dept: HOME HEALTH SERVICES | Facility: HOME HEALTH | Age: 84
End: 2024-03-12
Payer: MEDICARE

## 2024-03-12 VITALS — HEART RATE: 60 BPM | RESPIRATION RATE: 18 BRPM | SYSTOLIC BLOOD PRESSURE: 134 MMHG | DIASTOLIC BLOOD PRESSURE: 68 MMHG

## 2024-03-12 PROCEDURE — 1090000001 HH PPS REVENUE CREDIT

## 2024-03-12 PROCEDURE — G0157 HHC PT ASSISTANT EA 15: HCPCS | Mod: HHH

## 2024-03-12 PROCEDURE — 1090000002 HH PPS REVENUE DEBIT

## 2024-03-12 ASSESSMENT — ENCOUNTER SYMPTOMS
DENIES PAIN: 1
PERSON REPORTING PAIN: PATIENT

## 2024-03-13 ENCOUNTER — APPOINTMENT (OUTPATIENT)
Dept: HOME HEALTH SERVICES | Facility: HOME HEALTH | Age: 84
End: 2024-03-13
Payer: MEDICARE

## 2024-03-13 ENCOUNTER — TELEPHONE (OUTPATIENT)
Dept: PRIMARY CARE | Facility: CLINIC | Age: 84
End: 2024-03-13
Payer: MEDICARE

## 2024-03-13 PROCEDURE — 1090000002 HH PPS REVENUE DEBIT

## 2024-03-13 PROCEDURE — 1090000001 HH PPS REVENUE CREDIT

## 2024-03-14 ENCOUNTER — PATIENT OUTREACH (OUTPATIENT)
Dept: PRIMARY CARE | Facility: CLINIC | Age: 84
End: 2024-03-14
Payer: MEDICARE

## 2024-03-14 ENCOUNTER — HOME CARE VISIT (OUTPATIENT)
Dept: HOME HEALTH SERVICES | Facility: HOME HEALTH | Age: 84
End: 2024-03-14
Payer: MEDICARE

## 2024-03-14 VITALS
OXYGEN SATURATION: 95 % | RESPIRATION RATE: 18 BRPM | SYSTOLIC BLOOD PRESSURE: 130 MMHG | DIASTOLIC BLOOD PRESSURE: 78 MMHG | HEART RATE: 60 BPM | TEMPERATURE: 97.2 F

## 2024-03-14 PROCEDURE — 1090000002 HH PPS REVENUE DEBIT

## 2024-03-14 PROCEDURE — 1090000001 HH PPS REVENUE CREDIT

## 2024-03-14 PROCEDURE — G0299 HHS/HOSPICE OF RN EA 15 MIN: HCPCS | Mod: HHH

## 2024-03-14 SDOH — ECONOMIC STABILITY: GENERAL

## 2024-03-14 ASSESSMENT — ACTIVITIES OF DAILY LIVING (ADL)
CURRENT_FUNCTION: STAND BY ASSIST
AMBULATION ASSISTANCE: STAND BY ASSIST

## 2024-03-14 ASSESSMENT — ENCOUNTER SYMPTOMS
APPETITE LEVEL: GOOD
DENIES PAIN: 1

## 2024-03-15 PROCEDURE — 1090000002 HH PPS REVENUE DEBIT

## 2024-03-15 PROCEDURE — 1090000001 HH PPS REVENUE CREDIT

## 2024-03-16 PROCEDURE — 1090000002 HH PPS REVENUE DEBIT

## 2024-03-16 PROCEDURE — 1090000001 HH PPS REVENUE CREDIT

## 2024-03-16 PROCEDURE — 1090000003 HH PPS REVENUE ADJ

## 2024-03-17 PROCEDURE — 1090000001 HH PPS REVENUE CREDIT

## 2024-03-17 PROCEDURE — 1090000002 HH PPS REVENUE DEBIT

## 2024-03-18 PROCEDURE — 1090000001 HH PPS REVENUE CREDIT

## 2024-03-18 PROCEDURE — 1090000002 HH PPS REVENUE DEBIT

## 2024-03-19 ENCOUNTER — TELEPHONE (OUTPATIENT)
Dept: CARDIOLOGY | Facility: CLINIC | Age: 84
End: 2024-03-19
Payer: MEDICARE

## 2024-03-19 ENCOUNTER — HOME CARE VISIT (OUTPATIENT)
Dept: HOME HEALTH SERVICES | Facility: HOME HEALTH | Age: 84
End: 2024-03-19
Payer: MEDICARE

## 2024-03-19 VITALS
TEMPERATURE: 97.5 F | SYSTOLIC BLOOD PRESSURE: 120 MMHG | OXYGEN SATURATION: 98 % | HEART RATE: 60 BPM | RESPIRATION RATE: 18 BRPM | DIASTOLIC BLOOD PRESSURE: 62 MMHG

## 2024-03-19 DIAGNOSIS — I25.118 ATHEROSCLEROTIC HEART DISEASE OF NATIVE CORONARY ARTERY WITH OTHER FORMS OF ANGINA PECTORIS (CMS-HCC): ICD-10-CM

## 2024-03-19 DIAGNOSIS — I25.118 ATHEROSCLEROSIS OF NATIVE CORONARY ARTERY OF NATIVE HEART WITH STABLE ANGINA PECTORIS (CMS-HCC): ICD-10-CM

## 2024-03-19 DIAGNOSIS — Z95.0 PRESENCE OF CARDIAC PACEMAKER: Primary | ICD-10-CM

## 2024-03-19 DIAGNOSIS — I49.5 SICK SINUS SYNDROME (MULTI): ICD-10-CM

## 2024-03-19 DIAGNOSIS — I50.31 ACUTE DIASTOLIC HEART FAILURE (MULTI): ICD-10-CM

## 2024-03-19 DIAGNOSIS — I10 PRIMARY HYPERTENSION: ICD-10-CM

## 2024-03-19 DIAGNOSIS — I25.10 CORONARY ARTERIOSCLEROSIS: ICD-10-CM

## 2024-03-19 PROCEDURE — 1090000002 HH PPS REVENUE DEBIT

## 2024-03-19 PROCEDURE — G0299 HHS/HOSPICE OF RN EA 15 MIN: HCPCS | Mod: HHH

## 2024-03-19 PROCEDURE — 0023 HH SOC

## 2024-03-19 PROCEDURE — 1090000001 HH PPS REVENUE CREDIT

## 2024-03-19 RX ORDER — TORSEMIDE 20 MG/1
10 TABLET ORAL DAILY
Qty: 15 TABLET | Refills: 11 | Status: SHIPPED | OUTPATIENT
Start: 2024-03-19 | End: 2024-03-19 | Stop reason: SDUPTHER

## 2024-03-19 RX ORDER — TORSEMIDE 20 MG/1
10 TABLET ORAL DAILY
Qty: 15 TABLET | Refills: 11 | Status: SHIPPED
Start: 2024-03-19 | End: 2024-04-11 | Stop reason: HOSPADM

## 2024-03-19 SDOH — ECONOMIC STABILITY: GENERAL

## 2024-03-19 ASSESSMENT — ENCOUNTER SYMPTOMS
APPETITE LEVEL: GOOD
DENIES PAIN: 1

## 2024-03-19 ASSESSMENT — ACTIVITIES OF DAILY LIVING (ADL)
CURRENT_FUNCTION: STAND BY ASSIST
AMBULATION ASSISTANCE: STAND BY ASSIST
MONEY MANAGEMENT (EXPENSES/BILLS): NEEDS ASSISTANCE

## 2024-03-19 NOTE — TELEPHONE ENCOUNTER
Pt is calling in to receive advice concerning her medication Torsemide. Pt states that she has hospital discharge papers that say once swelling has gone stop taking. Her nurse would like for her to get a refill. Pt will like to know how to proceed.

## 2024-03-20 PROCEDURE — 1090000002 HH PPS REVENUE DEBIT

## 2024-03-20 PROCEDURE — 1090000001 HH PPS REVENUE CREDIT

## 2024-03-21 PROCEDURE — 1090000002 HH PPS REVENUE DEBIT

## 2024-03-21 PROCEDURE — 1090000001 HH PPS REVENUE CREDIT

## 2024-03-22 PROCEDURE — 1090000001 HH PPS REVENUE CREDIT

## 2024-03-22 PROCEDURE — 1090000002 HH PPS REVENUE DEBIT

## 2024-03-23 PROCEDURE — 1090000001 HH PPS REVENUE CREDIT

## 2024-03-23 PROCEDURE — 1090000002 HH PPS REVENUE DEBIT

## 2024-03-24 PROCEDURE — 1090000002 HH PPS REVENUE DEBIT

## 2024-03-24 PROCEDURE — 1090000001 HH PPS REVENUE CREDIT

## 2024-03-25 ENCOUNTER — HOME CARE VISIT (OUTPATIENT)
Dept: HOME HEALTH SERVICES | Facility: HOME HEALTH | Age: 84
End: 2024-03-25
Payer: MEDICARE

## 2024-03-25 VITALS
OXYGEN SATURATION: 100 % | RESPIRATION RATE: 18 BRPM | DIASTOLIC BLOOD PRESSURE: 72 MMHG | TEMPERATURE: 96.2 F | HEART RATE: 96 BPM | SYSTOLIC BLOOD PRESSURE: 128 MMHG

## 2024-03-25 PROCEDURE — 1090000002 HH PPS REVENUE DEBIT

## 2024-03-25 PROCEDURE — 1090000001 HH PPS REVENUE CREDIT

## 2024-03-25 PROCEDURE — G0300 HHS/HOSPICE OF LPN EA 15 MIN: HCPCS | Mod: HHH

## 2024-03-25 ASSESSMENT — ENCOUNTER SYMPTOMS
CHANGE IN APPETITE: UNCHANGED
APPETITE LEVEL: GOOD
DENIES PAIN: 1
DESCRIPTION OF MEMORY LOSS: SHORT TERM

## 2024-03-26 PROCEDURE — 1090000002 HH PPS REVENUE DEBIT

## 2024-03-26 PROCEDURE — 1090000001 HH PPS REVENUE CREDIT

## 2024-03-27 PROCEDURE — 1090000001 HH PPS REVENUE CREDIT

## 2024-03-27 PROCEDURE — 1090000002 HH PPS REVENUE DEBIT

## 2024-03-28 PROCEDURE — 1090000002 HH PPS REVENUE DEBIT

## 2024-03-28 PROCEDURE — 1090000001 HH PPS REVENUE CREDIT

## 2024-03-29 PROCEDURE — 1090000001 HH PPS REVENUE CREDIT

## 2024-03-29 PROCEDURE — 1090000002 HH PPS REVENUE DEBIT

## 2024-03-30 PROCEDURE — 1090000001 HH PPS REVENUE CREDIT

## 2024-03-30 PROCEDURE — 1090000002 HH PPS REVENUE DEBIT

## 2024-03-31 PROCEDURE — 1090000001 HH PPS REVENUE CREDIT

## 2024-03-31 PROCEDURE — 1090000002 HH PPS REVENUE DEBIT

## 2024-04-01 PROCEDURE — 1090000001 HH PPS REVENUE CREDIT

## 2024-04-01 PROCEDURE — 1090000002 HH PPS REVENUE DEBIT

## 2024-04-02 ENCOUNTER — HOME CARE VISIT (OUTPATIENT)
Dept: HOME HEALTH SERVICES | Facility: HOME HEALTH | Age: 84
End: 2024-04-02
Payer: MEDICARE

## 2024-04-02 ENCOUNTER — TELEPHONE (OUTPATIENT)
Dept: CARDIOLOGY | Facility: CLINIC | Age: 84
End: 2024-04-02
Payer: MEDICARE

## 2024-04-02 VITALS
HEART RATE: 72 BPM | SYSTOLIC BLOOD PRESSURE: 105 MMHG | TEMPERATURE: 97.2 F | OXYGEN SATURATION: 97 % | RESPIRATION RATE: 18 BRPM | DIASTOLIC BLOOD PRESSURE: 70 MMHG

## 2024-04-02 PROCEDURE — 1090000001 HH PPS REVENUE CREDIT

## 2024-04-02 PROCEDURE — G0299 HHS/HOSPICE OF RN EA 15 MIN: HCPCS | Mod: HHH

## 2024-04-02 PROCEDURE — 1090000002 HH PPS REVENUE DEBIT

## 2024-04-02 SDOH — ECONOMIC STABILITY: GENERAL

## 2024-04-02 ASSESSMENT — ACTIVITIES OF DAILY LIVING (ADL)
CURRENT_FUNCTION: STAND BY ASSIST
AMBULATION ASSISTANCE: STAND BY ASSIST

## 2024-04-02 ASSESSMENT — ENCOUNTER SYMPTOMS
DENIES PAIN: 1
SHORTNESS OF BREATH: 1
APPETITE LEVEL: GOOD

## 2024-04-03 PROCEDURE — 1090000001 HH PPS REVENUE CREDIT

## 2024-04-03 PROCEDURE — 1090000002 HH PPS REVENUE DEBIT

## 2024-04-04 PROCEDURE — 1090000001 HH PPS REVENUE CREDIT

## 2024-04-04 PROCEDURE — 1090000002 HH PPS REVENUE DEBIT

## 2024-04-04 NOTE — TELEPHONE ENCOUNTER
Patient called the office stating she continues to have sob and was unable to take her medication because she threw it up. I told her what you stated about the salt intake and she stated that she does eat those things often. She will try to limit as you instructed. I advised her to go to the ER if her breathing continues to get worse.

## 2024-04-05 ENCOUNTER — APPOINTMENT (OUTPATIENT)
Dept: RADIOLOGY | Facility: HOSPITAL | Age: 84
DRG: 308 | End: 2024-04-05
Payer: MEDICARE

## 2024-04-05 ENCOUNTER — HOME CARE VISIT (OUTPATIENT)
Dept: HOME HEALTH SERVICES | Facility: HOME HEALTH | Age: 84
End: 2024-04-05
Payer: MEDICARE

## 2024-04-05 ENCOUNTER — APPOINTMENT (OUTPATIENT)
Dept: CARDIOLOGY | Facility: HOSPITAL | Age: 84
DRG: 308 | End: 2024-04-05
Payer: MEDICARE

## 2024-04-05 ENCOUNTER — HOSPITAL ENCOUNTER (INPATIENT)
Facility: HOSPITAL | Age: 84
LOS: 6 days | Discharge: HOME HEALTH | DRG: 308 | End: 2024-04-11
Attending: EMERGENCY MEDICINE | Admitting: INTERNAL MEDICINE
Payer: MEDICARE

## 2024-04-05 DIAGNOSIS — R01.1 CARDIAC MURMUR, UNSPECIFIED: ICD-10-CM

## 2024-04-05 DIAGNOSIS — I50.31 ACUTE DIASTOLIC HEART FAILURE (MULTI): ICD-10-CM

## 2024-04-05 DIAGNOSIS — I48.0 PAROXYSMAL A-FIB (MULTI): Primary | ICD-10-CM

## 2024-04-05 DIAGNOSIS — I34.0 MITRAL VALVE INSUFFICIENCY, UNSPECIFIED ETIOLOGY: ICD-10-CM

## 2024-04-05 DIAGNOSIS — E66.3 OVERWEIGHT WITH BODY MASS INDEX (BMI) 25.0-29.9: ICD-10-CM

## 2024-04-05 DIAGNOSIS — R09.02 HYPOXEMIA: ICD-10-CM

## 2024-04-05 DIAGNOSIS — N18.31 STAGE 3A CHRONIC KIDNEY DISEASE (MULTI): ICD-10-CM

## 2024-04-05 DIAGNOSIS — I25.118 ATHEROSCLEROTIC HEART DISEASE OF NATIVE CORONARY ARTERY WITH OTHER FORMS OF ANGINA PECTORIS (CMS-HCC): ICD-10-CM

## 2024-04-05 DIAGNOSIS — I48.91 ATRIAL FIBRILLATION WITH RAPID VENTRICULAR RESPONSE (MULTI): ICD-10-CM

## 2024-04-05 LAB
ALBUMIN SERPL-MCNC: 4.4 G/DL (ref 3.5–5)
ALP BLD-CCNC: 96 U/L (ref 35–125)
ALT SERPL-CCNC: 28 U/L (ref 5–40)
ANION GAP SERPL CALC-SCNC: 17 MMOL/L
AST SERPL-CCNC: 33 U/L (ref 5–40)
BASOPHILS # BLD AUTO: 0.05 X10*3/UL (ref 0–0.1)
BASOPHILS NFR BLD AUTO: 0.5 %
BILIRUB SERPL-MCNC: 0.7 MG/DL (ref 0.1–1.2)
BUN SERPL-MCNC: 33 MG/DL (ref 8–25)
CALCIUM SERPL-MCNC: 9.7 MG/DL (ref 8.5–10.4)
CHLORIDE SERPL-SCNC: 99 MMOL/L (ref 97–107)
CO2 SERPL-SCNC: 24 MMOL/L (ref 24–31)
CREAT SERPL-MCNC: 1.9 MG/DL (ref 0.4–1.6)
EGFRCR SERPLBLD CKD-EPI 2021: 26 ML/MIN/1.73M*2
EOSINOPHIL # BLD AUTO: 0.09 X10*3/UL (ref 0–0.4)
EOSINOPHIL NFR BLD AUTO: 1 %
ERYTHROCYTE [DISTWIDTH] IN BLOOD BY AUTOMATED COUNT: 15.7 % (ref 11.5–14.5)
FLUAV RNA RESP QL NAA+PROBE: NOT DETECTED
FLUBV RNA RESP QL NAA+PROBE: NOT DETECTED
GLUCOSE SERPL-MCNC: 136 MG/DL (ref 65–99)
HCT VFR BLD AUTO: 32.1 % (ref 36–46)
HGB BLD-MCNC: 9.9 G/DL (ref 12–16)
IMM GRANULOCYTES # BLD AUTO: 0.06 X10*3/UL (ref 0–0.5)
IMM GRANULOCYTES NFR BLD AUTO: 0.6 % (ref 0–0.9)
LYMPHOCYTES # BLD AUTO: 1.31 X10*3/UL (ref 0.8–3)
LYMPHOCYTES NFR BLD AUTO: 14.1 %
MAGNESIUM SERPL-MCNC: 1.9 MG/DL (ref 1.6–3.1)
MCH RBC QN AUTO: 28.3 PG (ref 26–34)
MCHC RBC AUTO-ENTMCNC: 30.8 G/DL (ref 32–36)
MCV RBC AUTO: 92 FL (ref 80–100)
MONOCYTES # BLD AUTO: 0.66 X10*3/UL (ref 0.05–0.8)
MONOCYTES NFR BLD AUTO: 7.1 %
NEUTROPHILS # BLD AUTO: 7.13 X10*3/UL (ref 1.6–5.5)
NEUTROPHILS NFR BLD AUTO: 76.7 %
NRBC BLD-RTO: 0 /100 WBCS (ref 0–0)
PLATELET # BLD AUTO: 209 X10*3/UL (ref 150–450)
POTASSIUM SERPL-SCNC: 3.3 MMOL/L (ref 3.4–5.1)
PROT SERPL-MCNC: 7.2 G/DL (ref 5.9–7.9)
RBC # BLD AUTO: 3.5 X10*6/UL (ref 4–5.2)
SARS-COV-2 RNA RESP QL NAA+PROBE: NOT DETECTED
SODIUM SERPL-SCNC: 140 MMOL/L (ref 133–145)
TROPONIN T SERPL-MCNC: 45 NG/L
TROPONIN T SERPL-MCNC: 46 NG/L
WBC # BLD AUTO: 9.3 X10*3/UL (ref 4.4–11.3)

## 2024-04-05 PROCEDURE — 93005 ELECTROCARDIOGRAM TRACING: CPT

## 2024-04-05 PROCEDURE — 2500000002 HC RX 250 W HCPCS SELF ADMINISTERED DRUGS (ALT 637 FOR MEDICARE OP, ALT 636 FOR OP/ED): Performed by: INTERNAL MEDICINE

## 2024-04-05 PROCEDURE — 36415 COLL VENOUS BLD VENIPUNCTURE: CPT

## 2024-04-05 PROCEDURE — 99291 CRITICAL CARE FIRST HOUR: CPT

## 2024-04-05 PROCEDURE — 1090000001 HH PPS REVENUE CREDIT

## 2024-04-05 PROCEDURE — 84484 ASSAY OF TROPONIN QUANT: CPT

## 2024-04-05 PROCEDURE — 1090000002 HH PPS REVENUE DEBIT

## 2024-04-05 PROCEDURE — 85025 COMPLETE CBC W/AUTO DIFF WBC: CPT

## 2024-04-05 PROCEDURE — 2500000002 HC RX 250 W HCPCS SELF ADMINISTERED DRUGS (ALT 637 FOR MEDICARE OP, ALT 636 FOR OP/ED)

## 2024-04-05 PROCEDURE — 96374 THER/PROPH/DIAG INJ IV PUSH: CPT

## 2024-04-05 PROCEDURE — 2500000004 HC RX 250 GENERAL PHARMACY W/ HCPCS (ALT 636 FOR OP/ED)

## 2024-04-05 PROCEDURE — 96361 HYDRATE IV INFUSION ADD-ON: CPT

## 2024-04-05 PROCEDURE — 2500000004 HC RX 250 GENERAL PHARMACY W/ HCPCS (ALT 636 FOR OP/ED): Performed by: HOSPITALIST

## 2024-04-05 PROCEDURE — 2060000001 HC INTERMEDIATE ICU ROOM DAILY

## 2024-04-05 PROCEDURE — 83880 ASSAY OF NATRIURETIC PEPTIDE: CPT | Performed by: INTERNAL MEDICINE

## 2024-04-05 PROCEDURE — 80053 COMPREHEN METABOLIC PANEL: CPT

## 2024-04-05 PROCEDURE — 2500000001 HC RX 250 WO HCPCS SELF ADMINISTERED DRUGS (ALT 637 FOR MEDICARE OP): Performed by: INTERNAL MEDICINE

## 2024-04-05 PROCEDURE — 2500000004 HC RX 250 GENERAL PHARMACY W/ HCPCS (ALT 636 FOR OP/ED): Performed by: INTERNAL MEDICINE

## 2024-04-05 PROCEDURE — 71046 X-RAY EXAM CHEST 2 VIEWS: CPT | Performed by: RADIOLOGY

## 2024-04-05 PROCEDURE — 71046 X-RAY EXAM CHEST 2 VIEWS: CPT

## 2024-04-05 PROCEDURE — 94640 AIRWAY INHALATION TREATMENT: CPT

## 2024-04-05 PROCEDURE — 2500000005 HC RX 250 GENERAL PHARMACY W/O HCPCS: Performed by: EMERGENCY MEDICINE

## 2024-04-05 PROCEDURE — 87636 SARSCOV2 & INF A&B AMP PRB: CPT

## 2024-04-05 PROCEDURE — 96375 TX/PRO/DX INJ NEW DRUG ADDON: CPT

## 2024-04-05 PROCEDURE — 2500000005 HC RX 250 GENERAL PHARMACY W/O HCPCS

## 2024-04-05 PROCEDURE — 83735 ASSAY OF MAGNESIUM: CPT

## 2024-04-05 PROCEDURE — 96376 TX/PRO/DX INJ SAME DRUG ADON: CPT

## 2024-04-05 RX ORDER — DILTIAZEM HCL/D5W 125 MG/125
15 PLASTIC BAG, INJECTION (ML) INTRAVENOUS CONTINUOUS
Status: DISPENSED | OUTPATIENT
Start: 2024-04-05 | End: 2024-04-06

## 2024-04-05 RX ORDER — PANTOPRAZOLE SODIUM 40 MG/1
40 TABLET, DELAYED RELEASE ORAL
Status: DISCONTINUED | OUTPATIENT
Start: 2024-04-06 | End: 2024-04-11 | Stop reason: HOSPADM

## 2024-04-05 RX ORDER — METOPROLOL SUCCINATE 25 MG/1
25 TABLET, EXTENDED RELEASE ORAL 2 TIMES DAILY
Status: DISCONTINUED | OUTPATIENT
Start: 2024-04-05 | End: 2024-04-06

## 2024-04-05 RX ORDER — METOPROLOL TARTRATE 1 MG/ML
10 INJECTION, SOLUTION INTRAVENOUS ONCE
Status: DISCONTINUED | OUTPATIENT
Start: 2024-04-05 | End: 2024-04-05

## 2024-04-05 RX ORDER — FUROSEMIDE 10 MG/ML
40 INJECTION INTRAMUSCULAR; INTRAVENOUS ONCE
Status: COMPLETED | OUTPATIENT
Start: 2024-04-05 | End: 2024-04-05

## 2024-04-05 RX ORDER — ONDANSETRON 4 MG/1
4 TABLET, FILM COATED ORAL 4 TIMES DAILY PRN
Status: DISCONTINUED | OUTPATIENT
Start: 2024-04-05 | End: 2024-04-11 | Stop reason: HOSPADM

## 2024-04-05 RX ORDER — DILTIAZEM HCL/D5W 125 MG/125
5-15 PLASTIC BAG, INJECTION (ML) INTRAVENOUS CONTINUOUS
Status: DISPENSED | OUTPATIENT
Start: 2024-04-05 | End: 2024-04-05

## 2024-04-05 RX ORDER — LEVOTHYROXINE SODIUM 100 UG/1
100 TABLET ORAL DAILY
Status: DISCONTINUED | OUTPATIENT
Start: 2024-04-06 | End: 2024-04-11 | Stop reason: HOSPADM

## 2024-04-05 RX ORDER — METOPROLOL TARTRATE 1 MG/ML
5 INJECTION, SOLUTION INTRAVENOUS ONCE
Status: COMPLETED | OUTPATIENT
Start: 2024-04-05 | End: 2024-04-05

## 2024-04-05 RX ORDER — ONDANSETRON HYDROCHLORIDE 2 MG/ML
4 INJECTION, SOLUTION INTRAVENOUS EVERY 6 HOURS PRN
Status: DISCONTINUED | OUTPATIENT
Start: 2024-04-05 | End: 2024-04-11 | Stop reason: HOSPADM

## 2024-04-05 RX ORDER — ACETAMINOPHEN 500 MG
5 TABLET ORAL NIGHTLY PRN
Status: DISCONTINUED | OUTPATIENT
Start: 2024-04-05 | End: 2024-04-11 | Stop reason: HOSPADM

## 2024-04-05 RX ORDER — IPRATROPIUM BROMIDE AND ALBUTEROL SULFATE 2.5; .5 MG/3ML; MG/3ML
3 SOLUTION RESPIRATORY (INHALATION) ONCE
Status: COMPLETED | OUTPATIENT
Start: 2024-04-05 | End: 2024-04-05

## 2024-04-05 RX ORDER — ATORVASTATIN CALCIUM 80 MG/1
80 TABLET, FILM COATED ORAL NIGHTLY
Status: DISCONTINUED | OUTPATIENT
Start: 2024-04-05 | End: 2024-04-11 | Stop reason: HOSPADM

## 2024-04-05 RX ORDER — IPRATROPIUM BROMIDE AND ALBUTEROL SULFATE 2.5; .5 MG/3ML; MG/3ML
3 SOLUTION RESPIRATORY (INHALATION) EVERY 4 HOURS PRN
Status: DISCONTINUED | OUTPATIENT
Start: 2024-04-05 | End: 2024-04-11 | Stop reason: HOSPADM

## 2024-04-05 RX ORDER — CLOPIDOGREL BISULFATE 75 MG/1
75 TABLET ORAL DAILY
Status: DISCONTINUED | OUTPATIENT
Start: 2024-04-06 | End: 2024-04-11 | Stop reason: HOSPADM

## 2024-04-05 RX ORDER — CHOLECALCIFEROL (VITAMIN D3) 50 MCG
2000 TABLET ORAL DAILY
Status: DISCONTINUED | OUTPATIENT
Start: 2024-04-06 | End: 2024-04-11 | Stop reason: HOSPADM

## 2024-04-05 RX ORDER — ACETAMINOPHEN 325 MG/1
650 TABLET ORAL EVERY 6 HOURS PRN
Status: DISCONTINUED | OUTPATIENT
Start: 2024-04-05 | End: 2024-04-11 | Stop reason: HOSPADM

## 2024-04-05 RX ORDER — DILTIAZEM HYDROCHLORIDE 60 MG/1
120 TABLET, FILM COATED ORAL 2 TIMES DAILY
Status: DISCONTINUED | OUTPATIENT
Start: 2024-04-05 | End: 2024-04-11 | Stop reason: HOSPADM

## 2024-04-05 RX ORDER — ASPIRIN 81 MG/1
81 TABLET ORAL DAILY
COMMUNITY
End: 2024-04-11 | Stop reason: HOSPADM

## 2024-04-05 RX ADMIN — IPRATROPIUM BROMIDE AND ALBUTEROL SULFATE 3 ML: 2.5; .5 SOLUTION RESPIRATORY (INHALATION) at 21:29

## 2024-04-05 RX ADMIN — METOPROLOL TARTRATE 5 MG: 5 INJECTION INTRAVENOUS at 13:17

## 2024-04-05 RX ADMIN — FUROSEMIDE 40 MG: 10 INJECTION, SOLUTION INTRAMUSCULAR; INTRAVENOUS at 14:46

## 2024-04-05 RX ADMIN — SODIUM CHLORIDE 1000 ML: 900 INJECTION, SOLUTION INTRAVENOUS at 11:03

## 2024-04-05 RX ADMIN — APIXABAN 2.5 MG: 5 TABLET, FILM COATED ORAL at 20:12

## 2024-04-05 RX ADMIN — Medication 15 MG/HR: at 19:55

## 2024-04-05 RX ADMIN — ATORVASTATIN CALCIUM 80 MG: 80 TABLET, FILM COATED ORAL at 20:12

## 2024-04-05 RX ADMIN — METOPROLOL SUCCINATE 25 MG: 25 TABLET, FILM COATED, EXTENDED RELEASE ORAL at 20:12

## 2024-04-05 RX ADMIN — IPRATROPIUM BROMIDE AND ALBUTEROL SULFATE 3 ML: .5; 3 SOLUTION RESPIRATORY (INHALATION) at 13:30

## 2024-04-05 RX ADMIN — METOPROLOL TARTRATE 5 MG: 5 INJECTION INTRAVENOUS at 12:46

## 2024-04-05 RX ADMIN — Medication 15 MG/HR: at 11:08

## 2024-04-05 ASSESSMENT — ENCOUNTER SYMPTOMS
CONSTIPATION: 0
DIAPHORESIS: 1
SEIZURES: 0
APPETITE CHANGE: 1
DIARRHEA: 0
ENDOCRINE NEGATIVE: 1
PALPITATIONS: 1
COUGH: 0
ABDOMINAL PAIN: 0
FLANK PAIN: 0
CHILLS: 0
SHORTNESS OF BREATH: 1
BLOOD IN STOOL: 0
EYES NEGATIVE: 1
NECK PAIN: 0
NERVOUS/ANXIOUS: 1
BACK PAIN: 0
CHEST TIGHTNESS: 1
ACTIVITY CHANGE: 1
WHEEZING: 0
FATIGUE: 1
HEMATURIA: 0
ALLERGIC/IMMUNOLOGIC NEGATIVE: 1
VOMITING: 1
HEMATOLOGIC/LYMPHATIC NEGATIVE: 1
NAUSEA: 1
FEVER: 0

## 2024-04-05 ASSESSMENT — PAIN SCALES - GENERAL: PAINLEVEL_OUTOF10: 0 - NO PAIN

## 2024-04-05 ASSESSMENT — PAIN - FUNCTIONAL ASSESSMENT: PAIN_FUNCTIONAL_ASSESSMENT: 0-10

## 2024-04-05 NOTE — PROGRESS NOTES
Pharmacy Medication History Review    Linette Doyle is a 83 y.o. female admitted for Paroxysmal A-fib (CMS/[facility]). Pharmacy reviewed the patient's [id number] medications and allergies for accuracy.    Medications ADDED:  Aspirin 81  Ocuvite   Medications CHANGED:  none  Medications REMOVED:   none     The list below reflects the updated PTA list. Comments regarding how patient may be taking medications differently can be found in the Admit Orders Activity  Prior to Admission Medications   Prescriptions Last Dose Informant   apixaban (Eliquis) 2.5 mg tablet 4/5/2024 Self   Sig: Take 1 tablet (2.5 mg) by mouth 2 times a day.   aspirin 81 mg EC tablet 4/5/2024 Self   Sig: Take 1 tablet (81 mg) by mouth once daily.   atorvastatin (Lipitor) 80 mg tablet 4/5/2024 Self   Sig: Take 1 tablet (80 mg) by mouth once daily at bedtime.   cholecalciferol (Vitamin D-3) 50 MCG (2000 UT) tablet 4/5/2024 Self   Sig: Take 1 tablet (2,000 Units) by mouth once daily.   clopidogrel (Plavix) 75 mg tablet 4/5/2024 Self   Sig: Take 1 tablet (75 mg) by mouth once daily. Do not start before February 15, 2024.   dilTIAZem (Cardizem) 120 mg immediate release tablet 4/5/2024 Self   Sig: Take 1 tablet (120 mg) by mouth 2 times a day.   levothyroxine (Synthroid, Levoxyl) 100 mcg tablet 4/5/2024 Self   Sig: Take 1 tablet (100 mcg) by mouth once daily. 1 tablet in the morning on an empty stomach Orally Once a day for 30 day(s)   metoprolol succinate XL (Toprol-XL) 25 mg 24 hr tablet 4/5/2024 Self   Sig: Take 1 tablet (25 mg) by mouth 2 times a day.   multivitamin (MULTIPLE VITAMINS ORAL) 4/5/2024 Self   Sig: Take 1 capsule by mouth once daily.   mv-min/FA/vit K/lycop/lut/zeax (OCUVITE EYE PLUS MULTI ORAL) 4/5/2024 Self   Sig: Take by mouth.   omeprazole (PriLOSEC) 20 mg DR capsule 4/5/2024 Self   Sig: Take 1 capsule (20 mg) by mouth once daily.   sertraline (Zoloft) 100 mg tablet 4/5/2024 Self   Sig: Take 1.5 tablets (150 mg) by mouth once  daily.   torsemide (Demadex) 20 mg tablet 4/5/2024 Self   Sig: Take 0.5 tablets (10 mg) by mouth once daily.      Facility-Administered Medications: None        The list below reflects the updated allergy list. Please review each documented allergy for additional clarification and justification.  Allergies  Reviewed by Soraya Costello on 4/5/2024        Severity Reactions Comments    Iodinated Contrast Media High Anaphylaxis             Pharmacy has been updated to [facility].    Sources used to complete the med history include dispense history, AVS, PTA medication list, patient interview. Patient is a poor historian.    Below are additional concerns with the patient's PTA list.  Patient showed a previous AVS and said those were current. Patient could not name medications. Patient was unsure of Atorvastatin dosing. AVS showed 40mg. Patient reports taking plavix and eliquis.    Soraya Costello  Please reach out via [employer] Secure Chat for questions

## 2024-04-05 NOTE — H&P
History Of Present Illness  Linette Doyle is a 83 y.o. female presenting with progressive shortness of breath, palpitations.  Patient has a history of coronary artery disease.  She was admitted by my partners 2 months ago with clinical picture of non-ST evaluation myocardial infarction, new onset of paroxysmal atrial fibrillation.  Patient had 2 cardiac catheterizations during that admission and during the second 1, on February 8 she had 2 stents placed in the RCA.  Atrial fibrillation was quite resistant to medical measures, electrophysiology was consulted.  Finally, she converted to sinus rhythm.  Patient was discharged home on Eliquis and Plavix.  Echocardiogram performed during previous admission demonstrated: Left ventricular systolic function is normal with a 55-60% estimated ejection fraction.   2. The left atrium is moderately dilated.   3. The mitral valve is moderately thickened.   4. Moderate to severe mitral valve regurgitation.   5. Mild tricuspid regurgitation is visualized.   6. Moderately elevated right ventricular systolic pressure.  Patient's hospital stay was complicated by development of acute on chronic renal failure.  After prolonged hospital stay patient was successfully discharged home.  Patient tells me that she is compliant with her medications.  She denies chest pain.  Yesterday, she started experiencing shortness of breath with ambulation which has gotten worse today.  Also, yesterday she had an episode of emesis when she vomited all her medications but she was able to keep them down today.    Patient was brought to the emergency department she was in atrial fibrillation with heart rate in the 130s.  Patient was hypoxic and required 4 L of oxygen.  Physician discussed the case with patient's cardiologist, Dr. Ibrahim who advised to give patient repeated doses of IV metoprolol 5 mg each will he is goal to control heart rate.    When I saw patient in the emergency department, she was  complaining of some chest discomfort, not feeling well and some shortness of breath.  She already received 3 doses of IV metoprolol 5 mg each.    Patient denies chest pain, she denies abdominal pain.  Denies diarrhea.  She denies lower extremity edema.  Patient does not have a history of COPD or chronic lung problems she has never smoked.  Past Medical History  She has a past medical history of Angina pectoris (CMS/[facility]) (10/22/2023), Atherosclerosis of coronary artery (08/21/2019), Atherosclerotic heart disease of native coronary artery with other forms of angina pectoris (CMS/[facility]) (10/22/2023), Hypercholesterolemia (12/28/2018), Hyperlipidemia (10/22/2023), Presence of cardiac pacemaker (10/22/2023), Primary hypertension (12/28/2018), Shortness of breath (11/26/2019), Sick sinus syndrome (CMS/[facility]) (10/22/2023), Sinus bradycardia (10/22/2023), and Stage 3a chronic kidney disease (CMS/[facility]) (11/25/2019).    Surgical History  She has a past surgical history that includes Cardiac catheterization (N/A, 02/02/2024); Cardiac catheterization (N/A, 02/02/2024); Cardiac catheterization (N/A, 02/08/2024); Cardiac catheterization (N/A, 02/08/2024); and pacemaker placement (07/04/2019).   2/8/24: Diagnostic coronary angiogram without LV and PCI of proximal and mid to distal RCA using 2 drug-eluting stents 2.5 x 18 mm stents.   Social History  She reports that she has never smoked. She has never been exposed to tobacco smoke. She has never used smokeless tobacco. She reports that she does not drink alcohol and does not use drugs.  Patient has recently (few months ago) lost her .  She lives alone.  She has a brother who lives in [place] and he is here power of .  Her granddaughter is in college.  Family History  Family History   Problem Relation Name Age of Onset    No Known Problems Mother      No Known Problems Father          Allergies  Iodinated contrast media    Review of Systems   Constitutional:  Positive for  activity change, appetite change, diaphoresis and fatigue. Negative for chills and fever.   HENT: Negative.     Eyes: Negative.    Respiratory:  Positive for chest tightness and shortness of breath. Negative for cough and wheezing.    Cardiovascular:  Positive for chest pain and palpitations. Negative for leg swelling.   Gastrointestinal:  Positive for nausea and vomiting. Negative for abdominal pain, blood in stool, constipation and diarrhea.   Endocrine: Negative.    Genitourinary:  Negative for flank pain and hematuria.   Musculoskeletal:  Negative for back pain and neck pain.   Allergic/Immunologic: Negative.    Neurological:  Negative for seizures and syncope.   Hematological: Negative.    Psychiatric/Behavioral:  The patient is nervous/anxious.         Physical Exam  Location: Emergency department, room 6.  Last Recorded Vitals  BP (!) 109/94   Pulse (!) 109   Temp 36.4 °C (97.5 °F) (Temporal)   Resp (!) 23   Wt 64.9 kg (143 lb)   SpO2 99%     Relevant Results        XR chest 2 views    Result Date: 4/5/2024  Interpreted By:  Mc Carranza, STUDY: XR CHEST 2 VIEWS; 4/5/2024 11:25 am   INDICATION: Signs/Symptoms:sob   COMPARISON: 02/07/2024   ACCESSION NUMBER(S): [id number]   ORDERING CLINICIAN: BREE SOTO   TECHNIQUE: PA and LAT views of the chest were obtained.   FINDINGS: The cardiomediastinal silhouette is mildly enlarged, grossly stable. There are bilateral lower lobe airspace opacities most suggestive of small pleural effusions and adjacent atelectasis. There is mild prominence of the interstitium and mild ground-glass opacity suggesting mild pulmonary edema.   The osseous structures are intact.       Small bilateral opacities at the lung bases, most suggestive of small pleural effusions and adjacent atelectasis. There is mild prominence of the interstitium and mild ground-glass opacity suggesting mild pulmonary edema.     Signed by: Mc Carranza 4/5/2024 12:09 PM Dictation workstation:    [id number]    ECG 12 lead    Result Date: 4/5/2024  Supraventricular tachycardia ST & T wave abnormality, consider inferior ischemia ST & T wave abnormality, consider anterolateral ischemia Abnormal ECG When compared with ECG of 05-APR-2024 10:46, (unconfirmed) Fusion complexes are no longer Present Premature ventricular complexes are no longer Present ST no longer depressed in Inferior leads ST now depressed in Anterior leads Inverted T waves have replaced nonspecific T wave abnormality in Inferior leads    Electrocardiogram, 12-lead PRN ACS symptoms    Result Date: 4/5/2024  Sinus tachycardia with frequent Premature ventricular complexes and Fusion complexes Marked ST abnormality, possible inferior subendocardial injury Abnormal ECG When compared with ECG of 07-FEB-2024 17:36, Sinus rhythm has replaced Electronic atrial pacemaker Vent. rate has increased BY  82 BPM ST now depressed in Inferior leads ST no longer depressed in Anterior leads Nonspecific T wave abnormality now evident in Inferior leads      Results for orders placed or performed during the hospital encounter of 04/05/24 (from the past 24 hour(s))   ECG 12 lead   Result Value Ref Range    Ventricular Rate 144 BPM    QRS Duration 80 ms    QT Interval 298 ms    QTC Calculation(Bazett) 461 ms    R Axis 71 degrees    T Axis 241 degrees    QRS Count 23 beats    Q Onset 229 ms    T Offset 378 ms    QTC Fredericia 398 ms   CBC and Auto Differential   Result Value Ref Range    WBC 9.3 4.4 - 11.3 x10*3/uL    nRBC 0.0 0.0 - 0.0 /100 WBCs    RBC 3.50 (L) 4.00 - 5.20 x10*6/uL    Hemoglobin 9.9 (L) 12.0 - 16.0 g/dL    Hematocrit 32.1 (L) 36.0 - 46.0 %    MCV 92 80 - 100 fL    MCH 28.3 26.0 - 34.0 pg    MCHC 30.8 (L) 32.0 - 36.0 g/dL    RDW 15.7 (H) 11.5 - 14.5 %    Platelets 209 150 - 450 x10*3/uL    Neutrophils % 76.7 40.0 - 80.0 %    Immature Granulocytes %, Automated 0.6 0.0 - 0.9 %    Lymphocytes % 14.1 13.0 - 44.0 %    Monocytes % 7.1 2.0 - 10.0 %     Eosinophils % 1.0 0.0 - 6.0 %    Basophils % 0.5 0.0 - 2.0 %    Neutrophils Absolute 7.13 (H) 1.60 - 5.50 x10*3/uL    Immature Granulocytes Absolute, Automated 0.06 0.00 - 0.50 x10*3/uL    Lymphocytes Absolute 1.31 0.80 - 3.00 x10*3/uL    Monocytes Absolute 0.66 0.05 - 0.80 x10*3/uL    Eosinophils Absolute 0.09 0.00 - 0.40 x10*3/uL    Basophils Absolute 0.05 0.00 - 0.10 x10*3/uL   Comprehensive metabolic panel   Result Value Ref Range    Glucose 136 (H) 65 - 99 mg/dL    Sodium 140 133 - 145 mmol/L    Potassium 3.3 (L) 3.4 - 5.1 mmol/L    Chloride 99 97 - 107 mmol/L    Bicarbonate 24 24 - 31 mmol/L    Urea Nitrogen 33 (H) 8 - 25 mg/dL    Creatinine 1.90 (H) 0.40 - 1.60 mg/dL    eGFR 26 (L) >60 mL/min/1.73m*2    Calcium 9.7 8.5 - 10.4 mg/dL    Albumin 4.4 3.5 - 5.0 g/dL    Alkaline Phosphatase 96 35 - 125 U/L    Total Protein 7.2 5.9 - 7.9 g/dL    AST 33 5 - 40 U/L    Bilirubin, Total 0.7 0.1 - 1.2 mg/dL    ALT 28 5 - 40 U/L    Anion Gap 17 <=19 mmol/L   Magnesium   Result Value Ref Range    Magnesium 1.90 1.60 - 3.10 mg/dL   Sars-CoV-2 and Influenza A/B PCR   Result Value Ref Range    Flu A Result Not Detected Not Detected    Flu B Result Not Detected Not Detected    Coronavirus 2019, PCR Not Detected Not Detected   Serial Troponin, Initial ([facility])   Result Value Ref Range    Troponin T, High Sensitivity 46 (HH) <=14 ng/L   Serial Troponin, 2 Hour ([facility])   Result Value Ref Range    Troponin T, High Sensitivity 45 (HH) <=14 ng/L         Assessment/Plan   Principal Problem:    Paroxysmal A-fib (CMS/HCC)      Paroxysmal atrial fibrillation.  currently, heart rate is better controlled with 2 doses of IV metoprolol.  Heart rate is 113.  Consult patient's cardiologist Dr. Ibrahim.  Continue course.    Coronary artery disease, recent non-ST elevation myocardial infarction with 2 stents to RCA  Acute on chronic renal failure.  Consult nephrology  Small pleural effusions.  Clinical picture of early pulmonary edema.  1 dose of  Lasix.  Pacheco catheter to monitor urine output  Acute respiratory failure with hypoxemia due to combination of noncontrolled paroxysmal atrial fibrillation and pleural effusions.  Patient does not have history of COPD.  She is not on oxygen at home.  Will use aerosols as needed.  I believe, respiratory failure reflects not a primarily pulmonary problem but acute diastolic CHF  CODE STATUS, discussed with patient.  No CPR.  Okay for temporarily intubation in the case of acute respiratory failure    Critical care time spent with patient 47 minutes.       Samantha Alcantara MD

## 2024-04-05 NOTE — ED PROVIDER NOTES
HPI   No chief complaint on file.      HPI  Patient is an 83-year-old female with history of CKD, A-fib, CAD, hypertension presenting for evaluation of shortness of breath and A-fib RVR.  The patient does not require oxygen at baseline.  Patient takes Eliquis, metoprolol, diltiazem for her atrial fibrillation.  Patient states that yesterday she threw up her morning pills and then later that day she started becoming short of breath.  She states the shortness of breath has worsened thus she called EMS.  She denies recent illness, chest pain, cough, congestion, sore throat.  She denies leg swelling.  The patient was recently admitted to the hospital for a heart attack in February.                  No data recorded                   Patient History   Past Medical History:   Diagnosis Date    Angina pectoris (CMS/[facility]) 10/22/2023    Atherosclerosis of coronary artery 08/21/2019    Atherosclerotic heart disease of native coronary artery with other forms of angina pectoris (CMS/[facility]) 10/22/2023    Hypercholesterolemia 12/28/2018    Hyperlipidemia 10/22/2023    Presence of cardiac pacemaker 10/22/2023    Primary hypertension 12/28/2018    Shortness of breath 11/26/2019    Sick sinus syndrome (CMS/[facility]) 10/22/2023    Sinus bradycardia 10/22/2023    Stage 3a chronic kidney disease (CMS/[facility]) 11/25/2019     Past Surgical History:   Procedure Laterality Date    CARDIAC CATHETERIZATION N/A 02/02/2024    Procedure: Left Heart Cath;  Surgeon: Becky Vela MD;  Location: [facility] Cardiac Cath Lab;  Service: Cardiovascular;  Laterality: N/A;    CARDIAC CATHETERIZATION N/A 02/02/2024    Procedure: PCI;  Surgeon: Becky Vela MD;  Location: [facility] Cardiac Cath Lab;  Service: Cardiovascular;  Laterality: N/A;    CARDIAC CATHETERIZATION N/A 02/08/2024    Procedure: Left Heart Cath;  Surgeon: Darrius Ibrahim MD;  Location: [facility] Cardiac Cath Lab;  Service: Cardiovascular;  Laterality: N/A;    CARDIAC CATHETERIZATION N/A 02/08/2024    Procedure:  PCI;  Surgeon: Darrius Ibrahim MD;  Location: [facility] Cardiac Cath Lab;  Service: Cardiovascular;  Laterality: N/A;    PACEMAKER PLACEMENT  07/04/2019    [employer] PACEMAKE AND STENT PLACEMENT     Family History   Problem Relation Name Age of Onset    No Known Problems Mother      No Known Problems Father       Social History     Tobacco Use    Smoking status: Never     Passive exposure: Never    Smokeless tobacco: Never   Vaping Use    Vaping Use: Never used   Substance Use Topics    Alcohol use: Never    Drug use: Never       Physical Exam   ED Triage Vitals   Temp Pulse Resp BP   -- -- -- --      SpO2 Temp src Heart Rate Source Patient Position   -- -- -- --      BP Location FiO2 (%)     -- --       Physical Exam  Vitals and nursing note reviewed.   Constitutional:       General: She is not in acute distress.     Appearance: She is well-developed.      Comments: On 2 L of oxygen with some tachypnea   HENT:      Head: Normocephalic and atraumatic.      Mouth/Throat:      Mouth: Mucous membranes are moist.      Pharynx: Oropharynx is clear.   Eyes:      Conjunctiva/sclera: Conjunctivae normal.   Cardiovascular:      Rate and Rhythm: Tachycardia present. Rhythm irregular.      Heart sounds: No murmur heard.  Pulmonary:      Comments: Tachypneic with mild accessory muscle usage, requiring 2 L of oxygen, with wheezes bilaterally on exam  Abdominal:      Palpations: Abdomen is soft.      Tenderness: There is no abdominal tenderness.   Musculoskeletal:         General: No swelling.      Cervical back: Neck supple.   Skin:     General: Skin is warm and dry.      Capillary Refill: Capillary refill takes less than 2 seconds.   Neurological:      Mental Status: She is alert.   Psychiatric:         Mood and Affect: Mood normal.         ED Course & MDM   ED Course as of 04/05/24 1647   Fri Apr 05, 2024   1050 EKG personally interpreted by me performed at 1047  Atrial fibrillation with a ventricular rate 144 normal axis diffuse T  wave flattening, significant artifact and discordant with computer read of supraventricular tachycardia [EF]      ED Course User Index  [EF] Isabel CORLEY Kristinamarcelle, DO         Diagnoses as of 04/05/24 1647   Atrial fibrillation with rapid ventricular response (CMS/HCC)   Paroxysmal A-fib (CMS/HCC)       Medical Decision Making  Parts of this chart have been completed using voice recognition software. Please excuse any errors of transcription.  My thought process and reason for plan has been formulated from the time that I saw the patient until the time of disposition and is not specific to one specific moment during their visit and furthermore my MDM encompasses this entire chart and not only this text box.      HPI: Detailed above.    Exam: A medically appropriate exam performed, outlined above, given the known history and presentation.    History obtained from: Patient    EKG: Atrial fibrillation with rapid ventricular response with no new evidence of ischemia    Social Determinants of Health considered during this visit: Lives independently    Medications given during visit:  Medications   dilTIAZem (Cardizem) 125 mg in dextrose 5% 125 mL (1 mg/mL) infusion (premix) (15 mg/hr intravenous Rate/Dose Verify 4/5/24 1334)   apixaban (Eliquis) tablet 2.5 mg (has no administration in time range)   atorvastatin (Lipitor) tablet 80 mg (has no administration in time range)   cholecalciferol (Vitamin D-3) tablet 2,000 Units (has no administration in time range)   clopidogrel (Plavix) tablet 75 mg (has no administration in time range)   dilTIAZem (Cardizem) immediate release tablet 120 mg (has no administration in time range)   levothyroxine (Synthroid, Levoxyl) tablet 100 mcg (has no administration in time range)   metoprolol succinate XL (Toprol-XL) 24 hr tablet 25 mg (has no administration in time range)   sertraline (Zoloft) tablet 150 mg (has no administration in time range)   pantoprazole (ProtoNix) EC tablet 40 mg (has no  administration in time range)   acetaminophen (Tylenol) tablet 650 mg (has no administration in time range)   ondansetron (Zofran) tablet 4 mg (has no administration in time range)   ondansetron (Zofran) injection 4 mg (has no administration in time range)   melatonin tablet 5 mg (has no administration in time range)   ipratropium-albuteroL (Duo-Neb) 0.5-2.5 mg/3 mL nebulizer solution 3 mL (has no administration in time range)   sodium chloride 0.9 % bolus 1,000 mL (0 mL intravenous Stopped 4/5/24 1229)   metoprolol tartrate (Lopressor) injection 5 mg (5 mg intravenous Given 4/5/24 1246)   metoprolol tartrate (Lopressor) injection 5 mg (5 mg intravenous Given 4/5/24 1317)   ipratropium-albuteroL (Duo-Neb) 0.5-2.5 mg/3 mL nebulizer solution 3 mL (3 mL nebulization Given 4/5/24 1330)   furosemide (Lasix) injection 40 mg (40 mg intravenous Given 4/5/24 1446)        Diagnostic/tests  Labs Reviewed   CBC WITH AUTO DIFFERENTIAL - Abnormal       Result Value    WBC 9.3      nRBC 0.0      RBC 3.50 (*)     Hemoglobin 9.9 (*)     Hematocrit 32.1 (*)     MCV 92      MCH 28.3      MCHC 30.8 (*)     RDW 15.7 (*)     Platelets 209      Neutrophils % 76.7      Immature Granulocytes %, Automated 0.6      Lymphocytes % 14.1      Monocytes % 7.1      Eosinophils % 1.0      Basophils % 0.5      Neutrophils Absolute 7.13 (*)     Immature Granulocytes Absolute, Automated 0.06      Lymphocytes Absolute 1.31      Monocytes Absolute 0.66      Eosinophils Absolute 0.09      Basophils Absolute 0.05     COMPREHENSIVE METABOLIC PANEL - Abnormal    Glucose 136 (*)     Sodium 140      Potassium 3.3 (*)     Chloride 99      Bicarbonate 24      Urea Nitrogen 33 (*)     Creatinine 1.90 (*)     eGFR 26 (*)     Calcium 9.7      Albumin 4.4      Alkaline Phosphatase 96      Total Protein 7.2      AST 33      Bilirubin, Total 0.7      ALT 28      Anion Gap 17     SERIAL TROPONIN, INITIAL ([facility]) - Abnormal    Troponin T, High Sensitivity 46 (*)     SERIAL TROPONIN,  2 HOUR ([facility]) - Abnormal    Troponin T, High Sensitivity 45 (*)    MAGNESIUM - Normal    Magnesium 1.90     SARS-COV-2 AND INFLUENZA A/B PCR - Normal    Flu A Result Not Detected      Flu B Result Not Detected      Coronavirus 2019, PCR Not Detected      Narrative:     This assay has received FDA Emergency Use Authorization (EUA) and  is only authorized for the duration of time that circumstances exist to justify the authorization of the emergency use of in vitro diagnostic tests for the detection of SARS-CoV-2 virus and/or diagnosis of COVID-19 infection under section 564(b)(1) of the Act, 21 U.S.C. 360bbb-3(b)(1). Testing for SARS-CoV-2 is only recommended for patients who meet current clinical and/or epidemiological criteria as defined by federal, state, or local public health directives. This assay is an in vitro diagnostic nucleic acid amplification test for the qualitative detection of SARS-CoV-2, Influenza A, and Influenza B from nasopharyngeal specimens and has been validated for use at [facility]. Negative results do not preclude COVID-19 infections or Influenza A/B infections, and should not be used as the sole basis for diagnosis, treatment, or other management decisions. If Influenza A/B and RSV PCR results are negative, testing for Parainfluenza virus, Adenovirus and Metapneumovirus is routinely performed for [facility] pediatric oncology and intensive care inpatients, and is available on other patients by placing an add-on request.    TROPONIN T SERIES, HIGH SENSITIVITY (0, 2 HR, 6 HR)    Narrative:     The following orders were created for panel order Troponin T Series, High Sensitivity (0, 2HR, 6HR).  Procedure                               Abnormality         Status                     ---------                               -----------         ------                     Serial Troponin, Initial...[[id number]]  Abnormal            Final result               Serial  Troponin, 2 Hour ...[[id number]]  Abnormal            Final result               Serial Troponin, 6 Hour ...[[id number]]                                                   Please view results for these tests on the individual orders.   SERIAL TROPONIN, 6 HOUR ([facility])      XR chest 2 views   Final Result   Small bilateral opacities at the lung bases, most suggestive of small   pleural effusions and adjacent atelectasis. There is mild prominence   of the interstitium and mild ground-glass opacity suggesting mild   pulmonary edema.             Signed by: Mc Carranza 4/5/2024 12:09 PM   Dictation workstation:   [id number]           Considerations/further MDM:  Patient is an 83-year-old female with history of A-fib brought in the ER for evaluation of shortness of breath.  Patient was seen to be in A-fib RVR per EMS.  Upon arrival the patient is tachypneic and 88% on room air.  She was put on 2 L of oxygen upon arrival.  She was tachycardic in the 140s with EKG frequent for A-fib RVR.  Lung sounds were significant for wheezes bilaterally thus patient was given a DuoNeb with improvement.  Patient was given fluids and started on Cardizem drip.  Laboratory workup with anemia and elevated creatinine.  Cardiac enzymes stable.  Low suspicion for ACS at this time.  No electrolyte abnormalities.  Patient remained tachycardic despite Dilt drip thus attending physician spoke with cardiology on-call who recommended blood pressure injections every 20 minutes until heart rate improves.  Lopressor 5 mg was administered 2 times with improvement of the patient's tachycardia.  Chest x-ray with very mild pulmonary edema and small pleural effusions bilaterally.  No evidence of pneumonia or pneumothorax on chest x-ray.  Laboratory and x-ray findings were discussed with patient at bedside.  Patient agreeable to staying in the hospital for further management of her atrial fibrillation and shortness of breath. Attending physician spoke with  hospitalist on-call and discussed the patient case.  The patient was admitted to the stepdown unit for further management of her A-fib RVR and shortness of breath.  She remained stable during the ER visit.      Procedure  Procedures     Juana Ervin PA-C  04/05/24 [id number]

## 2024-04-06 LAB
ANION GAP SERPL CALC-SCNC: 18 MMOL/L
BUN SERPL-MCNC: 36 MG/DL (ref 8–25)
CALCIUM SERPL-MCNC: 9.5 MG/DL (ref 8.5–10.4)
CHLORIDE SERPL-SCNC: 99 MMOL/L (ref 97–107)
CO2 SERPL-SCNC: 23 MMOL/L (ref 24–31)
CREAT SERPL-MCNC: 1.9 MG/DL (ref 0.4–1.6)
EGFRCR SERPLBLD CKD-EPI 2021: 26 ML/MIN/1.73M*2
ERYTHROCYTE [DISTWIDTH] IN BLOOD BY AUTOMATED COUNT: 15.9 % (ref 11.5–14.5)
GLUCOSE SERPL-MCNC: 153 MG/DL (ref 65–99)
HCT VFR BLD AUTO: 32.8 % (ref 36–46)
HGB BLD-MCNC: 9.8 G/DL (ref 12–16)
MCH RBC QN AUTO: 27.8 PG (ref 26–34)
MCHC RBC AUTO-ENTMCNC: 29.9 G/DL (ref 32–36)
MCV RBC AUTO: 93 FL (ref 80–100)
NRBC BLD-RTO: 0 /100 WBCS (ref 0–0)
NT-PROBNP SERPL-MCNC: ABNORMAL PG/ML (ref 0–624)
PLATELET # BLD AUTO: 245 X10*3/UL (ref 150–450)
POTASSIUM SERPL-SCNC: 3.7 MMOL/L (ref 3.4–5.1)
Q ONSET: 229 MS
QRS COUNT: 23 BEATS
QRS DURATION: 80 MS
QT INTERVAL: 298 MS
QTC CALCULATION(BAZETT): 461 MS
QTC FREDERICIA: 398 MS
R AXIS: 71 DEGREES
RBC # BLD AUTO: 3.52 X10*6/UL (ref 4–5.2)
SODIUM SERPL-SCNC: 140 MMOL/L (ref 133–145)
T AXIS: 241 DEGREES
T OFFSET: 378 MS
TROPONIN T SERPL-MCNC: 49 NG/L
VENTRICULAR RATE: 144 BPM
WBC # BLD AUTO: 10.3 X10*3/UL (ref 4.4–11.3)

## 2024-04-06 PROCEDURE — 99223 1ST HOSP IP/OBS HIGH 75: CPT | Performed by: INTERNAL MEDICINE

## 2024-04-06 PROCEDURE — 85027 COMPLETE CBC AUTOMATED: CPT | Performed by: INTERNAL MEDICINE

## 2024-04-06 PROCEDURE — 2500000001 HC RX 250 WO HCPCS SELF ADMINISTERED DRUGS (ALT 637 FOR MEDICARE OP): Performed by: INTERNAL MEDICINE

## 2024-04-06 PROCEDURE — 84484 ASSAY OF TROPONIN QUANT: CPT

## 2024-04-06 PROCEDURE — 2500000004 HC RX 250 GENERAL PHARMACY W/ HCPCS (ALT 636 FOR OP/ED): Performed by: HOSPITALIST

## 2024-04-06 PROCEDURE — 2500000004 HC RX 250 GENERAL PHARMACY W/ HCPCS (ALT 636 FOR OP/ED): Performed by: INTERNAL MEDICINE

## 2024-04-06 PROCEDURE — 1090000001 HH PPS REVENUE CREDIT

## 2024-04-06 PROCEDURE — 2500000005 HC RX 250 GENERAL PHARMACY W/O HCPCS: Performed by: INTERNAL MEDICINE

## 2024-04-06 PROCEDURE — 2500000002 HC RX 250 W HCPCS SELF ADMINISTERED DRUGS (ALT 637 FOR MEDICARE OP, ALT 636 FOR OP/ED): Mod: MUE | Performed by: INTERNAL MEDICINE

## 2024-04-06 PROCEDURE — 2060000001 HC INTERMEDIATE ICU ROOM DAILY

## 2024-04-06 PROCEDURE — 80048 BASIC METABOLIC PNL TOTAL CA: CPT | Performed by: INTERNAL MEDICINE

## 2024-04-06 PROCEDURE — 36415 COLL VENOUS BLD VENIPUNCTURE: CPT | Performed by: INTERNAL MEDICINE

## 2024-04-06 PROCEDURE — 1090000002 HH PPS REVENUE DEBIT

## 2024-04-06 RX ORDER — METOPROLOL TARTRATE 100 MG/1
100 TABLET ORAL ONCE
Status: COMPLETED | OUTPATIENT
Start: 2024-04-06 | End: 2024-04-06

## 2024-04-06 RX ORDER — DILTIAZEM HCL/D5W 125 MG/125
5-15 PLASTIC BAG, INJECTION (ML) INTRAVENOUS CONTINUOUS
Status: DISCONTINUED | OUTPATIENT
Start: 2024-04-06 | End: 2024-04-06

## 2024-04-06 RX ORDER — FUROSEMIDE 10 MG/ML
40 INJECTION INTRAMUSCULAR; INTRAVENOUS 2 TIMES DAILY
Status: DISCONTINUED | OUTPATIENT
Start: 2024-04-06 | End: 2024-04-07

## 2024-04-06 RX ORDER — METOPROLOL TARTRATE 100 MG/1
100 TABLET ORAL 2 TIMES DAILY
Status: DISCONTINUED | OUTPATIENT
Start: 2024-04-06 | End: 2024-04-08

## 2024-04-06 RX ORDER — DILTIAZEM HCL/D5W 125 MG/125
15 PLASTIC BAG, INJECTION (ML) INTRAVENOUS CONTINUOUS
Status: ACTIVE | OUTPATIENT
Start: 2024-04-06 | End: 2024-04-06

## 2024-04-06 RX ORDER — DILTIAZEM HYDROCHLORIDE 60 MG/1
60 TABLET, FILM COATED ORAL EVERY 6 HOURS
Status: DISCONTINUED | OUTPATIENT
Start: 2024-04-06 | End: 2024-04-07

## 2024-04-06 RX ORDER — METOPROLOL SUCCINATE 50 MG/1
50 TABLET, EXTENDED RELEASE ORAL 2 TIMES DAILY
Status: DISCONTINUED | OUTPATIENT
Start: 2024-04-06 | End: 2024-04-06

## 2024-04-06 RX ORDER — DILTIAZEM HYDROCHLORIDE 5 MG/ML
10 INJECTION INTRAVENOUS ONCE
Status: DISCONTINUED | OUTPATIENT
Start: 2024-04-06 | End: 2024-04-07

## 2024-04-06 RX ORDER — ADENOSINE 3 MG/ML
12 INJECTION, SOLUTION INTRAVENOUS ONCE
Status: COMPLETED | OUTPATIENT
Start: 2024-04-06 | End: 2024-04-06

## 2024-04-06 RX ADMIN — LEVOTHYROXINE SODIUM 100 MCG: 0.1 TABLET ORAL at 06:04

## 2024-04-06 RX ADMIN — METOPROLOL 100 MG: 100 TABLET ORAL at 20:52

## 2024-04-06 RX ADMIN — Medication 2000 UNITS: at 09:31

## 2024-04-06 RX ADMIN — Medication 15 MG/HR: at 03:44

## 2024-04-06 RX ADMIN — APIXABAN 2.5 MG: 5 TABLET, FILM COATED ORAL at 09:31

## 2024-04-06 RX ADMIN — DILTIAZEM HYDROCHLORIDE 60 MG: 60 TABLET ORAL at 15:42

## 2024-04-06 RX ADMIN — Medication 15 MG/HR: at 14:17

## 2024-04-06 RX ADMIN — METOPROLOL SUCCINATE 50 MG: 50 TABLET, EXTENDED RELEASE ORAL at 09:31

## 2024-04-06 RX ADMIN — METOPROLOL 100 MG: 100 TABLET ORAL at 17:29

## 2024-04-06 RX ADMIN — FUROSEMIDE 40 MG: 10 INJECTION, SOLUTION INTRAMUSCULAR; INTRAVENOUS at 20:52

## 2024-04-06 RX ADMIN — ATORVASTATIN CALCIUM 80 MG: 80 TABLET, FILM COATED ORAL at 20:52

## 2024-04-06 RX ADMIN — ADENOSINE 12 MG: 3 INJECTION INTRAVENOUS at 15:06

## 2024-04-06 RX ADMIN — DILTIAZEM HYDROCHLORIDE 60 MG: 60 TABLET ORAL at 20:53

## 2024-04-06 RX ADMIN — FUROSEMIDE 40 MG: 10 INJECTION, SOLUTION INTRAMUSCULAR; INTRAVENOUS at 01:21

## 2024-04-06 RX ADMIN — SERTRALINE 150 MG: 100 TABLET, FILM COATED ORAL at 09:31

## 2024-04-06 RX ADMIN — FUROSEMIDE 40 MG: 10 INJECTION, SOLUTION INTRAMUSCULAR; INTRAVENOUS at 09:31

## 2024-04-06 RX ADMIN — APIXABAN 2.5 MG: 5 TABLET, FILM COATED ORAL at 20:52

## 2024-04-06 RX ADMIN — PANTOPRAZOLE SODIUM 40 MG: 40 TABLET, DELAYED RELEASE ORAL at 06:04

## 2024-04-06 RX ADMIN — Medication 15 MG/HR: at 15:44

## 2024-04-06 RX ADMIN — CLOPIDOGREL BISULFATE 75 MG: 75 TABLET ORAL at 09:31

## 2024-04-06 SDOH — HEALTH STABILITY: MENTAL HEALTH: HOW MANY STANDARD DRINKS CONTAINING ALCOHOL DO YOU HAVE ON A TYPICAL DAY?: PATIENT DOES NOT DRINK

## 2024-04-06 SDOH — SOCIAL STABILITY: SOCIAL NETWORK: HOW OFTEN DO YOU ATTEND CHURCH OR RELIGIOUS SERVICES?: NEVER

## 2024-04-06 SDOH — SOCIAL STABILITY: SOCIAL INSECURITY: WITHIN THE LAST YEAR, HAVE YOU BEEN AFRAID OF YOUR PARTNER OR EX-PARTNER?: NO

## 2024-04-06 SDOH — SOCIAL STABILITY: SOCIAL NETWORK
DO YOU BELONG TO ANY CLUBS OR ORGANIZATIONS SUCH AS CHURCH GROUPS UNIONS, FRATERNAL OR ATHLETIC GROUPS, OR SCHOOL GROUPS?: NO

## 2024-04-06 SDOH — HEALTH STABILITY: MENTAL HEALTH: HOW OFTEN DO YOU HAVE A DRINK CONTAINING ALCOHOL?: NEVER

## 2024-04-06 SDOH — SOCIAL STABILITY: SOCIAL INSECURITY
WITHIN THE LAST YEAR, HAVE TO BEEN RAPED OR FORCED TO HAVE ANY KIND OF SEXUAL ACTIVITY BY YOUR PARTNER OR EX-PARTNER?: NO

## 2024-04-06 SDOH — HEALTH STABILITY: PHYSICAL HEALTH: ON AVERAGE, HOW MANY MINUTES DO YOU ENGAGE IN EXERCISE AT THIS LEVEL?: 0 MIN

## 2024-04-06 SDOH — SOCIAL STABILITY: SOCIAL INSECURITY: WITHIN THE LAST YEAR, HAVE YOU BEEN HUMILIATED OR EMOTIONALLY ABUSED IN OTHER WAYS BY YOUR PARTNER OR EX-PARTNER?: NO

## 2024-04-06 SDOH — SOCIAL STABILITY: SOCIAL INSECURITY: ARE YOU OR HAVE YOU BEEN THREATENED OR ABUSED PHYSICALLY, EMOTIONALLY, OR SEXUALLY BY ANYONE?: NO

## 2024-04-06 SDOH — HEALTH STABILITY: MENTAL HEALTH: HOW OFTEN DO YOU HAVE 6 OR MORE DRINKS ON ONE OCCASION?: NEVER

## 2024-04-06 SDOH — SOCIAL STABILITY: SOCIAL INSECURITY: DO YOU FEEL UNSAFE GOING BACK TO THE PLACE WHERE YOU ARE LIVING?: NO

## 2024-04-06 SDOH — SOCIAL STABILITY: SOCIAL NETWORK: ARE YOU MARRIED, WIDOWED, DIVORCED, SEPARATED, NEVER MARRIED, OR LIVING WITH A PARTNER?: WIDOWED

## 2024-04-06 SDOH — SOCIAL STABILITY: SOCIAL INSECURITY
WITHIN THE LAST YEAR, HAVE YOU BEEN KICKED, HIT, SLAPPED, OR OTHERWISE PHYSICALLY HURT BY YOUR PARTNER OR EX-PARTNER?: NO

## 2024-04-06 SDOH — HEALTH STABILITY: PHYSICAL HEALTH: ON AVERAGE, HOW MANY DAYS PER WEEK DO YOU ENGAGE IN MODERATE TO STRENUOUS EXERCISE (LIKE A BRISK WALK)?: 0 DAYS

## 2024-04-06 SDOH — ECONOMIC STABILITY: FOOD INSECURITY: WITHIN THE PAST 12 MONTHS, YOU WORRIED THAT YOUR FOOD WOULD RUN OUT BEFORE YOU GOT MONEY TO BUY MORE.: NEVER TRUE

## 2024-04-06 SDOH — SOCIAL STABILITY: SOCIAL INSECURITY: ARE THERE ANY APPARENT SIGNS OF INJURIES/BEHAVIORS THAT COULD BE RELATED TO ABUSE/NEGLECT?: NO

## 2024-04-06 SDOH — ECONOMIC STABILITY: FOOD INSECURITY: WITHIN THE PAST 12 MONTHS, THE FOOD YOU BOUGHT JUST DIDN'T LAST AND YOU DIDN'T HAVE MONEY TO GET MORE.: NEVER TRUE

## 2024-04-06 SDOH — ECONOMIC STABILITY: INCOME INSECURITY: IN THE PAST 12 MONTHS, HAS THE ELECTRIC, GAS, OIL, OR WATER COMPANY THREATENED TO SHUT OFF SERVICE IN YOUR HOME?: NO

## 2024-04-06 SDOH — HEALTH STABILITY: MENTAL HEALTH
STRESS IS WHEN SOMEONE FEELS TENSE, NERVOUS, ANXIOUS, OR CAN'T SLEEP AT NIGHT BECAUSE THEIR MIND IS TROUBLED. HOW STRESSED ARE YOU?: NOT AT ALL

## 2024-04-06 SDOH — SOCIAL STABILITY: SOCIAL INSECURITY: HAVE YOU HAD THOUGHTS OF HARMING ANYONE ELSE?: NO

## 2024-04-06 SDOH — SOCIAL STABILITY: SOCIAL NETWORK: HOW OFTEN DO YOU GET TOGETHER WITH FRIENDS OR RELATIVES?: ONCE A WEEK

## 2024-04-06 SDOH — SOCIAL STABILITY: SOCIAL INSECURITY: DO YOU FEEL ANYONE HAS EXPLOITED OR TAKEN ADVANTAGE OF YOU FINANCIALLY OR OF YOUR PERSONAL PROPERTY?: NO

## 2024-04-06 SDOH — SOCIAL STABILITY: SOCIAL INSECURITY: HAS ANYONE EVER THREATENED TO HURT YOUR FAMILY OR YOUR PETS?: NO

## 2024-04-06 SDOH — SOCIAL STABILITY: SOCIAL NETWORK: IN A TYPICAL WEEK, HOW MANY TIMES DO YOU TALK ON THE PHONE WITH FAMILY, FRIENDS, OR NEIGHBORS?: TWICE A WEEK

## 2024-04-06 SDOH — SOCIAL STABILITY: SOCIAL INSECURITY: DOES ANYONE TRY TO KEEP YOU FROM HAVING/CONTACTING OTHER FRIENDS OR DOING THINGS OUTSIDE YOUR HOME?: NO

## 2024-04-06 SDOH — SOCIAL STABILITY: SOCIAL INSECURITY: ABUSE: ADULT

## 2024-04-06 SDOH — SOCIAL STABILITY: SOCIAL NETWORK: HOW OFTEN DO YOU ATTENT MEETINGS OF THE CLUB OR ORGANIZATION YOU BELONG TO?: NEVER

## 2024-04-06 SDOH — SOCIAL STABILITY: SOCIAL INSECURITY: WERE YOU ABLE TO COMPLETE ALL THE BEHAVIORAL HEALTH SCREENINGS?: YES

## 2024-04-06 ASSESSMENT — COGNITIVE AND FUNCTIONAL STATUS - GENERAL
MOBILITY SCORE: 24
PATIENT BASELINE BEDBOUND: NO
MOBILITY SCORE: 24
DAILY ACTIVITIY SCORE: 24
DAILY ACTIVITIY SCORE: 24

## 2024-04-06 ASSESSMENT — PATIENT HEALTH QUESTIONNAIRE - PHQ9
1. LITTLE INTEREST OR PLEASURE IN DOING THINGS: NOT AT ALL
SUM OF ALL RESPONSES TO PHQ9 QUESTIONS 1 & 2: 0
2. FEELING DOWN, DEPRESSED OR HOPELESS: NOT AT ALL

## 2024-04-06 ASSESSMENT — LIFESTYLE VARIABLES
AUDIT-C TOTAL SCORE: 0
SKIP TO QUESTIONS 9-10: 1
HOW OFTEN DO YOU HAVE 6 OR MORE DRINKS ON ONE OCCASION: NEVER
SUBSTANCE_ABUSE_PAST_12_MONTHS: NO
SUBSTANCE_ABUSE_PAST_12_MONTHS: NO
SKIP TO QUESTIONS 9-10: 1
AUDIT-C TOTAL SCORE: 0
HOW MANY STANDARD DRINKS CONTAINING ALCOHOL DO YOU HAVE ON A TYPICAL DAY: PATIENT DOES NOT DRINK
PRESCIPTION_ABUSE_PAST_12_MONTHS: NO
HOW OFTEN DO YOU HAVE A DRINK CONTAINING ALCOHOL: NEVER
PRESCIPTION_ABUSE_PAST_12_MONTHS: NO
AUDIT-C TOTAL SCORE: 0

## 2024-04-06 ASSESSMENT — PAIN - FUNCTIONAL ASSESSMENT: PAIN_FUNCTIONAL_ASSESSMENT: 0-10

## 2024-04-06 ASSESSMENT — ACTIVITIES OF DAILY LIVING (ADL)
BATHING: INDEPENDENT
HEARING - LEFT EAR: FUNCTIONAL
ADEQUATE_TO_COMPLETE_ADL: YES
PATIENT'S MEMORY ADEQUATE TO SAFELY COMPLETE DAILY ACTIVITIES?: YES
DRESSING YOURSELF: INDEPENDENT
GROOMING: INDEPENDENT
JUDGMENT_ADEQUATE_SAFELY_COMPLETE_DAILY_ACTIVITIES: YES
HEARING - RIGHT EAR: FUNCTIONAL
TOILETING: INDEPENDENT
LACK_OF_TRANSPORTATION: NO
WALKS IN HOME: INDEPENDENT
FEEDING YOURSELF: INDEPENDENT

## 2024-04-06 ASSESSMENT — PAIN SCALES - WONG BAKER
WONGBAKER_NUMERICALRESPONSE: NO HURT

## 2024-04-06 ASSESSMENT — PAIN SCALES - GENERAL
PAINLEVEL_OUTOF10: 0 - NO PAIN

## 2024-04-06 NOTE — CONSULTS
Inpatient consult to Cardiology  Consult performed by: Becky Vela MD  Consult ordered by: Samantha Alcantara MD  Reason for consult: Heart failure, atrial fibrillation.        History Of Present Illness:    Linette Doyle is a 83 y.o. female presenting with shortness of breath.  Patient reports being at usual state of health until the last week or so where she has been complaining of shortness of breath.  Patient has a history of atrial fibrillation on oral anticoagulation status post cardioversion in the past.  History of acute non-ST elevation myocardial infarction in February 2, 2024 was found to have 100% occluded small left circumflex artery.  The right coronary artery was treated with drug-eluting stent.  Attempted intervention to the left circumflex artery was not successful.  Patient subsequently had a prolonged hospitalization was discharged home.  Returns now for shortness of breath acute decompensated heart failure.  Denies having chest pain.  Denies palpitations however she is in SVT with heart rate around 140 bpm.    Review of systems.  10 point review of systems otherwise negative.  Patient denies having fever chills nausea vomiting     Last Recorded Vitals:  Vitals:    04/06/24 1218 04/06/24 1219 04/06/24 1220 04/06/24 1221   BP:    112/70   BP Location:    Right arm   Patient Position:    Lying   Pulse: (!) 126 (!) 127 (!) 127 (!) 127   Resp: 21 19 20 (!) 29   Temp:    36.8 °C (98.2 °F)   TempSrc:    Oral   SpO2: 97% 97% 97% 95%   Weight:    72.7 kg (160 lb 4.4 oz)   Height:           Last Labs:  CBC - 4/6/2024:  5:42 AM  10.3 9.8 245    32.8      CMP - 4/6/2024:  5:42 AM  9.5 7.2 33 --- 0.7   3.0 4.4 28 96      PTT - 2/9/2024:  4:59 AM  1.1   11.9 24.6     Hemoglobin A1C   Date/Time Value Ref Range Status   02/01/2024 05:58 AM 6.0 (H) See below % Final   07/05/2023 08:49 AM 5.6 4.0 - 6.0 % Final     Comment:     Hemoglobin A1C levels are related to mean blood glucose during the   preceding 2-3  months. The relationship table below may be used as a   general guide. Each 1% increase in HGB A1C is a reflection of an   increase in mean glucose of approximately 30 mg/dl.   Reference: Diabetes [employer], volume 29, supplement 1 Jan. 2006                        HGB A1C ................. Approx. Mean Glucose   _______________________________________________   6%   ...............................  120 mg/dl   7%   ...............................  150 mg/dl   8%   ...............................  180 mg/dl   9%   ...............................  210 mg/dl   10%  ...............................  240 mg/dl  Performed at [address] [address]     12/14/2022 09:13 AM 5.7 4.0 - 6.0 % Final     Comment:     Hemoglobin A1C levels are related to mean blood glucose during the   preceding 2-3 months. The relationship table below may be used as a   general guide. Each 1% increase in HGB A1C is a reflection of an   increase in mean glucose of approximately 30 mg/dl.   Reference: Diabetes [employer], volume 29, supplement 1 Jan. 2006                        HGB A1C ................. Approx. Mean Glucose   _______________________________________________   6%   ...............................  120 mg/dl   7%   ...............................  150 mg/dl   8%   ...............................  180 mg/dl   9%   ...............................  210 mg/dl   10%  ...............................  240 mg/dl  Performed at [address] [address]       LDL Calculated   Date/Time Value Ref Range Status   07/05/2023 08:49 AM 57 (L) 65 - 130 MG/DL Final   12/14/2022 09:13 AM 64 (L) 65 - 130 MG/DL Final   06/29/2022 08:43 AM 58 (L) 65 - 130 MG/DL Final      Last I/O:  I/O last 3 completed shifts:  In: 423.5 (6.5 mL/kg) [P.O.:200; I.V.:223.5 (3.4 mL/kg)]  Out: 450 (6.9 mL/kg) [Urine:450 (0.2 mL/kg/hr)]  Weight: 64.9 kg     Past Cardiology Tests (Last 3 Years):  EKG:  ECG 12 lead  04/05/2024      Electrocardiogram, 12-lead PRN ACS symptoms 04/05/2024 (Preliminary)      ECG 12 lead 02/07/2024      ECG 12 lead 02/06/2024      ECG 12 Lead 01/31/2024    Echo:  Transthoracic Echo (TTE) Limited 02/09/2024      Transthoracic Echo (TTE) Complete 02/01/2024    Ejection Fractions:  EF   Date/Time Value Ref Range Status   02/09/2024 08:59 AM 58 %    02/01/2024 11:43 AM 59 %      Cath:  Cardiac catheterization - coronary 02/08/2024      Cardiac catheterization - coronary 02/02/2024      Cardiac catheterization - coronary 02/02/2024    Stress Test:  No results found for this or any previous visit from the past 1095 days.    Cardiac Imaging:  No results found for this or any previous visit from the past 1095 days.      Past Medical History:  She has a past medical history of Angina pectoris (CMS/[facility], located [facility]) (10/22/2023), Atherosclerosis of coronary artery (08/21/2019), Atherosclerotic heart disease of native coronary artery with other forms of angina pectoris (CMS/[facility]) (10/22/2023), Hypercholesterolemia (12/28/2018), Hyperlipidemia (10/22/2023), Presence of cardiac pacemaker (10/22/2023), Primary hypertension (12/28/2018), Shortness of breath (11/26/2019), Sick sinus syndrome (CMS/[facility]) (10/22/2023), Sinus bradycardia (10/22/2023), and Stage 3a chronic kidney disease (CMS/[facility] located [facility]) (11/25/2019).    Past Surgical History:  She has a past surgical history that includes Cardiac catheterization (N/A, 02/02/2024); Cardiac catheterization (N/A, 02/02/2024); Cardiac catheterization (N/A, 02/08/2024); Cardiac catheterization (N/A, 02/08/2024); and pacemaker placement (07/04/2019).      Social History:  She reports that she has never smoked. She has never been exposed to tobacco smoke. She has never used smokeless tobacco. She reports that she does not drink alcohol and does not use drugs.    Family History:  Family History   Problem Relation Name Age of Onset    No Known Problems Mother      No Known Problems Father           Allergies:  Iodinated contrast media    Inpatient Medications:  Scheduled medications   Medication Dose Route Frequency    apixaban  2.5 mg oral BID    atorvastatin  80 mg oral Nightly    cholecalciferol  2,000 Units oral Daily    clopidogrel  75 mg oral Daily    [Held by provider] dilTIAZem  120 mg oral BID    dilTIAZem  10 mg intravenous Once    furosemide  40 mg intravenous BID    levothyroxine  100 mcg oral Daily    metoprolol succinate XL  50 mg oral BID    pantoprazole  40 mg oral Daily before breakfast    sertraline  150 mg oral Daily     PRN medications   Medication    acetaminophen    ipratropium-albuteroL    melatonin    ondansetron    ondansetron     Continuous Medications   Medication Dose Last Rate    dilTIAZem  5-15 mg/hr 15 mg/hr (04/06/24 1417)     Outpatient Medications:  Current Outpatient Medications   Medication Instructions    apixaban (ELIQUIS) 2.5 mg, oral, 2 times daily    aspirin 81 mg, oral, Daily    atorvastatin (LIPITOR) 80 mg, oral, Nightly    cholecalciferol (Vitamin D-3) 50 MCG (2000 UT) tablet 1 tablet, oral, Daily    clopidogrel (PLAVIX) 75 mg, oral, Daily    dilTIAZem (Cardizem) 120 mg immediate release tablet 1 tablet, oral, 2 times daily    levothyroxine (Synthroid, Levoxyl) 100 mcg tablet 1 tablet, oral, Daily, 1 tablet in the morning on an empty stomach Orally Once a day for 30 day(s)<BR>    metoprolol succinate XL (Toprol-XL) 25 mg 24 hr tablet 1 tablet, oral, 2 times daily    multivitamin (MULTIPLE VITAMINS ORAL) 1 capsule, oral, Daily    mv-min/FA/vit K/lycop/lut/zeax (OCUVITE EYE PLUS MULTI ORAL) oral    omeprazole (PriLOSEC) 20 mg DR capsule 1 capsule, oral, Daily    sertraline (ZOLOFT) 150 mg, oral, Daily    torsemide (DEMADEX) 10 mg, oral, Daily       Physical Exam:  General: Patient is in mild respiratory distress.  HEENT: atraumatic normocephalic.  Neck: is supple jugular venous pressure within normal limits no thyromegaly.  Cardiovascular regular rate and rhythm  normal heart sounds no murmurs rubs or gallops.  Lungs: Please breathing sounds at bases.  Abdomen: is soft nontender.  Extremities warm to touch no edema.  Neurologic examination: patient is awake alert oriented to person, place, date/time.  Psychiatric examination: patient has good insight denies feeling suicidal and depressed.  Pulses 2+ intact bilaterally     Assessment/Plan   #1 acute on chronic diastolic heart failure.  Patient has severe LVH.  Admitted to the hospital with acute decompensated heart failure.  Small bilateral pleural effusion elevated NT proBNP.  Recommend to continue diuresis.  Monitor renal function.    2.  Tachycardia.  Regular rhythm differential diagnoses include SVT versus atypical flutter.  Will give adenosine to delineate arrhythmia.  Continue oral anticoagulation for now.  Continue metoprolol and we will give further recommendation after adenosine injection.    3.  Coronary artery disease.  Recent non-STEMI.  Continue clopidogrel.  No aspirin.  Patient is on Eliquis.    4.  Hyperlipidemia continue high intensity statin.    5.  Hypertension.      Thank you for allowing to participate in her care we will follow-up in a.m.  Peripheral IV 04/05/24 20 G Left Antecubital (Active)   Site Assessment Clean;Dry;Intact 04/06/24 0900   Dressing Status Clean;Dry 04/06/24 0900   Number of days: 1       Code Status:  DNR and No Intubation      Becky Vela MD

## 2024-04-06 NOTE — CONSULTS
.Reason For Consult  Kidney injury superimposed on chronic kidney disease stage III    History Of Present Illness  Linette Doyle is a 83 y.o. female who is known to have a history of underlying CKD stage III she had also history of acute kidney injury in February after she had her MRI and underwent cardiac cath and stent placement with IV contrast induced acute kidney injury however her creatinine had improved to 1.3 to 1.5 mg/dL and is admitted this time because of increased shortness of breath and palpitations she was found to have A-fib with rapid ventricular rate she is seen by cardiology she had received IV beta-blocker is asked see the patient in consultation because of worsening renal function with higher creatinine level up to 1.9 mg/dL patient at this time denies any chest pain she denies any nausea vomiting diarrhea she is however short of breath.     Review of Systems  10 point review of system was done all negative except was positive for the history of present illness    Past Medical History  She has a past medical history of Angina pectoris (CMS/[facility]) (10/22/2023), Atherosclerosis of coronary artery (08/21/2019), Atherosclerotic heart disease of native coronary artery with other forms of angina pectoris (CMS/[facility]) (10/22/2023), Hypercholesterolemia (12/28/2018), Hyperlipidemia (10/22/2023), Presence of cardiac pacemaker (10/22/2023), Primary hypertension (12/28/2018), Shortness of breath (11/26/2019), Sick sinus syndrome (CMS/[facility]) (10/22/2023), Sinus bradycardia (10/22/2023), and Stage 3a chronic kidney disease (CMS/[facility]) (11/25/2019).    Surgical History  She has a past surgical history that includes Cardiac catheterization (N/A, 02/02/2024); Cardiac catheterization (N/A, 02/02/2024); Cardiac catheterization (N/A, 02/08/2024); Cardiac catheterization (N/A, 02/08/2024); and pacemaker placement (07/04/2019).     Social History  She reports that she has never smoked. She has never been exposed to tobacco  smoke. She has never used smokeless tobacco. She reports that she does not drink alcohol and does not use drugs.    Family History  Family History   Problem Relation Name Age of Onset    No Known Problems Mother      No Known Problems Father          Current Facility-Administered Medications:     acetaminophen (Tylenol) tablet 650 mg, 650 mg, oral, q6h PRN, Samantha Alcantara MD    apixaban (Eliquis) tablet 2.5 mg, 2.5 mg, oral, BID, Samantha Alcantara MD, 2.5 mg at 04/06/24 0931    atorvastatin (Lipitor) tablet 80 mg, 80 mg, oral, Nightly, Samantha Alcantara MD, 80 mg at 04/05/24 2012    cholecalciferol (Vitamin D-3) tablet 2,000 Units, 2,000 Units, oral, Daily, Samantha Alcantara MD, 2,000 Units at 04/06/24 0931    clopidogrel (Plavix) tablet 75 mg, 75 mg, oral, Daily, Samantha Alcantara MD, 75 mg at 04/06/24 0931    [Held by provider] dilTIAZem (Cardizem) immediate release tablet 120 mg, 120 mg, oral, BID, Samantha Alcantara MD    furosemide (Lasix) injection 40 mg, 40 mg, intravenous, BID, Arturo Arvizu DO, 40 mg at 04/06/24 0931    ipratropium-albuteroL (Duo-Neb) 0.5-2.5 mg/3 mL nebulizer solution 3 mL, 3 mL, nebulization, q4h PRN, Samantha Alcantara MD, 3 mL at 04/05/24 2129    levothyroxine (Synthroid, Levoxyl) tablet 100 mcg, 100 mcg, oral, Daily, Samantha Alcantara MD, 100 mcg at 04/06/24 0604    melatonin tablet 5 mg, 5 mg, oral, Nightly PRN, Samantha Alcantara MD    metoprolol succinate XL (Toprol-XL) 24 hr tablet 50 mg, 50 mg, oral, BID, Arturo Arvizu DO, 50 mg at 04/06/24 0931    ondansetron (Zofran) injection 4 mg, 4 mg, intravenous, q6h PRN, Samantha Alcantara MD    ondansetron (Zofran) tablet 4 mg, 4 mg, oral, 4x daily PRN, Samantha Alcantara MD    pantoprazole (ProtoNix) EC tablet 40 mg, 40 mg, oral, Daily before breakfast, Samantha Alcantara MD, 40 mg at 04/06/24 0604    sertraline (Zoloft) tablet 150 mg, 150 mg, oral, Daily, Samantha Alcantara MD, 150 mg at 04/06/24  0931   Allergies  Iodinated contrast media         Physical Exam  Physical Exam  Constitutional:       General: She is not in acute distress.     Appearance: She is not toxic-appearing.   HENT:      Head: Normocephalic and atraumatic.   Eyes:      Extraocular Movements: Extraocular movements intact.      Pupils: Pupils are equal, round, and reactive to light.   Neck:      Vascular: No carotid bruit.   Cardiovascular:      Rate and Rhythm: Normal rate and regular rhythm.   Pulmonary:      Effort: No respiratory distress.      Breath sounds: No stridor. Rales present. No wheezing or rhonchi.   Chest:      Chest wall: No tenderness.   Abdominal:      General: There is no distension.      Palpations: There is no mass.      Tenderness: There is no abdominal tenderness. There is no right CVA tenderness, left CVA tenderness or guarding.      Hernia: No hernia is present.   Musculoskeletal:         General: No swelling or tenderness.      Cervical back: No rigidity.      Right lower leg: Edema present.      Left lower leg: Edema present.   Lymphadenopathy:      Cervical: No cervical adenopathy.   Skin:     General: Skin is warm and dry.      Coloration: Skin is not jaundiced or pale.      Findings: No bruising or erythema.   Neurological:      General: No focal deficit present.      Mental Status: She is alert and oriented to person, place, and time.   Psychiatric:         Mood and Affect: Mood normal.         Behavior: Behavior normal.              I&O 24HR    Intake/Output Summary (Last 24 hours) at 4/6/2024 1326  Last data filed at 4/6/2024 1000  Gross per 24 hour   Intake 863.5 ml   Output 450 ml   Net 413.5 ml       Vitals 24HR  Heart Rate:  [103-141]   Temp:  [36.3 °C (97.3 °F)-36.8 °C (98.2 °F)]   Resp:  [15-38]   BP: (101-139)/()   Weight:  [72.7 kg (160 lb 4.4 oz)]   SpO2:  [68 %-100 %]     Relevant Results        Results for orders placed or performed during the hospital encounter of 04/05/24 (from the past  96 hour(s))   ECG 12 lead   Result Value Ref Range    Ventricular Rate 144 BPM    QRS Duration 80 ms    QT Interval 298 ms    QTC Calculation(Bazett) 461 ms    R Axis 71 degrees    T Axis 241 degrees    QRS Count 23 beats    Q Onset 229 ms    T Offset 378 ms    QTC Fredericia 398 ms   CBC and Auto Differential   Result Value Ref Range    WBC 9.3 4.4 - 11.3 x10*3/uL    nRBC 0.0 0.0 - 0.0 /100 WBCs    RBC 3.50 (L) 4.00 - 5.20 x10*6/uL    Hemoglobin 9.9 (L) 12.0 - 16.0 g/dL    Hematocrit 32.1 (L) 36.0 - 46.0 %    MCV 92 80 - 100 fL    MCH 28.3 26.0 - 34.0 pg    MCHC 30.8 (L) 32.0 - 36.0 g/dL    RDW 15.7 (H) 11.5 - 14.5 %    Platelets 209 150 - 450 x10*3/uL    Neutrophils % 76.7 40.0 - 80.0 %    Immature Granulocytes %, Automated 0.6 0.0 - 0.9 %    Lymphocytes % 14.1 13.0 - 44.0 %    Monocytes % 7.1 2.0 - 10.0 %    Eosinophils % 1.0 0.0 - 6.0 %    Basophils % 0.5 0.0 - 2.0 %    Neutrophils Absolute 7.13 (H) 1.60 - 5.50 x10*3/uL    Immature Granulocytes Absolute, Automated 0.06 0.00 - 0.50 x10*3/uL    Lymphocytes Absolute 1.31 0.80 - 3.00 x10*3/uL    Monocytes Absolute 0.66 0.05 - 0.80 x10*3/uL    Eosinophils Absolute 0.09 0.00 - 0.40 x10*3/uL    Basophils Absolute 0.05 0.00 - 0.10 x10*3/uL   Comprehensive metabolic panel   Result Value Ref Range    Glucose 136 (H) 65 - 99 mg/dL    Sodium 140 133 - 145 mmol/L    Potassium 3.3 (L) 3.4 - 5.1 mmol/L    Chloride 99 97 - 107 mmol/L    Bicarbonate 24 24 - 31 mmol/L    Urea Nitrogen 33 (H) 8 - 25 mg/dL    Creatinine 1.90 (H) 0.40 - 1.60 mg/dL    eGFR 26 (L) >60 mL/min/1.73m*2    Calcium 9.7 8.5 - 10.4 mg/dL    Albumin 4.4 3.5 - 5.0 g/dL    Alkaline Phosphatase 96 35 - 125 U/L    Total Protein 7.2 5.9 - 7.9 g/dL    AST 33 5 - 40 U/L    Bilirubin, Total 0.7 0.1 - 1.2 mg/dL    ALT 28 5 - 40 U/L    Anion Gap 17 <=19 mmol/L   Magnesium   Result Value Ref Range    Magnesium 1.90 1.60 - 3.10 mg/dL   Sars-CoV-2 and Influenza A/B PCR   Result Value Ref Range    Flu A Result Not Detected  Not Detected    Flu B Result Not Detected Not Detected    Coronavirus 2019, PCR Not Detected Not Detected   Serial Troponin, Initial ([facility])   Result Value Ref Range    Troponin T, High Sensitivity 46 (HH) <=14 ng/L   Serial Troponin, 2 Hour ([facility])   Result Value Ref Range    Troponin T, High Sensitivity 45 (HH) <=14 ng/L   NT Pro-BNP   Result Value Ref Range    PROBNP 19,252 (H) 0 - 624 pg/mL   Serial Troponin, 6 Hour ([facility])   Result Value Ref Range    Troponin T, High Sensitivity 49 (HH) <=14 ng/L   CBC   Result Value Ref Range    WBC 10.3 4.4 - 11.3 x10*3/uL    nRBC 0.0 0.0 - 0.0 /100 WBCs    RBC 3.52 (L) 4.00 - 5.20 x10*6/uL    Hemoglobin 9.8 (L) 12.0 - 16.0 g/dL    Hematocrit 32.8 (L) 36.0 - 46.0 %    MCV 93 80 - 100 fL    MCH 27.8 26.0 - 34.0 pg    MCHC 29.9 (L) 32.0 - 36.0 g/dL    RDW 15.9 (H) 11.5 - 14.5 %    Platelets 245 150 - 450 x10*3/uL   Basic Metabolic Panel   Result Value Ref Range    Glucose 153 (H) 65 - 99 mg/dL    Sodium 140 133 - 145 mmol/L    Potassium 3.7 3.4 - 5.1 mmol/L    Chloride 99 97 - 107 mmol/L    Bicarbonate 23 (L) 24 - 31 mmol/L    Urea Nitrogen 36 (H) 8 - 25 mg/dL    Creatinine 1.90 (H) 0.40 - 1.60 mg/dL    eGFR 26 (L) >60 mL/min/1.73m*2    Calcium 9.5 8.5 - 10.4 mg/dL    Anion Gap 18 <=19 mmol/L          Assessment/Plan     ECG 12 lead    Result Date: 4/6/2024  Supraventricular tachycardia Nonspecific ST and T wave abnormality Abnormal ECG Confirmed by Dean Hanks ([id number]) on 4/6/2024 1:21:45 PM    XR chest 2 views    Result Date: 4/5/2024  Interpreted By:  Mc Carranza, STUDY: XR CHEST 2 VIEWS; 4/5/2024 11:25 am   INDICATION: Signs/Symptoms:sob   COMPARISON: 02/07/2024   ACCESSION NUMBER(S): [id number]   ORDERING CLINICIAN: BREE SOTO   TECHNIQUE: PA and LAT views of the chest were obtained.   FINDINGS: The cardiomediastinal silhouette is mildly enlarged, grossly stable. There are bilateral lower lobe airspace opacities most suggestive of small pleural effusions and adjacent  atelectasis. There is mild prominence of the interstitium and mild ground-glass opacity suggesting mild pulmonary edema.   The osseous structures are intact.       Small bilateral opacities at the lung bases, most suggestive of small pleural effusions and adjacent atelectasis. There is mild prominence of the interstitium and mild ground-glass opacity suggesting mild pulmonary edema.     Signed by: Mc Carranza 4/5/2024 12:09 PM Dictation workstation:   [id number]    Electrocardiogram, 12-lead PRN ACS symptoms    Result Date: 4/5/2024  Sinus tachycardia with frequent Premature ventricular complexes and Fusion complexes Marked ST abnormality, possible inferior subendocardial injury Abnormal ECG When compared with ECG of 07-FEB-2024 17:36, Sinus rhythm has replaced Electronic atrial pacemaker Vent. rate has increased BY  82 BPM ST now depressed in Inferior leads ST no longer depressed in Anterior leads Nonspecific T wave abnormality now evident in Inferior leads    Impression:  Acute kidney injury superimposed on chronic kidney DISEASE STAGE III secondary to A-fib with rapid ventricular rate and congestive heart failure  A-fib with rapid ventricular rate  CKD stage III  Pulmonary edema  Coronary artery disease  Coronary artery stenting    Recommendations:  Agree with current management continue with IV furosemide twice a day, fluid restriction 1500 mL in 24 hours, cardiology evaluation for the A-fib, monitor renal function and electrolytes very closely. thank you very much for consultation.      I spent 30 minutes in the professional and overall care of this patient.      Ronnie Giang MDInpatient consult to Nephrology  Consult performed by: Ronnie Giang MD  Consult ordered by: Samantha Alcantara MD

## 2024-04-06 NOTE — NURSING NOTE
HR is elevated up to 130's-140's phoned alyssa Dove to give a one time dose Metoprolol per request due to elevated HR.

## 2024-04-06 NOTE — PROGRESS NOTES
Called to see patient for increasing oxygen requirements she remains in atrial fibrillation with rapid ventricular response between 120 and 140 bpm on IV diltiazem drip at 15 mg.  I have adjusted her beta-blocker from 25 mg twice daily of Toprol-XL to 50 mg twice daily of Toprol-XL and administered 40 mg of Lasix IV push will continue Lasix at 40 mg every 12 hours replace her potassium level her echocardiogram previously showed a well-preserved ejection fraction with diastolic dysfunction.  Total time spent in assessment of patient review of laboratories echocardiogram and revising medications is 30 minutes

## 2024-04-06 NOTE — PROGRESS NOTES
Linette Doyle is a 83 y.o. female on day 1 of admission presenting with Paroxysmal A-fib (CMS/HCC).      Subjective   Rolf in A-fib with RVR, heart rate between 120s 130s.  Pill dose was increased to 50 mg twice a day.       Objective     Last Recorded Vitals  /70 (BP Location: Right arm, Patient Position: Lying)   Pulse (!) 127   Temp 36.8 °C (98.2 °F) (Oral)   Resp (!) 29   Wt 72.7 kg (160 lb 4.4 oz)   SpO2 95%   Intake/Output last 3 Shifts:    Intake/Output Summary (Last 24 hours) at 4/6/2024 1406  Last data filed at 4/6/2024 1000  Gross per 24 hour   Intake 863.5 ml   Output 450 ml   Net 413.5 ml       Admission Weight  Weight: 64.9 kg (143 lb) (04/05/24 1048)    Daily Weight  04/06/24 : 72.7 kg (160 lb 4.4 oz)    Image Results  ECG 12 lead  Supraventricular tachycardia  Nonspecific ST and T wave abnormality  Abnormal ECG    Confirmed by Dean Hanks ([id number]) on 4/6/2024 1:21:45 PM      Physical Exam  Pt is NAD.  Cooperative with exam.  In mild respiratory distress  A, Ox3.  Face is symmetrical.  Skin - no lesions.  Lungs: clear to auscultations B/L, diminished.  No wheezes, rales, rhonchi.  Heart: irregular cardiac S1S2.  Abdomen: soft, NT, ND. BS positive.  Extr.: no edema, cords, cyanosis.  Moves all extr.   Relevant Results               Assessment/Plan                  Principal Problem:    Paroxysmal A-fib (CMS/HCC)    A-fib with RVR. Vs aflutter? Will restart Cardizem drip and give Cardizem bolus.  Continue increased dose of metoprolol 50 mg twice a day.   Will try to avoid digoxin given abnormal renal function.  Possible amiodarone?  Discussed with Dr. Vela.  He will assess and give his recommendations.  Acute on chronic renal failure.  Followed by nephrologist.  Continue diuresis  Acute on chronic diastolic CHF.  IV diuresis  Coronary artery disease, recent non-STEMI with placement of 2 stents to RCA              Samantha Alcantara MD

## 2024-04-06 NOTE — CARE PLAN
The patient's goals for the shift include Patient Safety    The clinical goals for the shift include Maintain Normal Heart Rate, Rhythm

## 2024-04-06 NOTE — CARE PLAN
The patient's goals for the shift include Patient Safety    The clinical goals for the shift include Maintain Normal Heart Rate, Rhythm    Problem: Pain  Goal: My pain/discomfort is manageable  Outcome: Progressing     Problem: Safety  Goal: Patient will be injury free during hospitalization  Outcome: Progressing  Goal: I will remain free of falls  Outcome: Progressing     Problem: Daily Care  Goal: Daily care needs are met  Outcome: Progressing     Problem: Psychosocial Needs  Goal: Demonstrates ability to cope with hospitalization/illness  Outcome: Progressing

## 2024-04-07 LAB
ANION GAP SERPL CALC-SCNC: 16 MMOL/L
BUN SERPL-MCNC: 47 MG/DL (ref 8–25)
CALCIUM SERPL-MCNC: 9.5 MG/DL (ref 8.5–10.4)
CHLORIDE SERPL-SCNC: 97 MMOL/L (ref 97–107)
CO2 SERPL-SCNC: 21 MMOL/L (ref 24–31)
CREAT SERPL-MCNC: 2.3 MG/DL (ref 0.4–1.6)
EGFRCR SERPLBLD CKD-EPI 2021: 20 ML/MIN/1.73M*2
GLUCOSE SERPL-MCNC: 129 MG/DL (ref 65–99)
POTASSIUM SERPL-SCNC: 4.1 MMOL/L (ref 3.4–5.1)
SODIUM SERPL-SCNC: 134 MMOL/L (ref 133–145)

## 2024-04-07 PROCEDURE — 1090000001 HH PPS REVENUE CREDIT

## 2024-04-07 PROCEDURE — 1090000002 HH PPS REVENUE DEBIT

## 2024-04-07 PROCEDURE — 99233 SBSQ HOSP IP/OBS HIGH 50: CPT | Performed by: INTERNAL MEDICINE

## 2024-04-07 PROCEDURE — 2500000001 HC RX 250 WO HCPCS SELF ADMINISTERED DRUGS (ALT 637 FOR MEDICARE OP): Performed by: INTERNAL MEDICINE

## 2024-04-07 PROCEDURE — 93010 ELECTROCARDIOGRAM REPORT: CPT | Performed by: INTERNAL MEDICINE

## 2024-04-07 PROCEDURE — 36415 COLL VENOUS BLD VENIPUNCTURE: CPT | Performed by: INTERNAL MEDICINE

## 2024-04-07 PROCEDURE — 2500000004 HC RX 250 GENERAL PHARMACY W/ HCPCS (ALT 636 FOR OP/ED): Performed by: INTERNAL MEDICINE

## 2024-04-07 PROCEDURE — 2500000002 HC RX 250 W HCPCS SELF ADMINISTERED DRUGS (ALT 637 FOR MEDICARE OP, ALT 636 FOR OP/ED): Mod: MUE | Performed by: INTERNAL MEDICINE

## 2024-04-07 PROCEDURE — 80048 BASIC METABOLIC PNL TOTAL CA: CPT | Performed by: INTERNAL MEDICINE

## 2024-04-07 PROCEDURE — 2060000001 HC INTERMEDIATE ICU ROOM DAILY

## 2024-04-07 PROCEDURE — 2500000002 HC RX 250 W HCPCS SELF ADMINISTERED DRUGS (ALT 637 FOR MEDICARE OP, ALT 636 FOR OP/ED): Performed by: INTERNAL MEDICINE

## 2024-04-07 RX ORDER — ISOSORBIDE MONONITRATE 30 MG/1
30 TABLET, EXTENDED RELEASE ORAL DAILY
Status: DISCONTINUED | OUTPATIENT
Start: 2024-04-07 | End: 2024-04-11 | Stop reason: HOSPADM

## 2024-04-07 RX ORDER — AMIODARONE HYDROCHLORIDE 200 MG/1
400 TABLET ORAL 3 TIMES DAILY
Status: DISCONTINUED | OUTPATIENT
Start: 2024-04-07 | End: 2024-04-09

## 2024-04-07 RX ORDER — DIGOXIN 0.25 MG/ML
500 INJECTION INTRAMUSCULAR; INTRAVENOUS ONCE
Status: COMPLETED | OUTPATIENT
Start: 2024-04-07 | End: 2024-04-07

## 2024-04-07 RX ORDER — FUROSEMIDE 10 MG/ML
40 INJECTION INTRAMUSCULAR; INTRAVENOUS DAILY
Status: DISCONTINUED | OUTPATIENT
Start: 2024-04-08 | End: 2024-04-08

## 2024-04-07 RX ADMIN — APIXABAN 2.5 MG: 5 TABLET, FILM COATED ORAL at 22:02

## 2024-04-07 RX ADMIN — FUROSEMIDE 40 MG: 10 INJECTION, SOLUTION INTRAMUSCULAR; INTRAVENOUS at 10:13

## 2024-04-07 RX ADMIN — Medication 2000 UNITS: at 10:16

## 2024-04-07 RX ADMIN — DILTIAZEM HYDROCHLORIDE 60 MG: 60 TABLET ORAL at 02:46

## 2024-04-07 RX ADMIN — PANTOPRAZOLE SODIUM 40 MG: 40 TABLET, DELAYED RELEASE ORAL at 05:37

## 2024-04-07 RX ADMIN — AMIODARONE HYDROCHLORIDE 400 MG: 200 TABLET ORAL at 16:26

## 2024-04-07 RX ADMIN — APIXABAN 2.5 MG: 5 TABLET, FILM COATED ORAL at 10:13

## 2024-04-07 RX ADMIN — ATORVASTATIN CALCIUM 80 MG: 80 TABLET, FILM COATED ORAL at 22:02

## 2024-04-07 RX ADMIN — AMIODARONE HYDROCHLORIDE 400 MG: 200 TABLET ORAL at 10:13

## 2024-04-07 RX ADMIN — SERTRALINE 150 MG: 100 TABLET, FILM COATED ORAL at 10:13

## 2024-04-07 RX ADMIN — METOPROLOL 100 MG: 100 TABLET ORAL at 22:02

## 2024-04-07 RX ADMIN — METOPROLOL 100 MG: 100 TABLET ORAL at 10:13

## 2024-04-07 RX ADMIN — LEVOTHYROXINE SODIUM 100 MCG: 0.1 TABLET ORAL at 05:37

## 2024-04-07 RX ADMIN — AMIODARONE HYDROCHLORIDE 400 MG: 200 TABLET ORAL at 22:02

## 2024-04-07 RX ADMIN — DIGOXIN 500 MCG: 0.25 INJECTION INTRAMUSCULAR; INTRAVENOUS at 10:13

## 2024-04-07 RX ADMIN — ISOSORBIDE MONONITRATE 30 MG: 30 TABLET, EXTENDED RELEASE ORAL at 10:13

## 2024-04-07 RX ADMIN — CLOPIDOGREL BISULFATE 75 MG: 75 TABLET ORAL at 10:13

## 2024-04-07 ASSESSMENT — COGNITIVE AND FUNCTIONAL STATUS - GENERAL
STANDING UP FROM CHAIR USING ARMS: A LITTLE
MOBILITY SCORE: 17
CLIMB 3 TO 5 STEPS WITH RAILING: A LOT
HELP NEEDED FOR BATHING: A LITTLE
DRESSING REGULAR LOWER BODY CLOTHING: A LITTLE
TURNING FROM BACK TO SIDE WHILE IN FLAT BAD: A LITTLE
MOVING TO AND FROM BED TO CHAIR: A LITTLE
PERSONAL GROOMING: A LITTLE
WALKING IN HOSPITAL ROOM: A LITTLE
MOVING FROM LYING ON BACK TO SITTING ON SIDE OF FLAT BED WITH BEDRAILS: A LITTLE
TOILETING: A LITTLE
DAILY ACTIVITIY SCORE: 20

## 2024-04-07 ASSESSMENT — PAIN - FUNCTIONAL ASSESSMENT: PAIN_FUNCTIONAL_ASSESSMENT: 0-10

## 2024-04-07 ASSESSMENT — PAIN SCALES - GENERAL
PAINLEVEL_OUTOF10: 0 - NO PAIN
PAINLEVEL_OUTOF10: 0 - NO PAIN

## 2024-04-07 ASSESSMENT — PAIN SCALES - WONG BAKER: WONGBAKER_NUMERICALRESPONSE: NO HURT

## 2024-04-07 NOTE — PROGRESS NOTES
"Linette Doyle is a 83 y.o. female on day 2 of admission presenting with Paroxysmal A-fib (CMS/HCC).    Subjective   Was seen yesterday for acute kidney injury on top of chronic kidney disease stage III secondary to A-fib with RVR and CHF still short of breath still tachycardic       Objective     Physical Exam  Neck:      Vascular: No carotid bruit.   Cardiovascular:      Rate and Rhythm: Tachycardia present. Rhythm irregular.      Heart sounds: No murmur heard.     No friction rub. No gallop.   Pulmonary:      Breath sounds: No wheezing, rhonchi or rales.   Chest:      Chest wall: No tenderness.   Abdominal:      General: There is no distension.      Tenderness: There is no abdominal tenderness. There is no guarding or rebound.   Musculoskeletal:         General: No swelling or tenderness.      Cervical back: Neck supple.      Right lower leg: No edema.      Left lower leg: No edema.   Lymphadenopathy:      Cervical: No cervical adenopathy.         Last Recorded Vitals  Blood pressure (!) 112/94, pulse (!) 129, temperature 36.4 °C (97.5 °F), temperature source Axillary, resp. rate 24, height 1.626 m (5' 4\"), weight 73 kg (160 lb 15 oz), SpO2 99 %.    Intake/Output last 3 Shifts:  I/O last 3 completed shifts:  In: 963.5 (13.3 mL/kg) [P.O.:740; I.V.:223.5 (3.1 mL/kg)]  Out: 450 (6.2 mL/kg) [Urine:450 (0.2 mL/kg/hr)]  Weight: 72.7 kg     Current Facility-Administered Medications:     acetaminophen (Tylenol) tablet 650 mg, 650 mg, oral, q6h PRN, Samantha Alcantara MD    amiodarone (Pacerone) tablet 400 mg, 400 mg, oral, TID, Becky Vela MD, 400 mg at 04/07/24 1013    apixaban (Eliquis) tablet 2.5 mg, 2.5 mg, oral, BID, Samantha Alcantara MD, 2.5 mg at 04/07/24 1013    atorvastatin (Lipitor) tablet 80 mg, 80 mg, oral, Nightly, Samantha Alcantara MD, 80 mg at 04/06/24 2052    cholecalciferol (Vitamin D-3) tablet 2,000 Units, 2,000 Units, oral, Daily, Samantha Alcantara MD, 2,000 Units at 04/07/24 1016    " clopidogrel (Plavix) tablet 75 mg, 75 mg, oral, Daily, Samantha Alcantara MD, 75 mg at 04/07/24 1013    [Held by provider] dilTIAZem (Cardizem) immediate release tablet 120 mg, 120 mg, oral, BID, Samantha Alcantara MD    furosemide (Lasix) injection 40 mg, 40 mg, intravenous, BID, Arturo Arvizu DO, 40 mg at 04/07/24 1013    ipratropium-albuteroL (Duo-Neb) 0.5-2.5 mg/3 mL nebulizer solution 3 mL, 3 mL, nebulization, q4h PRN, Samantha Alcantara MD, 3 mL at 04/05/24 2129    isosorbide mononitrate ER (Imdur) 24 hr tablet 30 mg, 30 mg, oral, Daily, Becky Vela MD, 30 mg at 04/07/24 1013    levothyroxine (Synthroid, Levoxyl) tablet 100 mcg, 100 mcg, oral, Daily, Samantha Alcantara MD, 100 mcg at 04/07/24 0537    melatonin tablet 5 mg, 5 mg, oral, Nightly PRN, Samantha Alcantara MD    metoprolol tartrate (Lopressor) tablet 100 mg, 100 mg, oral, BID, Becky Vela MD, 100 mg at 04/07/24 1013    ondansetron (Zofran) injection 4 mg, 4 mg, intravenous, q6h PRN, Samantha Alcantara MD    ondansetron (Zofran) tablet 4 mg, 4 mg, oral, 4x daily PRN, Samantha Alcantara MD    pantoprazole (ProtoNix) EC tablet 40 mg, 40 mg, oral, Daily before breakfast, Samantha Alcantara MD, 40 mg at 04/07/24 0537    sertraline (Zoloft) tablet 150 mg, 150 mg, oral, Daily, Samantha Alcantara MD, 150 mg at 04/07/24 1013   Relevant Results    Results for orders placed or performed during the hospital encounter of 04/05/24 (from the past 96 hour(s))   ECG 12 lead   Result Value Ref Range    Ventricular Rate 144 BPM    QRS Duration 80 ms    QT Interval 298 ms    QTC Calculation(Bazett) 461 ms    R Axis 71 degrees    T Axis 241 degrees    QRS Count 23 beats    Q Onset 229 ms    T Offset 378 ms    QTC Fredericia 398 ms   CBC and Auto Differential   Result Value Ref Range    WBC 9.3 4.4 - 11.3 x10*3/uL    nRBC 0.0 0.0 - 0.0 /100 WBCs    RBC 3.50 (L) 4.00 - 5.20 x10*6/uL    Hemoglobin 9.9 (L) 12.0 - 16.0 g/dL    Hematocrit 32.1 (L) 36.0  - 46.0 %    MCV 92 80 - 100 fL    MCH 28.3 26.0 - 34.0 pg    MCHC 30.8 (L) 32.0 - 36.0 g/dL    RDW 15.7 (H) 11.5 - 14.5 %    Platelets 209 150 - 450 x10*3/uL    Neutrophils % 76.7 40.0 - 80.0 %    Immature Granulocytes %, Automated 0.6 0.0 - 0.9 %    Lymphocytes % 14.1 13.0 - 44.0 %    Monocytes % 7.1 2.0 - 10.0 %    Eosinophils % 1.0 0.0 - 6.0 %    Basophils % 0.5 0.0 - 2.0 %    Neutrophils Absolute 7.13 (H) 1.60 - 5.50 x10*3/uL    Immature Granulocytes Absolute, Automated 0.06 0.00 - 0.50 x10*3/uL    Lymphocytes Absolute 1.31 0.80 - 3.00 x10*3/uL    Monocytes Absolute 0.66 0.05 - 0.80 x10*3/uL    Eosinophils Absolute 0.09 0.00 - 0.40 x10*3/uL    Basophils Absolute 0.05 0.00 - 0.10 x10*3/uL   Comprehensive metabolic panel   Result Value Ref Range    Glucose 136 (H) 65 - 99 mg/dL    Sodium 140 133 - 145 mmol/L    Potassium 3.3 (L) 3.4 - 5.1 mmol/L    Chloride 99 97 - 107 mmol/L    Bicarbonate 24 24 - 31 mmol/L    Urea Nitrogen 33 (H) 8 - 25 mg/dL    Creatinine 1.90 (H) 0.40 - 1.60 mg/dL    eGFR 26 (L) >60 mL/min/1.73m*2    Calcium 9.7 8.5 - 10.4 mg/dL    Albumin 4.4 3.5 - 5.0 g/dL    Alkaline Phosphatase 96 35 - 125 U/L    Total Protein 7.2 5.9 - 7.9 g/dL    AST 33 5 - 40 U/L    Bilirubin, Total 0.7 0.1 - 1.2 mg/dL    ALT 28 5 - 40 U/L    Anion Gap 17 <=19 mmol/L   Magnesium   Result Value Ref Range    Magnesium 1.90 1.60 - 3.10 mg/dL   Sars-CoV-2 and Influenza A/B PCR   Result Value Ref Range    Flu A Result Not Detected Not Detected    Flu B Result Not Detected Not Detected    Coronavirus 2019, PCR Not Detected Not Detected   Serial Troponin, Initial ([facility])   Result Value Ref Range    Troponin T, High Sensitivity 46 () <=14 ng/L   Serial Troponin, 2 Hour ([facility])   Result Value Ref Range    Troponin T, High Sensitivity 45 () <=14 ng/L   NT Pro-BNP   Result Value Ref Range    PROBNP 19,252 (H) 0 - 624 pg/mL   Serial Troponin, 6 Hour ([facility])   Result Value Ref Range    Troponin T, High Sensitivity 49 () <=14 ng/L    CBC   Result Value Ref Range    WBC 10.3 4.4 - 11.3 x10*3/uL    nRBC 0.0 0.0 - 0.0 /100 WBCs    RBC 3.52 (L) 4.00 - 5.20 x10*6/uL    Hemoglobin 9.8 (L) 12.0 - 16.0 g/dL    Hematocrit 32.8 (L) 36.0 - 46.0 %    MCV 93 80 - 100 fL    MCH 27.8 26.0 - 34.0 pg    MCHC 29.9 (L) 32.0 - 36.0 g/dL    RDW 15.9 (H) 11.5 - 14.5 %    Platelets 245 150 - 450 x10*3/uL   Basic Metabolic Panel   Result Value Ref Range    Glucose 153 (H) 65 - 99 mg/dL    Sodium 140 133 - 145 mmol/L    Potassium 3.7 3.4 - 5.1 mmol/L    Chloride 99 97 - 107 mmol/L    Bicarbonate 23 (L) 24 - 31 mmol/L    Urea Nitrogen 36 (H) 8 - 25 mg/dL    Creatinine 1.90 (H) 0.40 - 1.60 mg/dL    eGFR 26 (L) >60 mL/min/1.73m*2    Calcium 9.5 8.5 - 10.4 mg/dL    Anion Gap 18 <=19 mmol/L   Basic Metabolic Panel   Result Value Ref Range    Glucose 129 (H) 65 - 99 mg/dL    Sodium 134 133 - 145 mmol/L    Potassium 4.1 3.4 - 5.1 mmol/L    Chloride 97 97 - 107 mmol/L    Bicarbonate 21 (L) 24 - 31 mmol/L    Urea Nitrogen 47 (H) 8 - 25 mg/dL    Creatinine 2.30 (H) 0.40 - 1.60 mg/dL    eGFR 20 (L) >60 mL/min/1.73m*2    Calcium 9.5 8.5 - 10.4 mg/dL    Anion Gap 16 <=19 mmol/L       Assessment/Plan     Acute kidney injury superimposed on chronic kidney DISEASE STAGE III secondary to A-fib with rapid ventricular rate and congestive heart failure because of worsening creatinine I would like to reduce her diuretics to once a day  A-fib with rapid ventricular rate scheduled for cardioversion tomorrow  CKD stage III  Pulmonary edema  Coronary artery disease  Coronary artery stenting           Ronnie Giang MD

## 2024-04-07 NOTE — NURSING NOTE
Dr Hudson notified pt bp 102/84, hr 122, pt received a one time dose of metoprolol 100 mg at 1730 and has a dose scheduled for 2100 per dr hudson ok to give hs dose

## 2024-04-07 NOTE — PROGRESS NOTES
Subjective Data:  Patient still complain of shortness of breath.  Denies having chest pain.  Still complaining of palpitations.  Heart rate poorly controlled.    Overnight Events:    Telemetry overnight reviewed no events     Objective Data:  Last Recorded Vitals:  Vitals:    04/07/24 0245 04/07/24 0246 04/07/24 0403 04/07/24 0700   BP: 98/71  88/68    BP Location:   Right arm    Patient Position:   Lying    Pulse:  (!) 123 (!) 123    Resp:   16    Temp:   36.4 °C (97.5 °F)    TempSrc:   Oral    SpO2:   99%    Weight:    73 kg (160 lb 15 oz)   Height:           Last Labs:  CBC - 4/6/2024:  5:42 AM  10.3 9.8 245    32.8      CMP - 4/7/2024:  5:03 AM  9.5 7.2 33 --- 0.7   3.0 4.4 28 96      PTT - 2/9/2024:  4:59 AM  1.1   11.9 24.6     HGBA1C   Date/Time Value Ref Range Status   02/01/2024 05:58 AM 6.0 See below % Final   07/05/2023 08:49 AM 5.6 4.0 - 6.0 % Final     Comment:     Hemoglobin A1C levels are related to mean blood glucose during the   preceding 2-3 months. The relationship table below may be used as a   general guide. Each 1% increase in HGB A1C is a reflection of an   increase in mean glucose of approximately 30 mg/dl.   Reference: Diabetes Care, volume 29, supplement 1 Jan. 2006                        HGB A1C ................. Approx. Mean Glucose   _______________________________________________   6%   ...............................  120 mg/dl   7%   ...............................  150 mg/dl   8%   ...............................  180 mg/dl   9%   ...............................  210 mg/dl   10%  ...............................  240 mg/dl  Performed at [address] [address]     12/14/2022 09:13 AM 5.7 4.0 - 6.0 % Final     Comment:     Hemoglobin A1C levels are related to mean blood glucose during the   preceding 2-3 months. The relationship table below may be used as a   general guide. Each 1% increase in HGB A1C is a reflection of an   increase in mean glucose of  approximately 30 mg/dl.   Reference: Diabetes Care, volume 29, supplement 1 Jan. 2006                        HGB A1C ................. Approx. Mean Glucose   _______________________________________________   6%   ...............................  120 mg/dl   7%   ...............................  150 mg/dl   8%   ...............................  180 mg/dl   9%   ...............................  210 mg/dl   10%  ...............................  240 mg/dl  Performed at [place] [address]       LDLCALC   Date/Time Value Ref Range Status   07/05/2023 08:49 AM 57 65 - 130 MG/DL Final   12/14/2022 09:13 AM 64 65 - 130 MG/DL Final   06/29/2022 08:43 AM 58 65 - 130 MG/DL Final      Last I/O:  I/O last 3 completed shifts:  In: 963.5 (13.3 mL/kg) [P.O.:740; I.V.:223.5 (3.1 mL/kg)]  Out: 450 (6.2 mL/kg) [Urine:450 (0.2 mL/kg/hr)]  Weight: 72.7 kg     Past Cardiology Tests (Last 3 Years):  EKG:  ECG 12 lead 04/05/2024      Electrocardiogram, 12-lead PRN ACS symptoms 04/05/2024 (Preliminary)      ECG 12 lead 02/07/2024      ECG 12 lead 02/06/2024      ECG 12 Lead 01/31/2024    Echo:  Transthoracic Echo (TTE) Limited 02/09/2024      Transthoracic Echo (TTE) Complete 02/01/2024    Ejection Fractions:  EF   Date/Time Value Ref Range Status   02/09/2024 08:59 AM 58 %    02/01/2024 11:43 AM 59 %      Cath:  Cardiac catheterization - coronary 02/08/2024      Cardiac catheterization - coronary 02/02/2024      Cardiac catheterization - coronary 02/02/2024    Stress Test:  No results found for this or any previous visit from the past 1095 days.    Cardiac Imaging:  No results found for this or any previous visit from the past 1095 days.      Inpatient Medications:  Scheduled medications   Medication Dose Route Frequency    apixaban  2.5 mg oral BID    atorvastatin  80 mg oral Nightly    cholecalciferol  2,000 Units oral Daily    clopidogrel  75 mg oral Daily    [Held by provider] dilTIAZem  120 mg oral BID     dilTIAZem  60 mg oral q6h    dilTIAZem  10 mg intravenous Once    furosemide  40 mg intravenous BID    levothyroxine  100 mcg oral Daily    metoprolol tartrate  100 mg oral BID    pantoprazole  40 mg oral Daily before breakfast    sertraline  150 mg oral Daily     PRN medications   Medication    acetaminophen    ipratropium-albuteroL    melatonin    ondansetron    ondansetron     Continuous Medications   Medication Dose Last Rate       Physical Exam:  eneral: Patient is in mild respiratory distress.  HEENT: atraumatic normocephalic.  Neck: is supple jugular venous pressure within normal limits no thyromegaly.  Cardiovascular regular rate and rhythm normal heart sounds no murmurs rubs or gallops.  Lungs: Please breathing sounds at bases.  Abdomen: is soft nontender.  Extremities warm to touch no edema.          Assessment/Plan   #1 acute on chronic diastolic heart failure.  Patient has severe LVH.  Admitted to the hospital with acute decompensated heart failure.  Small bilateral pleural effusion elevated NT proBNP.  Recommend to continue diuresis.  Renal function slightly worse today.  Remains short of breath.     2.  Tachycardia.  After adenosine patient appears to be in atrial fibrillation with rapid ventricular rate.  She remains tachycardic despite metoprolol and diltiazem.  We will load her on amiodarone.  400 mg oral 3 times daily will give her digoxin one-time stop diltiazem and continue metoprolol.  We will arrange for cardioversion tomorrow.      3.  Coronary artery disease.  Recent non-STEMI.  Continue clopidogrel.  No aspirin.  Patient is on Eliquis.     4.  Hyperlipidemia continue high intensity statin.     5.  Hypertension.       6.  Moderate to severe mitral regurgitation seen in February 2024.  Will obtain limited 2D echo to assess the severity of mitral regurgitation and ejection fraction.  If patient has severe mitral regurgitation we may consider transesophageal echocardiogram and refer her for  clip.      Thank you for allowing to participate in her care we will follow-up in a.m.  Peripheral IV 04/05/24 20 G Left Antecubital (Active)   Site Assessment Clean;Dry;Intact 04/06/24 2100   Dressing Status Dry;Clean;Occlusive 04/06/24 2100   Number of days: 2       Code Status:  DNR and No Intubation        Becky Vela MD

## 2024-04-07 NOTE — CARE PLAN
Problem: Pain  Goal: My pain/discomfort is manageable  Outcome: Progressing     Problem: Safety  Goal: Patient will be injury free during hospitalization  Outcome: Progressing  Goal: I will remain free of falls  Outcome: Progressing     Problem: Daily Care  Goal: Daily care needs are met  Outcome: Progressing     Problem: Psychosocial Needs  Goal: Demonstrates ability to cope with hospitalization/illness  Outcome: Progressing  Goal: Collaborate with me, my family, and caregiver to identify my specific goals  Outcome: Progressing     Problem: Discharge Barriers  Goal: My discharge needs are met  Outcome: Progressing     Problem: Skin  Goal: Decreased wound size/increased tissue granulation at next dressing change  Outcome: Progressing  Flowsheets (Taken 4/7/2024 1936)  Decreased wound size/increased tissue granulation at next dressing change: Promote sleep for wound healing  Goal: Participates in plan/prevention/treatment measures  Outcome: Progressing  Flowsheets (Taken 4/7/2024 1936)  Participates in plan/prevention/treatment measures:   Elevate heels   Discuss with provider PT/OT consult  Goal: Prevent/manage excess moisture  Outcome: Progressing  Flowsheets (Taken 4/7/2024 1936)  Prevent/manage excess moisture:   Cleanse incontinence/protect with barrier cream   Use wicking fabric (obtain order)  Goal: Prevent/minimize sheer/friction injuries  Outcome: Progressing  Flowsheets (Taken 4/7/2024 1936)  Prevent/minimize sheer/friction injuries:   Turn/reposition every 2 hours/use positioning/transfer devices   Use pull sheet   HOB 30 degrees or less  Goal: Promote/optimize nutrition  Outcome: Progressing  Flowsheets (Taken 4/7/2024 1936)  Promote/optimize nutrition:   Offer water/supplements/favorite foods   Discuss with provider if NPO > 2 days  Goal: Promote skin healing  Outcome: Progressing  Flowsheets (Taken 4/7/2024 1936)  Promote skin healing:   Rotate device position/do not position patient on device    Turn/reposition every 2 hours/use positioning/transfer devices   The patient's goals for the shift include safety    The clinical goals for the shift include decrease supplemental oxygen demands      Problem: Pain  Goal: My pain/discomfort is manageable  Outcome: Progressing     Problem: Safety  Goal: Patient will be injury free during hospitalization  Outcome: Progressing  Goal: I will remain free of falls  Outcome: Progressing     Problem: Daily Care  Goal: Daily care needs are met  Outcome: Progressing     Problem: Psychosocial Needs  Goal: Demonstrates ability to cope with hospitalization/illness  Outcome: Progressing  Goal: Collaborate with me, my family, and caregiver to identify my specific goals  Outcome: Progressing     Problem: Discharge Barriers  Goal: My discharge needs are met  Outcome: Progressing     Problem: Skin  Goal: Decreased wound size/increased tissue granulation at next dressing change  Outcome: Progressing  Flowsheets (Taken 4/7/2024 1936)  Decreased wound size/increased tissue granulation at next dressing change: Promote sleep for wound healing  Goal: Participates in plan/prevention/treatment measures  Outcome: Progressing  Flowsheets (Taken 4/7/2024 1936)  Participates in plan/prevention/treatment measures:   Elevate heels   Discuss with provider PT/OT consult  Goal: Prevent/manage excess moisture  Outcome: Progressing  Flowsheets (Taken 4/7/2024 1936)  Prevent/manage excess moisture:   Cleanse incontinence/protect with barrier cream   Use wicking fabric (obtain order)  Goal: Prevent/minimize sheer/friction injuries  Outcome: Progressing  Flowsheets (Taken 4/7/2024 1936)  Prevent/minimize sheer/friction injuries:   Turn/reposition every 2 hours/use positioning/transfer devices   Use pull sheet   HOB 30 degrees or less  Goal: Promote/optimize nutrition  Outcome: Progressing  Flowsheets (Taken 4/7/2024 1936)  Promote/optimize nutrition:   Offer water/supplements/favorite foods   Discuss with  provider if NPO > 2 days  Goal: Promote skin healing  Outcome: Progressing  Flowsheets (Taken 4/7/2024 1936)  Promote skin healing:   Rotate device position/do not position patient on device   Turn/reposition every 2 hours/use positioning/transfer devices

## 2024-04-07 NOTE — NURSING NOTE
Assumed care of, pt is awake lying in bed, HR is 122 afib on tele, on 10Lbubbler, bed locked and lowered, bedside shift report given by RIO Bernal, call light w/in reach.

## 2024-04-07 NOTE — CARE PLAN
The patient's goals for the shift include safety    The clinical goals for the shift include decrease supplemental oxygen demands      Problem: Pain  Goal: My pain/discomfort is manageable  Outcome: Progressing     Problem: Safety  Goal: Patient will be injury free during hospitalization  Outcome: Progressing  Goal: I will remain free of falls  Outcome: Progressing     Problem: Daily Care  Goal: Daily care needs are met  Outcome: Progressing     Problem: Psychosocial Needs  Goal: Demonstrates ability to cope with hospitalization/illness  Outcome: Progressing  Goal: Collaborate with me, my family, and caregiver to identify my specific goals  Outcome: Progressing     Problem: Discharge Barriers  Goal: My discharge needs are met  Outcome: Progressing

## 2024-04-07 NOTE — CARE PLAN
The patient's goals for the shift include safety    The clinical goals for the shift include decrease supplemental oxygen demands    Over the shift, the patient did not make progress toward the following goals. Barriers to progression include continued need for supplemental oxygen. Recommendations to address these barriers include continue poc

## 2024-04-07 NOTE — PROGRESS NOTES
Linette Doyle is a 83 y.o. female on day 2 of admission presenting with Paroxysmal A-fib (CMS/HCC).      Subjective   Remains in A-fib with heart rate in low 120s.  Trial of adenosine yesterday demonstrated atrial fibrillation, not a flutter as an underlying rhythm.       Objective     Last Recorded Vitals  BP (!) 112/94 (BP Location: Left arm, Patient Position: Lying)   Pulse (!) 129   Temp 36.4 °C (97.5 °F) (Axillary)   Resp 24   Wt 73 kg (160 lb 15 oz)   SpO2 99%   Intake/Output last 3 Shifts:    Intake/Output Summary (Last 24 hours) at 4/7/2024 1113  Last data filed at 4/7/2024 0900  Gross per 24 hour   Intake 220 ml   Output --   Net 220 ml         Admission Weight  Weight: 64.9 kg (143 lb) (04/05/24 1048)    Daily Weight  04/07/24 : 73 kg (160 lb 15 oz)    Image Results  ECG 12 lead  Supraventricular tachycardia  Nonspecific ST and T wave abnormality  Abnormal ECG    Confirmed by Dean Hanks ([id number]) on 4/6/2024 1:21:45 PM      Physical Exam  Pt is NAD.  Cooperative with exam.  In mild respiratory distress  A, Ox3.  Face is symmetrical.  Skin - no lesions.  Lungs: clear to auscultations B/L, diminished.  No wheezes, rales, rhonchi.  Heart: irregular tachycardic S1-S2  Abdomen: soft, NT, ND. BS positive.  Extr.: no edema, cords, cyanosis.  Moves all extr.   Relevant Results               Assessment/Plan                  Principal Problem:    Paroxysmal A-fib (CMS/HCC)    A-fib with RVR. Vs aflutter? Will restart Cardizem drip and give Cardizem bolus.  Continue increased dose of metoprolol 50 mg twice a day.   Will try to avoid digoxin given abnormal renal function.  Possible amiodarone?  Discussed with Dr. Vela.  He will assess and give his recommendations.  Acute on chronic renal failure.  Followed by nephrologist.  Continue diuresis  Acute on chronic diastolic CHF.  IV diuresis  Coronary artery disease, recent non-STEMI with placement of 2 stents to RCA    4/7/24:  Remains in a flutter with heart rate in  120s.  Plan is to start amiodarone, had 1 dose of dig today.  Dr. Vela is planning cardioversion tomorrow.  With regards to coronary artery disease and recent non-ST elevation myocardial infarction patient is stable.  No angina.  Worsening acute on chronic renal failure.  Followed by nephrologist.  Patient is not on any nephrotoxic medications.  She had 2 doses of IV Lasix during this admission which may have caused worsening of renal function.              Samantha Alcantara MD

## 2024-04-08 ENCOUNTER — APPOINTMENT (OUTPATIENT)
Dept: CARDIOLOGY | Facility: HOSPITAL | Age: 84
DRG: 308 | End: 2024-04-08
Payer: MEDICARE

## 2024-04-08 ENCOUNTER — APPOINTMENT (OUTPATIENT)
Dept: RADIOLOGY | Facility: HOSPITAL | Age: 84
DRG: 308 | End: 2024-04-08
Payer: MEDICARE

## 2024-04-08 LAB
ANION GAP SERPL CALC-SCNC: 15 MMOL/L
AORTIC VALVE PEAK VELOCITY: 0.79 M/S
ATRIAL RATE: 60 BPM
AV PEAK GRADIENT: 2.5 MMHG
BUN SERPL-MCNC: 52 MG/DL (ref 8–25)
CALCIUM SERPL-MCNC: 9.1 MG/DL (ref 8.5–10.4)
CHLORIDE SERPL-SCNC: 102 MMOL/L (ref 97–107)
CO2 SERPL-SCNC: 23 MMOL/L (ref 24–31)
CREAT SERPL-MCNC: 2.2 MG/DL (ref 0.4–1.6)
EGFRCR SERPLBLD CKD-EPI 2021: 21 ML/MIN/1.73M*2
ERYTHROCYTE [DISTWIDTH] IN BLOOD BY AUTOMATED COUNT: 15.9 % (ref 11.5–14.5)
GLUCOSE SERPL-MCNC: 99 MG/DL (ref 65–99)
HCT VFR BLD AUTO: 31.2 % (ref 36–46)
HGB BLD-MCNC: 9.4 G/DL (ref 12–16)
MCH RBC QN AUTO: 28 PG (ref 26–34)
MCHC RBC AUTO-ENTMCNC: 30.1 G/DL (ref 32–36)
MCV RBC AUTO: 93 FL (ref 80–100)
NRBC BLD-RTO: 0 /100 WBCS (ref 0–0)
P OFFSET: 158 MS
P ONSET: 138 MS
PLATELET # BLD AUTO: 185 X10*3/UL (ref 150–450)
POTASSIUM SERPL-SCNC: 3.5 MMOL/L (ref 3.4–5.1)
PR INTERVAL: 224 MS
Q ONSET: 222 MS
QRS COUNT: 10 BEATS
QRS DURATION: 84 MS
QT INTERVAL: 390 MS
QTC CALCULATION(BAZETT): 392 MS
QTC FREDERICIA: 391 MS
R AXIS: 47 DEGREES
RBC # BLD AUTO: 3.36 X10*6/UL (ref 4–5.2)
RIGHT VENTRICLE PEAK SYSTOLIC PRESSURE: 53.7 MMHG
SODIUM SERPL-SCNC: 140 MMOL/L (ref 133–145)
T AXIS: 266 DEGREES
T OFFSET: 417 MS
VENTRICULAR RATE: 61 BPM
WBC # BLD AUTO: 7 X10*3/UL (ref 4.4–11.3)

## 2024-04-08 PROCEDURE — 93308 TTE F-UP OR LMTD: CPT | Performed by: INTERNAL MEDICINE

## 2024-04-08 PROCEDURE — 2500000001 HC RX 250 WO HCPCS SELF ADMINISTERED DRUGS (ALT 637 FOR MEDICARE OP): Performed by: INTERNAL MEDICINE

## 2024-04-08 PROCEDURE — 93321 DOPPLER ECHO F-UP/LMTD STD: CPT | Performed by: INTERNAL MEDICINE

## 2024-04-08 PROCEDURE — 97165 OT EVAL LOW COMPLEX 30 MIN: CPT | Mod: GO

## 2024-04-08 PROCEDURE — 2500000005 HC RX 250 GENERAL PHARMACY W/O HCPCS: Performed by: INTERNAL MEDICINE

## 2024-04-08 PROCEDURE — 99233 SBSQ HOSP IP/OBS HIGH 50: CPT | Performed by: INTERNAL MEDICINE

## 2024-04-08 PROCEDURE — 93325 DOPPLER ECHO COLOR FLOW MAPG: CPT

## 2024-04-08 PROCEDURE — 97116 GAIT TRAINING THERAPY: CPT | Mod: GP

## 2024-04-08 PROCEDURE — 2060000001 HC INTERMEDIATE ICU ROOM DAILY

## 2024-04-08 PROCEDURE — 1090000002 HH PPS REVENUE DEBIT

## 2024-04-08 PROCEDURE — 97161 PT EVAL LOW COMPLEX 20 MIN: CPT | Mod: GP

## 2024-04-08 PROCEDURE — 2500000002 HC RX 250 W HCPCS SELF ADMINISTERED DRUGS (ALT 637 FOR MEDICARE OP, ALT 636 FOR OP/ED): Performed by: INTERNAL MEDICINE

## 2024-04-08 PROCEDURE — 36415 COLL VENOUS BLD VENIPUNCTURE: CPT | Performed by: INTERNAL MEDICINE

## 2024-04-08 PROCEDURE — 71045 X-RAY EXAM CHEST 1 VIEW: CPT

## 2024-04-08 PROCEDURE — 80048 BASIC METABOLIC PNL TOTAL CA: CPT | Performed by: INTERNAL MEDICINE

## 2024-04-08 PROCEDURE — 71045 X-RAY EXAM CHEST 1 VIEW: CPT | Performed by: RADIOLOGY

## 2024-04-08 PROCEDURE — 1090000001 HH PPS REVENUE CREDIT

## 2024-04-08 PROCEDURE — 85027 COMPLETE CBC AUTOMATED: CPT | Performed by: INTERNAL MEDICINE

## 2024-04-08 PROCEDURE — 93325 DOPPLER ECHO COLOR FLOW MAPG: CPT | Performed by: INTERNAL MEDICINE

## 2024-04-08 RX ORDER — TORSEMIDE 20 MG/1
20 TABLET ORAL DAILY
Status: DISCONTINUED | OUTPATIENT
Start: 2024-04-08 | End: 2024-04-11 | Stop reason: HOSPADM

## 2024-04-08 RX ORDER — HYDRALAZINE HYDROCHLORIDE 10 MG/1
10 TABLET, FILM COATED ORAL 3 TIMES DAILY
Status: DISCONTINUED | OUTPATIENT
Start: 2024-04-08 | End: 2024-04-09

## 2024-04-08 RX ORDER — METOPROLOL TARTRATE 50 MG/1
50 TABLET ORAL 2 TIMES DAILY
Status: DISCONTINUED | OUTPATIENT
Start: 2024-04-08 | End: 2024-04-11 | Stop reason: HOSPADM

## 2024-04-08 RX ADMIN — Medication 2000 UNITS: at 09:27

## 2024-04-08 RX ADMIN — TORSEMIDE 20 MG: 20 TABLET ORAL at 14:06

## 2024-04-08 RX ADMIN — HYDRALAZINE HYDROCHLORIDE 10 MG: 10 TABLET, FILM COATED ORAL at 17:11

## 2024-04-08 RX ADMIN — ATORVASTATIN CALCIUM 80 MG: 80 TABLET, FILM COATED ORAL at 21:04

## 2024-04-08 RX ADMIN — Medication 9 L/MIN: at 09:15

## 2024-04-08 RX ADMIN — AMIODARONE HYDROCHLORIDE 400 MG: 200 TABLET ORAL at 09:27

## 2024-04-08 RX ADMIN — HYDRALAZINE HYDROCHLORIDE 10 MG: 10 TABLET, FILM COATED ORAL at 21:05

## 2024-04-08 RX ADMIN — CLOPIDOGREL BISULFATE 75 MG: 75 TABLET ORAL at 09:27

## 2024-04-08 RX ADMIN — ISOSORBIDE MONONITRATE 30 MG: 30 TABLET, EXTENDED RELEASE ORAL at 09:27

## 2024-04-08 RX ADMIN — SERTRALINE 150 MG: 100 TABLET, FILM COATED ORAL at 09:27

## 2024-04-08 RX ADMIN — LEVOTHYROXINE SODIUM 100 MCG: 0.1 TABLET ORAL at 05:43

## 2024-04-08 RX ADMIN — AMIODARONE HYDROCHLORIDE 400 MG: 200 TABLET ORAL at 21:04

## 2024-04-08 RX ADMIN — APIXABAN 2.5 MG: 5 TABLET, FILM COATED ORAL at 21:05

## 2024-04-08 RX ADMIN — AMIODARONE HYDROCHLORIDE 400 MG: 200 TABLET ORAL at 17:11

## 2024-04-08 RX ADMIN — HYDRALAZINE HYDROCHLORIDE 10 MG: 10 TABLET, FILM COATED ORAL at 09:27

## 2024-04-08 RX ADMIN — PANTOPRAZOLE SODIUM 40 MG: 40 TABLET, DELAYED RELEASE ORAL at 05:43

## 2024-04-08 RX ADMIN — METOPROLOL TARTRATE 50 MG: 50 TABLET, FILM COATED ORAL at 21:05

## 2024-04-08 RX ADMIN — APIXABAN 2.5 MG: 5 TABLET, FILM COATED ORAL at 09:27

## 2024-04-08 ASSESSMENT — COGNITIVE AND FUNCTIONAL STATUS - GENERAL
TURNING FROM BACK TO SIDE WHILE IN FLAT BAD: A LITTLE
WALKING IN HOSPITAL ROOM: A LITTLE
DRESSING REGULAR LOWER BODY CLOTHING: A LITTLE
TOILETING: A LITTLE
MOVING TO AND FROM BED TO CHAIR: A LITTLE
DAILY ACTIVITIY SCORE: 18
CLIMB 3 TO 5 STEPS WITH RAILING: A LOT
PERSONAL GROOMING: A LITTLE
STANDING UP FROM CHAIR USING ARMS: A LITTLE
EATING MEALS: A LITTLE
DRESSING REGULAR UPPER BODY CLOTHING: A LITTLE
HELP NEEDED FOR BATHING: A LITTLE
MOBILITY SCORE: 18

## 2024-04-08 ASSESSMENT — PAIN SCALES - GENERAL
PAINLEVEL_OUTOF10: 0 - NO PAIN

## 2024-04-08 ASSESSMENT — PAIN - FUNCTIONAL ASSESSMENT
PAIN_FUNCTIONAL_ASSESSMENT: 0-10

## 2024-04-08 ASSESSMENT — ACTIVITIES OF DAILY LIVING (ADL)
BATHING_ASSISTANCE: MINIMAL
ADL_ASSISTANCE: INDEPENDENT
LACK_OF_TRANSPORTATION: NO
ADL_ASSISTANCE: INDEPENDENT

## 2024-04-08 NOTE — PROGRESS NOTES
Subjective Data:  Patient converted into sinus rhythm yesterday.  Feels slightly better.  Breathing still tenuous.  Denies having chest pain.    Overnight Events:    Telemetry overnight reviewed no events     Objective Data:  Last Recorded Vitals:  Vitals:    04/07/24 2315 04/07/24 2320 04/07/24 2325 04/08/24 0300   BP: 133/68   134/70   BP Location: Right arm   Right arm   Patient Position: Lying   Lying   Pulse: 65 71 66 64   Resp: 17 15 15 18   Temp: 36.9 °C (98.4 °F)   36.6 °C (97.9 °F)   TempSrc: Oral   Oral   SpO2: 91% 92% 91% 96%   Weight:       Height:           Last Labs:  CBC - 4/8/2024:  5:19 AM  7.0 9.4 185    31.2      CMP - 4/8/2024:  5:19 AM  9.1 7.2 33 --- 0.7   3.0 4.4 28 96      PTT - 2/9/2024:  4:59 AM  1.1   11.9 24.6     HGBA1C   Date/Time Value Ref Range Status   02/01/2024 05:58 AM 6.0 See below % Final   07/05/2023 08:49 AM 5.6 4.0 - 6.0 % Final     Comment:     Hemoglobin A1C levels are related to mean blood glucose during the   preceding 2-3 months. The relationship table below may be used as a   general guide. Each 1% increase in HGB A1C is a reflection of an   increase in mean glucose of approximately 30 mg/dl.   Reference: Diabetes Care, volume 29, supplement 1 Jan. 2006                        HGB A1C ................. Approx. Mean Glucose   _______________________________________________   6%   ...............................  120 mg/dl   7%   ...............................  150 mg/dl   8%   ...............................  180 mg/dl   9%   ...............................  210 mg/dl   10%  ...............................  240 mg/dl  Performed at [address] [address]     12/14/2022 09:13 AM 5.7 4.0 - 6.0 % Final     Comment:     Hemoglobin A1C levels are related to mean blood glucose during the   preceding 2-3 months. The relationship table below may be used as a   general guide. Each 1% increase in HGB A1C is a reflection of an   increase in mean glucose of  approximately 30 mg/dl.   Reference: Diabetes Care, volume 29, supplement 1 Jan. 2006                        HGB A1C ................. Approx. Mean Glucose   _______________________________________________   6%   ...............................  120 mg/dl   7%   ...............................  150 mg/dl   8%   ...............................  180 mg/dl   9%   ...............................  210 mg/dl   10%  ...............................  240 mg/dl  Performed at [address] [address]       LDLCALC   Date/Time Value Ref Range Status   07/05/2023 08:49 AM 57 65 - 130 MG/DL Final   12/14/2022 09:13 AM 64 65 - 130 MG/DL Final   06/29/2022 08:43 AM 58 65 - 130 MG/DL Final      Last I/O:  I/O last 3 completed shifts:  In: 340 (4.7 mL/kg) [P.O.:340]  Out: - (0 mL/kg)   Weight: 73 kg     Past Cardiology Tests (Last 3 Years):  EKG:  ECG 12 lead 04/05/2024      ECG 12 lead 04/05/2024      Electrocardiogram, 12-lead PRN ACS symptoms 04/05/2024 (Preliminary)      ECG 12 lead 02/07/2024      ECG 12 lead 02/06/2024      ECG 12 Lead 01/31/2024    Echo:  Transthoracic Echo (TTE) Limited 04/08/2024      Transthoracic Echo (TTE) Limited 02/09/2024      Transthoracic Echo (TTE) Complete 02/01/2024    Ejection Fractions:  EF   Date/Time Value Ref Range Status   02/09/2024 08:59 AM 58 %    02/01/2024 11:43 AM 59 %      Cath:  Cardiac catheterization - coronary 02/08/2024      Cardiac catheterization - coronary 02/02/2024      Cardiac catheterization - coronary 02/02/2024    Stress Test:  No results found for this or any previous visit from the past 1095 days.    Cardiac Imaging:  No results found for this or any previous visit from the past 1095 days.      Inpatient Medications:  Scheduled medications   Medication Dose Route Frequency    amiodarone  400 mg oral TID    apixaban  2.5 mg oral BID    atorvastatin  80 mg oral Nightly    cholecalciferol  2,000 Units oral Daily    clopidogrel  75 mg oral Daily     [Held by provider] dilTIAZem  120 mg oral BID    hydrALAZINE  10 mg oral TID    isosorbide mononitrate ER  30 mg oral Daily    levothyroxine  100 mcg oral Daily    metoprolol tartrate  50 mg oral BID    oxygen   inhalation Continuous - Inhalation    pantoprazole  40 mg oral Daily before breakfast    sertraline  150 mg oral Daily     PRN medications   Medication    acetaminophen    ipratropium-albuteroL    melatonin    ondansetron    ondansetron     Continuous Medications   Medication Dose Last Rate       Physical Exam:  General: Patient is in mild respiratory distress.  HEENT: atraumatic normocephalic.  Neck: is supple jugular venous pressure within normal limits no thyromegaly.  Cardiovascular regular rate and rhythm normal heart sounds no murmurs rubs or gallops.  Lungs: Please breathing sounds at bases.  Abdomen: is soft nontender.  Extremities warm to touch no edema.           Assessment/Plan   #1 acute on chronic diastolic heart failure.  Patient has severe LVH.  Admitted to the hospital with acute decompensated heart failure.  Small bilateral pleural effusion elevated NT proBNP.  Will hold diuretics for today since his renal function worsened.  2D echo showed moderate to severe mitral regurgitation again preserved ejection fraction and moderate tricuspid regurgitation.  Since we cannot use ACE inhibitors to vasodilate her I will do hydralazine isosorbide to help with the much regurgitation.  She may require mitral clip.  Will talk about AUGUSTIN to assess better the mitral consultation.  Decrease metoprolol to 50 mg oral twice daily.  Continue amiodarone for today will change it to 200 mg oral twice daily by tomorrow.     2.  Tachycardia.  Atrial fibrillation now back into normal sinus rhythm on amiodarone.  Will continue amiodarone 400 mg 3 times daily.  Decrease metoprolol to 50 mg oral twice daily for bradycardia.  Patient has also pacemaker.  Continue oral anticoagulation    3.  Coronary artery disease.  Recent  non-STEMI.  Continue clopidogrel.  No aspirin.  Patient is on Eliquis.  Could     4.  Hyperlipidemia continue high intensity statin.     5.  Hypertension.       6.  Moderate to severe mitral regurgitation seen in February 2024.  Will obtain limited 2D echo to assess the severity of mitral regurgitation and ejection fraction.  If patient has severe mitral regurgitation we may consider transesophageal echocardiogram and refer her for clip.        Thank you for allowing to participate in her care we will follow-up in a.m.  Peripheral IV 04/05/24 20 G Left Antecubital (Active)   Site Assessment Bleeding 04/08/24 0755   Dressing Status Clean;Dry;Occlusive 04/08/24 0755   Number of days: 3       Code Status:  DNR and No Intubation        Becky Vela MD

## 2024-04-08 NOTE — PROGRESS NOTES
"Linette Doyle is a 83 y.o. female on day 3 of admission presenting with Paroxysmal A-fib (CMS/HCC).    Subjective   Was seen for acute kidney injury on top of chronic kidney disease stage III secondary to A-fib with RVR and CHF and converted to sinus rhythm yesterday heart rate in the 60s she feels a little bit better but still short of breath       Objective     Physical Exam  Neck:      Vascular: No carotid bruit.   Cardiovascular:      Rate and Rhythm: Regular rhythm.      Heart sounds: No murmur heard.     No friction rub. No gallop.   Pulmonary:      Breath sounds: No wheezing, rhonchi or rales.   Chest:      Chest wall: No tenderness.   Abdominal:      General: There is no distension.      Tenderness: There is no abdominal tenderness. There is no guarding or rebound.   Musculoskeletal:         General: No swelling or tenderness.      Cervical back: Neck supple.      Right lower leg: No edema.      Left lower leg: No edema.   Lymphadenopathy:      Cervical: No cervical adenopathy.         Last Recorded Vitals  Blood pressure (!) 137/92, pulse 65, temperature 36.4 °C (97.5 °F), temperature source Oral, resp. rate 20, height 1.626 m (5' 4\"), weight 73 kg (160 lb 15 oz), SpO2 97 %.    Intake/Output last 3 Shifts:  I/O last 3 completed shifts:  In: 340 (4.7 mL/kg) [P.O.:340]  Out: - (0 mL/kg)   Weight: 73 kg     Current Facility-Administered Medications:     acetaminophen (Tylenol) tablet 650 mg, 650 mg, oral, q6h PRN, Samantha Alcantara MD    amiodarone (Pacerone) tablet 400 mg, 400 mg, oral, TID, Becky Vela MD, 400 mg at 04/08/24 0927    apixaban (Eliquis) tablet 2.5 mg, 2.5 mg, oral, BID, Samantha Alcantara MD, 2.5 mg at 04/08/24 0927    atorvastatin (Lipitor) tablet 80 mg, 80 mg, oral, Nightly, Samantha Alcantara MD, 80 mg at 04/07/24 2202    cholecalciferol (Vitamin D-3) tablet 2,000 Units, 2,000 Units, oral, Daily, Samantha Alcantara MD, 2,000 Units at 04/08/24 0927    clopidogrel (Plavix) tablet 75 " mg, 75 mg, oral, Daily, Samantha Alcantara MD, 75 mg at 04/08/24 0927    [Held by provider] dilTIAZem (Cardizem) immediate release tablet 120 mg, 120 mg, oral, BID, Samantha Alcantara MD    hydrALAZINE (Apresoline) tablet 10 mg, 10 mg, oral, TID, Becky Vela MD, 10 mg at 04/08/24 0927    ipratropium-albuteroL (Duo-Neb) 0.5-2.5 mg/3 mL nebulizer solution 3 mL, 3 mL, nebulization, q4h PRN, Samantha Alcantara MD, 3 mL at 04/05/24 2129    isosorbide mononitrate ER (Imdur) 24 hr tablet 30 mg, 30 mg, oral, Daily, Becky Vela MD, 30 mg at 04/08/24 0927    levothyroxine (Synthroid, Levoxyl) tablet 100 mcg, 100 mcg, oral, Daily, Samantha Alcantara MD, 100 mcg at 04/08/24 0543    melatonin tablet 5 mg, 5 mg, oral, Nightly PRN, Samantha Alcantara MD    metoprolol tartrate (Lopressor) tablet 50 mg, 50 mg, oral, BID, Becky Vela MD    ondansetron (Zofran) injection 4 mg, 4 mg, intravenous, q6h PRN, Samantha Alcantara MD    ondansetron (Zofran) tablet 4 mg, 4 mg, oral, 4x daily PRN, Samantha Alcantara MD    oxygen (O2) therapy, , inhalation, Continuous - Inhalation, Henry Ricci, DO, 9 L/min at 04/08/24 0915    pantoprazole (ProtoNix) EC tablet 40 mg, 40 mg, oral, Daily before breakfast, Samantha Alcantara MD, 40 mg at 04/08/24 0543    sertraline (Zoloft) tablet 150 mg, 150 mg, oral, Daily, Samantha Alcantara MD, 150 mg at 04/08/24 0927   Relevant Results    Results for orders placed or performed during the hospital encounter of 04/05/24 (from the past 96 hour(s))   ECG 12 lead   Result Value Ref Range    Ventricular Rate 144 BPM    QRS Duration 80 ms    QT Interval 298 ms    QTC Calculation(Bazett) 461 ms    R Axis 71 degrees    T Axis 241 degrees    QRS Count 23 beats    Q Onset 229 ms    T Offset 378 ms    QTC Fredericia 398 ms   CBC and Auto Differential   Result Value Ref Range    WBC 9.3 4.4 - 11.3 x10*3/uL    nRBC 0.0 0.0 - 0.0 /100 WBCs    RBC 3.50 (L) 4.00 - 5.20 x10*6/uL    Hemoglobin 9.9 (L)  12.0 - 16.0 g/dL    Hematocrit 32.1 (L) 36.0 - 46.0 %    MCV 92 80 - 100 fL    MCH 28.3 26.0 - 34.0 pg    MCHC 30.8 (L) 32.0 - 36.0 g/dL    RDW 15.7 (H) 11.5 - 14.5 %    Platelets 209 150 - 450 x10*3/uL    Neutrophils % 76.7 40.0 - 80.0 %    Immature Granulocytes %, Automated 0.6 0.0 - 0.9 %    Lymphocytes % 14.1 13.0 - 44.0 %    Monocytes % 7.1 2.0 - 10.0 %    Eosinophils % 1.0 0.0 - 6.0 %    Basophils % 0.5 0.0 - 2.0 %    Neutrophils Absolute 7.13 (H) 1.60 - 5.50 x10*3/uL    Immature Granulocytes Absolute, Automated 0.06 0.00 - 0.50 x10*3/uL    Lymphocytes Absolute 1.31 0.80 - 3.00 x10*3/uL    Monocytes Absolute 0.66 0.05 - 0.80 x10*3/uL    Eosinophils Absolute 0.09 0.00 - 0.40 x10*3/uL    Basophils Absolute 0.05 0.00 - 0.10 x10*3/uL   Comprehensive metabolic panel   Result Value Ref Range    Glucose 136 (H) 65 - 99 mg/dL    Sodium 140 133 - 145 mmol/L    Potassium 3.3 (L) 3.4 - 5.1 mmol/L    Chloride 99 97 - 107 mmol/L    Bicarbonate 24 24 - 31 mmol/L    Urea Nitrogen 33 (H) 8 - 25 mg/dL    Creatinine 1.90 (H) 0.40 - 1.60 mg/dL    eGFR 26 (L) >60 mL/min/1.73m*2    Calcium 9.7 8.5 - 10.4 mg/dL    Albumin 4.4 3.5 - 5.0 g/dL    Alkaline Phosphatase 96 35 - 125 U/L    Total Protein 7.2 5.9 - 7.9 g/dL    AST 33 5 - 40 U/L    Bilirubin, Total 0.7 0.1 - 1.2 mg/dL    ALT 28 5 - 40 U/L    Anion Gap 17 <=19 mmol/L   Magnesium   Result Value Ref Range    Magnesium 1.90 1.60 - 3.10 mg/dL   Sars-CoV-2 and Influenza A/B PCR   Result Value Ref Range    Flu A Result Not Detected Not Detected    Flu B Result Not Detected Not Detected    Coronavirus 2019, PCR Not Detected Not Detected   Serial Troponin, Initial ([facility])   Result Value Ref Range    Troponin T, High Sensitivity 46 (HH) <=14 ng/L   Serial Troponin, 2 Hour ([facility])   Result Value Ref Range    Troponin T, High Sensitivity 45 (HH) <=14 ng/L   NT Pro-BNP   Result Value Ref Range    PROBNP 19,252 (H) 0 - 624 pg/mL   ECG 12 lead   Result Value Ref Range    Ventricular Rate 61  BPM    Atrial Rate 60 BPM    TN Interval 224 ms    QRS Duration 84 ms    QT Interval 390 ms    QTC Calculation(Bazett) 392 ms    R Axis 47 degrees    T Axis 266 degrees    QRS Count 10 beats    Q Onset 222 ms    P Onset 138 ms    P Offset 158 ms    T Offset 417 ms    QTC Fredericia 391 ms   Serial Troponin, 6 Hour ([facility])   Result Value Ref Range    Troponin T, High Sensitivity 49 (HH) <=14 ng/L   CBC   Result Value Ref Range    WBC 10.3 4.4 - 11.3 x10*3/uL    nRBC 0.0 0.0 - 0.0 /100 WBCs    RBC 3.52 (L) 4.00 - 5.20 x10*6/uL    Hemoglobin 9.8 (L) 12.0 - 16.0 g/dL    Hematocrit 32.8 (L) 36.0 - 46.0 %    MCV 93 80 - 100 fL    MCH 27.8 26.0 - 34.0 pg    MCHC 29.9 (L) 32.0 - 36.0 g/dL    RDW 15.9 (H) 11.5 - 14.5 %    Platelets 245 150 - 450 x10*3/uL   Basic Metabolic Panel   Result Value Ref Range    Glucose 153 (H) 65 - 99 mg/dL    Sodium 140 133 - 145 mmol/L    Potassium 3.7 3.4 - 5.1 mmol/L    Chloride 99 97 - 107 mmol/L    Bicarbonate 23 (L) 24 - 31 mmol/L    Urea Nitrogen 36 (H) 8 - 25 mg/dL    Creatinine 1.90 (H) 0.40 - 1.60 mg/dL    eGFR 26 (L) >60 mL/min/1.73m*2    Calcium 9.5 8.5 - 10.4 mg/dL    Anion Gap 18 <=19 mmol/L   Basic Metabolic Panel   Result Value Ref Range    Glucose 129 (H) 65 - 99 mg/dL    Sodium 134 133 - 145 mmol/L    Potassium 4.1 3.4 - 5.1 mmol/L    Chloride 97 97 - 107 mmol/L    Bicarbonate 21 (L) 24 - 31 mmol/L    Urea Nitrogen 47 (H) 8 - 25 mg/dL    Creatinine 2.30 (H) 0.40 - 1.60 mg/dL    eGFR 20 (L) >60 mL/min/1.73m*2    Calcium 9.5 8.5 - 10.4 mg/dL    Anion Gap 16 <=19 mmol/L   CBC   Result Value Ref Range    WBC 7.0 4.4 - 11.3 x10*3/uL    nRBC 0.0 0.0 - 0.0 /100 WBCs    RBC 3.36 (L) 4.00 - 5.20 x10*6/uL    Hemoglobin 9.4 (L) 12.0 - 16.0 g/dL    Hematocrit 31.2 (L) 36.0 - 46.0 %    MCV 93 80 - 100 fL    MCH 28.0 26.0 - 34.0 pg    MCHC 30.1 (L) 32.0 - 36.0 g/dL    RDW 15.9 (H) 11.5 - 14.5 %    Platelets 185 150 - 450 x10*3/uL   Basic Metabolic Panel   Result Value Ref Range    Glucose 99  65 - 99 mg/dL    Sodium 140 133 - 145 mmol/L    Potassium 3.5 3.4 - 5.1 mmol/L    Chloride 102 97 - 107 mmol/L    Bicarbonate 23 (L) 24 - 31 mmol/L    Urea Nitrogen 52 (H) 8 - 25 mg/dL    Creatinine 2.20 (H) 0.40 - 1.60 mg/dL    eGFR 21 (L) >60 mL/min/1.73m*2    Calcium 9.1 8.5 - 10.4 mg/dL    Anion Gap 15 <=19 mmol/L   Transthoracic Echo (TTE) Limited   Result Value Ref Range    AV pk bernarda 0.79 m/s    RVSP 53.7 mmHg    AV pk grad 2.5 mmHg       Assessment/Plan     Acute kidney injury superimposed on chronic kidney disease stage III the patient creatinine level is stable at 2.2 will restart her diuretics  A-fib with rapid ventricular rate scheduled for cardioversion tomorrow  CKD stage III  Pulmonary edema restart diuretics today  Coronary artery disease  Coronary artery stenting           Ronnie Giang MD

## 2024-04-08 NOTE — NURSING NOTE
Assumed care of patient.   Patient in bed resting.  Bedside shift report received.  POX 96% on 9L blubbler.  Call light in reach.

## 2024-04-08 NOTE — PROGRESS NOTES
Physical Therapy    Physical Therapy Evaluation & Treatment    Patient Name: Linette Doyle  MRN: [id number]  Today's Date: 4/8/2024   Time Calculation  Start Time: 1033  Stop Time: 1053  Time Calculation (min): 20 min    Assessment/Plan   PT Assessment  PT Assessment Results: Decreased strength, Decreased endurance, Impaired balance, Decreased mobility, Impaired judgement, Decreased safety awareness  Rehab Prognosis: Good  Evaluation/Treatment Tolerance: Patient tolerated treatment well, Patient limited by fatigue  Medical Staff Made Aware: Yes  Strengths: Ability to acquire knowledge, Premorbid level of function  Barriers to Participation: Comorbidities  End of Session Communication: Bedside nurse  Assessment Comment: Pt demonstrates impaired functional mobility from baseline level; pt with mild balance/BLE strength deficits and decreased overall activity tolerance; would benefit from continued skilled PT services during hospital stay.  End of Session Patient Position: Bed, 3 rail up, Alarm on   IP OR SWING BED PT PLAN  Inpatient or Swing Bed: Inpatient  PT Plan  Treatment/Interventions: Bed mobility, Transfer training, Gait training, Balance training, Neuromuscular re-education, Strengthening, Endurance training, Therapeutic exercise, Therapeutic activity  PT Plan: Skilled PT  PT Frequency: 4 times per week  PT Discharge Recommendations: Low intensity level of continued care  Equipment Recommended upon Discharge: Wheeled walker  PT Recommended Transfer Status: Assist x1, Assistive device  PT - OK to Discharge: Yes    Subjective     General Visit Information:  General  Reason for Referral: impaired functional mobility (Pt is an 83 year-old F admitted from home with c/o Afib and SOB.)  Referred By: Dr. Radhames DO  Past Medical History Relevant to Rehab: pacemaker, hypothyroidism, HTN, hypercholesterolemia, anemia, CKD, CHF  Family/Caregiver Present: No  Prior to Session Communication: Bedside nurse  Patient  Position Received: Bed, 3 rail up, Alarm off, not on at start of session  Preferred Learning Style: verbal  General Comment: Pt cleared for therapy via nurse, received in supine, NAD, agreeable to participate in therapy with encouragement. (+) telemetry, 2L O2 via NC  Home Living:  Home Living  Type of Home: House  Lives With: Alone  Home Adaptive Equipment: Walker rolling or standard  Home Layout: One level  Home Access: Level entry  Bathroom Shower/Tub: Tub/shower unit, Walk-in shower  Bathroom Toilet: Standard  Bathroom Equipment: Grab bars in shower, Shower chair with back  Prior Level of Function:  Prior Function Per Pt/Caregiver Report  Level of [place]: Independent with ADLs and functional transfers, Independent with homemaking with ambulation  Receives Help From: Family  ADL Assistance: Independent  Homemaking Assistance: Independent  Ambulatory Assistance: Independent  Vocational: Retired  Hand Dominance: Right  Precautions:  Precautions  Hearing/Visual Limitations: glasses  Medical Precautions: Fall precautions, Oxygen therapy device and L/min (2L O2 via NC)  Vital Signs:  Vital Signs  SpO2: 97 %    Objective   Pain:  Pain Assessment  Pain Assessment: 0-10  Pain Score: 0 - No pain  Cognition:  Cognition  Overall Cognitive Status: Within Functional Limits  Orientation Level: Oriented X4  Insight: Mild    General Assessments:  General Observation  General Observation: c/o fatigue but willing to participate with encouragement     Activity Tolerance  Endurance: Decreased tolerance for upright activites    Sensation  Light Touch: No apparent deficits  Sensation Comment: pt denies paresthesias    Strength  Strength Comments: BLE > 4-/5  Strength  Strength Comments: BLE > 4-/5    Coordination  Movements are Fluid and Coordinated: Yes    Postural Control  Postural Control: Impaired  Posture Comment: forward head    Static Sitting Balance  Static Sitting-Balance Support: Bilateral upper extremity supported,  Feet supported  Static Sitting-Level of Assistance: Close supervision    Static Standing Balance  Static Standing-Balance Support: Bilateral upper extremity supported  Static Standing-Level of Assistance: Contact guard  Functional Assessments:     Bed Mobility  Bed Mobility: Yes  Bed Mobility 1  Bed Mobility 1: Supine to sitting  Level of Assistance 1: Close supervision  Bed Mobility Comments 1: use of bedrails, HOB elevated; increased time/effort to complete  Bed Mobility 2  Bed Mobility  2: Sitting to supine  Level of Assistance 2: Close supervision    Transfers  Transfer: Yes  Transfer 1  Transfer From 1: Sit to  Transfer to 1: Stand  Technique 1: Sit to stand  Transfer Device 1: Walker  Transfer Level of Assistance 1: Contact guard  Trials/Comments 1: assist to steady, cueing for proper hand placement  Transfers 2  Transfer From 2: Stand to  Transfer to 2: Sit  Technique 2: Stand to sit  Transfer Device 2: Walker  Transfer Level of Assistance 2: Contact guard  Trials/Comments 2: assist for safe eccentric lowering onto EOB; declined to sit on EOB    Ambulation/Gait Training  Ambulation/Gait Training Performed: Yes  Ambulation/Gait Training 1  Surface 1: Level tile  Device 1: Rolling walker  Assistance 1: Contact guard  Quality of Gait 1: Decreased step length, Forward flexed posture (reciprocating pattern)  Comments/Distance (ft) 1: 10' x 2 (distance limited d/t pt c/o fatigue)    Stairs  Stairs: No       Extremity/Trunk Assessments:  RLE   RLE : Within Functional Limits  LLE   LLE : Within Functional Limits  Treatments:  Ambulation/Gait Training  Ambulation/Gait Training Performed: Yes  Ambulation/Gait Training 1  Surface 1: Level tile  Device 1: Rolling walker  Assistance 1: Contact guard  Quality of Gait 1: Decreased step length, Forward flexed posture (reciprocating pattern)  Comments/Distance (ft) 1: 10' x 2 (distance limited d/t pt c/o fatigue)  Outcome Measures:  [facility] Basic Mobility  Turning from your  back to your side while in a flat bed without using bedrails: None  Moving from lying on your back to sitting on the side of a flat bed without using bedrails: A little  Moving to and from bed to chair (including a wheelchair): A little  Standing up from a chair using your arms (e.g. wheelchair or bedside chair): A little  To walk in hospital room: A little  Climbing 3-5 steps with railing: A lot  Basic Mobility - Total Score: 18    Encounter Problems       Encounter Problems (Active)       Mobility       STG - Patient will ambulate 150' with rolling walker and modified independence. (Progressing)       Start:  04/08/24    Expected End:  04/22/24               PT Transfers       STG - Patient to transfer to and from sit to supine with independence. (Progressing)       Start:  04/08/24    Expected End:  04/22/24            STG - Patient will transfer sit to and from stand with use of rolling walker and modified independence. (Progressing)       Start:  04/08/24    Expected End:  04/22/24                   Education Documentation  Precautions, taught by Mariluz Headley PT at 4/8/2024 11:13 AM.  Learner: Patient  Readiness: Acceptance  Method: Explanation, Demonstration  Response: Needs Reinforcement    Body Mechanics, taught by Mariluz Headley PT at 4/8/2024 11:13 AM.  Learner: Patient  Readiness: Acceptance  Method: Explanation, Demonstration  Response: Needs Reinforcement    Mobility Training, taught by Mariluz Headley, PT at 4/8/2024 11:13 AM.  Learner: Patient  Readiness: Acceptance  Method: Explanation, Demonstration  Response: Needs Reinforcement    Education Comments  No comments found.

## 2024-04-08 NOTE — PROGRESS NOTES
TCC met with patient at bedside. Introduced self and explained role. Patient lives home alone. There are no steps to enter and no steps within the home. Patient has grab bars  and  doesn't use a walker. Patient states her family has been getting her to her doctors appointments. No reports of smoking or alcohol use.  PCP is Hi . [facility] is pharmacy of choice for medications. Patient has LW and POA. Patient is active with [facility] with SN PT and OT and would like to resume upon discharge.    Will need internal referral for home health upon discharge       04/08/24 1118   Discharge Planning   Living Arrangements Alone   Support Systems Children   Assistance Needed [facility]   Type of Residence Private residence   Number of Stairs to Enter Residence 0   Number of Stairs Within Residence 0   Do you have animals or pets at home? No   Who is requesting discharge planning? Provider   Home or Post Acute Services In home services   Type of Home Care Services Home OT;Home PT   Patient expects to be discharged to: Resume [facility]   Does the patient need discharge transport arranged? No   Financial Resource Strain   How hard is it for you to pay for the very basics like food, housing, medical care, and heating? Not hard   Housing Stability   In the last 12 months, was there a time when you were not able to pay the mortgage or rent on time? N   In the last 12 months, how many places have you lived? 1   In the last 12 months, was there a time when you did not have a steady place to sleep or slept in a shelter (including now)? N   Transportation Needs   In the past 12 months, has lack of transportation kept you from medical appointments or from getting medications? no   In the past 12 months, has lack of transportation kept you from meetings, work, or from getting things needed for daily living? No

## 2024-04-08 NOTE — PROGRESS NOTES
Occupational Therapy    Evaluation    Patient Name: Linette Doyle  MRN: [id number]  Today's Date: 4/8/2024  Time Calculation  Start Time: 1150  Stop Time: 1208  Time Calculation (min): 18 min    Assessment  IP OT Assessment  OT Assessment: Patient is an 83 year old female admitted with paroxysmal a-fib. Patient is presenting with a decline in strength, balance, activity tolerance, and function, resulting in an increased need for assistance with ADL/IADL tasks. Skilled OT to address the above deficits in order to increase patient's safety and independence with daily tasks.  Prognosis: Good  Evaluation/Treatment Tolerance: Patient tolerated treatment well  End of Session Communication: Bedside nurse  End of Session Patient Position: Bed, 3 rail up, Alarm on  Plan:  Treatment Interventions: ADL retraining, Functional transfer training, UE strengthening/ROM, Endurance training, Patient/family training, Neuromuscular reeducation, Compensatory technique education  OT Frequency: 4 times per week  OT Discharge Recommendations: Low intensity level of continued care  Equipment Recommended upon Discharge: Wheeled walker  OT Recommended Transfer Status: Stand by assist  OT - OK to Discharge: Yes    Subjective   Current Problem:  1. Paroxysmal A-fib (CMS/HCC)        2. Atrial fibrillation with rapid ventricular response (CMS/HCC)  CANCELED: Case Request Cath Lab: Cardioversion/Defibrillation    CANCELED: Case Request Cath Lab: Cardioversion/Defibrillation      3. Acute diastolic heart failure (CMS/HCC)  Transthoracic Echo (TTE) Limited    Transthoracic Echo (TTE) Limited      4. Mitral valve insufficiency, unspecified etiology  Transthoracic Echo (TTE) Limited    Transthoracic Echo (TTE) Limited        General:  General  Reason for Referral: decline in ADLs  Referred By: Dr. Radhames DO  Past Medical History Relevant to Rehab: pacemaker, hypothyroidism, HTN, hypercholesterolemia, anemia, CKD, CHF  Prior to Session  Communication: Bedside nurse  Patient Position Received:  (up in bathroom)  Preferred Learning Style: verbal  General Comment: Patient is an 83 year old female admitted with paroxysmal a-fib. Patient is cleared by nursing for therapy. She is in the bathroom upon arrival. PCA present. Patient agreeable to participate  Precautions:  Hearing/Visual Limitations: glasses  Medical Precautions: Fall precautions, Oxygen therapy device and L/min  Pain:  Pain Assessment  Pain Score: 0 - No pain    Objective   Cognition:  Overall Cognitive Status: Within Functional Limits  Orientation Level: Oriented X4  Insight: Mild     Home Living:  Type of Home: House  Lives With: Alone  Home Adaptive Equipment: Walker rolling or standard  Home Layout: One level  Home Access: Level entry  Bathroom Shower/Tub: Tub/shower unit, Walk-in shower  Bathroom Toilet: Standard  Bathroom Equipment: Grab bars in shower, Shower chair with back   Prior Function:  Level of [place]: Independent with ADLs and functional transfers, Independent with homemaking with ambulation  Receives Help From: Family  ADL Assistance: Independent  Homemaking Assistance: Independent  Ambulatory Assistance: Independent  Vocational: Retired  Hand Dominance: Right  IADL History:  Homemaking Responsibilities: Yes  IADL Comments: patient is independent with IADLs at baseline  ADL:  Eating Assistance: Stand by  Eating Deficit: Setup (per clinical judgement)  Grooming Assistance: Stand by  Grooming Deficit: Supervision/safety, Standing with assistive device, Increased time to complete  Bathing Assistance: Minimal  Bathing Deficit: Steadying, Supervision/safety, Increased time to complete , Buttocks (per clinical judgement)  UE Dressing Assistance: Minimal  UE Dressing Deficit: Pull around back, Pull down in back  LE Dressing Assistance: Minimal  LE Dressing Deficit: Steadying, Increased time to complete, Requires assistive device for steadying (per clinical  judgement)  Toileting Assistance with Device: Minimal  Toileting Deficit: Steadying, Supervison/safety (thoroughness)  Activity Tolerance:  Endurance: Decreased tolerance for upright activites  Bed Mobility/Transfers: Bed Mobility  Bed Mobility: Yes  Bed Mobility 1  Bed Mobility 1: Sitting to supine  Level of Assistance 1: Close supervision    Transfers  Transfer: Yes  Transfer 1  Transfer From 1: Toilet to  Transfer to 1: Stand  Technique 1: Sit to stand  Transfer Device 1: Walker  Transfer Level of Assistance 1: Contact guard  Trials/Comments 1: with of grab bars    Functional Mobility:  Functional Mobility  Functional Mobility Performed: Yes  Functional Mobility 1  Surface 1: Level tile  Device 1: Rolling walker  Assistance 1: Contact guard  Comments 1: from the bathroom, back to bed. cues for safety  Standing Balance:  Static Standing Balance  Static Standing-Balance Support: Right upper extremity supported, Left upper extremity supported (alternating UE support)  Static Standing-Level of Assistance: Contact guard    IADL's:   Homemaking Responsibilities: Yes  IADL Comments: patient is independent with IADLs at baseline  Vision: Vision - Basic Assessment  Current Vision: Wears glasses all the time  Sensation:  Sensation Comment: patient denies numbness/tingling  Strength:  Strength Comments: B UE 4-/5 grossly  Coordination:  Movements are Fluid and Coordinated: Yes   Hand Function:  Hand Function  Gross Grasp: Functional  Coordination: Functional  Extremities: RUE   RUE : Within Functional Limits and LUE   LUE: Within Functional Limits    Outcome Measures: [facility] Daily Activity  Putting on and taking off regular lower body clothing: A little  Bathing (including washing, rinsing, drying): A little  Putting on and taking off regular upper body clothing: A little  Toileting, which includes using toilet, bedpan or urinal: A little  Taking care of personal grooming such as brushing teeth: A little  Eating Meals: A  little  Daily Activity - Total Score: 18      Education Documentation  Body Mechanics, taught by Ashley Gonsalez OT at 4/8/2024 12:41 PM.  Learner: Patient  Readiness: Acceptance  Method: Explanation, Demonstration  Response: Verbalizes Understanding, Needs Reinforcement    Precautions, taught by Ashley Gonsalez OT at 4/8/2024 12:41 PM.  Learner: Patient  Readiness: Acceptance  Method: Explanation, Demonstration  Response: Verbalizes Understanding, Needs Reinforcement    ADL Training, taught by Ashley Gonsalez OT at 4/8/2024 12:41 PM.  Learner: Patient  Readiness: Acceptance  Method: Explanation, Demonstration  Response: Verbalizes Understanding, Needs Reinforcement    Education Comments  No comments found.      Goals:   Encounter Problems       Encounter Problems (Active)       OT Goals       ADLs (Progressing)       Start:  04/08/24    Expected End:  05/06/24       Patient will complete ADL tasks with Mod I, using AE as needed, in order to increase patient's safety and independence with self-care tasks.         Functional Transfers (Progressing)       Start:  04/08/24    Expected End:  05/06/24       Patient will complete functional transfers with Mod I, using least restrictive device, in order to increase patient's safety and independence with daily tasks.         B UE Strengthening (Progressing)       Start:  04/08/24    Expected End:  05/06/24       Patient will increase B UE strength to 5/5 for functional transfers.         Functional Mobility (Progressing)       Start:  04/08/24    Expected End:  05/06/24       Patient will demonstrate the ability to complete item retrieval and functional mobility with Mod I, using least restrictive device, in order to increase patient's safety and independence with daily tasks.

## 2024-04-08 NOTE — PROGRESS NOTES
Linette Doyle is a 83 y.o. female on day 3 of admission presenting with Paroxysmal A-fib (CMS/HCC).      Subjective     Converted to normal sinus rhythm yesterday.  Echocardiogram done, pending read.         Objective     Last Recorded Vitals  /70 (BP Location: Right arm, Patient Position: Lying)   Pulse 64   Temp 36.6 °C (97.9 °F) (Oral)   Resp 18   Wt 73 kg (160 lb 15 oz)   SpO2 96%   Intake/Output last 3 Shifts:    Intake/Output Summary (Last 24 hours) at 4/8/2024 0855  Last data filed at 4/7/2024 1700  Gross per 24 hour   Intake 240 ml   Output --   Net 240 ml       Admission Weight  Weight: 64.9 kg (143 lb) (04/05/24 1048)    Daily Weight  04/07/24 : 73 kg (160 lb 15 oz)    Image Results  Transthoracic Echo (TTE) [place] [address]             Phone [phone]    TRANSTHORACIC ECHOCARDIOGRAM REPORT       Patient Name:     LINETTE DOYLE        Reading Physician:   [id number] Vela MD  Study Date:       4/8/2024             Ordering Provider:   [id number] SHAGGY VELA  MRN/PID:          [id number]             Fellow:  Accession#:       [id number]         Nurse:  Date of           1940 / 83      Sonographer:         Yulisa Kennedy RDCS  Birth/Age:        years  Gender:           F                    Additional Staff:  Height:           162.56 cm            Admit Date:  Weight:           72.58 kg             Admission Status:    Inpatient - Routine  BSA / BMI:        1.78 m2 / 27.46      Department Location: [facility]                    kg/m2  Blood Pressure: 134 /70 mmHg    Study Type:    TRANSTHORACIC ECHO (TTE) LIMITED  Diagnosis/ICD: Acute diastolic (congestive) heart failure (CHF)-I50.31;                 Nonrheumatic mitral (valve) insufficiency-I34.0  Indication:    MR, Heart failure  CPT Codes:     Echo Limited-09703; Doppler Limited-06526; Color  Doppler-13100    Patient History:  Valve Disorders:   Mitral Regurgitation.  Pertinent History: A-Fib, Bradycardia, Heart failure,Sick sinus syndrome and                     HTN.    Study Detail: The following Echo studies were performed: 2D, M-Mode, Doppler and                color flow. Patient has a pacemaker.       PHYSICIAN INTERPRETATION:  Left Ventricle: Left ventricular systolic function is normal, with an estimated ejection fraction of 55-60%. There are no regional wall motion abnormalities. The left ventricular cavity size is normal. There is mild left ventricular hypertrophy. Spectral Doppler shows a normal pattern of left ventricular diastolic filling.  Left Atrium: The left atrium is normal in size.  Right Ventricle: The right ventricle is moderately enlarged. There is reduced right ventricular systolic function. A device is visualized in the right ventricle.  Right Atrium: The right atrium is normal in size. There is a device visualized in the right atrium.  Aortic Valve: The aortic valve is trileaflet. There is no evidence of aortic valve stenosis.  There is no evidence of aortic valve regurgitation. The peak instantaneous gradient of the aortic valve is 2.5 mmHg.  Mitral Valve: The mitral valve is normal in structure. There is evidence of mild mitral valve stenosis. The doppler estimated mean and peak diastolic pressure gradients are 5.0 mmHg and 15.7 mmHg respectively. There is moderate to severe mitral valve regurgitation which is centrally directed.  Tricuspid Valve: The tricuspid valve is structurally normal. There is moderate tricuspid regurgitation. The Doppler estimated RVSP is moderately elevated at 53.7 mmHg.  Pulmonic Valve: The pulmonic valve is structurally normal. There is mild pulmonic valve regurgitation.  Pericardium: There is a trivial pericardial effusion.  Aorta: The aortic root is normal.  Systemic Veins: The inferior vena cava appears to be of normal size.       CONCLUSIONS:   1.  Left ventricular systolic function is normal with a 55-60% estimated ejection fraction.   2. Moderately enlarged right ventricle.   3. There is reduced right ventricular systolic function.   4. Moderate to severe mitral valve regurgitation.   5. Moderate tricuspid regurgitation.   6. Moderately elevated right ventricular systolic pressure.   7. Aortic valve stenosis is not present.   8. There is reduction in the mitral valve leaflet motion with mild mitral stenosis. Mean gradient of 5 mmHg.   9. There is moderate to possible severe mitral regurgitation with central jet. We will consider MitraClip down the road.    QUANTITATIVE DATA SUMMARY:  MITRAL VALVE:                        Normal Ranges:  MV Vmax:    1.98 m/s  (<=1.3m/s)  MV peak PG: 15.7 mmHg (<5mmHg)  MV mean P.0 mmHg  (<48mmHg)    AORTIC VALVE:                         Normal Ranges:  AoV Vmax:     0.78 m/s (<=1.7m/s)  AoV Peak P.5 mmHg (<20mmHg)  LVOT Max Yasir: 0.56 m/s (<=1.1m/s)    TRICUSPID VALVE/RVSP:                              Normal Ranges:  Peak TR Velocity: 3.56 m/s  RV Syst Pressure: 53.7 mmHg (< 30mmHg)  IVC Diam:         1.40 cm       [id number] Becky Vela MD  Electronically signed on 2024 at 8:54:24 AM       ** Final **  ECG 12 lead  Normal sinus rhythm  Nonspecific T wave abnormality  Abnormal ECG  When compared with ECG of 2024 15:13, (unconfirmed)  Sinus rhythm has replaced probable SVT  Heart rate has decreased by 72 bpm  Confirmed by Dean Hanks ([id number]) on 2024 8:44:24 AM      Physical Exam  Constitutional:       Appearance: Normal appearance.   HENT:      Head: Normocephalic.      Right Ear: External ear normal.      Left Ear: External ear normal.   Eyes:      Extraocular Movements: Extraocular movements intact.   Cardiovascular:      Rate and Rhythm: Normal rate.   Pulmonary:      Effort: Pulmonary effort is normal.      Breath sounds: Normal breath sounds.   Skin:     General: Skin is warm.   Neurological:       General: No focal deficit present.      Mental Status: Mental status is at baseline.   Psychiatric:         Mood and Affect: Mood normal.         Relevant Results               Assessment/Plan                  Principal Problem:    Paroxysmal A-fib (CMS/HCC)    Paroxysmal atrial fibrillation  -Now currently normal sinus rhythm after amiodarone bolusing  -No plan for cardioversion  -Continue to observe as her heart rate has been so difficult to control  -Cardiology on consult.    Acute hypoxemic respiratory failure  -I decreased oxygen to 3 L and saturating well.  -Continue to wean oxygen as able.    CHF, preserved EF  -With moderate to severe mitral regurgitation  -Diuresis per cardiology nephrology given the GARY on chronic kidney disease stage III.    GARY on CKD  -Creatinine is also better at 2.2 today.  -Diuresis as above.    Physical therapy Occupational Therapy              Henry Ricci, DO

## 2024-04-09 ENCOUNTER — HOME CARE VISIT (OUTPATIENT)
Dept: HOME HEALTH SERVICES | Facility: HOME HEALTH | Age: 84
End: 2024-04-09
Payer: MEDICARE

## 2024-04-09 LAB
ALBUMIN SERPL-MCNC: 3.9 G/DL (ref 3.5–5)
ALP BLD-CCNC: 111 U/L (ref 35–125)
ALT SERPL-CCNC: 67 U/L (ref 5–40)
ANION GAP SERPL CALC-SCNC: 13 MMOL/L
AST SERPL-CCNC: 54 U/L (ref 5–40)
BASOPHILS # BLD AUTO: 0.04 X10*3/UL (ref 0–0.1)
BASOPHILS NFR BLD AUTO: 0.7 %
BILIRUB SERPL-MCNC: 0.7 MG/DL (ref 0.1–1.2)
BUN SERPL-MCNC: 42 MG/DL (ref 8–25)
CALCIUM SERPL-MCNC: 9.4 MG/DL (ref 8.5–10.4)
CHLORIDE SERPL-SCNC: 103 MMOL/L (ref 97–107)
CO2 SERPL-SCNC: 28 MMOL/L (ref 24–31)
CREAT SERPL-MCNC: 1.8 MG/DL (ref 0.4–1.6)
EGFRCR SERPLBLD CKD-EPI 2021: 27 ML/MIN/1.73M*2
EOSINOPHIL # BLD AUTO: 0.16 X10*3/UL (ref 0–0.4)
EOSINOPHIL NFR BLD AUTO: 2.6 %
ERYTHROCYTE [DISTWIDTH] IN BLOOD BY AUTOMATED COUNT: 15.8 % (ref 11.5–14.5)
GLUCOSE SERPL-MCNC: 93 MG/DL (ref 65–99)
HCT VFR BLD AUTO: 33.7 % (ref 36–46)
HGB BLD-MCNC: 10.1 G/DL (ref 12–16)
IMM GRANULOCYTES # BLD AUTO: 0.02 X10*3/UL (ref 0–0.5)
IMM GRANULOCYTES NFR BLD AUTO: 0.3 % (ref 0–0.9)
LYMPHOCYTES # BLD AUTO: 0.79 X10*3/UL (ref 0.8–3)
LYMPHOCYTES NFR BLD AUTO: 12.8 %
MCH RBC QN AUTO: 27.4 PG (ref 26–34)
MCHC RBC AUTO-ENTMCNC: 30 G/DL (ref 32–36)
MCV RBC AUTO: 91 FL (ref 80–100)
MONOCYTES # BLD AUTO: 0.51 X10*3/UL (ref 0.05–0.8)
MONOCYTES NFR BLD AUTO: 8.3 %
NEUTROPHILS # BLD AUTO: 4.63 X10*3/UL (ref 1.6–5.5)
NEUTROPHILS NFR BLD AUTO: 75.3 %
NRBC BLD-RTO: 0 /100 WBCS (ref 0–0)
PLATELET # BLD AUTO: 192 X10*3/UL (ref 150–450)
POTASSIUM SERPL-SCNC: 3.3 MMOL/L (ref 3.4–5.1)
PROT SERPL-MCNC: 6.1 G/DL (ref 5.9–7.9)
RBC # BLD AUTO: 3.69 X10*6/UL (ref 4–5.2)
SODIUM SERPL-SCNC: 144 MMOL/L (ref 133–145)
WBC # BLD AUTO: 6.2 X10*3/UL (ref 4.4–11.3)

## 2024-04-09 PROCEDURE — 1090000001 HH PPS REVENUE CREDIT

## 2024-04-09 PROCEDURE — 2500000002 HC RX 250 W HCPCS SELF ADMINISTERED DRUGS (ALT 637 FOR MEDICARE OP, ALT 636 FOR OP/ED): Performed by: INTERNAL MEDICINE

## 2024-04-09 PROCEDURE — 97116 GAIT TRAINING THERAPY: CPT | Mod: GP,CQ

## 2024-04-09 PROCEDURE — 2500000001 HC RX 250 WO HCPCS SELF ADMINISTERED DRUGS (ALT 637 FOR MEDICARE OP): Performed by: INTERNAL MEDICINE

## 2024-04-09 PROCEDURE — B246ZZ4 ULTRASONOGRAPHY OF RIGHT AND LEFT HEART, TRANSESOPHAGEAL: ICD-10-PCS | Performed by: INTERNAL MEDICINE

## 2024-04-09 PROCEDURE — 2500000004 HC RX 250 GENERAL PHARMACY W/ HCPCS (ALT 636 FOR OP/ED): Performed by: INTERNAL MEDICINE

## 2024-04-09 PROCEDURE — 99233 SBSQ HOSP IP/OBS HIGH 50: CPT | Performed by: INTERNAL MEDICINE

## 2024-04-09 PROCEDURE — 1090000002 HH PPS REVENUE DEBIT

## 2024-04-09 PROCEDURE — 82565 ASSAY OF CREATININE: CPT | Performed by: INTERNAL MEDICINE

## 2024-04-09 PROCEDURE — 97530 THERAPEUTIC ACTIVITIES: CPT | Mod: GP,CQ

## 2024-04-09 PROCEDURE — 2500000005 HC RX 250 GENERAL PHARMACY W/O HCPCS: Performed by: INTERNAL MEDICINE

## 2024-04-09 PROCEDURE — 2060000001 HC INTERMEDIATE ICU ROOM DAILY

## 2024-04-09 PROCEDURE — 36415 COLL VENOUS BLD VENIPUNCTURE: CPT | Performed by: INTERNAL MEDICINE

## 2024-04-09 PROCEDURE — 97110 THERAPEUTIC EXERCISES: CPT | Mod: GP,CQ

## 2024-04-09 PROCEDURE — 2500000002 HC RX 250 W HCPCS SELF ADMINISTERED DRUGS (ALT 637 FOR MEDICARE OP, ALT 636 FOR OP/ED): Mod: MUE | Performed by: INTERNAL MEDICINE

## 2024-04-09 PROCEDURE — 85025 COMPLETE CBC W/AUTO DIFF WBC: CPT | Performed by: INTERNAL MEDICINE

## 2024-04-09 RX ORDER — AMIODARONE HYDROCHLORIDE 200 MG/1
200 TABLET ORAL 2 TIMES DAILY
Status: DISCONTINUED | OUTPATIENT
Start: 2024-04-09 | End: 2024-04-11 | Stop reason: HOSPADM

## 2024-04-09 RX ORDER — FUROSEMIDE 10 MG/ML
40 INJECTION INTRAMUSCULAR; INTRAVENOUS ONCE
Status: COMPLETED | OUTPATIENT
Start: 2024-04-09 | End: 2024-04-09

## 2024-04-09 RX ADMIN — CLOPIDOGREL BISULFATE 75 MG: 75 TABLET ORAL at 08:00

## 2024-04-09 RX ADMIN — DILTIAZEM HYDROCHLORIDE 120 MG: 60 TABLET ORAL at 22:11

## 2024-04-09 RX ADMIN — Medication 2 L/MIN: at 08:00

## 2024-04-09 RX ADMIN — TORSEMIDE 20 MG: 20 TABLET ORAL at 08:01

## 2024-04-09 RX ADMIN — Medication 2 L/MIN: at 20:00

## 2024-04-09 RX ADMIN — PANTOPRAZOLE SODIUM 40 MG: 40 TABLET, DELAYED RELEASE ORAL at 06:20

## 2024-04-09 RX ADMIN — APIXABAN 2.5 MG: 5 TABLET, FILM COATED ORAL at 22:11

## 2024-04-09 RX ADMIN — METOPROLOL TARTRATE 50 MG: 50 TABLET, FILM COATED ORAL at 08:01

## 2024-04-09 RX ADMIN — LEVOTHYROXINE SODIUM 100 MCG: 0.1 TABLET ORAL at 06:20

## 2024-04-09 RX ADMIN — HYDRALAZINE HYDROCHLORIDE 10 MG: 10 TABLET, FILM COATED ORAL at 08:00

## 2024-04-09 RX ADMIN — FUROSEMIDE 40 MG: 10 INJECTION, SOLUTION INTRAMUSCULAR; INTRAVENOUS at 17:23

## 2024-04-09 RX ADMIN — METOPROLOL TARTRATE 50 MG: 50 TABLET, FILM COATED ORAL at 22:12

## 2024-04-09 RX ADMIN — AMIODARONE HYDROCHLORIDE 200 MG: 200 TABLET ORAL at 22:11

## 2024-04-09 RX ADMIN — ATORVASTATIN CALCIUM 80 MG: 80 TABLET, FILM COATED ORAL at 22:11

## 2024-04-09 RX ADMIN — SERTRALINE 150 MG: 100 TABLET, FILM COATED ORAL at 08:00

## 2024-04-09 RX ADMIN — AMIODARONE HYDROCHLORIDE 400 MG: 200 TABLET ORAL at 08:02

## 2024-04-09 RX ADMIN — APIXABAN 2.5 MG: 5 TABLET, FILM COATED ORAL at 08:00

## 2024-04-09 RX ADMIN — Medication 2000 UNITS: at 08:02

## 2024-04-09 RX ADMIN — ISOSORBIDE MONONITRATE 30 MG: 30 TABLET, EXTENDED RELEASE ORAL at 08:02

## 2024-04-09 ASSESSMENT — COGNITIVE AND FUNCTIONAL STATUS - GENERAL
WALKING IN HOSPITAL ROOM: A LITTLE
TOILETING: A LITTLE
MOVING TO AND FROM BED TO CHAIR: A LITTLE
CLIMB 3 TO 5 STEPS WITH RAILING: A LITTLE
TURNING FROM BACK TO SIDE WHILE IN FLAT BAD: A LITTLE
STANDING UP FROM CHAIR USING ARMS: A LITTLE
CLIMB 3 TO 5 STEPS WITH RAILING: A LOT
MOVING TO AND FROM BED TO CHAIR: A LITTLE
DAILY ACTIVITIY SCORE: 18
EATING MEALS: A LITTLE
DRESSING REGULAR LOWER BODY CLOTHING: A LITTLE
DRESSING REGULAR UPPER BODY CLOTHING: A LITTLE
TURNING FROM BACK TO SIDE WHILE IN FLAT BAD: A LITTLE
STANDING UP FROM CHAIR USING ARMS: A LITTLE
MOBILITY SCORE: 18
PERSONAL GROOMING: A LITTLE
MOBILITY SCORE: 19
HELP NEEDED FOR BATHING: A LITTLE
WALKING IN HOSPITAL ROOM: A LITTLE

## 2024-04-09 ASSESSMENT — PAIN - FUNCTIONAL ASSESSMENT
PAIN_FUNCTIONAL_ASSESSMENT: 0-10
PAIN_FUNCTIONAL_ASSESSMENT: 0-10

## 2024-04-09 ASSESSMENT — PAIN SCALES - GENERAL
PAINLEVEL_OUTOF10: 0 - NO PAIN

## 2024-04-09 NOTE — PROGRESS NOTES
Occupational Therapy                 Therapy Communication Note    Patient Name: Linette Doyle  MRN: [id number]  Today's Date: 4/9/2024     Discipline: Occupational Therapy    Missed Visit Reason: Missed Visit Reason: Other (Comment) (upon arrival pt expressing increased nausea d/t meds with basin present on tray table. Provided pt with cracker + water with RN aware-will hold OT at this time)    Missed Time: Attempt    Comment:

## 2024-04-09 NOTE — CARE PLAN
The patient's goals for the shift include go home    The clinical goals for the shift include decrease oxygen use    Over the shift, the patient did not make progress toward the following goals. Barriers to progression include   . Recommendations to address these barriers include   .

## 2024-04-09 NOTE — PROGRESS NOTES
Occupational Therapy                 Therapy Communication Note    Patient Name: Linette Doyle  MRN: [id number]  Today's Date: 4/9/2024     Discipline: Occupational Therapy    Missed Visit Reason: Missed Visit Reason: Other (Comment) (attempt x2-pt expressing continued fatigue and requesting therapist to return tomorrow)    Missed Time: Attempt    Comment:

## 2024-04-09 NOTE — PROGRESS NOTES
Subjective Data:  Patient very tearful today. having paroxysmal atrial fibrillation.  Still complain of shortness of breath.  Denies having chest pain.  Overnight Events:    Telemetry overnight reviewed no events     Objective Data:  Last Recorded Vitals:  Vitals:    04/09/24 0730 04/09/24 0900 04/09/24 1000 04/09/24 1140   BP:    123/87   BP Location:    Left arm   Patient Position:    Lying   Pulse: (!) 114   100   Resp:    16   Temp:    36.4 °C (97.5 °F)   TempSrc:  Oral  Oral   SpO2:    100%   Weight:   69.2 kg (152 lb 8.9 oz)    Height:           Last Labs:  CBC - 4/9/2024:  4:46 AM  6.2 10.1 192    33.7      CMP - 4/9/2024:  4:46 AM  9.4 6.1 54 --- 0.7   3.0 3.9 67 111      PTT - 2/9/2024:  4:59 AM  1.1   11.9 24.6     HGBA1C   Date/Time Value Ref Range Status   02/01/2024 05:58 AM 6.0 See below % Final   07/05/2023 08:49 AM 5.6 4.0 - 6.0 % Final     Comment:     Hemoglobin A1C levels are related to mean blood glucose during the   preceding 2-3 months. The relationship table below may be used as a   general guide. Each 1% increase in HGB A1C is a reflection of an   increase in mean glucose of approximately 30 mg/dl.   Reference: Diabetes Care, volume 29, supplement 1 Jan. 2006                        HGB A1C ................. Approx. Mean Glucose   _______________________________________________   6%   ...............................  120 mg/dl   7%   ...............................  150 mg/dl   8%   ...............................  180 mg/dl   9%   ...............................  210 mg/dl   10%  ...............................  240 mg/dl  Performed at [address] [address]     12/14/2022 09:13 AM 5.7 4.0 - 6.0 % Final     Comment:     Hemoglobin A1C levels are related to mean blood glucose during the   preceding 2-3 months. The relationship table below may be used as a   general guide. Each 1% increase in HGB A1C is a reflection of an   increase in mean glucose of approximately 30  mg/dl.   Reference: Diabetes Care, volume 29, supplement 1 Jan. 2006                        HGB A1C ................. Approx. Mean Glucose   _______________________________________________   6%   ...............................  120 mg/dl   7%   ...............................  150 mg/dl   8%   ...............................  180 mg/dl   9%   ...............................  210 mg/dl   10%  ...............................  240 mg/dl  Performed at [place] [id number]       LDLCALC   Date/Time Value Ref Range Status   07/05/2023 08:49 AM 57 65 - 130 MG/DL Final   12/14/2022 09:13 AM 64 65 - 130 MG/DL Final   06/29/2022 08:43 AM 58 65 - 130 MG/DL Final      Last I/O:  I/O last 3 completed shifts:  In: 580 (7.9 mL/kg) [P.O.:580]  Out: - (0 mL/kg)   Weight: 73 kg     Past Cardiology Tests (Last 3 Years):  EKG:  ECG 12 lead 04/05/2024      ECG 12 lead 04/05/2024      Electrocardiogram, 12-lead PRN ACS symptoms 04/05/2024 (Preliminary)      ECG 12 lead 02/07/2024      ECG 12 lead 02/06/2024      ECG 12 Lead 01/31/2024    Echo:  Transthoracic Echo (TTE) Limited 04/08/2024      Transthoracic Echo (TTE) Limited 02/09/2024      Transthoracic Echo (TTE) Complete 02/01/2024    Ejection Fractions:  EF   Date/Time Value Ref Range Status   02/09/2024 08:59 AM 58 %    02/01/2024 11:43 AM 59 %      Cath:  Cardiac catheterization - coronary 02/08/2024      Cardiac catheterization - coronary 02/02/2024      Cardiac catheterization - coronary 02/02/2024    Stress Test:  No results found for this or any previous visit from the past 1095 days.    Cardiac Imaging:  No results found for this or any previous visit from the past 1095 days.      Inpatient Medications:  Scheduled medications   Medication Dose Route Frequency    amiodarone  400 mg oral TID    apixaban  2.5 mg oral BID    atorvastatin  80 mg oral Nightly    cholecalciferol  2,000 Units oral Daily    clopidogrel  75 mg oral Daily    [Held by provider]  dilTIAZem  120 mg oral BID    furosemide  40 mg intravenous Once    hydrALAZINE  10 mg oral TID    isosorbide mononitrate ER  30 mg oral Daily    levothyroxine  100 mcg oral Daily    metoprolol tartrate  50 mg oral BID    oxygen   inhalation Continuous - Inhalation    pantoprazole  40 mg oral Daily before breakfast    sertraline  150 mg oral Daily    torsemide  20 mg oral Daily     PRN medications   Medication    acetaminophen    ipratropium-albuteroL    melatonin    ondansetron    ondansetron     Continuous Medications   Medication Dose Last Rate       Physical Exam:  General: Patient is in no acute distress.  HEENT: atraumatic normocephalic.  Neck: is supple jugular venous pressure within normal limits no thyromegaly.  Cardiovascular irregular rate and rhythm normal heart sounds no murmurs rubs or gallops.  Lungs: clear to auscultation bilaterally.  Abdomen: is soft nontender.  Extremities warm to touch no edema.    Assessment/Plan   1 acute on chronic diastolic heart failure.  Patient has severe LVH.  Admitted to the hospital with acute decompensated heart failure.  Small bilateral pleural effusion elevated NT proBNP.  Patient has moderate to severe mitral regurgitation and addition of moderate to severe tricuspid regurgitation.  Some RV failure.  Appears more euvolemic now.  Renal function better.  Okay to restart diuretics.  Will arrange for transesophageal echocardiogram in a.m. to delineate better the severity of mitral valve regurgitation and her candidacy for MitraClip     2.  Tachycardia.  Atrial fibrillation now back into normal sinus rhythm on amiodarone.  Will continue amiodarone 400 mg 3 times daily.  Decrease metoprolol to 50 mg oral twice daily for bradycardia.  Patient has also pacemaker.  Continue oral anticoagulation     3.  Coronary artery disease.  Recent non-STEMI.  Continue clopidogrel.  No aspirin.  Patient is on Eliquis.  Could     4.  Hyperlipidemia continue high intensity statin.     5.   Hypertension.       6.  Moderate to severe mitral regurgitation seen in February 2024.  Will obtain limited 2D echo to assess the severity of mitral regurgitation and ejection fraction.  Will arrange for transesophageal echocardiogram in a.m.      Peripheral IV 04/05/24 20 G Left Antecubital (Active)   Site Assessment Clean;Dry;Intact 04/09/24 0823   Dressing Status Clean;Dry 04/09/24 0823   Number of days: 4       Code Status:  DNR and No Intubation        Becky Vela MD

## 2024-04-09 NOTE — PROGRESS NOTES
Physical Therapy    Physical Therapy Treatment    Patient Name: Linette Doyle  MRN: [id number]  Today's Date: 4/9/2024  Time Calculation  Start Time: 0730  Stop Time: 0755  Time Calculation (min): 25 min       Assessment/Plan   PT Assessment  End of Session Communication: Bedside nurse  End of Session Patient Position: Up in chair, Alarm on  PT Plan  Inpatient/Swing Bed or Outpatient: Inpatient  PT Plan  Treatment/Interventions: Bed mobility, Transfer training, Gait training, Balance training, Neuromuscular re-education, Strengthening, Endurance training, Therapeutic exercise, Therapeutic activity  PT Plan: Skilled PT  PT Frequency: 4 times per week  PT Discharge Recommendations: Low intensity level of continued care  Equipment Recommended upon Discharge: Wheeled walker  PT Recommended Transfer Status: Assist x1, Assistive device  PT - OK to Discharge: Yes      General Visit Information:   PT  Visit  PT Received On: 04/09/24  General  Prior to Session Communication: Bedside nurse  Patient Position Received: Bed, 3 rail up, Alarm off, not on at start of session  General Comment: Cleared by nursing to be seen for therapy, pt agreeable with tx, supine in bed upon arrival.    Subjective      Vital Signs:  Vital Signs  Heart Rate: (!) 114 (114-125)    Objective   Pain:  Pain Assessment  Pain Assessment: 0-10  Pain Score: 0 - No pain     Postural Control:  Static Sitting Balance  Static Sitting-Balance Support: Bilateral upper extremity supported, Feet supported  Static Sitting-Level of Assistance: Close supervision  Static Standing Balance  Static Standing-Balance Support: Bilateral upper extremity supported  Static Standing-Level of Assistance: Contact guard    Treatments:  Therapeutic Exercise  Therapeutic Exercise Performed: Yes  Therapeutic Exercise Activity 1: Bilateral ankle pumps x15  Therapeutic Exercise Activity 2: Bilateral hip flexion x15  Therapeutic Exercise Activity 3: Bilateral knee extension  x15  Therapeutic Exercise Activity 4: Resisted hip abd/add 15    Therapeutic Activity  Therapeutic Activity Performed: Yes  Therapeutic Activity 1: Assisted to bathroom, pt able to perform self cleanse. Pt performed static stand at bathroom sink x2 minutes for teeth brushing.    Bed Mobility  Bed Mobility: Yes  Bed Mobility 1  Bed Mobility 1: Supine to sitting  Level of Assistance 1: Close supervision  Bed Mobility Comments 1: Increased time and effort to complete bed mobility.    Ambulation/Gait Training  Ambulation/Gait Training Performed: Yes  Ambulation/Gait Training 1  Surface 1: Level tile  Device 1: Rolling walker  Assistance 1: Contact guard  Comments/Distance (ft) 1: 40' with wheeled walker, presents with slow gloria, decreased bilateral step length, narrow PENELOPE, requires cues for upright posture/cues to remain in PENELOPE of walker.    Transfers  Transfer: Yes  Transfer 1  Transfer From 1: Sit to  Transfer to 1: Stand  Transfer Level of Assistance 1: Close supervision  Trials/Comments 1: Cues for proper hand placement, cues for safety during eccentric control in sitting.    Outcome Measures:  [facility] Basic Mobility  Turning from your back to your side while in a flat bed without using bedrails: None  Moving from lying on your back to sitting on the side of a flat bed without using bedrails: A little  Moving to and from bed to chair (including a wheelchair): A little  Standing up from a chair using your arms (e.g. wheelchair or bedside chair): A little  To walk in hospital room: A little  Climbing 3-5 steps with railing: A little  Basic Mobility - Total Score: 19         Encounter Problems       Encounter Problems (Active)       Mobility       STG - Patient will ambulate 150' with rolling walker and modified independence. (Progressing)       Start:  04/08/24    Expected End:  04/12/24               PT Transfers       STG - Patient to transfer to and from sit to supine with independence. (Progressing)       Start:   04/08/24    Expected End:  04/12/24            STG - Patient will transfer sit to and from stand with use of rolling walker and modified independence. (Progressing)       Start:  04/08/24    Expected End:  04/12/24

## 2024-04-09 NOTE — PROGRESS NOTES
Linette Doyle is a 83 y.o. female on day 4 of admission presenting with Paroxysmal A-fib (CMS/HCC).      Subjective     Patient still requiring oxygen, in fact nasal cannula in her nose went into the room saturating 85%.    Discussed discharge with oxygen today versus trialing some more diuresis, and she is more interested in that.  She does understand that oxygen may be necessary.         Objective     Last Recorded Vitals  /79 (BP Location: Left arm, Patient Position: Lying)   Pulse (!) 114 Comment: 114-125  Temp 36.1 °C (97 °F) (Oral)   Resp 14   Wt 69.2 kg (152 lb 8.9 oz)   SpO2 98%   Intake/Output last 3 Shifts:    Intake/Output Summary (Last 24 hours) at 4/9/2024 1047  Last data filed at 4/9/2024 0900  Gross per 24 hour   Intake 700 ml   Output --   Net 700 ml         Admission Weight  Weight: 64.9 kg (143 lb) (04/05/24 1048)    Daily Weight  04/09/24 : 69.2 kg (152 lb 8.9 oz)    Image Results  XR chest 1 view  Narrative: Interpreted By:  Tamia Glass,   STUDY:  XR CHEST 1 VIEW 4/8/2024 9:43 am      INDICATION:  Signs/Symptoms:hypoxemia      COMPARISON:  04/05/2024      ACCESSION NUMBER(S):  [id number]      ORDERING CLINICIAN:  MELANIE HARDWICK      TECHNIQUE:  AP erect view of the chest      FINDINGS:  The heart is enlarged with bipolar pacemaker leads in right atrium  and right ventricle.      There are bilateral pleural effusions, right larger than left. The  right pleural effusion has increased in size and is now considered  moderate to moderately large. Left effusion appears unchanged.      There is mild pulmonary vascular congestion. There is probably some  atelectasis within each lower lobe as well.      Impression: Cardiomegaly and mild pulmonary vascular congestion with bilateral  pleural effusions, right larger than left. The right effusion has  increased in size.      Probable compressive atelectasis in each lower lung zone.      Signed by: Tamia Glass 4/8/2024 10:29 AM  Dictation  workstation:   [id number]  Transthoracic Echo (TTE) Limited             [facility]  [id number] [place], [place] [address]             Phone [phone]    TRANSTHORACIC ECHOCARDIOGRAM REPORT       Patient Name:     SAMUEL MYLA Buenrostro Physician:   Zuri Vela MD  Study Date:       4/8/2024             Ordering Provider:   Zuri VELA  MRN/PID:          [id number]             Fellow:  Accession#:       [id number]         Nurse:  Date of           1940 / 83      Sonographer:         Yulisa Kennedy [facility]  Birth/Age:        years  Gender:           F                    Additional Staff:  Height:           162.56 cm            Admit Date:  Weight:           72.58 kg             Admission Status:    Inpatient - Routine  BSA / BMI:        1.78 m2 / 27.46      Department Location: [facility]                    kg/m2  Blood Pressure: 134 /70 mmHg    Study Type:    TRANSTHORACIC ECHO (TTE) LIMITED  Diagnosis/ICD: Acute diastolic (congestive) heart failure (CHF)-I50.31;                 Nonrheumatic mitral (valve) insufficiency-I34.0  Indication:    MR, Heart failure  CPT Codes:     Echo Limited-65260; Doppler Limited-52409; Color Doppler-03956    Patient History:  Valve Disorders:   Mitral Regurgitation.  Pertinent History: A-Fib, Bradycardia, Heart failure,Sick sinus syndrome and                     HTN.    Study Detail: The following Echo studies were performed: 2D, M-Mode, Doppler and                color flow. Patient has a pacemaker.       PHYSICIAN INTERPRETATION:  Left Ventricle: Left ventricular systolic function is normal, with an estimated ejection fraction of 55-60%. There are no regional wall motion abnormalities. The left ventricular cavity size is normal. There is mild left ventricular hypertrophy. Spectral Doppler shows a normal pattern of left ventricular diastolic filling.  Left Atrium: The  left atrium is normal in size.  Right Ventricle: The right ventricle is moderately enlarged. There is reduced right ventricular systolic function. A device is visualized in the right ventricle.  Right Atrium: The right atrium is normal in size. There is a device visualized in the right atrium.  Aortic Valve: The aortic valve is trileaflet. There is no evidence of aortic valve stenosis.  There is no evidence of aortic valve regurgitation. The peak instantaneous gradient of the aortic valve is 2.5 mmHg.  Mitral Valve: The mitral valve is normal in structure. There is evidence of mild mitral valve stenosis. The doppler estimated mean and peak diastolic pressure gradients are 5.0 mmHg and 15.7 mmHg respectively. There is moderate to severe mitral valve regurgitation which is centrally directed.  Tricuspid Valve: The tricuspid valve is structurally normal. There is moderate tricuspid regurgitation. The Doppler estimated RVSP is moderately elevated at 53.7 mmHg.  Pulmonic Valve: The pulmonic valve is structurally normal. There is mild pulmonic valve regurgitation.  Pericardium: There is a trivial pericardial effusion.  Aorta: The aortic root is normal.  Systemic Veins: The inferior vena cava appears to be of normal size.       CONCLUSIONS:   1. Left ventricular systolic function is normal with a 55-60% estimated ejection fraction.   2. Moderately enlarged right ventricle.   3. There is reduced right ventricular systolic function.   4. Moderate to severe mitral valve regurgitation.   5. Moderate tricuspid regurgitation.   6. Moderately elevated right ventricular systolic pressure.   7. Aortic valve stenosis is not present.   8. There is reduction in the mitral valve leaflet motion with mild mitral stenosis. Mean gradient of 5 mmHg.   9. There is moderate to possible severe mitral regurgitation with central jet. We will consider MitraClip down the road.    QUANTITATIVE DATA SUMMARY:  MITRAL VALVE:                         Normal Ranges:  MV Vmax:    1.98 m/s  (<=1.3m/s)  MV peak PG: 15.7 mmHg (<5mmHg)  MV mean P.0 mmHg  (<48mmHg)    AORTIC VALVE:                         Normal Ranges:  AoV Vmax:     0.78 m/s (<=1.7m/s)  AoV Peak P.5 mmHg (<20mmHg)  LVOT Max Yasir: 0.56 m/s (<=1.1m/s)    TRICUSPID VALVE/RVSP:                              Normal Ranges:  Peak TR Velocity: 3.56 m/s  RV Syst Pressure: 53.7 mmHg (< 30mmHg)  IVC Diam:         1.40 cm       [id number] Becky Vela MD  Electronically signed on 2024 at 8:54:24 AM       ** Final **  ECG 12 lead  Normal sinus rhythm  Nonspecific T wave abnormality  Abnormal ECG  When compared with ECG of 2024 15:13, (unconfirmed)  Sinus rhythm has replaced probable SVT  Heart rate has decreased by 72 bpm  Confirmed by Dean Hanks ([id number]) on 2024 8:44:24 AM      Physical Exam  Constitutional:       Appearance: Normal appearance.   HENT:      Head: Normocephalic.      Right Ear: External ear normal.      Left Ear: External ear normal.   Eyes:      Extraocular Movements: Extraocular movements intact.   Cardiovascular:      Rate and Rhythm: Normal rate.   Pulmonary:      Effort: Pulmonary effort is normal.      Breath sounds: Normal breath sounds.   Skin:     General: Skin is warm.   Neurological:      General: No focal deficit present.      Mental Status: Mental status is at baseline.   Psychiatric:         Mood and Affect: Mood normal.         Relevant Results               Assessment/Plan                  Principal Problem:    Paroxysmal A-fib (CMS/HCC)    Paroxysmal atrial fibrillation  -Now currently normal sinus rhythm after amiodarone bolusing  -No plan for cardioversion  -Continue to observe as her heart rate has been so difficult to control  -Cardiology on consult.    Acute hypoxemic respiratory failure  -Still requiring 3 L nasal cannula  -Wean as able, and will try to diurese and discussed with Dr. Giang, but I have a suspicion that this will be a chronic issue and will  need home oxygen.      Mitral regurgitation  -Moderate to severe  -Reaching out to cardiology about assessment and referral for intervention.    GARY on CKD  -Improving, creatinine to 1.8 today.    Physical therapy Occupational Therapy -low intensity.              Henry Ricci, DO

## 2024-04-09 NOTE — PROGRESS NOTES
"Linette Doyle is a 83 y.o. female on day 4 of admission presenting with Paroxysmal A-fib (CMS/HCC).    Subjective   Was seen for acute kidney injury on top of chronic kidney disease stage III secondary to A-fib with RVR and CHF and converted to sinus rhythm patient is doing okay however she still requiring oxygen with nasal cannula    Objective     Physical Exam  Neck:      Vascular: No carotid bruit.   Cardiovascular:      Rate and Rhythm: Regular rhythm.      Heart sounds: No murmur heard.     No friction rub. No gallop.   Pulmonary:      Breath sounds: No wheezing, rhonchi or rales.   Chest:      Chest wall: No tenderness.   Abdominal:      General: There is no distension.      Tenderness: There is no abdominal tenderness. There is no guarding or rebound.   Musculoskeletal:         General: No swelling or tenderness.      Cervical back: Neck supple.      Right lower leg: No edema.      Left lower leg: No edema.   Lymphadenopathy:      Cervical: No cervical adenopathy.         Last Recorded Vitals  Blood pressure 131/79, pulse (!) 114, temperature 36.1 °C (97 °F), temperature source Oral, resp. rate 14, height 1.626 m (5' 4\"), weight 69.2 kg (152 lb 8.9 oz), SpO2 98 %.    Intake/Output last 3 Shifts:  I/O last 3 completed shifts:  In: 580 (7.9 mL/kg) [P.O.:580]  Out: - (0 mL/kg)   Weight: 73 kg     Current Facility-Administered Medications:     acetaminophen (Tylenol) tablet 650 mg, 650 mg, oral, q6h PRN, Samantha Alcantara MD    amiodarone (Pacerone) tablet 400 mg, 400 mg, oral, TID, Becky Vela MD, 400 mg at 04/09/24 0802    apixaban (Eliquis) tablet 2.5 mg, 2.5 mg, oral, BID, Samantha Alcantara MD, 2.5 mg at 04/09/24 0800    atorvastatin (Lipitor) tablet 80 mg, 80 mg, oral, Nightly, Samantha Alcantara MD, 80 mg at 04/08/24 2104    cholecalciferol (Vitamin D-3) tablet 2,000 Units, 2,000 Units, oral, Daily, Samantha Alcantara MD, 2,000 Units at 04/09/24 0802    clopidogrel (Plavix) tablet 75 mg, 75 mg, " oral, Daily, Samantha Alcantara MD, 75 mg at 04/09/24 0800    [Held by provider] dilTIAZem (Cardizem) immediate release tablet 120 mg, 120 mg, oral, BID, Samantha Alcantara MD    hydrALAZINE (Apresoline) tablet 10 mg, 10 mg, oral, TID, Becky Vela MD, 10 mg at 04/09/24 0800    ipratropium-albuteroL (Duo-Neb) 0.5-2.5 mg/3 mL nebulizer solution 3 mL, 3 mL, nebulization, q4h PRN, Samantha Alcantara MD, 3 mL at 04/05/24 2129    isosorbide mononitrate ER (Imdur) 24 hr tablet 30 mg, 30 mg, oral, Daily, Becky Vela MD, 30 mg at 04/09/24 0802    levothyroxine (Synthroid, Levoxyl) tablet 100 mcg, 100 mcg, oral, Daily, Samantha Alcantara MD, 100 mcg at 04/09/24 0620    melatonin tablet 5 mg, 5 mg, oral, Nightly PRN, Samantha Alcantara MD    metoprolol tartrate (Lopressor) tablet 50 mg, 50 mg, oral, BID, Becky Vela MD, 50 mg at 04/09/24 0801    ondansetron (Zofran) injection 4 mg, 4 mg, intravenous, q6h PRN, Samantha Alcantara MD    ondansetron (Zofran) tablet 4 mg, 4 mg, oral, 4x daily PRN, Samantha Alcantara MD    oxygen (O2) therapy, , inhalation, Continuous - Inhalation, Henry Ricci, DO, 2 L/min at 04/09/24 0800    pantoprazole (ProtoNix) EC tablet 40 mg, 40 mg, oral, Daily before breakfast, Samantha Alcantara MD, 40 mg at 04/09/24 0620    sertraline (Zoloft) tablet 150 mg, 150 mg, oral, Daily, Samantha Alcantara MD, 150 mg at 04/09/24 0800    torsemide (Demadex) tablet 20 mg, 20 mg, oral, Daily, Ronnie Giang MD, 20 mg at 04/09/24 0801   Relevant Results    Results for orders placed or performed during the hospital encounter of 04/05/24 (from the past 96 hour(s))   ECG 12 lead   Result Value Ref Range    Ventricular Rate 144 BPM    QRS Duration 80 ms    QT Interval 298 ms    QTC Calculation(Bazett) 461 ms    R Axis 71 degrees    T Axis 241 degrees    QRS Count 23 beats    Q Onset 229 ms    T Offset 378 ms    QTC Fredericia 398 ms   CBC and Auto Differential   Result Value Ref Range    WBC 9.3  4.4 - 11.3 x10*3/uL    nRBC 0.0 0.0 - 0.0 /100 WBCs    RBC 3.50 (L) 4.00 - 5.20 x10*6/uL    Hemoglobin 9.9 (L) 12.0 - 16.0 g/dL    Hematocrit 32.1 (L) 36.0 - 46.0 %    MCV 92 80 - 100 fL    MCH 28.3 26.0 - 34.0 pg    MCHC 30.8 (L) 32.0 - 36.0 g/dL    RDW 15.7 (H) 11.5 - 14.5 %    Platelets 209 150 - 450 x10*3/uL    Neutrophils % 76.7 40.0 - 80.0 %    Immature Granulocytes %, Automated 0.6 0.0 - 0.9 %    Lymphocytes % 14.1 13.0 - 44.0 %    Monocytes % 7.1 2.0 - 10.0 %    Eosinophils % 1.0 0.0 - 6.0 %    Basophils % 0.5 0.0 - 2.0 %    Neutrophils Absolute 7.13 (H) 1.60 - 5.50 x10*3/uL    Immature Granulocytes Absolute, Automated 0.06 0.00 - 0.50 x10*3/uL    Lymphocytes Absolute 1.31 0.80 - 3.00 x10*3/uL    Monocytes Absolute 0.66 0.05 - 0.80 x10*3/uL    Eosinophils Absolute 0.09 0.00 - 0.40 x10*3/uL    Basophils Absolute 0.05 0.00 - 0.10 x10*3/uL   Comprehensive metabolic panel   Result Value Ref Range    Glucose 136 (H) 65 - 99 mg/dL    Sodium 140 133 - 145 mmol/L    Potassium 3.3 (L) 3.4 - 5.1 mmol/L    Chloride 99 97 - 107 mmol/L    Bicarbonate 24 24 - 31 mmol/L    Urea Nitrogen 33 (H) 8 - 25 mg/dL    Creatinine 1.90 (H) 0.40 - 1.60 mg/dL    eGFR 26 (L) >60 mL/min/1.73m*2    Calcium 9.7 8.5 - 10.4 mg/dL    Albumin 4.4 3.5 - 5.0 g/dL    Alkaline Phosphatase 96 35 - 125 U/L    Total Protein 7.2 5.9 - 7.9 g/dL    AST 33 5 - 40 U/L    Bilirubin, Total 0.7 0.1 - 1.2 mg/dL    ALT 28 5 - 40 U/L    Anion Gap 17 <=19 mmol/L   Magnesium   Result Value Ref Range    Magnesium 1.90 1.60 - 3.10 mg/dL   Sars-CoV-2 and Influenza A/B PCR   Result Value Ref Range    Flu A Result Not Detected Not Detected    Flu B Result Not Detected Not Detected    Coronavirus 2019, PCR Not Detected Not Detected   Serial Troponin, Initial ([facility])   Result Value Ref Range    Troponin T, High Sensitivity 46 (HH) <=14 ng/L   Serial Troponin, 2 Hour ([facility])   Result Value Ref Range    Troponin T, High Sensitivity 45 (HH) <=14 ng/L   NT Pro-BNP   Result  Value Ref Range    PROBNP 19,252 (H) 0 - 624 pg/mL   ECG 12 lead   Result Value Ref Range    Ventricular Rate 61 BPM    Atrial Rate 60 BPM    NM Interval 224 ms    QRS Duration 84 ms    QT Interval 390 ms    QTC Calculation(Bazett) 392 ms    R Axis 47 degrees    T Axis 266 degrees    QRS Count 10 beats    Q Onset 222 ms    P Onset 138 ms    P Offset 158 ms    T Offset 417 ms    QTC Fredericia 391 ms   Serial Troponin, 6 Hour ([facility])   Result Value Ref Range    Troponin T, High Sensitivity 49 (HH) <=14 ng/L   CBC   Result Value Ref Range    WBC 10.3 4.4 - 11.3 x10*3/uL    nRBC 0.0 0.0 - 0.0 /100 WBCs    RBC 3.52 (L) 4.00 - 5.20 x10*6/uL    Hemoglobin 9.8 (L) 12.0 - 16.0 g/dL    Hematocrit 32.8 (L) 36.0 - 46.0 %    MCV 93 80 - 100 fL    MCH 27.8 26.0 - 34.0 pg    MCHC 29.9 (L) 32.0 - 36.0 g/dL    RDW 15.9 (H) 11.5 - 14.5 %    Platelets 245 150 - 450 x10*3/uL   Basic Metabolic Panel   Result Value Ref Range    Glucose 153 (H) 65 - 99 mg/dL    Sodium 140 133 - 145 mmol/L    Potassium 3.7 3.4 - 5.1 mmol/L    Chloride 99 97 - 107 mmol/L    Bicarbonate 23 (L) 24 - 31 mmol/L    Urea Nitrogen 36 (H) 8 - 25 mg/dL    Creatinine 1.90 (H) 0.40 - 1.60 mg/dL    eGFR 26 (L) >60 mL/min/1.73m*2    Calcium 9.5 8.5 - 10.4 mg/dL    Anion Gap 18 <=19 mmol/L   Basic Metabolic Panel   Result Value Ref Range    Glucose 129 (H) 65 - 99 mg/dL    Sodium 134 133 - 145 mmol/L    Potassium 4.1 3.4 - 5.1 mmol/L    Chloride 97 97 - 107 mmol/L    Bicarbonate 21 (L) 24 - 31 mmol/L    Urea Nitrogen 47 (H) 8 - 25 mg/dL    Creatinine 2.30 (H) 0.40 - 1.60 mg/dL    eGFR 20 (L) >60 mL/min/1.73m*2    Calcium 9.5 8.5 - 10.4 mg/dL    Anion Gap 16 <=19 mmol/L   CBC   Result Value Ref Range    WBC 7.0 4.4 - 11.3 x10*3/uL    nRBC 0.0 0.0 - 0.0 /100 WBCs    RBC 3.36 (L) 4.00 - 5.20 x10*6/uL    Hemoglobin 9.4 (L) 12.0 - 16.0 g/dL    Hematocrit 31.2 (L) 36.0 - 46.0 %    MCV 93 80 - 100 fL    MCH 28.0 26.0 - 34.0 pg    MCHC 30.1 (L) 32.0 - 36.0 g/dL    RDW 15.9 (H)  11.5 - 14.5 %    Platelets 185 150 - 450 x10*3/uL   Basic Metabolic Panel   Result Value Ref Range    Glucose 99 65 - 99 mg/dL    Sodium 140 133 - 145 mmol/L    Potassium 3.5 3.4 - 5.1 mmol/L    Chloride 102 97 - 107 mmol/L    Bicarbonate 23 (L) 24 - 31 mmol/L    Urea Nitrogen 52 (H) 8 - 25 mg/dL    Creatinine 2.20 (H) 0.40 - 1.60 mg/dL    eGFR 21 (L) >60 mL/min/1.73m*2    Calcium 9.1 8.5 - 10.4 mg/dL    Anion Gap 15 <=19 mmol/L   Transthoracic Echo (TTE) Limited   Result Value Ref Range    AV pk bernarda 0.79 m/s    RVSP 53.7 mmHg    AV pk grad 2.5 mmHg   Comprehensive Metabolic Panel   Result Value Ref Range    Glucose 93 65 - 99 mg/dL    Sodium 144 133 - 145 mmol/L    Potassium 3.3 (L) 3.4 - 5.1 mmol/L    Chloride 103 97 - 107 mmol/L    Bicarbonate 28 24 - 31 mmol/L    Urea Nitrogen 42 (H) 8 - 25 mg/dL    Creatinine 1.80 (H) 0.40 - 1.60 mg/dL    eGFR 27 (L) >60 mL/min/1.73m*2    Calcium 9.4 8.5 - 10.4 mg/dL    Albumin 3.9 3.5 - 5.0 g/dL    Alkaline Phosphatase 111 35 - 125 U/L    Total Protein 6.1 5.9 - 7.9 g/dL    AST 54 (H) 5 - 40 U/L    Bilirubin, Total 0.7 0.1 - 1.2 mg/dL    ALT 67 (H) 5 - 40 U/L    Anion Gap 13 <=19 mmol/L   CBC and Auto Differential   Result Value Ref Range    WBC 6.2 4.4 - 11.3 x10*3/uL    nRBC 0.0 0.0 - 0.0 /100 WBCs    RBC 3.69 (L) 4.00 - 5.20 x10*6/uL    Hemoglobin 10.1 (L) 12.0 - 16.0 g/dL    Hematocrit 33.7 (L) 36.0 - 46.0 %    MCV 91 80 - 100 fL    MCH 27.4 26.0 - 34.0 pg    MCHC 30.0 (L) 32.0 - 36.0 g/dL    RDW 15.8 (H) 11.5 - 14.5 %    Platelets 192 150 - 450 x10*3/uL    Neutrophils % 75.3 40.0 - 80.0 %    Immature Granulocytes %, Automated 0.3 0.0 - 0.9 %    Lymphocytes % 12.8 13.0 - 44.0 %    Monocytes % 8.3 2.0 - 10.0 %    Eosinophils % 2.6 0.0 - 6.0 %    Basophils % 0.7 0.0 - 2.0 %    Neutrophils Absolute 4.63 1.60 - 5.50 x10*3/uL    Immature Granulocytes Absolute, Automated 0.02 0.00 - 0.50 x10*3/uL    Lymphocytes Absolute 0.79 (L) 0.80 - 3.00 x10*3/uL    Monocytes Absolute 0.51  0.05 - 0.80 x10*3/uL    Eosinophils Absolute 0.16 0.00 - 0.40 x10*3/uL    Basophils Absolute 0.04 0.00 - 0.10 x10*3/uL       Assessment/Plan     Acute kidney injury superimposed on chronic kidney disease stage III and is down to 1.8 today we will continue her Demadex given 1 extra dose of IV Lasix this afternoon  A-fib with rapid ventricular rate converted to sinus rhythm  CKD stage III  Pulmonary edema due to diuresis  Coronary artery disease  Coronary artery stenting           Ronnie Giang MD

## 2024-04-10 ENCOUNTER — HOSPITAL ENCOUNTER (INPATIENT)
Dept: CARDIOLOGY | Facility: HOSPITAL | Age: 84
Discharge: HOME | DRG: 308 | End: 2024-04-10
Payer: MEDICARE

## 2024-04-10 VITALS
DIASTOLIC BLOOD PRESSURE: 62 MMHG | HEART RATE: 80 BPM | RESPIRATION RATE: 12 BRPM | SYSTOLIC BLOOD PRESSURE: 126 MMHG | TEMPERATURE: 97.8 F | OXYGEN SATURATION: 97 %

## 2024-04-10 DIAGNOSIS — I34.0 MITRAL VALVE INSUFFICIENCY, UNSPECIFIED ETIOLOGY: Primary | ICD-10-CM

## 2024-04-10 LAB
ALBUMIN SERPL-MCNC: 3.8 G/DL (ref 3.5–5)
ALP BLD-CCNC: 101 U/L (ref 35–125)
ALT SERPL-CCNC: 49 U/L (ref 5–40)
ANION GAP SERPL CALC-SCNC: 13 MMOL/L
AST SERPL-CCNC: 38 U/L (ref 5–40)
BASOPHILS # BLD AUTO: 0.05 X10*3/UL (ref 0–0.1)
BASOPHILS NFR BLD AUTO: 0.7 %
BILIRUB SERPL-MCNC: 0.7 MG/DL (ref 0.1–1.2)
BUN SERPL-MCNC: 33 MG/DL (ref 8–25)
CALCIUM SERPL-MCNC: 9.2 MG/DL (ref 8.5–10.4)
CHLORIDE SERPL-SCNC: 101 MMOL/L (ref 97–107)
CO2 SERPL-SCNC: 31 MMOL/L (ref 24–31)
CREAT SERPL-MCNC: 1.4 MG/DL (ref 0.4–1.6)
EGFRCR SERPLBLD CKD-EPI 2021: 37 ML/MIN/1.73M*2
EOSINOPHIL # BLD AUTO: 0.22 X10*3/UL (ref 0–0.4)
EOSINOPHIL NFR BLD AUTO: 3.3 %
ERYTHROCYTE [DISTWIDTH] IN BLOOD BY AUTOMATED COUNT: 15.6 % (ref 11.5–14.5)
GLUCOSE BLD MANUAL STRIP-MCNC: 102 MG/DL (ref 74–99)
GLUCOSE BLD MANUAL STRIP-MCNC: 135 MG/DL (ref 74–99)
GLUCOSE SERPL-MCNC: 91 MG/DL (ref 65–99)
HCT VFR BLD AUTO: 34.4 % (ref 36–46)
HGB BLD-MCNC: 10.6 G/DL (ref 12–16)
IMM GRANULOCYTES # BLD AUTO: 0.03 X10*3/UL (ref 0–0.5)
IMM GRANULOCYTES NFR BLD AUTO: 0.4 % (ref 0–0.9)
LYMPHOCYTES # BLD AUTO: 1 X10*3/UL (ref 0.8–3)
LYMPHOCYTES NFR BLD AUTO: 14.9 %
MCH RBC QN AUTO: 27.9 PG (ref 26–34)
MCHC RBC AUTO-ENTMCNC: 30.8 G/DL (ref 32–36)
MCV RBC AUTO: 91 FL (ref 80–100)
MONOCYTES # BLD AUTO: 0.63 X10*3/UL (ref 0.05–0.8)
MONOCYTES NFR BLD AUTO: 9.4 %
NEUTROPHILS # BLD AUTO: 4.77 X10*3/UL (ref 1.6–5.5)
NEUTROPHILS NFR BLD AUTO: 71.3 %
NRBC BLD-RTO: 0 /100 WBCS (ref 0–0)
PLATELET # BLD AUTO: 193 X10*3/UL (ref 150–450)
POTASSIUM SERPL-SCNC: 3.1 MMOL/L (ref 3.4–5.1)
PROT SERPL-MCNC: 5.9 G/DL (ref 5.9–7.9)
RBC # BLD AUTO: 3.8 X10*6/UL (ref 4–5.2)
SODIUM SERPL-SCNC: 145 MMOL/L (ref 133–145)
WBC # BLD AUTO: 6.7 X10*3/UL (ref 4.4–11.3)

## 2024-04-10 PROCEDURE — 82947 ASSAY GLUCOSE BLOOD QUANT: CPT

## 2024-04-10 PROCEDURE — 2060000001 HC INTERMEDIATE ICU ROOM DAILY

## 2024-04-10 PROCEDURE — 99222 1ST HOSP IP/OBS MODERATE 55: CPT

## 2024-04-10 PROCEDURE — 1090000001 HH PPS REVENUE CREDIT

## 2024-04-10 PROCEDURE — 2500000002 HC RX 250 W HCPCS SELF ADMINISTERED DRUGS (ALT 637 FOR MEDICARE OP, ALT 636 FOR OP/ED): Performed by: INTERNAL MEDICINE

## 2024-04-10 PROCEDURE — 99152 MOD SED SAME PHYS/QHP 5/>YRS: CPT | Performed by: INTERNAL MEDICINE

## 2024-04-10 PROCEDURE — 2500000004 HC RX 250 GENERAL PHARMACY W/ HCPCS (ALT 636 FOR OP/ED): Performed by: INTERNAL MEDICINE

## 2024-04-10 PROCEDURE — 2500000001 HC RX 250 WO HCPCS SELF ADMINISTERED DRUGS (ALT 637 FOR MEDICARE OP): Performed by: INTERNAL MEDICINE

## 2024-04-10 PROCEDURE — 93312 ECHO TRANSESOPHAGEAL: CPT | Performed by: INTERNAL MEDICINE

## 2024-04-10 PROCEDURE — 99153 MOD SED SAME PHYS/QHP EA: CPT | Performed by: INTERNAL MEDICINE

## 2024-04-10 PROCEDURE — 2500000002 HC RX 250 W HCPCS SELF ADMINISTERED DRUGS (ALT 637 FOR MEDICARE OP, ALT 636 FOR OP/ED): Mod: MUE | Performed by: INTERNAL MEDICINE

## 2024-04-10 PROCEDURE — 99232 SBSQ HOSP IP/OBS MODERATE 35: CPT | Performed by: INTERNAL MEDICINE

## 2024-04-10 PROCEDURE — 2500000005 HC RX 250 GENERAL PHARMACY W/O HCPCS: Performed by: INTERNAL MEDICINE

## 2024-04-10 PROCEDURE — 99153 MOD SED SAME PHYS/QHP EA: CPT

## 2024-04-10 PROCEDURE — 99152 MOD SED SAME PHYS/QHP 5/>YRS: CPT

## 2024-04-10 PROCEDURE — 93320 DOPPLER ECHO COMPLETE: CPT

## 2024-04-10 PROCEDURE — 93318 ECHO TRANSESOPHAGEAL INTRAOP: CPT

## 2024-04-10 PROCEDURE — 97535 SELF CARE MNGMENT TRAINING: CPT | Mod: GO,CO

## 2024-04-10 PROCEDURE — 1090000002 HH PPS REVENUE DEBIT

## 2024-04-10 PROCEDURE — 85025 COMPLETE CBC W/AUTO DIFF WBC: CPT | Performed by: INTERNAL MEDICINE

## 2024-04-10 PROCEDURE — 97530 THERAPEUTIC ACTIVITIES: CPT | Mod: GO,CO

## 2024-04-10 PROCEDURE — 84075 ASSAY ALKALINE PHOSPHATASE: CPT | Performed by: INTERNAL MEDICINE

## 2024-04-10 PROCEDURE — 36415 COLL VENOUS BLD VENIPUNCTURE: CPT | Performed by: INTERNAL MEDICINE

## 2024-04-10 RX ORDER — SERTRALINE HYDROCHLORIDE 100 MG/1
200 TABLET, FILM COATED ORAL DAILY
Status: DISCONTINUED | OUTPATIENT
Start: 2024-04-11 | End: 2024-04-11 | Stop reason: HOSPADM

## 2024-04-10 RX ORDER — FENTANYL CITRATE 50 UG/ML
INJECTION, SOLUTION INTRAMUSCULAR; INTRAVENOUS AS NEEDED
Status: DISCONTINUED | OUTPATIENT
Start: 2024-04-10 | End: 2024-04-11 | Stop reason: HOSPADM

## 2024-04-10 RX ORDER — HYDROXYZINE HYDROCHLORIDE 10 MG/1
10 TABLET, FILM COATED ORAL EVERY 6 HOURS PRN
Status: DISCONTINUED | OUTPATIENT
Start: 2024-04-10 | End: 2024-04-11 | Stop reason: HOSPADM

## 2024-04-10 RX ORDER — MIDAZOLAM HYDROCHLORIDE 1 MG/ML
INJECTION, SOLUTION INTRAMUSCULAR; INTRAVENOUS AS NEEDED
Status: DISCONTINUED | OUTPATIENT
Start: 2024-04-10 | End: 2024-04-11 | Stop reason: HOSPADM

## 2024-04-10 RX ADMIN — FENTANYL CITRATE 50 MCG: 50 INJECTION, SOLUTION INTRAMUSCULAR; INTRAVENOUS at 11:04

## 2024-04-10 RX ADMIN — LEVOTHYROXINE SODIUM 100 MCG: 0.1 TABLET ORAL at 06:00

## 2024-04-10 RX ADMIN — CLOPIDOGREL BISULFATE 75 MG: 75 TABLET ORAL at 08:34

## 2024-04-10 RX ADMIN — AMIODARONE HYDROCHLORIDE 200 MG: 200 TABLET ORAL at 21:25

## 2024-04-10 RX ADMIN — DILTIAZEM HYDROCHLORIDE 120 MG: 60 TABLET ORAL at 21:23

## 2024-04-10 RX ADMIN — MIDAZOLAM 1 MG: 1 INJECTION INTRAMUSCULAR; INTRAVENOUS at 11:19

## 2024-04-10 RX ADMIN — Medication 6 PERCENT: at 11:08

## 2024-04-10 RX ADMIN — Medication 3 PERCENT: at 10:59

## 2024-04-10 RX ADMIN — Medication 3 PERCENT: at 11:30

## 2024-04-10 RX ADMIN — TORSEMIDE 20 MG: 20 TABLET ORAL at 08:33

## 2024-04-10 RX ADMIN — SERTRALINE 150 MG: 100 TABLET, FILM COATED ORAL at 08:32

## 2024-04-10 RX ADMIN — PANTOPRAZOLE SODIUM 40 MG: 40 TABLET, DELAYED RELEASE ORAL at 06:00

## 2024-04-10 RX ADMIN — METOPROLOL TARTRATE 50 MG: 50 TABLET, FILM COATED ORAL at 08:32

## 2024-04-10 RX ADMIN — ALUMINUM HYDROXIDE, MAGNESIUM HYDROXIDE, AND DIMETHICONE 15 ML: 200; 20; 200 SUSPENSION ORAL at 11:04

## 2024-04-10 RX ADMIN — ATORVASTATIN CALCIUM 80 MG: 80 TABLET, FILM COATED ORAL at 21:24

## 2024-04-10 RX ADMIN — APIXABAN 2.5 MG: 5 TABLET, FILM COATED ORAL at 08:33

## 2024-04-10 RX ADMIN — Medication 2000 UNITS: at 08:33

## 2024-04-10 RX ADMIN — APIXABAN 2.5 MG: 5 TABLET, FILM COATED ORAL at 21:25

## 2024-04-10 RX ADMIN — AMIODARONE HYDROCHLORIDE 200 MG: 200 TABLET ORAL at 08:32

## 2024-04-10 RX ADMIN — Medication 2 L/MIN: at 08:00

## 2024-04-10 RX ADMIN — DILTIAZEM HYDROCHLORIDE 120 MG: 60 TABLET ORAL at 08:33

## 2024-04-10 RX ADMIN — METOPROLOL TARTRATE 50 MG: 50 TABLET, FILM COATED ORAL at 21:24

## 2024-04-10 RX ADMIN — MIDAZOLAM 1 MG: 1 INJECTION INTRAMUSCULAR; INTRAVENOUS at 11:04

## 2024-04-10 RX ADMIN — ISOSORBIDE MONONITRATE 30 MG: 30 TABLET, EXTENDED RELEASE ORAL at 08:33

## 2024-04-10 ASSESSMENT — ENCOUNTER SYMPTOMS
CONFUSION: 0
AGITATION: 0
FATIGUE: 1
PALPITATIONS: 0
DECREASED CONCENTRATION: 0
SLEEP DISTURBANCE: 1
WEAKNESS: 1
ARTHRALGIAS: 0
MYALGIAS: 0
DYSPHORIC MOOD: 1
NERVOUS/ANXIOUS: 1
ACTIVITY CHANGE: 1
HALLUCINATIONS: 0
HYPERACTIVE: 0

## 2024-04-10 ASSESSMENT — COGNITIVE AND FUNCTIONAL STATUS - GENERAL
DAILY ACTIVITIY SCORE: 22
DRESSING REGULAR LOWER BODY CLOTHING: A LITTLE
DAILY ACTIVITIY SCORE: 24
HELP NEEDED FOR BATHING: A LITTLE
CLIMB 3 TO 5 STEPS WITH RAILING: A LITTLE
MOBILITY SCORE: 22
WALKING IN HOSPITAL ROOM: A LITTLE
CLIMB 3 TO 5 STEPS WITH RAILING: A LITTLE
DAILY ACTIVITIY SCORE: 24
MOBILITY SCORE: 22
WALKING IN HOSPITAL ROOM: A LITTLE

## 2024-04-10 ASSESSMENT — PAIN - FUNCTIONAL ASSESSMENT
PAIN_FUNCTIONAL_ASSESSMENT: 0-10

## 2024-04-10 ASSESSMENT — COLUMBIA-SUICIDE SEVERITY RATING SCALE - C-SSRS
1. IN THE PAST MONTH, HAVE YOU WISHED YOU WERE DEAD OR WISHED YOU COULD GO TO SLEEP AND NOT WAKE UP?: NO
2. HAVE YOU ACTUALLY HAD ANY THOUGHTS OF KILLING YOURSELF?: NO

## 2024-04-10 ASSESSMENT — PAIN SCALES - GENERAL
PAINLEVEL_OUTOF10: 0 - NO PAIN

## 2024-04-10 ASSESSMENT — ACTIVITIES OF DAILY LIVING (ADL): HOME_MANAGEMENT_TIME_ENTRY: 16

## 2024-04-10 ASSESSMENT — PAIN DESCRIPTION - DESCRIPTORS: DESCRIPTORS: ACHING

## 2024-04-10 NOTE — PROGRESS NOTES
"Linette Doyle is a 83 y.o. female on day 5 of admission presenting with Paroxysmal A-fib (CMS/HCC).    Subjective   Was seen for acute kidney injury on top of chronic kidney disease stage III secondary to A-fib with RVR and CHF and converted to sinus rhythm patient is a little bit sleepy she just got back from [place] results reviewed she has moderate mitral regurgitation no indication for mitral clip    Objective     Physical Exam  Neck:      Vascular: No carotid bruit.   Cardiovascular:      Rate and Rhythm: Regular rhythm.      Heart sounds: No murmur heard.     No friction rub. No gallop.   Pulmonary:      Breath sounds: No wheezing, rhonchi or rales.   Chest:      Chest wall: No tenderness.   Abdominal:      General: There is no distension.      Tenderness: There is no abdominal tenderness. There is no guarding or rebound.   Musculoskeletal:         General: No swelling or tenderness.      Cervical back: Neck supple.      Right lower leg: No edema.      Left lower leg: No edema.   Lymphadenopathy:      Cervical: No cervical adenopathy.         Last Recorded Vitals  Blood pressure 108/53, pulse 60, temperature 36.3 °C (97.3 °F), temperature source Oral, resp. rate 17, height 1.626 m (5' 4\"), weight 66.3 kg (146 lb 2.6 oz), SpO2 96%.    Intake/Output last 3 Shifts:  I/O last 3 completed shifts:  In: 460 (6.9 mL/kg) [P.O.:460]  Out: - (0 mL/kg)   Weight: 66.3 kg     Current Facility-Administered Medications:     acetaminophen (Tylenol) tablet 650 mg, 650 mg, oral, q6h PRN, Samantha Alcantara MD    amiodarone (Pacerone) tablet 200 mg, 200 mg, oral, BID, Becky Vela MD, 200 mg at 04/10/24 0832    apixaban (Eliquis) tablet 2.5 mg, 2.5 mg, oral, BID, Samantha Alcantara MD, 2.5 mg at 04/10/24 0833    atorvastatin (Lipitor) tablet 80 mg, 80 mg, oral, Nightly, Samantha Alcantara MD, 80 mg at 04/09/24 2211    cholecalciferol (Vitamin D-3) tablet 2,000 Units, 2,000 Units, oral, Daily, Samantha Alcantara MD, 2,000 Units " at 04/10/24 0833    clopidogrel (Plavix) tablet 75 mg, 75 mg, oral, Daily, Samantha Alcantara MD, 75 mg at 04/10/24 0834    dilTIAZem (Cardizem) immediate release tablet 120 mg, 120 mg, oral, BID, Samantha Alcantara MD, 120 mg at 04/10/24 0833    ipratropium-albuteroL (Duo-Neb) 0.5-2.5 mg/3 mL nebulizer solution 3 mL, 3 mL, nebulization, q4h PRN, Samantha Alcantara MD, 3 mL at 04/05/24 2129    isosorbide mononitrate ER (Imdur) 24 hr tablet 30 mg, 30 mg, oral, Daily, Becky Vela MD, 30 mg at 04/10/24 0833    levothyroxine (Synthroid, Levoxyl) tablet 100 mcg, 100 mcg, oral, Daily, Samantha Alcantara MD, 100 mcg at 04/10/24 0600    melatonin tablet 5 mg, 5 mg, oral, Nightly PRN, Samantha Alcantara MD    metoprolol tartrate (Lopressor) tablet 50 mg, 50 mg, oral, BID, Becky Vela MD, 50 mg at 04/10/24 0832    ondansetron (Zofran) injection 4 mg, 4 mg, intravenous, q6h PRN, Samantha Alcantara MD    ondansetron (Zofran) tablet 4 mg, 4 mg, oral, 4x daily PRN, Samantha Alcantara MD    oxygen (O2) therapy, , inhalation, Continuous - Inhalation, Henry Ricci, , 2 L/min at 04/10/24 0800    pantoprazole (ProtoNix) EC tablet 40 mg, 40 mg, oral, Daily before breakfast, Samantha Alcantara MD, 40 mg at 04/10/24 0600    sertraline (Zoloft) tablet 150 mg, 150 mg, oral, Daily, Samantha Alcantara MD, 150 mg at 04/10/24 0832    torsemide (Demadex) tablet 20 mg, 20 mg, oral, Daily, Ronnie Giang MD, 20 mg at 04/10/24 0833    Facility-Administered Medications Ordered in Other Encounters:     fentaNYL PF (Sublimaze) injection, , , PRN, Becky Vela MD, 50 mcg at 04/10/24 1104    midazolam (Versed) injection, , , PRN, Becky Vela MD, 1 mg at 04/10/24 1119    oxygen (O2) therapy, , , PRN, Becky Vela MD, 3 percent at 04/10/24 1130    HODGSON oral liquid (dicyclomine-lidocaine viscous-Mylanta 1:1:4), , , PRN, Becky Vela MD, 15 mL at 04/10/24 1104   Relevant Results    Results for orders placed or  performed during the hospital encounter of 04/05/24 (from the past 96 hour(s))   Basic Metabolic Panel   Result Value Ref Range    Glucose 129 (H) 65 - 99 mg/dL    Sodium 134 133 - 145 mmol/L    Potassium 4.1 3.4 - 5.1 mmol/L    Chloride 97 97 - 107 mmol/L    Bicarbonate 21 (L) 24 - 31 mmol/L    Urea Nitrogen 47 (H) 8 - 25 mg/dL    Creatinine 2.30 (H) 0.40 - 1.60 mg/dL    eGFR 20 (L) >60 mL/min/1.73m*2    Calcium 9.5 8.5 - 10.4 mg/dL    Anion Gap 16 <=19 mmol/L   CBC   Result Value Ref Range    WBC 7.0 4.4 - 11.3 x10*3/uL    nRBC 0.0 0.0 - 0.0 /100 WBCs    RBC 3.36 (L) 4.00 - 5.20 x10*6/uL    Hemoglobin 9.4 (L) 12.0 - 16.0 g/dL    Hematocrit 31.2 (L) 36.0 - 46.0 %    MCV 93 80 - 100 fL    MCH 28.0 26.0 - 34.0 pg    MCHC 30.1 (L) 32.0 - 36.0 g/dL    RDW 15.9 (H) 11.5 - 14.5 %    Platelets 185 150 - 450 x10*3/uL   Basic Metabolic Panel   Result Value Ref Range    Glucose 99 65 - 99 mg/dL    Sodium 140 133 - 145 mmol/L    Potassium 3.5 3.4 - 5.1 mmol/L    Chloride 102 97 - 107 mmol/L    Bicarbonate 23 (L) 24 - 31 mmol/L    Urea Nitrogen 52 (H) 8 - 25 mg/dL    Creatinine 2.20 (H) 0.40 - 1.60 mg/dL    eGFR 21 (L) >60 mL/min/1.73m*2    Calcium 9.1 8.5 - 10.4 mg/dL    Anion Gap 15 <=19 mmol/L   Transthoracic Echo (TTE) Limited   Result Value Ref Range    AV pk bernarda 0.79 m/s    RVSP 53.7 mmHg    AV pk grad 2.5 mmHg   Comprehensive Metabolic Panel   Result Value Ref Range    Glucose 93 65 - 99 mg/dL    Sodium 144 133 - 145 mmol/L    Potassium 3.3 (L) 3.4 - 5.1 mmol/L    Chloride 103 97 - 107 mmol/L    Bicarbonate 28 24 - 31 mmol/L    Urea Nitrogen 42 (H) 8 - 25 mg/dL    Creatinine 1.80 (H) 0.40 - 1.60 mg/dL    eGFR 27 (L) >60 mL/min/1.73m*2    Calcium 9.4 8.5 - 10.4 mg/dL    Albumin 3.9 3.5 - 5.0 g/dL    Alkaline Phosphatase 111 35 - 125 U/L    Total Protein 6.1 5.9 - 7.9 g/dL    AST 54 (H) 5 - 40 U/L    Bilirubin, Total 0.7 0.1 - 1.2 mg/dL    ALT 67 (H) 5 - 40 U/L    Anion Gap 13 <=19 mmol/L   CBC and Auto Differential    Result Value Ref Range    WBC 6.2 4.4 - 11.3 x10*3/uL    nRBC 0.0 0.0 - 0.0 /100 WBCs    RBC 3.69 (L) 4.00 - 5.20 x10*6/uL    Hemoglobin 10.1 (L) 12.0 - 16.0 g/dL    Hematocrit 33.7 (L) 36.0 - 46.0 %    MCV 91 80 - 100 fL    MCH 27.4 26.0 - 34.0 pg    MCHC 30.0 (L) 32.0 - 36.0 g/dL    RDW 15.8 (H) 11.5 - 14.5 %    Platelets 192 150 - 450 x10*3/uL    Neutrophils % 75.3 40.0 - 80.0 %    Immature Granulocytes %, Automated 0.3 0.0 - 0.9 %    Lymphocytes % 12.8 13.0 - 44.0 %    Monocytes % 8.3 2.0 - 10.0 %    Eosinophils % 2.6 0.0 - 6.0 %    Basophils % 0.7 0.0 - 2.0 %    Neutrophils Absolute 4.63 1.60 - 5.50 x10*3/uL    Immature Granulocytes Absolute, Automated 0.02 0.00 - 0.50 x10*3/uL    Lymphocytes Absolute 0.79 (L) 0.80 - 3.00 x10*3/uL    Monocytes Absolute 0.51 0.05 - 0.80 x10*3/uL    Eosinophils Absolute 0.16 0.00 - 0.40 x10*3/uL    Basophils Absolute 0.04 0.00 - 0.10 x10*3/uL   Comprehensive Metabolic Panel   Result Value Ref Range    Glucose 91 65 - 99 mg/dL    Sodium 145 133 - 145 mmol/L    Potassium 3.1 (L) 3.4 - 5.1 mmol/L    Chloride 101 97 - 107 mmol/L    Bicarbonate 31 24 - 31 mmol/L    Urea Nitrogen 33 (H) 8 - 25 mg/dL    Creatinine 1.40 0.40 - 1.60 mg/dL    eGFR 37 (L) >60 mL/min/1.73m*2    Calcium 9.2 8.5 - 10.4 mg/dL    Albumin 3.8 3.5 - 5.0 g/dL    Alkaline Phosphatase 101 35 - 125 U/L    Total Protein 5.9 5.9 - 7.9 g/dL    AST 38 5 - 40 U/L    Bilirubin, Total 0.7 0.1 - 1.2 mg/dL    ALT 49 (H) 5 - 40 U/L    Anion Gap 13 <=19 mmol/L   CBC and Auto Differential   Result Value Ref Range    WBC 6.7 4.4 - 11.3 x10*3/uL    nRBC 0.0 0.0 - 0.0 /100 WBCs    RBC 3.80 (L) 4.00 - 5.20 x10*6/uL    Hemoglobin 10.6 (L) 12.0 - 16.0 g/dL    Hematocrit 34.4 (L) 36.0 - 46.0 %    MCV 91 80 - 100 fL    MCH 27.9 26.0 - 34.0 pg    MCHC 30.8 (L) 32.0 - 36.0 g/dL    RDW 15.6 (H) 11.5 - 14.5 %    Platelets 193 150 - 450 x10*3/uL    Neutrophils % 71.3 40.0 - 80.0 %    Immature Granulocytes %, Automated 0.4 0.0 - 0.9 %     Lymphocytes % 14.9 13.0 - 44.0 %    Monocytes % 9.4 2.0 - 10.0 %    Eosinophils % 3.3 0.0 - 6.0 %    Basophils % 0.7 0.0 - 2.0 %    Neutrophils Absolute 4.77 1.60 - 5.50 x10*3/uL    Immature Granulocytes Absolute, Automated 0.03 0.00 - 0.50 x10*3/uL    Lymphocytes Absolute 1.00 0.80 - 3.00 x10*3/uL    Monocytes Absolute 0.63 0.05 - 0.80 x10*3/uL    Eosinophils Absolute 0.22 0.00 - 0.40 x10*3/uL    Basophils Absolute 0.05 0.00 - 0.10 x10*3/uL   POCT GLUCOSE   Result Value Ref Range    POCT Glucose 102 (H) 74 - 99 mg/dL   Transesophageal Echo ([facility])   Result Value Ref Range    BSA 1.73 m2       Assessment/Plan     Acute kidney injury superimposed on chronic kidney disease stage III renal function continue to improve I will continue same diuretic regimen and continue to monitor renal function very closely  A-fib with rapid ventricular rate converted to sinus rhythm  CKD stage III  Pulmonary edema due to diuresis  Coronary artery disease  Coronary artery stenting  Mitral regurgitation by cardiology           Ronnie Giang MD

## 2024-04-10 NOTE — POST-PROCEDURE NOTE
Physician Transition of Care Summary  Invasive Cardiovascular Lab    Procedure Date: 4/10/2024  Attending: * No surgeons found in log *  Resident/Fellow/Other Assistant: * No surgeons found in log *    Indications:   * No Diagnosis Codes entered *    Post-procedure diagnosis:   * No Diagnosis Codes entered *    Procedure(s):   * No procedures documented on diagnosis form *      Procedure Findings:   [facility] showed moderate mitral regurgitation and tricuspid regurgitation    Description of the Procedure:   Transesophageal echocardiogram    Complications:   None    Stents/Implants:   Cardiovascular Implants       Stent    Stent, Urbano Appling Sheldon, 2.50 X 18rx - [id number] - Implanted        Inventory item: STENT, URBANO FRONTIER SHELDON, 2.50 X 18RX Model/Cat number: [id number]    : [employer] Lot number: [id number]    Device identifier: [id number]        As of 2/8/2024       Status: Implanted                      Stent, Keller Appling Sheldon, 2.50 X 18rx - [id number] - Implanted        Inventory item: STENT, URBANO FRONTIER SHELDON, 2.50 X 18RX Model/Cat number: [id number]    : [employer] Lot number: [id number]    Device identifier: [id number]        As of 2/8/2024       Status: Implanted                              Anticoagulation/Antiplatelet Plan:   None    Estimated Blood Loss:   0 mL    Anesthesia: * No anesthesia type entered * Anesthesia Staff: No anesthesia staff entered.    Any Specimen(s) Removed:   No specimens collected during this procedure.    Disposition:   Okay to discharge home later today.      Electronically signed by: Becky Vela MD, 4/10/2024 12:20 PM

## 2024-04-10 NOTE — CONSULTS
Inpatient consult to Psychiatry  Consult performed by: JUNG Franco-RANDALL  Consult ordered by: Henry Ricci DO  Reason for consult: Depression        History Of Present Illness  Linette Doyle is a 83 y.o. female presenting with Paroxysmal A-fib (CMS/HCC).    During today's interaction with the patient, several ordonez observations were made. The patient appeared alert and oriented, demonstrating awareness of time, place, person, and situation. The patient shared that she had been scammed on , which had a significant impact on her health, causing stress and leading to atrial fibrillation. However, she mentioned that her depression was better today and denied experiencing mood swings or euphoria. Although she did not feel irritable, she expressed feelings of guilt and victimization due to the scam and the loss of money. The patient denied having any racing thoughts but reported experiencing mild anxiety. There were no reports of panic attacks or phobias.  In terms of sleep, the patient stated that she usually sleeps well, averaging about six hours or more. However, her appetite has been poor for weeks, resulting in non-significant weight loss. Although her energy levels have not fully returned to baseline, she feels that she is moving in the right direction. The patient demonstrated good concentration and impulse control during the assessment. While she was able to perform the task of reciting the months backwards with very difficulty, she struggled with the exercise of subtracting seven. She reported maintaining her motivation and engaging in regular activities.  The patient denied experiencing any auditory or visual hallucinations. She also denied any suicidal or homicidal ideation, emphasizing her commitment to her grandchildren, brother, and sister and her strong moral beliefs. Although she occasionally has passive thoughts related to missing her   and son, she reassured the provider  that she would never act on them due to fear and her moral values. The patient does not have a dedicated mental health provider and relies on her primary care physician for psychotropic medication management. She stated that she does not experience any pain and feels safe living alone. She has a supportive network of neighbors, and while her sister and brother live a bit far in [place], they are available if needed.  The patient denies any history of smoking, alcohol use, or drug use. Education was provided to the patient regarding her current medication regimen, but she expressed resistance to making any further changes. Therefore, it was decided to add hydroxyzine to help manage her anxiety while adjusting her Zoloft dose to 200 mg. The patient was given the opportunity to ask questions or voice any concerns, but she did not have any at this time.  Past Medical History  She has a past medical history of Angina pectoris (CMS/[facility]) (10/22/2023), Atherosclerosis of coronary artery (08/21/2019), Atherosclerotic heart disease of native coronary artery with other forms of angina pectoris (CMS/[facility]) (10/22/2023), Hypercholesterolemia (12/28/2018), Hyperlipidemia (10/22/2023), Presence of cardiac pacemaker (10/22/2023), Primary hypertension (12/28/2018), Shortness of breath (11/26/2019), Sick sinus syndrome (CMS/[facility]) (10/22/2023), Sinus bradycardia (10/22/2023), and Stage 3a chronic kidney disease (CMS/[facility]) (11/25/2019).    Surgical History  She has a past surgical history that includes Cardiac catheterization (N/A, 02/02/2024); Cardiac catheterization (N/A, 02/02/2024); Cardiac catheterization (N/A, 02/08/2024); Cardiac catheterization (N/A, 02/08/2024); and pacemaker placement (07/04/2019).     Social History  She reports that she has never smoked. She has never been exposed to tobacco smoke. She has never used smokeless tobacco. She reports that she does not drink alcohol and does not use  drugs.    Abuse/Trauma  none    Past Treatment   Inpatient: None    Outpatient: medication      Family Psychiatric History  There has been no family history of psychological problems    Past Medications  Zoloft     Substance Abuse  Denied by patient        Access to Fire Arms and/or Weapons: Denies    Legal Issues: Denies    Allergies  Iodinated contrast media    Review of Systems   Constitutional:  Positive for activity change and fatigue.   Cardiovascular:  Negative for chest pain and palpitations.   Musculoskeletal:  Negative for arthralgias and myalgias.   Neurological:  Positive for weakness.   Psychiatric/Behavioral:  Positive for dysphoric mood and sleep disturbance. Negative for agitation, behavioral problems, confusion, decreased concentration, hallucinations, self-injury and suicidal ideas. The patient is nervous/anxious. The patient is not hyperactive.          Mental Status Exam  General: alert and pleasant    Appearance: Well groomed, appropriate eye contact. In hospital attire.  Attitude/Behavior:Cooperative, conversant, engaged, and with good eye contact.  Motor Activity: No psychomotor agitation or retardation, no tremor or other abnormal movements.  Speech: normal rate, tone and rhythm  Mood: anxious and euthymic  Affect: normal  Thought Process: linear, goal directed  Thought Content: Within normal limits  Thought Perception: No perceptual abnormalities noted  Cognition: Cognitively intact to conversational testing with respect to attention, orientation, fund of knowledge, recent and remote memory, and language.  Insight: intact  Judgement: Intact    Psychiatric Risk Assessment  Violence Risk Assessment: none  Acute Risk of Harm to Others is Considered: low   Suicide Risk Assessment: age > 65 yrs old, , life crisis (shame/despair), and living alone or lack of social support  Protective Factors against Suicide: adherence to  treatment, fear of suicide, moral objections to suicide, and  positive family relationships  Acute Risk of Harm to Self is Considered: low    Current Medications    Scheduled medications  amiodarone, 200 mg, oral, BID  apixaban, 2.5 mg, oral, BID  atorvastatin, 80 mg, oral, Nightly  cholecalciferol, 2,000 Units, oral, Daily  clopidogrel, 75 mg, oral, Daily  dilTIAZem, 120 mg, oral, BID  isosorbide mononitrate ER, 30 mg, oral, Daily  levothyroxine, 100 mcg, oral, Daily  metoprolol tartrate, 50 mg, oral, BID  oxygen, , inhalation, Continuous - Inhalation  pantoprazole, 40 mg, oral, Daily before breakfast  sertraline, 150 mg, oral, Daily  torsemide, 20 mg, oral, Daily      Continuous medications     PRN medications  PRN medications: acetaminophen, ipratropium-albuteroL, melatonin, ondansetron, ondansetron         Relevant Results        @[facility]@    Relevant Results  Results for orders placed or performed during the hospital encounter of 04/05/24 (from the past 24 hour(s))   Comprehensive Metabolic Panel   Result Value Ref Range    Glucose 91 65 - 99 mg/dL    Sodium 145 133 - 145 mmol/L    Potassium 3.1 (L) 3.4 - 5.1 mmol/L    Chloride 101 97 - 107 mmol/L    Bicarbonate 31 24 - 31 mmol/L    Urea Nitrogen 33 (H) 8 - 25 mg/dL    Creatinine 1.40 0.40 - 1.60 mg/dL    eGFR 37 (L) >60 mL/min/1.73m*2    Calcium 9.2 8.5 - 10.4 mg/dL    Albumin 3.8 3.5 - 5.0 g/dL    Alkaline Phosphatase 101 35 - 125 U/L    Total Protein 5.9 5.9 - 7.9 g/dL    AST 38 5 - 40 U/L    Bilirubin, Total 0.7 0.1 - 1.2 mg/dL    ALT 49 (H) 5 - 40 U/L    Anion Gap 13 <=19 mmol/L   CBC and Auto Differential   Result Value Ref Range    WBC 6.7 4.4 - 11.3 x10*3/uL    nRBC 0.0 0.0 - 0.0 /100 WBCs    RBC 3.80 (L) 4.00 - 5.20 x10*6/uL    Hemoglobin 10.6 (L) 12.0 - 16.0 g/dL    Hematocrit 34.4 (L) 36.0 - 46.0 %    MCV 91 80 - 100 fL    MCH 27.9 26.0 - 34.0 pg    MCHC 30.8 (L) 32.0 - 36.0 g/dL    RDW 15.6 (H) 11.5 - 14.5 %    Platelets 193 150 - 450 x10*3/uL    Neutrophils % 71.3 40.0 - 80.0 %    Immature Granulocytes %,  Automated 0.4 0.0 - 0.9 %    Lymphocytes % 14.9 13.0 - 44.0 %    Monocytes % 9.4 2.0 - 10.0 %    Eosinophils % 3.3 0.0 - 6.0 %    Basophils % 0.7 0.0 - 2.0 %    Neutrophils Absolute 4.77 1.60 - 5.50 x10*3/uL    Immature Granulocytes Absolute, Automated 0.03 0.00 - 0.50 x10*3/uL    Lymphocytes Absolute 1.00 0.80 - 3.00 x10*3/uL    Monocytes Absolute 0.63 0.05 - 0.80 x10*3/uL    Eosinophils Absolute 0.22 0.00 - 0.40 x10*3/uL    Basophils Absolute 0.05 0.00 - 0.10 x10*3/uL   POCT GLUCOSE   Result Value Ref Range    POCT Glucose 102 (H) 74 - 99 mg/dL   POCT GLUCOSE   Result Value Ref Range    POCT Glucose 135 (H) 74 - 99 mg/dL      Reviewed     Assessment/Plan   Principal Problem:    Paroxysmal A-fib (CMS/HCC)       I spent 60 minutes in the professional and overall care of this patient.    Plan/Recommendations     Depression    - Increase Zoloft to 200 mg PO Daily  2.    Anxiety   - Start Hydroxyzine 10 mg PO PRN q6H   3.   Adjustment  Insomnia   - Continue Melatonin 5 mg PO PRN    The patient does not meet the criteria for the inpatient psychiatric treatment. Appreciate Discharge Dispo or next steps from the Care Coordinator. Thank you for allowing us to participate in the care of this patient. We will continue to follow.    Medication Consent  Medication Consent: risks, benefits, side effects reviewed for all ordered meds    Parts of this chart have been completed using voice recognition software.  Please excuse any errors of transcription.  Despite the medical decision making time stamp, my medical decision making has taken place during the patient's entire visit.  Thought process and reason for plan has been formulated from the time that I saw the patient until the time of disposition and is not specific to one specific moment during their visit and furthermore the medical decision making encompasses the entire chart and not only that represented in this note.    Maggy Blanco, APRN-CNP

## 2024-04-10 NOTE — PROGRESS NOTES
Occupational Therapy    OT Treatment    Patient Name: Linette Doyle  MRN: [id number]  Today's Date: 4/10/2024  Time Calculation  Start Time: 0810  Stop Time: 0835  Time Calculation (min): 25 min         Assessment:  OT Assessment: Tolerated session fairly, demonstrating increased fatigue this date with fair functional tolerance. Pt would benefit from continued skilled OT services to improve strength and functional tolerance to increase independence with ADL tasks  End of Session Communication: Bedside nurse  End of Session Patient Position: Bed, 3 rail up, Alarm off, caregiver present (RN present)  OT Assessment Results: Decreased ADL status, Decreased upper extremity strength, Decreased safe judgment during ADL, Decreased endurance, Decreased functional mobility, Decreased gross motor control, Decreased IADLs  Plan:  Treatment Interventions: ADL retraining, Functional transfer training, UE strengthening/ROM, Endurance training, Patient/family training, Neuromuscular reeducation, Compensatory technique education  OT Frequency: 4 times per week  OT Discharge Recommendations: Low intensity level of continued care  Equipment Recommended upon Discharge: Wheeled walker  OT Recommended Transfer Status: Stand by assist  OT - OK to Discharge: Yes  Treatment Interventions: ADL retraining, Functional transfer training, UE strengthening/ROM, Endurance training, Patient/family training, Neuromuscular reeducation, Compensatory technique education    Subjective   Previous Visit Info:  OT Last Visit  OT Received On: 04/10/24  General:  General  Missed Visit: Yes  Missed Visit Reason: Other (Comment) (attempt x2-pt expressing continued fatigue and requesting therapist to return tomorrow)  Prior to Session Communication: Bedside nurse  Patient Position Received: Bed, 3 rail up, Alarm off, not on at start of session  General Comment: Cleared for therapy per RN. Pt supine in bed upon arrival, agreeable to tx with  encouragement  Precautions:  Medical Precautions: Fall precautions, Oxygen therapy device and L/min (2L O2 nc)    Pain:  Pain Assessment  Pain Assessment: 0-10  Pain Score: 0 - No pain    Objective    Cognition:  Cognition  Overall Cognitive Status: Within Functional Limits    Activities of Daily Living:    Grooming  Grooming Level of Assistance: Setup, Close supervision  Grooming Where Assessed: Standing sinkside  Grooming Comments: hand/face washing and oral hygiene tasks    Toileting  Toileting Level of Assistance: Distant supervision  Where Assessed: Toilet  Toileting Comments: completing toileting task in privacy  Functional Standing Tolerance:  Time: 10 min  Functional Standing Tolerance Comments: Tolerated standing sinkside for completion of ADL tasks  Bed Mobility/Transfers: Bed Mobility  Bed Mobility: Yes  Bed Mobility 1  Bed Mobility 1: Supine to sitting, Sitting to supine  Level of Assistance 1: Close supervision  Bed Mobility Comments 1: increased time and effort with use of bed rail for UE support    Transfers  Transfer: Yes  Transfer 1  Technique 1: Sit to stand  Transfer Device 1: Walker  Transfer Level of Assistance 1: Close supervision  Trials/Comments 1: cues for proper hand placement  Transfers 2  Technique 2: Stand to sit  Transfer Device 2: Walker  Transfer Level of Assistance 2: Contact guard  Trials/Comments 2: assist with eccentric lowering with cues for proper hand placement    Toilet Transfers  Toilet Transfer Type: To and from  Toilet Transfer to: Standard toilet  Toilet Transfers: Modified independence  Toilet Transfers Comments: use of grab bar for UE support    Functional Mobility:  Functional Mobility  Functional Mobility Performed: Yes  Functional Mobility 1  Device 1: Rolling walker  Assistance 1: Contact guard  Comments 1: short household distance x2 at FWW with cues for pacing task to increase safety awareness, demonstrating fair functional tolerance    Standing Balance:  Dynamic  Standing Balance  Dynamic Standing-Balance: Forward lean, Reaching for objects, Reaching across midline  Dynamic Standing-Comments: fair balance standing sinkside for completion of ADL tasks    Outcome Measures:[facility] Daily Activity  Putting on and taking off regular lower body clothing: A little  Bathing (including washing, rinsing, drying): A little  Putting on and taking off regular upper body clothing: None  Toileting, which includes using toilet, bedpan or urinal: None  Taking care of personal grooming such as brushing teeth: None  Eating Meals: None  Daily Activity - Total Score: 22    Education Documentation  Handouts, taught by COLLEEN Leonardo at 4/10/2024  9:58 AM.  Learner: Patient  Readiness: Acceptance  Method: Explanation  Response: Verbalizes Understanding    Body Mechanics, taught by COLLEEN Leonardo at 4/10/2024  9:58 AM.  Learner: Patient  Readiness: Acceptance  Method: Explanation  Response: Verbalizes Understanding    Precautions, taught by COLLEEN Leonardo at 4/10/2024  9:58 AM.  Learner: Patient  Readiness: Acceptance  Method: Explanation  Response: Verbalizes Understanding    Home Exercise Program, taught by COLLEEN Leonardo at 4/10/2024  9:58 AM.  Learner: Patient  Readiness: Acceptance  Method: Explanation  Response: Verbalizes Understanding    ADL Training, taught by COLLEEN Leonardo at 4/10/2024  9:58 AM.  Learner: Patient  Readiness: Acceptance  Method: Explanation  Response: Verbalizes Understanding    Education Comments  No comments found.        Problem: OT Goals  Goal: ADLs  Description: Patient will complete ADL tasks with Mod I, using AE as needed, in order to increase patient's safety and independence with self-care tasks.  Outcome: Progressing  Goal: Functional Transfers  Description: Patient will complete functional transfers with Mod I, using least restrictive device, in order to increase patient's safety and independence  with daily tasks.  Outcome: Progressing  Goal: B UE Strengthening  Description: Patient will increase B UE strength to 5/5 for functional transfers.  Outcome: Progressing  Goal: Functional Mobility  Description: Patient will demonstrate the ability to complete item retrieval and functional mobility with Mod I, using least restrictive device, in order to increase patient's safety and independence with daily tasks.    Outcome: Progressing

## 2024-04-10 NOTE — PROGRESS NOTES
Subjective Data:  Patient is doing better.  Still short of breath.  Mildly depressed.    Overnight Events:    Telemetry overnight reviewed no events     Objective Data:  Last Recorded Vitals:  Vitals:    04/10/24 0926 04/10/24 1102 04/10/24 1129   BP: 145/72 129/62 126/62   Pulse: 67 60 80   Resp: 16 12 12   Temp: 36.6 °C (97.8 °F)     TempSrc: Oral     SpO2: 100% 98% 97%       Last Labs:  CBC - 4/10/2024:  4:51 AM  6.7 10.6 193    34.4      CMP - 4/10/2024:  4:51 AM  9.2 5.9 38 --- 0.7   3.0 3.8 49 101      PTT - 2/9/2024:  4:59 AM  1.1   11.9 24.6     HGBA1C   Date/Time Value Ref Range Status   02/01/2024 05:58 AM 6.0 See below % Final   07/05/2023 08:49 AM 5.6 4.0 - 6.0 % Final     Comment:     Hemoglobin A1C levels are related to mean blood glucose during the   preceding 2-3 months. The relationship table below may be used as a   general guide. Each 1% increase in HGB A1C is a reflection of an   increase in mean glucose of approximately 30 mg/dl.   Reference: Diabetes Care, volume 29, supplement 1 Jan. 2006                        HGB A1C ................. Approx. Mean Glucose   _______________________________________________   6%   ...............................  120 mg/dl   7%   ...............................  150 mg/dl   8%   ...............................  180 mg/dl   9%   ...............................  210 mg/dl   10%  ...............................  240 mg/dl  Performed at [address] [address]     12/14/2022 09:13 AM 5.7 4.0 - 6.0 % Final     Comment:     Hemoglobin A1C levels are related to mean blood glucose during the   preceding 2-3 months. The relationship table below may be used as a   general guide. Each 1% increase in HGB A1C is a reflection of an   increase in mean glucose of approximately 30 mg/dl.   Reference: Diabetes Care, volume 29, supplement 1 Jan. 2006                        HGB A1C ................. Approx. Mean Glucose    _______________________________________________   6%   ...............................  120 mg/dl   7%   ...............................  150 mg/dl   8%   ...............................  180 mg/dl   9%   ...............................  210 mg/dl   10%  ...............................  240 mg/dl  Performed at [address] [address]       LDLCALC   Date/Time Value Ref Range Status   07/05/2023 08:49 AM 57 65 - 130 MG/DL Final   12/14/2022 09:13 AM 64 65 - 130 MG/DL Final   06/29/2022 08:43 AM 58 65 - 130 MG/DL Final      Last I/O:  I/O last 3 completed shifts:  In: 460 [P.O.:460]  Out: -     Past Cardiology Tests (Last 3 Years):  EKG:  ECG 12 lead 04/05/2024      ECG 12 lead 04/05/2024      Electrocardiogram, 12-lead PRN ACS symptoms 04/05/2024 (Preliminary)      ECG 12 lead 02/07/2024      ECG 12 lead 02/06/2024      ECG 12 Lead 01/31/2024    Echo:  Transthoracic Echo (TTE) Limited 04/08/2024      Transthoracic Echo (TTE) Limited 02/09/2024      Transthoracic Echo (TTE) Complete 02/01/2024    Ejection Fractions:  EF   Date/Time Value Ref Range Status   02/09/2024 08:59 AM 58 %    02/01/2024 11:43 AM 59 %      Cath:  Cardiac catheterization - coronary 02/08/2024      Cardiac catheterization - coronary 02/02/2024      Cardiac catheterization - coronary 02/02/2024    Stress Test:  No results found for this or any previous visit from the past 1095 days.    Cardiac Imaging:  No results found for this or any previous visit from the past 1095 days.      Inpatient Medications:  Scheduled medications   Medication Dose Route Frequency     PRN medications   Medication    fentaNYL PF    midazolam    oxygen    HODGSON oral liquid (dicyclomine-lidocaine viscous-Mylanta 1:1:4)     Continuous Medications   Medication Dose Last Rate       Physical Exam:  General: Patient is in no acute distress.  HEENT: atraumatic normocephalic.  Neck: is supple jugular venous pressure within normal limits no  thyromegaly.  Cardiovascular irregular rate and rhythm normal heart sounds no murmurs rubs or gallops.  Lungs: clear to auscultation bilaterally.  Abdomen: is soft nontender.  Extremities warm to touch no edema.        Assessment/Plan   1 acute on chronic diastolic heart failure.  Patient has severe LVH.  Admitted to the hospital with acute decompensated heart failure.  Small bilateral pleural effusion elevated NT proBNP.  Transesophageal echocardiogram showed moderate mitral regurgitation no indication for MitraClip.    2.  Tachycardia.  Atrial fibrillation now back into normal sinus rhythm on amiodarone.  Decrease amiodarone to 200 mg oral daily.  Continue metoprolol 500 mg oral twice daily and oral anticoagulation Eliquis.     3.  Coronary artery disease.  Recent non-STEMI.  Continue clopidogrel.  No aspirin.  Patient is on Eliquis.     4.  Hyperlipidemia continue high intensity statin.     5.  Hypertension.       6.  Transesophageal echocardiogram showed moderate mitral regurgitation no indication for MitraClip for the time being.       Code Status:  DNR and No Intubation    Becky Vela MD

## 2024-04-10 NOTE — PROGRESS NOTES
04/10/24 7246   Discharge Planning   Patient expects to be discharged to: [facility]

## 2024-04-10 NOTE — PROGRESS NOTES
Linette Doyle is a 83 y.o. female on day 5 of admission presenting with Paroxysmal A-fib (CMS/HCC).      Subjective     No worsening shortness of breath.  Satting 100% on 3 L nasal cannula.  N.p.o. for transesophageal echocardiogram.    Discussed meeting with behavioral health today given her prolonged hospitalizations, she is agreeable.         Objective     Last Recorded Vitals  /77 (BP Location: Left arm, Patient Position: Lying)   Pulse 70   Temp 36.4 °C (97.5 °F) (Oral)   Resp 16   Wt 66.3 kg (146 lb 2.6 oz)   SpO2 97%   Intake/Output last 3 Shifts:    Intake/Output Summary (Last 24 hours) at 4/10/2024 0749  Last data filed at 4/9/2024 0900  Gross per 24 hour   Intake 360 ml   Output --   Net 360 ml       Admission Weight  Weight: 64.9 kg (143 lb) (04/05/24 1048)    Daily Weight  04/10/24 : 66.3 kg (146 lb 2.6 oz)    Image Results  XR chest 1 view  Narrative: Interpreted By:  Tamia Glass,   STUDY:  XR CHEST 1 VIEW 4/8/2024 9:43 am      INDICATION:  Signs/Symptoms:hypoxemia      COMPARISON:  04/05/2024      ACCESSION NUMBER(S):  [id number]      ORDERING CLINICIAN:  MELANIE HARDWICK      TECHNIQUE:  AP erect view of the chest      FINDINGS:  The heart is enlarged with bipolar pacemaker leads in right atrium  and right ventricle.      There are bilateral pleural effusions, right larger than left. The  right pleural effusion has increased in size and is now considered  moderate to moderately large. Left effusion appears unchanged.      There is mild pulmonary vascular congestion. There is probably some  atelectasis within each lower lobe as well.      Impression: Cardiomegaly and mild pulmonary vascular congestion with bilateral  pleural effusions, right larger than left. The right effusion has  increased in size.      Probable compressive atelectasis in each lower lung zone.      Signed by: Tamia Glass 4/8/2024 10:29 AM  Dictation workstation:   [id number]  Transthoracic Echo (TTE) Limited              [place] [id number]             Phone [phone]    TRANSTHORACIC ECHOCARDIOGRAM REPORT       Patient Name:     SAMUEL Buenrostro Physician:   [id number] Vela MD  Study Date:       4/8/2024             Ordering Provider:   [id number] SHAGGY VELA  MRN/PID:          [id number]             Fellow:  Accession#:       [id number]         Nurse:  Date of           1940 / 83      Sonographer:         Yulisa Kennedy [facility]  Birth/Age:        years  Gender:           F                    Additional Staff:  Height:           162.56 cm            Admit Date:  Weight:           72.58 kg             Admission Status:    Inpatient - Routine  BSA / BMI:        1.78 m2 / 27.46      Department Location: [facility]                    kg/m2  Blood Pressure: 134 /70 mmHg    Study Type:    TRANSTHORACIC ECHO (TTE) LIMITED  Diagnosis/ICD: Acute diastolic (congestive) heart failure (CHF)-I50.31;                 Nonrheumatic mitral (valve) insufficiency-I34.0  Indication:    MR, Heart failure  CPT Codes:     Echo Limited-84262; Doppler Limited-95276; Color Doppler-80999    Patient History:  Valve Disorders:   Mitral Regurgitation.  Pertinent History: A-Fib, Bradycardia, Heart failure,Sick sinus syndrome and                     HTN.    Study Detail: The following Echo studies were performed: 2D, M-Mode, Doppler and                color flow. Patient has a pacemaker.       PHYSICIAN INTERPRETATION:  Left Ventricle: Left ventricular systolic function is normal, with an estimated ejection fraction of 55-60%. There are no regional wall motion abnormalities. The left ventricular cavity size is normal. There is mild left ventricular hypertrophy. Spectral Doppler shows a normal pattern of left ventricular diastolic filling.  Left Atrium: The left atrium is normal in size.  Right Ventricle: The right ventricle is  moderately enlarged. There is reduced right ventricular systolic function. A device is visualized in the right ventricle.  Right Atrium: The right atrium is normal in size. There is a device visualized in the right atrium.  Aortic Valve: The aortic valve is trileaflet. There is no evidence of aortic valve stenosis.  There is no evidence of aortic valve regurgitation. The peak instantaneous gradient of the aortic valve is 2.5 mmHg.  Mitral Valve: The mitral valve is normal in structure. There is evidence of mild mitral valve stenosis. The doppler estimated mean and peak diastolic pressure gradients are 5.0 mmHg and 15.7 mmHg respectively. There is moderate to severe mitral valve regurgitation which is centrally directed.  Tricuspid Valve: The tricuspid valve is structurally normal. There is moderate tricuspid regurgitation. The Doppler estimated RVSP is moderately elevated at 53.7 mmHg.  Pulmonic Valve: The pulmonic valve is structurally normal. There is mild pulmonic valve regurgitation.  Pericardium: There is a trivial pericardial effusion.  Aorta: The aortic root is normal.  Systemic Veins: The inferior vena cava appears to be of normal size.       CONCLUSIONS:   1. Left ventricular systolic function is normal with a 55-60% estimated ejection fraction.   2. Moderately enlarged right ventricle.   3. There is reduced right ventricular systolic function.   4. Moderate to severe mitral valve regurgitation.   5. Moderate tricuspid regurgitation.   6. Moderately elevated right ventricular systolic pressure.   7. Aortic valve stenosis is not present.   8. There is reduction in the mitral valve leaflet motion with mild mitral stenosis. Mean gradient of 5 mmHg.   9. There is moderate to possible severe mitral regurgitation with central jet. We will consider MitraClip down the road.    QUANTITATIVE DATA SUMMARY:  MITRAL VALVE:                        Normal Ranges:  MV Vmax:    1.98 m/s  (<=1.3m/s)  MV peak PG: 15.7 mmHg  (<5mmHg)  MV mean P.0 mmHg  (<48mmHg)    AORTIC VALVE:                         Normal Ranges:  AoV Vmax:     0.78 m/s (<=1.7m/s)  AoV Peak P.5 mmHg (<20mmHg)  LVOT Max Yasir: 0.56 m/s (<=1.1m/s)    TRICUSPID VALVE/RVSP:                              Normal Ranges:  Peak TR Velocity: 3.56 m/s  RV Syst Pressure: 53.7 mmHg (< 30mmHg)  IVC Diam:         1.40 cm       [id number] Becky Vela MD  Electronically signed on 2024 at 8:54:24 AM       ** Final **  ECG 12 lead  Normal sinus rhythm  Nonspecific T wave abnormality  Abnormal ECG  When compared with ECG of 2024 15:13, (unconfirmed)  Sinus rhythm has replaced probable SVT  Heart rate has decreased by 72 bpm  Confirmed by Dean Hanks ([id number]) on 2024 8:44:24 AM      Physical Exam  Constitutional:       Appearance: Normal appearance.   HENT:      Head: Normocephalic.      Right Ear: External ear normal.      Left Ear: External ear normal.   Eyes:      Extraocular Movements: Extraocular movements intact.   Cardiovascular:      Rate and Rhythm: Normal rate.   Pulmonary:      Effort: Pulmonary effort is normal.      Breath sounds: Normal breath sounds.      Comments: 3 L nasal cannula.  No distress.  Skin:     General: Skin is warm.   Neurological:      General: No focal deficit present.      Mental Status: Mental status is at baseline.   Psychiatric:         Mood and Affect: Mood normal.         Relevant Results               Assessment/Plan                  Principal Problem:    Paroxysmal A-fib (CMS/HCC)    Paroxysmal atrial fibrillation  -Back in normal sinus today.  She did transition to A-fib yesterday.  -No plan for cardioversion  -Cardiology on consult    Acute hypoxemic respiratory failure  -Still requiring 3 L nasal cannula  -There appears to be some marginal improvements after 1% on 3 L and will trial off of oxygen after transesophageal echocardiogram today.  -Diuresis per nephrology    Mitral regurgitation  -Moderate to severe  -Discussed with  cardiology, and will go for transesophageal echocardiogram today to assess for mitral valve procedure.    GARY on CKD  -Continues to improve nicely, with creatinine now at 1.4    Suspect depression  -Patient has had major events including death of her  and son about 3 years ago and prolonged hospitalizations currently  -Discussed with behavioral health who is here today, the patient is very agreeable to this also.    Disposition: Per transesophageal echocardiogram.              Henry Ricci,

## 2024-04-10 NOTE — CARE PLAN
The patient's goals for the shift include go home    The clinical goals for the shift include pulse ox over 90 without oxygen    Over the shift, the patient did not make progress toward the following goals. Barriers to progression include   . Recommendations to address these barriers include   .

## 2024-04-11 ENCOUNTER — PHARMACY VISIT (OUTPATIENT)
Dept: PHARMACY | Facility: CLINIC | Age: 84
End: 2024-04-11
Payer: COMMERCIAL

## 2024-04-11 VITALS
WEIGHT: 143.3 LBS | BODY MASS INDEX: 24.46 KG/M2 | HEIGHT: 64 IN | DIASTOLIC BLOOD PRESSURE: 57 MMHG | SYSTOLIC BLOOD PRESSURE: 123 MMHG | TEMPERATURE: 97.7 F | OXYGEN SATURATION: 96 % | RESPIRATION RATE: 15 BRPM | HEART RATE: 60 BPM

## 2024-04-11 PROBLEM — R09.02 HYPOXEMIA: Status: ACTIVE | Noted: 2024-04-11

## 2024-04-11 LAB
ALBUMIN SERPL-MCNC: 3.8 G/DL (ref 3.5–5)
ALP BLD-CCNC: 96 U/L (ref 35–125)
ALT SERPL-CCNC: 41 U/L (ref 5–40)
ANION GAP SERPL CALC-SCNC: 11 MMOL/L
AST SERPL-CCNC: 31 U/L (ref 5–40)
BASOPHILS # BLD AUTO: 0.03 X10*3/UL (ref 0–0.1)
BASOPHILS NFR BLD AUTO: 0.6 %
BILIRUB SERPL-MCNC: 0.6 MG/DL (ref 0.1–1.2)
BUN SERPL-MCNC: 29 MG/DL (ref 8–25)
CALCIUM SERPL-MCNC: 9 MG/DL (ref 8.5–10.4)
CHLORIDE SERPL-SCNC: 102 MMOL/L (ref 97–107)
CO2 SERPL-SCNC: 32 MMOL/L (ref 24–31)
CREAT SERPL-MCNC: 1.4 MG/DL (ref 0.4–1.6)
EGFRCR SERPLBLD CKD-EPI 2021: 37 ML/MIN/1.73M*2
EOSINOPHIL # BLD AUTO: 0.18 X10*3/UL (ref 0–0.4)
EOSINOPHIL NFR BLD AUTO: 3.6 %
ERYTHROCYTE [DISTWIDTH] IN BLOOD BY AUTOMATED COUNT: 15.4 % (ref 11.5–14.5)
GLUCOSE BLD MANUAL STRIP-MCNC: 102 MG/DL (ref 74–99)
GLUCOSE BLD MANUAL STRIP-MCNC: 117 MG/DL (ref 74–99)
GLUCOSE BLD MANUAL STRIP-MCNC: 176 MG/DL (ref 74–99)
GLUCOSE SERPL-MCNC: 112 MG/DL (ref 65–99)
HCT VFR BLD AUTO: 33 % (ref 36–46)
HGB BLD-MCNC: 10.1 G/DL (ref 12–16)
IMM GRANULOCYTES # BLD AUTO: 0.02 X10*3/UL (ref 0–0.5)
IMM GRANULOCYTES NFR BLD AUTO: 0.4 % (ref 0–0.9)
LYMPHOCYTES # BLD AUTO: 0.72 X10*3/UL (ref 0.8–3)
LYMPHOCYTES NFR BLD AUTO: 14.3 %
MAGNESIUM SERPL-MCNC: 1.8 MG/DL (ref 1.6–3.1)
MCH RBC QN AUTO: 27.8 PG (ref 26–34)
MCHC RBC AUTO-ENTMCNC: 30.6 G/DL (ref 32–36)
MCV RBC AUTO: 91 FL (ref 80–100)
MONOCYTES # BLD AUTO: 0.51 X10*3/UL (ref 0.05–0.8)
MONOCYTES NFR BLD AUTO: 10.2 %
NEUTROPHILS # BLD AUTO: 3.56 X10*3/UL (ref 1.6–5.5)
NEUTROPHILS NFR BLD AUTO: 70.9 %
NRBC BLD-RTO: 0 /100 WBCS (ref 0–0)
PLATELET # BLD AUTO: 170 X10*3/UL (ref 150–450)
POTASSIUM SERPL-SCNC: 2.9 MMOL/L (ref 3.4–5.1)
PROT SERPL-MCNC: 5.9 G/DL (ref 5.9–7.9)
RBC # BLD AUTO: 3.63 X10*6/UL (ref 4–5.2)
SODIUM SERPL-SCNC: 145 MMOL/L (ref 133–145)
WBC # BLD AUTO: 5 X10*3/UL (ref 4.4–11.3)

## 2024-04-11 PROCEDURE — 85025 COMPLETE CBC W/AUTO DIFF WBC: CPT | Performed by: INTERNAL MEDICINE

## 2024-04-11 PROCEDURE — 82947 ASSAY GLUCOSE BLOOD QUANT: CPT

## 2024-04-11 PROCEDURE — 83735 ASSAY OF MAGNESIUM: CPT | Performed by: INTERNAL MEDICINE

## 2024-04-11 PROCEDURE — 36415 COLL VENOUS BLD VENIPUNCTURE: CPT | Performed by: INTERNAL MEDICINE

## 2024-04-11 PROCEDURE — 97110 THERAPEUTIC EXERCISES: CPT | Mod: GP,CQ

## 2024-04-11 PROCEDURE — 1090000002 HH PPS REVENUE DEBIT

## 2024-04-11 PROCEDURE — 2500000001 HC RX 250 WO HCPCS SELF ADMINISTERED DRUGS (ALT 637 FOR MEDICARE OP): Performed by: INTERNAL MEDICINE

## 2024-04-11 PROCEDURE — 1090000001 HH PPS REVENUE CREDIT

## 2024-04-11 PROCEDURE — 99232 SBSQ HOSP IP/OBS MODERATE 35: CPT

## 2024-04-11 PROCEDURE — 84075 ASSAY ALKALINE PHOSPHATASE: CPT | Performed by: INTERNAL MEDICINE

## 2024-04-11 PROCEDURE — 2500000002 HC RX 250 W HCPCS SELF ADMINISTERED DRUGS (ALT 637 FOR MEDICARE OP, ALT 636 FOR OP/ED): Performed by: INTERNAL MEDICINE

## 2024-04-11 PROCEDURE — 2500000002 HC RX 250 W HCPCS SELF ADMINISTERED DRUGS (ALT 637 FOR MEDICARE OP, ALT 636 FOR OP/ED)

## 2024-04-11 PROCEDURE — 97116 GAIT TRAINING THERAPY: CPT | Mod: GP,CQ

## 2024-04-11 PROCEDURE — 2500000004 HC RX 250 GENERAL PHARMACY W/ HCPCS (ALT 636 FOR OP/ED): Performed by: INTERNAL MEDICINE

## 2024-04-11 PROCEDURE — 97535 SELF CARE MNGMENT TRAINING: CPT | Mod: GO,CO

## 2024-04-11 PROCEDURE — RXMED WILLOW AMBULATORY MEDICATION CHARGE

## 2024-04-11 PROCEDURE — 99232 SBSQ HOSP IP/OBS MODERATE 35: CPT | Performed by: INTERNAL MEDICINE

## 2024-04-11 RX ORDER — METOPROLOL TARTRATE 50 MG/1
50 TABLET ORAL 2 TIMES DAILY
Qty: 180 TABLET | Refills: 0 | Status: SHIPPED | OUTPATIENT
Start: 2024-04-11 | End: 2024-05-08 | Stop reason: SDUPTHER

## 2024-04-11 RX ORDER — SERTRALINE HYDROCHLORIDE 100 MG/1
200 TABLET, FILM COATED ORAL DAILY
Qty: 90 TABLET | Refills: 0 | Status: SHIPPED | OUTPATIENT
Start: 2024-04-12 | End: 2024-04-30 | Stop reason: SDUPTHER

## 2024-04-11 RX ORDER — TORSEMIDE 20 MG/1
20 TABLET ORAL DAILY
Qty: 90 TABLET | Refills: 0 | Status: SHIPPED | OUTPATIENT
Start: 2024-04-12 | End: 2024-05-15 | Stop reason: HOSPADM

## 2024-04-11 RX ORDER — POTASSIUM CHLORIDE 1.5 G/1.58G
40 POWDER, FOR SOLUTION ORAL ONCE
Status: COMPLETED | OUTPATIENT
Start: 2024-04-11 | End: 2024-04-11

## 2024-04-11 RX ORDER — ONDANSETRON 4 MG/1
4 TABLET, FILM COATED ORAL EVERY 8 HOURS PRN
Qty: 20 TABLET | Refills: 0 | Status: SHIPPED | OUTPATIENT
Start: 2024-04-11 | End: 2024-04-23 | Stop reason: ALTCHOICE

## 2024-04-11 RX ORDER — POTASSIUM CHLORIDE 14.9 MG/ML
20 INJECTION INTRAVENOUS
Status: COMPLETED | OUTPATIENT
Start: 2024-04-11 | End: 2024-04-11

## 2024-04-11 RX ORDER — AMIODARONE HYDROCHLORIDE 200 MG/1
200 TABLET ORAL 2 TIMES DAILY
Qty: 180 TABLET | Refills: 0 | Status: SHIPPED | OUTPATIENT
Start: 2024-04-11 | End: 2024-05-08 | Stop reason: SDUPTHER

## 2024-04-11 RX ORDER — ISOSORBIDE MONONITRATE 30 MG/1
30 TABLET, EXTENDED RELEASE ORAL DAILY
Qty: 90 TABLET | Refills: 0 | Status: SHIPPED | OUTPATIENT
Start: 2024-04-12 | End: 2024-05-06 | Stop reason: HOSPADM

## 2024-04-11 RX ORDER — POTASSIUM CHLORIDE 750 MG/1
10 TABLET, FILM COATED, EXTENDED RELEASE ORAL 2 TIMES DAILY
Qty: 180 TABLET | Refills: 0 | Status: SHIPPED | OUTPATIENT
Start: 2024-04-11 | End: 2024-05-08 | Stop reason: SDUPTHER

## 2024-04-11 RX ADMIN — METOPROLOL TARTRATE 50 MG: 50 TABLET, FILM COATED ORAL at 08:21

## 2024-04-11 RX ADMIN — LEVOTHYROXINE SODIUM 100 MCG: 0.1 TABLET ORAL at 06:31

## 2024-04-11 RX ADMIN — POTASSIUM CHLORIDE 40 MEQ: 1.5 FOR SOLUTION ORAL at 08:20

## 2024-04-11 RX ADMIN — SERTRALINE 200 MG: 100 TABLET, FILM COATED ORAL at 08:20

## 2024-04-11 RX ADMIN — ISOSORBIDE MONONITRATE 30 MG: 30 TABLET, EXTENDED RELEASE ORAL at 08:21

## 2024-04-11 RX ADMIN — CLOPIDOGREL BISULFATE 75 MG: 75 TABLET ORAL at 08:21

## 2024-04-11 RX ADMIN — POTASSIUM CHLORIDE 20 MEQ: 200 INJECTION, SOLUTION INTRAVENOUS at 14:56

## 2024-04-11 RX ADMIN — APIXABAN 2.5 MG: 5 TABLET, FILM COATED ORAL at 08:21

## 2024-04-11 RX ADMIN — PANTOPRAZOLE SODIUM 40 MG: 40 TABLET, DELAYED RELEASE ORAL at 06:31

## 2024-04-11 RX ADMIN — DILTIAZEM HYDROCHLORIDE 120 MG: 60 TABLET ORAL at 08:21

## 2024-04-11 RX ADMIN — AMIODARONE HYDROCHLORIDE 200 MG: 200 TABLET ORAL at 08:21

## 2024-04-11 RX ADMIN — Medication 2000 UNITS: at 08:21

## 2024-04-11 RX ADMIN — POTASSIUM CHLORIDE 20 MEQ: 200 INJECTION, SOLUTION INTRAVENOUS at 12:46

## 2024-04-11 RX ADMIN — TORSEMIDE 20 MG: 20 TABLET ORAL at 08:21

## 2024-04-11 ASSESSMENT — PAIN - FUNCTIONAL ASSESSMENT
PAIN_FUNCTIONAL_ASSESSMENT: 0-10
PAIN_FUNCTIONAL_ASSESSMENT: FLACC (FACE, LEGS, ACTIVITY, CRY, CONSOLABILITY)
PAIN_FUNCTIONAL_ASSESSMENT: 0-10
PAIN_FUNCTIONAL_ASSESSMENT: 0-10

## 2024-04-11 ASSESSMENT — COGNITIVE AND FUNCTIONAL STATUS - GENERAL
CLIMB 3 TO 5 STEPS WITH RAILING: A LITTLE
MOBILITY SCORE: 19
DAILY ACTIVITIY SCORE: 22
STANDING UP FROM CHAIR USING ARMS: A LITTLE
WALKING IN HOSPITAL ROOM: A LITTLE
HELP NEEDED FOR BATHING: A LITTLE
TURNING FROM BACK TO SIDE WHILE IN FLAT BAD: A LITTLE
MOVING TO AND FROM BED TO CHAIR: A LITTLE
DRESSING REGULAR LOWER BODY CLOTHING: A LITTLE

## 2024-04-11 ASSESSMENT — PAIN SCALES - GENERAL
PAINLEVEL_OUTOF10: 0 - NO PAIN

## 2024-04-11 ASSESSMENT — ACTIVITIES OF DAILY LIVING (ADL): HOME_MANAGEMENT_TIME_ENTRY: 8

## 2024-04-11 NOTE — NURSING NOTE
AVS reviewed with patient and patient's brother Bill. Both voiced understanding. Patient discharged home with all personal belongings and home oxygen.

## 2024-04-11 NOTE — CARE PLAN
The patient's goals for the shift include go home    The clinical goals for the shift include free from falls    Over the shift, the patient did not make progress toward the following goals. Barriers to progression include pain. Recommendations to address these barriers include rest.

## 2024-04-11 NOTE — DISCHARGE SUMMARY
Discharge Diagnosis  Paroxysmal A-fib (CMS/HCC)    Issues Requiring Follow-Up  Continue following the mitral regurgitation.  Transesophageal echocardiogram was done which showed moderate regurgitation, and no current need for a MitraClip.  Follow-up with cardiology, nephrology, primary care.      Discharge Meds     Your medication list        START taking these medications        Instructions Last Dose Given Next Dose Due   amiodarone 200 mg tablet  Commonly known as: Pacerone      Take 1 tablet (200 mg) by mouth 2 times a day.       isosorbide mononitrate ER 30 mg 24 hr tablet  Commonly known as: Imdur  Start taking on: April 12, 2024      Take 1 tablet (30 mg) by mouth once daily. Do not crush or chew.       metoprolol tartrate 50 mg tablet  Commonly known as: Lopressor      Take 1 tablet by mouth 2 times a day.       ondansetron 4 mg tablet  Commonly known as: Zofran      Take 1 tablet (4 mg) by mouth every 8 hours if needed for vomiting or nausea.       oxygen gas therapy  Commonly known as: O2      Inhale 1 each every 12 hours.              CHANGE how you take these medications        Instructions Last Dose Given Next Dose Due   apixaban 5 mg tablet  Commonly known as: Eliquis  What changed:   medication strength  how much to take      Take 1 tablet (5 mg) by mouth 2 times a day.       sertraline 100 mg tablet  Commonly known as: Zoloft  Start taking on: April 12, 2024  What changed: how much to take      Take 2 tablets (200 mg) by mouth once daily.       torsemide 20 mg tablet  Commonly known as: Demadex  Start taking on: April 12, 2024  What changed: how much to take      Take 1 tablet (20 mg) by mouth once daily.              CONTINUE taking these medications        Instructions Last Dose Given Next Dose Due   atorvastatin 80 mg tablet  Commonly known as: Lipitor      Take 1 tablet (80 mg) by mouth once daily at bedtime.       cholecalciferol 50 MCG (2000 UT) tablet  Commonly known as: Vitamin D-3            clopidogrel 75 mg tablet  Commonly known as: Plavix      Take 1 tablet (75 mg) by mouth once daily. Do not start before February 15, 2024.       dilTIAZem 120 mg immediate release tablet  Commonly known as: Cardizem           levothyroxine 100 mcg tablet  Commonly known as: Synthroid, Levoxyl           MULTIPLE VITAMINS ORAL           OCUVITE EYE PLUS MULTI ORAL           omeprazole 20 mg DR capsule  Commonly known as: PriLOSEC                  STOP taking these medications      aspirin 81 mg EC tablet        metoprolol succinate XL 25 mg 24 hr tablet  Commonly known as: Toprol-XL                  Where to Get Your Medications        These medications were sent to [facility]  [id number] [address] [facility] [id number]      Hours: 9 AM to 6 PM Mon-Fri, 9 AM to 1 PM Sat Phone: [phone]   amiodarone 200 mg tablet  apixaban 5 mg tablet  isosorbide mononitrate ER 30 mg 24 hr tablet  metoprolol tartrate 50 mg tablet  ondansetron 4 mg tablet  sertraline 100 mg tablet  torsemide 20 mg tablet       Information about where to get these medications is not yet available    Ask your nurse or doctor about these medications  oxygen gas therapy         Test Results Pending At Discharge  Pending Labs       No current pending labs.            Hospital Course    HPI: Linette Doyle is a 83 y.o. female presenting with progressive shortness of breath, palpitations.  Patient has a history of coronary artery disease.  She was admitted by my partners 2 months ago with clinical picture of non-ST evaluation myocardial infarction, new onset of paroxysmal atrial fibrillation.  Patient had 2 cardiac catheterizations during that admission and during the second 1, on February 8 she had 2 stents placed in the RCA.  Atrial fibrillation was quite resistant to medical measures, electrophysiology was consulted.  Finally, she converted to sinus rhythm.  Patient was discharged home on Eliquis and Plavix.  Echocardiogram performed  during previous admission demonstrated: Left ventricular systolic function is normal with a 55-60% estimated ejection fraction.   2. The left atrium is moderately dilated.   3. The mitral valve is moderately thickened.   4. Moderate to severe mitral valve regurgitation.   5. Mild tricuspid regurgitation is visualized.   6. Moderately elevated right ventricular systolic pressure.  Patient's hospital stay was complicated by development of acute on chronic renal failure.  After prolonged hospital stay patient was successfully discharged home.  Patient tells me that she is compliant with her medications.  She denies chest pain.  Yesterday, she started experiencing shortness of breath with ambulation which has gotten worse today.  Also, yesterday she had an episode of emesis when she vomited all her medications but she was able to keep them down today.     Patient was brought to the emergency department she was in atrial fibrillation with heart rate in the 130s.  Patient was hypoxic and required 4 L of oxygen.  Physician discussed the case with patient's cardiologist, Dr. Ibrahim who advised to give patient repeated doses of IV metoprolol 5 mg each will he is goal to control heart rate.     When I saw patient in the emergency department, she was complaining of some chest discomfort, not feeling well and some shortness of breath.  She already received 3 doses of IV metoprolol 5 mg each.    Hospital course: For the atrial fibrillation, seen by cardiology and amiodarone was started 20 mg twice a day and metoprolol increased.  Initial plan was for cardioversion, but she did convert to normal sinus rhythm.  However, she converted back to A-fib, and cardiology felt that this was likely paroxysmal and recommend against cardioversion.  She had hypoxemic respiratory failure, and was diuresed carefully as creatinine did come up, but this came back to near baseline with creatinine of 1.4.  With the mitral regurgitation, a  transesophageal echocardiogram was done and a mitral clip was not recommended.  Physical therapy Occupational Therapy work with the patient multiple times, and low intensity was recommended.  Unfortunate, was not able to get the patient off oxygen, she is discharged to home with home health care with 3 L of oxygen, and care coordination has provided a list of private caretakers also.  Prescriptions for all new medications and new doses have been sent to her pharmacy    Pertinent Physical Exam At Time of Discharge  Physical Exam    Outpatient Follow-Up  Future Appointments   Date Time Provider Department Center   5/22/2024  2:30 PM Darrius Ibrahim MD [id number] None   8/26/2024  1:30 PM Darrius Ibrahim MD [id number] None   9/10/2024 10:30 AM [id number] [id number] [facility]   12/16/2024 11:30 AM Frida Hines MD [id number] Michele Ricci DO

## 2024-04-11 NOTE — PROGRESS NOTES
Linette Doyle is a 83 y.o. female on day 6 of admission presenting with Paroxysmal A-fib (CMS/HCC).      Subjective     No worsening shortness of breath.  Satting 100% on 3 L nasal cannula.  Nursing attempted to wean today but unfortunately desaturated now back to 3 L.    She wishes to go home but is concerned, will ask for therapy to see her again today.          Objective     Last Recorded Vitals  /61 (BP Location: Left arm, Patient Position: Lying)   Pulse 60   Temp 36.4 °C (97.5 °F) (Oral)   Resp 15   Wt 65 kg (143 lb 4.8 oz)   SpO2 96% Comment: 2L  Intake/Output last 3 Shifts:    Intake/Output Summary (Last 24 hours) at 4/11/2024 0851  Last data filed at 4/10/2024 2000  Gross per 24 hour   Intake 640 ml   Output 0 ml   Net 640 ml       Admission Weight  Weight: 64.9 kg (143 lb) (04/05/24 1048)    Daily Weight  04/11/24 : 65 kg (143 lb 4.8 oz)    Image Results  Transesophageal Echo ([place])             [place] [address]             Phone [phone]    TRANSESOPHAGEAL ECHOCARDIOGRAM REPORT       Patient Name:     LINETTE DOYLE        Reading Physician:   Zuri Vela MD  Study Date:       4/10/2024            Ordering Provider:   Zuri VELA  MRN/PID:          [id number]             Fellow:  Accession#:       [id number]         Nurse:  Date of           1940 / 83      Sonographer:         Jordy Perales RDCS  Birth/Age:        years  Gender:           F                    Additional Staff:  Height:           162.00 cm            Admit Date:  Weight:           67.00 kg             Admission Status:    Inpatient - Routine  BSA / BMI:        1.72 m2 / 25.53      Department Location: [facility]                    kg/m2  Blood Pressure: 137 /77 mmHg    Study Type:    TRANSESOPHAGEAL ECHO ([facility])  Diagnosis/ICD: Cardiac murmur, unspecified-R01.1  Indication:    Murmur /  MR  CPT Codes:     [facility] Complete-94779    Patient History:  Pertinent         CAD, Chest Pain, CHF and Murmur. Abn Ekg, Pacemaker, Murmur,  History:          PCI.    Study Detail: The following Echo studies were performed: 2D, Doppler and color                flow.       PHYSICIAN INTERPRETATION:  [facility] Details: The [place]IN probe used was [id number]. Technically adequate omniplane transesophageal echocardiogram performed. Color flow Doppler echo was performed to assess for the presence of a patent foramen ovale.  [facility] Medication: The patient was sedated by Anesthesia; please refer to anesthesia flow sheet for medications used.  [facility] Procedure: The probe was passed without difficulty. The following complication was encountered during the procedure: Patient tolerated the procedure well without any apparent complications.  Left Ventricle: Left ventricular systolic function is normal, with an estimated ejection fraction of 55-60%. There are no regional wall motion abnormalities. The left ventricular cavity size is normal. Spectral Doppler shows a normal pattern of left ventricular diastolic filling.  Left Atrium: The left atrium is mildly dilated. There is a normal sized left atrial appendage.  Right Ventricle: The right ventricle is normal in size. There is normal right ventricular global systolic function.  Right Atrium: The right atrium is mildly dilated.  Aortic Valve: The aortic valve appears structurally normal. There is no evidence of aortic valve regurgitation.  Mitral Valve: The mitral valve is normal in structure. There is moderate mitral valve regurgitation. There is moderate mitral valve regurgitation at the most central jet the mitral valve structure within normal limit.  Tricuspid Valve: The tricuspid valve is structurally normal. There is mild tricuspid regurgitation.  Pulmonic Valve: The pulmonic valve is structurally normal. There is no indication of pulmonic valve regurgitation.  Pericardium: There is no  pericardial effusion noted.  Aorta: The aortic root is normal.  Systemic Veins: The inferior vena cava appears to be of normal size. There is IVC inspiratory collapse greater than 50%.       CONCLUSIONS:   1. Left ventricular systolic function is normal with a 55-60% estimated ejection fraction.   2. There is moderate mitral valve regurgitation at the most central jet the mitral valve structure within normal limit.   3. Moderate mitral valve regurgitation.   4. Mild tricuspid regurgitation is visualized.    QUANTITATIVE DATA SUMMARY:  MITRAL INSUFFICIENCY:                            Normal Ranges:  PISA Radius:  0.6 cm  MR VTI:       170.00 cm  MR Vmax:      567.00 cm/s  MR Alias Yasir: 30.8 cm/s  MR Volume:    20.89 ml  MR Flow Rt:   69.67 ml/s  MR EROA:      0.12 cm2       [id number] Becky Vela MD  Electronically signed on 4/10/2024 at 1:33:21 PM       ** Final **      Physical Exam  Constitutional:       Appearance: Normal appearance.   HENT:      Head: Normocephalic.      Right Ear: External ear normal.      Left Ear: External ear normal.   Eyes:      Extraocular Movements: Extraocular movements intact.   Cardiovascular:      Rate and Rhythm: Normal rate.   Pulmonary:      Effort: Pulmonary effort is normal.      Breath sounds: Normal breath sounds.      Comments: 3 L nasal cannula.  No distress.  Skin:     General: Skin is warm.   Neurological:      General: No focal deficit present.      Mental Status: Mental status is at baseline.   Psychiatric:         Mood and Affect: Mood normal.         Relevant Results               Assessment/Plan                  Principal Problem:    Paroxysmal A-fib (CMS/HCC)    Paroxysmal atrial fibrillation  -No plan for cardioversion  -Cardiology on consult    Acute hypoxemic respiratory failure  -Still requiring 3 L nasal cannula  -I think we may be heading up baseline and will likely be discharged with oxygen.  -Diuresis per nephrology    Mitral regurgitation  -Moderate per  transesophageal echocardiogram.  Cardiology states no need for current intervention.  -Diuresis per cardiology nephrology.    GARY on CKD  -Creatinine at 1.4 today.  Stable.  -Nephrology on consult.    Suspect depression  -Zoloft has been increased by behavioral health and appreciative of their input.      Disposition: Patient is nervous about going home, and asked for therapy this year.  I will also discuss in our care coordination meeting momentarily.  O2 certification.              Henry Ricci, DO

## 2024-04-11 NOTE — CARE PLAN
Problem: Pain  Goal: My pain/discomfort is manageable  Outcome: Progressing     Problem: Safety  Goal: Patient will be injury free during hospitalization  Outcome: Progressing  Goal: I will remain free of falls  Outcome: Progressing     Problem: Daily Care  Goal: Daily care needs are met  Outcome: Progressing     Problem: Psychosocial Needs  Goal: Demonstrates ability to cope with hospitalization/illness  Outcome: Progressing  Goal: Collaborate with me, my family, and caregiver to identify my specific goals  Outcome: Progressing     Problem: Discharge Barriers  Goal: My discharge needs are met  Outcome: Progressing     Problem: Skin  Goal: Decreased wound size/increased tissue granulation at next dressing change  Outcome: Progressing  Goal: Participates in plan/prevention/treatment measures  Outcome: Progressing  Goal: Prevent/manage excess moisture  Outcome: Progressing  Goal: Prevent/minimize sheer/friction injuries  Outcome: Progressing  Goal: Promote/optimize nutrition  Outcome: Progressing  Goal: Promote skin healing  Outcome: Progressing     Problem: Pain - Adult  Goal: Verbalizes/displays adequate comfort level or baseline comfort level  Outcome: Progressing     Problem: Safety - Adult  Goal: Free from fall injury  Outcome: Progressing     Problem: Discharge Planning  Goal: Discharge to home or other facility with appropriate resources  Outcome: Progressing     Problem: Chronic Conditions and Co-morbidities  Goal: Patient's chronic conditions and co-morbidity symptoms are monitored and maintained or improved  Outcome: Progressing     The patient's goals for the shift include go home    The clinical goals for the shift include Improve resp status, safety

## 2024-04-11 NOTE — PROGRESS NOTES
Occupational Therapy    OT Treatment    Patient Name: Linette Doyle  MRN: [id number]  Today's Date: 4/11/2024  Time Calculation  Start Time: 1338  Stop Time: 1346  Time Calculation (min): 8 min         Assessment:  OT Assessment: Tolerated session fairly, expressing increased fatigue and declining further engagement in tx following toileting task. Pt expecting to be discharged later this date.  End of Session Communication: Bedside nurse  End of Session Patient Position: Bed, 3 rail up, Alarm on  OT Assessment Results: Decreased ADL status, Decreased upper extremity strength, Decreased safe judgment during ADL, Decreased endurance, Decreased functional mobility, Decreased gross motor control, Decreased IADLs  Plan:  Treatment Interventions: ADL retraining, Functional transfer training, UE strengthening/ROM, Endurance training, Patient/family training, Neuromuscular reeducation, Compensatory technique education  OT Frequency: 4 times per week  OT Discharge Recommendations: Low intensity level of continued care  Equipment Recommended upon Discharge: Wheeled walker  OT Recommended Transfer Status: Stand by assist  OT - OK to Discharge: Yes  Treatment Interventions: ADL retraining, Functional transfer training, UE strengthening/ROM, Endurance training, Patient/family training, Neuromuscular reeducation, Compensatory technique education    Subjective   Previous Visit Info:  OT Last Visit  OT Received On: 04/11/24  General:  General  Prior to Session Communication: Bedside nurse  Patient Position Received: Bed, 3 rail up, Alarm off, not on at start of session  General Comment: Cleared for therapy per RN. Pt supine in bed upon arrival, requesting to utilize restroom  Precautions:  Hearing/Visual Limitations: glasses  Medical Precautions: Fall precautions, Oxygen therapy device and L/min (3L O2 nc)    Pain:  Pain Assessment  Pain Assessment: 0-10  Pain Score: 0 - No pain    Objective    Cognition:  Cognition  Overall  Cognitive Status: Within Functional Limits    Activities of Daily Living:    Toileting  Toileting Level of Assistance: Distant supervision  Where Assessed: Toilet  Toileting Comments: completing toileting task with increased time    Bed Mobility/Transfers: Bed Mobility  Bed Mobility: Yes  Bed Mobility 1  Bed Mobility 1: Supine to sitting, Sitting to supine  Level of Assistance 1: Distant supervision  Bed Mobility Comments 1: HOB elevated with use of bed rails    Transfers  Transfer: Yes  Transfer 1  Technique 1: Stand to sit, Sit to stand  Transfer Device 1: Walker  Transfer Level of Assistance 1: Close supervision  Trials/Comments 1: demonstrating safe transfers    Toilet Transfers  Toilet Transfer Type: To and from  Toilet Transfer to: Standard toilet  Toilet Transfers: Modified independence  Toilet Transfers Comments: use of grab bar for UE support    Functional Mobility:  Functional Mobility  Functional Mobility Performed: Yes  Functional Mobility 1  Device 1: Rolling walker  Assistance 1: Close supervision  Comments 1: short household distance x2 at FWW with cues for pacing to increased safety awareness, demonstrating improved functional tolerance    Outcome Measures:[facility] Daily Activity  Putting on and taking off regular lower body clothing: A little  Bathing (including washing, rinsing, drying): A little  Putting on and taking off regular upper body clothing: None  Toileting, which includes using toilet, bedpan or urinal: None  Taking care of personal grooming such as brushing teeth: None  Eating Meals: None  Daily Activity - Total Score: 22    Education Documentation  Handouts, taught by COLLEEN Leonrado at 4/11/2024  2:47 PM.  Learner: Patient  Readiness: Acceptance  Method: Explanation  Response: Verbalizes Understanding, Demonstrated Understanding    Body Mechanics, taught by COLLEEN Leonardo at 4/11/2024  2:47 PM.  Learner: Patient  Readiness: Acceptance  Method:  Explanation  Response: Verbalizes Understanding, Demonstrated Understanding    Precautions, taught by COLLEEN Leonardo at 4/11/2024  2:47 PM.  Learner: Patient  Readiness: Acceptance  Method: Explanation  Response: Verbalizes Understanding, Demonstrated Understanding    Home Exercise Program, taught by COLLEEN Leonardo at 4/11/2024  2:47 PM.  Learner: Patient  Readiness: Acceptance  Method: Explanation  Response: Verbalizes Understanding, Demonstrated Understanding    ADL Training, taught by COLLEEN Leonardo at 4/11/2024  2:47 PM.  Learner: Patient  Readiness: Acceptance  Method: Explanation  Response: Verbalizes Understanding, Demonstrated Understanding    Education Comments  No comments found.        Problem: OT Goals  Goal: ADLs  Description: Patient will complete ADL tasks with Mod I, using AE as needed, in order to increase patient's safety and independence with self-care tasks.  Outcome: Progressing  Goal: Functional Transfers  Description: Patient will complete functional transfers with Mod I, using least restrictive device, in order to increase patient's safety and independence with daily tasks.  Outcome: Progressing  Goal: B UE Strengthening  Description: Patient will increase B UE strength to 5/5 for functional transfers.  Outcome: Progressing  Goal: Functional Mobility  Description: Patient will demonstrate the ability to complete item retrieval and functional mobility with Mod I, using least restrictive device, in order to increase patient's safety and independence with daily tasks.    Outcome: Progressing

## 2024-04-11 NOTE — PROGRESS NOTES
04/11/24 1324   Discharge Planning   Patient expects to be discharged to: [facility] processed for SOC 24-48 hour HC     TCC spoke to patients sister via phone, she is concerned that pt can not go home and family would like for her to go to SNF. Educated her that pt does not qualify for SNF, therapy to see patient again and if recommendation changes TCC to submit referrals. Spoke to patient and updated regarding plan states that jose saw her and still recommends low. TCC explained that she will go home and resume [facility], will add healthy at home referral and offered list of private pay caregivers. Patient declined and states she feels comfortable to go home as long as these things are set up for her. She does not want a caregiver at this time

## 2024-04-11 NOTE — SIGNIFICANT EVENT
At rest RA SpO2 83% HR 60 bpm. On 3L SpO2 at rest 95% HR 62 bpm. On ambulation on 3L SpO2 96% HR 60 bpm. Pt needs 3L continuous.

## 2024-04-11 NOTE — PROGRESS NOTES
"Linette Doyle is a 83 y.o. female on day 6 of admission presenting with Paroxysmal A-fib (CMS/HCC).      Subjective   During the face-to-face interaction with the patient, she reported feeling better mood-wise and denied experiencing any side effects from medication adjustments. She mentioned having a little bit of anxiety but expressed her likelihood of going home today. She wished she was a little stronger but overall felt safe returning home. The patient mentioned having a good support system, with helpful neighbors and her brother and sister just a call away.  The patient denied experiencing any auditory or visual hallucinations and denied having any thoughts of self-harm or harm towards others. She also mentioned being free of pain and no changes in her appetite, stating that it is the same as yesterday. She reports a good night sleep. We allowed the patient to ask any questions she had, but she did not have any at this time. We encouraged her to reach out to the nurse if her anxiety starts to increase and to ask for her PRN anxiolytic medication.  We appreciate being involved in this patient's care and will sign off for now.         Objective     Last Recorded Vitals  Blood pressure 110/54, pulse 60, temperature 37.3 °C (99.1 °F), temperature source Oral, resp. rate 15, height 1.626 m (5' 4\"), weight 65 kg (143 lb 4.8 oz), SpO2 96%.    Review of Systems  Constitutional:  Positive for activity change and fatigue.   Cardiovascular:  Negative for chest pain and palpitations.   Musculoskeletal:  Negative for arthralgias and myalgias.   Neurological:  Positive for weakness.   Psychiatric/Behavioral:  Positive for dysphoric mood and sleep disturbance. Negative for agitation, behavioral problems, confusion, decreased concentration, hallucinations, self-injury and suicidal ideas. The patient is nervous/anxious. The patient is not hyperactive.      Psychiatric ROS - Adult  Anxiety: increased anxiety relating to " returning home   Depression: energy moderate depression that is better  Delirium: negative  Psychosis: negative  Misty: negative  Safety Issues: none  Psychiatric ROS Comment: As noted      Mental Status Exam  General: alert and pleasant    Appearance: Well groomed, appropriate eye contact. In hospital attire.  Attitude/Behavior:Cooperative, conversant, engaged, and with good eye contact.  Motor Activity: No psychomotor agitation or retardation, no tremor or other abnormal movements.  Speech: normal rate, tone and rhythm  Mood: anxious and euthymic  Affect: normal  Thought Process: linear, goal directed  Thought Content: Within normal limits  Thought Perception: No perceptual abnormalities noted  Cognition: Cognitively intact to conversational testing with respect to attention, orientation, fund of knowledge, recent and remote memory, and language.  Insight: intact  Judgement: Intact    Psychiatric Risk Assessment  Violence Risk Assessment: none  Acute Risk of Harm to Others is Considered: low   Suicide Risk Assessment: age > 65 yrs old, , life crisis (shame/despair), and living alone or lack of social support  Protective Factors against Suicide: adherence to  treatment, fear of suicide, moral objections to suicide, and positive family relationships  Acute Risk of Harm to Self is Considered: low    Current Medications    Scheduled medications  amiodarone, 200 mg, oral, BID  apixaban, 5 mg, oral, BID  atorvastatin, 80 mg, oral, Nightly  cholecalciferol, 2,000 Units, oral, Daily  clopidogrel, 75 mg, oral, Daily  dilTIAZem, 120 mg, oral, BID  isosorbide mononitrate ER, 30 mg, oral, Daily  levothyroxine, 100 mcg, oral, Daily  metoprolol tartrate, 50 mg, oral, BID  oxygen, , inhalation, Continuous - Inhalation  pantoprazole, 40 mg, oral, Daily before breakfast  sertraline, 200 mg, oral, Daily  torsemide, 20 mg, oral, Daily      Continuous medications     PRN medications  PRN medications: acetaminophen,  hydrOXYzine HCL, ipratropium-albuteroL, melatonin, ondansetron, ondansetron       Medication efficacy: Yes  Patient reporting any side effects? No  Any observed side effects? No      Relevant Results  Results for orders placed or performed during the hospital encounter of 04/05/24 (from the past 24 hour(s))   POCT GLUCOSE   Result Value Ref Range    POCT Glucose 135 (H) 74 - 99 mg/dL   Comprehensive Metabolic Panel   Result Value Ref Range    Glucose 112 (H) 65 - 99 mg/dL    Sodium 145 133 - 145 mmol/L    Potassium 2.9 (LL) 3.4 - 5.1 mmol/L    Chloride 102 97 - 107 mmol/L    Bicarbonate 32 (H) 24 - 31 mmol/L    Urea Nitrogen 29 (H) 8 - 25 mg/dL    Creatinine 1.40 0.40 - 1.60 mg/dL    eGFR 37 (L) >60 mL/min/1.73m*2    Calcium 9.0 8.5 - 10.4 mg/dL    Albumin 3.8 3.5 - 5.0 g/dL    Alkaline Phosphatase 96 35 - 125 U/L    Total Protein 5.9 5.9 - 7.9 g/dL    AST 31 5 - 40 U/L    Bilirubin, Total 0.6 0.1 - 1.2 mg/dL    ALT 41 (H) 5 - 40 U/L    Anion Gap 11 <=19 mmol/L   CBC and Auto Differential   Result Value Ref Range    WBC 5.0 4.4 - 11.3 x10*3/uL    nRBC 0.0 0.0 - 0.0 /100 WBCs    RBC 3.63 (L) 4.00 - 5.20 x10*6/uL    Hemoglobin 10.1 (L) 12.0 - 16.0 g/dL    Hematocrit 33.0 (L) 36.0 - 46.0 %    MCV 91 80 - 100 fL    MCH 27.8 26.0 - 34.0 pg    MCHC 30.6 (L) 32.0 - 36.0 g/dL    RDW 15.4 (H) 11.5 - 14.5 %    Platelets 170 150 - 450 x10*3/uL    Neutrophils % 70.9 40.0 - 80.0 %    Immature Granulocytes %, Automated 0.4 0.0 - 0.9 %    Lymphocytes % 14.3 13.0 - 44.0 %    Monocytes % 10.2 2.0 - 10.0 %    Eosinophils % 3.6 0.0 - 6.0 %    Basophils % 0.6 0.0 - 2.0 %    Neutrophils Absolute 3.56 1.60 - 5.50 x10*3/uL    Immature Granulocytes Absolute, Automated 0.02 0.00 - 0.50 x10*3/uL    Lymphocytes Absolute 0.72 (L) 0.80 - 3.00 x10*3/uL    Monocytes Absolute 0.51 0.05 - 0.80 x10*3/uL    Eosinophils Absolute 0.18 0.00 - 0.40 x10*3/uL    Basophils Absolute 0.03 0.00 - 0.10 x10*3/uL   Magnesium   Result Value Ref Range    Magnesium  1.80 1.60 - 3.10 mg/dL   POCT GLUCOSE   Result Value Ref Range    POCT Glucose 117 (H) 74 - 99 mg/dL   POCT GLUCOSE   Result Value Ref Range    POCT Glucose 176 (H) 74 - 99 mg/dL               Reviewed    Assessment/Plan   Principal Problem:    Paroxysmal A-fib (CMS/HCC)       Depression              - Continue  Zoloft to 200 mg PO Daily  2.    Anxiety             - Continue  Hydroxyzine 10 mg PO PRN q6H   3.   Adjustment  Insomnia             - Continue Melatonin 5 mg PO PRN     The patient does not meet the criteria for the inpatient psychiatric treatment. Appreciate Discharge Dispo or next steps from the Care Coordinator. Thank you for allowing us to participate in the care of this patient. We will sign off for now and encourage a reconsultation if necessary.           I spent 30 minutes in the professional and overall care of this patient.    Parts of this chart have been completed using voice recognition software.  Please excuse any errors of transcription.  Despite the medical decision making time stamp, my medical decision making has taken place during the patient's entire visit.  Thought process and reason for plan has been formulated from the time that I saw the patient until the time of disposition and is not specific to one specific moment during their visit and furthermore the medical decision making encompasses the entire chart and not only that represented in this note.       Maggy Blanco, JUNG-CNP

## 2024-04-11 NOTE — PROGRESS NOTES
"iLnette Doyle is a 83 y.o. female on day 6 of admission presenting with Paroxysmal A-fib (CMS/HCC).    Subjective   Was seen for acute kidney injury on top of chronic kidney disease stage III patient is feeling much better she is ambulating with assistance by physical therapist short of breath still however on oxygen therapy    Objective     Physical Exam  Neck:      Vascular: No carotid bruit.   Cardiovascular:      Rate and Rhythm: Regular rhythm.      Heart sounds: No murmur heard.     No friction rub. No gallop.   Pulmonary:      Breath sounds: No wheezing, rhonchi or rales.   Chest:      Chest wall: No tenderness.   Abdominal:      General: There is no distension.      Tenderness: There is no abdominal tenderness. There is no guarding or rebound.   Musculoskeletal:         General: No swelling or tenderness.      Cervical back: Neck supple.      Right lower leg: No edema.      Left lower leg: No edema.   Lymphadenopathy:      Cervical: No cervical adenopathy.         Last Recorded Vitals  Blood pressure 137/61, pulse 60, temperature 36.4 °C (97.5 °F), temperature source Oral, resp. rate 15, height 1.626 m (5' 4\"), weight 65 kg (143 lb 4.8 oz), SpO2 96%.    Intake/Output last 3 Shifts:  I/O last 3 completed shifts:  In: 740 (11.4 mL/kg) [P.O.:740]  Out: 0 (0 mL/kg)   Weight: 65 kg     Current Facility-Administered Medications:     acetaminophen (Tylenol) tablet 650 mg, 650 mg, oral, q6h PRN, Samantha Alcantara MD    amiodarone (Pacerone) tablet 200 mg, 200 mg, oral, BID, Becky Vela MD, 200 mg at 04/11/24 0821    apixaban (Eliquis) tablet 2.5 mg, 2.5 mg, oral, BID, Samantha Alcantara MD, 2.5 mg at 04/11/24 0821    atorvastatin (Lipitor) tablet 80 mg, 80 mg, oral, Nightly, Samantha Alcantara MD, 80 mg at 04/10/24 2124    cholecalciferol (Vitamin D-3) tablet 2,000 Units, 2,000 Units, oral, Daily, Samantha Alcantara MD, 2,000 Units at 04/11/24 0821    clopidogrel (Plavix) tablet 75 mg, 75 mg, oral, Daily, " Samantha Alcantara MD, 75 mg at 04/11/24 0821    dilTIAZem (Cardizem) immediate release tablet 120 mg, 120 mg, oral, BID, Samantha Alcantara MD, 120 mg at 04/11/24 0821    hydrOXYzine HCL (Atarax) tablet 10 mg, 10 mg, oral, q6h PRN, Ariol Tafa, APRN-CNP    ipratropium-albuteroL (Duo-Neb) 0.5-2.5 mg/3 mL nebulizer solution 3 mL, 3 mL, nebulization, q4h PRN, Samantha Alcantara MD, 3 mL at 04/05/24 2129    isosorbide mononitrate ER (Imdur) 24 hr tablet 30 mg, 30 mg, oral, Daily, Becky Vela MD, 30 mg at 04/11/24 0821    levothyroxine (Synthroid, Levoxyl) tablet 100 mcg, 100 mcg, oral, Daily, Samantha Alcantara MD, 100 mcg at 04/11/24 0631    melatonin tablet 5 mg, 5 mg, oral, Nightly PRN, Samantha Alcantara MD    metoprolol tartrate (Lopressor) tablet 50 mg, 50 mg, oral, BID, Becky Vela MD, 50 mg at 04/11/24 0821    ondansetron (Zofran) injection 4 mg, 4 mg, intravenous, q6h PRN, Samantha Alcantara MD    ondansetron (Zofran) tablet 4 mg, 4 mg, oral, 4x daily PRN, Samantha Alcantara MD    oxygen (O2) therapy, , inhalation, Continuous - Inhalation, Henry Ricci, DO, 2 L/min at 04/10/24 0800    pantoprazole (ProtoNix) EC tablet 40 mg, 40 mg, oral, Daily before breakfast, Samantha Alcantara MD, 40 mg at 04/11/24 0631    sertraline (Zoloft) tablet 200 mg, 200 mg, oral, Daily, Ariol Tafa, APRN-CNP, 200 mg at 04/11/24 0820    torsemide (Demadex) tablet 20 mg, 20 mg, oral, Daily, Ronnie Giang MD, 20 mg at 04/11/24 0821   Relevant Results    Results for orders placed or performed during the hospital encounter of 04/05/24 (from the past 96 hour(s))   CBC   Result Value Ref Range    WBC 7.0 4.4 - 11.3 x10*3/uL    nRBC 0.0 0.0 - 0.0 /100 WBCs    RBC 3.36 (L) 4.00 - 5.20 x10*6/uL    Hemoglobin 9.4 (L) 12.0 - 16.0 g/dL    Hematocrit 31.2 (L) 36.0 - 46.0 %    MCV 93 80 - 100 fL    MCH 28.0 26.0 - 34.0 pg    MCHC 30.1 (L) 32.0 - 36.0 g/dL    RDW 15.9 (H) 11.5 - 14.5 %    Platelets 185 150 - 450 x10*3/uL   Basic  Metabolic Panel   Result Value Ref Range    Glucose 99 65 - 99 mg/dL    Sodium 140 133 - 145 mmol/L    Potassium 3.5 3.4 - 5.1 mmol/L    Chloride 102 97 - 107 mmol/L    Bicarbonate 23 (L) 24 - 31 mmol/L    Urea Nitrogen 52 (H) 8 - 25 mg/dL    Creatinine 2.20 (H) 0.40 - 1.60 mg/dL    eGFR 21 (L) >60 mL/min/1.73m*2    Calcium 9.1 8.5 - 10.4 mg/dL    Anion Gap 15 <=19 mmol/L   Transthoracic Echo (TTE) Limited   Result Value Ref Range    AV pk bernarda 0.79 m/s    RVSP 53.7 mmHg    AV pk grad 2.5 mmHg   Comprehensive Metabolic Panel   Result Value Ref Range    Glucose 93 65 - 99 mg/dL    Sodium 144 133 - 145 mmol/L    Potassium 3.3 (L) 3.4 - 5.1 mmol/L    Chloride 103 97 - 107 mmol/L    Bicarbonate 28 24 - 31 mmol/L    Urea Nitrogen 42 (H) 8 - 25 mg/dL    Creatinine 1.80 (H) 0.40 - 1.60 mg/dL    eGFR 27 (L) >60 mL/min/1.73m*2    Calcium 9.4 8.5 - 10.4 mg/dL    Albumin 3.9 3.5 - 5.0 g/dL    Alkaline Phosphatase 111 35 - 125 U/L    Total Protein 6.1 5.9 - 7.9 g/dL    AST 54 (H) 5 - 40 U/L    Bilirubin, Total 0.7 0.1 - 1.2 mg/dL    ALT 67 (H) 5 - 40 U/L    Anion Gap 13 <=19 mmol/L   CBC and Auto Differential   Result Value Ref Range    WBC 6.2 4.4 - 11.3 x10*3/uL    nRBC 0.0 0.0 - 0.0 /100 WBCs    RBC 3.69 (L) 4.00 - 5.20 x10*6/uL    Hemoglobin 10.1 (L) 12.0 - 16.0 g/dL    Hematocrit 33.7 (L) 36.0 - 46.0 %    MCV 91 80 - 100 fL    MCH 27.4 26.0 - 34.0 pg    MCHC 30.0 (L) 32.0 - 36.0 g/dL    RDW 15.8 (H) 11.5 - 14.5 %    Platelets 192 150 - 450 x10*3/uL    Neutrophils % 75.3 40.0 - 80.0 %    Immature Granulocytes %, Automated 0.3 0.0 - 0.9 %    Lymphocytes % 12.8 13.0 - 44.0 %    Monocytes % 8.3 2.0 - 10.0 %    Eosinophils % 2.6 0.0 - 6.0 %    Basophils % 0.7 0.0 - 2.0 %    Neutrophils Absolute 4.63 1.60 - 5.50 x10*3/uL    Immature Granulocytes Absolute, Automated 0.02 0.00 - 0.50 x10*3/uL    Lymphocytes Absolute 0.79 (L) 0.80 - 3.00 x10*3/uL    Monocytes Absolute 0.51 0.05 - 0.80 x10*3/uL    Eosinophils Absolute 0.16 0.00 -  0.40 x10*3/uL    Basophils Absolute 0.04 0.00 - 0.10 x10*3/uL   Comprehensive Metabolic Panel   Result Value Ref Range    Glucose 91 65 - 99 mg/dL    Sodium 145 133 - 145 mmol/L    Potassium 3.1 (L) 3.4 - 5.1 mmol/L    Chloride 101 97 - 107 mmol/L    Bicarbonate 31 24 - 31 mmol/L    Urea Nitrogen 33 (H) 8 - 25 mg/dL    Creatinine 1.40 0.40 - 1.60 mg/dL    eGFR 37 (L) >60 mL/min/1.73m*2    Calcium 9.2 8.5 - 10.4 mg/dL    Albumin 3.8 3.5 - 5.0 g/dL    Alkaline Phosphatase 101 35 - 125 U/L    Total Protein 5.9 5.9 - 7.9 g/dL    AST 38 5 - 40 U/L    Bilirubin, Total 0.7 0.1 - 1.2 mg/dL    ALT 49 (H) 5 - 40 U/L    Anion Gap 13 <=19 mmol/L   CBC and Auto Differential   Result Value Ref Range    WBC 6.7 4.4 - 11.3 x10*3/uL    nRBC 0.0 0.0 - 0.0 /100 WBCs    RBC 3.80 (L) 4.00 - 5.20 x10*6/uL    Hemoglobin 10.6 (L) 12.0 - 16.0 g/dL    Hematocrit 34.4 (L) 36.0 - 46.0 %    MCV 91 80 - 100 fL    MCH 27.9 26.0 - 34.0 pg    MCHC 30.8 (L) 32.0 - 36.0 g/dL    RDW 15.6 (H) 11.5 - 14.5 %    Platelets 193 150 - 450 x10*3/uL    Neutrophils % 71.3 40.0 - 80.0 %    Immature Granulocytes %, Automated 0.4 0.0 - 0.9 %    Lymphocytes % 14.9 13.0 - 44.0 %    Monocytes % 9.4 2.0 - 10.0 %    Eosinophils % 3.3 0.0 - 6.0 %    Basophils % 0.7 0.0 - 2.0 %    Neutrophils Absolute 4.77 1.60 - 5.50 x10*3/uL    Immature Granulocytes Absolute, Automated 0.03 0.00 - 0.50 x10*3/uL    Lymphocytes Absolute 1.00 0.80 - 3.00 x10*3/uL    Monocytes Absolute 0.63 0.05 - 0.80 x10*3/uL    Eosinophils Absolute 0.22 0.00 - 0.40 x10*3/uL    Basophils Absolute 0.05 0.00 - 0.10 x10*3/uL   POCT GLUCOSE   Result Value Ref Range    POCT Glucose 102 (H) 74 - 99 mg/dL   POCT GLUCOSE   Result Value Ref Range    POCT Glucose 135 (H) 74 - 99 mg/dL   Comprehensive Metabolic Panel   Result Value Ref Range    Glucose 112 (H) 65 - 99 mg/dL    Sodium 145 133 - 145 mmol/L    Potassium 2.9 (LL) 3.4 - 5.1 mmol/L    Chloride 102 97 - 107 mmol/L    Bicarbonate 32 (H) 24 - 31 mmol/L     Urea Nitrogen 29 (H) 8 - 25 mg/dL    Creatinine 1.40 0.40 - 1.60 mg/dL    eGFR 37 (L) >60 mL/min/1.73m*2    Calcium 9.0 8.5 - 10.4 mg/dL    Albumin 3.8 3.5 - 5.0 g/dL    Alkaline Phosphatase 96 35 - 125 U/L    Total Protein 5.9 5.9 - 7.9 g/dL    AST 31 5 - 40 U/L    Bilirubin, Total 0.6 0.1 - 1.2 mg/dL    ALT 41 (H) 5 - 40 U/L    Anion Gap 11 <=19 mmol/L   CBC and Auto Differential   Result Value Ref Range    WBC 5.0 4.4 - 11.3 x10*3/uL    nRBC 0.0 0.0 - 0.0 /100 WBCs    RBC 3.63 (L) 4.00 - 5.20 x10*6/uL    Hemoglobin 10.1 (L) 12.0 - 16.0 g/dL    Hematocrit 33.0 (L) 36.0 - 46.0 %    MCV 91 80 - 100 fL    MCH 27.8 26.0 - 34.0 pg    MCHC 30.6 (L) 32.0 - 36.0 g/dL    RDW 15.4 (H) 11.5 - 14.5 %    Platelets 170 150 - 450 x10*3/uL    Neutrophils % 70.9 40.0 - 80.0 %    Immature Granulocytes %, Automated 0.4 0.0 - 0.9 %    Lymphocytes % 14.3 13.0 - 44.0 %    Monocytes % 10.2 2.0 - 10.0 %    Eosinophils % 3.6 0.0 - 6.0 %    Basophils % 0.6 0.0 - 2.0 %    Neutrophils Absolute 3.56 1.60 - 5.50 x10*3/uL    Immature Granulocytes Absolute, Automated 0.02 0.00 - 0.50 x10*3/uL    Lymphocytes Absolute 0.72 (L) 0.80 - 3.00 x10*3/uL    Monocytes Absolute 0.51 0.05 - 0.80 x10*3/uL    Eosinophils Absolute 0.18 0.00 - 0.40 x10*3/uL    Basophils Absolute 0.03 0.00 - 0.10 x10*3/uL   Magnesium   Result Value Ref Range    Magnesium 1.80 1.60 - 3.10 mg/dL   POCT GLUCOSE   Result Value Ref Range    POCT Glucose 117 (H) 74 - 99 mg/dL       Assessment/Plan     Acute kidney injury superimposed on chronic kidney disease stage III renal function is much improved creatinine down to 1.4 plan continue current management with diuretics okay to discharge from renal point of view follow-up with me in couple weeks  A-fib with rapid ventricular rate converted to sinus rhythm  CKD stage III  Pulmonary edema due to diuresis  Coronary artery disease  Coronary artery stenting  Mitral regurgitation by cardiology  Hypokalemia will replace potassium            Ronnie Giang MD

## 2024-04-11 NOTE — PROGRESS NOTES
Physical Therapy    Physical Therapy Treatment    Patient Name: Linette Doyle  MRN: [id number]  Today's Date: 4/11/2024  Time Calculation  Start Time: 0958  Stop Time: 1021  Time Calculation (min): 23 min       Assessment/Plan   PT Assessment  End of Session Communication: Bedside nurse  End of Session Patient Position: Up in chair, Alarm on  PT Plan  Inpatient/Swing Bed or Outpatient: Inpatient  PT Plan  Treatment/Interventions: Bed mobility, Transfer training, Gait training, Balance training, Neuromuscular re-education, Strengthening, Endurance training, Therapeutic exercise, Therapeutic activity  PT Plan: Skilled PT  PT Frequency: 4 times per week  PT Discharge Recommendations: Low intensity level of continued care  Equipment Recommended upon Discharge: Wheeled walker  PT Recommended Transfer Status: Assist x1, Assistive device  PT - OK to Discharge: Yes      General Visit Information:   PT  Visit  PT Received On: 04/11/24  General  Prior to Session Communication: Bedside nurse  Patient Position Received: Bed, 3 rail up, Alarm off, not on at start of session  General Comment: Cleared by nursing to be seen for therapy, pt agreeable with tx, supine in bed upon arrival.    Subjective        Objective   Pain:  Pain Assessment  Pain Assessment: 0-10  Pain Score: 0 - No pain     Postural Control:  Static Sitting Balance  Static Sitting-Balance Support: Bilateral upper extremity supported, Feet supported  Static Sitting-Level of Assistance: Close supervision  Static Standing Balance  Static Standing-Balance Support: Bilateral upper extremity supported  Static Standing-Level of Assistance: Contact guard    Treatments:  Therapeutic Exercise  Therapeutic Exercise Performed: Yes  Therapeutic Exercise Activity 1: Bilateral ankle pumps x15  Therapeutic Exercise Activity 2: Bilateral hip flexion x15  Therapeutic Exercise Activity 3: Bilateral knee extension x15  Therapeutic Exercise Activity 4: Resisted hip abd/add  15    Therapeutic Activity  Therapeutic Activity Performed: No    Bed Mobility  Bed Mobility: Yes  Bed Mobility 1  Bed Mobility 1: Supine to sitting  Level of Assistance 1: Distant supervision  Bed Mobility Comments 1: Good bed mobility.    Ambulation/Gait Training  Ambulation/Gait Training Performed: Yes  Ambulation/Gait Training 1  Surface 1: Level tile  Device 1: Rolling walker  Assistance 1: Minimum assistance  Quality of Gait 1: Decreased step length, Forward flexed posture  Comments/Distance (ft) 1: 50' with wheeled walker, presents with slow gloria, decreased bilateral step length, requires cues for upright posture/cues to remain in PENELOPE of walker, min assist for balance.    Transfers  Transfer: Yes  Transfer 1  Transfer From 1: Sit to  Transfer to 1: Stand  Technique 1: Sit to stand  Transfer Level of Assistance 1: Close supervision  Trials/Comments 1: Cues for proper hand placement, cues for safety during eccentric control in sitting.    Outcome Measures:  [facility] Basic Mobility  Turning from your back to your side while in a flat bed without using bedrails: None  Moving from lying on your back to sitting on the side of a flat bed without using bedrails: A little  Moving to and from bed to chair (including a wheelchair): A little  Standing up from a chair using your arms (e.g. wheelchair or bedside chair): A little  To walk in hospital room: A little  Climbing 3-5 steps with railing: A little  Basic Mobility - Total Score: 19       Encounter Problems       Encounter Problems (Active)       Mobility       STG - Patient will ambulate 150' with rolling walker and modified independence. (Progressing)       Start:  04/08/24    Expected End:  04/12/24               PT Transfers       STG - Patient to transfer to and from sit to supine with independence. (Progressing)       Start:  04/08/24    Expected End:  04/12/24            STG - Patient will transfer sit to and from stand with use of rolling walker and modified  independence. (Progressing)       Start:  04/08/24    Expected End:  04/12/24               Pain - Adult

## 2024-04-11 NOTE — DISCHARGE INSTRUCTIONS
Please pay special attention to the new medications and new dosings.  I have gone through each of these and sent in prescriptions to our pharmacy if there is a new dose or new prescription.    Follow-up with listed physicians including her primary care doctor, cardiology, and kidney specialist.    You did qualify for oxygen and this will be arranged at home.  You need to be on 3 L continuously.

## 2024-04-12 ENCOUNTER — HOME CARE VISIT (OUTPATIENT)
Dept: HOME HEALTH SERVICES | Facility: HOME HEALTH | Age: 84
End: 2024-04-12
Payer: MEDICARE

## 2024-04-12 VITALS
DIASTOLIC BLOOD PRESSURE: 70 MMHG | TEMPERATURE: 97.7 F | HEART RATE: 88 BPM | OXYGEN SATURATION: 95 % | SYSTOLIC BLOOD PRESSURE: 122 MMHG | RESPIRATION RATE: 18 BRPM

## 2024-04-12 PROCEDURE — G0299 HHS/HOSPICE OF RN EA 15 MIN: HCPCS | Mod: HHH

## 2024-04-12 PROCEDURE — 1090000001 HH PPS REVENUE CREDIT

## 2024-04-12 PROCEDURE — 1090000002 HH PPS REVENUE DEBIT

## 2024-04-12 SDOH — HEALTH STABILITY: MENTAL HEALTH: SMOKING IN HOME: 0

## 2024-04-12 SDOH — ECONOMIC STABILITY: HOUSING INSECURITY: EVIDENCE OF SMOKING MATERIAL: 0

## 2024-04-12 ASSESSMENT — ACTIVITIES OF DAILY LIVING (ADL)
ENTERING_EXITING_HOME: MINIMUM ASSIST
OASIS_M1830: 03
AMBULATION ASSISTANCE: STAND BY ASSIST
CURRENT_FUNCTION: STAND BY ASSIST

## 2024-04-12 ASSESSMENT — ENCOUNTER SYMPTOMS
APPETITE LEVEL: GOOD
FATIGUE: 1
FORGETFULNESS: 1
DENIES PAIN: 1

## 2024-04-13 LAB
ATRIAL RATE: 68 BPM
Q ONSET: 222 MS
QRS COUNT: 21 BEATS
QRS DURATION: 86 MS
QT INTERVAL: 330 MS
QTC CALCULATION(BAZETT): 476 MS
QTC FREDERICIA: 421 MS
R AXIS: 86 DEGREES
T AXIS: 230 DEGREES
T OFFSET: 387 MS
VENTRICULAR RATE: 125 BPM

## 2024-04-13 PROCEDURE — 1090000002 HH PPS REVENUE DEBIT

## 2024-04-13 PROCEDURE — 1090000001 HH PPS REVENUE CREDIT

## 2024-04-14 PROCEDURE — 1090000002 HH PPS REVENUE DEBIT

## 2024-04-14 PROCEDURE — 1090000001 HH PPS REVENUE CREDIT

## 2024-04-15 ENCOUNTER — PATIENT OUTREACH (OUTPATIENT)
Dept: PRIMARY CARE | Facility: CLINIC | Age: 84
End: 2024-04-15
Payer: MEDICARE

## 2024-04-15 PROCEDURE — 1090000002 HH PPS REVENUE DEBIT

## 2024-04-15 PROCEDURE — 1090000001 HH PPS REVENUE CREDIT

## 2024-04-15 NOTE — PROGRESS NOTES
Discharge Facility: [facility]  Discharge Diagnosis: Paroxysmal A-fib  Admission Date: 04/05/2024  Discharge Date: 04/11/2024    PCP Appointment Date: Tasked to office for scheduling  Specialist Appointment Date: None  Hospital Encounter and Summary: Linked    See discharge assessment below for further details    Engagement  Call Start Time: 1200 (4/15/2024 12:12 PM)    Medications  Medications reviewed with patient/caregiver?: Yes (4/15/2024 12:12 PM)  Is the patient having any side effects they believe may be caused by any medication additions or changes?: No (4/15/2024 12:12 PM)  Does the patient have all medications ordered at discharge?: Yes (4/15/2024 12:12 PM)  Prescription Comments: Scripts given at discharge for Pacerone, Imdur, Metoprolol Tartrate, Zofran, Zoloft and Torsemide (4/15/2024 12:12 PM)  Is the patient taking all medications as directed (includes completed medication regime)?: Yes (4/15/2024 12:12 PM)  Medication Comments: Patient denies any issues obtaining or affording medication (4/15/2024 12:12 PM)    Appointments  Does the patient have a primary care provider?: Yes (4/15/2024 12:12 PM)  Care Management Interventions: -- (Tasked to office for scheduling) (4/15/2024 12:12 PM)  Has the patient kept scheduled appointments due by today?: Yes (4/15/2024 12:12 PM)    Self Management  What is the home health agency?: [facility] (4/15/2024 12:12 PM)  Has home health visited the patient within 72 hours of discharge?: Yes (4/15/2024 12:12 PM)  What Durable Medical Equipment (DME) was ordered?: N/A (4/15/2024 12:12 PM)    Patient Teaching  Does the patient have access to their discharge instructions?: Yes (4/15/2024 12:12 PM)  Care Management Interventions: Reviewed instructions with patient (4/15/2024 12:12 PM)  What is the patient's perception of their health status since discharge?: Improving (4/15/2024 12:12 PM)  Is the patient/caregiver able to teach back the hierarchy of who to call/visit for  symptoms/problems? PCP, Specialist, Home Health nurse, Urgent Care, ED, 911: Yes (4/15/2024 12:12 PM)  Patient/Caregiver Education Comments: CM spoke to patient via phone. She states that she is doing well at home. She has her discharge medication. PCP follow up has been tasked to the office for scheduling. [facility] is active with the patient. She was thankful for this call. (4/15/2024 12:12 PM)

## 2024-04-16 ENCOUNTER — HOME CARE VISIT (OUTPATIENT)
Dept: HOME HEALTH SERVICES | Facility: HOME HEALTH | Age: 84
End: 2024-04-16
Payer: MEDICARE

## 2024-04-16 VITALS
DIASTOLIC BLOOD PRESSURE: 65 MMHG | HEART RATE: 70 BPM | OXYGEN SATURATION: 97 % | TEMPERATURE: 98.1 F | RESPIRATION RATE: 18 BRPM | SYSTOLIC BLOOD PRESSURE: 115 MMHG

## 2024-04-16 PROCEDURE — 0023 HH ROC

## 2024-04-16 PROCEDURE — 1090000001 HH PPS REVENUE CREDIT

## 2024-04-16 PROCEDURE — G0299 HHS/HOSPICE OF RN EA 15 MIN: HCPCS | Mod: HHH

## 2024-04-16 PROCEDURE — 1090000002 HH PPS REVENUE DEBIT

## 2024-04-16 SDOH — HEALTH STABILITY: MENTAL HEALTH: SMOKING IN HOME: 0

## 2024-04-16 SDOH — ECONOMIC STABILITY: GENERAL

## 2024-04-16 SDOH — ECONOMIC STABILITY: HOUSING INSECURITY: EVIDENCE OF SMOKING MATERIAL: 0

## 2024-04-16 ASSESSMENT — ENCOUNTER SYMPTOMS
DENIES PAIN: 1
MUSCLE WEAKNESS: 1
APPETITE LEVEL: GOOD
SHORTNESS OF BREATH: 1

## 2024-04-16 ASSESSMENT — ACTIVITIES OF DAILY LIVING (ADL)
CURRENT_FUNCTION: STAND BY ASSIST
AMBULATION ASSISTANCE: STAND BY ASSIST

## 2024-04-17 PROCEDURE — 1090000002 HH PPS REVENUE DEBIT

## 2024-04-17 PROCEDURE — G0179 MD RECERTIFICATION HHA PT: HCPCS | Performed by: FAMILY MEDICINE

## 2024-04-17 PROCEDURE — 1090000001 HH PPS REVENUE CREDIT

## 2024-04-18 ENCOUNTER — HOME CARE VISIT (OUTPATIENT)
Dept: HOME HEALTH SERVICES | Facility: HOME HEALTH | Age: 84
End: 2024-04-18
Payer: MEDICARE

## 2024-04-18 VITALS
RESPIRATION RATE: 20 BRPM | WEIGHT: 135.38 LBS | DIASTOLIC BLOOD PRESSURE: 88 MMHG | HEART RATE: 60 BPM | SYSTOLIC BLOOD PRESSURE: 122 MMHG | OXYGEN SATURATION: 95 % | BODY MASS INDEX: 23.24 KG/M2 | TEMPERATURE: 96.8 F

## 2024-04-18 PROCEDURE — 1090000001 HH PPS REVENUE CREDIT

## 2024-04-18 PROCEDURE — G0300 HHS/HOSPICE OF LPN EA 15 MIN: HCPCS | Mod: HHH

## 2024-04-18 PROCEDURE — 1090000002 HH PPS REVENUE DEBIT

## 2024-04-18 SDOH — HEALTH STABILITY: MENTAL HEALTH: SMOKING IN HOME: 0

## 2024-04-18 SDOH — ECONOMIC STABILITY: HOUSING INSECURITY: EVIDENCE OF SMOKING MATERIAL: 0

## 2024-04-18 ASSESSMENT — ENCOUNTER SYMPTOMS
SHORTNESS OF BREATH: 1
DENIES PAIN: 1

## 2024-04-19 PROCEDURE — 1090000002 HH PPS REVENUE DEBIT

## 2024-04-19 PROCEDURE — 1090000001 HH PPS REVENUE CREDIT

## 2024-04-20 PROCEDURE — 1090000001 HH PPS REVENUE CREDIT

## 2024-04-20 PROCEDURE — 1090000002 HH PPS REVENUE DEBIT

## 2024-04-21 PROCEDURE — 1090000002 HH PPS REVENUE DEBIT

## 2024-04-21 PROCEDURE — 1090000001 HH PPS REVENUE CREDIT

## 2024-04-22 ENCOUNTER — HOME CARE VISIT (OUTPATIENT)
Dept: HOME HEALTH SERVICES | Facility: HOME HEALTH | Age: 84
End: 2024-04-22
Payer: MEDICARE

## 2024-04-22 VITALS
HEART RATE: 60 BPM | RESPIRATION RATE: 18 BRPM | OXYGEN SATURATION: 93 % | SYSTOLIC BLOOD PRESSURE: 110 MMHG | DIASTOLIC BLOOD PRESSURE: 57 MMHG | TEMPERATURE: 97 F

## 2024-04-22 PROCEDURE — 1090000001 HH PPS REVENUE CREDIT

## 2024-04-22 PROCEDURE — G0299 HHS/HOSPICE OF RN EA 15 MIN: HCPCS | Mod: HHH

## 2024-04-22 PROCEDURE — 1090000002 HH PPS REVENUE DEBIT

## 2024-04-22 SDOH — ECONOMIC STABILITY: GENERAL

## 2024-04-22 ASSESSMENT — ACTIVITIES OF DAILY LIVING (ADL)
CURRENT_FUNCTION: STAND BY ASSIST
AMBULATION ASSISTANCE: STAND BY ASSIST

## 2024-04-22 ASSESSMENT — ENCOUNTER SYMPTOMS
APPETITE LEVEL: GOOD
SHORTNESS OF BREATH: 1
DENIES PAIN: 1
DYSPNEA ACTIVITY LEVEL: AFTER AMBULATING LESS THAN 10 FT

## 2024-04-23 ENCOUNTER — OFFICE VISIT (OUTPATIENT)
Dept: PRIMARY CARE | Facility: CLINIC | Age: 84
End: 2024-04-23
Payer: MEDICARE

## 2024-04-23 VITALS
HEART RATE: 66 BPM | WEIGHT: 134 LBS | DIASTOLIC BLOOD PRESSURE: 60 MMHG | SYSTOLIC BLOOD PRESSURE: 120 MMHG | OXYGEN SATURATION: 98 % | BODY MASS INDEX: 23 KG/M2

## 2024-04-23 DIAGNOSIS — I25.118 ATHEROSCLEROTIC HEART DISEASE OF NATIVE CORONARY ARTERY WITH OTHER FORMS OF ANGINA PECTORIS (CMS-HCC): Primary | ICD-10-CM

## 2024-04-23 DIAGNOSIS — I25.118 ATHEROSCLEROTIC HEART DISEASE OF NATIVE CORONARY ARTERY WITH OTHER FORMS OF ANGINA PECTORIS (CMS-HCC): ICD-10-CM

## 2024-04-23 DIAGNOSIS — I48.0 PAROXYSMAL A-FIB (MULTI): ICD-10-CM

## 2024-04-23 PROCEDURE — 1160F RVW MEDS BY RX/DR IN RCRD: CPT | Performed by: FAMILY MEDICINE

## 2024-04-23 PROCEDURE — 3074F SYST BP LT 130 MM HG: CPT | Performed by: FAMILY MEDICINE

## 2024-04-23 PROCEDURE — 1123F ACP DISCUSS/DSCN MKR DOCD: CPT | Performed by: FAMILY MEDICINE

## 2024-04-23 PROCEDURE — 1158F ADVNC CARE PLAN TLK DOCD: CPT | Performed by: FAMILY MEDICINE

## 2024-04-23 PROCEDURE — 1090000002 HH PPS REVENUE DEBIT

## 2024-04-23 PROCEDURE — 1111F DSCHRG MED/CURRENT MED MERGE: CPT | Performed by: FAMILY MEDICINE

## 2024-04-23 PROCEDURE — 1090000001 HH PPS REVENUE CREDIT

## 2024-04-23 PROCEDURE — 3078F DIAST BP <80 MM HG: CPT | Performed by: FAMILY MEDICINE

## 2024-04-23 PROCEDURE — 99495 TRANSJ CARE MGMT MOD F2F 14D: CPT | Performed by: FAMILY MEDICINE

## 2024-04-23 PROCEDURE — 1159F MED LIST DOCD IN RCRD: CPT | Performed by: FAMILY MEDICINE

## 2024-04-23 PROCEDURE — 1126F AMNT PAIN NOTED NONE PRSNT: CPT | Performed by: FAMILY MEDICINE

## 2024-04-23 ASSESSMENT — PAIN SCALES - GENERAL: PAINLEVEL: 0-NO PAIN

## 2024-04-23 NOTE — PROGRESS NOTES
"Patient: Linette Doyle  : 1940  PCP: Maritza Do MD  MRN: [id number]  Program: Transitional Care Management  Status: Enrolled  Effective Dates: 2/15/2024 - present  Responsible Staff: Rubi Villanueva  Social Determinants to be Addressed: Physical Activity, Social Connections, Stress         Linette Doyle is a 83 y.o. female presenting today for follow-up after being discharged from the hospital 12 days ago. The main problem requiring admission was atrial fibrillation with RVR. The discharge summary and/or Transitional Care Management documentation was reviewed. Medication reconciliation was performed as indicated via the \"Colby as Reviewed\" timestamp.     Linette Doyle was contacted by Transitional Care Management services two days after her discharge. This encounter and supporting documentation was reviewed.    February had 2 stents placed in RCA.  Had episode of atrial fibrillation that was resistant to medical measures and EP was consulted.  Finally converted to sinus rhythm and was sent home.  Returned  to ED for shortness of breath and heart racing. Found to be in A. Fib with RVR. Started on amiodarone and metoprolol was increased which converted her to NSR. Complicated by hypoxia and GARY. Improved with supplemental O2 and hydration. Discharged home with O2 when she sleeps. Monitoring her O2 when off and active and she is running in the 90s. No chest pain or heart racing. No shortness of breath.     Review of Systems  All other systems have been reviewed and are negative except as noted in the HPI.       /60   Pulse 66   Wt 60.8 kg (134 lb)   LMP  (LMP Unknown)   SpO2 98%   BMI 23.00 kg/m²     Physical Exam  Vitals and nursing note reviewed.   Constitutional:       Appearance: Normal appearance.   Eyes:      Extraocular Movements: Extraocular movements intact.      Conjunctiva/sclera: Conjunctivae normal.      Pupils: Pupils are equal, round, and reactive to light. "   Cardiovascular:      Rate and Rhythm: Normal rate and regular rhythm.   Pulmonary:      Effort: Pulmonary effort is normal.      Breath sounds: Normal breath sounds.   Skin:     Findings: No rash.   Neurological:      General: No focal deficit present.      Mental Status: She is alert.   Psychiatric:         Mood and Affect: Mood normal.         The complexity of medical decision making for this patient's transitional care is high.    Assessment/Plan   Problem List Items Addressed This Visit             ICD-10-CM    Atherosclerotic heart disease of native coronary artery with other forms of angina pectoris (CMS-[facility]) - Primary I25.118    Paroxysmal A-fib (Multi)    Diagnosis and hospital information reviewed in detail with patient.  Discussed amiodarone with patient.  Monitor for shortness of breath.  Continue follow-up with cardiologist for medication management.  Call immediately for any chest pain or tachycardia.  Continue with all other medications as prescribed.  She will need to wear her oxygen for shortness of breath or low levels at home until her follow-up appointment with cardiology.  Follow-up in 3 months I48.0

## 2024-04-24 PROCEDURE — 1090000001 HH PPS REVENUE CREDIT

## 2024-04-24 PROCEDURE — 1090000002 HH PPS REVENUE DEBIT

## 2024-04-25 ENCOUNTER — PATIENT OUTREACH (OUTPATIENT)
Dept: PRIMARY CARE | Facility: CLINIC | Age: 84
End: 2024-04-25
Payer: MEDICARE

## 2024-04-25 ENCOUNTER — HOME CARE VISIT (OUTPATIENT)
Dept: HOME HEALTH SERVICES | Facility: HOME HEALTH | Age: 84
End: 2024-04-25
Payer: MEDICARE

## 2024-04-25 VITALS
BODY MASS INDEX: 22.72 KG/M2 | HEART RATE: 60 BPM | RESPIRATION RATE: 18 BRPM | TEMPERATURE: 96.1 F | WEIGHT: 132.38 LBS | DIASTOLIC BLOOD PRESSURE: 82 MMHG | SYSTOLIC BLOOD PRESSURE: 132 MMHG | OXYGEN SATURATION: 95 %

## 2024-04-25 PROCEDURE — 1090000001 HH PPS REVENUE CREDIT

## 2024-04-25 PROCEDURE — 1090000002 HH PPS REVENUE DEBIT

## 2024-04-25 PROCEDURE — G0300 HHS/HOSPICE OF LPN EA 15 MIN: HCPCS | Mod: HHH

## 2024-04-25 SDOH — HEALTH STABILITY: MENTAL HEALTH: SMOKING IN HOME: 0

## 2024-04-25 SDOH — ECONOMIC STABILITY: HOUSING INSECURITY: EVIDENCE OF SMOKING MATERIAL: 0

## 2024-04-25 ASSESSMENT — ENCOUNTER SYMPTOMS
DENIES PAIN: 1
FORGETFULNESS: 1
APPETITE LEVEL: GOOD
CHANGE IN APPETITE: UNCHANGED

## 2024-04-25 NOTE — PROGRESS NOTES
Call regarding appt. with PCP on 04/23/2024 after hospitalization.  At time of outreach call the patient feels as if their condition has improved since last visit.  Reviewed the PCP appointment with the pt and addressed any questions or concerns.       Patient will be referred to CCM. She has some affordability issues with Eliquis. She lives alone and has trouble navigating healthcare and often get overwhelmed.

## 2024-04-26 PROCEDURE — 1090000001 HH PPS REVENUE CREDIT

## 2024-04-26 PROCEDURE — 1090000002 HH PPS REVENUE DEBIT

## 2024-04-27 PROCEDURE — 1090000001 HH PPS REVENUE CREDIT

## 2024-04-27 PROCEDURE — 1090000002 HH PPS REVENUE DEBIT

## 2024-04-28 PROCEDURE — 1090000001 HH PPS REVENUE CREDIT

## 2024-04-28 PROCEDURE — 1090000002 HH PPS REVENUE DEBIT

## 2024-04-29 PROBLEM — D69.6 DISORDER INVOLVING THROMBOCYTOPENIA (CMS-HCC): Status: RESOLVED | Noted: 2023-07-12 | Resolved: 2024-04-29

## 2024-04-29 PROCEDURE — 1090000001 HH PPS REVENUE CREDIT

## 2024-04-29 PROCEDURE — 1090000002 HH PPS REVENUE DEBIT

## 2024-04-30 ENCOUNTER — PATIENT OUTREACH (OUTPATIENT)
Dept: PRIMARY CARE | Facility: CLINIC | Age: 84
End: 2024-04-30
Payer: MEDICARE

## 2024-04-30 ENCOUNTER — TELEPHONE (OUTPATIENT)
Dept: PRIMARY CARE | Facility: CLINIC | Age: 84
End: 2024-04-30
Payer: MEDICARE

## 2024-04-30 ENCOUNTER — DOCUMENTATION (OUTPATIENT)
Dept: PRIMARY CARE | Facility: CLINIC | Age: 84
End: 2024-04-30
Payer: MEDICARE

## 2024-04-30 ENCOUNTER — HOME CARE VISIT (OUTPATIENT)
Dept: HOME HEALTH SERVICES | Facility: HOME HEALTH | Age: 84
End: 2024-04-30
Payer: MEDICARE

## 2024-04-30 VITALS
DIASTOLIC BLOOD PRESSURE: 60 MMHG | OXYGEN SATURATION: 94 % | RESPIRATION RATE: 18 BRPM | TEMPERATURE: 97.3 F | SYSTOLIC BLOOD PRESSURE: 120 MMHG | HEART RATE: 60 BPM

## 2024-04-30 DIAGNOSIS — K21.9 GASTROESOPHAGEAL REFLUX DISEASE, UNSPECIFIED WHETHER ESOPHAGITIS PRESENT: ICD-10-CM

## 2024-04-30 DIAGNOSIS — E03.9 HYPOTHYROIDISM, UNSPECIFIED TYPE: ICD-10-CM

## 2024-04-30 DIAGNOSIS — I48.0 PAROXYSMAL A-FIB (MULTI): ICD-10-CM

## 2024-04-30 DIAGNOSIS — K21.9 GASTROESOPHAGEAL REFLUX DISEASE, UNSPECIFIED WHETHER ESOPHAGITIS PRESENT: Primary | ICD-10-CM

## 2024-04-30 DIAGNOSIS — I25.118 ATHEROSCLEROTIC HEART DISEASE OF NATIVE CORONARY ARTERY WITH OTHER FORMS OF ANGINA PECTORIS (CMS-HCC): ICD-10-CM

## 2024-04-30 PROCEDURE — 1090000002 HH PPS REVENUE DEBIT

## 2024-04-30 PROCEDURE — 1090000001 HH PPS REVENUE CREDIT

## 2024-04-30 PROCEDURE — G0299 HHS/HOSPICE OF RN EA 15 MIN: HCPCS | Mod: HHH

## 2024-04-30 RX ORDER — SERTRALINE HYDROCHLORIDE 100 MG/1
200 TABLET, FILM COATED ORAL DAILY
Qty: 90 TABLET | Refills: 0 | Status: SHIPPED | OUTPATIENT
Start: 2024-04-30 | End: 2024-05-01 | Stop reason: SDUPTHER

## 2024-04-30 RX ORDER — LEVOTHYROXINE SODIUM 100 UG/1
100 TABLET ORAL DAILY
Qty: 90 TABLET | Refills: 0 | Status: SHIPPED | OUTPATIENT
Start: 2024-04-30 | End: 2024-05-01 | Stop reason: SDUPTHER

## 2024-04-30 RX ORDER — OMEPRAZOLE 20 MG/1
20 CAPSULE, DELAYED RELEASE ORAL DAILY
Qty: 90 CAPSULE | Refills: 0 | Status: SHIPPED | OUTPATIENT
Start: 2024-04-30 | End: 2024-05-01 | Stop reason: SDUPTHER

## 2024-04-30 SDOH — HEALTH STABILITY: MENTAL HEALTH: SMOKING IN HOME: 0

## 2024-04-30 SDOH — ECONOMIC STABILITY: HOUSING INSECURITY: EVIDENCE OF SMOKING MATERIAL: 0

## 2024-04-30 SDOH — ECONOMIC STABILITY: GENERAL

## 2024-04-30 ASSESSMENT — ACTIVITIES OF DAILY LIVING (ADL)
CURRENT_FUNCTION: STAND BY ASSIST
AMBULATION ASSISTANCE: STAND BY ASSIST

## 2024-04-30 ASSESSMENT — ENCOUNTER SYMPTOMS
DENIES PAIN: 1
APPETITE LEVEL: GOOD

## 2024-04-30 NOTE — PROGRESS NOTES
Call to Linette about opening for case management. Linette stated she is doing okay with the nurses coming to the home to help at this time. She will call the office in the future if she would like to start monthly calls.     Message sent to pharmacist Sherry to assist with questions about Eliquis.

## 2024-04-30 NOTE — TELEPHONE ENCOUNTER
Rx Refill Request Telephone Encounter    Name:  Linette Doyle  :  [id number]  Medication Name:  Eliquis 5 mg , 2 x a day             Specific Pharmacy location:   pharmacy    Date of last appointment:    Date of next appointment:    Best number to reach patient:

## 2024-05-01 ENCOUNTER — TELEPHONE (OUTPATIENT)
Dept: PHARMACY | Facility: CLINIC | Age: 84
End: 2024-05-01

## 2024-05-01 PROCEDURE — 1090000002 HH PPS REVENUE DEBIT

## 2024-05-01 PROCEDURE — 1090000001 HH PPS REVENUE CREDIT

## 2024-05-01 RX ORDER — LEVOTHYROXINE SODIUM 100 UG/1
100 TABLET ORAL DAILY
Qty: 90 TABLET | Refills: 0 | Status: SHIPPED | OUTPATIENT
Start: 2024-05-01

## 2024-05-01 RX ORDER — OMEPRAZOLE 20 MG/1
20 CAPSULE, DELAYED RELEASE ORAL DAILY
Qty: 90 CAPSULE | Refills: 0 | Status: SHIPPED | OUTPATIENT
Start: 2024-05-01 | End: 2024-06-07 | Stop reason: HOSPADM

## 2024-05-01 RX ORDER — SERTRALINE HYDROCHLORIDE 100 MG/1
200 TABLET, FILM COATED ORAL DAILY
Qty: 90 TABLET | Refills: 0 | Status: SHIPPED | OUTPATIENT
Start: 2024-05-01

## 2024-05-01 NOTE — TELEPHONE ENCOUNTER
Spoke with patient.  I am assisting with medication cost and refills for her.  Pt was recently hospitalized and there is some confusion re: current medications.

## 2024-05-01 NOTE — TELEPHONE ENCOUNTER
----- Message from Tiffanie Dial sent at 5/1/2024  8:25 AM EDT -----  Patient called [phone] tiera Martin

## 2024-05-02 ENCOUNTER — APPOINTMENT (OUTPATIENT)
Dept: RADIOLOGY | Facility: HOSPITAL | Age: 84
DRG: 683 | End: 2024-05-02
Payer: MEDICARE

## 2024-05-02 ENCOUNTER — HOSPITAL ENCOUNTER (INPATIENT)
Facility: HOSPITAL | Age: 84
LOS: 2 days | Discharge: HOME HEALTH | DRG: 683 | End: 2024-05-06
Attending: EMERGENCY MEDICINE | Admitting: INTERNAL MEDICINE
Payer: MEDICARE

## 2024-05-02 DIAGNOSIS — I63.9 CEREBROVASCULAR ACCIDENT (CVA), UNSPECIFIED MECHANISM (MULTI): ICD-10-CM

## 2024-05-02 DIAGNOSIS — N39.0 URINARY TRACT INFECTION WITHOUT HEMATURIA, SITE UNSPECIFIED: ICD-10-CM

## 2024-05-02 DIAGNOSIS — N17.9 AKI (ACUTE KIDNEY INJURY) (CMS-HCC): ICD-10-CM

## 2024-05-02 DIAGNOSIS — I61.9 CVA (CEREBROVASCULAR ACCIDENT DUE TO INTRACEREBRAL HEMORRHAGE) (MULTI): ICD-10-CM

## 2024-05-02 DIAGNOSIS — R09.02 HYPOXEMIA: ICD-10-CM
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
## 2024-05-02 DIAGNOSIS — R29.6 RECURRENT FALLS: ICD-10-CM

## 2024-05-02 DIAGNOSIS — F43.21 ADJUSTMENT DISORDER WITH DEPRESSED MOOD: ICD-10-CM

## 2024-05-02 DIAGNOSIS — R55 SYNCOPE AND COLLAPSE: ICD-10-CM

## 2024-05-02 DIAGNOSIS — S09.90XA INJURY OF HEAD, INITIAL ENCOUNTER: Primary | ICD-10-CM

## 2024-05-02 DIAGNOSIS — E87.6 HYPOKALEMIA: ICD-10-CM

## 2024-05-02 DIAGNOSIS — G51.0 BELL'S PALSY: ICD-10-CM

## 2024-05-02 DIAGNOSIS — I50.31 ACUTE DIASTOLIC HEART FAILURE (MULTI): ICD-10-CM

## 2024-05-02 DIAGNOSIS — I63.412 CEREBRAL INFARCTION DUE TO EMBOLISM OF LEFT MIDDLE CEREBRAL ARTERY (MULTI): ICD-10-CM

## 2024-05-02 DIAGNOSIS — I48.0 PAROXYSMAL A-FIB (MULTI): ICD-10-CM

## 2024-05-02 LAB
BASOPHILS # BLD AUTO: 0.04 X10*3/UL (ref 0–0.1)
BASOPHILS NFR BLD AUTO: 0.6 %
EOSINOPHIL # BLD AUTO: 0.32 X10*3/UL (ref 0–0.4)
EOSINOPHIL NFR BLD AUTO: 5.2 %
ERYTHROCYTE [DISTWIDTH] IN BLOOD BY AUTOMATED COUNT: 14.9 % (ref 11.5–14.5)
HCT VFR BLD AUTO: 31.8 % (ref 36–46)
HGB BLD-MCNC: 10.1 G/DL (ref 12–16)
IMM GRANULOCYTES # BLD AUTO: 0.01 X10*3/UL (ref 0–0.5)
IMM GRANULOCYTES NFR BLD AUTO: 0.2 % (ref 0–0.9)
LYMPHOCYTES # BLD AUTO: 0.95 X10*3/UL (ref 0.8–3)
LYMPHOCYTES NFR BLD AUTO: 15.3 %
MCH RBC QN AUTO: 28 PG (ref 26–34)
MCHC RBC AUTO-ENTMCNC: 31.8 G/DL (ref 32–36)
MCV RBC AUTO: 88 FL (ref 80–100)
MONOCYTES # BLD AUTO: 0.62 X10*3/UL (ref 0.05–0.8)
MONOCYTES NFR BLD AUTO: 10 %
NEUTROPHILS # BLD AUTO: 4.25 X10*3/UL (ref 1.6–5.5)
NEUTROPHILS NFR BLD AUTO: 68.7 %
NRBC BLD-RTO: 0 /100 WBCS (ref 0–0)
PLATELET # BLD AUTO: 114 X10*3/UL (ref 150–450)
RBC # BLD AUTO: 3.61 X10*6/UL (ref 4–5.2)
WBC # BLD AUTO: 6.2 X10*3/UL (ref 4.4–11.3)

## 2024-05-02 PROCEDURE — 72125 CT NECK SPINE W/O DYE: CPT | Performed by: SURGERY

## 2024-05-02 PROCEDURE — 72125 CT NECK SPINE W/O DYE: CPT

## 2024-05-02 PROCEDURE — 80053 COMPREHEN METABOLIC PANEL: CPT | Performed by: EMERGENCY MEDICINE

## 2024-05-02 PROCEDURE — 70450 CT HEAD/BRAIN W/O DYE: CPT

## 2024-05-02 PROCEDURE — 87637 SARSCOV2&INF A&B&RSV AMP PRB: CPT | Performed by: EMERGENCY MEDICINE

## 2024-05-02 PROCEDURE — G0390 TRAUMA RESPONS W/HOSP CRITI: HCPCS

## 2024-05-02 PROCEDURE — 2500000004 HC RX 250 GENERAL PHARMACY W/ HCPCS (ALT 636 FOR OP/ED): Performed by: EMERGENCY MEDICINE

## 2024-05-02 PROCEDURE — 96360 HYDRATION IV INFUSION INIT: CPT

## 2024-05-02 PROCEDURE — 85025 COMPLETE CBC W/AUTO DIFF WBC: CPT | Performed by: EMERGENCY MEDICINE

## 2024-05-02 PROCEDURE — 1090000001 HH PPS REVENUE CREDIT

## 2024-05-02 PROCEDURE — 1090000002 HH PPS REVENUE DEBIT

## 2024-05-02 PROCEDURE — 36415 COLL VENOUS BLD VENIPUNCTURE: CPT | Performed by: EMERGENCY MEDICINE

## 2024-05-02 PROCEDURE — 96361 HYDRATE IV INFUSION ADD-ON: CPT

## 2024-05-02 PROCEDURE — 84484 ASSAY OF TROPONIN QUANT: CPT | Performed by: EMERGENCY MEDICINE

## 2024-05-02 PROCEDURE — 99291 CRITICAL CARE FIRST HOUR: CPT | Performed by: EMERGENCY MEDICINE

## 2024-05-02 PROCEDURE — 70450 CT HEAD/BRAIN W/O DYE: CPT | Performed by: SURGERY

## 2024-05-02 RX ORDER — PREDNISONE 20 MG/1
60 TABLET ORAL ONCE
Status: COMPLETED | OUTPATIENT
Start: 2024-05-02 | End: 2024-05-02

## 2024-05-02 RX ADMIN — PREDNISONE 60 MG: 20 TABLET ORAL at 23:55

## 2024-05-02 ASSESSMENT — LIFESTYLE VARIABLES
EVER HAD A DRINK FIRST THING IN THE MORNING TO STEADY YOUR NERVES TO GET RID OF A HANGOVER: NO
TOTAL SCORE: 0
HAVE YOU EVER FELT YOU SHOULD CUT DOWN ON YOUR DRINKING: NO
HAVE PEOPLE ANNOYED YOU BY CRITICIZING YOUR DRINKING: NO
EVER FELT BAD OR GUILTY ABOUT YOUR DRINKING: NO

## 2024-05-02 ASSESSMENT — PAIN DESCRIPTION - DESCRIPTORS: DESCRIPTORS: SORE

## 2024-05-02 ASSESSMENT — PAIN - FUNCTIONAL ASSESSMENT: PAIN_FUNCTIONAL_ASSESSMENT: 0-10

## 2024-05-02 ASSESSMENT — PAIN DESCRIPTION - ORIENTATION: ORIENTATION: RIGHT;POSTERIOR

## 2024-05-02 ASSESSMENT — PAIN DESCRIPTION - LOCATION: LOCATION: HEAD

## 2024-05-02 ASSESSMENT — PAIN SCALES - GENERAL: PAINLEVEL_OUTOF10: 5 - MODERATE PAIN

## 2024-05-03 ENCOUNTER — APPOINTMENT (OUTPATIENT)
Dept: CARDIOLOGY | Facility: HOSPITAL | Age: 84
DRG: 683 | End: 2024-05-03
Payer: MEDICARE

## 2024-05-03 ENCOUNTER — HOME CARE VISIT (OUTPATIENT)
Dept: HOME HEALTH SERVICES | Facility: HOME HEALTH | Age: 84
End: 2024-05-03
Payer: MEDICARE

## 2024-05-03 PROBLEM — I61.9 CVA (CEREBROVASCULAR ACCIDENT DUE TO INTRACEREBRAL HEMORRHAGE) (MULTI): Status: ACTIVE | Noted: 2024-05-03

## 2024-05-03 LAB
ALBUMIN SERPL-MCNC: 3.5 G/DL (ref 3.5–5)
ALP BLD-CCNC: 66 U/L (ref 35–125)
ALT SERPL-CCNC: 14 U/L (ref 5–40)
ANION GAP SERPL CALC-SCNC: 12 MMOL/L
ANION GAP SERPL CALC-SCNC: 13 MMOL/L
APPEARANCE UR: ABNORMAL
AST SERPL-CCNC: 19 U/L (ref 5–40)
BACTERIA #/AREA URNS AUTO: ABNORMAL /HPF
BILIRUB SERPL-MCNC: 0.2 MG/DL (ref 0.1–1.2)
BILIRUB UR STRIP.AUTO-MCNC: NEGATIVE MG/DL
BUN SERPL-MCNC: 25 MG/DL (ref 8–25)
BUN SERPL-MCNC: 28 MG/DL (ref 8–25)
CALCIUM SERPL-MCNC: 7.3 MG/DL (ref 8.5–10.4)
CALCIUM SERPL-MCNC: 9.7 MG/DL (ref 8.5–10.4)
CHLORIDE SERPL-SCNC: 100 MMOL/L (ref 97–107)
CHLORIDE SERPL-SCNC: 109 MMOL/L (ref 97–107)
CO2 SERPL-SCNC: 23 MMOL/L (ref 24–31)
CO2 SERPL-SCNC: 26 MMOL/L (ref 24–31)
COLOR UR: ABNORMAL
CREAT SERPL-MCNC: 1.4 MG/DL (ref 0.4–1.6)
CREAT SERPL-MCNC: 1.6 MG/DL (ref 0.4–1.6)
EGFRCR SERPLBLD CKD-EPI 2021: 31 ML/MIN/1.73M*2
EGFRCR SERPLBLD CKD-EPI 2021: 37 ML/MIN/1.73M*2
FLUAV RNA RESP QL NAA+PROBE: NOT DETECTED
FLUBV RNA RESP QL NAA+PROBE: NOT DETECTED
GLUCOSE SERPL-MCNC: 111 MG/DL (ref 65–99)
GLUCOSE SERPL-MCNC: 169 MG/DL (ref 65–99)
GLUCOSE UR STRIP.AUTO-MCNC: NORMAL MG/DL
HOLD SPECIMEN: NORMAL
HYALINE CASTS #/AREA URNS AUTO: ABNORMAL /LPF
KETONES UR STRIP.AUTO-MCNC: NEGATIVE MG/DL
LEUKOCYTE ESTERASE UR QL STRIP.AUTO: ABNORMAL
MAGNESIUM SERPL-MCNC: 1.6 MG/DL (ref 1.6–3.1)
MUCOUS THREADS #/AREA URNS AUTO: ABNORMAL /LPF
NITRITE UR QL STRIP.AUTO: NEGATIVE
PH UR STRIP.AUTO: 5.5 [PH]
POTASSIUM SERPL-SCNC: 3.1 MMOL/L (ref 3.4–5.1)
POTASSIUM SERPL-SCNC: 4.8 MMOL/L (ref 3.4–5.1)
PROT SERPL-MCNC: 5.3 G/DL (ref 5.9–7.9)
PROT UR STRIP.AUTO-MCNC: NEGATIVE MG/DL
RBC # UR STRIP.AUTO: NEGATIVE /UL
RBC #/AREA URNS AUTO: ABNORMAL /HPF
RSV RNA RESP QL NAA+PROBE: NOT DETECTED
SARS-COV-2 RNA RESP QL NAA+PROBE: NOT DETECTED
SODIUM SERPL-SCNC: 138 MMOL/L (ref 133–145)
SODIUM SERPL-SCNC: 145 MMOL/L (ref 133–145)
SP GR UR STRIP.AUTO: 1.01
SQUAMOUS #/AREA URNS AUTO: ABNORMAL /HPF
TROPONIN T SERPL-MCNC: 23 NG/L
TROPONIN T SERPL-MCNC: 29 NG/L
TROPONIN T SERPL-MCNC: 29 NG/L
TSH SERPL DL<=0.05 MIU/L-ACNC: 2.2 MIU/L (ref 0.27–4.2)
UROBILINOGEN UR STRIP.AUTO-MCNC: NORMAL MG/DL
WBC #/AREA URNS AUTO: >50 /HPF
WBC CLUMPS #/AREA URNS AUTO: ABNORMAL /HPF

## 2024-05-03 PROCEDURE — 1090000002 HH PPS REVENUE DEBIT

## 2024-05-03 PROCEDURE — 93880 EXTRACRANIAL BILAT STUDY: CPT | Performed by: SURGERY

## 2024-05-03 PROCEDURE — 87086 URINE CULTURE/COLONY COUNT: CPT | Mod: WESLAB | Performed by: EMERGENCY MEDICINE

## 2024-05-03 PROCEDURE — 84443 ASSAY THYROID STIM HORMONE: CPT | Performed by: INTERNAL MEDICINE

## 2024-05-03 PROCEDURE — 84484 ASSAY OF TROPONIN QUANT: CPT | Performed by: EMERGENCY MEDICINE

## 2024-05-03 PROCEDURE — 2500000004 HC RX 250 GENERAL PHARMACY W/ HCPCS (ALT 636 FOR OP/ED): Performed by: INTERNAL MEDICINE

## 2024-05-03 PROCEDURE — 92610 EVALUATE SWALLOWING FUNCTION: CPT | Mod: GN | Performed by: SPEECH-LANGUAGE PATHOLOGIST

## 2024-05-03 PROCEDURE — 93005 ELECTROCARDIOGRAM TRACING: CPT

## 2024-05-03 PROCEDURE — 81001 URINALYSIS AUTO W/SCOPE: CPT | Performed by: EMERGENCY MEDICINE

## 2024-05-03 PROCEDURE — 2500000001 HC RX 250 WO HCPCS SELF ADMINISTERED DRUGS (ALT 637 FOR MEDICARE OP): Performed by: INTERNAL MEDICINE

## 2024-05-03 PROCEDURE — G0378 HOSPITAL OBSERVATION PER HR: HCPCS

## 2024-05-03 PROCEDURE — 93010 ELECTROCARDIOGRAM REPORT: CPT | Performed by: INTERNAL MEDICINE

## 2024-05-03 PROCEDURE — 1090000001 HH PPS REVENUE CREDIT

## 2024-05-03 PROCEDURE — 83735 ASSAY OF MAGNESIUM: CPT | Performed by: INTERNAL MEDICINE

## 2024-05-03 PROCEDURE — 2500000006 HC RX 250 W HCPCS SELF ADMINISTERED DRUGS (ALT 637 FOR ALL PAYERS): Mod: MUE | Performed by: INTERNAL MEDICINE

## 2024-05-03 PROCEDURE — 93880 EXTRACRANIAL BILAT STUDY: CPT

## 2024-05-03 PROCEDURE — 80048 BASIC METABOLIC PNL TOTAL CA: CPT | Performed by: INTERNAL MEDICINE

## 2024-05-03 PROCEDURE — 36415 COLL VENOUS BLD VENIPUNCTURE: CPT | Performed by: EMERGENCY MEDICINE

## 2024-05-03 PROCEDURE — 2500000001 HC RX 250 WO HCPCS SELF ADMINISTERED DRUGS (ALT 637 FOR MEDICARE OP): Performed by: EMERGENCY MEDICINE

## 2024-05-03 PROCEDURE — 36415 COLL VENOUS BLD VENIPUNCTURE: CPT | Performed by: INTERNAL MEDICINE

## 2024-05-03 PROCEDURE — 2500000004 HC RX 250 GENERAL PHARMACY W/ HCPCS (ALT 636 FOR OP/ED): Performed by: EMERGENCY MEDICINE

## 2024-05-03 PROCEDURE — 97161 PT EVAL LOW COMPLEX 20 MIN: CPT | Mod: GP

## 2024-05-03 RX ORDER — TORSEMIDE 20 MG/1
20 TABLET ORAL DAILY
Status: DISCONTINUED | OUTPATIENT
Start: 2024-05-03 | End: 2024-05-06 | Stop reason: HOSPADM

## 2024-05-03 RX ORDER — ONDANSETRON HYDROCHLORIDE 2 MG/ML
4 INJECTION, SOLUTION INTRAVENOUS EVERY 6 HOURS PRN
Status: DISCONTINUED | OUTPATIENT
Start: 2024-05-03 | End: 2024-05-06 | Stop reason: HOSPADM

## 2024-05-03 RX ORDER — ONDANSETRON 4 MG/1
4 TABLET, FILM COATED ORAL 4 TIMES DAILY PRN
Status: DISCONTINUED | OUTPATIENT
Start: 2024-05-03 | End: 2024-05-06 | Stop reason: HOSPADM

## 2024-05-03 RX ORDER — ACETAMINOPHEN 325 MG/1
650 TABLET ORAL EVERY 6 HOURS PRN
Status: DISCONTINUED | OUTPATIENT
Start: 2024-05-03 | End: 2024-05-06 | Stop reason: HOSPADM

## 2024-05-03 RX ORDER — ACETAMINOPHEN 500 MG
5 TABLET ORAL NIGHTLY PRN
Status: DISCONTINUED | OUTPATIENT
Start: 2024-05-03 | End: 2024-05-06 | Stop reason: HOSPADM

## 2024-05-03 RX ORDER — AMIODARONE HYDROCHLORIDE 200 MG/1
200 TABLET ORAL 2 TIMES DAILY
Status: DISCONTINUED | OUTPATIENT
Start: 2024-05-03 | End: 2024-05-06 | Stop reason: HOSPADM

## 2024-05-03 RX ORDER — METOPROLOL TARTRATE 50 MG/1
50 TABLET ORAL 2 TIMES DAILY
Status: DISCONTINUED | OUTPATIENT
Start: 2024-05-03 | End: 2024-05-06 | Stop reason: HOSPADM

## 2024-05-03 RX ORDER — POTASSIUM CHLORIDE 1.5 G/1.58G
40 POWDER, FOR SOLUTION ORAL ONCE
Status: COMPLETED | OUTPATIENT
Start: 2024-05-03 | End: 2024-05-03

## 2024-05-03 RX ORDER — ATORVASTATIN CALCIUM 80 MG/1
80 TABLET, FILM COATED ORAL NIGHTLY
Status: DISCONTINUED | OUTPATIENT
Start: 2024-05-03 | End: 2024-05-06 | Stop reason: HOSPADM

## 2024-05-03 RX ORDER — SODIUM CHLORIDE 9 MG/ML
100 INJECTION, SOLUTION INTRAVENOUS CONTINUOUS
Status: DISCONTINUED | OUTPATIENT
Start: 2024-05-03 | End: 2024-05-04

## 2024-05-03 RX ORDER — CLOPIDOGREL BISULFATE 75 MG/1
75 TABLET ORAL DAILY
Status: DISCONTINUED | OUTPATIENT
Start: 2024-05-03 | End: 2024-05-06 | Stop reason: HOSPADM

## 2024-05-03 RX ORDER — LEVOTHYROXINE SODIUM 100 UG/1
100 TABLET ORAL
Status: DISCONTINUED | OUTPATIENT
Start: 2024-05-03 | End: 2024-05-06 | Stop reason: HOSPADM

## 2024-05-03 RX ORDER — DILTIAZEM HYDROCHLORIDE 60 MG/1
120 TABLET, FILM COATED ORAL 2 TIMES DAILY
Status: DISCONTINUED | OUTPATIENT
Start: 2024-05-03 | End: 2024-05-03

## 2024-05-03 RX ORDER — LANOLIN ALCOHOL/MO/W.PET/CERES
400 CREAM (GRAM) TOPICAL DAILY
Status: DISCONTINUED | OUTPATIENT
Start: 2024-05-03 | End: 2024-05-06 | Stop reason: HOSPADM

## 2024-05-03 RX ORDER — ISOSORBIDE MONONITRATE 30 MG/1
30 TABLET, EXTENDED RELEASE ORAL DAILY
Status: DISCONTINUED | OUTPATIENT
Start: 2024-05-03 | End: 2024-05-06 | Stop reason: HOSPADM

## 2024-05-03 RX ORDER — POTASSIUM CHLORIDE 20 MEQ/1
20 TABLET, EXTENDED RELEASE ORAL 2 TIMES DAILY
Status: DISCONTINUED | OUTPATIENT
Start: 2024-05-03 | End: 2024-05-06 | Stop reason: HOSPADM

## 2024-05-03 RX ORDER — ALUMINUM HYDROXIDE, MAGNESIUM HYDROXIDE, AND SIMETHICONE 1200; 120; 1200 MG/30ML; MG/30ML; MG/30ML
30 SUSPENSION ORAL 4 TIMES DAILY PRN
Status: DISCONTINUED | OUTPATIENT
Start: 2024-05-03 | End: 2024-05-06 | Stop reason: HOSPADM

## 2024-05-03 RX ORDER — NAPROXEN SODIUM 220 MG/1
324 TABLET, FILM COATED ORAL ONCE
Status: COMPLETED | OUTPATIENT
Start: 2024-05-03 | End: 2024-05-03

## 2024-05-03 RX ORDER — SERTRALINE HYDROCHLORIDE 100 MG/1
200 TABLET, FILM COATED ORAL DAILY
Status: DISCONTINUED | OUTPATIENT
Start: 2024-05-03 | End: 2024-05-06 | Stop reason: HOSPADM

## 2024-05-03 RX ADMIN — APIXABAN 5 MG: 5 TABLET, FILM COATED ORAL at 08:15

## 2024-05-03 RX ADMIN — LEVOTHYROXINE SODIUM 100 MCG: 0.1 TABLET ORAL at 07:45

## 2024-05-03 RX ADMIN — SERTRALINE 200 MG: 100 TABLET, FILM COATED ORAL at 08:16

## 2024-05-03 RX ADMIN — POTASSIUM CHLORIDE 20 MEQ: 1500 TABLET, EXTENDED RELEASE ORAL at 08:16

## 2024-05-03 RX ADMIN — ISOSORBIDE MONONITRATE 30 MG: 30 TABLET, EXTENDED RELEASE ORAL at 08:16

## 2024-05-03 RX ADMIN — AMIODARONE HYDROCHLORIDE 200 MG: 200 TABLET ORAL at 20:22

## 2024-05-03 RX ADMIN — POTASSIUM CHLORIDE 40 MEQ: 1.5 FOR SOLUTION ORAL at 01:13

## 2024-05-03 RX ADMIN — TORSEMIDE 20 MG: 20 TABLET ORAL at 09:05

## 2024-05-03 RX ADMIN — ATORVASTATIN CALCIUM 80 MG: 80 TABLET, FILM COATED ORAL at 20:22

## 2024-05-03 RX ADMIN — METOPROLOL TARTRATE 50 MG: 50 TABLET, FILM COATED ORAL at 20:23

## 2024-05-03 RX ADMIN — POTASSIUM CHLORIDE 20 MEQ: 1500 TABLET, EXTENDED RELEASE ORAL at 20:22

## 2024-05-03 RX ADMIN — SODIUM CHLORIDE 1000 ML: 9 INJECTION, SOLUTION INTRAVENOUS at 02:59

## 2024-05-03 RX ADMIN — METOPROLOL TARTRATE 50 MG: 50 TABLET, FILM COATED ORAL at 08:16

## 2024-05-03 RX ADMIN — Medication 5 MG: at 20:21

## 2024-05-03 RX ADMIN — ASPIRIN 81 MG CHEWABLE TABLET 324 MG: 81 TABLET CHEWABLE at 01:14

## 2024-05-03 RX ADMIN — AMIODARONE HYDROCHLORIDE 200 MG: 200 TABLET ORAL at 08:16

## 2024-05-03 RX ADMIN — Medication 400 MG: at 01:33

## 2024-05-03 RX ADMIN — APIXABAN 5 MG: 5 TABLET, FILM COATED ORAL at 20:22

## 2024-05-03 RX ADMIN — CLOPIDOGREL BISULFATE 75 MG: 75 TABLET ORAL at 08:15

## 2024-05-03 RX ADMIN — SODIUM CHLORIDE 100 ML/HR: 900 INJECTION, SOLUTION INTRAVENOUS at 02:59

## 2024-05-03 SDOH — SOCIAL STABILITY: SOCIAL INSECURITY: HAVE YOU HAD THOUGHTS OF HARMING ANYONE ELSE?: NO

## 2024-05-03 SDOH — SOCIAL STABILITY: SOCIAL INSECURITY: DOES ANYONE TRY TO KEEP YOU FROM HAVING/CONTACTING OTHER FRIENDS OR DOING THINGS OUTSIDE YOUR HOME?: NO

## 2024-05-03 SDOH — SOCIAL STABILITY: SOCIAL INSECURITY: HAVE YOU HAD ANY THOUGHTS OF HARMING ANYONE ELSE?: NO

## 2024-05-03 SDOH — SOCIAL STABILITY: SOCIAL INSECURITY: ABUSE: ADULT

## 2024-05-03 SDOH — SOCIAL STABILITY: SOCIAL INSECURITY: DO YOU FEEL ANYONE HAS EXPLOITED OR TAKEN ADVANTAGE OF YOU FINANCIALLY OR OF YOUR PERSONAL PROPERTY?: NO

## 2024-05-03 SDOH — SOCIAL STABILITY: SOCIAL INSECURITY: WERE YOU ABLE TO COMPLETE ALL THE BEHAVIORAL HEALTH SCREENINGS?: YES

## 2024-05-03 SDOH — SOCIAL STABILITY: SOCIAL INSECURITY: ARE THERE ANY APPARENT SIGNS OF INJURIES/BEHAVIORS THAT COULD BE RELATED TO ABUSE/NEGLECT?: NO

## 2024-05-03 SDOH — SOCIAL STABILITY: SOCIAL INSECURITY: ARE YOU OR HAVE YOU BEEN THREATENED OR ABUSED PHYSICALLY, EMOTIONALLY, OR SEXUALLY BY ANYONE?: NO

## 2024-05-03 SDOH — SOCIAL STABILITY: SOCIAL INSECURITY: HAS ANYONE EVER THREATENED TO HURT YOUR FAMILY OR YOUR PETS?: NO

## 2024-05-03 SDOH — SOCIAL STABILITY: SOCIAL INSECURITY: DO YOU FEEL UNSAFE GOING BACK TO THE PLACE WHERE YOU ARE LIVING?: NO

## 2024-05-03 ASSESSMENT — PAIN - FUNCTIONAL ASSESSMENT
PAIN_FUNCTIONAL_ASSESSMENT: 0-10

## 2024-05-03 ASSESSMENT — COGNITIVE AND FUNCTIONAL STATUS - GENERAL
MOBILITY SCORE: 24
CLIMB 3 TO 5 STEPS WITH RAILING: A LITTLE
DAILY ACTIVITIY SCORE: 24
MOBILITY SCORE: 24
DAILY ACTIVITIY SCORE: 24
MOBILITY SCORE: 22
PATIENT BASELINE BEDBOUND: NO
WALKING IN HOSPITAL ROOM: A LITTLE

## 2024-05-03 ASSESSMENT — ACTIVITIES OF DAILY LIVING (ADL)
HEARING - RIGHT EAR: FUNCTIONAL
PATIENT'S MEMORY ADEQUATE TO SAFELY COMPLETE DAILY ACTIVITIES?: YES
DRESSING YOURSELF: INDEPENDENT
ASSISTIVE_DEVICE: EYEGLASSES
HEARING - LEFT EAR: FUNCTIONAL
JUDGMENT_ADEQUATE_SAFELY_COMPLETE_DAILY_ACTIVITIES: YES
GROOMING: INDEPENDENT
LACK_OF_TRANSPORTATION: NO
BATHING: INDEPENDENT
ADEQUATE_TO_COMPLETE_ADL: YES
ADL_ASSISTANCE: INDEPENDENT
FEEDING YOURSELF: INDEPENDENT
WALKS IN HOME: INDEPENDENT
TOILETING: INDEPENDENT

## 2024-05-03 ASSESSMENT — ENCOUNTER SYMPTOMS
FATIGUE: 0
CHILLS: 0
MUSCULOSKELETAL NEGATIVE: 1
PSYCHIATRIC NEGATIVE: 1
ACTIVITY CHANGE: 0
APPETITE CHANGE: 0
UNEXPECTED WEIGHT CHANGE: 1
EYES NEGATIVE: 1
GASTROINTESTINAL NEGATIVE: 1
ENDOCRINE NEGATIVE: 1
DIAPHORESIS: 0
NEUROLOGICAL NEGATIVE: 1
ALLERGIC/IMMUNOLOGIC NEGATIVE: 1
HEMATOLOGIC/LYMPHATIC NEGATIVE: 1
FEVER: 0
RESPIRATORY NEGATIVE: 1
CARDIOVASCULAR NEGATIVE: 1

## 2024-05-03 ASSESSMENT — LIFESTYLE VARIABLES
HOW OFTEN DO YOU HAVE 6 OR MORE DRINKS ON ONE OCCASION: NEVER
AUDIT-C TOTAL SCORE: 0
SKIP TO QUESTIONS 9-10: 1
AUDIT-C TOTAL SCORE: 0
PRESCIPTION_ABUSE_PAST_12_MONTHS: NO
HOW MANY STANDARD DRINKS CONTAINING ALCOHOL DO YOU HAVE ON A TYPICAL DAY: PATIENT DOES NOT DRINK
HOW OFTEN DO YOU HAVE A DRINK CONTAINING ALCOHOL: NEVER
SUBSTANCE_ABUSE_PAST_12_MONTHS: NO

## 2024-05-03 ASSESSMENT — PAIN SCALES - GENERAL
PAINLEVEL_OUTOF10: 0 - NO PAIN
PAINLEVEL_OUTOF10: 1
PAINLEVEL_OUTOF10: 0 - NO PAIN

## 2024-05-03 ASSESSMENT — PAIN DESCRIPTION - DESCRIPTORS: DESCRIPTORS: SORE

## 2024-05-03 NOTE — ED NOTES
Patient sleeping. Chest rises and falls at equal intervals and patient takes breaths, responds verbally to calling of name and acknowledges this nurses presence in room     Odalys Benavidez LPN  05/03/24 [id number]

## 2024-05-03 NOTE — PROGRESS NOTES
05/03/24 1001   Discharge Planning   Home or Post Acute Services In home services   Patient expects to be discharged to: Home with resumed services with [facility]     Patient is day 1 OBS for stroke symptoms, work up pending.      PT/OT evals completed.  OT no needs, PT LOW intensity.    Patient from home and is active with [facility], no new orders needed while in OBS status.  If upgraded to IP will need updated INTERNAL home care referral for resumption of care prior to discharge.         Recommended discontinuing ceftriaxone as UTI is unlikely. Patient is also growing E. faecalis in the urine (not covered by ceftriaxone) and is afebrile w/o urinary symptoms.

## 2024-05-03 NOTE — CONSULTS
Inpatient consult to Neurology  Consult performed by: Sophia Coko MD  Consult ordered by: Samantha Alcantara MD          History Of Present Illness  Linette Doyle is a 83 y.o. female presented after fall.  The patient has extensive cardiac history with resultant paroxysmal A-fib for which she takes Eliquis and chronic renal insufficiency.  The patient reports that she fell backwards as she was managing her oxygen.  She felt a little bit dizzy maybe.  She does not really remember much of the details.  She awoke with a lump on her head.  She also reports having been here about 2 weeks ago for cardiac reasons.     Past Medical History  She has a past medical history of Angina pectoris (CMS-HCC) (10/22/2023), Atherosclerosis of coronary artery (08/21/2019), Atherosclerotic heart disease of native coronary artery with other forms of angina pectoris (CMS-HCC) (10/22/2023), Hypercholesterolemia (12/28/2018), Hyperlipidemia (10/22/2023), Presence of cardiac pacemaker (10/22/2023), Primary hypertension (12/28/2018), Shortness of breath (11/26/2019), Sick sinus syndrome (Multi) (10/22/2023), Sinus bradycardia (10/22/2023), and Stage 3a chronic kidney disease (Multi) (11/25/2019).    Surgical History  She has a past surgical history that includes Cardiac catheterization (N/A, 02/02/2024); Cardiac catheterization (N/A, 02/02/2024); Cardiac catheterization (N/A, 02/08/2024); Cardiac catheterization (N/A, 02/08/2024); and pacemaker placement (07/04/2019).     Social History  She reports that she has never smoked. She has never been exposed to tobacco smoke. She has never used smokeless tobacco. She reports that she does not drink alcohol and does not use drugs.     Allergies  Iodinated contrast media    Medications  Medications Prior to Admission   Medication Sig Dispense Refill Last Dose    amiodarone (Pacerone) 200 mg tablet Take 1 tablet (200 mg) by mouth 2 times a day. 180 tablet 0 5/2/2024    apixaban (Eliquis) 5 mg  tablet Take 1 tablet (5 mg) by mouth 2 times a day. 180 tablet 3 5/2/2024    atorvastatin (Lipitor) 80 mg tablet Take 1 tablet (80 mg) by mouth once daily at bedtime. 30 tablet 3 5/2/2024    cholecalciferol (Vitamin D-3) 50 MCG (2000 UT) tablet Take 1 tablet (2,000 Units) by mouth once daily.   5/2/2024    clopidogrel (Plavix) 75 mg tablet Take 1 tablet (75 mg) by mouth once daily. Do not start before February 15, 2024. 30 tablet 3 5/2/2024    dilTIAZem (Cardizem) 120 mg immediate release tablet Take 1 tablet (120 mg) by mouth 2 times a day.   Unknown    isosorbide mononitrate ER (Imdur) 30 mg 24 hr tablet Take 1 tablet (30 mg) by mouth once daily. Do not crush or chew. 90 tablet 0 5/2/2024    levothyroxine (Synthroid, Levoxyl) 100 mcg tablet Take 1 tablet (100 mcg) by mouth once daily. 1 tablet in the morning on an empty stomach Orally Once a day for 30 day(s) 90 tablet 0 5/2/2024    metoprolol tartrate (Lopressor) 50 mg tablet Take 1 tablet by mouth 2 times a day. 180 tablet 0 5/2/2024    multivitamin (MULTIPLE VITAMINS ORAL) Take 1 capsule by mouth once daily.   5/2/2024    mv-min/FA/vit K/lycop/lut/zeax (OCUVITE EYE PLUS MULTI ORAL) Take by mouth.   5/2/2024    omeprazole (PriLOSEC) 20 mg DR capsule Take 1 capsule (20 mg) by mouth once daily. 90 capsule 0 5/2/2024    oxygen (O2) gas therapy Inhale 1 each every 12 hours. (Patient taking differently: Inhale 3 L/min if needed (sob, or low oxygen levels).)   5/2/2024    potassium chloride CR (Klor-Con) 10 mEq ER tablet Take 1 tablet (10 mEq) by mouth 2 times a day. Do not crush, chew, or split. 180 tablet 0 5/2/2024    sertraline (Zoloft) 100 mg tablet Take 2 tablets (200 mg) by mouth once daily. 90 tablet 0 5/2/2024    torsemide (Demadex) 20 mg tablet Take 1 tablet (20 mg) by mouth once daily. 90 tablet 0 5/2/2024     Review of Systems    Scheduled medications  amiodarone, 200 mg, oral, BID  apixaban, 5 mg, oral, BID  atorvastatin, 80 mg, oral,  "Nightly  clopidogrel, 75 mg, oral, Daily  isosorbide mononitrate ER, 30 mg, oral, Daily  levothyroxine, 100 mcg, oral, Daily before breakfast  magnesium oxide, 400 mg, oral, Daily  metoprolol tartrate, 50 mg, oral, BID  potassium chloride CR, 20 mEq, oral, BID  sertraline, 200 mg, oral, Daily  torsemide, 20 mg, oral, Daily      Continuous medications  sodium chloride 0.9%, 100 mL/hr, Last Rate: Stopped (05/03/24 1133)      PRN medications  PRN medications: acetaminophen, alum-mag hydroxide-simeth, melatonin, ondansetron, ondansetron    Last Recorded Vitals   Blood pressure 99/71, pulse 62, temperature 36.6 °C (97.9 °F), temperature source Oral, resp. rate 17, height 1.626 m (5' 4\"), weight 60 kg (132 lb 4.4 oz), SpO2 98%.    Heart is RRR, Lungs CTAB  Neurologically, pt is awake and oriented x4. Attention, concentration, memory, cortical processing are intact. No aphasia  Cranial nerves: VFF, EOMI, No nystagmus, Face is symmetric to sensory and motor, no dysarthria, Tongue protrudes midline, Shrug symmetric  Motor: 5/5 strength B throughout except weakness of bilateral hip flexors suggestive of deconditioning, no tremor or asterixis  Sensation is intact bilaterally throughout  Coordination: no ataxia, no dysmetria/dysdiadochokinesia  DTR symmetric  Gait unable to assess    Relevant Results    Results for orders placed or performed during the hospital encounter of 05/02/24 (from the past 96 hour(s))   CBC and Auto Differential   Result Value Ref Range    WBC 6.2 4.4 - 11.3 x10*3/uL    nRBC 0.0 0.0 - 0.0 /100 WBCs    RBC 3.61 (L) 4.00 - 5.20 x10*6/uL    Hemoglobin 10.1 (L) 12.0 - 16.0 g/dL    Hematocrit 31.8 (L) 36.0 - 46.0 %    MCV 88 80 - 100 fL    MCH 28.0 26.0 - 34.0 pg    MCHC 31.8 (L) 32.0 - 36.0 g/dL    RDW 14.9 (H) 11.5 - 14.5 %    Platelets 114 (L) 150 - 450 x10*3/uL    Neutrophils % 68.7 40.0 - 80.0 %    Immature Granulocytes %, Automated 0.2 0.0 - 0.9 %    Lymphocytes % 15.3 13.0 - 44.0 %    Monocytes % 10.0 " 2.0 - 10.0 %    Eosinophils % 5.2 0.0 - 6.0 %    Basophils % 0.6 0.0 - 2.0 %    Neutrophils Absolute 4.25 1.60 - 5.50 x10*3/uL    Immature Granulocytes Absolute, Automated 0.01 0.00 - 0.50 x10*3/uL    Lymphocytes Absolute 0.95 0.80 - 3.00 x10*3/uL    Monocytes Absolute 0.62 0.05 - 0.80 x10*3/uL    Eosinophils Absolute 0.32 0.00 - 0.40 x10*3/uL    Basophils Absolute 0.04 0.00 - 0.10 x10*3/uL   Comprehensive metabolic panel   Result Value Ref Range    Glucose 111 (H) 65 - 99 mg/dL    Sodium 145 133 - 145 mmol/L    Potassium 3.1 (L) 3.4 - 5.1 mmol/L    Chloride 109 (H) 97 - 107 mmol/L    Bicarbonate 23 (L) 24 - 31 mmol/L    Urea Nitrogen 25 8 - 25 mg/dL    Creatinine 1.40 0.40 - 1.60 mg/dL    eGFR 37 (L) >60 mL/min/1.73m*2    Calcium 7.3 (L) 8.5 - 10.4 mg/dL    Albumin 3.5 3.5 - 5.0 g/dL    Alkaline Phosphatase 66 35 - 125 U/L    Total Protein 5.3 (L) 5.9 - 7.9 g/dL    AST 19 5 - 40 U/L    Bilirubin, Total 0.2 0.1 - 1.2 mg/dL    ALT 14 5 - 40 U/L    Anion Gap 13 <=19 mmol/L   Sars-CoV-2 PCR   Result Value Ref Range    Coronavirus 2019, PCR Not Detected Not Detected   Influenza A, and B PCR   Result Value Ref Range    Flu A Result Not Detected Not Detected    Flu B Result Not Detected Not Detected   RSV PCR   Result Value Ref Range    RSV PCR Not Detected Not Detected   Serial Troponin, Initial ([facility])   Result Value Ref Range    Troponin T, High Sensitivity 29 (HH) <=14 ng/L   Serial Troponin, 2 Hour ([facility])   Result Value Ref Range    Troponin T, High Sensitivity 29 (HH) <=14 ng/L   Magnesium   Result Value Ref Range    Magnesium 1.60 1.60 - 3.10 mg/dL   Lavender Top   Result Value Ref Range    Extra Tube Hold for add-ons.    Serial Troponin, 6 Hour ([facility])   Result Value Ref Range    Troponin T, High Sensitivity 23 (HH) <=14 ng/L   Thyroid Stimulating Hormone   Result Value Ref Range    Thyroid Stimulating Hormone 2.20 0.27 - 4.20 mIU/L   Basic Metabolic Panel   Result Value Ref Range    Glucose 169 (H) 65 - 99 mg/dL     Sodium 138 133 - 145 mmol/L    Potassium 4.8 3.4 - 5.1 mmol/L    Chloride 100 97 - 107 mmol/L    Bicarbonate 26 24 - 31 mmol/L    Urea Nitrogen 28 (H) 8 - 25 mg/dL    Creatinine 1.60 0.40 - 1.60 mg/dL    eGFR 31 (L) >60 mL/min/1.73m*2    Calcium 9.7 8.5 - 10.4 mg/dL    Anion Gap 12 <=19 mmol/L   Urinalysis with Reflex Culture and Microscopic   Result Value Ref Range    Color, Urine Light-Yellow Light-Yellow, Yellow, Dark-Yellow    Appearance, Urine Turbid (N) Clear    Specific Gravity, Urine 1.011 1.005 - 1.035    pH, Urine 5.5 5.0, 5.5, 6.0, 6.5, 7.0, 7.5, 8.0    Protein, Urine NEGATIVE NEGATIVE, 10 (TRACE), 20 (TRACE) mg/dL    Glucose, Urine Normal Normal mg/dL    Blood, Urine NEGATIVE NEGATIVE    Ketones, Urine NEGATIVE NEGATIVE mg/dL    Bilirubin, Urine NEGATIVE NEGATIVE    Urobilinogen, Urine Normal Normal mg/dL    Nitrite, Urine NEGATIVE NEGATIVE    Leukocyte Esterase, Urine 500 Rick/µL (A) NEGATIVE   Microscopic Only, Urine   Result Value Ref Range    WBC, Urine >50 (A) 1-5, NONE /HPF    WBC Clumps, Urine OCCASIONAL Reference range not established. /HPF    RBC, Urine 1-2 NONE, 1-2, 3-5 /HPF    Squamous Epithelial Cells, Urine 1-9 (SPARSE) Reference range not established. /HPF    Bacteria, Urine 1+ (A) NONE SEEN /HPF    Mucus, Urine FEW Reference range not established. /LPF    Hyaline Casts, Urine 1+ (A) NONE /LPF   Vascular US Carotid Artery Duplex Bilateral   Result Value Ref Range    BSA 1.65 m2        Vascular US Carotid Artery Duplex Bilateral    Result Date: 5/3/2024  Preliminary Cardiology Report           [facility] [id number] [place], [place] [address]            Phone [phone]      Preliminary Vascular Lab Report  [facility] US CAROTID ARTERY DUPLEX BILATERAL  Patient Name:     SAMUEL Buenrostro Physician:  [id number] Mason Julian MD Study Date:       5/3/2024      Ordering Provider:  [id number] GABY WALLACE MRN/PID:          [id number]      Fellow: Accession#:       [id number]   Technologist:       Molly Cook RVILIR YOB: 1940    Technologist 2: Gender:           F             Encounter#:         [id number] Admission Status: Emergency     Location Performed: [facility]  Diagnosis/ICD: Syncope and collapse-R55 Indication:    Syncope CPT Codes:     02351 Cerebrovascular Carotid Duplex scan complete  PRELIMINARY CONCLUSIONS:  Right Carotid: Findings are consistent with less than 50% stenosis of the right proximal internal carotid artery. Right external carotid artery appears patent with no evidence of stenosis. The right vertebral artery is patent with antegrade flow. No evidence of hemodynamically significant stenosis in the right subclavian artery. Left Carotid: Findings are consistent with 50 to 69% stenosis of the left proximal internal carotid artery. Left external carotid artery appears patent with no evidence of stenosis. The left vertebral artery is patent with antegrade flow. No evidence of hemodynamically significant stenosis in the left subclavian artery.  Imaging & Doppler Findings: Right Plaque Morph: The proximal right internal carotid artery demonstrates heterogenous and calcified plaque. The distal right common carotid artery demonstrates heterogenous and calcified plaque. Left Plaque Morph: The proximal left internal carotid artery demonstrates heterogenous and calcified plaque. The proximal left external carotid artery demonstrates heterogenous plaque. The distal left common carotid artery demonstrates heterogenous, calcified and irregular plaque.   Right                       Left   PSV     EDV                PSV      EDV 75 cm/s           CCA P    68 cm/s 57 cm/s           CCA D    60 cm/s                    Bulb    170 cm/s 30 cm/s 98 cm/s 17 cm/s   ICA P    166 cm/s 25 cm/s 64 cm/s 12 cm/s   ICA M    79 cm/s  14 cm/s 80 cm/s 18 cm/s   ICA D    59 cm/s  11 cm/s 76 cm/s            ECA     95 cm/s 28 cm/s 6 cm/s  Vertebral  52 cm/s  9 cm/s 83 cm/s          Subclavian 114 cm/s                Right Left ICA/CCA Ratio  1.7  2.8   VASCULAR PRELIMINARY REPORT completed by Molly Cook RVT on 5/3/2024 at 1:25:51 PM  ** Final **     CT head wo IV contrast    Result Date: 5/2/2024  Interpreted By:  David Hoang, STUDY: CT CERVICAL SPINE WO IV CONTRAST; CT HEAD WO IV CONTRAST; ;  5/2/2024 10:35 pm   INDICATION: Signs/Symptoms:fall neck trauma; Signs/Symptoms:fall, head injury on plavix.   COMPARISON: Noncontrast CT exams of head and cervical spine of 10/09/2020.   ACCESSION NUMBER(S): [id number]; [id number]   ORDERING CLINICIAN: VIKTORIA AVILES   TECHNIQUE: Noncontrast CT exams of the head and cervical spine with multiplanar reformations.   FINDINGS: BRAIN PARENCHYMA: Moderate to advanced volume loss.  There is periventricular and subcortical white matter hypoattenuation, most in keeping with chronic microvascular ischemic change.  Gray-white matter interfaces are preserved. No mass, mass effect or midline shift.   HEMORRHAGE: No acute intracranial hemorrhage. VENTRICLES and EXTRA-AXIAL SPACES: Normal size. EXTRACRANIAL SOFT TISSUES: Moderate to large right parieto-occipital scalp hematoma. PARANASAL SINUSES/MASTOIDS: The visualized paranasal sinuses and mastoid air cells are aerated. CALVARIUM: No depressed skull fracture. No destructive osseous lesion.   OTHER FINDINGS: None.   CERVICAL SPINE:   Straightening of normal cervical lordosis could reflect positioning or muscle spasm. ALIGNMENT: Normal. VERTEBRAE: No acute fracture. Redemonstrated small ossific fragment along the anterior aspect of the inferior C6 endplate. Vertebral stature is maintained. Moderate to severe multilevel hypertrophic facet osteoarthropathy. SPINAL CANAL: No critical spinal canal stenosis. PREVERTEBRAL SOFT TISSUES: No prevertebral soft tissue swelling. LUNG APICES: Interstitial prominence in the visible lungs probably relates to chronic changes with or without acute pulmonary interstitial  edema/CHF. Please correlate clinically with volume status.   OTHER FINDINGS: None.       No evidence of acute intracranial abnormality.   No acute cervical spine fracture or malalignment.     MACRO: None   Signed by: David Hoang 5/2/2024 10:45 PM Dictation workstation:   [id number]    CT cervical spine wo IV contrast    Result Date: 5/2/2024  Interpreted By:  David oHang, STUDY: CT CERVICAL SPINE WO IV CONTRAST; CT HEAD WO IV CONTRAST; ;  5/2/2024 10:35 pm   INDICATION: Signs/Symptoms:fall neck trauma; Signs/Symptoms:fall, head injury on plavix.   COMPARISON: Noncontrast CT exams of head and cervical spine of 10/09/2020.   ACCESSION NUMBER(S): [id number]; [id number]   ORDERING CLINICIAN: VIKTORIA AVILES   TECHNIQUE: Noncontrast CT exams of the head and cervical spine with multiplanar reformations.   FINDINGS: BRAIN PARENCHYMA: Moderate to advanced volume loss.  There is periventricular and subcortical white matter hypoattenuation, most in keeping with chronic microvascular ischemic change.  Gray-white matter interfaces are preserved. No mass, mass effect or midline shift.   HEMORRHAGE: No acute intracranial hemorrhage. VENTRICLES and EXTRA-AXIAL SPACES: Normal size. EXTRACRANIAL SOFT TISSUES: Moderate to large right parieto-occipital scalp hematoma. PARANASAL SINUSES/MASTOIDS: The visualized paranasal sinuses and mastoid air cells are aerated. CALVARIUM: No depressed skull fracture. No destructive osseous lesion.   OTHER FINDINGS: None.   CERVICAL SPINE:   Straightening of normal cervical lordosis could reflect positioning or muscle spasm. ALIGNMENT: Normal. VERTEBRAE: No acute fracture. Redemonstrated small ossific fragment along the anterior aspect of the inferior C6 endplate. Vertebral stature is maintained. Moderate to severe multilevel hypertrophic facet osteoarthropathy. SPINAL CANAL: No critical spinal canal stenosis. PREVERTEBRAL SOFT TISSUES: No prevertebral soft tissue swelling. LUNG APICES:  Interstitial prominence in the visible lungs probably relates to chronic changes with or without acute pulmonary interstitial edema/CHF. Please correlate clinically with volume status.   OTHER FINDINGS: None.       No evidence of acute intracranial abnormality.   No acute cervical spine fracture or malalignment.     MACRO: None   Signed by: David Hoang 5/2/2024 10:45 PM Dictation workstation:   [id number]    Electrocardiogram, 12-lead PRN ACS symptoms    Result Date: 4/13/2024  Sinus tachycardia with 1st degree AV block Nonspecific ST and T wave abnormality Abnormal ECG When compared with ECG of 05-APR-2024 10:47, (unconfirmed) Junctional rhythm has replaced Sinus rhythm ST now depressed in Inferior leads Confirmed by Moy Sanchez ([id number]) on 4/13/2024 5:02:47 PM    Transesophageal Echo (AUGUSTIN)    Result Date: 4/10/2024           [place] [address]            Phone [phone] TRANSESOPHAGEAL ECHOCARDIOGRAM REPORT  Patient Name:     SAMUEL Buenrostro Physician:   [id number] Shaggy Vela MD Study Date:       4/10/2024            Ordering Provider:   [id number] SHAGGY VELA MRN/PID:          [id number]             Fellow: Accession#:       [id number]         Nurse: Date of           1940 / 83      Sonographer:         Jordy Perales RDCS Birth/Age:        years Gender:           F                    Additional Staff: Height:           162.00 cm            Admit Date: Weight:           67.00 kg             Admission Status:    Inpatient - Routine BSA / BMI:        1.72 m2 / 25.53      Department Location: [facility]                   kg/m2 Blood Pressure: 137 /77 mmHg Study Type:    TRANSESOPHAGEAL ECHO ([facility]) Diagnosis/ICD: Cardiac murmur, unspecified-R01.1 Indication:    Murmur / MR CPT Codes:     [facility] Complete-86611 Patient History: Pertinent         CAD, Chest Pain, CHF and Murmur. Abn Ekg,  Pacemaker, Murmur, History:          PCI. Study Detail: The following Echo studies were performed: 2D, Doppler and color               flow.  PHYSICIAN INTERPRETATION: AUGUSTIN Details: The AUGUSTIN probe used was [id number]. Technically adequate omniplane transesophageal echocardiogram performed. Color flow Doppler echo was performed to assess for the presence of a patent foramen ovale. AUGUSTIN Medication: The patient was sedated by Anesthesia; please refer to anesthesia flow sheet for medications used. AUGUSTIN Procedure: The probe was passed without difficulty. The following complication was encountered during the procedure: Patient tolerated the procedure well without any apparent complications. Left Ventricle: Left ventricular systolic function is normal, with an estimated ejection fraction of 55-60%. There are no regional wall motion abnormalities. The left ventricular cavity size is normal. Spectral Doppler shows a normal pattern of left ventricular diastolic filling. Left Atrium: The left atrium is mildly dilated. There is a normal sized left atrial appendage. Right Ventricle: The right ventricle is normal in size. There is normal right ventricular global systolic function. Right Atrium: The right atrium is mildly dilated. Aortic Valve: The aortic valve appears structurally normal. There is no evidence of aortic valve regurgitation. Mitral Valve: The mitral valve is normal in structure. There is moderate mitral valve regurgitation. There is moderate mitral valve regurgitation at the most central jet the mitral valve structure within normal limit. Tricuspid Valve: The tricuspid valve is structurally normal. There is mild tricuspid regurgitation. Pulmonic Valve: The pulmonic valve is structurally normal. There is no indication of pulmonic valve regurgitation. Pericardium: There is no pericardial effusion noted. Aorta: The aortic root is normal. Systemic Veins: The inferior vena cava appears to be of normal size. There is IVC  inspiratory collapse greater than 50%.  CONCLUSIONS:  1. Left ventricular systolic function is normal with a 55-60% estimated ejection fraction.  2. There is moderate mitral valve regurgitation at the most central jet the mitral valve structure within normal limit.  3. Moderate mitral valve regurgitation.  4. Mild tricuspid regurgitation is visualized. QUANTITATIVE DATA SUMMARY: MITRAL INSUFFICIENCY:                           Normal Ranges: PISA Radius:  0.6 cm MR VTI:       170.00 cm MR Vmax:      567.00 cm/s MR Alias Yasir: 30.8 cm/s MR Volume:    20.89 ml MR Flow Rt:   69.67 ml/s MR EROA:      0.12 cm2  [id number] Becky Vela MD Electronically signed on 4/10/2024 at 1:33:21 PM  ** Final **     XR chest 1 view    Result Date: 4/8/2024  Interpreted By:  Tamia Glass, STUDY: XR CHEST 1 VIEW 4/8/2024 9:43 am   INDICATION: Signs/Symptoms:hypoxemia   COMPARISON: 04/05/2024   ACCESSION NUMBER(S): [id number]   ORDERING CLINICIAN: MELANIE HARDWICK   TECHNIQUE: AP erect view of the chest   FINDINGS: The heart is enlarged with bipolar pacemaker leads in right atrium and right ventricle.   There are bilateral pleural effusions, right larger than left. The right pleural effusion has increased in size and is now considered moderate to moderately large. Left effusion appears unchanged.   There is mild pulmonary vascular congestion. There is probably some atelectasis within each lower lobe as well.       Cardiomegaly and mild pulmonary vascular congestion with bilateral pleural effusions, right larger than left. The right effusion has increased in size.   Probable compressive atelectasis in each lower lung zone.   Signed by: Tamia Glass 4/8/2024 10:29 AM Dictation workstation:   [id number]    Transthoracic Echo (TTE) Limited    Result Date: 4/8/2024           [place] [address]            Phone [phone] TRANSTHORACIC ECHOCARDIOGRAM REPORT  Patient Name:     SAMUEL VILLALOBOS         Reading Physician:   [id number] Vela MD Study Date:       4/8/2024             Ordering Provider:   [id number] SHAGGY VELA MRN/PID:          [id number]             Fellow: Accession#:       [id number]         Nurse: Date of           1940 / 83      Sonographer:         Yulisa Kennedy RDCS Birth/Age:        years Gender:           F                    Additional Staff: Height:           162.56 cm            Admit Date: Weight:           72.58 kg             Admission Status:    Inpatient - Routine BSA / BMI:        1.78 m2 / 27.46      Department Location: [facility]                   kg/m2 Blood Pressure: 134 /70 mmHg Study Type:    TRANSTHORACIC ECHO (TTE) LIMITED Diagnosis/ICD: Acute diastolic (congestive) heart failure (CHF)-I50.31;                Nonrheumatic mitral (valve) insufficiency-I34.0 Indication:    MR, Heart failure CPT Codes:     Echo Limited-75692; Doppler Limited-36759; Color Doppler-59622 Patient History: Valve Disorders:   Mitral Regurgitation. Pertinent History: A-Fib, Bradycardia, Heart failure,Sick sinus syndrome and                    HTN. Study Detail: The following Echo studies were performed: 2D, M-Mode, Doppler and               color flow. Patient has a pacemaker.  PHYSICIAN INTERPRETATION: Left Ventricle: Left ventricular systolic function is normal, with an estimated ejection fraction of 55-60%. There are no regional wall motion abnormalities. The left ventricular cavity size is normal. There is mild left ventricular hypertrophy. Spectral Doppler shows a normal pattern of left ventricular diastolic filling. Left Atrium: The left atrium is normal in size. Right Ventricle: The right ventricle is moderately enlarged. There is reduced right ventricular systolic function. A device is visualized in the right ventricle. Right Atrium: The right atrium is normal in size. There is a device visualized in the right atrium.  Aortic Valve: The aortic valve is trileaflet. There is no evidence of aortic valve stenosis. There is no evidence of aortic valve regurgitation. The peak instantaneous gradient of the aortic valve is 2.5 mmHg. Mitral Valve: The mitral valve is normal in structure. There is evidence of mild mitral valve stenosis. The doppler estimated mean and peak diastolic pressure gradients are 5.0 mmHg and 15.7 mmHg respectively. There is moderate to severe mitral valve regurgitation which is centrally directed. Tricuspid Valve: The tricuspid valve is structurally normal. There is moderate tricuspid regurgitation. The Doppler estimated RVSP is moderately elevated at 53.7 mmHg. Pulmonic Valve: The pulmonic valve is structurally normal. There is mild pulmonic valve regurgitation. Pericardium: There is a trivial pericardial effusion. Aorta: The aortic root is normal. Systemic Veins: The inferior vena cava appears to be of normal size.  CONCLUSIONS:  1. Left ventricular systolic function is normal with a 55-60% estimated ejection fraction.  2. Moderately enlarged right ventricle.  3. There is reduced right ventricular systolic function.  4. Moderate to severe mitral valve regurgitation.  5. Moderate tricuspid regurgitation.  6. Moderately elevated right ventricular systolic pressure.  7. Aortic valve stenosis is not present.  8. There is reduction in the mitral valve leaflet motion with mild mitral stenosis. Mean gradient of 5 mmHg.  9. There is moderate to possible severe mitral regurgitation with central jet. We will consider MitraClip down the road. QUANTITATIVE DATA SUMMARY: MITRAL VALVE:                       Normal Ranges: MV Vmax:    1.98 m/s  (<=1.3m/s) MV peak PG: 15.7 mmHg (<5mmHg) MV mean P.0 mmHg  (<48mmHg) AORTIC VALVE:                        Normal Ranges: AoV Vmax:     0.78 m/s (<=1.7m/s) AoV Peak P.5 mmHg (<20mmHg) LVOT Max Yasir: 0.56 m/s (<=1.1m/s) TRICUSPID VALVE/RVSP:                             Normal  Ranges: Peak TR Velocity: 3.56 m/s RV Syst Pressure: 53.7 mmHg (< 30mmHg) IVC Diam:         1.40 cm  [id number] Becky Vela MD Electronically signed on 4/8/2024 at 8:54:24 AM  ** Final **     ECG 12 lead    Result Date: 4/8/2024  Normal sinus rhythm Nonspecific T wave abnormality Abnormal ECG When compared with ECG of 06-APR-2024 15:13, (unconfirmed) Sinus rhythm has replaced probable SVT Heart rate has decreased by 72 bpm Confirmed by Dean Hanks ([id number]) on 4/8/2024 8:44:24 AM    ECG 12 lead    Result Date: 4/6/2024  Supraventricular tachycardia Nonspecific ST and T wave abnormality Abnormal ECG Confirmed by Dean Hanks ([id number]) on 4/6/2024 1:21:45 PM    XR chest 2 views    Result Date: 4/5/2024  Interpreted By:  Mc Carranza, STUDY: XR CHEST 2 VIEWS; 4/5/2024 11:25 am   INDICATION: Signs/Symptoms:sob   COMPARISON: 02/07/2024   ACCESSION NUMBER(S): [id number]   ORDERING CLINICIAN: BREE SOTO   TECHNIQUE: PA and LAT views of the chest were obtained.   FINDINGS: The cardiomediastinal silhouette is mildly enlarged, grossly stable. There are bilateral lower lobe airspace opacities most suggestive of small pleural effusions and adjacent atelectasis. There is mild prominence of the interstitium and mild ground-glass opacity suggesting mild pulmonary edema.   The osseous structures are intact.       Small bilateral opacities at the lung bases, most suggestive of small pleural effusions and adjacent atelectasis. There is mild prominence of the interstitium and mild ground-glass opacity suggesting mild pulmonary edema.     Signed by: Mc Carranza 4/5/2024 12:09 PM Dictation workstation:   [id number]    Electrocardiogram, 12-lead PRN ACS symptoms    Result Date: 4/5/2024  Sinus tachycardia with frequent Premature ventricular complexes and Fusion complexes Marked ST abnormality, possible inferior subendocardial injury Abnormal ECG When compared with ECG of 07-FEB-2024 17:36, Sinus rhythm has replaced Electronic  atrial pacemaker Vent. rate has increased BY  82 BPM ST now depressed in Inferior leads ST no longer depressed in Anterior leads Nonspecific T wave abnormality now evident in Inferior leads        Assessment/Plan   Principal Problem:    CVA (cerebrovascular accident due to intracerebral hemorrhage) (Multi)    83-year-old woman with multiple risk factors for stroke presented with facial droop which appears to likely be at her baseline as well as recent fall.  She is undergoing a complete stroke evaluation which in light of her risk factors is certainly appropriate.  The patient also seems to be having some difficulty with processing information which may further be suggestive of a small stroke.  I will follow-up results tomorrow.    I personally spent 60 minutes today, exclusive of procedures, providing care for this patient, including preparation, face to face time, documentation and other services such as review of medical records, diagnostic result, patient education, counseling, coordination of care as specified in the encounter.

## 2024-05-03 NOTE — ED PROVIDER NOTES
HPI   Chief Complaint   Patient presents with    Fall     On plavix, hit the back of her head, no LOC, right posterior scalp hematoma       83-year-old female with a history of CAD, dyslipidemia, pacemaker for sick sinus syndrome, hypertension comes to the emergency department with a chief complaint of fall.  Patient uses supplemental oxygen at night and tripped on the cord and fell backwards sustaining a large hematoma on the back of her head.  She is on Plavix.  She denies any other symptoms.  She states that she did not have dizziness palpitations or chest pain prior to the fall.  She notes that over the last couple of days she has had a facial droop but has not followed up.  She denies any recent illnesses.  The patient notes that she lives alone since her  has passed and recently was hospitalized for a heart attack.  She states that if it is her time to go she would welcome it      History provided by:  Patient   used: No                        Regulo Coma Scale Score: 15         NIH Stroke Scale: 2             Patient History   Past Medical History:   Diagnosis Date    Angina pectoris (CMS-HCC) 10/22/2023    Atherosclerosis of coronary artery 08/21/2019    Atherosclerotic heart disease of native coronary artery with other forms of angina pectoris (CMS-HCC) 10/22/2023    Hypercholesterolemia 12/28/2018    Hyperlipidemia 10/22/2023    Presence of cardiac pacemaker 10/22/2023    Primary hypertension 12/28/2018    Shortness of breath 11/26/2019    Sick sinus syndrome (Multi) 10/22/2023    Sinus bradycardia 10/22/2023    Stage 3a chronic kidney disease (Multi) 11/25/2019     Past Surgical History:   Procedure Laterality Date    CARDIAC CATHETERIZATION N/A 02/02/2024    Procedure: Left Heart Cath;  Surgeon: Becky Vela MD;  Location: [facility] Cardiac Cath Lab;  Service: Cardiovascular;  Laterality: N/A;    CARDIAC CATHETERIZATION N/A 02/02/2024    Procedure: PCI;  Surgeon: Becky Vela,  MD;  Location: [facility] Cardiac Cath Lab;  Service: Cardiovascular;  Laterality: N/A;    CARDIAC CATHETERIZATION N/A 02/08/2024    Procedure: Left Heart Cath;  Surgeon: Darrius Ibrahim MD;  Location: [facility] Cardiac Cath Lab;  Service: Cardiovascular;  Laterality: N/A;    CARDIAC CATHETERIZATION N/A 02/08/2024    Procedure: PCI;  Surgeon: Darrius Ibrahim MD;  Location: [facility] Cardiac Cath Lab;  Service: Cardiovascular;  Laterality: N/A;    PACEMAKER PLACEMENT  07/04/2019    [employer] PACEMAKE AND STENT PLACEMENT     Family History   Problem Relation Name Age of Onset    No Known Problems Mother      No Known Problems Father       Social History     Tobacco Use    Smoking status: Never     Passive exposure: Never    Smokeless tobacco: Never   Vaping Use    Vaping status: Never Used   Substance Use Topics    Alcohol use: Never    Drug use: Never       Physical Exam   ED Triage Vitals [05/02/24 2155]   Temperature Heart Rate Respirations BP   36.3 °C (97.3 °F) 58 14 167/63      Pulse Ox Temp Source Heart Rate Source Patient Position   96 % Oral -- --      BP Location FiO2 (%)     -- --       Physical Exam  Vitals and nursing note reviewed.   Constitutional:       General: She is not in acute distress.     Appearance: She is well-developed.   HENT:      Head: Normocephalic and atraumatic.   Eyes:      Conjunctiva/sclera: Conjunctivae normal.   Cardiovascular:      Rate and Rhythm: Normal rate and regular rhythm.      Heart sounds: No murmur heard.  Pulmonary:      Effort: Pulmonary effort is normal. No respiratory distress.      Breath sounds: Normal breath sounds.   Abdominal:      Palpations: Abdomen is soft.      Tenderness: There is no abdominal tenderness.   Musculoskeletal:         General: No swelling.      Cervical back: Neck supple.   Skin:     General: Skin is warm and dry.      Capillary Refill: Capillary refill takes less than 2 seconds.   Neurological:      Mental Status: She is alert and oriented to person, place, and  time.      Cranial Nerves: Cranial nerve deficit present.      Sensory: No sensory deficit.      Motor: No weakness.      Coordination: Coordination normal.      Gait: Gait normal.      Deep Tendon Reflexes: Reflexes normal.   Psychiatric:         Attention and Perception: Attention normal.         Mood and Affect: Mood is depressed. Affect is labile.         Speech: Speech normal.         Behavior: Behavior normal. Behavior is cooperative.         Thought Content: Thought content is not paranoid or delusional. Thought content includes suicidal ideation. Thought content does not include homicidal ideation. Thought content does not include homicidal or suicidal plan.         Cognition and Memory: Cognition and memory normal.         Judgment: Judgment normal.         ED Course & MDM   ED Course as of 05/03/24 0806   Fri May 03, 2024   0017 CBC and Auto Differential(!)  Baseline anemia and otherwise unremarkable [EG]   0017 ECG 12 lead  ECG performed on May 3, 2024 at 12:01 AM and interpreted by me at 12:03 AM showing an AV dual paced rhythm with prolonged MS interval.  No axis deviation, otherwise intervals within normal limits.  Nonspecific ST-T wave abnormality with T wave inversions in 2 3 and aVF.  No STEMI.  When compared with ECG from April 7, 2024, no significant changes. [EG]   0022 CT head wo IV contrast  IMPRESSION:  No evidence of acute intracranial abnormality.      No acute cervical spine fracture or malalignment.   [EG]   0023 Comprehensive metabolic panel(!)  Notable for chronic hypokalemia.  Creatinine is stable from last draw and improved from previous lab work and otherwise unremarkable [EG]   0053 Troponin T Series, High Sensitivity (0, 2HR, 6HR)(!!)  Troponin is elevated, but much improved from previous lab work [EG]   0053 Negative viral testing [EG]      ED Course User Index  [EG] Mariluz Tony MD         Diagnoses as of 05/03/24 0806   Injury of head, initial encounter   Bell's palsy    Fall, initial encounter   Adjustment disorder with depressed mood   Hypokalemia   Cerebrovascular accident (CVA), unspecified mechanism (Multi)       Medical Decision Making    HPI:  As Above  PMHx/PSHx/Meds/Allergies/SH/FH as per nursing documentation and reviewed.  Review of systems: Total of 10 systems reviewed and otherwise negative except as noted elsewhere    DDX: As described in MDM    If performed, radiology listed above interpreted by me and confirmed by the Radiologist.  Medications administered during this visit (name and route): see MAR  Social determinants of health considered for this visit: lives at home  If performed, EKG interpreted by me and detailed above    [facility] Summary/considerations:  83-year-old female presenting after a fall on blood thinners.  She has a large hematoma on the back of her head that is not actively growing.  Head CT is negative for fractures or acute intracranial hemorrhage.  Patient has a focal neurological deficit of a right-sided facial droop that is concerning for a central etiology.  As this may be a Bell's palsy, she is started on prednisone at 60 mg, but will require admission to the hospital for further rule out of stroke.  Her vital signs are within normal limits and she is afebrile.  Her viral testing is also negative.  In passing the patient was complaining of passive suicidal ideation without plan.  She is being provided outpatient resources by the psychiatric social worker.  Her lab work was remarkable for a hypokalemia that was given oral replacement.  She is not a candidate for tPA as this is occurring after a fall with a head injury and her only neurological deficit is the facial droop.  It is not causing her to have difficulty talking or swallowing.  The case was discussed with Dr. Sophia Cook who is the neurologist on-call this evening who agrees that the patient should have a further rule out of stroke.  Patient's cardiologist is Darrius Ibrahim.    Quinton of the hospitalist service agrees to manage the patient in observation    The patient was seen and triaged by our nursing/medic staff, their vitals were taken and the staff notes were reviewed.  If the patient arrived by an EMS squad or an outside agency, we discussed the case with transporting EMS medic, police, or other historians. My initial assessment was attention to their airway, breathing, and circulatory status.  We addressed any immediate or life threatening findings and completed a medical history and a physical exam if the patient or those legally responsible were in agreement with this.   **Disclaimer:  This note was dictated by speech recognition technology.  Minor errors in transcription may be present.  Please contact for clarification or corrections.      Amount and/or Complexity of Data Reviewed  External Data Reviewed: labs, ECG and notes.  Labs: ordered. Decision-making details documented in ED Course.  Radiology: ordered and independent interpretation performed. Decision-making details documented in ED Course.  ECG/medicine tests: ordered and independent interpretation performed. Decision-making details documented in ED Course.        Procedure  Critical Care    Performed by: Mariluz Tony MD  Authorized by: Mariluz Tony MD    Critical care provider statement:     Critical care time (minutes):  35    Critical care time was exclusive of:  Separately billable procedures and treating other patients    Critical care was necessary to treat or prevent imminent or life-threatening deterioration of the following conditions:  Trauma and CNS failure or compromise    Critical care was time spent personally by me on the following activities:  Development of treatment plan with patient or surrogate, discussions with consultants, evaluation of patient's response to treatment, examination of patient, obtaining history from patient or surrogate, ordering and performing  treatments and interventions, ordering and review of laboratory studies, ordering and review of radiographic studies, pulse oximetry, re-evaluation of patient's condition and review of old charts    Care discussed with: admitting provider         Mariluz Tony MD  05/03/24 0813

## 2024-05-03 NOTE — CASE COMMUNICATION
MISSED VISIT PATIENT HOSPITALIZED  Patient requests all Lab, Cardiology, and Radiology Results on their Discharge Instructions

## 2024-05-03 NOTE — PROGRESS NOTES
Physical Therapy    Physical Therapy Evaluation    Patient Name: Linette Doyle  MRN: [id number]  Today's Date: 5/3/2024   Time Calculation  Start Time: 0750  Stop Time: 0810  Time Calculation (min): 20 min    Assessment/Plan   PT Assessment  Rehab Prognosis: Good  Evaluation/Treatment Tolerance: Patient tolerated treatment well  Medical Staff Made Aware: Yes  Strengths: Ability to acquire knowledge, Attitude of self, Housing layout, Premorbid level of function, Support of extended family/friends, Support of Caregivers, Living arrangement secure  End of Session Communication: Bedside nurse  Assessment Comment: pt demonstrated safe and independent functional mobility and amb without device. Pt is functioning at baseline, but could benefit from continued home PT to increase functional strength, balance, endurance. PT educated pt in amb in halls with distant supervision of 1 staff until d/c. No further skilled PT needs noted. Will d/c from PT intervention while in hospital.  End of Session Patient Position: On cart (call button in reach; nurse aware)  IP OR SWING BED PT PLAN  Inpatient or Swing Bed: Inpatient  PT Plan  PT Plan: PT Eval only  PT Eval Only Reason: Safe to return home  PT Discharge Recommendations: Low intensity level of continued care  PT Recommended Transfer Status: Stand by assist  PT - OK to Discharge: Yes      Subjective   General Visit Information:  General  Reason for Referral: impaired mobility; s/p fall  Referred By: Dr Alcantara  Past Medical History Relevant to Rehab: CAD with stents, pacemaker, CKD 3, A-fib, recent MI, O2 at night  Family/Caregiver Present: No  Prior to Session Communication: Bedside nurse  Patient Position Received: On cart, Alarm off, not on at start of session  Preferred Learning Style: verbal, visual  General Comment: 82 yo WF admitted to observation at [facility] via ED after sustaining a fall at home during the night; ? tripped over O2 cord. CT Scan of head negative for acute  event. Cleared by nurse for therapy; pt agreeable to therapy  Home Living:  Home Living  Type of Home: House  Lives With: Alone  Home Adaptive Equipment: Walker rolling or standard  Home Layout: One level  Home Access: Stairs to enter without rails  Entrance Stairs-Rails: None  Entrance Stairs-Number of Steps: 1  Bathroom Shower/Tub: Tub/shower unit  Bathroom Toilet: Standard  Bathroom Equipment: Grab bars in shower, Shower chair with back  Prior Level of Function:  Prior Function Per Pt/Caregiver Report  Level of Bureau: Independent with ADLs and functional transfers, Independent with homemaking with ambulation  Receives Help From: Family (no family close by  however siblings and D-I-L assist some if needed)  ADL Assistance: Independent  Homemaking Assistance: Independent  Vocational: Retired  Prior Function Comments: pt currently active with home PT and RN; pt drives  Precautions:  Precautions  Hearing/Visual Limitations: reading glasses  Medical Precautions: Fall precautions  Vital Signs:  Vital Signs  Heart Rate: 61  Pulse Ox: 94 %  Patient Position: Lying    Objective   Pain:  Pain Assessment  Pain Assessment: 0-10  Pain Score: 1  Pain Type: Acute pain  Pain Location: Neck  Pain Orientation: Posterior  Pain Descriptors: Sore  Pain Interventions:  (pain meds per nurse, positioning, modalities, posture)  Cognition:  Cognition  Overall Cognitive Status: Within Functional Limits  Memory:  (appears mild decreased STM)    General Assessments:  General Observation  General Observation: NAD, cooperative, pleasant,     Pt able to pick object up from floor in standing position with left hand on cart, distant supervision of 1.          Activity Tolerance  Endurance: Endurance does not limit participation in activity    Sensation  Light Touch: No apparent deficits    Strength  Strength Comments: dylan hips 3-/5, knees 4-/5, ankles 3+/5  Coordination  Heel to Shin: Intact    Postural Control  Posture Comment: mild  forward head    Static Sitting Balance  Static Sitting-Balance Support: Feet unsupported, No upper extremity supported  Static Sitting-Level of Assistance: Independent  Static Sitting-Comment/Number of Minutes: 3-4 min with good balance  Dynamic Sitting Balance  Dynamic Sitting-Balance Support: Feet unsupported, No upper extremity supported  Dynamic Sitting-Comments: good balance with distant supervision of 1    Static Standing Balance  Static Standing-Balance Support: No upper extremity supported  Static Standing-Level of Assistance: Distant supervision  Static Standing-Comment/Number of Minutes: 1-2 min with good balance  Dynamic Standing Balance  Dynamic Standing-Balance Support: No upper extremity supported  Dynamic Standing-Comments: good - balance without device  Functional Assessments:  Bed Mobility  Bed Mobility: Yes  Bed Mobility 1  Bed Mobility 1: Supine to sitting  Level of Assistance 1: Modified independent  Bed Mobility Comments 1: head of cart flat  Bed Mobility 2  Bed Mobility  2: Sitting to supine  Level of Assistance 2: Modified independent  Bed Mobility Comments 2: head of cart flat    Transfers  Transfer: Yes  Transfer 1  Transfer From 1: Sit to  Transfer to 1: Stand  Technique 1: Sit to stand  Transfer Level of Assistance 1: Modified independent  Trials/Comments 1: no difficulties observed from cart  Transfers 2  Transfer From 2: Stand to  Transfer to 2: Sit  Technique 2: Stand to sit  Transfer Level of Assistance 2: Modified independent  Trials/Comments 2: no difficulties from cart noted    Ambulation/Gait Training  Ambulation/Gait Training Performed: Yes  Ambulation/Gait Training 1  Surface 1: Level tile  Device 1: No device  Assistance 1: Distant supervision  Comments/Distance (ft) 1: pt amb 300 ft without device, + turns, distant supervision of 1; o2 sat 95% with HR 60 bpm  Extremity/Trunk Assessments:  Cervical Spine   Cervical Spine:  (mild forward head)  RLE   RLE :  (see above strength  comments)  LLE   LLE :  (see above strength comments)  Outcome Measures:  [facility] Basic Mobility  Turning from your back to your side while in a flat bed without using bedrails: None  Moving from lying on your back to sitting on the side of a flat bed without using bedrails: None  Moving to and from bed to chair (including a wheelchair): None  Standing up from a chair using your arms (e.g. wheelchair or bedside chair): None  To walk in hospital room: A little  Climbing 3-5 steps with railing: A little  Basic Mobility - Total Score: 22        Education Documentation  Mobility Training, taught by Molly Brooke, PT at 5/3/2024  8:34 AM.  Learner: Patient  Readiness: Acceptance  Method: Explanation  Response: Verbalizes Understanding    Education Comments  No comments found.

## 2024-05-03 NOTE — PROGRESS NOTES
Pharmacy Medication History Review    Linette Doyle is a 83 y.o. 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 Pharmacy reviewed the patient's fleuk-da-cdubdphye medications and allergies for accuracy.    Medications ADDED:  none  Medications CHANGED:  none  Medications REMOVED:   none     The list below reflects the updated PTA list. Comments regarding how patient may be taking medications differently can be found in the Admit Orders Activity  Prior to Admission Medications   Prescriptions Last Dose Informant   amiodarone (Pacerone) 200 mg tablet 5/2/2024 self   Sig: Take 1 tablet (200 mg) by mouth 2 times a day.   apixaban (Eliquis) 5 mg tablet 5/2/2024 self   Sig: Take 1 tablet (5 mg) by mouth 2 times a day.   atorvastatin (Lipitor) 80 mg tablet 5/2/2024 Self   Sig: Take 1 tablet (80 mg) by mouth once daily at bedtime.   cholecalciferol (Vitamin D-3) 50 MCG (2000 UT) tablet 5/2/2024 Self   Sig: Take 1 tablet (2,000 Units) by mouth once daily.   clopidogrel (Plavix) 75 mg tablet 5/2/2024 Self   Sig: Take 1 tablet (75 mg) by mouth once daily. Do not start before February 15, 2024.   dilTIAZem (Cardizem) 120 mg immediate release tablet Unknown Self   Sig: Take 1 tablet (120 mg) by mouth 2 times a day.   isosorbide mononitrate ER (Imdur) 30 mg 24 hr tablet 5/2/2024 self   Sig: Take 1 tablet (30 mg) by mouth once daily. Do not crush or chew.   levothyroxine (Synthroid, Levoxyl) 100 mcg tablet 5/2/2024 self   Sig: Take 1 tablet (100 mcg) by mouth once daily. 1 tablet in the morning on an empty stomach Orally Once a day for 30 day(s)   metoprolol tartrate (Lopressor) 50 mg tablet 5/2/2024 self   Sig: Take 1 tablet by mouth 2 times a day.   multivitamin (MULTIPLE VITAMINS ORAL) 5/2/2024 Self   Sig: Take 1 capsule by mouth once daily.   mv-min/FA/vit K/lycop/lut/zeax (OCUVITE EYE PLUS MULTI ORAL) 5/2/2024 Self   Sig: Take by mouth.   omeprazole (PriLOSEC) 20 mg DR capsule  5/2/2024 self   Sig: Take 1 capsule (20 mg) by mouth once daily.   oxygen (O2) gas therapy 5/2/2024 self   Sig: Inhale 1 each every 12 hours.   Patient taking differently: Inhale 3 L/min if needed (sob, or low oxygen levels).   potassium chloride CR (Klor-Con) 10 mEq ER tablet 5/2/2024 self   Sig: Take 1 tablet (10 mEq) by mouth 2 times a day. Do not crush, chew, or split.   sertraline (Zoloft) 100 mg tablet 5/2/2024 self   Sig: Take 2 tablets (200 mg) by mouth once daily.   torsemide (Demadex) 20 mg tablet 5/2/2024 self   Sig: Take 1 tablet (20 mg) by mouth once daily.      Facility-Administered Medications: None        The list below reflects the updated allergy list. Please review each documented allergy for additional clarification and justification.  Allergies  Reviewed by Soraya Costello on 5/3/2024        Severity Reactions Comments    Iodinated Contrast Media High Anaphylaxis             Pharmacy has been updated to [facility].    Sources used to complete the med history include PTA medication list, dispense history, patient interview. Patient is a poor historian.    Below are additional concerns with the patient's PTA list.  Someone previous to me marked last use of medications. I was unable to with certainty confirm use of the medications. Some were recognized and others were not. I did not change the list.    Soraya Costello  Please reach out via [employer] Secure Chat for questions

## 2024-05-03 NOTE — ED NOTES
PATIENT ATTEMPTED TO GIVE NEW URINE SAMPLE AND MISSED THE HAT, WILL ATTEMPT AGAIN SOON     Odalys Benavidez, MARÍA  05/03/24 0134

## 2024-05-03 NOTE — ED TRIAGE NOTES
To ED via [facility] from home, pt was moving her oxygen tubing and fell backwards and hit the back right part of her head on carpet floor. Pt is on Plavix. She denies LOC. States she had a little dizziness when getting up, but states currently no dizziness at all. Denies headache. Pt does have a large right posterior scalp hematoma, states pain at the site. No C, T, L spine tenderness.     Pt denies pain elsewhere, hip/pelvis stable. Lungs clear.    States she wears oxygen only when she is sleeping at night or napping during the day.     Pt is A&Ox4. Neuro intact.

## 2024-05-03 NOTE — PROGRESS NOTES
Occupational Therapy                 Therapy Communication Note    Patient Name: Linette Doyle  MRN: [id number]  Today's Date: 5/3/2024     Discipline: Occupational Therapy    Missed Visit Reason: Other (Comment) (OT screen completed. Pt reports no concerns with self-care or functional mobility and has been up ad juju to the bathroom independently in the ED. Skilled PT already evaluated pt and discharged her at a distant S to [facility] level. Defer OT eval.)

## 2024-05-03 NOTE — H&P
History Of Present Illness  Linette Doyle is a 83 y.o. female presenting with mechanical fall.   Pt has a Hx of Cad, Non-STEMI MI in February with 2 stents to RCA, Diastolic CGF, parox. Afib, CRF. She takes Eliquis. Uses O2 for sleep. Was ambulating at home and tripped over O2 hose. She hit her head, no LOC, Came to the ED to be checked.  Was found to have a new Rt-sided facial droop.  Patient denies any other neurological deficits.  She denies history of stroke in the past.  There was suspicion of possible Bell's palsy so she received 60 mg of prednisone in the emergency room.  CAT scan of the brain did not demonstrate any acute findings.  During exam, patient denies visual disturbances, she denies any problems Peysha speech.  She denies muscle weakness or sensory deficits.  Past Medical History  She has a past medical history of Angina pectoris (CMS-HCC) (10/22/2023), Atherosclerosis of coronary artery (08/21/2019), Atherosclerotic heart disease of native coronary artery with other forms of angina pectoris (CMS-HCC) (10/22/2023), Hypercholesterolemia (12/28/2018), Hyperlipidemia (10/22/2023), Presence of cardiac pacemaker (10/22/2023), Primary hypertension (12/28/2018), Shortness of breath (11/26/2019), Sick sinus syndrome (Multi) (10/22/2023), Sinus bradycardia (10/22/2023), and Stage 3a chronic kidney disease (Multi) (11/25/2019).  Non-STEMI in February 2024 with 2 stents to RCA  Paroxysmal atrial fibrillation  Chronic respiratory failure, uses oxygen at home for sleep  Moderate mitral regurgitation  Hypothyroidism  Surgical History  She has a past surgical history that includes Cardiac catheterization (N/A, 02/02/2024); Cardiac catheterization (N/A, 02/02/2024); Cardiac catheterization (N/A, 02/08/2024); Cardiac catheterization (N/A, 02/08/2024); and pacemaker placement (07/04/2019).     Social History  She reports that she has never smoked. She has never been exposed to tobacco smoke. She has never used  smokeless tobacco. She reports that she does not drink alcohol and does not use drugs.    Family History  Family History   Problem Relation Name Age of Onset    No Known Problems Mother      No Known Problems Father          Allergies  Iodinated contrast media    Review of Systems   Constitutional:  Positive for unexpected weight change. Negative for activity change, appetite change, chills, diaphoresis, fatigue and fever.   HENT: Negative.     Eyes: Negative.    Respiratory: Negative.     Cardiovascular: Negative.    Gastrointestinal: Negative.    Endocrine: Negative.    Genitourinary: Negative.    Musculoskeletal: Negative.    Allergic/Immunologic: Negative.    Neurological: Negative.    Hematological: Negative.    Psychiatric/Behavioral: Negative.     All other systems reviewed and are negative.    Patient tells me that she lost about 17 pounds within last few months.  She admits to feeling depressed but denies suicidal or homicidal ideation.  Physical Exam  Location: Emergency department, room 5.Pi is in no apparent distress.   Cooperative with exam.  Nontoxic-appearing.  Comfortable at rest on room air.  Skin is clean, dry.  No skin lesions, rashes, ecchymosis.  Head is normocephalic.  Small bump on the right side of the scalp.  No no bleeding.  Face: Mild right-sided facial droop.  Mouth mucosa is pink and moist.  No mucosal lesions.  No nasal discharge.  Musculoskeletal: No deformities, no muscle swelling.  No point tenderness to palpation.  Neck is supple, no JVD, no carotid bruits.  No lymphadenopathy.  Chest is atraumatic.  No tenderness to palpation.  Lungs are clear to auscultation bilaterally.  No wheezes, no rales, no rhonchi.  Heart: Regular rate and rhythm S1-S2.  Systolic murmur peripheral pulses are palpable in all extremities, equal.  Abdomen is soft, not tender, not distended.  Bowel sounds positive in all quadrants.  No rebound, no guarding.  Rectal exam deferred.  Extremities: No peripheral  edema, cords, cyanosis, varices.  Neuro: Patient is alert, oriented x3.  Moves all extremities.  No gross focal neurological deficits.  Face is symmetrical.  Tongue is midline.  No visual abnormalities.  No dysarthria or aphasia.  Psychiatric: Patient is cooperative with exam, maintains good eye contact.  No evidence of psychosis, suicidal ideation or depression.   Last Recorded Vitals  /68   Pulse 58   Temp 36.3 °C (97.3 °F) (Oral)   Resp 14   Wt 60 kg (132 lb 4.4 oz)   SpO2 96%     Relevant Results        Results for orders placed or performed during the hospital encounter of 05/02/24 (from the past 24 hour(s))   CBC and Auto Differential   Result Value Ref Range    WBC 6.2 4.4 - 11.3 x10*3/uL    nRBC 0.0 0.0 - 0.0 /100 WBCs    RBC 3.61 (L) 4.00 - 5.20 x10*6/uL    Hemoglobin 10.1 (L) 12.0 - 16.0 g/dL    Hematocrit 31.8 (L) 36.0 - 46.0 %    MCV 88 80 - 100 fL    MCH 28.0 26.0 - 34.0 pg    MCHC 31.8 (L) 32.0 - 36.0 g/dL    RDW 14.9 (H) 11.5 - 14.5 %    Platelets 114 (L) 150 - 450 x10*3/uL    Neutrophils % 68.7 40.0 - 80.0 %    Immature Granulocytes %, Automated 0.2 0.0 - 0.9 %    Lymphocytes % 15.3 13.0 - 44.0 %    Monocytes % 10.0 2.0 - 10.0 %    Eosinophils % 5.2 0.0 - 6.0 %    Basophils % 0.6 0.0 - 2.0 %    Neutrophils Absolute 4.25 1.60 - 5.50 x10*3/uL    Immature Granulocytes Absolute, Automated 0.01 0.00 - 0.50 x10*3/uL    Lymphocytes Absolute 0.95 0.80 - 3.00 x10*3/uL    Monocytes Absolute 0.62 0.05 - 0.80 x10*3/uL    Eosinophils Absolute 0.32 0.00 - 0.40 x10*3/uL    Basophils Absolute 0.04 0.00 - 0.10 x10*3/uL   Comprehensive metabolic panel   Result Value Ref Range    Glucose 111 (H) 65 - 99 mg/dL    Sodium 145 133 - 145 mmol/L    Potassium 3.1 (L) 3.4 - 5.1 mmol/L    Chloride 109 (H) 97 - 107 mmol/L    Bicarbonate 23 (L) 24 - 31 mmol/L    Urea Nitrogen 25 8 - 25 mg/dL    Creatinine 1.40 0.40 - 1.60 mg/dL    eGFR 37 (L) >60 mL/min/1.73m*2    Calcium 7.3 (L) 8.5 - 10.4 mg/dL    Albumin 3.5 3.5 -  5.0 g/dL    Alkaline Phosphatase 66 35 - 125 U/L    Total Protein 5.3 (L) 5.9 - 7.9 g/dL    AST 19 5 - 40 U/L    Bilirubin, Total 0.2 0.1 - 1.2 mg/dL    ALT 14 5 - 40 U/L    Anion Gap 13 <=19 mmol/L   Sars-CoV-2 PCR   Result Value Ref Range    Coronavirus 2019, PCR Not Detected Not Detected   Influenza A, and B PCR   Result Value Ref Range    Flu A Result Not Detected Not Detected    Flu B Result Not Detected Not Detected   RSV PCR   Result Value Ref Range    RSV PCR Not Detected Not Detected   Serial Troponin, Initial ([facility])   Result Value Ref Range    Troponin T, High Sensitivity 29 (HH) <=14 ng/L   Serial Troponin, 2 Hour ([facility])   Result Value Ref Range    Troponin T, High Sensitivity 29 (HH) <=14 ng/L    CT head wo IV contrast    Result Date: 5/2/2024  Interpreted By:  David Hoang, STUDY: CT CERVICAL SPINE WO IV CONTRAST; CT HEAD WO IV CONTRAST; ;  5/2/2024 10:35 pm   INDICATION: Signs/Symptoms:fall neck trauma; Signs/Symptoms:fall, head injury on plavix.   COMPARISON: Noncontrast CT exams of head and cervical spine of 10/09/2020.   ACCESSION NUMBER(S): [id number]; [id number]   ORDERING CLINICIAN: VIKTORIA AVILES   TECHNIQUE: Noncontrast CT exams of the head and cervical spine with multiplanar reformations.   FINDINGS: BRAIN PARENCHYMA: Moderate to advanced volume loss.  There is periventricular and subcortical white matter hypoattenuation, most in keeping with chronic microvascular ischemic change.  Gray-white matter interfaces are preserved. No mass, mass effect or midline shift.   HEMORRHAGE: No acute intracranial hemorrhage. VENTRICLES and EXTRA-AXIAL SPACES: Normal size. EXTRACRANIAL SOFT TISSUES: Moderate to large right parieto-occipital scalp hematoma. PARANASAL SINUSES/MASTOIDS: The visualized paranasal sinuses and mastoid air cells are aerated. CALVARIUM: No depressed skull fracture. No destructive osseous lesion.   OTHER FINDINGS: None.   CERVICAL SPINE:   Straightening of normal cervical lordosis  could reflect positioning or muscle spasm. ALIGNMENT: Normal. VERTEBRAE: No acute fracture. Redemonstrated small ossific fragment along the anterior aspect of the inferior C6 endplate. Vertebral stature is maintained. Moderate to severe multilevel hypertrophic facet osteoarthropathy. SPINAL CANAL: No critical spinal canal stenosis. PREVERTEBRAL SOFT TISSUES: No prevertebral soft tissue swelling. LUNG APICES: Interstitial prominence in the visible lungs probably relates to chronic changes with or without acute pulmonary interstitial edema/CHF. Please correlate clinically with volume status.   OTHER FINDINGS: None.       No evidence of acute intracranial abnormality.   No acute cervical spine fracture or malalignment.     MACRO: None   Signed by: David Hoang 5/2/2024 10:45 PM Dictation workstation:   [id number]    CT cervical spine wo IV contrast    Result Date: 5/2/2024  Interpreted By:  David Hoang, STUDY: CT CERVICAL SPINE WO IV CONTRAST; CT HEAD WO IV CONTRAST; ;  5/2/2024 10:35 pm   INDICATION: Signs/Symptoms:fall neck trauma; Signs/Symptoms:fall, head injury on plavix.   COMPARISON: Noncontrast CT exams of head and cervical spine of 10/09/2020.   ACCESSION NUMBER(S): [id number]; [id number]   ORDERING CLINICIAN: VIKTORIA AVILES   TECHNIQUE: Noncontrast CT exams of the head and cervical spine with multiplanar reformations.   FINDINGS: BRAIN PARENCHYMA: Moderate to advanced volume loss.  There is periventricular and subcortical white matter hypoattenuation, most in keeping with chronic microvascular ischemic change.  Gray-white matter interfaces are preserved. No mass, mass effect or midline shift.   HEMORRHAGE: No acute intracranial hemorrhage. VENTRICLES and EXTRA-AXIAL SPACES: Normal size. EXTRACRANIAL SOFT TISSUES: Moderate to large right parieto-occipital scalp hematoma. PARANASAL SINUSES/MASTOIDS: The visualized paranasal sinuses and mastoid air cells are aerated. CALVARIUM: No depressed skull fracture.  No destructive osseous lesion.   OTHER FINDINGS: None.   CERVICAL SPINE:   Straightening of normal cervical lordosis could reflect positioning or muscle spasm. ALIGNMENT: Normal. VERTEBRAE: No acute fracture. Redemonstrated small ossific fragment along the anterior aspect of the inferior C6 endplate. Vertebral stature is maintained. Moderate to severe multilevel hypertrophic facet osteoarthropathy. SPINAL CANAL: No critical spinal canal stenosis. PREVERTEBRAL SOFT TISSUES: No prevertebral soft tissue swelling. LUNG APICES: Interstitial prominence in the visible lungs probably relates to chronic changes with or without acute pulmonary interstitial edema/CHF. Please correlate clinically with volume status.   OTHER FINDINGS: None.       No evidence of acute intracranial abnormality.   No acute cervical spine fracture or malalignment.     MACRO: None   Signed by: David Hoang 5/2/2024 10:45 PM Dictation workstation:   [id number]    Electrocardiogram, 12-lead PRN ACS symptoms    Result Date: 4/13/2024  Sinus tachycardia with 1st degree AV block Nonspecific ST and T wave abnormality Abnormal ECG When compared with ECG of 05-APR-2024 10:47, (unconfirmed) Junctional rhythm has replaced Sinus rhythm ST now depressed in Inferior leads Confirmed by Moy Sanchez ([id number]) on 4/13/2024 5:02:47 PM    Transesophageal Echo ([facility])    Result Date: 4/10/2024           [place] [address]            Phone [phone] TRANSESOPHAGEAL ECHOCARDIOGRAM REPORT  Patient Name:     SAMUEL Buenrostro Physician:   [id number] Vela MD Study Date:       4/10/2024            Ordering Provider:   [id number] VELA MRN/PID:          [id number]             Fellow: Accession#:       [id number]         Nurse: Date of           1940 / 83      Sonographer:         Jordy Perales RDCS Birth/Age:        years Gender:           F                     Additional Staff: Height:           162.00 cm            Admit Date: Weight:           67.00 kg             Admission Status:    Inpatient - Routine BSA / BMI:        1.72 m2 / 25.53      Department Location: [facility]                   kg/m2 Blood Pressure: 137 /77 mmHg Study Type:    TRANSESOPHAGEAL ECHO ([facility]) Diagnosis/ICD: Cardiac murmur, unspecified-R01.1 Indication:    Murmur / MR CPT Codes:     [facility] Complete-32239 Patient History: Pertinent         CAD, Chest Pain, CHF and Murmur. Abn Ekg, Pacemaker, Murmur, History:          PCI. Study Detail: The following Echo studies were performed: 2D, Doppler and color               flow.  PHYSICIAN INTERPRETATION: [facility] Details: The AUGUSTIN probe used was [id number]. Technically adequate omniplane transesophageal echocardiogram performed. Color flow Doppler echo was performed to assess for the presence of a patent foramen ovale. [facility] Medication: The patient was sedated by Anesthesia; please refer to anesthesia flow sheet for medications used. [facility] Procedure: The probe was passed without difficulty. The following complication was encountered during the procedure: Patient tolerated the procedure well without any apparent complications. Left Ventricle: Left ventricular systolic function is normal, with an estimated ejection fraction of 55-60%. There are no regional wall motion abnormalities. The left ventricular cavity size is normal. Spectral Doppler shows a normal pattern of left ventricular diastolic filling. Left Atrium: The left atrium is mildly dilated. There is a normal sized left atrial appendage. Right Ventricle: The right ventricle is normal in size. There is normal right ventricular global systolic function. Right Atrium: The right atrium is mildly dilated. Aortic Valve: The aortic valve appears structurally normal. There is no evidence of aortic valve regurgitation. Mitral Valve: The mitral valve is normal in structure. There is moderate mitral  valve regurgitation. There is moderate mitral valve regurgitation at the most central jet the mitral valve structure within normal limit. Tricuspid Valve: The tricuspid valve is structurally normal. There is mild tricuspid regurgitation. Pulmonic Valve: The pulmonic valve is structurally normal. There is no indication of pulmonic valve regurgitation. Pericardium: There is no pericardial effusion noted. Aorta: The aortic root is normal. Systemic Veins: The inferior vena cava appears to be of normal size. There is IVC inspiratory collapse greater than 50%.  CONCLUSIONS:  1. Left ventricular systolic function is normal with a 55-60% estimated ejection fraction.  2. There is moderate mitral valve regurgitation at the most central jet the mitral valve structure within normal limit.  3. Moderate mitral valve regurgitation.  4. Mild tricuspid regurgitation is visualized. QUANTITATIVE DATA SUMMARY: MITRAL INSUFFICIENCY:                           Normal Ranges: PISA Radius:  0.6 cm MR VTI:       170.00 cm MR Vmax:      567.00 cm/s MR Alias Yasir: 30.8 cm/s MR Volume:    20.89 ml MR Flow Rt:   69.67 ml/s MR EROA:      0.12 cm2  [id number] Becky Vela MD Electronically signed on 4/10/2024 at 1:33:21 PM  ** Final **     XR chest 1 view    Result Date: 4/8/2024  Interpreted By:  Tamia Glass, STUDY: XR CHEST 1 VIEW 4/8/2024 9:43 am   INDICATION: Signs/Symptoms:hypoxemia   COMPARISON: 04/05/2024   ACCESSION NUMBER(S): [id number]   ORDERING CLINICIAN: MELANIE HARDWICK   TECHNIQUE: AP erect view of the chest   FINDINGS: The heart is enlarged with bipolar pacemaker leads in right atrium and right ventricle.   There are bilateral pleural effusions, right larger than left. The right pleural effusion has increased in size and is now considered moderate to moderately large. Left effusion appears unchanged.   There is mild pulmonary vascular congestion. There is probably some atelectasis within each lower lobe as well.       Cardiomegaly and  mild pulmonary vascular congestion with bilateral pleural effusions, right larger than left. The right effusion has increased in size.   Probable compressive atelectasis in each lower lung zone.   Signed by: Tamia Glass 4/8/2024 10:29 AM Dictation workstation:   [id number]    Transthoracic Echo (TTE) Limited    Result Date: 4/8/2024           [place] [address]            Phone [phone] TRANSTHORACIC ECHOCARDIOGRAM REPORT  Patient Name:     SAMUEL Buenrostro Physician:   [id number] Vela MD Study Date:       4/8/2024             Ordering Provider:   [id number] SHAGGY VELA MRN/PID:          [id number]             Fellow: Accession#:       [id number]         Nurse: Date of           1940 / 83      Sonographer:         Yulisa Kennedy RDCS Birth/Age:        years Gender:           F                    Additional Staff: Height:           162.56 cm            Admit Date: Weight:           72.58 kg             Admission Status:    Inpatient - Routine BSA / BMI:        1.78 m2 / 27.46      Department Location: [facility]                   kg/m2 Blood Pressure: 134 /70 mmHg Study Type:    TRANSTHORACIC ECHO (TTE) LIMITED Diagnosis/ICD: Acute diastolic (congestive) heart failure (CHF)-I50.31;                Nonrheumatic mitral (valve) insufficiency-I34.0 Indication:    MR, Heart failure CPT Codes:     Echo Limited-40105; Doppler Limited-04382; Color Doppler-10463 Patient History: Valve Disorders:   Mitral Regurgitation. Pertinent History: A-Fib, Bradycardia, Heart failure,Sick sinus syndrome and                    HTN. Study Detail: The following Echo studies were performed: 2D, M-Mode, Doppler and               color flow. Patient has a pacemaker.  PHYSICIAN INTERPRETATION: Left Ventricle: Left ventricular systolic function is normal, with an estimated ejection fraction of 55-60%. There  are no regional wall motion abnormalities. The left ventricular cavity size is normal. There is mild left ventricular hypertrophy. Spectral Doppler shows a normal pattern of left ventricular diastolic filling. Left Atrium: The left atrium is normal in size. Right Ventricle: The right ventricle is moderately enlarged. There is reduced right ventricular systolic function. A device is visualized in the right ventricle. Right Atrium: The right atrium is normal in size. There is a device visualized in the right atrium. Aortic Valve: The aortic valve is trileaflet. There is no evidence of aortic valve stenosis. There is no evidence of aortic valve regurgitation. The peak instantaneous gradient of the aortic valve is 2.5 mmHg. Mitral Valve: The mitral valve is normal in structure. There is evidence of mild mitral valve stenosis. The doppler estimated mean and peak diastolic pressure gradients are 5.0 mmHg and 15.7 mmHg respectively. There is moderate to severe mitral valve regurgitation which is centrally directed. Tricuspid Valve: The tricuspid valve is structurally normal. There is moderate tricuspid regurgitation. The Doppler estimated RVSP is moderately elevated at 53.7 mmHg. Pulmonic Valve: The pulmonic valve is structurally normal. There is mild pulmonic valve regurgitation. Pericardium: There is a trivial pericardial effusion. Aorta: The aortic root is normal. Systemic Veins: The inferior vena cava appears to be of normal size.  CONCLUSIONS:  1. Left ventricular systolic function is normal with a 55-60% estimated ejection fraction.  2. Moderately enlarged right ventricle.  3. There is reduced right ventricular systolic function.  4. Moderate to severe mitral valve regurgitation.  5. Moderate tricuspid regurgitation.  6. Moderately elevated right ventricular systolic pressure.  7. Aortic valve stenosis is not present.  8. There is reduction in the mitral valve leaflet motion with mild mitral stenosis. Mean gradient  of 5 mmHg.  9. There is moderate to possible severe mitral regurgitation with central jet. We will consider MitraClip down the road. QUANTITATIVE DATA SUMMARY: MITRAL VALVE:                       Normal Ranges: MV Vmax:    1.98 m/s  (<=1.3m/s) MV peak PG: 15.7 mmHg (<5mmHg) MV mean P.0 mmHg  (<48mmHg) AORTIC VALVE:                        Normal Ranges: AoV Vmax:     0.78 m/s (<=1.7m/s) AoV Peak P.5 mmHg (<20mmHg) LVOT Max Yasir: 0.56 m/s (<=1.1m/s) TRICUSPID VALVE/RVSP:                             Normal Ranges: Peak TR Velocity: 3.56 m/s RV Syst Pressure: 53.7 mmHg (< 30mmHg) IVC Diam:         1.40 cm  [id number] Becky Vela MD Electronically signed on 2024 at 8:54:24 AM  ** Final **     ECG 12 lead    Result Date: 2024  Normal sinus rhythm Nonspecific T wave abnormality Abnormal ECG When compared with ECG of 2024 15:13, (unconfirmed) Sinus rhythm has replaced probable SVT Heart rate has decreased by 72 bpm Confirmed by Dean Hanks ([id number]) on 2024 8:44:24 AM    ECG 12 lead    Result Date: 2024  Supraventricular tachycardia Nonspecific ST and T wave abnormality Abnormal ECG Confirmed by Dean Hanks ([id number]) on 2024 1:21:45 PM    XR chest 2 views    Result Date: 2024  Interpreted By:  Mc Carranza, STUDY: XR CHEST 2 VIEWS; 2024 11:25 am   INDICATION: Signs/Symptoms:sob   COMPARISON: 2024   ACCESSION NUMBER(S): [id number]   ORDERING CLINICIAN: BREE SOTO   TECHNIQUE: PA and LAT views of the chest were obtained.   FINDINGS: The cardiomediastinal silhouette is mildly enlarged, grossly stable. There are bilateral lower lobe airspace opacities most suggestive of small pleural effusions and adjacent atelectasis. There is mild prominence of the interstitium and mild ground-glass opacity suggesting mild pulmonary edema.   The osseous structures are intact.       Small bilateral opacities at the lung bases, most suggestive of small pleural effusions and adjacent atelectasis.  There is mild prominence of the interstitium and mild ground-glass opacity suggesting mild pulmonary edema.     Signed by: Mc Carranza 4/5/2024 12:09 PM Dictation workstation:   [id number]    Electrocardiogram, 12-lead PRN ACS symptoms    Result Date: 4/5/2024  Sinus tachycardia with frequent Premature ventricular complexes and Fusion complexes Marked ST abnormality, possible inferior subendocardial injury Abnormal ECG When compared with ECG of 07-FEB-2024 17:36, Sinus rhythm has replaced Electronic atrial pacemaker Vent. rate has increased BY  82 BPM ST now depressed in Inferior leads ST no longer depressed in Anterior leads Nonspecific T wave abnormality now evident in Inferior leads       Assessment/Plan   Principal Problem:    CVA (cerebrovascular accident due to intracerebral hemorrhage) (Multi)      Patient presents with new right-sided facial droop.  Patient does not remember having right-sided facial droop.  CAT scan of the brain is negative for acute findings.  She has a history of paroxysmal atrial fibrillation and anticoagulated with Eliquis.  Will consult neurologist, perform carotid ultrasound.  Ideally, would like to perform MRI of the brain that patient has a pacemaker.  Will consult patient's cardiologist, Dr. Ibrahim to see if he pacemaker is compatible with MRI machine.  Consult physical Occupational Therapy, speech therapy.  Keep n.p.o. until swallow evaluation.  Coronary artery disease.  History of non-STEMI in February.  Stable no anginal symptoms  Chronic renal failure.  Stable creatinine 1.4  Hypokalemia.  Increase potassium replacement.  Check magnesium level  Hypothyroidism.  Check TSH, continue Synthroid  Mechanical fall.  PT OT consult.       Samantha Alcantara MD

## 2024-05-03 NOTE — PROGRESS NOTES
Speech-Language Pathology    Speech-Language Pathology Clinical Swallow Evaluation    Patient Name: Linette Doyle  MRN: [id number]  : 1940  Today's Date: 24  Start time: Start Time: 915  Stop time: Stop Time: 935  Time calculation (min) : Time Calculation (min): 20 min      ASSESSMENT  Impressions: oral phase WFL, and no s/s aspiration or  suspected pharyngeal phase dysphagia based on clinical swallow evaluation.  Prognosis: Good    PLAN  Recommendations:  MBSS recommended: No; no pharyngeal dysphagia suspected.  Solid consistency: Regular (IDDSI level 7)  Liquid consistency: Thin (IDDSI 0)  Medication administration: Whole, in thin liquid  Compensatory swallow strategies: Upright positioning for all PO intake    Recommended frequency/duration:  Skilled SLP services recommended: No  Frequency: N/A  Duration: N/A  Discharge recommendation: Home with no further SLP     Strengths: Cognition  Barriers to participation in tx: N/A      SUBJECTIVE    PMHx relevant to rehab: none    Chief complaint: Pt was admitted on 5/3/24 due to pt fell backwards sustaining a large hematoma on the back of her head.  She notes that over the last couple of days she has had a facial droop, r/o bells palsy vs CVA, pt has no other neurological deficits  Relevant imaging results: 24: Head CT is negative for fractures or acute intracranial hemorrhage. Patient has a focal neurological deficit of a right-sided facial droop that is concerning for a central etiology. As this may be a Bell's palsy, she is started on prednisone at 60 mg, but will require admission to the hospital for further rule out of stroke.    General Visit Information:     Patient Class: Inpatient  Living Environment: Home     Reason for Referral: assess swallow per CVA protocol,  facial droop  Prior to Session Communication: Bedside nurse    RN cleared pt to participate in session   Pt reported no h/o dysphagia, no difficulty with speech or swallow    Date of  Onset: 05/02/24  Date of Order: 05/02/24  Base[employer] Diet: regular adn thin liquids  Current Diet : regular and thin liquids    Pain Assessment  Pain Assessment: 0-10  Pain Score: 0 - No pain    Pt was alert, pleasant, and cooperative for session.  Orientation: Ox4  Ability to follow functional commands: WFL  Nutritional status: Appears well-nourished/no concerns    Respiratory status: Room air  Baseline Vocal Quality: Normal  Volitional Cough: Strong  Volitional Swallow: Within Functional Limits  Patient positioning: Upright in bed      OBJECTIVE  Clinical swallow evaluation completed and consisted of interview, oral motor assessment, and PO trials (4 oz thin liquids via cup and 1 anup cacker).  ORAL PHASE: Natural dentition in good condition. Oral mucosa were pink, moist, and free of obvious lesions. Lingual strength and ROM were WFL. Labial strength/ROM were mildly impaired, right facial assymtry, Labial seal was adequate, facial droop did not affect speech or swallow and very mild. Mastication of regular solids was WFL A/P transit and oral clearance were adequate.  PHARYNGEAL PHASE: Laryngeal elevation was visualized or palpated with all trials, however adequacy of hyolaryngeal elevation/excursion cannot be determined at bedside. No immediate or delayed s/sx aspiration/penetration were observed with any consistencies.    Was 3oz challenge administered: Yes; pt drank 3oz of thin liquid in one attempt, without breaking, with no overt s/sx aspiration/penetration observed.      Treatment/Education:  Results and recommendations were relayed to: Patient and Bedside nurse  Education provided: Yes   Learner: Patient   Barriers to learning: None   Method of teaching: Verbal   Topic: role of ST and results of assessment   Outcome of teaching: Pt verbalized understanding  Treatment provided: No

## 2024-05-04 ENCOUNTER — HOME CARE VISIT (OUTPATIENT)
Dept: HOME HEALTH SERVICES | Facility: HOME HEALTH | Age: 84
End: 2024-05-04
Payer: MEDICARE

## 2024-05-04 PROBLEM — S09.90XA INJURY OF HEAD, INITIAL ENCOUNTER: Status: ACTIVE | Noted: 2024-05-04

## 2024-05-04 LAB
ANION GAP SERPL CALC-SCNC: 15 MMOL/L
BUN SERPL-MCNC: 34 MG/DL (ref 8–25)
CALCIUM SERPL-MCNC: 9.5 MG/DL (ref 8.5–10.4)
CHLORIDE SERPL-SCNC: 102 MMOL/L (ref 97–107)
CO2 SERPL-SCNC: 25 MMOL/L (ref 24–31)
CREAT SERPL-MCNC: 1.7 MG/DL (ref 0.4–1.6)
EGFRCR SERPLBLD CKD-EPI 2021: 29 ML/MIN/1.73M*2
ERYTHROCYTE [DISTWIDTH] IN BLOOD BY AUTOMATED COUNT: 15 % (ref 11.5–14.5)
GLUCOSE SERPL-MCNC: 107 MG/DL (ref 65–99)
HCT VFR BLD AUTO: 33.5 % (ref 36–46)
HGB BLD-MCNC: 10.3 G/DL (ref 12–16)
MCH RBC QN AUTO: 27.3 PG (ref 26–34)
MCHC RBC AUTO-ENTMCNC: 30.7 G/DL (ref 32–36)
MCV RBC AUTO: 89 FL (ref 80–100)
NRBC BLD-RTO: 0 /100 WBCS (ref 0–0)
PLATELET # BLD AUTO: 114 X10*3/UL (ref 150–450)
POTASSIUM SERPL-SCNC: 4.1 MMOL/L (ref 3.4–5.1)
RBC # BLD AUTO: 3.77 X10*6/UL (ref 4–5.2)
SODIUM SERPL-SCNC: 142 MMOL/L (ref 133–145)
WBC # BLD AUTO: 8 X10*3/UL (ref 4.4–11.3)

## 2024-05-04 PROCEDURE — 85027 COMPLETE CBC AUTOMATED: CPT | Performed by: INTERNAL MEDICINE

## 2024-05-04 PROCEDURE — 2500000004 HC RX 250 GENERAL PHARMACY W/ HCPCS (ALT 636 FOR OP/ED): Performed by: NURSE PRACTITIONER

## 2024-05-04 PROCEDURE — 82374 ASSAY BLOOD CARBON DIOXIDE: CPT | Performed by: INTERNAL MEDICINE

## 2024-05-04 PROCEDURE — 2500000001 HC RX 250 WO HCPCS SELF ADMINISTERED DRUGS (ALT 637 FOR MEDICARE OP): Performed by: INTERNAL MEDICINE

## 2024-05-04 PROCEDURE — 2060000001 HC INTERMEDIATE ICU ROOM DAILY

## 2024-05-04 PROCEDURE — 2500000004 HC RX 250 GENERAL PHARMACY W/ HCPCS (ALT 636 FOR OP/ED): Performed by: EMERGENCY MEDICINE

## 2024-05-04 PROCEDURE — 1090000001 HH PPS REVENUE CREDIT

## 2024-05-04 PROCEDURE — 2500000006 HC RX 250 W HCPCS SELF ADMINISTERED DRUGS (ALT 637 FOR ALL PAYERS): Performed by: INTERNAL MEDICINE

## 2024-05-04 PROCEDURE — 1090000002 HH PPS REVENUE DEBIT

## 2024-05-04 PROCEDURE — 36415 COLL VENOUS BLD VENIPUNCTURE: CPT | Performed by: INTERNAL MEDICINE

## 2024-05-04 RX ORDER — CEFTRIAXONE 1 G/50ML
1 INJECTION, SOLUTION INTRAVENOUS EVERY 24 HOURS
Status: DISCONTINUED | OUTPATIENT
Start: 2024-05-04 | End: 2024-05-05

## 2024-05-04 RX ORDER — SODIUM CHLORIDE 9 MG/ML
75 INJECTION, SOLUTION INTRAVENOUS CONTINUOUS
Status: DISCONTINUED | OUTPATIENT
Start: 2024-05-04 | End: 2024-05-04

## 2024-05-04 RX ADMIN — CEFTRIAXONE SODIUM 1 G: 1 INJECTION, SOLUTION INTRAVENOUS at 10:23

## 2024-05-04 RX ADMIN — Medication 400 MG: at 09:13

## 2024-05-04 RX ADMIN — AMIODARONE HYDROCHLORIDE 200 MG: 200 TABLET ORAL at 21:43

## 2024-05-04 RX ADMIN — METOPROLOL TARTRATE 50 MG: 50 TABLET, FILM COATED ORAL at 09:13

## 2024-05-04 RX ADMIN — LEVOTHYROXINE SODIUM 100 MCG: 0.1 TABLET ORAL at 05:24

## 2024-05-04 RX ADMIN — Medication 5 MG: at 21:43

## 2024-05-04 RX ADMIN — POTASSIUM CHLORIDE 20 MEQ: 1500 TABLET, EXTENDED RELEASE ORAL at 21:43

## 2024-05-04 RX ADMIN — POTASSIUM CHLORIDE 20 MEQ: 1500 TABLET, EXTENDED RELEASE ORAL at 09:13

## 2024-05-04 RX ADMIN — SERTRALINE 200 MG: 100 TABLET, FILM COATED ORAL at 09:13

## 2024-05-04 RX ADMIN — CLOPIDOGREL BISULFATE 75 MG: 75 TABLET ORAL at 09:13

## 2024-05-04 RX ADMIN — METOPROLOL TARTRATE 50 MG: 50 TABLET, FILM COATED ORAL at 21:43

## 2024-05-04 RX ADMIN — AMIODARONE HYDROCHLORIDE 200 MG: 200 TABLET ORAL at 09:13

## 2024-05-04 RX ADMIN — APIXABAN 5 MG: 5 TABLET, FILM COATED ORAL at 21:43

## 2024-05-04 RX ADMIN — ATORVASTATIN CALCIUM 80 MG: 80 TABLET, FILM COATED ORAL at 21:43

## 2024-05-04 RX ADMIN — SODIUM CHLORIDE 75 ML/HR: 900 INJECTION, SOLUTION INTRAVENOUS at 09:22

## 2024-05-04 RX ADMIN — APIXABAN 5 MG: 5 TABLET, FILM COATED ORAL at 09:13

## 2024-05-04 ASSESSMENT — COGNITIVE AND FUNCTIONAL STATUS - GENERAL
MOBILITY SCORE: 24
MOBILITY SCORE: 22
CLIMB 3 TO 5 STEPS WITH RAILING: A LITTLE
DAILY ACTIVITIY SCORE: 24
DAILY ACTIVITIY SCORE: 24
WALKING IN HOSPITAL ROOM: A LITTLE

## 2024-05-04 ASSESSMENT — PAIN - FUNCTIONAL ASSESSMENT
PAIN_FUNCTIONAL_ASSESSMENT: 0-10

## 2024-05-04 ASSESSMENT — PAIN SCALES - GENERAL
PAINLEVEL_OUTOF10: 0 - NO PAIN

## 2024-05-04 NOTE — NURSING NOTE
Patient lying in bed comfortably. On phone. Patient took all meds whole with water. Hopeful to home tomorrow after testing complete. Call light within reach. Bed alarm in use.

## 2024-05-04 NOTE — PROGRESS NOTES
Linette Doyle is a 83 y.o. female on day 0 of admission presenting with CVA (cerebrovascular accident due to intracerebral hemorrhage) (Multi).      Subjective   Patient seen and examined. Awake/alert/oriented. Denies chest pain, shortness of breath, nausea, or vomiting.  No lightheadedness dizziness.  No vision changes.  Denies numbness or paresthesias.       Objective     Last Recorded Vitals  /65 (BP Location: Left arm, Patient Position: Lying)   Pulse 60   Temp 36.5 °C (97.7 °F) (Oral)   Resp 17   Wt 61.6 kg (135 lb 11.2 oz)   SpO2 97%   Intake/Output last 3 Shifts:    Intake/Output Summary (Last 24 hours) at 5/4/2024 1443  Last data filed at 5/4/2024 1428  Gross per 24 hour   Intake 1839.17 ml   Output 0 ml   Net 1839.17 ml       Admission Weight  Weight: 60 kg (132 lb 4.4 oz) (05/02/24 2155)    Daily Weight  05/04/24 : 61.6 kg (135 lb 11.2 oz)    Image Results  Vascular US Carotid Artery Duplex Bilateral  Preliminary Cardiology [place] [address]             Phone [phone]           [facility] Lab Report     [facility]       Patient Name:     LINETTE DOYLE Reading Physician:  [id number] Mason Julian MD  Study Date:       5/3/2024      Ordering Provider:  [id number] GABY WALLACE  MRN/PID:          [id number]      Fellow:  Accession#:       [id number]  Technologist:       Molly Cook RVT  YOB: 1940    Technologist 2:  Gender:           F             Encounter#:         [id number]  Admission Status: Emergency     Location Performed: [facility]       Diagnosis/ICD: Syncope and collapse-R55  Indication:    Syncope  CPT Codes:     75426 Cerebrovascular Carotid Duplex scan complete       PRELIMINARY CONCLUSIONS:     Right Carotid: Findings are consistent with less than 50% stenosis of the right proximal internal carotid artery. Right external carotid artery appears patent  with no evidence of stenosis. The right vertebral artery is patent with antegrade flow. No evidence of hemodynamically significant stenosis in the right subclavian artery.  Left Carotid: Findings are consistent with 50 to 69% stenosis of the left proximal internal carotid artery. Left external carotid artery appears patent with no evidence of stenosis. The left vertebral artery is patent with antegrade flow. No evidence of hemodynamically significant stenosis in the left subclavian artery.     Imaging & Doppler Findings:  Right Plaque Morph: The proximal right internal carotid artery demonstrates heterogenous and calcified plaque. The distal right common carotid artery demonstrates heterogenous and calcified plaque.  Left Plaque Morph: The proximal left internal carotid artery demonstrates heterogenous and calcified plaque. The proximal left external carotid artery demonstrates heterogenous plaque. The distal left common carotid artery demonstrates heterogenous, calcified and irregular plaque.      Right                       Left    PSV     EDV                PSV      EDV  75 cm/s           CCA P    68 cm/s  57 cm/s           CCA D    60 cm/s                     Bulb    170 cm/s 30 cm/s  98 cm/s 17 cm/s   ICA P    166 cm/s 25 cm/s  64 cm/s 12 cm/s   ICA M    79 cm/s  14 cm/s  80 cm/s 18 cm/s   ICA D    59 cm/s  11 cm/s  76 cm/s            ECA     95 cm/s  28 cm/s 6 cm/s  Vertebral  52 cm/s  9 cm/s  83 cm/s         Subclavian 114 cm/s                     Right Left  ICA/CCA Ratio  1.7  2.8          VASCULAR PRELIMINARY REPORT  completed by Molly Cook ILIR on 5/3/2024 at 1:25:51 PM       ** Final **      Physical Exam  Vitals reviewed.   Constitutional:       Appearance: Normal appearance.   HENT:      Head: Normocephalic and atraumatic.   Eyes:      Extraocular Movements: Extraocular movements intact.      Conjunctiva/sclera: Conjunctivae normal.   Cardiovascular:      Rate and Rhythm: Normal rate and regular rhythm.    Pulmonary:      Effort: Pulmonary effort is normal.      Breath sounds: Normal breath sounds. No wheezing, rhonchi or rales.   Abdominal:      General: Bowel sounds are normal.      Palpations: Abdomen is soft.      Tenderness: There is no abdominal tenderness.   Musculoskeletal:         General: Normal range of motion.   Skin:     General: Skin is warm and dry.   Neurological:      Mental Status: She is alert and oriented to person, place, and time.         Relevant Results  Lab Results   Component Value Date    GLUCOSE 107 (H) 05/04/2024    CALCIUM 9.5 05/04/2024     05/04/2024    K 4.1 05/04/2024    CO2 25 05/04/2024     05/04/2024    BUN 34 (H) 05/04/2024    CREATININE 1.70 (H) 05/04/2024     Lab Results   Component Value Date    WBC 8.0 05/04/2024    HGB 10.3 (L) 05/04/2024    HCT 33.5 (L) 05/04/2024    MCV 89 05/04/2024     (L) 05/04/2024     Vascular US Carotid Artery Duplex Bilateral  Result Date: 5/3/2024  CONCLUSIONS:  Right Carotid: Findings are consistent with less than 50% stenosis of the right proximal internal carotid artery. Right external carotid artery appears patent with no evidence of stenosis. The right vertebral artery is patent with antegrade flow. No evidence of hemodynamically significant stenosis in the right subclavian artery. Left Carotid: Findings are consistent with 50 to 69% stenosis of the left proximal internal carotid artery. Left external carotid artery appears patent with no evidence of stenosis. The left vertebral artery is patent with antegrade flow. No evidence of hemodynamically significant stenosis in the left subclavian artery.  Imaging & Doppler Findings: Right Plaque Morph: The proximal right internal carotid artery demonstrates heterogenous and calcified plaque. The distal right common carotid artery demonstrates heterogenous and calcified plaque. Left Plaque Morph: The proximal left internal carotid artery demonstrates heterogenous and calcified  plaque. The proximal left external carotid artery demonstrates heterogenous plaque. The distal left common carotid artery demonstrates heterogenous, calcified and irregular plaque.       CT head wo IV contrast  Result Date: 5/2/2024  No evidence of acute intracranial abnormality.   No acute cervical spine fracture or malalignment.     MACRO: None   Signed by: David Hoang 5/2/2024 10:45 PM Dictation workstation:   [id number]    CT cervical spine wo IV contrast  Result Date: 5/2/2024  No evidence of acute intracranial abnormality.   No acute cervical spine fracture or malalignment.     MACRO: None   Signed by: David Hoang 5/2/2024 10:45 PM Dictation workstation:   [id number]           Assessment/Plan      Principal Problem:    CVA (cerebrovascular accident due to intracerebral hemorrhage) (Multi)    Right facial droop  A head CT showed no acute changes. MRI of the brain not ordered because she has a pacemaker.   Await neurology input  Carotid ultrasound  Pacemaker interrogation ordered  EP/cardiology consulted to evaluate pacemaker compatibility for MRI  ASA/statin  permissive hypertension  lipid panel in AM  US carotid results as above  Echocardiogram 4/24  neurostroke assessment  consult neurology, appreciate recommendations    GARY  Creatinine increased to 1.7  Hold torsemide  IV fluids  Renally dose meds, avoid nephrotoxic medications  BMP in AM    UTI  IV ceftriaxone  UA positive, follow culture    Fall  PT/OT     Coronary artery disease  Stable     Chronic kidney disease  Chronic     Hypothyroidism  On levothyroxine     Presence of cardiac pacemaker  [employer] dual chamber pacemaker placed 7/4/2019 at [facility] because of sick sinus syndrome with pauses.   Pacemaker interrogation ordered     Hypokalemia  Improved, replace as needed    Plan  Admitted to SDU  Stroke work up, neurology following  Pacemaker interrogation ordered, need eval for MRI compatibility, cardiology/EP consulted  IV fluids, antibiotics  DVT  prophylaxis: On eliquis  CBC and BMP in AM                Lori Robles, APRN-CNP

## 2024-05-04 NOTE — PROGRESS NOTES
"Subjective   Patient continues to have no other focal neurologic deficits.  We are waiting on an MRI because of the cardiac clearance for her pacemaker to get an MRI.     Objective   Neurological Exam  Physical Exam    Last Recorded Vitals  Blood pressure 150/65, pulse 60, temperature 36.5 °C (97.7 °F), temperature source Oral, resp. rate 17, height 1.626 m (5' 4\"), weight 61.6 kg (135 lb 11.2 oz), SpO2 97%.      Neurologically, pt is awake and oriented x4. Attention, concentration, memory, cortical processing are intact. No aphasia  Cranial nerves: VFF, EOMI, No nystagmus, Face is symmetric to sensory and motor, no dysarthria, Tongue protrudes midline, Shrug symmetric  Motor: 5/5 strength B throughout, no tremor or asterixis  Sensation is intact bilaterally throughout  Coordination: no ataxia, no dysmetria/dysdiadochokinesia         Scheduled medications  amiodarone, 200 mg, oral, BID  apixaban, 5 mg, oral, BID  atorvastatin, 80 mg, oral, Nightly  cefTRIAXone, 1 g, intravenous, q24h  clopidogrel, 75 mg, oral, Daily  [Held by provider] isosorbide mononitrate ER, 30 mg, oral, Daily  levothyroxine, 100 mcg, oral, Daily before breakfast  magnesium oxide, 400 mg, oral, Daily  metoprolol tartrate, 50 mg, oral, BID  potassium chloride CR, 20 mEq, oral, BID  sertraline, 200 mg, oral, Daily  [Held by provider] torsemide, 20 mg, oral, Daily      Continuous medications  sodium chloride 0.9%, 75 mL/hr, Last Rate: 75 mL/hr (05/04/24 1428)      PRN medications  PRN medications: acetaminophen, alum-mag hydroxide-simeth, melatonin, ondansetron, ondansetron     Results for orders placed or performed during the hospital encounter of 05/02/24 (from the past 96 hour(s))   CBC and Auto Differential   Result Value Ref Range    WBC 6.2 4.4 - 11.3 x10*3/uL    nRBC 0.0 0.0 - 0.0 /100 WBCs    RBC 3.61 (L) 4.00 - 5.20 x10*6/uL    Hemoglobin 10.1 (L) 12.0 - 16.0 g/dL    Hematocrit 31.8 (L) 36.0 - 46.0 %    MCV 88 80 - 100 fL    MCH 28.0 26.0 " - 34.0 pg    MCHC 31.8 (L) 32.0 - 36.0 g/dL    RDW 14.9 (H) 11.5 - 14.5 %    Platelets 114 (L) 150 - 450 x10*3/uL    Neutrophils % 68.7 40.0 - 80.0 %    Immature Granulocytes %, Automated 0.2 0.0 - 0.9 %    Lymphocytes % 15.3 13.0 - 44.0 %    Monocytes % 10.0 2.0 - 10.0 %    Eosinophils % 5.2 0.0 - 6.0 %    Basophils % 0.6 0.0 - 2.0 %    Neutrophils Absolute 4.25 1.60 - 5.50 x10*3/uL    Immature Granulocytes Absolute, Automated 0.01 0.00 - 0.50 x10*3/uL    Lymphocytes Absolute 0.95 0.80 - 3.00 x10*3/uL    Monocytes Absolute 0.62 0.05 - 0.80 x10*3/uL    Eosinophils Absolute 0.32 0.00 - 0.40 x10*3/uL    Basophils Absolute 0.04 0.00 - 0.10 x10*3/uL   Comprehensive metabolic panel   Result Value Ref Range    Glucose 111 (H) 65 - 99 mg/dL    Sodium 145 133 - 145 mmol/L    Potassium 3.1 (L) 3.4 - 5.1 mmol/L    Chloride 109 (H) 97 - 107 mmol/L    Bicarbonate 23 (L) 24 - 31 mmol/L    Urea Nitrogen 25 8 - 25 mg/dL    Creatinine 1.40 0.40 - 1.60 mg/dL    eGFR 37 (L) >60 mL/min/1.73m*2    Calcium 7.3 (L) 8.5 - 10.4 mg/dL    Albumin 3.5 3.5 - 5.0 g/dL    Alkaline Phosphatase 66 35 - 125 U/L    Total Protein 5.3 (L) 5.9 - 7.9 g/dL    AST 19 5 - 40 U/L    Bilirubin, Total 0.2 0.1 - 1.2 mg/dL    ALT 14 5 - 40 U/L    Anion Gap 13 <=19 mmol/L   Sars-CoV-2 PCR   Result Value Ref Range    Coronavirus 2019, PCR Not Detected Not Detected   Influenza A, and B PCR   Result Value Ref Range    Flu A Result Not Detected Not Detected    Flu B Result Not Detected Not Detected   RSV PCR   Result Value Ref Range    RSV PCR Not Detected Not Detected   Serial Troponin, Initial ([facility])   Result Value Ref Range    Troponin T, High Sensitivity 29 () <=14 ng/L   Serial Troponin, 2 Hour ([facility])   Result Value Ref Range    Troponin T, High Sensitivity 29 () <=14 ng/L   Magnesium   Result Value Ref Range    Magnesium 1.60 1.60 - 3.10 mg/dL   Lavender Top   Result Value Ref Range    Extra Tube Hold for add-ons.    Serial Troponin, 6 Hour ([facility])   Result  Value Ref Range    Troponin T, High Sensitivity 23 ([facility]) <=14 ng/L   Thyroid Stimulating Hormone   Result Value Ref Range    Thyroid Stimulating Hormone 2.20 0.27 - 4.20 mIU/L   Basic Metabolic Panel   Result Value Ref Range    Glucose 169 (H) 65 - 99 mg/dL    Sodium 138 133 - 145 mmol/L    Potassium 4.8 3.4 - 5.1 mmol/L    Chloride 100 97 - 107 mmol/L    Bicarbonate 26 24 - 31 mmol/L    Urea Nitrogen 28 (H) 8 - 25 mg/dL    Creatinine 1.60 0.40 - 1.60 mg/dL    eGFR 31 (L) >60 mL/min/1.73m*2    Calcium 9.7 8.5 - 10.4 mg/dL    Anion Gap 12 <=19 mmol/L   Urinalysis with Reflex Culture and Microscopic   Result Value Ref Range    Color, Urine Light-Yellow Light-Yellow, Yellow, Dark-Yellow    Appearance, Urine Turbid (N) Clear    Specific Gravity, Urine 1.011 1.005 - 1.035    pH, Urine 5.5 5.0, 5.5, 6.0, 6.5, 7.0, 7.5, 8.0    Protein, Urine NEGATIVE NEGATIVE, 10 (TRACE), 20 (TRACE) mg/dL    Glucose, Urine Normal Normal mg/dL    Blood, Urine NEGATIVE NEGATIVE    Ketones, Urine NEGATIVE NEGATIVE mg/dL    Bilirubin, Urine NEGATIVE NEGATIVE    Urobilinogen, Urine Normal Normal mg/dL    Nitrite, Urine NEGATIVE NEGATIVE    Leukocyte Esterase, Urine 500 Rick/µL (A) NEGATIVE   Microscopic Only, Urine   Result Value Ref Range    WBC, Urine >50 (A) 1-5, NONE /HPF    WBC Clumps, Urine OCCASIONAL Reference range not established. /HPF    RBC, Urine 1-2 NONE, 1-2, 3-5 /HPF    Squamous Epithelial Cells, Urine 1-9 (SPARSE) Reference range not established. /HPF    Bacteria, Urine 1+ (A) NONE SEEN /HPF    Mucus, Urine FEW Reference range not established. /LPF    Hyaline Casts, Urine 1+ (A) NONE /LPF   Urine Culture    Specimen: Clean Catch/Voided; Urine   Result Value Ref Range    Urine Culture >100,000 Gram Negative Bacilli (A)    Vascular US Carotid Artery Duplex Bilateral   Result Value Ref Range    BSA 1.65 m2   Basic Metabolic Panel   Result Value Ref Range    Glucose 107 (H) 65 - 99 mg/dL    Sodium 142 133 - 145 mmol/L    Potassium 4.1  3.4 - 5.1 mmol/L    Chloride 102 97 - 107 mmol/L    Bicarbonate 25 24 - 31 mmol/L    Urea Nitrogen 34 (H) 8 - 25 mg/dL    Creatinine 1.70 (H) 0.40 - 1.60 mg/dL    eGFR 29 (L) >60 mL/min/1.73m*2    Calcium 9.5 8.5 - 10.4 mg/dL    Anion Gap 15 <=19 mmol/L   CBC   Result Value Ref Range    WBC 8.0 4.4 - 11.3 x10*3/uL    nRBC 0.0 0.0 - 0.0 /100 WBCs    RBC 3.77 (L) 4.00 - 5.20 x10*6/uL    Hemoglobin 10.3 (L) 12.0 - 16.0 g/dL    Hematocrit 33.5 (L) 36.0 - 46.0 %    MCV 89 80 - 100 fL    MCH 27.3 26.0 - 34.0 pg    MCHC 30.7 (L) 32.0 - 36.0 g/dL    RDW 15.0 (H) 11.5 - 14.5 %    Platelets 114 (L) 150 - 450 x10*3/uL          Vascular US Carotid Artery Duplex Bilateral    Result Date: 5/3/2024  Preliminary [place] [address]            Phone [phone]      Preliminary Vascular Lab Report  VASC US CAROTID ARTERY DUPLEX BILATERAL  Patient Name:     SAMUEL Buenrostro Physician:  [id number] Mason Julian MD Study Date:       5/3/2024      Ordering Provider:  [id number] GABY WALLACE MRN/PID:          [id number]      Fellow: Accession#:       [id number]  Technologist:       Molly Cook RVILIR YOB: 1940    Technologist 2: Gender:           F             Encounter#:         [id number] Admission Status: Emergency     Location Performed: [facility]  Diagnosis/ICD: Syncope and collapse-R55 Indication:    Syncope CPT Codes:     27503 Cerebrovascular Carotid Duplex scan complete  PRELIMINARY CONCLUSIONS:  Right Carotid: Findings are consistent with less than 50% stenosis of the right proximal internal carotid artery. Right external carotid artery appears patent with no evidence of stenosis. The right vertebral artery is patent with antegrade flow. No evidence of hemodynamically significant stenosis in the right subclavian artery. Left Carotid: Findings are consistent with 50 to 69% stenosis of the left proximal internal carotid artery.  Left external carotid artery appears patent with no evidence of stenosis. The left vertebral artery is patent with antegrade flow. No evidence of hemodynamically significant stenosis in the left subclavian artery.  Imaging & Doppler Findings: Right Plaque Morph: The proximal right internal carotid artery demonstrates heterogenous and calcified plaque. The distal right common carotid artery demonstrates heterogenous and calcified plaque. Left Plaque Morph: The proximal left internal carotid artery demonstrates heterogenous and calcified plaque. The proximal left external carotid artery demonstrates heterogenous plaque. The distal left common carotid artery demonstrates heterogenous, calcified and irregular plaque.   Right                       Left   PSV     EDV                PSV      EDV 75 cm/s           CCA P    68 cm/s 57 cm/s           CCA D    60 cm/s                    Bulb    170 cm/s 30 cm/s 98 cm/s 17 cm/s   ICA P    166 cm/s 25 cm/s 64 cm/s 12 cm/s   ICA M    79 cm/s  14 cm/s 80 cm/s 18 cm/s   ICA D    59 cm/s  11 cm/s 76 cm/s            ECA     95 cm/s 28 cm/s 6 cm/s  Vertebral  52 cm/s  9 cm/s 83 cm/s         Subclavian 114 cm/s                Right Left ICA/CCA Ratio  1.7  2.8   VASCULAR PRELIMINARY REPORT completed by Molly Cook RVT on 5/3/2024 at 1:25:51 PM  ** Final **     CT head wo IV contrast    Result Date: 5/2/2024  Interpreted By:  David Hoang, STUDY: CT CERVICAL SPINE WO IV CONTRAST; CT HEAD WO IV CONTRAST; ;  5/2/2024 10:35 pm   INDICATION: Signs/Symptoms:fall neck trauma; Signs/Symptoms:fall, head injury on plavix.   COMPARISON: Noncontrast CT exams of head and cervical spine of 10/09/2020.   ACCESSION NUMBER(S): [id number]; [id number]   ORDERING CLINICIAN: VIKTORIA AVILES   TECHNIQUE: Noncontrast CT exams of the head and cervical spine with multiplanar reformations.   FINDINGS: BRAIN PARENCHYMA: Moderate to advanced volume loss.  There is periventricular and subcortical white matter  hypoattenuation, most in keeping with chronic microvascular ischemic change.  Gray-white matter interfaces are preserved. No mass, mass effect or midline shift.   HEMORRHAGE: No acute intracranial hemorrhage. VENTRICLES and EXTRA-AXIAL SPACES: Normal size. EXTRACRANIAL SOFT TISSUES: Moderate to large right parieto-occipital scalp hematoma. PARANASAL SINUSES/MASTOIDS: The visualized paranasal sinuses and mastoid air cells are aerated. CALVARIUM: No depressed skull fracture. No destructive osseous lesion.   OTHER FINDINGS: None.   CERVICAL SPINE:   Straightening of normal cervical lordosis could reflect positioning or muscle spasm. ALIGNMENT: Normal. VERTEBRAE: No acute fracture. Redemonstrated small ossific fragment along the anterior aspect of the inferior C6 endplate. Vertebral stature is maintained. Moderate to severe multilevel hypertrophic facet osteoarthropathy. SPINAL CANAL: No critical spinal canal stenosis. PREVERTEBRAL SOFT TISSUES: No prevertebral soft tissue swelling. LUNG APICES: Interstitial prominence in the visible lungs probably relates to chronic changes with or without acute pulmonary interstitial edema/CHF. Please correlate clinically with volume status.   OTHER FINDINGS: None.       No evidence of acute intracranial abnormality.   No acute cervical spine fracture or malalignment.     MACRO: None   Signed by: David Hoang 5/2/2024 10:45 PM Dictation workstation:   [id number]    CT cervical spine wo IV contrast    Result Date: 5/2/2024  Interpreted By:  David Hoang, STUDY: CT CERVICAL SPINE WO IV CONTRAST; CT HEAD WO IV CONTRAST; ;  5/2/2024 10:35 pm   INDICATION: Signs/Symptoms:fall neck trauma; Signs/Symptoms:fall, head injury on plavix.   COMPARISON: Noncontrast CT exams of head and cervical spine of 10/09/2020.   ACCESSION NUMBER(S): [id number]; [id number]   ORDERING CLINICIAN: VIKTORIA AVILES   TECHNIQUE: Noncontrast CT exams of the head and cervical spine with multiplanar reformations.    FINDINGS: BRAIN PARENCHYMA: Moderate to advanced volume loss.  There is periventricular and subcortical white matter hypoattenuation, most in keeping with chronic microvascular ischemic change.  Gray-white matter interfaces are preserved. No mass, mass effect or midline shift.   HEMORRHAGE: No acute intracranial hemorrhage. VENTRICLES and EXTRA-AXIAL SPACES: Normal size. EXTRACRANIAL SOFT TISSUES: Moderate to large right parieto-occipital scalp hematoma. PARANASAL SINUSES/MASTOIDS: The visualized paranasal sinuses and mastoid air cells are aerated. CALVARIUM: No depressed skull fracture. No destructive osseous lesion.   OTHER FINDINGS: None.   CERVICAL SPINE:   Straightening of normal cervical lordosis could reflect positioning or muscle spasm. ALIGNMENT: Normal. VERTEBRAE: No acute fracture. Redemonstrated small ossific fragment along the anterior aspect of the inferior C6 endplate. Vertebral stature is maintained. Moderate to severe multilevel hypertrophic facet osteoarthropathy. SPINAL CANAL: No critical spinal canal stenosis. PREVERTEBRAL SOFT TISSUES: No prevertebral soft tissue swelling. LUNG APICES: Interstitial prominence in the visible lungs probably relates to chronic changes with or without acute pulmonary interstitial edema/CHF. Please correlate clinically with volume status.   OTHER FINDINGS: None.       No evidence of acute intracranial abnormality.   No acute cervical spine fracture or malalignment.     MACRO: None   Signed by: David Hoang 5/2/2024 10:45 PM Dictation workstation:   [id number]    Electrocardiogram, 12-lead PRN ACS symptoms    Result Date: 4/13/2024  Sinus tachycardia with 1st degree AV block Nonspecific ST and T wave abnormality Abnormal ECG When compared with ECG of 05-APR-2024 10:47, (unconfirmed) Junctional rhythm has replaced Sinus rhythm ST now depressed in Inferior leads Confirmed by Moy Sanchez ([id number]) on 4/13/2024 5:02:47 PM    Transesophageal Echo (AUGUSTIN)    Result Date:  4/10/2024           [facility] [id number] [place], [place] [address]            Phone [phone] TRANSESOPHAGEAL ECHOCARDIOGRAM REPORT  Patient Name:     SAMUEL MYLA VILLALOBOS        Reading Physician:   [id number] Becky Vela MD Study Date:       4/10/2024            Ordering Provider:   [id number] BECKY VELA MRN/PID:          [id number]             Fellow: Accession#:       [id number]         Nurse: Date of           1940 / 83      Sonographer:         Jordy Perales RDCS Birth/Age:        years Gender:           F                    Additional Staff: Height:           162.00 cm            Admit Date: Weight:           67.00 kg             Admission Status:    Inpatient - Routine BSA / BMI:        1.72 m2 / 25.53      Department Location: [facility]                   kg/m2 Blood Pressure: 137 /77 mmHg Study Type:    TRANSESOPHAGEAL ECHO (AU[facility]USTIN) Diagnosis/ICD: Cardiac murmur, unspecified-R01.1 Indication:    Murmur / MR CPT Codes:     [place]IN Complete-94439 Patient History: Pertinent         CAD, Chest Pain, CHF and Murmur. Abn Ekg, Pacemaker, Murmur, History:          PCI. Study Detail: The following Echo studies were performed: 2D, Doppler and color               flow.  PHYSICIAN INTERPRETATION: AUGUSTIN Details: The AUGUSTIN probe used was [id number]. Technically adequate omniplane transesophageal echocardiogram performed. Color flow Doppler echo was performed to assess for the presence of a patent foramen ovale. AUGUSTIN Medication: The patient was sedated by Anesthesia; please refer to anesthesia flow sheet for medications used. AUGUSTIN Procedure: The probe was passed without difficulty. The following complication was encountered during the procedure: Patient tolerated the procedure well without any apparent complications. Left Ventricle: Left ventricular systolic function is normal, with an estimated ejection fraction of 55-60%. There are no regional wall  motion abnormalities. The left ventricular cavity size is normal. Spectral Doppler shows a normal pattern of left ventricular diastolic filling. Left Atrium: The left atrium is mildly dilated. There is a normal sized left atrial appendage. Right Ventricle: The right ventricle is normal in size. There is normal right ventricular global systolic function. Right Atrium: The right atrium is mildly dilated. Aortic Valve: The aortic valve appears structurally normal. There is no evidence of aortic valve regurgitation. Mitral Valve: The mitral valve is normal in structure. There is moderate mitral valve regurgitation. There is moderate mitral valve regurgitation at the most central jet the mitral valve structure within normal limit. Tricuspid Valve: The tricuspid valve is structurally normal. There is mild tricuspid regurgitation. Pulmonic Valve: The pulmonic valve is structurally normal. There is no indication of pulmonic valve regurgitation. Pericardium: There is no pericardial effusion noted. Aorta: The aortic root is normal. Systemic Veins: The inferior vena cava appears to be of normal size. There is IVC inspiratory collapse greater than 50%.  CONCLUSIONS:  1. Left ventricular systolic function is normal with a 55-60% estimated ejection fraction.  2. There is moderate mitral valve regurgitation at the most central jet the mitral valve structure within normal limit.  3. Moderate mitral valve regurgitation.  4. Mild tricuspid regurgitation is visualized. QUANTITATIVE DATA SUMMARY: MITRAL INSUFFICIENCY:                           Normal Ranges: PISA Radius:  0.6 cm MR VTI:       170.00 cm MR Vmax:      567.00 cm/s MR Alias Yasir: 30.8 cm/s MR Volume:    20.89 ml MR Flow Rt:   69.67 ml/s MR EROA:      0.12 cm2  [id number] Becky Vela MD Electronically signed on 4/10/2024 at 1:33:21 PM  ** Final **     XR chest 1 view    Result Date: 4/8/2024  Interpreted By:  Tamia Glass, STUDY: XR CHEST 1 VIEW 4/8/2024 9:43 am   INDICATION:  Signs/Symptoms:hypoxemia   COMPARISON: 04/05/2024   ACCESSION NUMBER(S): [id number]   ORDERING CLINICIAN: MELANIE HARDWICK   TECHNIQUE: AP erect view of the chest   FINDINGS: The heart is enlarged with bipolar pacemaker leads in right atrium and right ventricle.   There are bilateral pleural effusions, right larger than left. The right pleural effusion has increased in size and is now considered moderate to moderately large. Left effusion appears unchanged.   There is mild pulmonary vascular congestion. There is probably some atelectasis within each lower lobe as well.       Cardiomegaly and mild pulmonary vascular congestion with bilateral pleural effusions, right larger than left. The right effusion has increased in size.   Probable compressive atelectasis in each lower lung zone.   Signed by: Tamia Glass 4/8/2024 10:29 AM Dictation workstation:   [id number]    Transthoracic Echo (TTE) Limited    Result Date: 4/8/2024           [place] [address]            Phone [phone] TRANSTHORACIC ECHOCARDIOGRAM REPORT  Patient Name:     SAMUEL Buenrostro Physician:   [id number] Vela MD Study Date:       4/8/2024             Ordering Provider:   [id number] VELA MRN/PID:          [id number]             Fellow: Accession#:       [id number]         Nurse: Date of           1940 / 83      Sonographer:         Yulisa [place] RDCS Birth/Age:        years Gender:           F                    Additional Staff: Height:           162.56 cm            Admit Date: Weight:           72.58 kg             Admission Status:    Inpatient - Routine BSA / BMI:        1.78 m2 / 27.46      Department Location: [facility]                   kg/m2 Blood Pressure: 134 /70 mmHg Study Type:    TRANSTHORACIC ECHO (TTE) LIMITED Diagnosis/ICD: Acute diastolic (congestive) heart failure (CHF)-I50.31;                 Nonrheumatic mitral (valve) insufficiency-I34.0 Indication:    MR, Heart failure CPT Codes:     Echo Limited-66446; Doppler Limited-87634; Color Doppler-32309 Patient History: Valve Disorders:   Mitral Regurgitation. Pertinent History: A-Fib, Bradycardia, Heart failure,Sick sinus syndrome and                    HTN. Study Detail: The following Echo studies were performed: 2D, M-Mode, Doppler and               color flow. Patient has a pacemaker.  PHYSICIAN INTERPRETATION: Left Ventricle: Left ventricular systolic function is normal, with an estimated ejection fraction of 55-60%. There are no regional wall motion abnormalities. The left ventricular cavity size is normal. There is mild left ventricular hypertrophy. Spectral Doppler shows a normal pattern of left ventricular diastolic filling. Left Atrium: The left atrium is normal in size. Right Ventricle: The right ventricle is moderately enlarged. There is reduced right ventricular systolic function. A device is visualized in the right ventricle. Right Atrium: The right atrium is normal in size. There is a device visualized in the right atrium. Aortic Valve: The aortic valve is trileaflet. There is no evidence of aortic valve stenosis. There is no evidence of aortic valve regurgitation. The peak instantaneous gradient of the aortic valve is 2.5 mmHg. Mitral Valve: The mitral valve is normal in structure. There is evidence of mild mitral valve stenosis. The doppler estimated mean and peak diastolic pressure gradients are 5.0 mmHg and 15.7 mmHg respectively. There is moderate to severe mitral valve regurgitation which is centrally directed. Tricuspid Valve: The tricuspid valve is structurally normal. There is moderate tricuspid regurgitation. The Doppler estimated RVSP is moderately elevated at 53.7 mmHg. Pulmonic Valve: The pulmonic valve is structurally normal. There is mild pulmonic valve regurgitation. Pericardium: There is a trivial pericardial effusion.  Aorta: The aortic root is normal. Systemic Veins: The inferior vena cava appears to be of normal size.  CONCLUSIONS:  1. Left ventricular systolic function is normal with a 55-60% estimated ejection fraction.  2. Moderately enlarged right ventricle.  3. There is reduced right ventricular systolic function.  4. Moderate to severe mitral valve regurgitation.  5. Moderate tricuspid regurgitation.  6. Moderately elevated right ventricular systolic pressure.  7. Aortic valve stenosis is not present.  8. There is reduction in the mitral valve leaflet motion with mild mitral stenosis. Mean gradient of 5 mmHg.  9. There is moderate to possible severe mitral regurgitation with central jet. We will consider MitraClip down the road. QUANTITATIVE DATA SUMMARY: MITRAL VALVE:                       Normal Ranges: MV Vmax:    1.98 m/s  (<=1.3m/s) MV peak PG: 15.7 mmHg (<5mmHg) MV mean P.0 mmHg  (<48mmHg) AORTIC VALVE:                        Normal Ranges: AoV Vmax:     0.78 m/s (<=1.7m/s) AoV Peak P.5 mmHg (<20mmHg) LVOT Max Yasir: 0.56 m/s (<=1.1m/s) TRICUSPID VALVE/RVSP:                             Normal Ranges: Peak TR Velocity: 3.56 m/s RV Syst Pressure: 53.7 mmHg (< 30mmHg) IVC Diam:         1.40 cm  [id number] Becky Vela MD Electronically signed on 2024 at 8:54:24 AM  ** Final **     ECG 12 lead    Result Date: 2024  Normal sinus rhythm Nonspecific T wave abnormality Abnormal ECG When compared with ECG of 2024 15:13, (unconfirmed) Sinus rhythm has replaced probable SVT Heart rate has decreased by 72 bpm Confirmed by Dean Hanks ([id number]) on 2024 8:44:24 AM    ECG 12 lead    Result Date: 2024  Supraventricular tachycardia Nonspecific ST and T wave abnormality Abnormal ECG Confirmed by Dean Hanks ([id number]) on 2024 1:21:45 PM    XR chest 2 views    Result Date: 2024  Interpreted By:  Mc Carranza, STUDY: XR CHEST 2 VIEWS; 2024 11:25 am   INDICATION: Signs/Symptoms:sob   COMPARISON:  02/07/2024   ACCESSION NUMBER(S): [id number]   ORDERING CLINICIAN: BREE SOTO   TECHNIQUE: PA and LAT views of the chest were obtained.   FINDINGS: The cardiomediastinal silhouette is mildly enlarged, grossly stable. There are bilateral lower lobe airspace opacities most suggestive of small pleural effusions and adjacent atelectasis. There is mild prominence of the interstitium and mild ground-glass opacity suggesting mild pulmonary edema.   The osseous structures are intact.       Small bilateral opacities at the lung bases, most suggestive of small pleural effusions and adjacent atelectasis. There is mild prominence of the interstitium and mild ground-glass opacity suggesting mild pulmonary edema.     Signed by: Mc Carranza 4/5/2024 12:09 PM Dictation workstation:   [id number]    Electrocardiogram, 12-lead PRN ACS symptoms    Result Date: 4/5/2024  Sinus tachycardia with frequent Premature ventricular complexes and Fusion complexes Marked ST abnormality, possible inferior subendocardial injury Abnormal ECG When compared with ECG of 07-FEB-2024 17:36, Sinus rhythm has replaced Electronic atrial pacemaker Vent. rate has increased BY  82 BPM ST now depressed in Inferior leads ST no longer depressed in Anterior leads Nonspecific T wave abnormality now evident in Inferior leads       Assessment/Plan   Principal Problem:    CVA (cerebrovascular accident due to intracerebral hemorrhage) (Multi)  Active Problems:    Injury of head, initial encounter    83-year-old woman presented not feeling well and is found to have acute kidney injury and is being treated for infection.  I do not think she has any stroke but we await an MRI to resolve that question.  I will follow-up tomorrow.       I personally spent 20 minutes today, exclusive of procedures, providing care for this patient, including preparation, face to face time, documentation and other services such as review of medical records, diagnostic result,  patient education, counseling, coordination of care as specified in the encounter.

## 2024-05-04 NOTE — NURSING NOTE
Patient lying in bed comfortably. No needs verbalized. Call light within reach. Bed alarm in use.

## 2024-05-04 NOTE — CARE PLAN
The patient's goals for the shift include  to go home in am    The clinical goals for the shift include patient will not have worsening neuro deficit.

## 2024-05-04 NOTE — NURSING NOTE
Patient lying in bed comfortably. No needs verbalized. Breathing equal and unlabored. Call light within reach. Bed alarm in use.

## 2024-05-05 LAB
ANION GAP SERPL CALC-SCNC: 12 MMOL/L
BACTERIA UR CULT: ABNORMAL
BUN SERPL-MCNC: 27 MG/DL (ref 8–25)
CALCIUM SERPL-MCNC: 9.7 MG/DL (ref 8.5–10.4)
CHLORIDE SERPL-SCNC: 102 MMOL/L (ref 97–107)
CO2 SERPL-SCNC: 27 MMOL/L (ref 24–31)
CREAT SERPL-MCNC: 1.4 MG/DL (ref 0.4–1.6)
EGFRCR SERPLBLD CKD-EPI 2021: 37 ML/MIN/1.73M*2
ERYTHROCYTE [DISTWIDTH] IN BLOOD BY AUTOMATED COUNT: 14.9 % (ref 11.5–14.5)
GLUCOSE SERPL-MCNC: 130 MG/DL (ref 65–99)
HCT VFR BLD AUTO: 37 % (ref 36–46)
HGB BLD-MCNC: 11.4 G/DL (ref 12–16)
MCH RBC QN AUTO: 27.5 PG (ref 26–34)
MCHC RBC AUTO-ENTMCNC: 30.8 G/DL (ref 32–36)
MCV RBC AUTO: 89 FL (ref 80–100)
NRBC BLD-RTO: 0 /100 WBCS (ref 0–0)
PLATELET # BLD AUTO: 116 X10*3/UL (ref 150–450)
POTASSIUM SERPL-SCNC: 4.7 MMOL/L (ref 3.4–5.1)
RBC # BLD AUTO: 4.15 X10*6/UL (ref 4–5.2)
SODIUM SERPL-SCNC: 141 MMOL/L (ref 133–145)
WBC # BLD AUTO: 6.8 X10*3/UL (ref 4.4–11.3)

## 2024-05-05 PROCEDURE — 2500000004 HC RX 250 GENERAL PHARMACY W/ HCPCS (ALT 636 FOR OP/ED): Performed by: NURSE PRACTITIONER

## 2024-05-05 PROCEDURE — 85027 COMPLETE CBC AUTOMATED: CPT | Performed by: NURSE PRACTITIONER

## 2024-05-05 PROCEDURE — 1090000001 HH PPS REVENUE CREDIT

## 2024-05-05 PROCEDURE — 2500000004 HC RX 250 GENERAL PHARMACY W/ HCPCS (ALT 636 FOR OP/ED): Performed by: EMERGENCY MEDICINE

## 2024-05-05 PROCEDURE — 36415 COLL VENOUS BLD VENIPUNCTURE: CPT | Performed by: NURSE PRACTITIONER

## 2024-05-05 PROCEDURE — 2500000006 HC RX 250 W HCPCS SELF ADMINISTERED DRUGS (ALT 637 FOR ALL PAYERS): Performed by: INTERNAL MEDICINE

## 2024-05-05 PROCEDURE — 1090000002 HH PPS REVENUE DEBIT

## 2024-05-05 PROCEDURE — 2060000001 HC INTERMEDIATE ICU ROOM DAILY

## 2024-05-05 PROCEDURE — 80048 BASIC METABOLIC PNL TOTAL CA: CPT | Performed by: NURSE PRACTITIONER

## 2024-05-05 PROCEDURE — 2500000001 HC RX 250 WO HCPCS SELF ADMINISTERED DRUGS (ALT 637 FOR MEDICARE OP): Performed by: INTERNAL MEDICINE

## 2024-05-05 RX ADMIN — METOPROLOL TARTRATE 50 MG: 50 TABLET, FILM COATED ORAL at 08:14

## 2024-05-05 RX ADMIN — Medication 400 MG: at 08:14

## 2024-05-05 RX ADMIN — ATORVASTATIN CALCIUM 80 MG: 80 TABLET, FILM COATED ORAL at 20:35

## 2024-05-05 RX ADMIN — PIPERACILLIN SODIUM AND TAZOBACTAM SODIUM 2.25 G: 2; .25 INJECTION, SOLUTION INTRAVENOUS at 18:59

## 2024-05-05 RX ADMIN — SERTRALINE 200 MG: 100 TABLET, FILM COATED ORAL at 08:14

## 2024-05-05 RX ADMIN — Medication 5 MG: at 20:35

## 2024-05-05 RX ADMIN — APIXABAN 5 MG: 5 TABLET, FILM COATED ORAL at 08:14

## 2024-05-05 RX ADMIN — POTASSIUM CHLORIDE 20 MEQ: 1500 TABLET, EXTENDED RELEASE ORAL at 08:14

## 2024-05-05 RX ADMIN — LEVOTHYROXINE SODIUM 100 MCG: 0.1 TABLET ORAL at 06:13

## 2024-05-05 RX ADMIN — AMIODARONE HYDROCHLORIDE 200 MG: 200 TABLET ORAL at 08:14

## 2024-05-05 RX ADMIN — METOPROLOL TARTRATE 50 MG: 50 TABLET, FILM COATED ORAL at 20:35

## 2024-05-05 RX ADMIN — APIXABAN 5 MG: 5 TABLET, FILM COATED ORAL at 20:35

## 2024-05-05 RX ADMIN — AMIODARONE HYDROCHLORIDE 200 MG: 200 TABLET ORAL at 20:35

## 2024-05-05 RX ADMIN — PIPERACILLIN SODIUM AND TAZOBACTAM SODIUM 2.25 G: 2; .25 INJECTION, SOLUTION INTRAVENOUS at 23:14

## 2024-05-05 RX ADMIN — CLOPIDOGREL BISULFATE 75 MG: 75 TABLET ORAL at 08:14

## 2024-05-05 RX ADMIN — CEFTRIAXONE SODIUM 1 G: 1 INJECTION, SOLUTION INTRAVENOUS at 08:17

## 2024-05-05 ASSESSMENT — COGNITIVE AND FUNCTIONAL STATUS - GENERAL
DAILY ACTIVITIY SCORE: 24
WALKING IN HOSPITAL ROOM: A LITTLE
CLIMB 3 TO 5 STEPS WITH RAILING: A LITTLE
WALKING IN HOSPITAL ROOM: A LITTLE
CLIMB 3 TO 5 STEPS WITH RAILING: A LITTLE
MOBILITY SCORE: 22
DAILY ACTIVITIY SCORE: 24
MOBILITY SCORE: 22

## 2024-05-05 ASSESSMENT — PAIN SCALES - GENERAL
PAINLEVEL_OUTOF10: 0 - NO PAIN
PAINLEVEL_OUTOF10: 0 - NO PAIN

## 2024-05-05 ASSESSMENT — PAIN - FUNCTIONAL ASSESSMENT
PAIN_FUNCTIONAL_ASSESSMENT: 0-10
PAIN_FUNCTIONAL_ASSESSMENT: 0-10

## 2024-05-05 NOTE — CARE PLAN
The clinical goals for the shift include Remain free from falls for the duration of the shift    Over the shift, the patient did make progress toward the following goals.       Problem: Pain  Goal: My pain/discomfort is manageable  Outcome: Progressing     Problem: Safety  Goal: I will remain free of falls  Outcome: Progressing     Problem: Daily Care  Goal: Daily care needs are met  Outcome: Progressing

## 2024-05-05 NOTE — NURSING NOTE
Assumed care of patient at this time. At bedside report, patient had no c/o pain. vss and afebrile. Bed low and locked, call button within reach and bed alarm on. Patient has no needs that require immediate nursing interventions.

## 2024-05-05 NOTE — PROGRESS NOTES
Linette Doyle is a 83 y.o. female on day 1 of admission presenting with CVA (cerebrovascular accident due to intracerebral hemorrhage) (Multi).      Subjective   Patient seen and examined. Awake/alert/oriented. Denies chest pain, shortness of breath, nausea, or vomiting.  No lightheadedness dizziness.  No vision changes.  Denies numbness or paresthesias.       Objective     Last Recorded Vitals  /63 (BP Location: Left arm, Patient Position: Lying)   Pulse 60   Temp 36.7 °C (98.1 °F) (Axillary)   Resp 18   Wt 62.1 kg (136 lb 14.4 oz)   SpO2 97%   Intake/Output last 3 Shifts:    Intake/Output Summary (Last 24 hours) at 5/5/2024 1043  Last data filed at 5/5/2024 0914  Gross per 24 hour   Intake 1370 ml   Output --   Net 1370 ml       Admission Weight  Weight: 60 kg (132 lb 4.4 oz) (05/02/24 2155)    Daily Weight  05/05/24 : 62.1 kg (136 lb 14.4 oz)    Image Results  Vascular US Carotid Artery Duplex Bilateral  Preliminary Cardiology [place] [address]             Phone [phone]           Preliminary Vascular Lab Report     [facility] US CAROTID ARTERY DUPLEX BILATERAL       Patient Name:     LINETTE DOYLE Reading Physician:  [id number] Julian MD  Study Date:       5/3/2024      Ordering Provider:  [id number] GABY WALLACE  MRN/PID:          [id number]      Fellow:  Accession#:       [id number]  Technologist:       Molly Cook RVT  YOB: 1940    Technologist 2:  Gender:           F             Encounter#:         [id number]  Admission Status: Emergency     Location Performed: [facility]       Diagnosis/ICD: Syncope and collapse-R55  Indication:    Syncope  CPT Codes:     12355 Cerebrovascular Carotid Duplex scan complete       PRELIMINARY CONCLUSIONS:     Right Carotid: Findings are consistent with less than 50% stenosis of the right proximal internal carotid artery. Right external carotid artery appears patent  with no evidence of stenosis. The right vertebral artery is patent with antegrade flow. No evidence of hemodynamically significant stenosis in the right subclavian artery.  Left Carotid: Findings are consistent with 50 to 69% stenosis of the left proximal internal carotid artery. Left external carotid artery appears patent with no evidence of stenosis. The left vertebral artery is patent with antegrade flow. No evidence of hemodynamically significant stenosis in the left subclavian artery.     Imaging & Doppler Findings:  Right Plaque Morph: The proximal right internal carotid artery demonstrates heterogenous and calcified plaque. The distal right common carotid artery demonstrates heterogenous and calcified plaque.  Left Plaque Morph: The proximal left internal carotid artery demonstrates heterogenous and calcified plaque. The proximal left external carotid artery demonstrates heterogenous plaque. The distal left common carotid artery demonstrates heterogenous, calcified and irregular plaque.      Right                       Left    PSV     EDV                PSV      EDV  75 cm/s           CCA P    68 cm/s  57 cm/s           CCA D    60 cm/s                     Bulb    170 cm/s 30 cm/s  98 cm/s 17 cm/s   ICA P    166 cm/s 25 cm/s  64 cm/s 12 cm/s   ICA M    79 cm/s  14 cm/s  80 cm/s 18 cm/s   ICA D    59 cm/s  11 cm/s  76 cm/s            ECA     95 cm/s  28 cm/s 6 cm/s  Vertebral  52 cm/s  9 cm/s  83 cm/s         Subclavian 114 cm/s                     Right Left  ICA/CCA Ratio  1.7  2.8          VASCULAR PRELIMINARY REPORT  completed by Molly Cook ILIR on 5/3/2024 at 1:25:51 PM       ** Final **      Physical Exam  Vitals reviewed.   Constitutional:       Appearance: Normal appearance.   HENT:      Head: Normocephalic and atraumatic.   Eyes:      Extraocular Movements: Extraocular movements intact.      Conjunctiva/sclera: Conjunctivae normal.   Cardiovascular:      Rate and Rhythm: Normal rate and regular rhythm.    Pulmonary:      Effort: Pulmonary effort is normal.      Breath sounds: Normal breath sounds. No wheezing, rhonchi or rales.   Abdominal:      General: Bowel sounds are normal.      Palpations: Abdomen is soft.      Tenderness: There is no abdominal tenderness.   Musculoskeletal:         General: Normal range of motion.   Skin:     General: Skin is warm and dry.   Neurological:      Mental Status: She is alert and oriented to person, place, and time.         Relevant Results  Lab Results   Component Value Date    GLUCOSE 130 (H) 05/05/2024    CALCIUM 9.7 05/05/2024     05/05/2024    K 4.7 05/05/2024    CO2 27 05/05/2024     05/05/2024    BUN 27 (H) 05/05/2024    CREATININE 1.40 05/05/2024     Lab Results   Component Value Date    WBC 6.8 05/05/2024    HGB 11.4 (L) 05/05/2024    HCT 37.0 05/05/2024    MCV 89 05/05/2024     (L) 05/05/2024     Vascular US Carotid Artery Duplex Bilateral  Result Date: 5/3/2024  CONCLUSIONS:  Right Carotid: Findings are consistent with less than 50% stenosis of the right proximal internal carotid artery. Right external carotid artery appears patent with no evidence of stenosis. The right vertebral artery is patent with antegrade flow. No evidence of hemodynamically significant stenosis in the right subclavian artery. Left Carotid: Findings are consistent with 50 to 69% stenosis of the left proximal internal carotid artery. Left external carotid artery appears patent with no evidence of stenosis. The left vertebral artery is patent with antegrade flow. No evidence of hemodynamically significant stenosis in the left subclavian artery.  Imaging & Doppler Findings: Right Plaque Morph: The proximal right internal carotid artery demonstrates heterogenous and calcified plaque. The distal right common carotid artery demonstrates heterogenous and calcified plaque. Left Plaque Morph: The proximal left internal carotid artery demonstrates heterogenous and calcified plaque. The  proximal left external carotid artery demonstrates heterogenous plaque. The distal left common carotid artery demonstrates heterogenous, calcified and irregular plaque.       CT head wo IV contrast  Result Date: 5/2/2024  No evidence of acute intracranial abnormality.   No acute cervical spine fracture or malalignment.     MACRO: None   Signed by: David Hoang 5/2/2024 10:45 PM Dictation workstation:   [id number]    CT cervical spine wo IV contrast  Result Date: 5/2/2024  No evidence of acute intracranial abnormality.   No acute cervical spine fracture or malalignment.     MACRO: None   Signed by: David Hoang 5/2/2024 10:45 PM Dictation workstation:   [id number]           Assessment/Plan      Principal Problem:    CVA (cerebrovascular accident due to intracerebral hemorrhage) (Multi)  Active Problems:    Injury of head, initial encounter    Right facial droop  A head CT showed no acute changes. MRI of the brain not ordered because she has a pacemaker.   Await neurology input  Carotid ultrasound  Pacemaker interrogation ordered  EP/cardiology consulted to evaluate pacemaker compatibility for MRI, EP did fill out necessary MRI form, MRI/MRA has been ordered  ASA/statin  permissive hypertension  lipid panel in AM  US carotid results as above  Echocardiogram 4/24  neurostroke assessment  consult neurology, appreciate recommendations    GARY-improved  Creatinine increased to 1.7, now 1.4, basline  Consider resuming torsemide at decreased dose if stable tomorrow  Stop IV fluids  Renally dose meds, avoid nephrotoxic medications  BMP in AM    UTI  Change ATB to Zosyn renally dosed  UA positive, follow culture-grew >422320 enterobacter cloacae complex resistant to amoxicillin/clav, ampicillin, amp/sulb, cefazolin    Fall  PT/OT     Coronary artery disease  Stable     Chronic kidney disease  Chronic     Hypothyroidism  On levothyroxine     Presence of cardiac pacemaker  [employer] dual chamber pacemaker placed 7/4/2019 at [place] because of sick sinus syndrome with pauses.   Pacemaker interrogation ordered     Hypokalemia  Improved, replace as needed    Plan  Admitted to SDU  Stroke work up, neurology following  Pacemaker interrogation ordered, need eval for MRI compatibility, cardiology/EP consulted-MRI form has been filled out by EP, MRI/MRA order placed  Stop IV fluids  antibiotics  DVT prophylaxis: On eliquis  CBC and BMP in AM                Lori Robles, APRN-CNP

## 2024-05-05 NOTE — PROGRESS NOTES
"Subjective   Pt rpeorts she is doing well. No complaints but she is eager to return home when she can.       Objective   Neurological Exam  Physical Exam    Last Recorded Vitals  Blood pressure 133/69, pulse 59, temperature 36.5 °C (97.7 °F), temperature source Oral, resp. rate 17, height 1.626 m (5' 4\"), weight 62.1 kg (136 lb 14.4 oz), SpO2 96%, unknown if currently breastfeeding.      Neurologically, pt is awake and oriented x4. Attention, concentration, memory, cortical processing are intact. No aphasia  Cranial nerves: VFF, EOMI, No nystagmus, Face is symmetric to sensory and motor, no dysarthria, Tongue protrudes midline, Shrug symmetric  Motor: 5/5 strength B throughout, no tremor or asterixis  Sensation is intact bilaterally throughout  Coordination: no ataxia, no dysmetria/dysdiadochokinesia     Scheduled medications  amiodarone, 200 mg, oral, BID  apixaban, 5 mg, oral, BID  atorvastatin, 80 mg, oral, Nightly  clopidogrel, 75 mg, oral, Daily  [Held by provider] isosorbide mononitrate ER, 30 mg, oral, Daily  levothyroxine, 100 mcg, oral, Daily before breakfast  magnesium oxide, 400 mg, oral, Daily  metoprolol tartrate, 50 mg, oral, BID  piperacillin-tazobactam, 2.25 g, intravenous, q6h  potassium chloride CR, 20 mEq, oral, BID  sertraline, 200 mg, oral, Daily  [Held by provider] torsemide, 20 mg, oral, Daily      Continuous medications     PRN medications  PRN medications: acetaminophen, alum-mag hydroxide-simeth, melatonin, ondansetron, ondansetron     Results for orders placed or performed during the hospital encounter of 05/02/24 (from the past 96 hour(s))   CBC and Auto Differential   Result Value Ref Range    WBC 6.2 4.4 - 11.3 x10*3/uL    nRBC 0.0 0.0 - 0.0 /100 WBCs    RBC 3.61 (L) 4.00 - 5.20 x10*6/uL    Hemoglobin 10.1 (L) 12.0 - 16.0 g/dL    Hematocrit 31.8 (L) 36.0 - 46.0 %    MCV 88 80 - 100 fL    MCH 28.0 26.0 - 34.0 pg    MCHC 31.8 (L) 32.0 - 36.0 g/dL    RDW 14.9 (H) 11.5 - 14.5 %    Platelets " 114 (L) 150 - 450 x10*3/uL    Neutrophils % 68.7 40.0 - 80.0 %    Immature Granulocytes %, Automated 0.2 0.0 - 0.9 %    Lymphocytes % 15.3 13.0 - 44.0 %    Monocytes % 10.0 2.0 - 10.0 %    Eosinophils % 5.2 0.0 - 6.0 %    Basophils % 0.6 0.0 - 2.0 %    Neutrophils Absolute 4.25 1.60 - 5.50 x10*3/uL    Immature Granulocytes Absolute, Automated 0.01 0.00 - 0.50 x10*3/uL    Lymphocytes Absolute 0.95 0.80 - 3.00 x10*3/uL    Monocytes Absolute 0.62 0.05 - 0.80 x10*3/uL    Eosinophils Absolute 0.32 0.00 - 0.40 x10*3/uL    Basophils Absolute 0.04 0.00 - 0.10 x10*3/uL   Comprehensive metabolic panel   Result Value Ref Range    Glucose 111 (H) 65 - 99 mg/dL    Sodium 145 133 - 145 mmol/L    Potassium 3.1 (L) 3.4 - 5.1 mmol/L    Chloride 109 (H) 97 - 107 mmol/L    Bicarbonate 23 (L) 24 - 31 mmol/L    Urea Nitrogen 25 8 - 25 mg/dL    Creatinine 1.40 0.40 - 1.60 mg/dL    eGFR 37 (L) >60 mL/min/1.73m*2    Calcium 7.3 (L) 8.5 - 10.4 mg/dL    Albumin 3.5 3.5 - 5.0 g/dL    Alkaline Phosphatase 66 35 - 125 U/L    Total Protein 5.3 (L) 5.9 - 7.9 g/dL    AST 19 5 - 40 U/L    Bilirubin, Total 0.2 0.1 - 1.2 mg/dL    ALT 14 5 - 40 U/L    Anion Gap 13 <=19 mmol/L   Sars-CoV-2 PCR   Result Value Ref Range    Coronavirus 2019, PCR Not Detected Not Detected   Influenza A, and B PCR   Result Value Ref Range    Flu A Result Not Detected Not Detected    Flu B Result Not Detected Not Detected   RSV PCR   Result Value Ref Range    RSV PCR Not Detected Not Detected   Serial Troponin, Initial ([facility])   Result Value Ref Range    Troponin T, High Sensitivity 29 (HH) <=14 ng/L   Serial Troponin, 2 Hour ([facility])   Result Value Ref Range    Troponin T, High Sensitivity 29 (HH) <=14 ng/L   Magnesium   Result Value Ref Range    Magnesium 1.60 1.60 - 3.10 mg/dL   Lavender Top   Result Value Ref Range    Extra Tube Hold for add-ons.    Serial Troponin, 6 Hour ([facility])   Result Value Ref Range    Troponin T, High Sensitivity 23 (HH) <=14 ng/L   Thyroid  Stimulating Hormone   Result Value Ref Range    Thyroid Stimulating Hormone 2.20 0.27 - 4.20 mIU/L   Basic Metabolic Panel   Result Value Ref Range    Glucose 169 (H) 65 - 99 mg/dL    Sodium 138 133 - 145 mmol/L    Potassium 4.8 3.4 - 5.1 mmol/L    Chloride 100 97 - 107 mmol/L    Bicarbonate 26 24 - 31 mmol/L    Urea Nitrogen 28 (H) 8 - 25 mg/dL    Creatinine 1.60 0.40 - 1.60 mg/dL    eGFR 31 (L) >60 mL/min/1.73m*2    Calcium 9.7 8.5 - 10.4 mg/dL    Anion Gap 12 <=19 mmol/L   Urinalysis with Reflex Culture and Microscopic   Result Value Ref Range    Color, Urine Light-Yellow Light-Yellow, Yellow, Dark-Yellow    Appearance, Urine Turbid (N) Clear    Specific Gravity, Urine 1.011 1.005 - 1.035    pH, Urine 5.5 5.0, 5.5, 6.0, 6.5, 7.0, 7.5, 8.0    Protein, Urine NEGATIVE NEGATIVE, 10 (TRACE), 20 (TRACE) mg/dL    Glucose, Urine Normal Normal mg/dL    Blood, Urine NEGATIVE NEGATIVE    Ketones, Urine NEGATIVE NEGATIVE mg/dL    Bilirubin, Urine NEGATIVE NEGATIVE    Urobilinogen, Urine Normal Normal mg/dL    Nitrite, Urine NEGATIVE NEGATIVE    Leukocyte Esterase, Urine 500 Rick/µL (A) NEGATIVE   Microscopic Only, Urine   Result Value Ref Range    WBC, Urine >50 (A) 1-5, NONE /HPF    WBC Clumps, Urine OCCASIONAL Reference range not established. /HPF    RBC, Urine 1-2 NONE, 1-2, 3-5 /HPF    Squamous Epithelial Cells, Urine 1-9 (SPARSE) Reference range not established. /HPF    Bacteria, Urine 1+ (A) NONE SEEN /HPF    Mucus, Urine FEW Reference range not established. /LPF    Hyaline Casts, Urine 1+ (A) NONE /LPF   Urine Culture    Specimen: Clean Catch/Voided; Urine   Result Value Ref Range    Urine Culture >100,000 Enterobacter cloacae complex (A)        Susceptibility    Enterobacter cloacae complex - MICROSCAN     Ampicillin  Resistant      Amoxicillin/Clavulanate  Resistant      Ampicillin/Sulbactam  Resistant      Cefazolin  Resistant      Cefepime  Susceptible      Ciprofloxacin  Susceptible      Gentamicin  Susceptible       Nitrofurantoin  Susceptible      Piperacillin/Tazobactam  Susceptible      Trimethoprim/Sulfamethoxazole  Susceptible    Vascular US Carotid Artery Duplex Bilateral   Result Value Ref Range    BSA 1.65 m2   Basic Metabolic Panel   Result Value Ref Range    Glucose 107 (H) 65 - 99 mg/dL    Sodium 142 133 - 145 mmol/L    Potassium 4.1 3.4 - 5.1 mmol/L    Chloride 102 97 - 107 mmol/L    Bicarbonate 25 24 - 31 mmol/L    Urea Nitrogen 34 (H) 8 - 25 mg/dL    Creatinine 1.70 (H) 0.40 - 1.60 mg/dL    eGFR 29 (L) >60 mL/min/1.73m*2    Calcium 9.5 8.5 - 10.4 mg/dL    Anion Gap 15 <=19 mmol/L   CBC   Result Value Ref Range    WBC 8.0 4.4 - 11.3 x10*3/uL    nRBC 0.0 0.0 - 0.0 /100 WBCs    RBC 3.77 (L) 4.00 - 5.20 x10*6/uL    Hemoglobin 10.3 (L) 12.0 - 16.0 g/dL    Hematocrit 33.5 (L) 36.0 - 46.0 %    MCV 89 80 - 100 fL    MCH 27.3 26.0 - 34.0 pg    MCHC 30.7 (L) 32.0 - 36.0 g/dL    RDW 15.0 (H) 11.5 - 14.5 %    Platelets 114 (L) 150 - 450 x10*3/uL   CBC   Result Value Ref Range    WBC 6.8 4.4 - 11.3 x10*3/uL    nRBC 0.0 0.0 - 0.0 /100 WBCs    RBC 4.15 4.00 - 5.20 x10*6/uL    Hemoglobin 11.4 (L) 12.0 - 16.0 g/dL    Hematocrit 37.0 36.0 - 46.0 %    MCV 89 80 - 100 fL    MCH 27.5 26.0 - 34.0 pg    MCHC 30.8 (L) 32.0 - 36.0 g/dL    RDW 14.9 (H) 11.5 - 14.5 %    Platelets 116 (L) 150 - 450 x10*3/uL   Basic Metabolic Panel   Result Value Ref Range    Glucose 130 (H) 65 - 99 mg/dL    Sodium 141 133 - 145 mmol/L    Potassium 4.7 3.4 - 5.1 mmol/L    Chloride 102 97 - 107 mmol/L    Bicarbonate 27 24 - 31 mmol/L    Urea Nitrogen 27 (H) 8 - 25 mg/dL    Creatinine 1.40 0.40 - 1.60 mg/dL    eGFR 37 (L) >60 mL/min/1.73m*2    Calcium 9.7 8.5 - 10.4 mg/dL    Anion Gap 12 <=19 mmol/L          Vascular US Carotid Artery Duplex Bilateral    Result Date: 5/3/2024  Preliminary Cardiology Report           [facility] [id number] [place] [id number]            Phone [phone]      Preliminary Vascular Lab Report  [facility] US  CAROTID ARTERY DUPLEX BILATERAL  Patient Name:     SAMUEL VILLALOBOS Reading Physician:  [id number] Mason Julian MD Study Date:       5/3/2024      Ordering Provider:  [id number] GABY WALLACE MRN/PID:          [id number]      Fellow: Accession#:       [id number]  Technologist:       Molly Cook RVT YOB: 1940    Technologist 2: Gender:           F             Encounter#:         [id number] Admission Status: Emergency     Location Performed: [facility]  Diagnosis/ICD: Syncope and collapse-R55 Indication:    Syncope CPT Codes:     69265 Cerebrovascular Carotid Duplex scan complete  PRELIMINARY CONCLUSIONS:  Right Carotid: Findings are consistent with less than 50% stenosis of the right proximal internal carotid artery. Right external carotid artery appears patent with no evidence of stenosis. The right vertebral artery is patent with antegrade flow. No evidence of hemodynamically significant stenosis in the right subclavian artery. Left Carotid: Findings are consistent with 50 to 69% stenosis of the left proximal internal carotid artery. Left external carotid artery appears patent with no evidence of stenosis. The left vertebral artery is patent with antegrade flow. No evidence of hemodynamically significant stenosis in the left subclavian artery.  Imaging & Doppler Findings: Right Plaque Morph: The proximal right internal carotid artery demonstrates heterogenous and calcified plaque. The distal right common carotid artery demonstrates heterogenous and calcified plaque. Left Plaque Morph: The proximal left internal carotid artery demonstrates heterogenous and calcified plaque. The proximal left external carotid artery demonstrates heterogenous plaque. The distal left common carotid artery demonstrates heterogenous, calcified and irregular plaque.   Right                       Left   PSV     EDV                PSV      EDV 75 cm/s           CCA P    68 cm/s 57 cm/s           CCA D    60 cm/s                     Bulb    170 cm/s 30 cm/s 98 cm/s 17 cm/s   ICA P    166 cm/s 25 cm/s 64 cm/s 12 cm/s   ICA M    79 cm/s  14 cm/s 80 cm/s 18 cm/s   ICA D    59 cm/s  11 cm/s 76 cm/s            ECA     95 cm/s 28 cm/s 6 cm/s  Vertebral  52 cm/s  9 cm/s 83 cm/s         Subclavian 114 cm/s                Right Left ICA/CCA Ratio  1.7  2.8   VASCULAR PRELIMINARY REPORT completed by Molly Cook RVT on 5/3/2024 at 1:25:51 PM  ** Final **     CT head wo IV contrast    Result Date: 5/2/2024  Interpreted By:  David Hoang, STUDY: CT CERVICAL SPINE WO IV CONTRAST; CT HEAD WO IV CONTRAST; ;  5/2/2024 10:35 pm   INDICATION: Signs/Symptoms:fall neck trauma; Signs/Symptoms:fall, head injury on plavix.   COMPARISON: Noncontrast CT exams of head and cervical spine of 10/09/2020.   ACCESSION NUMBER(S): [id number]; [id number]   ORDERING CLINICIAN: VIKTORIA AVILES   TECHNIQUE: Noncontrast CT exams of the head and cervical spine with multiplanar reformations.   FINDINGS: BRAIN PARENCHYMA: Moderate to advanced volume loss.  There is periventricular and subcortical white matter hypoattenuation, most in keeping with chronic microvascular ischemic change.  Gray-white matter interfaces are preserved. No mass, mass effect or midline shift.   HEMORRHAGE: No acute intracranial hemorrhage. VENTRICLES and EXTRA-AXIAL SPACES: Normal size. EXTRACRANIAL SOFT TISSUES: Moderate to large right parieto-occipital scalp hematoma. PARANASAL SINUSES/MASTOIDS: The visualized paranasal sinuses and mastoid air cells are aerated. CALVARIUM: No depressed skull fracture. No destructive osseous lesion.   OTHER FINDINGS: None.   CERVICAL SPINE:   Straightening of normal cervical lordosis could reflect positioning or muscle spasm. ALIGNMENT: Normal. VERTEBRAE: No acute fracture. Redemonstrated small ossific fragment along the anterior aspect of the inferior C6 endplate. Vertebral stature is maintained. Moderate to severe multilevel hypertrophic facet osteoarthropathy.  SPINAL CANAL: No critical spinal canal stenosis. PREVERTEBRAL SOFT TISSUES: No prevertebral soft tissue swelling. LUNG APICES: Interstitial prominence in the visible lungs probably relates to chronic changes with or without acute pulmonary interstitial edema/CHF. Please correlate clinically with volume status.   OTHER FINDINGS: None.       No evidence of acute intracranial abnormality.   No acute cervical spine fracture or malalignment.     MACRO: None   Signed by: David Haong 5/2/2024 10:45 PM Dictation workstation:   [id number]    CT cervical spine wo IV contrast    Result Date: 5/2/2024  Interpreted By:  David Hoang, STUDY: CT CERVICAL SPINE WO IV CONTRAST; CT HEAD WO IV CONTRAST; ;  5/2/2024 10:35 pm   INDICATION: Signs/Symptoms:fall neck trauma; Signs/Symptoms:fall, head injury on plavix.   COMPARISON: Noncontrast CT exams of head and cervical spine of 10/09/2020.   ACCESSION NUMBER(S): [id number]; [id number]   ORDERING CLINICIAN: VIKTORIA AVILES   TECHNIQUE: Noncontrast CT exams of the head and cervical spine with multiplanar reformations.   FINDINGS: BRAIN PARENCHYMA: Moderate to advanced volume loss.  There is periventricular and subcortical white matter hypoattenuation, most in keeping with chronic microvascular ischemic change.  Gray-white matter interfaces are preserved. No mass, mass effect or midline shift.   HEMORRHAGE: No acute intracranial hemorrhage. VENTRICLES and EXTRA-AXIAL SPACES: Normal size. EXTRACRANIAL SOFT TISSUES: Moderate to large right parieto-occipital scalp hematoma. PARANASAL SINUSES/MASTOIDS: The visualized paranasal sinuses and mastoid air cells are aerated. CALVARIUM: No depressed skull fracture. No destructive osseous lesion.   OTHER FINDINGS: None.   CERVICAL SPINE:   Straightening of normal cervical lordosis could reflect positioning or muscle spasm. ALIGNMENT: Normal. VERTEBRAE: No acute fracture. Redemonstrated small ossific fragment along the anterior aspect of the  inferior C6 endplate. Vertebral stature is maintained. Moderate to severe multilevel hypertrophic facet osteoarthropathy. SPINAL CANAL: No critical spinal canal stenosis. PREVERTEBRAL SOFT TISSUES: No prevertebral soft tissue swelling. LUNG APICES: Interstitial prominence in the visible lungs probably relates to chronic changes with or without acute pulmonary interstitial edema/CHF. Please correlate clinically with volume status.   OTHER FINDINGS: None.       No evidence of acute intracranial abnormality.   No acute cervical spine fracture or malalignment.     MACRO: None   Signed by: David Hoang 5/2/2024 10:45 PM Dictation workstation:   [id number]    Electrocardiogram, 12-lead PRN ACS symptoms    Result Date: 4/13/2024  Sinus tachycardia with 1st degree AV block Nonspecific ST and T wave abnormality Abnormal ECG When compared with ECG of 05-APR-2024 10:47, (unconfirmed) Junctional rhythm has replaced Sinus rhythm ST now depressed in Inferior leads Confirmed by Moy Sanchez ([id number]) on 4/13/2024 5:02:47 PM    Transesophageal Echo ([facility]USTIN)    Result Date: 4/10/2024           [place] [address]            Phone [phone] TRANSESOPHAGEAL ECHOCARDIOGRAM REPORT  Patient Name:     SAMUEL Buenrostro Physician:   [id number] Shaggy Vela MD Study Date:       4/10/2024            Ordering Provider:   [id number] SHAGGY VELA MRN/PID:          [id number]             Fellow: Accession#:       [id number]         Nurse: Date of           1940 / 83      Sonographer:         Jordy Perales RDCS Birth/Age:        years Gender:           F                    Additional Staff: Height:           162.00 cm            Admit Date: Weight:           67.00 kg             Admission Status:    Inpatient - Routine BSA / BMI:        1.72 m2 / 25.53      Department Location: [facility]                   kg/m2 Blood  Pressure: 137 /77 mmHg Study Type:    TRANSESOPHAGEAL ECHO ([facility]) Diagnosis/ICD: Cardiac murmur, unspecified-R01.1 Indication:    Murmur / MR CPT Codes:     [facility] Complete-47647 Patient History: Pertinent         CAD, Chest Pain, CHF and Murmur. Abn Ekg, Pacemaker, Murmur, History:          PCI. Study Detail: The following Echo studies were performed: 2D, Doppler and color               flow.  PHYSICIAN INTERPRETATION: AUGUSTIN Details: The AUGUSTIN probe used was [id number]. Technically adequate omniplane transesophageal echocardiogram performed. Color flow Doppler echo was performed to assess for the presence of a patent foramen ovale. AUGUSTIN Medication: The patient was sedated by Anesthesia; please refer to anesthesia flow sheet for medications used. AUGUSTIN Procedure: The probe was passed without difficulty. The following complication was encountered during the procedure: Patient tolerated the procedure well without any apparent complications. Left Ventricle: Left ventricular systolic function is normal, with an estimated ejection fraction of 55-60%. There are no regional wall motion abnormalities. The left ventricular cavity size is normal. Spectral Doppler shows a normal pattern of left ventricular diastolic filling. Left Atrium: The left atrium is mildly dilated. There is a normal sized left atrial appendage. Right Ventricle: The right ventricle is normal in size. There is normal right ventricular global systolic function. Right Atrium: The right atrium is mildly dilated. Aortic Valve: The aortic valve appears structurally normal. There is no evidence of aortic valve regurgitation. Mitral Valve: The mitral valve is normal in structure. There is moderate mitral valve regurgitation. There is moderate mitral valve regurgitation at the most central jet the mitral valve structure within normal limit. Tricuspid Valve: The tricuspid valve is structurally normal. There is mild tricuspid regurgitation. Pulmonic Valve: The pulmonic valve is  structurally normal. There is no indication of pulmonic valve regurgitation. Pericardium: There is no pericardial effusion noted. Aorta: The aortic root is normal. Systemic Veins: The inferior vena cava appears to be of normal size. There is IVC inspiratory collapse greater than 50%.  CONCLUSIONS:  1. Left ventricular systolic function is normal with a 55-60% estimated ejection fraction.  2. There is moderate mitral valve regurgitation at the most central jet the mitral valve structure within normal limit.  3. Moderate mitral valve regurgitation.  4. Mild tricuspid regurgitation is visualized. QUANTITATIVE DATA SUMMARY: MITRAL INSUFFICIENCY:                           Normal Ranges: PISA Radius:  0.6 cm MR VTI:       170.00 cm MR Vmax:      567.00 cm/s MR Alias Yasir: 30.8 cm/s MR Volume:    20.89 ml MR Flow Rt:   69.67 ml/s MR EROA:      0.12 cm2  [id number] Becky Vela MD Electronically signed on 4/10/2024 at 1:33:21 PM  ** Final **     XR chest 1 view    Result Date: 4/8/2024  Interpreted By:  Tamia Glass, STUDY: XR CHEST 1 VIEW 4/8/2024 9:43 am   INDICATION: Signs/Symptoms:hypoxemia   COMPARISON: 04/05/2024   ACCESSION NUMBER(S): [id number]   ORDERING CLINICIAN: MELANIE HARDWICK   TECHNIQUE: AP erect view of the chest   FINDINGS: The heart is enlarged with bipolar pacemaker leads in right atrium and right ventricle.   There are bilateral pleural effusions, right larger than left. The right pleural effusion has increased in size and is now considered moderate to moderately large. Left effusion appears unchanged.   There is mild pulmonary vascular congestion. There is probably some atelectasis within each lower lobe as well.       Cardiomegaly and mild pulmonary vascular congestion with bilateral pleural effusions, right larger than left. The right effusion has increased in size.   Probable compressive atelectasis in each lower lung zone.   Signed by: Tamia Glass 4/8/2024 10:29 AM Dictation workstation:    [id number]    Transthoracic Echo (TTE) Limited    Result Date: 4/8/2024           [place] [address]            Phone [phone] TRANSTHORACIC ECHOCARDIOGRAM REPORT  Patient Name:     SAMUEL VILLALOBOS        Porter Physician:   Zuri Vela MD Study Date:       4/8/2024             Ordering Provider:   Zuri VELA MRN/PID:          [id number]             Fellow: Accession#:       [id number]         Nurse: Date of           1940 / 83      Sonographer:         Yulisa Kennedy [facility] Birth/Age:        years Gender:           F                    Additional Staff: Height:           162.56 cm            Admit Date: Weight:           72.58 kg             Admission Status:    Inpatient - Routine BSA / BMI:        1.78 m2 / 27.46      Department Location: [facility]                   kg/m2 Blood Pressure: 134 /70 mmHg Study Type:    TRANSTHORACIC ECHO (TTE) LIMITED Diagnosis/ICD: Acute diastolic (congestive) heart failure (CHF)-I50.31;                Nonrheumatic mitral (valve) insufficiency-I34.0 Indication:    MR, Heart failure CPT Codes:     Echo Limited-74076; Doppler Limited-03430; Color Doppler-78894 Patient History: Valve Disorders:   Mitral Regurgitation. Pertinent History: A-Fib, Bradycardia, Heart failure,Sick sinus syndrome and                    HTN. Study Detail: The following Echo studies were performed: 2D, M-Mode, Doppler and               color flow. Patient has a pacemaker.  PHYSICIAN INTERPRETATION: Left Ventricle: Left ventricular systolic function is normal, with an estimated ejection fraction of 55-60%. There are no regional wall motion abnormalities. The left ventricular cavity size is normal. There is mild left ventricular hypertrophy. Spectral Doppler shows a normal pattern of left ventricular diastolic filling. Left Atrium: The left atrium is normal in size.  Right Ventricle: The right ventricle is moderately enlarged. There is reduced right ventricular systolic function. A device is visualized in the right ventricle. Right Atrium: The right atrium is normal in size. There is a device visualized in the right atrium. Aortic Valve: The aortic valve is trileaflet. There is no evidence of aortic valve stenosis. There is no evidence of aortic valve regurgitation. The peak instantaneous gradient of the aortic valve is 2.5 mmHg. Mitral Valve: The mitral valve is normal in structure. There is evidence of mild mitral valve stenosis. The doppler estimated mean and peak diastolic pressure gradients are 5.0 mmHg and 15.7 mmHg respectively. There is moderate to severe mitral valve regurgitation which is centrally directed. Tricuspid Valve: The tricuspid valve is structurally normal. There is moderate tricuspid regurgitation. The Doppler estimated RVSP is moderately elevated at 53.7 mmHg. Pulmonic Valve: The pulmonic valve is structurally normal. There is mild pulmonic valve regurgitation. Pericardium: There is a trivial pericardial effusion. Aorta: The aortic root is normal. Systemic Veins: The inferior vena cava appears to be of normal size.  CONCLUSIONS:  1. Left ventricular systolic function is normal with a 55-60% estimated ejection fraction.  2. Moderately enlarged right ventricle.  3. There is reduced right ventricular systolic function.  4. Moderate to severe mitral valve regurgitation.  5. Moderate tricuspid regurgitation.  6. Moderately elevated right ventricular systolic pressure.  7. Aortic valve stenosis is not present.  8. There is reduction in the mitral valve leaflet motion with mild mitral stenosis. Mean gradient of 5 mmHg.  9. There is moderate to possible severe mitral regurgitation with central jet. We will consider MitraClip down the road. QUANTITATIVE DATA SUMMARY: MITRAL VALVE:                       Normal Ranges: MV Vmax:    1.98 m/s  (<=1.3m/s) MV peak PG:  15.7 mmHg (<5mmHg) MV mean P.0 mmHg  (<48mmHg) AORTIC VALVE:                        Normal Ranges: AoV Vmax:     0.78 m/s (<=1.7m/s) AoV Peak P.5 mmHg (<20mmHg) LVOT Max Yasir: 0.56 m/s (<=1.1m/s) TRICUSPID VALVE/RVSP:                             Normal Ranges: Peak TR Velocity: 3.56 m/s RV Syst Pressure: 53.7 mmHg (< 30mmHg) IVC Diam:         1.40 cm  [id number] Becky Vela MD Electronically signed on 2024 at 8:54:24 AM  ** Final **     ECG 12 lead    Result Date: 2024  Normal sinus rhythm Nonspecific T wave abnormality Abnormal ECG When compared with ECG of 2024 15:13, (unconfirmed) Sinus rhythm has replaced probable SVT Heart rate has decreased by 72 bpm Confirmed by Dean Hanks ([id number]) on 2024 8:44:24 AM       Assessment/Plan   Principal Problem:    CVA (cerebrovascular accident due to intracerebral hemorrhage) (Multi)  Active Problems:    Injury of head, initial encounter    82 yo F presented after a trip and a fall.  There was initial concern for facial droop, but the pt reports and I independently interpret her facial asymmetry to be her normal baseline.  In the meantime, she has been treated for GARY and UTI, which were likely contributory to the fall, and I think the facial droop was a red herring.  Nonetheless, we await the MRI and will follow up results.       I personally spent 30 minutes today, exclusive of procedures, providing care for this patient, including preparation, face to face time, documentation and other services such as review of medical records, diagnostic result, patient education, counseling, coordination of care as specified in the encounter.

## 2024-05-06 ENCOUNTER — APPOINTMENT (OUTPATIENT)
Dept: RADIOLOGY | Facility: HOSPITAL | Age: 84
DRG: 683 | End: 2024-05-06
Payer: MEDICARE

## 2024-05-06 ENCOUNTER — PHARMACY VISIT (OUTPATIENT)
Dept: PHARMACY | Facility: CLINIC | Age: 84
End: 2024-05-06
Payer: COMMERCIAL

## 2024-05-06 VITALS
HEIGHT: 64 IN | DIASTOLIC BLOOD PRESSURE: 77 MMHG | TEMPERATURE: 97.5 F | OXYGEN SATURATION: 100 % | RESPIRATION RATE: 18 BRPM | HEART RATE: 82 BPM | WEIGHT: 137.1 LBS | SYSTOLIC BLOOD PRESSURE: 116 MMHG | BODY MASS INDEX: 23.41 KG/M2

## 2024-05-06 PROBLEM — I61.9 CVA (CEREBROVASCULAR ACCIDENT DUE TO INTRACEREBRAL HEMORRHAGE) (MULTI): Status: RESOLVED | Noted: 2024-05-03 | Resolved: 2024-05-06

## 2024-05-06 PROBLEM — S09.90XA INJURY OF HEAD, INITIAL ENCOUNTER: Status: RESOLVED | Noted: 2024-05-04 | Resolved: 2024-05-06

## 2024-05-06 LAB
ANION GAP SERPL CALC-SCNC: 11 MMOL/L
BUN SERPL-MCNC: 26 MG/DL (ref 8–25)
CALCIUM SERPL-MCNC: 9.6 MG/DL (ref 8.5–10.4)
CHLORIDE SERPL-SCNC: 103 MMOL/L (ref 97–107)
CO2 SERPL-SCNC: 27 MMOL/L (ref 24–31)
CREAT SERPL-MCNC: 1.4 MG/DL (ref 0.4–1.6)
EGFRCR SERPLBLD CKD-EPI 2021: 37 ML/MIN/1.73M*2
ERYTHROCYTE [DISTWIDTH] IN BLOOD BY AUTOMATED COUNT: 14.8 % (ref 11.5–14.5)
GLUCOSE SERPL-MCNC: 113 MG/DL (ref 65–99)
HCT VFR BLD AUTO: 35.4 % (ref 36–46)
HGB BLD-MCNC: 10.8 G/DL (ref 12–16)
MCH RBC QN AUTO: 27 PG (ref 26–34)
MCHC RBC AUTO-ENTMCNC: 30.5 G/DL (ref 32–36)
MCV RBC AUTO: 89 FL (ref 80–100)
NRBC BLD-RTO: 0 /100 WBCS (ref 0–0)
PLATELET # BLD AUTO: 113 X10*3/UL (ref 150–450)
POTASSIUM SERPL-SCNC: 4.8 MMOL/L (ref 3.4–5.1)
RBC # BLD AUTO: 4 X10*6/UL (ref 4–5.2)
SODIUM SERPL-SCNC: 141 MMOL/L (ref 133–145)
WBC # BLD AUTO: 6.4 X10*3/UL (ref 4.4–11.3)

## 2024-05-06 PROCEDURE — 99233 SBSQ HOSP IP/OBS HIGH 50: CPT | Performed by: INTERNAL MEDICINE

## 2024-05-06 PROCEDURE — 2500000004 HC RX 250 GENERAL PHARMACY W/ HCPCS (ALT 636 FOR OP/ED): Performed by: EMERGENCY MEDICINE

## 2024-05-06 PROCEDURE — 2500000001 HC RX 250 WO HCPCS SELF ADMINISTERED DRUGS (ALT 637 FOR MEDICARE OP): Performed by: INTERNAL MEDICINE

## 2024-05-06 PROCEDURE — 70547 MR ANGIOGRAPHY NECK W/O DYE: CPT | Performed by: RADIOLOGY

## 2024-05-06 PROCEDURE — 85027 COMPLETE CBC AUTOMATED: CPT | Performed by: NURSE PRACTITIONER

## 2024-05-06 PROCEDURE — 99223 1ST HOSP IP/OBS HIGH 75: CPT

## 2024-05-06 PROCEDURE — 1090000001 HH PPS REVENUE CREDIT

## 2024-05-06 PROCEDURE — 1090000002 HH PPS REVENUE DEBIT

## 2024-05-06 PROCEDURE — RXMED WILLOW AMBULATORY MEDICATION CHARGE

## 2024-05-06 PROCEDURE — 70551 MRI BRAIN STEM W/O DYE: CPT | Performed by: RADIOLOGY

## 2024-05-06 PROCEDURE — 70544 MR ANGIOGRAPHY HEAD W/O DYE: CPT | Performed by: RADIOLOGY

## 2024-05-06 PROCEDURE — 36415 COLL VENOUS BLD VENIPUNCTURE: CPT | Performed by: NURSE PRACTITIONER

## 2024-05-06 PROCEDURE — 70547 MR ANGIOGRAPHY NECK W/O DYE: CPT

## 2024-05-06 PROCEDURE — 2500000006 HC RX 250 W HCPCS SELF ADMINISTERED DRUGS (ALT 637 FOR ALL PAYERS): Performed by: INTERNAL MEDICINE

## 2024-05-06 PROCEDURE — 70551 MRI BRAIN STEM W/O DYE: CPT

## 2024-05-06 PROCEDURE — 70544 MR ANGIOGRAPHY HEAD W/O DYE: CPT

## 2024-05-06 PROCEDURE — 82374 ASSAY BLOOD CARBON DIOXIDE: CPT | Performed by: NURSE PRACTITIONER

## 2024-05-06 PROCEDURE — 2500000004 HC RX 250 GENERAL PHARMACY W/ HCPCS (ALT 636 FOR OP/ED): Performed by: NURSE PRACTITIONER

## 2024-05-06 RX ORDER — SULFAMETHOXAZOLE AND TRIMETHOPRIM 800; 160 MG/1; MG/1
1 TABLET ORAL 2 TIMES DAILY
Qty: 14 TABLET | Refills: 0 | Status: SHIPPED | OUTPATIENT
Start: 2024-05-06 | End: 2024-05-15 | Stop reason: HOSPADM

## 2024-05-06 RX ORDER — HYDROXYZINE HYDROCHLORIDE 10 MG/1
10 TABLET, FILM COATED ORAL EVERY 6 HOURS PRN
Status: DISCONTINUED | OUTPATIENT
Start: 2024-05-06 | End: 2024-05-06 | Stop reason: HOSPADM

## 2024-05-06 RX ORDER — LOPERAMIDE HYDROCHLORIDE 2 MG/1
2 CAPSULE ORAL 4 TIMES DAILY PRN
Status: DISCONTINUED | OUTPATIENT
Start: 2024-05-06 | End: 2024-05-06 | Stop reason: HOSPADM

## 2024-05-06 RX ADMIN — LEVOTHYROXINE SODIUM 100 MCG: 0.1 TABLET ORAL at 06:17

## 2024-05-06 RX ADMIN — POTASSIUM CHLORIDE 20 MEQ: 1500 TABLET, EXTENDED RELEASE ORAL at 08:54

## 2024-05-06 RX ADMIN — PIPERACILLIN SODIUM AND TAZOBACTAM SODIUM 2.25 G: 2; .25 INJECTION, SOLUTION INTRAVENOUS at 12:55

## 2024-05-06 RX ADMIN — Medication 400 MG: at 08:54

## 2024-05-06 RX ADMIN — APIXABAN 5 MG: 5 TABLET, FILM COATED ORAL at 08:54

## 2024-05-06 RX ADMIN — SERTRALINE 200 MG: 100 TABLET, FILM COATED ORAL at 08:54

## 2024-05-06 RX ADMIN — CLOPIDOGREL BISULFATE 75 MG: 75 TABLET ORAL at 08:54

## 2024-05-06 RX ADMIN — PIPERACILLIN SODIUM AND TAZOBACTAM SODIUM 2.25 G: 2; .25 INJECTION, SOLUTION INTRAVENOUS at 06:17

## 2024-05-06 ASSESSMENT — PAIN SCALES - PAIN ASSESSMENT IN ADVANCED DEMENTIA (PAINAD): BREATHING: NORMAL

## 2024-05-06 ASSESSMENT — COGNITIVE AND FUNCTIONAL STATUS - GENERAL
DAILY ACTIVITIY SCORE: 24
CLIMB 3 TO 5 STEPS WITH RAILING: A LITTLE
WALKING IN HOSPITAL ROOM: A LITTLE
MOBILITY SCORE: 22

## 2024-05-06 ASSESSMENT — PAIN SCALES - GENERAL: PAINLEVEL_OUTOF10: 0 - NO PAIN

## 2024-05-06 ASSESSMENT — PAIN SCALES - WONG BAKER: WONGBAKER_NUMERICALRESPONSE: NO HURT

## 2024-05-06 NOTE — DISCHARGE INSTRUCTIONS
Follow-up with PCP within 1 week.  Follow-up with nephrologist within 2 weeks.  Repeat renal function panel within 1 week.

## 2024-05-06 NOTE — CONSULTS
Inpatient consult to Infectious Diseases  Consult performed by: Augustine Hudson MD  Consult ordered by: Lori Robles, APRN-CNP          Primary MD: Maritza Do MD    Reason For Consult  Urinary tract infection    History Of Present Illness  Linette Doyle is a 83 y.o. female presenting with mechanical fall.  She is reported to have tripped over and fell at home.  She did not have any loss of consciousness, but had head trauma.  She came to the hospital for further evaluation and management.  Workup was remarkable for acute kidney injury, and urinary tract infection.  Urine culture was positive for Enterobacter, susceptible to quinolone, but patient is on amiodarone.  Patient is on IV Zosyn.  She denies any fever or chills.  She denies any dysuria, abdominal pain, nausea or vomiting.       Past Medical History  She has a past medical history of Angina pectoris (CMS-HCC) (10/22/2023), Atherosclerosis of coronary artery (08/21/2019), Atherosclerotic heart disease of native coronary artery with other forms of angina pectoris (CMS-HCC) (10/22/2023), Hypercholesterolemia (12/28/2018), Hyperlipidemia (10/22/2023), Presence of cardiac pacemaker (10/22/2023), Primary hypertension (12/28/2018), Shortness of breath (11/26/2019), Sick sinus syndrome (Multi) (10/22/2023), Sinus bradycardia (10/22/2023), and Stage 3a chronic kidney disease (Multi) (11/25/2019).    Surgical History  She has a past surgical history that includes Cardiac catheterization (N/A, 02/02/2024); Cardiac catheterization (N/A, 02/02/2024); Cardiac catheterization (N/A, 02/08/2024); Cardiac catheterization (N/A, 02/08/2024); and pacemaker placement (07/04/2019).     Social History     Occupational History    Not on file   Tobacco Use    Smoking status: Never     Passive exposure: Never    Smokeless tobacco: Never   Vaping Use    Vaping status: Never Used   Substance and Sexual Activity    Alcohol use: Never    Drug use: Never    Sexual  activity: Defer     Travel History   Travel since 04/06/24    No documented travel since 04/06/24               Family History  Family History   Problem Relation Name Age of Onset    No Known Problems Mother      No Known Problems Father       Allergies  Iodinated contrast media     Immunization History   Administered Date(s) Administered    Flu vaccine (IIV4), preservative free *Check age/dose* 09/09/2020    Influenza, High Dose Seasonal, Preservative Free 09/30/2013, 09/16/2016, 09/15/2017, 09/14/2019    Influenza, Seasonal, Quadrivalent, Adjuvanted 09/06/2021, 09/12/2022    Influenza, Unspecified 09/30/2013, 09/16/2015, 09/16/2016, 09/15/2017, 09/14/2019, 09/09/2020, 09/06/2021, 09/01/2022, 09/12/2022    Influenza, seasonal, injectable 09/01/2020    Influenza, seasonal, injectable, preservative free 09/16/2015    Moderna COVID-19 vaccine, bivalent, blue cap/gray label *Check age/dose* 10/12/2022    Moderna SARS-CoV-2 Vaccination 02/26/2021, 03/26/2021, 10/30/2021, 04/05/2022    Novel influenza-H1N1-09, preservative-free 12/24/2009    Pneumococcal Conjugate PCV 7 03/30/2015    Pneumococcal conjugate vaccine, 13-valent (PREVNAR 13) 03/30/2015    Pneumococcal polysaccharide vaccine, 23-valent, age 2 years and older (PNEUMOVAX 23) 01/01/2012    Zoster, live 01/01/2012     Medications  Home medications:  Medications Prior to Admission   Medication Sig Dispense Refill Last Dose    amiodarone (Pacerone) 200 mg tablet Take 1 tablet (200 mg) by mouth 2 times a day. 180 tablet 0 5/2/2024    apixaban (Eliquis) 5 mg tablet Take 1 tablet (5 mg) by mouth 2 times a day. 180 tablet 3 5/2/2024    atorvastatin (Lipitor) 80 mg tablet Take 1 tablet (80 mg) by mouth once daily at bedtime. 30 tablet 3 5/2/2024    cholecalciferol (Vitamin D-3) 50 MCG (2000 UT) tablet Take 1 tablet (2,000 Units) by mouth once daily.   5/2/2024    clopidogrel (Plavix) 75 mg tablet Take 1 tablet (75 mg) by mouth once daily. Do not start before February  15, 2024. 30 tablet 3 5/2/2024    dilTIAZem (Cardizem) 120 mg immediate release tablet Take 1 tablet (120 mg) by mouth 2 times a day.   Unknown    isosorbide mononitrate ER (Imdur) 30 mg 24 hr tablet Take 1 tablet (30 mg) by mouth once daily. Do not crush or chew. 90 tablet 0 5/2/2024    levothyroxine (Synthroid, Levoxyl) 100 mcg tablet Take 1 tablet (100 mcg) by mouth once daily. 1 tablet in the morning on an empty stomach Orally Once a day for 30 day(s) 90 tablet 0 5/2/2024    metoprolol tartrate (Lopressor) 50 mg tablet Take 1 tablet by mouth 2 times a day. 180 tablet 0 5/2/2024    multivitamin (MULTIPLE VITAMINS ORAL) Take 1 capsule by mouth once daily.   5/2/2024    mv-min/FA/vit K/lycop/lut/zeax (OCUVITE EYE PLUS MULTI ORAL) Take by mouth.   5/2/2024    omeprazole (PriLOSEC) 20 mg DR capsule Take 1 capsule (20 mg) by mouth once daily. 90 capsule 0 5/2/2024    oxygen (O2) gas therapy Inhale 1 each every 12 hours. (Patient taking differently: Inhale 3 L/min if needed (sob, or low oxygen levels).)   5/2/2024    potassium chloride CR (Klor-Con) 10 mEq ER tablet Take 1 tablet (10 mEq) by mouth 2 times a day. Do not crush, chew, or split. 180 tablet 0 5/2/2024    sertraline (Zoloft) 100 mg tablet Take 2 tablets (200 mg) by mouth once daily. 90 tablet 0 5/2/2024    torsemide (Demadex) 20 mg tablet Take 1 tablet (20 mg) by mouth once daily. 90 tablet 0 5/2/2024     Current medications:  Scheduled medications  amiodarone, 200 mg, oral, BID  apixaban, 5 mg, oral, BID  atorvastatin, 80 mg, oral, Nightly  clopidogrel, 75 mg, oral, Daily  [Held by provider] isosorbide mononitrate ER, 30 mg, oral, Daily  levothyroxine, 100 mcg, oral, Daily before breakfast  magnesium oxide, 400 mg, oral, Daily  metoprolol tartrate, 50 mg, oral, BID  piperacillin-tazobactam, 2.25 g, intravenous, q6h  potassium chloride CR, 20 mEq, oral, BID  sertraline, 200 mg, oral, Daily  [Held by provider] torsemide, 20 mg, oral, Daily      Continuous  medications     PRN medications  PRN medications: acetaminophen, alum-mag hydroxide-simeth, melatonin, ondansetron, ondansetron    Review of Systems   All other systems reviewed and are negative.       Objective  Range of Vitals (last 24 hours)  Heart Rate:  [59-60]   Temp:  [36.3 °C (97.4 °F)-36.6 °C (97.9 °F)]   Resp:  [15-17]   BP: (118-156)/(47-78)   Weight:  [62.2 kg (137 lb 1.6 oz)]   SpO2:  [94 %-98 %]   Daily Weight  05/06/24 : 62.2 kg (137 lb 1.6 oz)    Body mass index is 23.53 kg/m².     Physical Exam  Constitutional:       Appearance: Normal appearance.   HENT:      Head: Normocephalic and atraumatic.      Right Ear: External ear normal.      Left Ear: External ear normal.      Nose: Nose normal.   Eyes:      General: Scleral icterus present.      Extraocular Movements: Extraocular movements intact.      Conjunctiva/sclera: Conjunctivae normal.   Cardiovascular:      Heart sounds: Normal heart sounds, S1 normal and S2 normal.   Pulmonary:      Effort: Pulmonary effort is normal.      Breath sounds: Normal breath sounds and air entry.   Abdominal:      General: Bowel sounds are normal.      Palpations: Abdomen is soft.   Musculoskeletal:      Cervical back: Normal range of motion and neck supple.      Right lower leg: No edema.      Left lower leg: No edema.   Skin:     General: Skin is warm and dry.   Neurological:      Mental Status: She is alert.   Psychiatric:         Behavior: Behavior normal. Behavior is cooperative.          Relevant Results  Outside Hospital Results    Labs  Results from last 72 hours   Lab Units 05/06/24  0451 05/05/24  0939 05/04/24  0513   WBC AUTO x10*3/uL 6.4 6.8 8.0   HEMOGLOBIN g/dL 10.8* 11.4* 10.3*   HEMATOCRIT % 35.4* 37.0 33.5*   PLATELETS AUTO x10*3/uL 113* 116* 114*     Results from last 72 hours   Lab Units 05/06/24  0451 05/05/24  0940 05/04/24  0513   SODIUM mmol/L 141 141 142   POTASSIUM mmol/L 4.8 4.7 4.1   CHLORIDE mmol/L 103 102 102   CO2 mmol/L 27 27 25   BUN  "mg/dL 26* 27* 34*   CREATININE mg/dL 1.40 1.40 1.70*   GLUCOSE mg/dL 113* 130* 107*   CALCIUM mg/dL 9.6 9.7 9.5   ANION GAP mmol/L 11 12 15   EGFR mL/min/1.73m*2 37* 37* 29*         Estimated Creatinine Clearance: 25.4 mL/min (by C-G formula based on SCr of 1.4 mg/dL).  No results found for: \"CRP\", \"SEDRATE\"  HIV 1/2 Antigen/Antibody Screen with Reflex to Confirmation   Date Value Ref Range Status   10/24/2023 Nonreactive Nonreactive Final     Hepatitis C AB   Date Value Ref Range Status   10/24/2023 Nonreactive Nonreactive Final     Comment:     Results from patients taking biotin supplements or receiving high-dose biotin therapy should be interpreted with caution due to possible interference with this test. Providers may contact their local laboratory for further information.     Microbiology  Susceptibility data from last 90 days.  Collected Specimen Info Organism Amoxicillin/Clavulanate Ampicillin Ampicillin/Sulbactam Cefazolin Cefepime Ciprofloxacin Daptomycin Gentamicin Levofloxacin Linezolid Nitrofurantoin Penicillin   05/03/24 Urine from Clean Catch/Voided Enterobacter cloacae complex R R R R S S  S   S    02/09/24 Urine from Clean Catch/Voided Enterococcus faecium  R    R SDD  R S S R     Collected Specimen Info Organism Piperacillin/Tazobactam Trimethoprim/Sulfamethoxazole Vancomycin   05/03/24 Urine from Clean Catch/Voided Enterobacter cloacae complex S S    02/09/24 Urine from Clean Catch/Voided Enterococcus faecium  R R     Imaging  MR brain wo IV contrast    Result Date: 5/6/2024  Interpreted By:  Luciano Danielson, STUDY: MR ANGIO NECK WO IV CONTRAST; MR BRAIN WO IV CONTRAST; MR ANGIO HEAD WO IV CONTRAST;  5/6/2024 3:25 pm   INDICATION: Signs/Symptoms:TIA.   COMPARISON: CT head dated 05/02/2024.   ACCESSION NUMBER(S): [id number]; [id number]; [id number]   ORDERING CLINICIAN: LENNY MARAVILLA   TECHNIQUE: 1) Standard multiplanar multisequence MR imaging was performed through the brain without " intravenous contrast. 2) Noncontrast 3D time-of-flight MRA imaging was performed through the brain. 3) Noncontrast 2D time-of-flight MRA imaging was performed through the neck.   Motion artifact degrades assessment, particularly on MRA neck imaging.   FINDINGS: BRAIN:   Parenchyma: There is no diffusion restriction abnormality to suggest acute infarct.  No evidence of recent hemorrhage. There is no mass effect or midline shift. Moderate chronic small vessel ischemic disease involving the bilateral cerebral hemispheres and central joann. Questionable tiny remote infarcts within the cerebellum.   CSF Spaces: The ventricles, sulci and basal cisterns are within normal limits for age with generalized brain atrophy. Basilar cisterns are patent.   Extra-axial spaces: No extra-axial fluid collection.   Paranasal Sinuses: Visualized paranasal sinuses are clear.   Mastoids: Trace fluid bilaterally.   Orbits: Bilateral native lens extractions.   Calvarium: No suspicious osseous marrow signal.   Scalp: Mild subgaleal soft tissue swelling posteriorly.     VASCULAR FINDINGS:   Common carotid arteries: Visualized segments demonstrate patent flow signal bilaterally.   External carotid arteries: Patent flow signal bilaterally.   Internal carotid arteries: Patent flow signal bilaterally. Likely mild atherosclerotic narrowing of the right internal carotid artery origin with no substantial (less than 20%) stenosis by NASCET criteria with motion degrading assessment. There is concern for at least moderate, 50% stenosis of the left internal carotid artery origin by NASCET criteria with motion artifact limiting quantification. The cervical left internal carotid artery also appears smaller in caliber relative to the right, possibly reflecting the flow limiting nature of the proximal stenosis versus congenital variant. Intracranial segments appear patent without flow significant stenosis. No definite aneurysm.   Anterior cerebral arteries:  Hypoplastic versus absent left A1 segment. Otherwise normal flow signal bilaterally.   Middle cerebral arteries: Normal flow signal bilaterally.   Vertebral arteries: The visualized segments of the vertebral arteries appear patent bilaterally with no flow significant stenosis.   Basilar artery: Normal flow signal.   Posterior communicating arteries: Visualized on the right, diminutive versus absent on the left.   Posterior cerebral arteries: Fetal versus near fetal origin on the right. Normal flow signal bilaterally.       No acute infarct, recent hemorrhage, or intracranial mass effect. Moderate chronic small vessel ischemic disease and generalized brain atrophy.   Motion artifact degrades assessment, particularly on MRA neck imaging. No source vessel arterial occlusion identified within the head and neck. Multiple anatomic variations as above.   Concern for at least moderate (50%) stenosis of the left internal carotid artery origin by NASCET criteria with motion artifact degrading assessment.   MACRO: None   Signed by: Luciano Danielson 5/6/2024 3:49 PM Dictation workstation:   [id number]    MR angio neck wo IV contrast    Result Date: 5/6/2024  Interpreted By:  Luciano Danielson, STUDY: MR ANGIO NECK WO IV CONTRAST; MR BRAIN WO IV CONTRAST; MR ANGIO HEAD WO IV CONTRAST;  5/6/2024 3:25 pm   INDICATION: Signs/Symptoms:TIA.   COMPARISON: CT head dated 05/02/2024.   ACCESSION NUMBER(S): [id number]; [id number]; [id number]   ORDERING CLINICIAN: LENNY MARAVILLA   TECHNIQUE: 1) Standard multiplanar multisequence MR imaging was performed through the brain without intravenous contrast. 2) Noncontrast 3D time-of-flight MRA imaging was performed through the brain. 3) Noncontrast 2D time-of-flight MRA imaging was performed through the neck.   Motion artifact degrades assessment, particularly on MRA neck imaging.   FINDINGS: BRAIN:   Parenchyma: There is no diffusion restriction abnormality to suggest acute infarct.  No  evidence of recent hemorrhage. There is no mass effect or midline shift. Moderate chronic small vessel ischemic disease involving the bilateral cerebral hemispheres and central joann. Questionable tiny remote infarcts within the cerebellum.   CSF Spaces: The ventricles, sulci and basal cisterns are within normal limits for age with generalized brain atrophy. Basilar cisterns are patent.   Extra-axial spaces: No extra-axial fluid collection.   Paranasal Sinuses: Visualized paranasal sinuses are clear.   Mastoids: Trace fluid bilaterally.   Orbits: Bilateral native lens extractions.   Calvarium: No suspicious osseous marrow signal.   Scalp: Mild subgaleal soft tissue swelling posteriorly.     VASCULAR FINDINGS:   Common carotid arteries: Visualized segments demonstrate patent flow signal bilaterally.   External carotid arteries: Patent flow signal bilaterally.   Internal carotid arteries: Patent flow signal bilaterally. Likely mild atherosclerotic narrowing of the right internal carotid artery origin with no substantial (less than 20%) stenosis by NASCET criteria with motion degrading assessment. There is concern for at least moderate, 50% stenosis of the left internal carotid artery origin by NASCET criteria with motion artifact limiting quantification. The cervical left internal carotid artery also appears smaller in caliber relative to the right, possibly reflecting the flow limiting nature of the proximal stenosis versus congenital variant. Intracranial segments appear patent without flow significant stenosis. No definite aneurysm.   Anterior cerebral arteries: Hypoplastic versus absent left A1 segment. Otherwise normal flow signal bilaterally.   Middle cerebral arteries: Normal flow signal bilaterally.   Vertebral arteries: The visualized segments of the vertebral arteries appear patent bilaterally with no flow significant stenosis.   Basilar artery: Normal flow signal.   Posterior communicating arteries:  Visualized on the right, diminutive versus absent on the left.   Posterior cerebral arteries: Fetal versus near fetal origin on the right. Normal flow signal bilaterally.       No acute infarct, recent hemorrhage, or intracranial mass effect. Moderate chronic small vessel ischemic disease and generalized brain atrophy.   Motion artifact degrades assessment, particularly on MRA neck imaging. No source vessel arterial occlusion identified within the head and neck. Multiple anatomic variations as above.   Concern for at least moderate (50%) stenosis of the left internal carotid artery origin by NASCET criteria with motion artifact degrading assessment.   MACRO: None   Signed by: Luciano Danielson 5/6/2024 3:49 PM Dictation workstation:   [id number]    MR angio head wo IV contrast    Result Date: 5/6/2024  Interpreted By:  Luciano Danielson, STUDY: MR ANGIO NECK WO IV CONTRAST; MR BRAIN WO IV CONTRAST; MR ANGIO HEAD WO IV CONTRAST;  5/6/2024 3:25 pm   INDICATION: Signs/Symptoms:TIA.   COMPARISON: CT head dated 05/02/2024.   ACCESSION NUMBER(S): [id number]; [id number]; [id number]   ORDERING CLINICIAN: LENNY MARAVILLA   TECHNIQUE: 1) Standard multiplanar multisequence MR imaging was performed through the brain without intravenous contrast. 2) Noncontrast 3D time-of-flight MRA imaging was performed through the brain. 3) Noncontrast 2D time-of-flight MRA imaging was performed through the neck.   Motion artifact degrades assessment, particularly on MRA neck imaging.   FINDINGS: BRAIN:   Parenchyma: There is no diffusion restriction abnormality to suggest acute infarct.  No evidence of recent hemorrhage. There is no mass effect or midline shift. Moderate chronic small vessel ischemic disease involving the bilateral cerebral hemispheres and central joann. Questionable tiny remote infarcts within the cerebellum.   CSF Spaces: The ventricles, sulci and basal cisterns are within normal limits for age with generalized brain  atrophy. Basilar cisterns are patent.   Extra-axial spaces: No extra-axial fluid collection.   Paranasal Sinuses: Visualized paranasal sinuses are clear.   Mastoids: Trace fluid bilaterally.   Orbits: Bilateral native lens extractions.   Calvarium: No suspicious osseous marrow signal.   Scalp: Mild subgaleal soft tissue swelling posteriorly.     VASCULAR FINDINGS:   Common carotid arteries: Visualized segments demonstrate patent flow signal bilaterally.   External carotid arteries: Patent flow signal bilaterally.   Internal carotid arteries: Patent flow signal bilaterally. Likely mild atherosclerotic narrowing of the right internal carotid artery origin with no substantial (less than 20%) stenosis by NASCET criteria with motion degrading assessment. There is concern for at least moderate, 50% stenosis of the left internal carotid artery origin by NASCET criteria with motion artifact limiting quantification. The cervical left internal carotid artery also appears smaller in caliber relative to the right, possibly reflecting the flow limiting nature of the proximal stenosis versus congenital variant. Intracranial segments appear patent without flow significant stenosis. No definite aneurysm.   Anterior cerebral arteries: Hypoplastic versus absent left A1 segment. Otherwise normal flow signal bilaterally.   Middle cerebral arteries: Normal flow signal bilaterally.   Vertebral arteries: The visualized segments of the vertebral arteries appear patent bilaterally with no flow significant stenosis.   Basilar artery: Normal flow signal.   Posterior communicating arteries: Visualized on the right, diminutive versus absent on the left.   Posterior cerebral arteries: Fetal versus near fetal origin on the right. Normal flow signal bilaterally.       No acute infarct, recent hemorrhage, or intracranial mass effect. Moderate chronic small vessel ischemic disease and generalized brain atrophy.   Motion artifact degrades assessment,  particularly on MRA neck imaging. No source vessel arterial occlusion identified within the head and neck. Multiple anatomic variations as above.   Concern for at least moderate (50%) stenosis of the left internal carotid artery origin by NASCET criteria with motion artifact degrading assessment.   MACRO: None   Signed by: Luciano Danielson 5/6/2024 3:49 PM Dictation workstation:   [id number]    ECG 12 lead    Result Date: 5/6/2024  AV dual-paced rhythm with prolonged AV conduction Abnormal ECG When compared with ECG of 07-APR-2024 16:49, Electronic ventricular pacemaker has replaced Electronic atrial pacemaker    Vascular US Carotid Artery Duplex Bilateral    Result Date: 5/3/2024  Preliminary Cardiology Report           [facility] [id number] [place], [place] [address]            Phone [phone]      Preliminary Vascular Lab Report  [facility] US CAROTID ARTERY DUPLEX BILATERAL  Patient Name:     SAMUEL MYLA Buenrostro Physician:  [id number] Mason Julian MD Study Date:       5/3/2024      Ordering Provider:  [id number] GABY WALLACE MRN/PID:          [id number]      Fellow: Accession#:       [id number]  Technologist:       Molly Cook RVT YOB: 1940    Technologist 2: Gender:           F             Encounter#:         [id number] Admission Status: Emergency     Location Performed: [facility]  Diagnosis/ICD: Syncope and collapse-R55 Indication:    Syncope CPT Codes:     41894 Cerebrovascular Carotid Duplex scan complete  PRELIMINARY CONCLUSIONS:  Right Carotid: Findings are consistent with less than 50% stenosis of the right proximal internal carotid artery. Right external carotid artery appears patent with no evidence of stenosis. The right vertebral artery is patent with antegrade flow. No evidence of hemodynamically significant stenosis in the right subclavian artery. Left Carotid: Findings are consistent with 50 to 69% stenosis of the left proximal internal carotid artery. Left  external carotid artery appears patent with no evidence of stenosis. The left vertebral artery is patent with antegrade flow. No evidence of hemodynamically significant stenosis in the left subclavian artery.  Imaging & Doppler Findings: Right Plaque Morph: The proximal right internal carotid artery demonstrates heterogenous and calcified plaque. The distal right common carotid artery demonstrates heterogenous and calcified plaque. Left Plaque Morph: The proximal left internal carotid artery demonstrates heterogenous and calcified plaque. The proximal left external carotid artery demonstrates heterogenous plaque. The distal left common carotid artery demonstrates heterogenous, calcified and irregular plaque.   Right                       Left   PSV     EDV                PSV      EDV 75 cm/s           CCA P    68 cm/s 57 cm/s           CCA D    60 cm/s                    Bulb    170 cm/s 30 cm/s 98 cm/s 17 cm/s   ICA P    166 cm/s 25 cm/s 64 cm/s 12 cm/s   ICA M    79 cm/s  14 cm/s 80 cm/s 18 cm/s   ICA D    59 cm/s  11 cm/s 76 cm/s            ECA     95 cm/s 28 cm/s 6 cm/s  Vertebral  52 cm/s  9 cm/s 83 cm/s         Subclavian 114 cm/s                Right Left ICA/CCA Ratio  1.7  2.8   VASCULAR PRELIMINARY REPORT completed by Molly Cook RVT on 5/3/2024 at 1:25:51 PM  ** Final **     CT head wo IV contrast    Result Date: 5/2/2024  Interpreted By:  David Hoang, STUDY: CT CERVICAL SPINE WO IV CONTRAST; CT HEAD WO IV CONTRAST; ;  5/2/2024 10:35 pm   INDICATION: Signs/Symptoms:fall neck trauma; Signs/Symptoms:fall, head injury on plavix.   COMPARISON: Noncontrast CT exams of head and cervical spine of 10/09/2020.   ACCESSION NUMBER(S): [id number]; [id number]   ORDERING CLINICIAN: VIKTORIA AVILES   TECHNIQUE: Noncontrast CT exams of the head and cervical spine with multiplanar reformations.   FINDINGS: BRAIN PARENCHYMA: Moderate to advanced volume loss.  There is periventricular and subcortical white matter  hypoattenuation, most in keeping with chronic microvascular ischemic change.  Gray-white matter interfaces are preserved. No mass, mass effect or midline shift.   HEMORRHAGE: No acute intracranial hemorrhage. VENTRICLES and EXTRA-AXIAL SPACES: Normal size. EXTRACRANIAL SOFT TISSUES: Moderate to large right parieto-occipital scalp hematoma. PARANASAL SINUSES/MASTOIDS: The visualized paranasal sinuses and mastoid air cells are aerated. CALVARIUM: No depressed skull fracture. No destructive osseous lesion.   OTHER FINDINGS: None.   CERVICAL SPINE:   Straightening of normal cervical lordosis could reflect positioning or muscle spasm. ALIGNMENT: Normal. VERTEBRAE: No acute fracture. Redemonstrated small ossific fragment along the anterior aspect of the inferior C6 endplate. Vertebral stature is maintained. Moderate to severe multilevel hypertrophic facet osteoarthropathy. SPINAL CANAL: No critical spinal canal stenosis. PREVERTEBRAL SOFT TISSUES: No prevertebral soft tissue swelling. LUNG APICES: Interstitial prominence in the visible lungs probably relates to chronic changes with or without acute pulmonary interstitial edema/CHF. Please correlate clinically with volume status.   OTHER FINDINGS: None.       No evidence of acute intracranial abnormality.   No acute cervical spine fracture or malalignment.     MACRO: None   Signed by: David Hoang 5/2/2024 10:45 PM Dictation workstation:   [id number]    CT cervical spine wo IV contrast    Result Date: 5/2/2024  Interpreted By:  David Hoang, STUDY: CT CERVICAL SPINE WO IV CONTRAST; CT HEAD WO IV CONTRAST; ;  5/2/2024 10:35 pm   INDICATION: Signs/Symptoms:fall neck trauma; Signs/Symptoms:fall, head injury on plavix.   COMPARISON: Noncontrast CT exams of head and cervical spine of 10/09/2020.   ACCESSION NUMBER(S): [id number]; [id number]   ORDERING CLINICIAN: VIKTORIA AVILES   TECHNIQUE: Noncontrast CT exams of the head and cervical spine with multiplanar reformations.    FINDINGS: BRAIN PARENCHYMA: Moderate to advanced volume loss.  There is periventricular and subcortical white matter hypoattenuation, most in keeping with chronic microvascular ischemic change.  Gray-white matter interfaces are preserved. No mass, mass effect or midline shift.   HEMORRHAGE: No acute intracranial hemorrhage. VENTRICLES and EXTRA-AXIAL SPACES: Normal size. EXTRACRANIAL SOFT TISSUES: Moderate to large right parieto-occipital scalp hematoma. PARANASAL SINUSES/MASTOIDS: The visualized paranasal sinuses and mastoid air cells are aerated. CALVARIUM: No depressed skull fracture. No destructive osseous lesion.   OTHER FINDINGS: None.   CERVICAL SPINE:   Straightening of normal cervical lordosis could reflect positioning or muscle spasm. ALIGNMENT: Normal. VERTEBRAE: No acute fracture. Redemonstrated small ossific fragment along the anterior aspect of the inferior C6 endplate. Vertebral stature is maintained. Moderate to severe multilevel hypertrophic facet osteoarthropathy. SPINAL CANAL: No critical spinal canal stenosis. PREVERTEBRAL SOFT TISSUES: No prevertebral soft tissue swelling. LUNG APICES: Interstitial prominence in the visible lungs probably relates to chronic changes with or without acute pulmonary interstitial edema/CHF. Please correlate clinically with volume status.   OTHER FINDINGS: None.       No evidence of acute intracranial abnormality.   No acute cervical spine fracture or malalignment.     MACRO: None   Signed by: David Hoang 5/2/2024 10:45 PM Dictation workstation:   [id number]    Transesophageal Echo (AUGUSTIN)    Result Date: 4/10/2024           [address] [address]            Phone [phone] TRANSESOPHAGEAL ECHOCARDIOGRAM REPORT  Patient Name:     SAMUEL Buenrostro Physician:   Zuri Vela MD Study Date:       4/10/2024            Ordering Provider:   Zuri COON  ESTEPHANIE ZENG MRN/PID:          [id number]             Fellow: Accession#:       [id number]         Nurse: Date of           1940 / 83      Sonographer:         Jordy Perales RDCS Birth/Age:        years Gender:           F                    Additional Staff: Height:           162.00 cm            Admit Date: Weight:           67.00 kg             Admission Status:    Inpatient - Routine BSA / BMI:        1.72 m2 / 25.53      [place] [facility] [place]                   kg/m2 Blood Pressure: 137 /77 mmHg Study Type:    TRANSESOPHAGEAL ECHO (AUGUSTIN) Diagnosis/ICD: Cardiac murmur, unspecified-R01.1 Indication:    Murmur / MR CPT Codes:     AUGUSTIN Complete-98823 Patient History: Pertinent         CAD, Chest Pain, CHF and Murmur. Abn Ekg, Pacemaker, Murmur, History:          PCI. Study Detail: The following Echo studies were performed: 2D, Doppler and color               flow.  PHYSICIAN INTERPRETATION: AUGUSTIN Details: The AUGUSTIN probe used was [id number]. Technically adequate omniplane transesophageal echocardiogram performed. Color flow Doppler echo was performed to assess for the presence of a patent foramen ovale. AUGUSTIN Medication: The patient was sedated by Anesthesia; please refer to anesthesia flow sheet for medications used. AUGUSTIN Procedure: The probe was passed without difficulty. The following complication was encountered during the procedure: Patient tolerated the procedure well without any apparent complications. Left Ventricle: Left ventricular systolic function is normal, with an estimated ejection fraction of 55-60%. There are no regional wall motion abnormalities. The left ventricular cavity size is normal. Spectral Doppler shows a normal pattern of left ventricular diastolic filling. Left Atrium: The left atrium is mildly dilated. There is a normal sized left atrial appendage. Right Ventricle: The right ventricle is normal in size. There is normal right ventricular global systolic function. Right Atrium: The  right atrium is mildly dilated. Aortic Valve: The aortic valve appears structurally normal. There is no evidence of aortic valve regurgitation. Mitral Valve: The mitral valve is normal in structure. There is moderate mitral valve regurgitation. There is moderate mitral valve regurgitation at the most central jet the mitral valve structure within normal limit. Tricuspid Valve: The tricuspid valve is structurally normal. There is mild tricuspid regurgitation. Pulmonic Valve: The pulmonic valve is structurally normal. There is no indication of pulmonic valve regurgitation. Pericardium: There is no pericardial effusion noted. Aorta: The aortic root is normal. Systemic Veins: The inferior vena cava appears to be of normal size. There is IVC inspiratory collapse greater than 50%.  CONCLUSIONS:  1. Left ventricular systolic function is normal with a 55-60% estimated ejection fraction.  2. There is moderate mitral valve regurgitation at the most central jet the mitral valve structure within normal limit.  3. Moderate mitral valve regurgitation.  4. Mild tricuspid regurgitation is visualized. QUANTITATIVE DATA SUMMARY: MITRAL INSUFFICIENCY:                           Normal Ranges: PISA Radius:  0.6 cm MR VTI:       170.00 cm MR Vmax:      567.00 cm/s MR Alias Yasir: 30.8 cm/s MR Volume:    20.89 ml MR Flow Rt:   69.67 ml/s MR EROA:      0.12 cm2  [id number] Becky Vela MD Electronically signed on 4/10/2024 at 1:33:21 PM  ** Final **     XR chest 1 view    Result Date: 4/8/2024  Interpreted By:  Tamia Glass, STUDY: XR CHEST 1 VIEW 4/8/2024 9:43 am   INDICATION: Signs/Symptoms:hypoxemia   COMPARISON: 04/05/2024   ACCESSION NUMBER(S): [id number]   ORDERING CLINICIAN: MELANIE HARDWICK   TECHNIQUE: AP erect view of the chest   FINDINGS: The heart is enlarged with bipolar pacemaker leads in right atrium and right ventricle.   There are bilateral pleural effusions, right larger than left. The right pleural effusion has increased in  size and is now considered moderate to moderately large. Left effusion appears unchanged.   There is mild pulmonary vascular congestion. There is probably some atelectasis within each lower lobe as well.       Cardiomegaly and mild pulmonary vascular congestion with bilateral pleural effusions, right larger than left. The right effusion has increased in size.   Probable compressive atelectasis in each lower lung zone.   Signed by: Tamia Galss 4/8/2024 10:29 AM Dictation workstation:   [id number]    Transthoracic Echo (TTE) Limited    Result Date: 4/8/2024           [place] [address]            Phone [phone] TRANSTHORACIC ECHOCARDIOGRAM REPORT  Patient Name:     SAMUEL VILLALOBOS        Reading Physician:   Zuri Vela MD Study Date:       4/8/2024             Ordering Provider:   Zuri VELA MRN/PID:          [id number]             Fellow: Accession#:       [id number]         Nurse: Date of           1940 / 83      Sonographer:         Yulisa Kennedy [facility] Birth/Age:        years Gender:           F                    Additional Staff: Height:           162.56 cm            Admit Date: Weight:           72.58 kg             Admission Status:    Inpatient - Routine BSA / BMI:        1.78 m2 / 27.46      Department Location: [facility]                   kg/m2 Blood Pressure: 134 /70 mmHg Study Type:    TRANSTHORACIC ECHO (TTE) LIMITED Diagnosis/ICD: Acute diastolic (congestive) heart failure (CHF)-I50.31;                Nonrheumatic mitral (valve) insufficiency-I34.0 Indication:    MR, Heart failure CPT Codes:     Echo Limited-92479; Doppler Limited-86207; Color Doppler-11623 Patient History: Valve Disorders:   Mitral Regurgitation. Pertinent History: A-Fib, Bradycardia, Heart failure,Sick sinus syndrome and                    HTN. Study Detail: The following Echo studies were  performed: 2D, M-Mode, Doppler and               color flow. Patient has a pacemaker.  PHYSICIAN INTERPRETATION: Left Ventricle: Left ventricular systolic function is normal, with an estimated ejection fraction of 55-60%. There are no regional wall motion abnormalities. The left ventricular cavity size is normal. There is mild left ventricular hypertrophy. Spectral Doppler shows a normal pattern of left ventricular diastolic filling. Left Atrium: The left atrium is normal in size. Right Ventricle: The right ventricle is moderately enlarged. There is reduced right ventricular systolic function. A device is visualized in the right ventricle. Right Atrium: The right atrium is normal in size. There is a device visualized in the right atrium. Aortic Valve: The aortic valve is trileaflet. There is no evidence of aortic valve stenosis. There is no evidence of aortic valve regurgitation. The peak instantaneous gradient of the aortic valve is 2.5 mmHg. Mitral Valve: The mitral valve is normal in structure. There is evidence of mild mitral valve stenosis. The doppler estimated mean and peak diastolic pressure gradients are 5.0 mmHg and 15.7 mmHg respectively. There is moderate to severe mitral valve regurgitation which is centrally directed. Tricuspid Valve: The tricuspid valve is structurally normal. There is moderate tricuspid regurgitation. The Doppler estimated RVSP is moderately elevated at 53.7 mmHg. Pulmonic Valve: The pulmonic valve is structurally normal. There is mild pulmonic valve regurgitation. Pericardium: There is a trivial pericardial effusion. Aorta: The aortic root is normal. Systemic Veins: The inferior vena cava appears to be of normal size.  CONCLUSIONS:  1. Left ventricular systolic function is normal with a 55-60% estimated ejection fraction.  2. Moderately enlarged right ventricle.  3. There is reduced right ventricular systolic function.  4. Moderate to severe mitral valve regurgitation.  5. Moderate  tricuspid regurgitation.  6. Moderately elevated right ventricular systolic pressure.  7. Aortic valve stenosis is not present.  8. There is reduction in the mitral valve leaflet motion with mild mitral stenosis. Mean gradient of 5 mmHg.  9. There is moderate to possible severe mitral regurgitation with central jet. We will consider MitraClip down the road. QUANTITATIVE DATA SUMMARY: MITRAL VALVE:                       Normal Ranges: MV Vmax:    1.98 m/s  (<=1.3m/s) MV peak PG: 15.7 mmHg (<5mmHg) MV mean P.0 mmHg  (<48mmHg) AORTIC VALVE:                        Normal Ranges: AoV Vmax:     0.78 m/s (<=1.7m/s) AoV Peak P.5 mmHg (<20mmHg) LVOT Max Yasir: 0.56 m/s (<=1.1m/s) TRICUSPID VALVE/RVSP:                             Normal Ranges: Peak TR Velocity: 3.56 m/s RV Syst Pressure: 53.7 mmHg (< 30mmHg) IVC Diam:         1.40 cm  [id number] Becky Vela MD Electronically signed on 2024 at 8:54:24 AM  ** Final **     Assessment/Plan   Enterobacter urinary tract infection  Resolved acute kidney injury on chronic kidney disease    Continue Zosyn  Monitor renal function  Supportive care  Monitor temperature and WBC        Augustine Hudson MD

## 2024-05-06 NOTE — NURSING NOTE
MRI ended.  [employer] rep placed pt back on her regular settings.  Pt denies any discomforts.  Pt taken back to her room.

## 2024-05-06 NOTE — PROGRESS NOTES
Subjective Data:  Patient doing better.  Denies any chest pain.  No shortness of breath.  Telemetry overnight reviewed no events    Overnight Events:    No events overnight     Objective Data:  Last Recorded Vitals:  Vitals:    05/06/24 0405 05/06/24 0746 05/06/24 1208 05/06/24 1405   BP: (!) 129/47 147/61 151/65 149/79   BP Location: Right arm Right arm Right arm Right arm   Patient Position: Lying Lying Sitting Sitting   Pulse: 60 59 58 60   Resp: 16 16 17 16   Temp: 36.6 °C (97.8 °F) 36.6 °C (97.9 °F) 36.6 °C (97.9 °F)    TempSrc: Oral Oral Oral    SpO2: 96% 98% 97% 100%   Weight: 62.2 kg (137 lb 1.6 oz)      Height:           Last Labs:  CBC - 5/6/2024:  4:51 AM  6.4 10.8 113    35.4      CMP - 5/6/2024:  4:51 AM  9.6 5.3 19 --- 0.2   3.0 3.5 14 66      PTT - 2/9/2024:  4:59 AM  1.1   11.9 24.6     HGBA1C   Date/Time Value Ref Range Status   02/01/2024 05:58 AM 6.0 See below % Final   07/05/2023 08:49 AM 5.6 4.0 - 6.0 % Final     Comment:     Hemoglobin A1C levels are related to mean blood glucose during the   preceding 2-3 months. The relationship table below may be used as a   general guide. Each 1% increase in HGB A1C is a reflection of an   increase in mean glucose of approximately 30 mg/dl.   Reference: Diabetes Care, volume 29, supplement 1 Jan. 2006                        HGB A1C ................. Approx. Mean Glucose   _______________________________________________   6%   ...............................  120 mg/dl   7%   ...............................  150 mg/dl   8%   ...............................  180 mg/dl   9%   ...............................  210 mg/dl   10%  ...............................  240 mg/dl  Performed at [address] [address]     12/14/2022 09:13 AM 5.7 4.0 - 6.0 % Final     Comment:     Hemoglobin A1C levels are related to mean blood glucose during the   preceding 2-3 months. The relationship table below may be used as a   general guide. Each 1% increase in  HGB A1C is a reflection of an   increase in mean glucose of approximately 30 mg/dl.   Reference: Diabetes Care, volume 29, supplement 1 Jan. 2006                        HGB A1C ................. Approx. Mean Glucose   _______________________________________________   6%   ...............................  120 mg/dl   7%   ...............................  150 mg/dl   8%   ...............................  180 mg/dl   9%   ...............................  210 mg/dl   10%  ...............................  240 mg/dl  Performed at [address] [address]       LDLCALC   Date/Time Value Ref Range Status   07/05/2023 08:49 AM 57 65 - 130 MG/DL Final   12/14/2022 09:13 AM 64 65 - 130 MG/DL Final   06/29/2022 08:43 AM 58 65 - 130 MG/DL Final      Last I/O:  I/O last 3 completed shifts:  In: 725 (11.7 mL/kg) [P.O.:240; I.V.:335 (5.4 mL/kg); IV Piggyback:150]  Out: - (0 mL/kg)   Weight: 62.2 kg     Past Cardiology Tests (Last 3 Years):  EKG:  ECG 12 lead 05/03/2024 (Preliminary)      ECG 12 lead 04/05/2024      Electrocardiogram, 12-lead PRN ACS symptoms 04/05/2024      ECG 12 lead 04/05/2024      Electrocardiogram, 12-lead PRN ACS symptoms 04/05/2024 (Preliminary)      ECG 12 lead 02/07/2024      ECG 12 lead 02/06/2024      ECG 12 Lead 01/31/2024    Echo:  Transesophageal Echo (AUGUSTIN) 04/10/2024      Transthoracic Echo (TTE) Limited 04/08/2024      Transthoracic Echo (TTE) Limited 02/09/2024      Transthoracic Echo (TTE) Complete 02/01/2024    Ejection Fractions:  EF   Date/Time Value Ref Range Status   02/09/2024 08:59 AM 58 %    02/01/2024 11:43 AM 59 %      Cath:  Cardiac catheterization - coronary 02/08/2024      Cardiac catheterization - coronary 02/02/2024      Cardiac catheterization - coronary 02/02/2024    Stress Test:  No results found for this or any previous visit from the past 1095 days.    Cardiac Imaging:  No results found for this or any previous visit from the past 1095 days.      Inpatient  Medications:  Scheduled medications   Medication Dose Route Frequency    amiodarone  200 mg oral BID    apixaban  5 mg oral BID    atorvastatin  80 mg oral Nightly    clopidogrel  75 mg oral Daily    [Held by provider] isosorbide mononitrate ER  30 mg oral Daily    levothyroxine  100 mcg oral Daily before breakfast    magnesium oxide  400 mg oral Daily    metoprolol tartrate  50 mg oral BID    piperacillin-tazobactam  2.25 g intravenous q6h    potassium chloride CR  20 mEq oral BID    sertraline  200 mg oral Daily    [Held by provider] torsemide  20 mg oral Daily     PRN medications   Medication    acetaminophen    alum-mag hydroxide-simeth    melatonin    ondansetron    ondansetron     Continuous Medications   Medication Dose Last Rate       Physical Exam:  General: Patient is in no acute distress.  HEENT: atraumatic normocephalic.  Neck: is supple jugular venous pressure within normal limits no thyromegaly.  Cardiovascular regular rate and rhythm normal heart sounds no murmurs rubs or gallops.  Lungs: clear to auscultation bilaterally.  Abdomen: is soft nontender.  Extremities warm to touch no edema.    Assessment/Plan   #1 fall.  Most likely sounds like mechanical fall.  Unclear if patient was dizzy or loss of consciousness.  Denies having chest pain or shortness of breath.  Has been doing okay and stable.  However creatinine was [place] worse than baseline.  Could be slightly dehydrated.  For now my recommendation is to monitor.    2.  Atrial fibrillation.  Patient with history of paroxysmal atrial fibrillation.  My recommendation is to continue amiodarone diltiazem and metoprolol because she was very tachycardic when she was in atrial fibrillation.  Will monitor for now.  Continue Eliquis.    3.  Coronary artery disease status post PCI to mid RCA with drug-eluting stent.  Recommend clopidogrel and addition of Eliquis.  Continue blood pressure rate control.  Continue intensity statin.    4.  Hyperlipidemia.   Continue statin.    Thank you for allowing to persuade in her care  Peripheral IV 05/03/24 20 G Left;Anterior Forearm (Active)   Site Assessment Clean;Dry;Intact 05/06/24 6207   Dressing Status Clean;Dry;Occlusive 05/06/24 0757   Number of days: 3       Code Status:  DNR and No Intubation      Becky Vela MD

## 2024-05-06 NOTE — CONSULTS
"Nutrition Assessement Note    Nutrition Assessment    Reason for Assessment: Admission nursing screening (MST4)  Admitted with right-sided facial droop, suspected CVA after fall outside of home. Patient with 13# weight loss since heart attack in February. Declines Ensure but is willing to try Magic Cup w/dinner meal.    Reason for Hospital Admission:  Linette Doyle is a 83 y.o. female who is admitted for possible CVA    Nutrition History:     Energy Intake: Fair 50-75 %  Food Allergies/Intolerances:  None  GI Symptoms: None  Oral Problems: None    Anthropometrics:  Ht: 162.6 cm (5' 4\"), Wt: 62.2 kg (137 lb 1.6 oz), BMI: 23.52  IBW/kg (Dietitian Calculated): 54.55 kg  Percent of IBW: 114 %     Weight Change:  Daily Weight  05/06/24 : 62.2 kg (137 lb 1.6 oz)  04/25/24 : 60 kg (132 lb 6 oz)  04/23/24 : 60.8 kg (134 lb)  04/18/24 : 61.4 kg (135 lb 6 oz)  04/11/24 : 65 kg (143 lb 4.8 oz)  03/11/24 : 60.3 kg (133 lb)  02/28/24 : 66.7 kg (147 lb)  02/20/24 : 67.6 kg (149 lb)  02/14/24 : 82.1 kg (181 lb)  01/29/24 : 68.3 kg (150 lb 9.6 oz)     Weight History / % Weight Change: Patient with 13# (8.6%) weight loss since Feb  Significant Weight Loss: Yes     Nutrition Focused Physical Exam Findings:      Nutrition Significant Labs:  Lab Results   Component Value Date    WBC 6.4 05/06/2024    HGB 10.8 (L) 05/06/2024    HCT 35.4 (L) 05/06/2024     (L) 05/06/2024    CHOL 138 07/05/2023    TRIG 188 (H) 07/05/2023    HDL 43 (L) 07/05/2023    ALT 14 05/02/2024    AST 19 05/02/2024     05/06/2024    K 4.8 05/06/2024     05/06/2024    CREATININE 1.40 05/06/2024    BUN 26 (H) 05/06/2024    CO2 27 05/06/2024    TSH 2.20 05/03/2024    INR 1.1 02/08/2024    HGBA1C 6.0 (H) 02/01/2024    ALBUR 17 07/05/2023     Nutrition Specific Medications:  amiodarone, 200 mg, oral, BID  apixaban, 5 mg, oral, BID  atorvastatin, 80 mg, oral, Nightly  clopidogrel, 75 mg, oral, Daily  [Held by provider] isosorbide mononitrate ER, 30 mg, " oral, Daily  levothyroxine, 100 mcg, oral, Daily before breakfast  magnesium oxide, 400 mg, oral, Daily  metoprolol tartrate, 50 mg, oral, BID  piperacillin-tazobactam, 2.25 g, intravenous, q6h  potassium chloride CR, 20 mEq, oral, BID  sertraline, 200 mg, oral, Daily  [Held by provider] torsemide, 20 mg, oral, Daily         Dietary Orders (From admission, onward)       Start     Ordered    05/06/24 1354  Oral nutritional supplements  Until discontinued        Comments: Chocolate   Question Answer Comment   Deliver with Dinner    Select supplement: Magic Cup        05/06/24 1353    05/04/24 0719  Adult diet Cardiac; 70 gm fat; 2 - 3 grams Sodium  Diet effective now        Question Answer Comment   Diet type Cardiac    Fat restriction: 70 gm fat    Sodium restriction: 2 - 3 grams Sodium        05/04/24 0718                   Estimated Needs:   Estimated Energy Needs  Total Energy Estimated Needs (kCal):  (0722-7710)  Total Estimated Energy Need per Day (kCal/kg):  (25-30)  Method for Estimating Needs: Actual Wt    Estimated Protein Needs  Total Protein Estimated Needs (g): 62 g  Total Protein Estimated Needs (g/kg): 1 g/kg  Method for Estimating Needs: Actual Wt    Estimated Fluid Needs  Total Fluid Estimated Needs (mL):  (5729-7187)  Method for Estimating Needs: 1 ml/kcal      Nutrition Diagnosis   Nutrition Diagnosis:     Nutrition Diagnosis  Patient has Nutrition Diagnosis: Yes  Diagnosis Status (1): New  Nutrition Diagnosis 1: Unintended weight loss  Related to (1): decreased ability to consume sufficient energy  As Evidenced by (1): 13# (8.6%) unintended weight loss in 5 months     Nutrition Interventions/Recommendations   Nutrition Interventions and Recommendations:    Nutrition Prescription:  Individualized Nutrition Prescription Provided for : 8882-6406 calories, 62 gm protein to be provided via diet and supplements    Nutrition Interventions:   Food and/or Nutrient Delivery Interventions  Interventions:  Meals and snacks, Medical food supplement  Meals and Snacks: Fat-modified diet, Mineral-modified diet  Goal: provide as ordered  Medical Food Supplement: Modified food  Goal: magic cup once daily to provide 290 kcals and 9g protein    Education Documentation  No documentation found.         Nutrition Monitoring and Evaluation   Monitoring/Evaluation:   Food/Nutrient Related History Monitoring  Monitoring and Evaluation Plan: Energy intake  Energy Intake: Estimated energy intake  Criteria: pt will maintain wt at this time       Time Spent/Follow-up:   Follow Up  Time Spent (min): 30 minutes  Last Date of Nutrition Visit: 05/06/24  Nutrition Follow-Up Needed?: 5-7 days  Follow up Comment: 5/10/24

## 2024-05-06 NOTE — CARE PLAN
The clinical goals for the shift include Get adequate sleep    Over the shift, the patient did make progress toward the following goals.       Problem: Pain  Goal: My pain/discomfort is manageable  Outcome: Progressing     Problem: Safety  Goal: Patient will be injury free during hospitalization  Outcome: Progressing     Problem: Daily Care  Goal: Daily care needs are met  Outcome: Progressing

## 2024-05-06 NOTE — CONSULTS
Inpatient consult to Psychiatry  Consult performed by: MARYBEL Franco  Consult ordered by: MARYBEL Thayer  Reason for consult: Anxiety / Depression          History Of Present Illness  Linette Doyle is a 83 y.o. female presenting with CVA (cerebrovascular accident due to intracerebral hemorrhage) (Multi).     During today's interaction with the patient, she explained that the reason for her current hospitalization was due to a fall at home. She mentioned that while trying to adjust her oxygen tank, the cord got tangled and she fell, hitting the back of her head. Although she was concerned about potential injuries, she decided to come to the hospital to ensure there was nothing serious. She expressed the belief that it's better to be safe than sorry. The patient informed the healthcare provider that she is undergoing various tests and being followed by the neurology team. She does not believe she had a stroke episode.    The patient remembered the provider from their previous encounter. The dose of sertraline had been increased to 200 mg and that it has been working well for her. She denied having any thoughts of suicide or harming others, and reported that her mood is good. She stated that her anxiety is primarily related to being readmitted to the hospital, but it is usually under control. The option of starting medication was offered, but she preferred not to take anything except hydroxyzine on an as-needed basis while in the hospital. When asked about auditory or visual hallucinations, she denied experiencing any.    The patient mentioned that her sleep is just alright, and she was encouraged to continue taking melatonin, which was initiated during her previous hospitalization. She expressed concern about polypharmacy and preferred to have only necessary medications. Her appetite remained the same, and she had no additional concerns. Throughout the interaction, the patient maintained proper eye  contact, was pleasant, and compliant with the care she was receiving, without exhibiting any behavioral issues.    Will continue monitoring and following up with the patient, with plans for another visit tomorrow if she remains in the hospital. We also offered Music therapy, to which the patient expressed openness to receiving.     Past Medical History  She has a past medical history of Angina pectoris (CMS-HCC) (10/22/2023), Atherosclerosis of coronary artery (08/21/2019), Atherosclerotic heart disease of native coronary artery with other forms of angina pectoris (CMS-HCC) (10/22/2023), Hypercholesterolemia (12/28/2018), Hyperlipidemia (10/22/2023), Presence of cardiac pacemaker (10/22/2023), Primary hypertension (12/28/2018), Shortness of breath (11/26/2019), Sick sinus syndrome (Multi) (10/22/2023), Sinus bradycardia (10/22/2023), and Stage 3a chronic kidney disease (Multi) (11/25/2019).    Surgical History  She has a past surgical history that includes Cardiac catheterization (N/A, 02/02/2024); Cardiac catheterization (N/A, 02/02/2024); Cardiac catheterization (N/A, 02/08/2024); Cardiac catheterization (N/A, 02/08/2024); and pacemaker placement (07/04/2019).     Social History  She reports that she has never smoked. She has never been exposed to tobacco smoke. She has never used smokeless tobacco. She reports that she does not drink alcohol and does not use drugs.    Abuse/Trauma  none    Past Treatment   Inpatient: None    Outpatient: medication      Family Psychiatric History  There has been no family history of psychological problems    Past Medications  Zoloft    Substance Abuse  Denied by patient        Access to Fire Arms and/or Weapons: Denies    Legal Issues: Denies    Allergies  Iodinated contrast media    Review of Systems  Constitutional:  Positive for activity change and fatigue.   Cardiovascular:  Negative for chest pain and palpitations.   Musculoskeletal:  Negative for arthralgias and myalgias.    Neurological:  Positive for weakness.   Psychiatric/Behavioral:  Positive for dysphoric mood and sleep disturbance. Negative for agitation, behavioral problems, confusion, decreased concentration, hallucinations, self-injury and suicidal ideas. The patient is nervous/anxious. The patient is not hyperactive.      Mental Status Exam  General: alert and pleasant    Appearance: Well groomed, appropriate eye contact. In hospital attire.  Attitude/Behavior:Cooperative, conversant, engaged, and with good eye contact.  Motor Activity: No psychomotor agitation or retardation, no tremor or other abnormal movements.  Speech: normal rate, tone and rhythm  Mood: anxious and euthymic  Affect: normal  Thought Process: linear, goal directed  Thought Content: Within normal limits  Thought Perception: No perceptual abnormalities noted  Cognition: Cognitively intact to conversational testing with respect to attention, orientation, fund of knowledge, recent and remote memory, and language.  Insight: intact  Judgement: Intact     Psychiatric Risk Assessment  Violence Risk Assessment: none  Acute Risk of Harm to Others is Considered: low   Suicide Risk Assessment: age > 65 yrs old, , life crisis (shame/despair), and living alone or lack of social support  Protective Factors against Suicide: adherence to  treatment, fear of suicide, moral objections to suicide, and positive family relationships  Acute Risk of Harm to Self is Considered: low    Current Medications    Scheduled medications  amiodarone, 200 mg, oral, BID  apixaban, 5 mg, oral, BID  atorvastatin, 80 mg, oral, Nightly  clopidogrel, 75 mg, oral, Daily  [Held by provider] isosorbide mononitrate ER, 30 mg, oral, Daily  levothyroxine, 100 mcg, oral, Daily before breakfast  magnesium oxide, 400 mg, oral, Daily  metoprolol tartrate, 50 mg, oral, BID  piperacillin-tazobactam, 2.25 g, intravenous, q6h  potassium chloride CR, 20 mEq, oral, BID  sertraline, 200 mg, oral,  Daily  [Held by provider] torsemide, 20 mg, oral, Daily      Continuous medications     PRN medications  PRN medications: acetaminophen, alum-mag hydroxide-simeth, melatonin, ondansetron, ondansetron         Relevant Results        [email or web address]    Relevant Results  Results for orders placed or performed during the hospital encounter of 05/02/24 (from the past 24 hour(s))   CBC   Result Value Ref Range    WBC 6.4 4.4 - 11.3 x10*3/uL    nRBC 0.0 0.0 - 0.0 /100 WBCs    RBC 4.00 4.00 - 5.20 x10*6/uL    Hemoglobin 10.8 (L) 12.0 - 16.0 g/dL    Hematocrit 35.4 (L) 36.0 - 46.0 %    MCV 89 80 - 100 fL    MCH 27.0 26.0 - 34.0 pg    MCHC 30.5 (L) 32.0 - 36.0 g/dL    RDW 14.8 (H) 11.5 - 14.5 %    Platelets 113 (L) 150 - 450 x10*3/uL   Basic Metabolic Panel   Result Value Ref Range    Glucose 113 (H) 65 - 99 mg/dL    Sodium 141 133 - 145 mmol/L    Potassium 4.8 3.4 - 5.1 mmol/L    Chloride 103 97 - 107 mmol/L    Bicarbonate 27 24 - 31 mmol/L    Urea Nitrogen 26 (H) 8 - 25 mg/dL    Creatinine 1.40 0.40 - 1.60 mg/dL    eGFR 37 (L) >60 mL/min/1.73m*2    Calcium 9.6 8.5 - 10.4 mg/dL    Anion Gap 11 <=19 mmol/L      Reviewed      Assessment/Plan   Principal Problem:    CVA (cerebrovascular accident due to intracerebral hemorrhage) (Multi)  Active Problems:    Injury of head, initial encounter         I spent 80 minutes in the professional and overall care of this patient.    Plan/Recommendations     Depression              - Continue Zoloft to 200 mg PO Daily  2.    Anxiety             - Continue Hydroxyzine 10 mg PO PRN q6H   3.   Adjustment  Insomnia             - Continue Melatonin 5 mg PO PRN     The patient does not meet the criteria for the inpatient psychiatric treatment. Appreciate Discharge Dispo or next steps from the Care Coordinator. Thank you for allowing us to participate in the care of this patient. We will continue to follow.       Medication Consent  Medication Consent: risks, benefits, side effects reviewed for all  ordered meds    Parts of this chart have been completed using voice recognition software.  Please excuse any errors of transcription.  Despite the medical decision making time stamp, my medical decision making has taken place during the patient's entire visit.  Thought process and reason for plan has been formulated from the time that I saw the patient until the time of disposition and is not specific to one specific moment during their visit and furthermore the medical decision making encompasses the entire chart and not only that represented in this note.     Maggy Blanco, JUNG-CNP

## 2024-05-06 NOTE — NURSING NOTE
"Bradly from [employer] placed pt on SureScan \"ON\" mode.  Also settings at Do[facility] with a HR 80bpm.j    "

## 2024-05-06 NOTE — PROGRESS NOTES
Linette Doyle is a 83 y.o. female on day 2 of admission presenting with CVA (cerebrovascular accident due to intracerebral hemorrhage) (Multi).      Subjective   Patient seen and examined.  Denies acute events overnight.  Denies pain or shortness of breath.  Is awaiting MRI brain to rule out CVA.       Objective     Last Recorded Vitals  /61 (BP Location: Right arm, Patient Position: Lying)   Pulse 59   Temp 36.6 °C (97.9 °F) (Oral)   Resp 16   Wt 62.2 kg (137 lb 1.6 oz)   SpO2 98%   Intake/Output last 3 Shifts:    Intake/Output Summary (Last 24 hours) at 5/6/2024 1007  Last data filed at 5/6/2024 0800  Gross per 24 hour   Intake 390 ml   Output --   Net 390 ml       Admission Weight  Weight: 60 kg (132 lb 4.4 oz) (05/02/24 2155)    Daily Weight  05/06/24 : 62.2 kg (137 lb 1.6 oz)    Image Results  ECG 12 lead  AV dual-paced rhythm with prolonged AV conduction  Abnormal ECG  When compared with ECG of 07-APR-2024 16:49,  Electronic ventricular pacemaker has replaced Electronic atrial pacemaker      Physical Exam  Vitals and nursing note reviewed.   Constitutional:       Appearance: Normal appearance.   HENT:      Head: Normocephalic and atraumatic.      Mouth/Throat:      Mouth: Mucous membranes are moist.   Eyes:      Extraocular Movements: Extraocular movements intact.      Pupils: Pupils are equal, round, and reactive to light.   Cardiovascular:      Rate and Rhythm: Normal rate and regular rhythm.      Pulses: Normal pulses.      Heart sounds: Normal heart sounds.   Pulmonary:      Effort: Pulmonary effort is normal.      Breath sounds: Normal breath sounds.   Abdominal:      General: Bowel sounds are normal.      Palpations: Abdomen is soft.   Musculoskeletal:         General: Normal range of motion.      Cervical back: Normal range of motion.   Skin:     General: Skin is warm and dry.      Capillary Refill: Capillary refill takes less than 2 seconds.   Neurological:      General: No focal deficit  present.      Mental Status: She is alert and oriented to person, place, and time. Mental status is at baseline.   Psychiatric:         Mood and Affect: Mood normal.         Relevant Results               Assessment/Plan        Principal Problem:    CVA (cerebrovascular accident due to intracerebral hemorrhage) (Multi)  Active Problems:    Injury of head, initial encounter    Right facial droop  A head CT showed no acute changes.   Neurology following  Pacemaker interrogation ordered  EP/cardiology consulted to evaluate pacemaker compatibility for MRI, EP did fill out necessary MRI form, MRI/MRA has been ordered  ASA/statin  permissive hypertension  lipid panel in AM  US carotid results as above  Echocardiogram 4/24  neurostroke assessment  consult neurology, appreciate recommendations     GARY on CKD-resolved  Creatinine increased to 1.7, now 1.4 which is baseline  Consider resuming torsemide at decreased dose if stable  Renally dose meds, avoid nephrotoxic medications     UTI  Change ATB to Zosyn renally dosed  UA positive, follow culture-grew >[id number] enterobacter cloacae complex resistant to amoxicillin/clav, ampicillin, amp/sulb, cefazolin    Fall  Appears mechanical in nature.    No acute injuries noted on imaging  PT/OT  Falls precautions     CAD/HLD  Stable  Continue home Lipitor     Hypothyroidism  Continue home levothyroxine     Presence of cardiac pacemaker  [employer]tronic dual chamber pacemaker placed 7/4/2019 at [facility] because of sick sinus syndrome with pauses.   Pacemaker interrogation ordered     Hypokalemia  Resolved     5/6/2024: Awaiting MRI brain/head and neck to be completed.  Suspicion for acute CVA seems less likely.  Plan is home with home health care within the next 24 hours.    Kathy Quigley, APRN-CNP

## 2024-05-06 NOTE — NURSING NOTE
Pt arrived to MRI via w/c. Pt has a cardiac device to be programmed remotely by [employer]. Pt A & O x 3; denies complaints. Pt arrived with 20G IV in (L)FA, flushes without difficulty. Baseline VS obtained. Awaiting [employer].

## 2024-05-06 NOTE — CONSULTS
Consults  History Of Present Illness:    Linette Doyle is a 83 y.o. female presenting with extensive cardiac history with resultant paroxysmal A-fib for which she takes Eliquis and chronic renal insufficiency.  The patient reports that she fell backwards as she was managing her oxygen.  She felt a little bit dizzy maybe.  She does not really remember much of the details.  She awoke with a lump on her head.  Symptoms of palpitations.  She complains that the pacemaker site is slightly sore but not seen electrophysiology since she had the pacemaker as per her recollection.  She informs me that she lost her  and son for COVID.  Usually follows with Dr. Ibrahim    last Recorded Vitals:  Vitals:    05/05/24 0357 05/05/24 0914 05/05/24 1155 05/05/24 1944   BP: 141/68 153/63 133/69 156/78   BP Location: Left arm Left arm Right arm Right arm   Patient Position: Lying Lying Lying Lying   Pulse: 59 60 59 60   Resp: 18 18 17 17   Temp:  36.7 °C (98.1 °F) 36.5 °C (97.7 °F) 36.3 °C (97.4 °F)   TempSrc: Oral Axillary Oral Oral   SpO2: 98% 97% 96% 94%   Weight: 62.1 kg (136 lb 14.4 oz)      Height:           Last Labs:  CBC - 5/5/2024:  9:39 AM  6.8 11.4 116    37.0      CMP - 5/5/2024:  9:40 AM  9.7 5.3 19 --- 0.2   3.0 3.5 14 66      PTT - 2/9/2024:  4:59 AM  1.1   11.9 24.6     Hemoglobin A1C   Date/Time Value Ref Range Status   02/01/2024 05:58 AM 6.0 (H) See below % Final   07/05/2023 08:49 AM 5.6 4.0 - 6.0 % Final     Comment:     Hemoglobin A1C levels are related to mean blood glucose during the   preceding 2-3 months. The relationship table below may be used as a   general guide. Each 1% increase in HGB A1C is a reflection of an   increase in mean glucose of approximately 30 mg/dl.   Reference: Diabetes Care, volume 29, supplement 1 Jan. 2006                        HGB A1C ................. Approx. Mean Glucose   _______________________________________________   6%   ...............................  120 mg/dl   7%    ...............................  150 mg/dl   8%   ...............................  180 mg/dl   9%   ...............................  210 mg/dl   10%  ...............................  240 mg/dl  Performed at [address] [address]     12/14/2022 09:13 AM 5.7 4.0 - 6.0 % Final     Comment:     Hemoglobin A1C levels are related to mean blood glucose during the   preceding 2-3 months. The relationship table below may be used as a   general guide. Each 1% increase in HGB A1C is a reflection of an   increase in mean glucose of approximately 30 mg/dl.   Reference: Diabetes Care, volume 29, supplement 1 Jan. 2006                        HGB A1C ................. Approx. Mean Glucose   _______________________________________________   6%   ...............................  120 mg/dl   7%   ...............................  150 mg/dl   8%   ...............................  180 mg/dl   9%   ...............................  210 mg/dl   10%  ...............................  240 mg/dl  Performed at [address] [facility] [address]       LDL Calculated   Date/Time Value Ref Range Status   07/05/2023 08:49 AM 57 (L) 65 - 130 MG/DL Final   12/14/2022 09:13 AM 64 (L) 65 - 130 MG/DL Final   06/29/2022 08:43 AM 58 (L) 65 - 130 MG/DL Final      Last I/O:  I/O last 3 completed shifts:  In: 1706.3 (27.5 mL/kg) [P.O.:720; I.V.:936.3 (15.1 mL/kg); IV Piggyback:50]  Out: - (0 mL/kg)   Weight: 62.1 kg     Past Cardiology Tests (Last 3 Years):  EKG:  ECG 12 lead 05/03/2024 (Wet Read)      ECG 12 lead 04/05/2024      Electrocardiogram, 12-lead PRN ACS symptoms 04/05/2024      ECG 12 lead 04/05/2024      Electrocardiogram, 12-lead PRN ACS symptoms 04/05/2024 (Preliminary)      ECG 12 lead 02/07/2024      ECG 12 lead 02/06/2024      ECG 12 Lead 01/31/2024    Echo:  Transesophageal Echo (AUGUSTIN) 04/10/2024      Transthoracic Echo (TTE) Limited 04/08/2024      Transthoracic Echo (TTE) Limited  02/09/2024      Transthoracic Echo (TTE) Complete 02/01/2024    Ejection Fractions:  EF   Date/Time Value Ref Range Status   02/09/2024 08:59 AM 58 %    02/01/2024 11:43 AM 59 %      Cath:  Cardiac catheterization - coronary 02/08/2024      Cardiac catheterization - coronary 02/02/2024      Cardiac catheterization - coronary 02/02/2024    Stress Test:  No results found for this or any previous visit from the past 1095 days.    Cardiac Imaging:  No results found for this or any previous visit from the past 1095 days.      Past Medical History:  She has a past medical history of Angina pectoris (CMS-HCC) (10/22/2023), Atherosclerosis of coronary artery (08/21/2019), Atherosclerotic heart disease of native coronary artery with other forms of angina pectoris (CMS-HCC) (10/22/2023), Hypercholesterolemia (12/28/2018), Hyperlipidemia (10/22/2023), Presence of cardiac pacemaker (10/22/2023), Primary hypertension (12/28/2018), Shortness of breath (11/26/2019), Sick sinus syndrome (Multi) (10/22/2023), Sinus bradycardia (10/22/2023), and Stage 3a chronic kidney disease (Multi) (11/25/2019).    Past Surgical History:  She has a past surgical history that includes Cardiac catheterization (N/A, 02/02/2024); Cardiac catheterization (N/A, 02/02/2024); Cardiac catheterization (N/A, 02/08/2024); Cardiac catheterization (N/A, 02/08/2024); and pacemaker placement (07/04/2019).      Social History:  She reports that she has never smoked. She has never been exposed to tobacco smoke. She has never used smokeless tobacco. She reports that she does not drink alcohol and does not use drugs.    Family History:  Family History   Problem Relation Name Age of Onset    No Known Problems Mother      No Known Problems Father          Allergies:  Iodinated contrast media    Inpatient Medications:  Scheduled medications   Medication Dose Route Frequency    amiodarone  200 mg oral BID    apixaban  5 mg oral BID    atorvastatin  80 mg oral Nightly     clopidogrel  75 mg oral Daily    [Held by provider] isosorbide mononitrate ER  30 mg oral Daily    levothyroxine  100 mcg oral Daily before breakfast    magnesium oxide  400 mg oral Daily    metoprolol tartrate  50 mg oral BID    piperacillin-tazobactam  2.25 g intravenous q6h    potassium chloride CR  20 mEq oral BID    sertraline  200 mg oral Daily    [Held by provider] torsemide  20 mg oral Daily     PRN medications   Medication    acetaminophen    alum-mag hydroxide-simeth    melatonin    ondansetron    ondansetron     Continuous Medications   Medication Dose Last Rate     Outpatient Medications:  Current Outpatient Medications   Medication Instructions    amiodarone (PACERONE) 200 mg, oral, 2 times daily    apixaban (ELIQUIS) 5 mg, oral, 2 times daily    atorvastatin (LIPITOR) 80 mg, oral, Nightly    cholecalciferol (Vitamin D-3) 50 MCG (2000 UT) tablet 1 tablet, oral, Daily    clopidogrel (PLAVIX) 75 mg, oral, Daily    dilTIAZem (Cardizem) 120 mg immediate release tablet 1 tablet, oral, 2 times daily    isosorbide mononitrate ER (IMDUR) 30 mg, oral, Daily, Do not crush or chew.    levothyroxine (SYNTHROID, LEVOXYL) 100 mcg, oral, Daily, 1 tablet in the morning on an empty stomach Orally Once a day for 30 day(s)    metoprolol tartrate (LOPRESSOR) 50 mg, oral, 2 times daily    multivitamin (MULTIPLE VITAMINS ORAL) 1 capsule, oral, Daily    mv-min/FA/vit K/lycop/lut/zeax (OCUVITE EYE PLUS MULTI ORAL) oral    omeprazole (PRILOSEC) 20 mg, oral, Daily    oxygen (O2) gas therapy 1 each, inhalation, Every 12 hours    potassium chloride CR (Klor-Con) 10 mEq ER tablet 10 mEq, oral, 2 times daily, Do not crush, chew, or split.    sertraline (ZOLOFT) 200 mg, oral, Daily    torsemide (DEMADEX) 20 mg, oral, Daily       Physical Exam:  HEENT PERRLA neck is supple lungs clear to auscultation bilaterally with good air entry heart S1-S2 present with a systolic murmur in the apical area abdomen soft and nontender EXTR shows no  evidence of pedal edema     Assessment/Plan   1 syncopal event for which patient was scheduled to have an MRI of the brain and this will be done tomorrow.  I did discussed with MRI division of radiology and they will fax papers to Dr. Figueroa's office on Monday and once that is approved she will have the MRI of the brain.  2.  Known coronary disease with history of stent placement in February of this year at our hospital with deployment of a 2.5 to 8 mm drug-eluting stent by Dr. Vela  3.  Moderate to severe mitral regurgitation based echocardiogram performed for which patient would merit mitral clip down the road.  Will be followed by Dr. Ibrahim as an outpatient.  4.  History of atrial fibrillation for which patient remains on oral amiodarone 200 mg 2 tablets daily and Eliquis 5 mg twice daily which is appropriate for her age and her creatinine was 1.7 on admission that improved to 1.4 and for now we will continue the 5 mg twice daily.  Site Assessment Clean;Dry;Intact 05/05/24 2037   Dressing Status Clean;Dry;Occlusive 05/05/24 2037   Number of days: 2       Code Status:  DNR and No Intubation    I spent 25 minutes in the professional and overall care of this patient.        Moy Sanchez MD

## 2024-05-06 NOTE — PROGRESS NOTES
"Linette Doyle is a       Subjective   83 y.o. female been eval for ? Lt facial droop.  Pat already eval by previous neurologist on call (Dr. Cook) w/ plans to get further eval w/ MRI's.  Pat feels ok and denies new facial motor/sensory deficits, HA, vision/speech changes or sympt's in ext's.     Scheduled medications  amiodarone, 200 mg, oral, BID  apixaban, 5 mg, oral, BID  atorvastatin, 80 mg, oral, Nightly  clopidogrel, 75 mg, oral, Daily  [Held by provider] isosorbide mononitrate ER, 30 mg, oral, Daily  levothyroxine, 100 mcg, oral, Daily before breakfast  magnesium oxide, 400 mg, oral, Daily  metoprolol tartrate, 50 mg, oral, BID  piperacillin-tazobactam, 2.25 g, intravenous, q6h  potassium chloride CR, 20 mEq, oral, BID  sertraline, 200 mg, oral, Daily  [Held by provider] torsemide, 20 mg, oral, Daily    PRN medications  PRN medications: acetaminophen, alum-mag hydroxide-simeth, hydrOXYzine HCL, loperamide, melatonin, ondansetron, ondansetron    Objective   Last Recorded Vitals  Blood pressure 116/77, pulse 82, temperature 36.4 °C (97.5 °F), temperature source Oral, resp. rate 18, height 1.626 m (5' 4\"), weight 62.2 kg (137 lb 1.6 oz), SpO2 100%, unknown if currently breastfeeding.    Physical Exam  Neurological Exam  Gen: lying in bed, awake, alone, no distress, fair historian.   MS: awake and oriented x4; no Rt-Lt confusion, nl speech; Nl fund of knowledge.    CN's: VFF, EOMI, No nystagmus, mild Lt decreased nasolabial fold but inconsistent; nl light touch at all facial quadrants; nl tongue protrusion midline, normal shoulder shrug symmetric  Motor: no drift, strength is 5/5 at all ext's.   Coordination: no ataxia, no dysmetria/dysdiadochokinesia.  DTR's: 2/4 at all ext's; plantar reflex is flexor blt.   Sensation: nl LT at all ext's.   Gait: deferred.    Relevant Results  Results for orders placed or performed during the hospital encounter of 05/02/24 (from the past 96 hour(s))   CBC and Auto Differential "   Result Value Ref Range    WBC 6.2 4.4 - 11.3 x10*3/uL    nRBC 0.0 0.0 - 0.0 /100 WBCs    RBC 3.61 (L) 4.00 - 5.20 x10*6/uL    Hemoglobin 10.1 (L) 12.0 - 16.0 g/dL    Hematocrit 31.8 (L) 36.0 - 46.0 %    MCV 88 80 - 100 fL    MCH 28.0 26.0 - 34.0 pg    MCHC 31.8 (L) 32.0 - 36.0 g/dL    RDW 14.9 (H) 11.5 - 14.5 %    Platelets 114 (L) 150 - 450 x10*3/uL    Neutrophils % 68.7 40.0 - 80.0 %    Immature Granulocytes %, Automated 0.2 0.0 - 0.9 %    Lymphocytes % 15.3 13.0 - 44.0 %    Monocytes % 10.0 2.0 - 10.0 %    Eosinophils % 5.2 0.0 - 6.0 %    Basophils % 0.6 0.0 - 2.0 %    Neutrophils Absolute 4.25 1.60 - 5.50 x10*3/uL    Immature Granulocytes Absolute, Automated 0.01 0.00 - 0.50 x10*3/uL    Lymphocytes Absolute 0.95 0.80 - 3.00 x10*3/uL    Monocytes Absolute 0.62 0.05 - 0.80 x10*3/uL    Eosinophils Absolute 0.32 0.00 - 0.40 x10*3/uL    Basophils Absolute 0.04 0.00 - 0.10 x10*3/uL   Comprehensive metabolic panel   Result Value Ref Range    Glucose 111 (H) 65 - 99 mg/dL    Sodium 145 133 - 145 mmol/L    Potassium 3.1 (L) 3.4 - 5.1 mmol/L    Chloride 109 (H) 97 - 107 mmol/L    Bicarbonate 23 (L) 24 - 31 mmol/L    Urea Nitrogen 25 8 - 25 mg/dL    Creatinine 1.40 0.40 - 1.60 mg/dL    eGFR 37 (L) >60 mL/min/1.73m*2    Calcium 7.3 (L) 8.5 - 10.4 mg/dL    Albumin 3.5 3.5 - 5.0 g/dL    Alkaline Phosphatase 66 35 - 125 U/L    Total Protein 5.3 (L) 5.9 - 7.9 g/dL    AST 19 5 - 40 U/L    Bilirubin, Total 0.2 0.1 - 1.2 mg/dL    ALT 14 5 - 40 U/L    Anion Gap 13 <=19 mmol/L   Sars-CoV-2 PCR   Result Value Ref Range    Coronavirus 2019, PCR Not Detected Not Detected   Influenza A, and B PCR   Result Value Ref Range    Flu A Result Not Detected Not Detected    Flu B Result Not Detected Not Detected   RSV PCR   Result Value Ref Range    RSV PCR Not Detected Not Detected   Serial Troponin, Initial ([facility])   Result Value Ref Range    Troponin T, High Sensitivity 29 (HH) <=14 ng/L   ECG 12 lead   Result Value Ref Range    Ventricular  Rate 60 BPM    Atrial Rate 60 BPM    MT Interval 292 ms    QRS Duration 90 ms    QT Interval 468 ms    QTC Calculation(Bazett) 468 ms    P Axis 101 degrees    R Axis 14 degrees    T Axis -33 degrees    QRS Count 10 beats    Q Onset 227 ms    P Onset 101 ms    P Offset 121 ms    T Offset 461 ms    QTC Fredericia 468 ms   Serial Troponin, 2 Hour ([facility])   Result Value Ref Range    Troponin T, High Sensitivity 29 (HH) <=14 ng/L   Magnesium   Result Value Ref Range    Magnesium 1.60 1.60 - 3.10 mg/dL   Lavender Top   Result Value Ref Range    Extra Tube Hold for add-ons.    Serial Troponin, 6 Hour ([facility])   Result Value Ref Range    Troponin T, High Sensitivity 23 (HH) <=14 ng/L   Thyroid Stimulating Hormone   Result Value Ref Range    Thyroid Stimulating Hormone 2.20 0.27 - 4.20 mIU/L   Basic Metabolic Panel   Result Value Ref Range    Glucose 169 (H) 65 - 99 mg/dL    Sodium 138 133 - 145 mmol/L    Potassium 4.8 3.4 - 5.1 mmol/L    Chloride 100 97 - 107 mmol/L    Bicarbonate 26 24 - 31 mmol/L    Urea Nitrogen 28 (H) 8 - 25 mg/dL    Creatinine 1.60 0.40 - 1.60 mg/dL    eGFR 31 (L) >60 mL/min/1.73m*2    Calcium 9.7 8.5 - 10.4 mg/dL    Anion Gap 12 <=19 mmol/L   Urinalysis with Reflex Culture and Microscopic   Result Value Ref Range    Color, Urine Light-Yellow Light-Yellow, Yellow, Dark-Yellow    Appearance, Urine Turbid (N) Clear    Specific Gravity, Urine 1.011 1.005 - 1.035    pH, Urine 5.5 5.0, 5.5, 6.0, 6.5, 7.0, 7.5, 8.0    Protein, Urine NEGATIVE NEGATIVE, 10 (TRACE), 20 (TRACE) mg/dL    Glucose, Urine Normal Normal mg/dL    Blood, Urine NEGATIVE NEGATIVE    Ketones, Urine NEGATIVE NEGATIVE mg/dL    Bilirubin, Urine NEGATIVE NEGATIVE    Urobilinogen, Urine Normal Normal mg/dL    Nitrite, Urine NEGATIVE NEGATIVE    Leukocyte Esterase, Urine 500 Rick/µL (A) NEGATIVE   Microscopic Only, Urine   Result Value Ref Range    WBC, Urine >50 (A) 1-5, NONE /HPF    WBC Clumps, Urine OCCASIONAL Reference range not established.  /HPF    RBC, Urine 1-2 NONE, 1-2, 3-5 /HPF    Squamous Epithelial Cells, Urine 1-9 (SPARSE) Reference range not established. /HPF    Bacteria, Urine 1+ (A) NONE SEEN /HPF    Mucus, Urine FEW Reference range not established. /LPF    Hyaline Casts, Urine 1+ (A) NONE /LPF   Urine Culture    Specimen: Clean Catch/Voided; Urine   Result Value Ref Range    Urine Culture >100,000 Enterobacter cloacae complex (A)        Susceptibility    Enterobacter cloacae complex - MICROSCAN     Ampicillin  Resistant      Amoxicillin/Clavulanate  Resistant      Ampicillin/Sulbactam  Resistant      Cefazolin  Resistant      Cefepime  Susceptible      Ciprofloxacin  Susceptible      Gentamicin  Susceptible      Nitrofurantoin  Susceptible      Piperacillin/Tazobactam  Susceptible      Trimethoprim/Sulfamethoxazole  Susceptible    Vascular US Carotid Artery Duplex Bilateral   Result Value Ref Range    BSA 1.65 m2   Basic Metabolic Panel   Result Value Ref Range    Glucose 107 (H) 65 - 99 mg/dL    Sodium 142 133 - 145 mmol/L    Potassium 4.1 3.4 - 5.1 mmol/L    Chloride 102 97 - 107 mmol/L    Bicarbonate 25 24 - 31 mmol/L    Urea Nitrogen 34 (H) 8 - 25 mg/dL    Creatinine 1.70 (H) 0.40 - 1.60 mg/dL    eGFR 29 (L) >60 mL/min/1.73m*2    Calcium 9.5 8.5 - 10.4 mg/dL    Anion Gap 15 <=19 mmol/L   CBC   Result Value Ref Range    WBC 8.0 4.4 - 11.3 x10*3/uL    nRBC 0.0 0.0 - 0.0 /100 WBCs    RBC 3.77 (L) 4.00 - 5.20 x10*6/uL    Hemoglobin 10.3 (L) 12.0 - 16.0 g/dL    Hematocrit 33.5 (L) 36.0 - 46.0 %    MCV 89 80 - 100 fL    MCH 27.3 26.0 - 34.0 pg    MCHC 30.7 (L) 32.0 - 36.0 g/dL    RDW 15.0 (H) 11.5 - 14.5 %    Platelets 114 (L) 150 - 450 x10*3/uL   CBC   Result Value Ref Range    WBC 6.8 4.4 - 11.3 x10*3/uL    nRBC 0.0 0.0 - 0.0 /100 WBCs    RBC 4.15 4.00 - 5.20 x10*6/uL    Hemoglobin 11.4 (L) 12.0 - 16.0 g/dL    Hematocrit 37.0 36.0 - 46.0 %    MCV 89 80 - 100 fL    MCH 27.5 26.0 - 34.0 pg    MCHC 30.8 (L) 32.0 - 36.0 g/dL    RDW 14.9 (H)  11.5 - 14.5 %    Platelets 116 (L) 150 - 450 x10*3/uL   Basic Metabolic Panel   Result Value Ref Range    Glucose 130 (H) 65 - 99 mg/dL    Sodium 141 133 - 145 mmol/L    Potassium 4.7 3.4 - 5.1 mmol/L    Chloride 102 97 - 107 mmol/L    Bicarbonate 27 24 - 31 mmol/L    Urea Nitrogen 27 (H) 8 - 25 mg/dL    Creatinine 1.40 0.40 - 1.60 mg/dL    eGFR 37 (L) >60 mL/min/1.73m*2    Calcium 9.7 8.5 - 10.4 mg/dL    Anion Gap 12 <=19 mmol/L   CBC   Result Value Ref Range    WBC 6.4 4.4 - 11.3 x10*3/uL    nRBC 0.0 0.0 - 0.0 /100 WBCs    RBC 4.00 4.00 - 5.20 x10*6/uL    Hemoglobin 10.8 (L) 12.0 - 16.0 g/dL    Hematocrit 35.4 (L) 36.0 - 46.0 %    MCV 89 80 - 100 fL    MCH 27.0 26.0 - 34.0 pg    MCHC 30.5 (L) 32.0 - 36.0 g/dL    RDW 14.8 (H) 11.5 - 14.5 %    Platelets 113 (L) 150 - 450 x10*3/uL   Basic Metabolic Panel   Result Value Ref Range    Glucose 113 (H) 65 - 99 mg/dL    Sodium 141 133 - 145 mmol/L    Potassium 4.8 3.4 - 5.1 mmol/L    Chloride 103 97 - 107 mmol/L    Bicarbonate 27 24 - 31 mmol/L    Urea Nitrogen 26 (H) 8 - 25 mg/dL    Creatinine 1.40 0.40 - 1.60 mg/dL    eGFR 37 (L) >60 mL/min/1.73m*2    Calcium 9.6 8.5 - 10.4 mg/dL    Anion Gap 11 <=19 mmol/L     MR brain wo IV contrast  Result Date: 5/6/2024  No acute infarct, recent hemorrhage, or intracranial mass effect. Moderate chronic small vessel ischemic disease and generalized brain atrophy.   Motion artifact degrades assessment, particularly on MRA neck imaging. No source vessel arterial occlusion identified within the head and neck. Multiple anatomic variations as above.   Concern for at least moderate (50%) stenosis of the left internal carotid artery origin by NASCET criteria with motion artifact degrading assessment.       MR angio neck wo IV contrast  Result Date: 5/6/2024  No acute infarct, recent hemorrhage, or intracranial mass effect. Moderate chronic small vessel ischemic disease and generalized brain atrophy.   Motion artifact degrades assessment,  particularly on MRA neck imaging. No source vessel arterial occlusion identified within the head and neck. Multiple anatomic variations as above.   Concern for at least moderate (50%) stenosis of the left internal carotid artery origin by NASCET criteria with motion artifact degrading assessment.       MR angio head wo IV contrast  Result Date: 5/6/2024  No acute infarct, recent hemorrhage, or intracranial mass effect. Moderate chronic small vessel ischemic disease and generalized brain atrophy.   Motion artifact degrades assessment, particularly on MRA neck imaging. No source vessel arterial occlusion identified within the head and neck. Multiple anatomic variations as above.   Concern for at least moderate (50%) stenosis of the left internal carotid artery origin by NASCET criteria with motion artifact degrading assessment.       ECG 12 lead  Result Date: 5/6/2024  AV dual-paced rhythm with prolonged AV conduction Abnormal ECG When compared with ECG of 07-APR-2024 16:49, Electronic ventricular pacemaker has replaced Electronic atrial pacemaker    Vascular US Carotid Artery Duplex Bilateral  Result Date: 5/3/2024    CT head wo IV contrast  Result Date: 5/2/2024  No evidence of acute intracranial abnormality.   No acute cervical spine fracture or malalignment.         CT cervical spine wo IV contrast  Result Date: 5/2/2024  No evidence of acute intracranial abnormality.   No acute cervical spine fracture or malalignment.        Transesophageal Echo ([facility])  Result Date: 4/10/2024  CONCLUSIONS:  1. Left ventricular systolic function is normal with a 55-60% estimated ejection fraction.  2. There is moderate mitral valve regurgitation at the most central jet the mitral valve structure within normal limit.  3. Moderate mitral valve regurgitation.  4. Mild tricuspid regurgitation is visualized.                       XR chest 1 view  Result Date: 4/8/2024  Cardiomegaly and mild pulmonary vascular congestion with bilateral  pleural effusions, right larger than left. The right effusion has increased in size.   Probable compressive atelectasis in each lower lung zone.     Transthoracic Echo (TTE) Limited  Result Date: 4/8/2024  CONCLUSIONS:  1. Left ventricular systolic function is normal with a 55-60% estimated ejection fraction.  2. Moderately enlarged right ventricle.  3. There is reduced right ventricular systolic function.  4. Moderate to severe mitral valve regurgitation.  5. Moderate tricuspid regurgitation.  6. Moderately elevated right ventricular systolic pressure.  7. Aortic valve stenosis is not present.  8. There is reduction in the mitral valve leaflet motion with mild mitral stenosis. Mean gradient of 5 mmHg.  9. There is moderate to possible severe mitral regurgitation with central jet. We will consider MitraClip down the road.     Assessment/Plan   Impression:  S/p mechanical fall w/ mild head trauma w/ noted ? Lt facial droop.  MRI brain w/o acute findings and pat is asymptomatic bringing probability that his is her baseline, since there is no clear evidence of a facute facial or trigeminal neuropathy or a correlating prior cva.  Need to cont her current secondary stroke prophylaxis as per primary medical team and Cardio.  No further testing planned from Neuro standpoint.      Recommendations/Plan:  Cont. Control of vascular risk factors, statin and eliquis as per Cardio.   Cont. Control of metabolic derangement and UTI as per primary medical team.   No further Neuro recc's; sign off.        Rayray Gómez MD

## 2024-05-06 NOTE — PROGRESS NOTES
05/06/24 1623   Discharge Planning   Patient expects to be discharged to: Home with [facility]     Patient to resume care with [facility]   Will need internal referral for home health upon discharge

## 2024-05-06 NOTE — NURSING NOTE
Assumed care of patient at this time. At bedside report, patient had no c/o pain. vss and afebrile. No telemetry order. Bed low and locked, call button within reach and bed alarm on. Patient has no needs that require immediate nursing interventions.

## 2024-05-06 NOTE — NURSING NOTE
Assumed care of patient.  Patient in bed asleep.  Bedside shift report received.  Call light in reach.

## 2024-05-07 ENCOUNTER — TELEPHONE (OUTPATIENT)
Dept: CARDIOLOGY | Facility: CLINIC | Age: 84
End: 2024-05-07
Payer: MEDICARE

## 2024-05-07 ENCOUNTER — PATIENT OUTREACH (OUTPATIENT)
Dept: PRIMARY CARE | Facility: CLINIC | Age: 84
End: 2024-05-07
Payer: MEDICARE

## 2024-05-07 PROBLEM — N18.31 CHRONIC KIDNEY DISEASE, STAGE 3A (MULTI): Status: ACTIVE | Noted: 2024-01-25

## 2024-05-07 PROBLEM — N30.00 ACUTE CYSTITIS: Status: ACTIVE | Noted: 2024-02-29

## 2024-05-07 PROBLEM — I34.0 MITRAL VALVE INSUFFICIENCY: Status: ACTIVE | Noted: 2024-05-07

## 2024-05-07 PROBLEM — R01.1 HEART MURMUR: Status: ACTIVE | Noted: 2024-05-07

## 2024-05-07 PROBLEM — D64.9 ANEMIA: Status: ACTIVE | Noted: 2021-03-01

## 2024-05-07 PROBLEM — R19.7 DIARRHEA OF PRESUMED INFECTIOUS ORIGIN: Status: ACTIVE | Noted: 2024-05-07

## 2024-05-07 LAB
ATRIAL RATE: 60 BPM
P AXIS: 101 DEGREES
P OFFSET: 121 MS
P ONSET: 101 MS
PR INTERVAL: 292 MS
Q ONSET: 227 MS
QRS COUNT: 10 BEATS
QRS DURATION: 90 MS
QT INTERVAL: 468 MS
QTC CALCULATION(BAZETT): 468 MS
QTC FREDERICIA: 468 MS
R AXIS: 14 DEGREES
T AXIS: -33 DEGREES
T OFFSET: 461 MS
VENTRICULAR RATE: 60 BPM

## 2024-05-07 PROCEDURE — 1090000001 HH PPS REVENUE CREDIT

## 2024-05-07 PROCEDURE — 1090000002 HH PPS REVENUE DEBIT

## 2024-05-07 RX ORDER — HYDROXYZINE HYDROCHLORIDE 10 MG/1
10 TABLET, FILM COATED ORAL EVERY 6 HOURS PRN
COMMUNITY
Start: 2024-04-10 | End: 2024-05-15 | Stop reason: HOSPADM

## 2024-05-07 NOTE — DISCHARGE SUMMARY
Discharge Diagnosis  Mechanical fall  GARY  UTI    Issues Requiring Follow-Up  GARY  History of atrial fibrillation      Discharge Meds     Your medication list        START taking these medications        Instructions Last Dose Given Next Dose Due   sulfamethoxazole-trimethoprim 800-160 mg tablet  Commonly known as: Bactrim DS      Take 1 tablet by mouth 2 times a day for 7 days.              CONTINUE taking these medications        Instructions Last Dose Given Next Dose Due   amiodarone 200 mg tablet  Commonly known as: Pacerone      Take 1 tablet (200 mg) by mouth 2 times a day.       apixaban 5 mg tablet  Commonly known as: Eliquis      Take 1 tablet (5 mg) by mouth 2 times a day.       atorvastatin 80 mg tablet  Commonly known as: Lipitor      Take 1 tablet (80 mg) by mouth once daily at bedtime.       cholecalciferol 50 MCG (2000 UT) tablet  Commonly known as: Vitamin D-3           clopidogrel 75 mg tablet  Commonly known as: Plavix      Take 1 tablet (75 mg) by mouth once daily. Do not start before February 15, 2024.       dilTIAZem 120 mg immediate release tablet  Commonly known as: Cardizem           levothyroxine 100 mcg tablet  Commonly known as: Synthroid, Levoxyl      Take 1 tablet (100 mcg) by mouth once daily. 1 tablet in the morning on an empty stomach Orally Once a day for 30 day(s)       metoprolol tartrate 50 mg tablet  Commonly known as: Lopressor      Take 1 tablet by mouth 2 times a day.       OCUVITE EYE PLUS MULTI ORAL           omeprazole 20 mg DR capsule  Commonly known as: PriLOSEC      Take 1 capsule (20 mg) by mouth once daily.       oxygen gas therapy  Commonly known as: O2      Inhale 1 each every 12 hours.       potassium chloride CR 10 mEq ER tablet  Commonly known as: Klor-Con      Take 1 tablet (10 mEq) by mouth 2 times a day. Do not crush, chew, or split.       sertraline 100 mg tablet  Commonly known as: Zoloft      Take 2 tablets (200 mg) by mouth once daily.       torsemide 20 mg  tablet  Commonly known as: Demadex      Take 1 tablet (20 mg) by mouth once daily.              STOP taking these medications      isosorbide mononitrate ER 30 mg 24 hr tablet  Commonly known as: Imdur        MULTIPLE VITAMINS ORAL                  Where to Get Your Medications        These [facility]  [id number] [facility] [id number]      Hours: 9 AM to 6 PM Mon-Fri, 9 AM to 1 PM Sat Phone: [phone]   sulfamethoxazole-trimethoprim 800-160 mg tablet         Test Results Pending At Discharge  Pending Labs       Order Current Status    Extra Urine Gray Tube Collected (05/03/24 0699)    Urinalysis with Reflex Culture and Microscopic In process            Hospital Course  83-year-old female, admitted for mechanical fall and questionable right facial droop.  CT/MRI imaging negative for acute CVA.  Right facial droop, per patient, is her baseline.  No actual neurological deficits.  Was with UTI on admission.  Started on IV antibiotics.  Consults with neurology.  Patient states she is feeling better and would like to go home. Cleared by neurology to go home with home health care.    Pertinent Physical Exam At Time of Discharge  Physical Exam  Vitals reviewed.   Constitutional:       Appearance: Normal appearance.   HENT:      Head: Normocephalic and atraumatic.      Mouth/Throat:      Mouth: Mucous membranes are moist.   Eyes:      Extraocular Movements: Extraocular movements intact.      Pupils: Pupils are equal, round, and reactive to light.   Cardiovascular:      Rate and Rhythm: Normal rate and regular rhythm.      Pulses: Normal pulses.      Heart sounds: Normal heart sounds.   Pulmonary:      Effort: Pulmonary effort is normal.      Breath sounds: Normal breath sounds.   Abdominal:      General: Bowel sounds are normal.      Palpations: Abdomen is soft.   Musculoskeletal:         General: Normal range of motion.      Cervical back: Normal range of motion.   Skin:      General: Skin is warm and dry.      Capillary Refill: Capillary refill takes less than 2 seconds.   Neurological:      General: No focal deficit present.      Mental Status: She is alert and oriented to person, place, and time. Mental status is at baseline.   Psychiatric:         Mood and Affect: Mood normal.         Outpatient Follow-Up  Future Appointments   Date Time Provider Department Center   5/8/2024  2:15 PM Darrius Ibrahim MD [id number] None   8/26/2024  1:30 PM Darrius Ibrahim MD [id number] None   9/10/2024 10:30 AM [id number] [id number] [facility]   12/16/2024 11:30 AM Frida Hines MD [id number] [facility]         JUNG Vásquez-CNP

## 2024-05-07 NOTE — PROGRESS NOTES
Discharge Facility: [facility]  Discharge Diagnosis: Injury of head  Admission Date: 05/03/2024  Discharge Date: 05/06/2024    PCP Appointment Date: Tasked to office, no contact made  Specialist Appointment Date: Cardiology 05/08/2024  Hospital Encounter and Summary: Linked    Two attempts were made to reach patient within two business days after discharge. Voicemail left with contact information for patient to call back with any non-emergent questions or concerns.

## 2024-05-07 NOTE — DOCUMENTATION CLARIFICATION NOTE
PATIENT:               SAMUEL VILLALOBOS  ACCT #:                  [id number]  MRN:                       [id number]  :                       1940  ADMIT DATE:       2024 9:48 PM  DISCH DATE:        2024 5:28 PM  RESPONDING PROVIDER #:        [id number]          PROVIDER RESPONSE TEXT:    Chronic Diastolic Congestive Heart Failure    CDI QUERY TEXT:    Clarification      Instruction:    Based on your assessment of the patient and the clinical information, please provide the requested documentation by clicking on the appropriate radio button and enter any additional information if prompted.    Question: Please further clarify the type and acuity of congestive heart failure    When answering this query, please exercise your independent professional judgment. The fact that a question is being asked, does not imply that any particular answer is desired or expected.    The patient's clinical indicators include:  Clinical Information: This is a 83 y.o. patient admitted from ED for fall after tripping on O2 line and sustaining a large hematoma on the back of her head. Pt was found to have UTI and GARY which both were treated.  MRI negative for infarct.    Clinical Indicators:    Prior documentation with hx of chronic diastolic CHF (2024)    Echo 2024 with EF 55-60%, there is reduced right ventricular systolic function.    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

## 2024-05-08 ENCOUNTER — HOME CARE VISIT (OUTPATIENT)
Dept: HOME HEALTH SERVICES | Facility: HOME HEALTH | Age: 84
End: 2024-05-08
Payer: MEDICARE

## 2024-05-08 ENCOUNTER — OFFICE VISIT (OUTPATIENT)
Dept: CARDIOLOGY | Facility: CLINIC | Age: 84
DRG: 183 | End: 2024-05-08
Payer: MEDICARE

## 2024-05-08 ENCOUNTER — TELEPHONE (OUTPATIENT)
Dept: PRIMARY CARE | Facility: CLINIC | Age: 84
End: 2024-05-08

## 2024-05-08 VITALS
OXYGEN SATURATION: 95 % | HEART RATE: 60 BPM | RESPIRATION RATE: 18 BRPM | SYSTOLIC BLOOD PRESSURE: 120 MMHG | TEMPERATURE: 97.3 F | DIASTOLIC BLOOD PRESSURE: 62 MMHG

## 2024-05-08 VITALS
WEIGHT: 133 LBS | DIASTOLIC BLOOD PRESSURE: 70 MMHG | OXYGEN SATURATION: 97 % | HEIGHT: 64 IN | SYSTOLIC BLOOD PRESSURE: 128 MMHG | HEART RATE: 60 BPM | BODY MASS INDEX: 22.71 KG/M2 | TEMPERATURE: 98.6 F | RESPIRATION RATE: 18 BRPM

## 2024-05-08 VITALS
DIASTOLIC BLOOD PRESSURE: 62 MMHG | OXYGEN SATURATION: 95 % | SYSTOLIC BLOOD PRESSURE: 120 MMHG | HEART RATE: 60 BPM | TEMPERATURE: 97.3 F | RESPIRATION RATE: 18 BRPM

## 2024-05-08 DIAGNOSIS — R00.1 SINUS BRADYCARDIA: ICD-10-CM

## 2024-05-08 DIAGNOSIS — I49.5 SICK SINUS SYNDROME (MULTI): ICD-10-CM

## 2024-05-08 DIAGNOSIS — I25.118 ATHEROSCLEROTIC HEART DISEASE OF NATIVE CORONARY ARTERY WITH OTHER FORMS OF ANGINA PECTORIS (CMS-HCC): ICD-10-CM

## 2024-05-08 DIAGNOSIS — N18.31 CHRONIC KIDNEY DISEASE, STAGE 3A (MULTI): ICD-10-CM

## 2024-05-08 DIAGNOSIS — I48.0 PAROXYSMAL A-FIB (MULTI): ICD-10-CM

## 2024-05-08 DIAGNOSIS — I34.0 NONRHEUMATIC MITRAL VALVE REGURGITATION: ICD-10-CM

## 2024-05-08 DIAGNOSIS — I25.118 ATHEROSCLEROSIS OF NATIVE CORONARY ARTERY OF NATIVE HEART WITH STABLE ANGINA PECTORIS (CMS-HCC): ICD-10-CM

## 2024-05-08 DIAGNOSIS — Z95.0 PRESENCE OF CARDIAC PACEMAKER: Primary | ICD-10-CM

## 2024-05-08 PROCEDURE — G0151 HHCP-SERV OF PT,EA 15 MIN: HCPCS | Mod: HHH

## 2024-05-08 PROCEDURE — 3074F SYST BP LT 130 MM HG: CPT | Performed by: INTERNAL MEDICINE

## 2024-05-08 PROCEDURE — 1159F MED LIST DOCD IN RCRD: CPT | Performed by: INTERNAL MEDICINE

## 2024-05-08 PROCEDURE — 1125F AMNT PAIN NOTED PAIN PRSNT: CPT | Performed by: INTERNAL MEDICINE

## 2024-05-08 PROCEDURE — 1090000001 HH PPS REVENUE CREDIT

## 2024-05-08 PROCEDURE — 1123F ACP DISCUSS/DSCN MKR DOCD: CPT | Performed by: INTERNAL MEDICINE

## 2024-05-08 PROCEDURE — 1111F DSCHRG MED/CURRENT MED MERGE: CPT | Performed by: INTERNAL MEDICINE

## 2024-05-08 PROCEDURE — 1160F RVW MEDS BY RX/DR IN RCRD: CPT | Performed by: INTERNAL MEDICINE

## 2024-05-08 PROCEDURE — 1036F TOBACCO NON-USER: CPT | Performed by: INTERNAL MEDICINE

## 2024-05-08 PROCEDURE — G0299 HHS/HOSPICE OF RN EA 15 MIN: HCPCS | Mod: HHH

## 2024-05-08 PROCEDURE — 1090000002 HH PPS REVENUE DEBIT

## 2024-05-08 PROCEDURE — 99213 OFFICE O/P EST LOW 20 MIN: CPT | Performed by: INTERNAL MEDICINE

## 2024-05-08 PROCEDURE — 3078F DIAST BP <80 MM HG: CPT | Performed by: INTERNAL MEDICINE

## 2024-05-08 RX ORDER — AMIODARONE HYDROCHLORIDE 200 MG/1
200 TABLET ORAL 2 TIMES DAILY
Qty: 180 TABLET | Refills: 3 | Status: SHIPPED
Start: 2024-05-08 | End: 2024-05-15 | Stop reason: HOSPADM

## 2024-05-08 RX ORDER — METOPROLOL TARTRATE 50 MG/1
50 TABLET ORAL 2 TIMES DAILY
Qty: 180 TABLET | Refills: 3 | Status: SHIPPED | OUTPATIENT
Start: 2024-05-08 | End: 2025-05-03

## 2024-05-08 RX ORDER — POTASSIUM CHLORIDE 750 MG/1
10 TABLET, FILM COATED, EXTENDED RELEASE ORAL 2 TIMES DAILY
Qty: 180 TABLET | Refills: 3 | Status: SHIPPED
Start: 2024-05-08 | End: 2024-05-15 | Stop reason: HOSPADM

## 2024-05-08 SDOH — HEALTH STABILITY: PHYSICAL HEALTH

## 2024-05-08 SDOH — HEALTH STABILITY: PHYSICAL HEALTH: PHYSICAL EXERCISE: 10

## 2024-05-08 SDOH — HEALTH STABILITY: PHYSICAL HEALTH: PHYSICAL EXERCISE: SITTING

## 2024-05-08 SDOH — HEALTH STABILITY: MENTAL HEALTH: SMOKING IN HOME: 0

## 2024-05-08 SDOH — HEALTH STABILITY: PHYSICAL HEALTH: EXERCISE ACTIVITY: APS, MARCHING, LAQ, HIP ABD/ADD

## 2024-05-08 SDOH — ECONOMIC STABILITY: HOUSING INSECURITY: EVIDENCE OF SMOKING MATERIAL: 0

## 2024-05-08 SDOH — HEALTH STABILITY: PHYSICAL HEALTH: EXERCISE ACTIVITIES SETS: 1

## 2024-05-08 ASSESSMENT — ENCOUNTER SYMPTOMS
PAIN LOCATION - EXACERBATING FACTORS: MOVEMENT
PAIN LOCATION - PAIN SEVERITY: 5/10
OCCASIONAL FEELINGS OF UNSTEADINESS: 1
LOWEST PAIN SEVERITY IN PAST 24 HOURS: 0/10
PAIN LOCATION - RELIEVING FACTORS: REST, ICE, MEDS
PAIN: 1
PAIN LOCATION: HEAD
PAIN LOCATION: LEFT HIP
HIGHEST PAIN SEVERITY IN PAST 24 HOURS: 5/10
PAIN LOCATION - PAIN QUALITY: ACHING
DEPRESSION: 0
PAIN: 1
LOSS OF SENSATION IN FEET: 0
PERSON REPORTING PAIN: PATIENT
APPETITE LEVEL: GOOD
HIGHEST PAIN SEVERITY IN PAST 24 HOURS: 3/10
OCCASIONAL FEELINGS OF UNSTEADINESS: 1

## 2024-05-08 ASSESSMENT — ACTIVITIES OF DAILY LIVING (ADL)
CURRENT_FUNCTION: ONE PERSON
AMBULATION ASSISTANCE ON FLAT SURFACES: 1
OASIS_M1830: 03
AMBULATION_DISTANCE/DURATION_TOLERATED: 50 FEET
ENTERING_EXITING_HOME: MINIMUM ASSIST
AMBULATION ASSISTANCE: ONE PERSON

## 2024-05-08 ASSESSMENT — PAIN SCALES - GENERAL: PAINLEVEL: 9

## 2024-05-08 ASSESSMENT — LIFESTYLE VARIABLES
HOW MANY STANDARD DRINKS CONTAINING ALCOHOL DO YOU HAVE ON A TYPICAL DAY: PATIENT DOES NOT DRINK
AUDIT-C TOTAL SCORE: 0
HOW OFTEN DO YOU HAVE SIX OR MORE DRINKS ON ONE OCCASION: NEVER
HOW OFTEN DO YOU HAVE A DRINK CONTAINING ALCOHOL: NEVER
SKIP TO QUESTIONS 9-10: 1

## 2024-05-08 NOTE — TELEPHONE ENCOUNTER
Spoke to patient she seemed confused about medication but she is coming in for appointment tomorrow she is not sure to as what she needed.

## 2024-05-08 NOTE — PROGRESS NOTES
Subjective   Chief Complaint   Patient presents with    Hospital Follow-up     Linette Doyle is a/an established patient who presents to the office today for a hospital follow up. They were admitted for Atrial Fibrillation.         83-year-old female patient with a multiple comorbid condition history of CAD, paroxysmal atrial fibrillation history of sick sinus syndrome.  History of CKD.  Patient with moderate to severe mitral regurgitation with echocardiogram  Require advanced history of PCI of right coronary the past.  Patient was recently admitted in the hospital for mechanical fall, GARY and UTI.  Patient had a workup done including CT and MRI unremarkable for acute CVA.  Patient was started antibiotic for underlying UTI.  Patient discharged home on the multiple rate control medication.  Again this morning if patient unsteady gait and falling the bruise of the left side of the abdomen.  No active chest pain tightness.  Patient had more likely this mechanical fall.  Unsteady gait.  Probably needs more physical therapy rather than noncardiac issue.  Patient had a BMP was done creatinine was 1.4 unremarkable findings.  CBC with hemoglobin 10.8 hematocrit 35.4 platelet count is of 113.      Past Medical History:   Diagnosis Date    Angina pectoris (CMS-HCC) 10/22/2023    Atherosclerosis of coronary artery 08/21/2019    Atherosclerotic heart disease of native coronary artery with other forms of angina pectoris (CMS-HCC) 10/22/2023    Hypercholesterolemia 12/28/2018    Hyperlipidemia 10/22/2023    Presence of cardiac pacemaker 10/22/2023    Primary hypertension 12/28/2018    Shortness of breath 11/26/2019    Sick sinus syndrome (Multi) 10/22/2023    Sinus bradycardia 10/22/2023    Stage 3a chronic kidney disease (Multi) 11/25/2019     Past Surgical History:   Procedure Laterality Date    CARDIAC CATHETERIZATION N/A 02/02/2024    Procedure: Left Heart Cath;  Surgeon: Becky Vela MD;  Location: [facility] Cardiac Cath Lab;   Service: Cardiovascular;  Laterality: N/A;    CARDIAC CATHETERIZATION N/A 02/02/2024    Procedure: PCI;  Surgeon: Becky Vela MD;  Location: [facility] Cardiac Cath Lab;  Service: Cardiovascular;  Laterality: N/A;    CARDIAC CATHETERIZATION N/A 02/08/2024    Procedure: Left Heart Cath;  Surgeon: Darrius Ibrahim MD;  Location: [facility] Cardiac Cath Lab;  Service: Cardiovascular;  Laterality: N/A;    CARDIAC CATHETERIZATION N/A 02/08/2024    Procedure: PCI;  Surgeon: Darrius Ibrahim MD;  Location: [facility] Cardiac Cath Lab;  Service: Cardiovascular;  Laterality: N/A;    PACEMAKER PLACEMENT  07/04/2019    [employer] PACEMAKE AND STENT PLACEMENT     No relevant family history has been documented for this patient.  Current Outpatient Medications   Medication Sig Dispense Refill    acetaminophen/diphenhydramine (TYLENOL PM EXTRA STRENGTH ORAL) Take 1 tablet by mouth as needed at bedtime (sleep). Indications: sleep      amiodarone (Pacerone) 200 mg tablet Take 1 tablet (200 mg) by mouth 2 times a day. 180 tablet 3    apixaban (Eliquis) 5 mg tablet Take 1 tablet (5 mg) by mouth 2 times a day. 180 tablet 3    ascorbic acid/collagen hydr (COLLAGEN PLUS VITAMIN C ORAL) Take 1 capsule by mouth early in the morning. Indications: supplement      atorvastatin (Lipitor) 80 mg tablet Take 1 tablet (80 mg) by mouth once daily at bedtime. 30 tablet 3    cholecalciferol (Vitamin D-3) 50 MCG (2000 UT) tablet Take 1 tablet (2,000 Units) by mouth once daily.      clopidogrel (Plavix) 75 mg tablet Take 1 tablet (75 mg) by mouth once daily. Do not start before February 15, 2024. 30 tablet 3    dilTIAZem (Cardizem) 120 mg immediate release tablet Take 1 tablet (120 mg) by mouth 2 times a day.      hydrOXYzine HCL (Atarax) 10 mg tablet Take 1 tablet (10 mg) by mouth every 6 hours if needed for anxiety.      levothyroxine (Synthroid, Levoxyl) 100 mcg tablet Take 1 tablet (100 mcg) by mouth once daily. 1 tablet in the morning on an empty stomach Orally Once  "a day for 30 day(s) 90 tablet 0    metoprolol tartrate (Lopressor) 50 mg tablet Take 1 tablet by mouth 2 times a day. 180 tablet 3    mv-min/FA/vit K/lycop/lut/zeax (OCUVITE EYE PLUS MULTI ORAL) Take 1 tablet by mouth once daily.      omeprazole (PriLOSEC) 20 mg DR capsule Take 1 capsule (20 mg) by mouth once daily. 90 capsule 0    oxygen (O2) gas therapy Inhale 1 each every 12 hours. (Patient taking differently: Inhale 3 L/min if needed (sob, or low oxygen levels).)      sertraline (Zoloft) 100 mg tablet Take 2 tablets (200 mg) by mouth once daily. 90 tablet 0    sulfamethoxazole-trimethoprim (Bactrim DS) 800-160 mg tablet Take 1 tablet by mouth 2 times a day for 7 days. 14 tablet 0    torsemide (Demadex) 20 mg tablet Take 1 tablet (20 mg) by mouth once daily. 90 tablet 0    potassium chloride CR (Klor-Con) 10 mEq ER tablet Take 1 tablet (10 mEq) by mouth 2 times a day. Do not crush, chew, or split. 180 tablet 3     No current facility-administered medications for this visit.      reports that she has never smoked. She has never been exposed to tobacco smoke. She has never used smokeless tobacco. She reports that she does not drink alcohol and does not use drugs.  Iodinated contrast media  Presence of cardiac pacemaker    Vitals:    05/08/24 1449   BP: 128/70   Pulse: 60   Resp: 18   Temp: 37 °C (98.6 °F)   SpO2: 97%   Weight: 60.3 kg (133 lb)   Height: 1.626 m (5' 4\")   PainSc: 9  Comment: Patient fell this morning   PainLoc: Rib Cage  Comment: just below rib cage      BMI:Body mass index is 22.83 kg/m².   General Cardiology:  General Appearance: Alert, oriented and in no acute distress.  HEENT: extra ocular movements intact (EOMI), pupils equal,  round, reactive to light and accommodation (PERRLA).  Carotid Upstroke: no bruit, normal.  Jugular Venous Distention (JVD): flat.  Chest: normal.  Lungs: Clear to auscultation,   Heart Sounds: no S3 or S4, normal S1, S2, regular rate.  Murmur, Click, Gallop: no systolic " murmur.  Abdomen: no hepatomegaly, no masses felt, soft.  Bruising of the left side of the abdomen  Extremities: no leg edema.  Peripheral pulses: 2 plus bilateral.  NEUROLOGY Cranial nerves II-XII grossly intact.     Patient Active Problem List   Diagnosis    Abnormal EKG    Presence of cardiac pacemaker    Neck pain    Contusion of left shoulder    Atherosclerotic heart disease of native coronary artery with other forms of angina pectoris (CMS-HCC)    Coronary arteriosclerosis    Atherosclerosis of coronary artery    Diverticulitis of large intestine    Recurrent falls    Hematoma    Hypothyroidism    Primary hypertension    Hypercholesterolemia    Sick sinus syndrome (Multi)    Sinus bradycardia    Anemia    Hyperkalemia    Left flank pain    Stage 3a chronic kidney disease (Multi)    Arthralgia of wrist    Candidiasis of skin    Common cold    Overweight with body mass index (BMI) 25.0-29.9    Acute diastolic heart failure (Multi)    Paroxysmal A-fib (Multi)    Hypoxemia    Heart murmur    Mitral valve insufficiency    Chronic kidney disease, stage 3a (Multi)    Acute cystitis    Diarrhea of presumed infectious origin       Problem List Items Addressed This Visit       Presence of cardiac pacemaker - Primary    Atherosclerotic heart disease of native coronary artery with other forms of angina pectoris (CMS-HCC)    Relevant Medications    amiodarone (Pacerone) 200 mg tablet    metoprolol tartrate (Lopressor) 50 mg tablet    Atherosclerosis of coronary artery    Relevant Medications    metoprolol tartrate (Lopressor) 50 mg tablet    Sick sinus syndrome (Multi)    Relevant Medications    metoprolol tartrate (Lopressor) 50 mg tablet    Sinus bradycardia    Relevant Medications    metoprolol tartrate (Lopressor) 50 mg tablet    Paroxysmal A-fib (Multi)    Relevant Medications    metoprolol tartrate (Lopressor) 50 mg tablet    potassium chloride CR (Klor-Con) 10 mEq ER tablet    Mitral valve insufficiency    Relevant  Medications    metoprolol tartrate (Lopressor) 50 mg tablet    Chronic kidney disease, stage 3a (Multi)      83-year-old female patient with underlying multiple comorbid condition.  History of atrial fibrillation, sick sinus syndrome, CKD, mitral regurgitation.  1.  Atrial fibrillation: Continue current rate control medication of Eliquis. Patient currently on multiple medication including amiodarone, diltiazem, metoprolol tartrate.  2.  Hyperlipidemia continue current Lipitor 80 mg once a day.  3.  Fall: Patient does have underlying weakness history of physical therapy advised patient to walk with a walker.  Patient fell this morning with a mechanical fall advised patient go to the ER to get checked out for the left abdominal bruise.  Patient currently on Eliquis.  Pt. care time is spent includes review of diagnostic tests, labs, radiographs, EKGs, old echoes, cardiac work-up and coordination of care. Assessment, impression and plans are reflected in the note above as well as the orders.   Darrius Ibrahim MD

## 2024-05-08 NOTE — TELEPHONE ENCOUNTER
Patient called to return call to Marcelle and Lucretia. Advised patient that Lucretia was calling to schedule an appointment with [facility]. Attempted to schedule appointment, Patient would like Lucretia or Marcelle to return her call. Patient stated she would call back to schedule an appointment after she meets with her Cardiologist.    Patient can be reached at [phone].

## 2024-05-09 ENCOUNTER — APPOINTMENT (OUTPATIENT)
Dept: CARDIOLOGY | Facility: HOSPITAL | Age: 84
DRG: 183 | End: 2024-05-09
Payer: MEDICARE

## 2024-05-09 ENCOUNTER — HOME CARE VISIT (OUTPATIENT)
Dept: HOME HEALTH SERVICES | Facility: HOME HEALTH | Age: 84
End: 2024-05-09
Payer: MEDICARE

## 2024-05-09 ENCOUNTER — APPOINTMENT (OUTPATIENT)
Dept: RADIOLOGY | Facility: HOSPITAL | Age: 84
DRG: 183 | End: 2024-05-09
Payer: MEDICARE

## 2024-05-09 ENCOUNTER — HOSPITAL ENCOUNTER (INPATIENT)
Facility: HOSPITAL | Age: 84
LOS: 6 days | Discharge: NURSING, REHAB OR LONG-TERM CARE | DRG: 183 | End: 2024-05-15
Attending: STUDENT IN AN ORGANIZED HEALTH CARE EDUCATION/TRAINING PROGRAM | Admitting: INTERNAL MEDICINE
Payer: MEDICARE

## 2024-05-09 DIAGNOSIS — S22.42XA CLOSED FRACTURE OF MULTIPLE RIBS OF LEFT SIDE, INITIAL ENCOUNTER: Primary | ICD-10-CM

## 2024-05-09 DIAGNOSIS — N18.31 CHRONIC KIDNEY DISEASE, STAGE 3A (MULTI): ICD-10-CM

## 2024-05-09 DIAGNOSIS — R29.6 MULTIPLE FALLS: ICD-10-CM

## 2024-05-09 DIAGNOSIS — I48.0 PAROXYSMAL A-FIB (MULTI): ICD-10-CM

## 2024-05-09 DIAGNOSIS — N18.32 CHRONIC KIDNEY DISEASE, STAGE 3B (MULTI): Primary | ICD-10-CM

## 2024-05-09 DIAGNOSIS — N17.9 AKI (ACUTE KIDNEY INJURY) (CMS-HCC): ICD-10-CM

## 2024-05-09 DIAGNOSIS — K59.09 OTHER CONSTIPATION: ICD-10-CM

## 2024-05-09 DIAGNOSIS — M54.2 NECK PAIN: ICD-10-CM

## 2024-05-09 DIAGNOSIS — F51.01 PRIMARY INSOMNIA: ICD-10-CM

## 2024-05-09 LAB
ALBUMIN SERPL-MCNC: 4.9 G/DL (ref 3.5–5)
ALP BLD-CCNC: 103 U/L (ref 35–125)
ALT SERPL-CCNC: 29 U/L (ref 5–40)
ANION GAP SERPL CALC-SCNC: 17 MMOL/L
AST SERPL-CCNC: 41 U/L (ref 5–40)
BASOPHILS # BLD AUTO: 0.06 X10*3/UL (ref 0–0.1)
BASOPHILS NFR BLD AUTO: 0.7 %
BILIRUB SERPL-MCNC: 0.4 MG/DL (ref 0.1–1.2)
BUN SERPL-MCNC: 31 MG/DL (ref 8–25)
CALCIUM SERPL-MCNC: 9.9 MG/DL (ref 8.5–10.4)
CHLORIDE SERPL-SCNC: 95 MMOL/L (ref 97–107)
CO2 SERPL-SCNC: 25 MMOL/L (ref 24–31)
CREAT SERPL-MCNC: 2.6 MG/DL (ref 0.4–1.6)
EGFRCR SERPLBLD CKD-EPI 2021: 18 ML/MIN/1.73M*2
EOSINOPHIL # BLD AUTO: 0.29 X10*3/UL (ref 0–0.4)
EOSINOPHIL NFR BLD AUTO: 3.1 %
ERYTHROCYTE [DISTWIDTH] IN BLOOD BY AUTOMATED COUNT: 15.6 % (ref 11.5–14.5)
GLUCOSE SERPL-MCNC: 114 MG/DL (ref 65–99)
HCT VFR BLD AUTO: 35.8 % (ref 36–46)
HGB BLD-MCNC: 11.2 G/DL (ref 12–16)
IMM GRANULOCYTES # BLD AUTO: 0.04 X10*3/UL (ref 0–0.5)
IMM GRANULOCYTES NFR BLD AUTO: 0.4 % (ref 0–0.9)
LYMPHOCYTES # BLD AUTO: 0.73 X10*3/UL (ref 0.8–3)
LYMPHOCYTES NFR BLD AUTO: 7.9 %
MCH RBC QN AUTO: 27.4 PG (ref 26–34)
MCHC RBC AUTO-ENTMCNC: 31.3 G/DL (ref 32–36)
MCV RBC AUTO: 88 FL (ref 80–100)
MONOCYTES # BLD AUTO: 0.66 X10*3/UL (ref 0.05–0.8)
MONOCYTES NFR BLD AUTO: 7.2 %
NEUTROPHILS # BLD AUTO: 7.45 X10*3/UL (ref 1.6–5.5)
NEUTROPHILS NFR BLD AUTO: 80.7 %
NRBC BLD-RTO: 0 /100 WBCS (ref 0–0)
PLATELET # BLD AUTO: 146 X10*3/UL (ref 150–450)
POTASSIUM SERPL-SCNC: 4.4 MMOL/L (ref 3.4–5.1)
PROT SERPL-MCNC: 7.9 G/DL (ref 5.9–7.9)
RBC # BLD AUTO: 4.09 X10*6/UL (ref 4–5.2)
SODIUM SERPL-SCNC: 137 MMOL/L (ref 133–145)
TROPONIN T SERPL-MCNC: 33 NG/L
TROPONIN T SERPL-MCNC: 39 NG/L
WBC # BLD AUTO: 9.2 X10*3/UL (ref 4.4–11.3)

## 2024-05-09 PROCEDURE — 1090000001 HH PPS REVENUE CREDIT

## 2024-05-09 PROCEDURE — 84075 ASSAY ALKALINE PHOSPHATASE: CPT | Performed by: STUDENT IN AN ORGANIZED HEALTH CARE EDUCATION/TRAINING PROGRAM

## 2024-05-09 PROCEDURE — 72125 CT NECK SPINE W/O DYE: CPT | Performed by: STUDENT IN AN ORGANIZED HEALTH CARE EDUCATION/TRAINING PROGRAM

## 2024-05-09 PROCEDURE — 72125 CT NECK SPINE W/O DYE: CPT

## 2024-05-09 PROCEDURE — 70450 CT HEAD/BRAIN W/O DYE: CPT | Performed by: STUDENT IN AN ORGANIZED HEALTH CARE EDUCATION/TRAINING PROGRAM

## 2024-05-09 PROCEDURE — 2500000001 HC RX 250 WO HCPCS SELF ADMINISTERED DRUGS (ALT 637 FOR MEDICARE OP): Performed by: INTERNAL MEDICINE

## 2024-05-09 PROCEDURE — 2500000006 HC RX 250 W HCPCS SELF ADMINISTERED DRUGS (ALT 637 FOR ALL PAYERS): Performed by: INTERNAL MEDICINE

## 2024-05-09 PROCEDURE — 1090000002 HH PPS REVENUE DEBIT

## 2024-05-09 PROCEDURE — 85025 COMPLETE CBC W/AUTO DIFF WBC: CPT | Performed by: STUDENT IN AN ORGANIZED HEALTH CARE EDUCATION/TRAINING PROGRAM

## 2024-05-09 PROCEDURE — 71045 X-RAY EXAM CHEST 1 VIEW: CPT | Performed by: STUDENT IN AN ORGANIZED HEALTH CARE EDUCATION/TRAINING PROGRAM

## 2024-05-09 PROCEDURE — 99291 CRITICAL CARE FIRST HOUR: CPT | Mod: 25 | Performed by: STUDENT IN AN ORGANIZED HEALTH CARE EDUCATION/TRAINING PROGRAM

## 2024-05-09 PROCEDURE — 36415 COLL VENOUS BLD VENIPUNCTURE: CPT | Performed by: STUDENT IN AN ORGANIZED HEALTH CARE EDUCATION/TRAINING PROGRAM

## 2024-05-09 PROCEDURE — 96360 HYDRATION IV INFUSION INIT: CPT

## 2024-05-09 PROCEDURE — G0390 TRAUMA RESPONS W/HOSP CRITI: HCPCS

## 2024-05-09 PROCEDURE — 1210000001 HC SEMI-PRIVATE ROOM DAILY

## 2024-05-09 PROCEDURE — 84484 ASSAY OF TROPONIN QUANT: CPT | Performed by: STUDENT IN AN ORGANIZED HEALTH CARE EDUCATION/TRAINING PROGRAM

## 2024-05-09 PROCEDURE — 2500000005 HC RX 250 GENERAL PHARMACY W/O HCPCS: Performed by: INTERNAL MEDICINE

## 2024-05-09 PROCEDURE — 74176 CT ABD & PELVIS W/O CONTRAST: CPT | Performed by: STUDENT IN AN ORGANIZED HEALTH CARE EDUCATION/TRAINING PROGRAM

## 2024-05-09 PROCEDURE — 70450 CT HEAD/BRAIN W/O DYE: CPT

## 2024-05-09 PROCEDURE — 74176 CT ABD & PELVIS W/O CONTRAST: CPT

## 2024-05-09 PROCEDURE — 71045 X-RAY EXAM CHEST 1 VIEW: CPT

## 2024-05-09 PROCEDURE — 2500000004 HC RX 250 GENERAL PHARMACY W/ HCPCS (ALT 636 FOR OP/ED): Performed by: STUDENT IN AN ORGANIZED HEALTH CARE EDUCATION/TRAINING PROGRAM

## 2024-05-09 PROCEDURE — 93010 ELECTROCARDIOGRAM REPORT: CPT | Performed by: INTERNAL MEDICINE

## 2024-05-09 PROCEDURE — 93005 ELECTROCARDIOGRAM TRACING: CPT

## 2024-05-09 PROCEDURE — 99222 1ST HOSP IP/OBS MODERATE 55: CPT | Performed by: SURGERY

## 2024-05-09 PROCEDURE — 2500000004 HC RX 250 GENERAL PHARMACY W/ HCPCS (ALT 636 FOR OP/ED): Performed by: INTERNAL MEDICINE

## 2024-05-09 RX ORDER — ACETAMINOPHEN 650 MG/1
650 SUPPOSITORY RECTAL EVERY 4 HOURS PRN
Status: DISCONTINUED | OUTPATIENT
Start: 2024-05-09 | End: 2024-05-15 | Stop reason: HOSPADM

## 2024-05-09 RX ORDER — SERTRALINE HYDROCHLORIDE 100 MG/1
200 TABLET, FILM COATED ORAL DAILY
Status: DISCONTINUED | OUTPATIENT
Start: 2024-05-10 | End: 2024-05-12

## 2024-05-09 RX ORDER — ACETAMINOPHEN 160 MG/5ML
650 SOLUTION ORAL EVERY 4 HOURS PRN
Status: DISCONTINUED | OUTPATIENT
Start: 2024-05-09 | End: 2024-05-15 | Stop reason: HOSPADM

## 2024-05-09 RX ORDER — PANTOPRAZOLE SODIUM 20 MG/1
20 TABLET, DELAYED RELEASE ORAL
Status: DISCONTINUED | OUTPATIENT
Start: 2024-05-10 | End: 2024-05-15 | Stop reason: HOSPADM

## 2024-05-09 RX ORDER — ATORVASTATIN CALCIUM 80 MG/1
80 TABLET, FILM COATED ORAL NIGHTLY
Status: DISCONTINUED | OUTPATIENT
Start: 2024-05-09 | End: 2024-05-15 | Stop reason: HOSPADM

## 2024-05-09 RX ORDER — DILTIAZEM HYDROCHLORIDE 60 MG/1
120 TABLET, FILM COATED ORAL 2 TIMES DAILY
Status: DISCONTINUED | OUTPATIENT
Start: 2024-05-09 | End: 2024-05-10

## 2024-05-09 RX ORDER — ACETAMINOPHEN 325 MG/1
650 TABLET ORAL EVERY 4 HOURS PRN
Status: DISCONTINUED | OUTPATIENT
Start: 2024-05-09 | End: 2024-05-15 | Stop reason: HOSPADM

## 2024-05-09 RX ORDER — POLYETHYLENE GLYCOL 3350 17 G/17G
17 POWDER, FOR SOLUTION ORAL DAILY
Status: DISCONTINUED | OUTPATIENT
Start: 2024-05-10 | End: 2024-05-15 | Stop reason: HOSPADM

## 2024-05-09 RX ORDER — LIDOCAINE 560 MG/1
1 PATCH PERCUTANEOUS; TOPICAL; TRANSDERMAL DAILY
Status: DISCONTINUED | OUTPATIENT
Start: 2024-05-09 | End: 2024-05-15 | Stop reason: HOSPADM

## 2024-05-09 RX ORDER — ACETAMINOPHEN 500 MG
5 TABLET ORAL NIGHTLY PRN
Status: DISCONTINUED | OUTPATIENT
Start: 2024-05-09 | End: 2024-05-15 | Stop reason: HOSPADM

## 2024-05-09 RX ORDER — CLOPIDOGREL BISULFATE 75 MG/1
75 TABLET ORAL DAILY
Status: DISCONTINUED | OUTPATIENT
Start: 2024-05-10 | End: 2024-05-09

## 2024-05-09 RX ORDER — AMIODARONE HYDROCHLORIDE 200 MG/1
200 TABLET ORAL 2 TIMES DAILY
Status: DISCONTINUED | OUTPATIENT
Start: 2024-05-09 | End: 2024-05-10

## 2024-05-09 RX ORDER — CLOPIDOGREL BISULFATE 75 MG/1
75 TABLET ORAL DAILY
Status: DISCONTINUED | OUTPATIENT
Start: 2024-05-10 | End: 2024-05-15 | Stop reason: HOSPADM

## 2024-05-09 RX ORDER — ONDANSETRON HYDROCHLORIDE 2 MG/ML
4 INJECTION, SOLUTION INTRAVENOUS EVERY 6 HOURS PRN
Status: DISCONTINUED | OUTPATIENT
Start: 2024-05-09 | End: 2024-05-15 | Stop reason: HOSPADM

## 2024-05-09 RX ORDER — LEVOTHYROXINE SODIUM 100 UG/1
100 TABLET ORAL
Status: DISCONTINUED | OUTPATIENT
Start: 2024-05-10 | End: 2024-05-15 | Stop reason: HOSPADM

## 2024-05-09 RX ORDER — SODIUM CHLORIDE 9 MG/ML
75 INJECTION, SOLUTION INTRAVENOUS CONTINUOUS
Status: DISCONTINUED | OUTPATIENT
Start: 2024-05-09 | End: 2024-05-11

## 2024-05-09 RX ORDER — METOPROLOL TARTRATE 50 MG/1
50 TABLET ORAL 2 TIMES DAILY
Status: DISCONTINUED | OUTPATIENT
Start: 2024-05-09 | End: 2024-05-15 | Stop reason: HOSPADM

## 2024-05-09 RX ORDER — GUAIFENESIN 600 MG/1
600 TABLET, EXTENDED RELEASE ORAL EVERY 12 HOURS PRN
Status: DISCONTINUED | OUTPATIENT
Start: 2024-05-09 | End: 2024-05-15 | Stop reason: HOSPADM

## 2024-05-09 RX ADMIN — SODIUM CHLORIDE 75 ML/HR: 900 INJECTION, SOLUTION INTRAVENOUS at 22:41

## 2024-05-09 RX ADMIN — ATORVASTATIN CALCIUM 80 MG: 80 TABLET, FILM COATED ORAL at 23:40

## 2024-05-09 RX ADMIN — LIDOCAINE 1 PATCH: 4 PATCH TOPICAL at 22:40

## 2024-05-09 RX ADMIN — METOPROLOL TARTRATE 50 MG: 50 TABLET, FILM COATED ORAL at 23:40

## 2024-05-09 RX ADMIN — SODIUM CHLORIDE 1000 ML: 900 INJECTION, SOLUTION INTRAVENOUS at 18:57

## 2024-05-09 RX ADMIN — AMIODARONE HYDROCHLORIDE 200 MG: 200 TABLET ORAL at 23:40

## 2024-05-09 ASSESSMENT — PAIN DESCRIPTION - DESCRIPTORS
DESCRIPTORS: ACHING
DESCRIPTORS: THROBBING

## 2024-05-09 ASSESSMENT — ENCOUNTER SYMPTOMS
FEVER: 0
CONFUSION: 0
ABDOMINAL PAIN: 1
SHORTNESS OF BREATH: 0
CHILLS: 0
DIAPHORESIS: 0
DIZZINESS: 0
ACTIVITY CHANGE: 0
ARTHRALGIAS: 1
APPETITE CHANGE: 0
AGITATION: 0

## 2024-05-09 ASSESSMENT — PAIN - FUNCTIONAL ASSESSMENT
PAIN_FUNCTIONAL_ASSESSMENT: 0-10

## 2024-05-09 ASSESSMENT — LIFESTYLE VARIABLES
HAVE YOU EVER FELT YOU SHOULD CUT DOWN ON YOUR DRINKING: NO
HAVE PEOPLE ANNOYED YOU BY CRITICIZING YOUR DRINKING: NO
TOTAL SCORE: 0
EVER FELT BAD OR GUILTY ABOUT YOUR DRINKING: NO
EVER HAD A DRINK FIRST THING IN THE MORNING TO STEADY YOUR NERVES TO GET RID OF A HANGOVER: NO

## 2024-05-09 ASSESSMENT — PAIN SCALES - GENERAL
PAINLEVEL_OUTOF10: 9
PAINLEVEL_OUTOF10: 5 - MODERATE PAIN

## 2024-05-09 NOTE — ED PROVIDER NOTES
HPI   Chief Complaint   Patient presents with    Fall     Presents to ED for multiple falls over the past 2 days.       Patient is 83-year-old female presenting to the emergency department for evaluation of multiple falls.  Patient states since she has fallen approximately 4 times since being discharged from the hospital on 5/2/2024.  She states she fell once yesterday and fell again today both times hitting her head.  She is now complaining of pain in her neck as well as pain in her left side.  She denies chest pain, shortness of breath, fever, chills, nausea, vomiting, abdominal pain, cough, congestion, headaches, numbness, tingling, hematuria, dysuria, constipation, diarrhea.                           [place] Coma Scale Score: 15                     Patient History   Past Medical History:   Diagnosis Date    Angina pectoris (CMS-HCC) 10/22/2023    Atherosclerosis of coronary artery 08/21/2019    Atherosclerotic heart disease of native coronary artery with other forms of angina pectoris (CMS-HCC) 10/22/2023    Hypercholesterolemia 12/28/2018    Hyperlipidemia 10/22/2023    Presence of cardiac pacemaker 10/22/2023    Primary hypertension 12/28/2018    Shortness of breath 11/26/2019    Sick sinus syndrome (Multi) 10/22/2023    Sinus bradycardia 10/22/2023    Stage 3a chronic kidney disease (Multi) 11/25/2019     Past Surgical History:   Procedure Laterality Date    CARDIAC CATHETERIZATION N/A 02/02/2024    Procedure: Left Heart Cath;  Surgeon: Becky Vela MD;  Location: [facility] Cardiac Cath Lab;  Service: Cardiovascular;  Laterality: N/A;    CARDIAC CATHETERIZATION N/A 02/02/2024    Procedure: PCI;  Surgeon: Becky Vela MD;  Location: [facility] Cardiac Cath Lab;  Service: Cardiovascular;  Laterality: N/A;    CARDIAC CATHETERIZATION N/A 02/08/2024    Procedure: Left Heart Cath;  Surgeon: Darrius Ibrahim MD;  Location: [facility] Cardiac Cath Lab;  Service: Cardiovascular;  Laterality: N/A;    CARDIAC CATHETERIZATION N/A  02/08/2024    Procedure: PCI;  Surgeon: Darrius Ibrahim MD;  Location: [facility] Cardiac Cath Lab;  Service: Cardiovascular;  Laterality: N/A;    PACEMAKER PLACEMENT  07/04/2019    [employer] PACEMAKE AND STENT PLACEMENT     Family History   Problem Relation Name Age of Onset    No Known Problems Mother      No Known Problems Father       Social History     Tobacco Use    Smoking status: Never     Passive exposure: Never    Smokeless tobacco: Never   Vaping Use    Vaping status: Never Used   Substance Use Topics    Alcohol use: Never    Drug use: Never       Physical Exam   ED Triage Vitals [05/09/24 1835]   Temperature Heart Rate Respirations BP   36.6 °C (97.9 °F) 61 16 156/71      Pulse Ox Temp src Heart Rate Source Patient Position   96 % -- -- --      BP Location FiO2 (%)     -- --       Physical Exam  Vitals and nursing note reviewed.   Constitutional:       General: She is not in acute distress.     Appearance: Normal appearance. She is not ill-appearing or toxic-appearing.   HENT:      Head: Normocephalic and atraumatic.      Comments: Mild swelling to the right cheek with tenderness to palpation but no obvious deformity or ecchymosis     Nose: Nose normal.      Mouth/Throat:      Mouth: Mucous membranes are moist.   Eyes:      Pupils: Pupils are equal, round, and reactive to light.   Cardiovascular:      Rate and Rhythm: Normal rate and regular rhythm.      Pulses: Normal pulses.      Heart sounds: Normal heart sounds. No murmur heard.     No friction rub. No gallop.   Pulmonary:      Effort: Pulmonary effort is normal.      Breath sounds: Normal breath sounds. No wheezing, rhonchi or rales.   Abdominal:      General: Abdomen is flat.      Palpations: Abdomen is soft.      Tenderness: There is no abdominal tenderness. There is no guarding or rebound.   Musculoskeletal:         General: No swelling or deformity. Normal range of motion.      Cervical back: Normal range of motion.   Skin:     General: Skin is warm  and dry.      Findings: Bruising (Hematoma noted to the left side) present.   Neurological:      General: No focal deficit present.      Mental Status: She is alert and oriented to person, place, and time.      Sensory: No sensory deficit.      Motor: No weakness.   Psychiatric:         Mood and Affect: Mood normal.         Behavior: Behavior normal.         ED Course & MDM   ED Course as of 05/09/24 2203   Thu May 09, 2024   1847 EKG on my interpretation shows AV dual paced rhythm with rate of 60 beats minute.  Normal axis.  QTc is 520 ms, [place] interval 280.  No ST elevation or depression, no acute ischemic rhythm.  No STEMI.  Normal EKG. [NT]   2122 Creatinine(!): 2.60  Baseline 1.4-1.5 [NT]      ED Course User Index  [NT] Marcelino Schwarz DO         Diagnoses as of 05/09/24 2203   Closed fracture of multiple ribs of left side, initial encounter   GARY (acute kidney injury) (CMS-[facility])   Multiple falls   Neck pain       Medical Decision Making  **Disclaimer parts of this chart have been completed using voice recognition software. Please excuse any errors of transcription.     Patient seen in conjunction with attending physician .     HPI: Detailed above.    Exam: A medically appropriate exam performed, outlined above, given the known history and presentation.    History obtained from: Patient    EKG: Reviewed and interpreted by my attending physician    Labs/Diagnostics:  Labs Reviewed   CBC WITH AUTO DIFFERENTIAL - Abnormal       Result Value    WBC 9.2      nRBC 0.0      RBC 4.09      Hemoglobin 11.2 (*)     Hematocrit 35.8 (*)     MCV 88      MCH 27.4      MCHC 31.3 (*)     RDW 15.6 (*)     Platelets 146 (*)     Neutrophils % 80.7      Immature Granulocytes %, Automated 0.4      Lymphocytes % 7.9      Monocytes % 7.2      Eosinophils % 3.1      Basophils % 0.7      Neutrophils Absolute 7.45 (*)     Immature Granulocytes Absolute, Automated 0.04      Lymphocytes Absolute 0.73 (*)     Monocytes Absolute  0.66      Eosinophils Absolute 0.29      Basophils Absolute 0.06     COMPREHENSIVE METABOLIC PANEL - Abnormal    Glucose 114 (*)     Sodium 137      Potassium 4.4      Chloride 95 (*)     Bicarbonate 25      Urea Nitrogen 31 (*)     Creatinine 2.60 (*)     eGFR 18 (*)     Calcium 9.9      Albumin 4.9      Alkaline Phosphatase 103      Total Protein 7.9      AST 41 (*)     Bilirubin, Total 0.4      ALT 29      Anion Gap 17     SERIAL TROPONIN, INITIAL ([facility]) - Abnormal    Troponin T, High Sensitivity 39 (*)    SERIAL TROPONIN,  2 HOUR ([facility]) - Abnormal    Troponin T, High Sensitivity 33 (*)    TROPONIN T SERIES, HIGH SENSITIVITY (0, 2 HR, 6 HR)    Narrative:     The following orders were created for panel order Troponin T Series, High Sensitivity (0, 2HR, 6HR).  Procedure                               Abnormality         Status                     ---------                               -----------         ------                     Serial Troponin, Initial...[[id number]]  Abnormal            Final result               Serial Troponin, 2 Hour ...[[id number]]  Abnormal            Final result               Serial Troponin, 6 Hour ...[[id number]]                                                   Please view results for these tests on the individual orders.   SERIAL TROPONIN, 6 HOUR ([facility])     EMERGENCY DEPARTMENT COURSE and DIFFERENTIAL DIAGNOSIS/MDM:  Patient is 83-year-old female presenting to the emergency department for evaluation of neck pain and multiple falls.  On physical exam vital signs remarkable for systolic hypertension but otherwise stable patient is in no acute distress.  She has a significant hematoma noted to the left side.  She is also complaining of pain in her neck and right side of the face from hitting it on the ground.  Diagnostic labs and imaging ordered.  Initial troponin 39 and repeat troponin 33.  CMP showed a creatinine of 2.6 and EGFR of 18 consistent with GARY.  CBC showed no leukocytosis or  anemia needing blood transfusion.  CT of the abdomen showed hematoma in the left lateral flank region as well as nondisplaced left anterolateral sixth and ninth rib fractures.  CT of the head showed no acute intracranial abnormality.  CT of the cervical spine showed no fracture or malalignment.  Chest x-ray showed no acute cardiopulmonary process with cardiomegaly similar to prior x-ray.  Due to patient's GARY and multiple falls we feel she warrants admission for further management of GARY and possible rehab placement.  I spoke with hospitalist  who agreed to admission for further management of GARY, rib fractures, and multiple falls.      Vitals:    Vitals:    05/09/24 1915 05/09/24 1930 05/09/24 1945 05/09/24 2000   BP: 141/59 141/53 140/58 143/61   Pulse: 61 63 60 61   Resp: 19 15 17 18   Temp: 36.6 °C (97.9 °F) 36.6 °C (97.9 °F) 36.6 °C (97.9 °F) 36.6 °C (97.9 °F)   SpO2: 95% 96% 96% 97%   Weight:       Height:         History Limited by:    None    Independent history obtained from:    None      External records reviewed:    Inpatient Notes/Discharge Summary from previous hospitalization on 5/6/2024  Hospital Course  83-year-old female, admitted for mechanical fall and questionable right facial droop.  CT/MRI imaging negative for acute CVA.  Right facial droop, per patient, is her baseline.  No actual neurological deficits.  Was with UTI on admission.  Started on IV antibiotics.  Consults with neurology.  Patient states she is feeling better and would like to go home. Cleared by neurology to go home with home health care.    Diagnostics interpreted by me:    CT Scan(s) see MDM and Xrays - see my independent interpretation in MDM      Discussions with other clinicians:    Hospitalist/Admitting Team Dr. Ricci      Chronic conditions impacting care:    None      Social determinants of health affecting care:    None    Diagnostic tests considered but not performed: None    ED Medications  managed:    Medications   sodium chloride 0.9 % bolus 1,000 mL (1,000 mL intravenous New Bag 5/9/24 1857)   sodium chloride 0.9 % bolus (1,000 mL intravenous Not Given 5/9/24 1857)     Prescription drugs considered:    None      Procedure  Procedures     Twyla Vyas PA-C  05/09/24 7239

## 2024-05-10 ENCOUNTER — HOME CARE VISIT (OUTPATIENT)
Dept: HOME HEALTH SERVICES | Facility: HOME HEALTH | Age: 84
End: 2024-05-10
Payer: MEDICARE

## 2024-05-10 LAB
ALBUMIN SERPL-MCNC: 4 G/DL (ref 3.5–5)
ANION GAP SERPL CALC-SCNC: 15 MMOL/L
APPEARANCE UR: CLEAR
ATRIAL RATE: 60 BPM
BASOPHILS # BLD AUTO: 0.04 X10*3/UL (ref 0–0.1)
BASOPHILS NFR BLD AUTO: 0.5 %
BILIRUB UR STRIP.AUTO-MCNC: NEGATIVE MG/DL
BUN SERPL-MCNC: 26 MG/DL (ref 8–25)
CALCIUM SERPL-MCNC: 9.1 MG/DL (ref 8.5–10.4)
CHLORIDE SERPL-SCNC: 99 MMOL/L (ref 97–107)
CO2 SERPL-SCNC: 23 MMOL/L (ref 24–31)
COLOR UR: NORMAL
CREAT SERPL-MCNC: 2.2 MG/DL (ref 0.4–1.6)
EGFRCR SERPLBLD CKD-EPI 2021: 21 ML/MIN/1.73M*2
EOSINOPHIL # BLD AUTO: 0.28 X10*3/UL (ref 0–0.4)
EOSINOPHIL NFR BLD AUTO: 3.8 %
ERYTHROCYTE [DISTWIDTH] IN BLOOD BY AUTOMATED COUNT: 15.4 % (ref 11.5–14.5)
GLUCOSE SERPL-MCNC: 92 MG/DL (ref 65–99)
GLUCOSE UR STRIP.AUTO-MCNC: NORMAL MG/DL
HCT VFR BLD AUTO: 31 % (ref 36–46)
HGB BLD-MCNC: 9.7 G/DL (ref 12–16)
IMM GRANULOCYTES # BLD AUTO: 0.04 X10*3/UL (ref 0–0.5)
IMM GRANULOCYTES NFR BLD AUTO: 0.5 % (ref 0–0.9)
KETONES UR STRIP.AUTO-MCNC: NEGATIVE MG/DL
LEUKOCYTE ESTERASE UR QL STRIP.AUTO: NEGATIVE
LYMPHOCYTES # BLD AUTO: 1.08 X10*3/UL (ref 0.8–3)
LYMPHOCYTES NFR BLD AUTO: 14.7 %
MCH RBC QN AUTO: 27.4 PG (ref 26–34)
MCHC RBC AUTO-ENTMCNC: 31.3 G/DL (ref 32–36)
MCV RBC AUTO: 88 FL (ref 80–100)
MONOCYTES # BLD AUTO: 0.69 X10*3/UL (ref 0.05–0.8)
MONOCYTES NFR BLD AUTO: 9.4 %
NEUTROPHILS # BLD AUTO: 5.22 X10*3/UL (ref 1.6–5.5)
NEUTROPHILS NFR BLD AUTO: 71.1 %
NITRITE UR QL STRIP.AUTO: NEGATIVE
NRBC BLD-RTO: 0 /100 WBCS (ref 0–0)
P AXIS: 82 DEGREES
P OFFSET: 128 MS
P ONSET: 105 MS
PH UR STRIP.AUTO: 6 [PH]
PHOSPHATE SERPL-MCNC: 3.6 MG/DL (ref 2.5–4.5)
PLATELET # BLD AUTO: 124 X10*3/UL (ref 150–450)
POTASSIUM SERPL-SCNC: 4.2 MMOL/L (ref 3.4–5.1)
PR INTERVAL: 280 MS
PROT UR STRIP.AUTO-MCNC: NEGATIVE MG/DL
Q ONSET: 226 MS
QRS COUNT: 9 BEATS
QRS DURATION: 98 MS
QT INTERVAL: 520 MS
QTC CALCULATION(BAZETT): 520 MS
QTC FREDERICIA: 520 MS
R AXIS: 8 DEGREES
RBC # BLD AUTO: 3.54 X10*6/UL (ref 4–5.2)
RBC # UR STRIP.AUTO: NEGATIVE /UL
SODIUM SERPL-SCNC: 137 MMOL/L (ref 133–145)
SP GR UR STRIP.AUTO: 1.02
T AXIS: -14 DEGREES
T OFFSET: 486 MS
TROPONIN T SERPL-MCNC: 40 NG/L
UROBILINOGEN UR STRIP.AUTO-MCNC: NORMAL MG/DL
VENTRICULAR RATE: 60 BPM
WBC # BLD AUTO: 7.4 X10*3/UL (ref 4.4–11.3)

## 2024-05-10 PROCEDURE — 36415 COLL VENOUS BLD VENIPUNCTURE: CPT | Performed by: INTERNAL MEDICINE

## 2024-05-10 PROCEDURE — 85025 COMPLETE CBC W/AUTO DIFF WBC: CPT | Performed by: INTERNAL MEDICINE

## 2024-05-10 PROCEDURE — 84484 ASSAY OF TROPONIN QUANT: CPT | Performed by: STUDENT IN AN ORGANIZED HEALTH CARE EDUCATION/TRAINING PROGRAM

## 2024-05-10 PROCEDURE — 80069 RENAL FUNCTION PANEL: CPT | Performed by: INTERNAL MEDICINE

## 2024-05-10 PROCEDURE — 2500000001 HC RX 250 WO HCPCS SELF ADMINISTERED DRUGS (ALT 637 FOR MEDICARE OP): Performed by: INTERNAL MEDICINE

## 2024-05-10 PROCEDURE — 81003 URINALYSIS AUTO W/O SCOPE: CPT | Performed by: INTERNAL MEDICINE

## 2024-05-10 PROCEDURE — 36415 COLL VENOUS BLD VENIPUNCTURE: CPT | Performed by: STUDENT IN AN ORGANIZED HEALTH CARE EDUCATION/TRAINING PROGRAM

## 2024-05-10 PROCEDURE — 1090000002 HH PPS REVENUE DEBIT

## 2024-05-10 PROCEDURE — 97161 PT EVAL LOW COMPLEX 20 MIN: CPT | Mod: GP

## 2024-05-10 PROCEDURE — 2500000004 HC RX 250 GENERAL PHARMACY W/ HCPCS (ALT 636 FOR OP/ED): Performed by: INTERNAL MEDICINE

## 2024-05-10 PROCEDURE — 1210000001 HC SEMI-PRIVATE ROOM DAILY

## 2024-05-10 PROCEDURE — 1090000001 HH PPS REVENUE CREDIT

## 2024-05-10 PROCEDURE — 2500000005 HC RX 250 GENERAL PHARMACY W/O HCPCS: Performed by: INTERNAL MEDICINE

## 2024-05-10 PROCEDURE — 97530 THERAPEUTIC ACTIVITIES: CPT | Mod: GO

## 2024-05-10 PROCEDURE — 99223 1ST HOSP IP/OBS HIGH 75: CPT | Performed by: INTERNAL MEDICINE

## 2024-05-10 PROCEDURE — 97166 OT EVAL MOD COMPLEX 45 MIN: CPT | Mod: GO

## 2024-05-10 PROCEDURE — 2500000006 HC RX 250 W HCPCS SELF ADMINISTERED DRUGS (ALT 637 FOR ALL PAYERS): Mod: MUE | Performed by: INTERNAL MEDICINE

## 2024-05-10 PROCEDURE — 97116 GAIT TRAINING THERAPY: CPT | Mod: GP

## 2024-05-10 RX ORDER — AMIODARONE HYDROCHLORIDE 200 MG/1
200 TABLET ORAL DAILY
Status: DISCONTINUED | OUTPATIENT
Start: 2024-05-11 | End: 2024-05-15 | Stop reason: HOSPADM

## 2024-05-10 RX ADMIN — ATORVASTATIN CALCIUM 80 MG: 80 TABLET, FILM COATED ORAL at 21:32

## 2024-05-10 RX ADMIN — METOPROLOL TARTRATE 50 MG: 50 TABLET, FILM COATED ORAL at 09:04

## 2024-05-10 RX ADMIN — LIDOCAINE 1 PATCH: 4 PATCH TOPICAL at 21:32

## 2024-05-10 RX ADMIN — SODIUM CHLORIDE 75 ML/HR: 900 INJECTION, SOLUTION INTRAVENOUS at 20:23

## 2024-05-10 RX ADMIN — SERTRALINE 200 MG: 100 TABLET, FILM COATED ORAL at 09:04

## 2024-05-10 RX ADMIN — METOPROLOL TARTRATE 50 MG: 50 TABLET, FILM COATED ORAL at 21:31

## 2024-05-10 RX ADMIN — PANTOPRAZOLE SODIUM 20 MG: 20 TABLET, DELAYED RELEASE ORAL at 06:26

## 2024-05-10 RX ADMIN — LEVOTHYROXINE SODIUM 100 MCG: 0.1 TABLET ORAL at 06:26

## 2024-05-10 SDOH — SOCIAL STABILITY: SOCIAL INSECURITY: WITHIN THE LAST YEAR, HAVE YOU BEEN HUMILIATED OR EMOTIONALLY ABUSED IN OTHER WAYS BY YOUR PARTNER OR EX-PARTNER?: NO

## 2024-05-10 SDOH — ECONOMIC STABILITY: HOUSING INSECURITY
IN THE LAST 12 MONTHS, WAS THERE A TIME WHEN YOU DID NOT HAVE A STEADY PLACE TO SLEEP OR SLEPT IN A SHELTER (INCLUDING NOW)?: NO

## 2024-05-10 SDOH — SOCIAL STABILITY: SOCIAL INSECURITY: HAVE YOU HAD THOUGHTS OF HARMING ANYONE ELSE?: NO

## 2024-05-10 SDOH — SOCIAL STABILITY: SOCIAL INSECURITY
WITHIN THE LAST YEAR, HAVE TO BEEN RAPED OR FORCED TO HAVE ANY KIND OF SEXUAL ACTIVITY BY YOUR PARTNER OR EX-PARTNER?: PATIENT DECLINED

## 2024-05-10 SDOH — SOCIAL STABILITY: SOCIAL INSECURITY: HAS ANYONE EVER THREATENED TO HURT YOUR FAMILY OR YOUR PETS?: NO

## 2024-05-10 SDOH — SOCIAL STABILITY: SOCIAL NETWORK: ARE YOU MARRIED, WIDOWED, DIVORCED, SEPARATED, NEVER MARRIED, OR LIVING WITH A PARTNER?: WIDOWED

## 2024-05-10 SDOH — ECONOMIC STABILITY: INCOME INSECURITY: HOW HARD IS IT FOR YOU TO PAY FOR THE VERY BASICS LIKE FOOD, HOUSING, MEDICAL CARE, AND HEATING?: NOT VERY HARD

## 2024-05-10 SDOH — ECONOMIC STABILITY: TRANSPORTATION INSECURITY
IN THE PAST 12 MONTHS, HAS THE LACK OF TRANSPORTATION KEPT YOU FROM MEDICAL APPOINTMENTS OR FROM GETTING MEDICATIONS?: NO

## 2024-05-10 SDOH — ECONOMIC STABILITY: HOUSING INSECURITY: IN THE LAST 12 MONTHS, HOW MANY PLACES HAVE YOU LIVED?: 1

## 2024-05-10 SDOH — ECONOMIC STABILITY: FOOD INSECURITY: WITHIN THE PAST 12 MONTHS, YOU WORRIED THAT YOUR FOOD WOULD RUN OUT BEFORE YOU GOT MONEY TO BUY MORE.: NEVER TRUE

## 2024-05-10 SDOH — SOCIAL STABILITY: SOCIAL INSECURITY: WITHIN THE LAST YEAR, HAVE YOU BEEN AFRAID OF YOUR PARTNER OR EX-PARTNER?: NO

## 2024-05-10 SDOH — HEALTH STABILITY: MENTAL HEALTH: HOW OFTEN DO YOU HAVE 6 OR MORE DRINKS ON ONE OCCASION?: NEVER

## 2024-05-10 SDOH — SOCIAL STABILITY: SOCIAL INSECURITY: ARE THERE ANY APPARENT SIGNS OF INJURIES/BEHAVIORS THAT COULD BE RELATED TO ABUSE/NEGLECT?: NO

## 2024-05-10 SDOH — SOCIAL STABILITY: SOCIAL INSECURITY: DO YOU FEEL UNSAFE GOING BACK TO THE PLACE WHERE YOU ARE LIVING?: NO

## 2024-05-10 SDOH — HEALTH STABILITY: PHYSICAL HEALTH: ON AVERAGE, HOW MANY MINUTES DO YOU ENGAGE IN EXERCISE AT THIS LEVEL?: 0 MIN

## 2024-05-10 SDOH — HEALTH STABILITY: MENTAL HEALTH
HOW OFTEN DO YOU NEED TO HAVE SOMEONE HELP YOU WHEN YOU READ INSTRUCTIONS, PAMPHLETS, OR OTHER WRITTEN MATERIAL FROM YOUR DOCTOR OR PHARMACY?: NEVER

## 2024-05-10 SDOH — SOCIAL STABILITY: SOCIAL NETWORK
IN A TYPICAL WEEK, HOW MANY TIMES DO YOU TALK ON THE PHONE WITH FAMILY, FRIENDS, OR NEIGHBORS?: MORE THAN THREE TIMES A WEEK

## 2024-05-10 SDOH — SOCIAL STABILITY: SOCIAL NETWORK: HOW OFTEN DO YOU ATTEND CHURCH OR RELIGIOUS SERVICES?: NEVER

## 2024-05-10 SDOH — SOCIAL STABILITY: SOCIAL INSECURITY: ABUSE: ADULT

## 2024-05-10 SDOH — HEALTH STABILITY: MENTAL HEALTH
STRESS IS WHEN SOMEONE FEELS TENSE, NERVOUS, ANXIOUS, OR CAN'T SLEEP AT NIGHT BECAUSE THEIR MIND IS TROUBLED. HOW STRESSED ARE YOU?: ONLY A LITTLE

## 2024-05-10 SDOH — SOCIAL STABILITY: SOCIAL INSECURITY: HAVE YOU HAD ANY THOUGHTS OF HARMING ANYONE ELSE?: NO

## 2024-05-10 SDOH — SOCIAL STABILITY: SOCIAL NETWORK: HOW OFTEN DO YOU ATTENT MEETINGS OF THE CLUB OR ORGANIZATION YOU BELONG TO?: NEVER

## 2024-05-10 SDOH — SOCIAL STABILITY: SOCIAL INSECURITY: ARE YOU OR HAVE YOU BEEN THREATENED OR ABUSED PHYSICALLY, EMOTIONALLY, OR SEXUALLY BY ANYONE?: NO

## 2024-05-10 SDOH — HEALTH STABILITY: MENTAL HEALTH: HOW OFTEN DO YOU HAVE A DRINK CONTAINING ALCOHOL?: NEVER

## 2024-05-10 SDOH — SOCIAL STABILITY: SOCIAL INSECURITY: DOES ANYONE TRY TO KEEP YOU FROM HAVING/CONTACTING OTHER FRIENDS OR DOING THINGS OUTSIDE YOUR HOME?: NO

## 2024-05-10 SDOH — ECONOMIC STABILITY: FOOD INSECURITY: WITHIN THE PAST 12 MONTHS, THE FOOD YOU BOUGHT JUST DIDN'T LAST AND YOU DIDN'T HAVE MONEY TO GET MORE.: NEVER TRUE

## 2024-05-10 SDOH — ECONOMIC STABILITY: INCOME INSECURITY: IN THE LAST 12 MONTHS, WAS THERE A TIME WHEN YOU WERE NOT ABLE TO PAY THE MORTGAGE OR RENT ON TIME?: YES

## 2024-05-10 SDOH — ECONOMIC STABILITY: INCOME INSECURITY: HOW HARD IS IT FOR YOU TO PAY FOR THE VERY BASICS LIKE FOOD, HOUSING, MEDICAL CARE, AND HEATING?: NOT HARD AT ALL

## 2024-05-10 SDOH — SOCIAL STABILITY: SOCIAL NETWORK: HOW OFTEN DO YOU GET TOGETHER WITH FRIENDS OR RELATIVES?: MORE THAN THREE TIMES A WEEK

## 2024-05-10 SDOH — ECONOMIC STABILITY: INCOME INSECURITY: IN THE PAST 12 MONTHS, HAS THE ELECTRIC, GAS, OIL, OR WATER COMPANY THREATENED TO SHUT OFF SERVICE IN YOUR HOME?: NO

## 2024-05-10 SDOH — SOCIAL STABILITY: SOCIAL INSECURITY: DO YOU FEEL ANYONE HAS EXPLOITED OR TAKEN ADVANTAGE OF YOU FINANCIALLY OR OF YOUR PERSONAL PROPERTY?: NO

## 2024-05-10 SDOH — HEALTH STABILITY: MENTAL HEALTH: HOW MANY STANDARD DRINKS CONTAINING ALCOHOL DO YOU HAVE ON A TYPICAL DAY?: PATIENT DOES NOT DRINK

## 2024-05-10 SDOH — ECONOMIC STABILITY: FOOD INSECURITY
WITHIN THE PAST 12 MONTHS, YOU WORRIED THAT YOUR FOOD WOULD RUN OUT BEFORE YOU GOT MONEY TO BUY MORE.: PATIENT UNABLE TO ANSWER

## 2024-05-10 SDOH — HEALTH STABILITY: PHYSICAL HEALTH: ON AVERAGE, HOW MANY DAYS PER WEEK DO YOU ENGAGE IN MODERATE TO STRENUOUS EXERCISE (LIKE A BRISK WALK)?: 0 DAYS

## 2024-05-10 SDOH — HEALTH STABILITY: MENTAL HEALTH: EXPERIENCED ANY OF THE FOLLOWING LIFE EVENTS: DEATH OF FAMILY/FRIEND

## 2024-05-10 SDOH — SOCIAL STABILITY: SOCIAL NETWORK: IN A TYPICAL WEEK, HOW MANY TIMES DO YOU TALK ON THE PHONE WITH FAMILY, FRIENDS, OR NEIGHBORS?: NEVER

## 2024-05-10 SDOH — SOCIAL STABILITY: SOCIAL INSECURITY: WERE YOU ABLE TO COMPLETE ALL THE BEHAVIORAL HEALTH SCREENINGS?: YES

## 2024-05-10 SDOH — SOCIAL STABILITY: SOCIAL NETWORK: HOW OFTEN DO YOU GET TOGETHER WITH FRIENDS OR RELATIVES?: NEVER

## 2024-05-10 SDOH — ECONOMIC STABILITY: TRANSPORTATION INSECURITY
IN THE PAST 12 MONTHS, HAS LACK OF TRANSPORTATION KEPT YOU FROM MEETINGS, WORK, OR FROM GETTING THINGS NEEDED FOR DAILY LIVING?: NO

## 2024-05-10 ASSESSMENT — ENCOUNTER SYMPTOMS
FATIGUE: 1
ARTHRALGIAS: 0
WOUND: 0
PALPITATIONS: 0
CHILLS: 0
SEIZURES: 0
FREQUENCY: 0
VOMITING: 0
SHORTNESS OF BREATH: 0
SPEECH DIFFICULTY: 0
HALLUCINATIONS: 0
ABDOMINAL PAIN: 0
ADENOPATHY: 0
NECK PAIN: 0
BRUISES/BLEEDS EASILY: 0
FEVER: 0
HEADACHES: 0
DIZZINESS: 0
WEAKNESS: 1
DYSURIA: 0
HEMATURIA: 0
SINUS PRESSURE: 0
MYALGIAS: 0
NAUSEA: 0
DIARRHEA: 0
LIGHT-HEADEDNESS: 0
CONFUSION: 0
APNEA: 0
COLOR CHANGE: 0
SLEEP DISTURBANCE: 0
RECTAL PAIN: 0
SORE THROAT: 0
POLYDIPSIA: 0
JOINT SWELLING: 0
COUGH: 0
POLYPHAGIA: 0
PHOTOPHOBIA: 0
BACK PAIN: 1
CONSTIPATION: 0
WHEEZING: 0
TREMORS: 0
BLOOD IN STOOL: 0
NUMBNESS: 0

## 2024-05-10 ASSESSMENT — COGNITIVE AND FUNCTIONAL STATUS - GENERAL
PATIENT BASELINE BEDBOUND: NO
MOBILITY SCORE: 20
MOVING TO AND FROM BED TO CHAIR: A LITTLE
MOVING TO AND FROM BED TO CHAIR: A LITTLE
STANDING UP FROM CHAIR USING ARMS: A LITTLE
WALKING IN HOSPITAL ROOM: A LITTLE
DAILY ACTIVITIY SCORE: 17
MOVING FROM LYING ON BACK TO SITTING ON SIDE OF FLAT BED WITH BEDRAILS: A LITTLE
PERSONAL GROOMING: A LITTLE
TURNING FROM BACK TO SIDE WHILE IN FLAT BAD: A LOT
CLIMB 3 TO 5 STEPS WITH RAILING: A LITTLE
DRESSING REGULAR UPPER BODY CLOTHING: A LITTLE
DAILY ACTIVITIY SCORE: 19
STANDING UP FROM CHAIR USING ARMS: A LITTLE
DRESSING REGULAR UPPER BODY CLOTHING: A LITTLE
HELP NEEDED FOR BATHING: A LOT
TOILETING: A LITTLE
TOILETING: A LITTLE
EATING MEALS: A LITTLE
PERSONAL GROOMING: A LITTLE
CLIMB 3 TO 5 STEPS WITH RAILING: A LOT
HELP NEEDED FOR BATHING: A LITTLE
MOBILITY SCORE: 16
DRESSING REGULAR LOWER BODY CLOTHING: A LITTLE
DRESSING REGULAR LOWER BODY CLOTHING: A LITTLE
WALKING IN HOSPITAL ROOM: A LITTLE

## 2024-05-10 ASSESSMENT — LIFESTYLE VARIABLES
AUDIT-C TOTAL SCORE: 0
AUDIT-C TOTAL SCORE: 0
PRESCIPTION_ABUSE_PAST_12_MONTHS: NO
SUBSTANCE_ABUSE_PAST_12_MONTHS: NO
SUBSTANCE_ABUSE_PAST_12_MONTHS: NO
HOW OFTEN DO YOU HAVE 6 OR MORE DRINKS ON ONE OCCASION: NEVER
HOW MANY STANDARD DRINKS CONTAINING ALCOHOL DO YOU HAVE ON A TYPICAL DAY: PATIENT DOES NOT DRINK
SKIP TO QUESTIONS 9-10: 1
HOW OFTEN DO YOU HAVE A DRINK CONTAINING ALCOHOL: NEVER
SKIP TO QUESTIONS 9-10: 1
AUDIT-C TOTAL SCORE: 0
AUDIT-C TOTAL SCORE: 0
HOW OFTEN DO YOU HAVE 6 OR MORE DRINKS ON ONE OCCASION: NEVER
PRESCIPTION_ABUSE_PAST_12_MONTHS: NO
SKIP TO QUESTIONS 9-10: 1
AUDIT-C TOTAL SCORE: 0
HOW OFTEN DO YOU HAVE A DRINK CONTAINING ALCOHOL: NEVER
HOW MANY STANDARD DRINKS CONTAINING ALCOHOL DO YOU HAVE ON A TYPICAL DAY: PATIENT DOES NOT DRINK

## 2024-05-10 ASSESSMENT — PATIENT HEALTH QUESTIONNAIRE - PHQ9
SUM OF ALL RESPONSES TO PHQ9 QUESTIONS 1 & 2: 0
1. LITTLE INTEREST OR PLEASURE IN DOING THINGS: NOT AT ALL
SUM OF ALL RESPONSES TO PHQ9 QUESTIONS 1 & 2: 0
2. FEELING DOWN, DEPRESSED OR HOPELESS: NOT AT ALL
2. FEELING DOWN, DEPRESSED OR HOPELESS: NOT AT ALL
1. LITTLE INTEREST OR PLEASURE IN DOING THINGS: NOT AT ALL

## 2024-05-10 ASSESSMENT — ACTIVITIES OF DAILY LIVING (ADL)
ADL_ASSISTANCE: INDEPENDENT
TOILETING: NEEDS ASSISTANCE
PATIENT'S MEMORY ADEQUATE TO SAFELY COMPLETE DAILY ACTIVITIES?: YES
GROOMING: INDEPENDENT
WALKS IN HOME: NEEDS ASSISTANCE
HEARING - LEFT EAR: FUNCTIONAL
ADLS_ADDRESSED: NO
DRESSING YOURSELF: INDEPENDENT
HEARING - RIGHT EAR: FUNCTIONAL
JUDGMENT_ADEQUATE_SAFELY_COMPLETE_DAILY_ACTIVITIES: YES
ADEQUATE_TO_COMPLETE_ADL: YES
ASSISTIVE_DEVICE: WALKER
FEEDING YOURSELF: INDEPENDENT
ADL_ASSISTANCE: INDEPENDENT
BATHING: NEEDS ASSISTANCE
BATHING_ASSISTANCE: MINIMAL

## 2024-05-10 ASSESSMENT — PAIN - FUNCTIONAL ASSESSMENT
PAIN_FUNCTIONAL_ASSESSMENT: 0-10

## 2024-05-10 ASSESSMENT — PAIN SCALES - GENERAL
PAINLEVEL_OUTOF10: 6
PAINLEVEL_OUTOF10: 0 - NO PAIN
PAINLEVEL_OUTOF10: 6
PAINLEVEL_OUTOF10: 4

## 2024-05-10 ASSESSMENT — PAIN SCALES - PAIN ASSESSMENT IN ADVANCED DEMENTIA (PAINAD)
TOTALSCORE: MEDICATION (SEE MAR)
CONSOLABILITY: NO NEED TO CONSOLE
BREATHING: NORMAL
BODYLANGUAGE: RELAXED
FACIALEXPRESSION: SMILING OR INEXPRESSIVE
TOTALSCORE: 0

## 2024-05-10 ASSESSMENT — PAIN SCALES - WONG BAKER: WONGBAKER_NUMERICALRESPONSE: NO HURT

## 2024-05-10 ASSESSMENT — PAIN DESCRIPTION - DESCRIPTORS
DESCRIPTORS: ACHING;SHARP
DESCRIPTORS: ACHING;DISCOMFORT;SHOOTING

## 2024-05-10 ASSESSMENT — PAIN DESCRIPTION - ORIENTATION: ORIENTATION: LEFT

## 2024-05-10 NOTE — H&P
History Of Present Illness  Linette Doyle is a 83 y.o. female presenting with repeated falls.  Patient has been hospitalized multiple times this year, most recently discharged on May 6 after a possible TIA with a UTI, she was discharged on Bactrim DS.  In the interim, she has had 4 mechanical falls at home.  She had 2 falls today though she believes that the rib fractures there that are seen on chest x-ray may be from a previous fall.  No presyncope.  She is on Plavix and Eliquis 5mg.  Emergency department, a limited trauma was called and patient has already been seen by surgery.  CT demonstrated acute to subacute anterolateral fractures of ribs 6 and 9.  There is also evidence of a small left-sided iliopsoas/retroperitoneal hematoma.     Past Medical History  She has a past medical history of Angina pectoris (CMS-HCC) (10/22/2023), Atherosclerosis of coronary artery (08/21/2019), Atherosclerotic heart disease of native coronary artery with other forms of angina pectoris (CMS-HCC) (10/22/2023), Hypercholesterolemia (12/28/2018), Hyperlipidemia (10/22/2023), Presence of cardiac pacemaker (10/22/2023), Primary hypertension (12/28/2018), Shortness of breath (11/26/2019), Sick sinus syndrome (Multi) (10/22/2023), Sinus bradycardia (10/22/2023), and Stage 3a chronic kidney disease (Multi) (11/25/2019).    Surgical History  She has a past surgical history that includes Cardiac catheterization (N/A, 02/02/2024); Cardiac catheterization (N/A, 02/02/2024); Cardiac catheterization (N/A, 02/08/2024); Cardiac catheterization (N/A, 02/08/2024); and pacemaker placement (07/04/2019).     Social History  She reports that she has never smoked. She has never been exposed to tobacco smoke. She has never used smokeless tobacco. She reports that she does not drink alcohol and does not use drugs.    Family History  Family History   Problem Relation Name Age of Onset    No Known Problems Mother      No Known Problems Father           Allergies  Iodinated contrast media    Review of Systems     As per HPI    Physical Exam    General: Elderly female, no acute distress.  Eyes: Clear sclera  Neck: No JVD  Cardio: Systolic murmur.  Respiratory: Clear to auscultation.  Saturating 94% on room air.  Abdomen nondistended  Extremities: No lower extremity  Gastro: Appropriate mood and affect  Musculoskeletal: No obvious bony deformity.  Tenderness left ribs.  Neuro: Cranial nerves II through XII intact grossly.  Moves all limbs spontaneously.    Last Recorded Vitals  /57 (BP Location: Right arm, Patient Position: Lying)   Pulse 60   Temp 36.6 °C (97.9 °F)   Resp 17   Wt 63.5 kg (139 lb 15.9 oz)   SpO2 95%     Relevant Results        Results for orders placed or performed during the hospital encounter of 05/09/24 (from the past 24 hour(s))   CBC and Auto Differential   Result Value Ref Range    WBC 9.2 4.4 - 11.3 x10*3/uL    nRBC 0.0 0.0 - 0.0 /100 WBCs    RBC 4.09 4.00 - 5.20 x10*6/uL    Hemoglobin 11.2 (L) 12.0 - 16.0 g/dL    Hematocrit 35.8 (L) 36.0 - 46.0 %    MCV 88 80 - 100 fL    MCH 27.4 26.0 - 34.0 pg    MCHC 31.3 (L) 32.0 - 36.0 g/dL    RDW 15.6 (H) 11.5 - 14.5 %    Platelets 146 (L) 150 - 450 x10*3/uL    Neutrophils % 80.7 40.0 - 80.0 %    Immature Granulocytes %, Automated 0.4 0.0 - 0.9 %    Lymphocytes % 7.9 13.0 - 44.0 %    Monocytes % 7.2 2.0 - 10.0 %    Eosinophils % 3.1 0.0 - 6.0 %    Basophils % 0.7 0.0 - 2.0 %    Neutrophils Absolute 7.45 (H) 1.60 - 5.50 x10*3/uL    Immature Granulocytes Absolute, Automated 0.04 0.00 - 0.50 x10*3/uL    Lymphocytes Absolute 0.73 (L) 0.80 - 3.00 x10*3/uL    Monocytes Absolute 0.66 0.05 - 0.80 x10*3/uL    Eosinophils Absolute 0.29 0.00 - 0.40 x10*3/uL    Basophils Absolute 0.06 0.00 - 0.10 x10*3/uL   Comprehensive metabolic panel   Result Value Ref Range    Glucose 114 (H) 65 - 99 mg/dL    Sodium 137 133 - 145 mmol/L    Potassium 4.4 3.4 - 5.1 mmol/L    Chloride 95 (L) 97 - 107 mmol/L     Bicarbonate 25 24 - 31 mmol/L    Urea Nitrogen 31 (H) 8 - 25 mg/dL    Creatinine 2.60 (H) 0.40 - 1.60 mg/dL    eGFR 18 (L) >60 mL/min/1.73m*2    Calcium 9.9 8.5 - 10.4 mg/dL    Albumin 4.9 3.5 - 5.0 g/dL    Alkaline Phosphatase 103 35 - 125 U/L    Total Protein 7.9 5.9 - 7.9 g/dL    AST 41 (H) 5 - 40 U/L    Bilirubin, Total 0.4 0.1 - 1.2 mg/dL    ALT 29 5 - 40 U/L    Anion Gap 17 <=19 mmol/L   Serial Troponin, Initial ([facility])   Result Value Ref Range    Troponin T, High Sensitivity 39 (HH) <=14 ng/L   Serial Troponin, 2 Hour ([facility])   Result Value Ref Range    Troponin T, High Sensitivity 33 (HH) <=14 ng/L         Assessment/Plan   Principal Problem:    Closed fracture of multiple ribs of left side, initial encounter    Fall, rib fracture, iliopsoas/retroperitoneal bleed.  -Suspect secondary to general deconditioning.  The patient has been hospitalized multiple times this year for prolonged periods of time.  -With the rib fractures and evidence of iliopsoas hematoma, will hold on home Plavix and Eliquis.  -Surgery has already seen the patient and will keep on consult.  Will get a CBC to assess hemoglobin later today.    Acute kidney injury  -On background chronic kidney disease.  -Baseline is creatinine around 1.4, currently up to 2.6.  -Patient appears largely euvolemic.  I have suspicion that the Bactrim DS is contributing to the GARY  -Hold nephrotoxic medications.  -I will start gentle fluids at 75 mL/h.  -Consult nephrology Dr. Giang    Urinary tract infection  -Holding antibiotics as above  -Will get a urinalysis, but should have completed a course at this point.    Urinary tract infection  -Patient was recently diagnosed and started on Bactrim DS.  -With the acute kidney    Atrial fibrillation  -Heart rate currently controlled  -Continue metoprolol, amiodarone. Discussed with pharmacy, and agree that these, including diltiazem which will not be contiued for now (HR: low  60s), and ask for cardiology  input    Mitral regurgitation  -Recent transesophageal echocardiogram and was felt that she did not need mitral clip.  -Holding diuresis at this point given GARY.    Physical therapy Occupational Therapy         Henry Ricci, DO

## 2024-05-10 NOTE — CARE PLAN
Pt is a re-admit; she was discharged home on 56; multi rib fx on the right side. She went home with [facility], she is having difficulty ambulating. Pt would like to go to a SNF; she has straight medicare, no precert needed.  List of SNF given to pt.  Will follow up for her choices.  16:24--pts dtr in room; they are discussing SNF's; no choices at this time  16:46 Pt and dtr in law have choices: #1) [facility] #2) [facility] #3) [facility]. Per Pt her sister Krista Deleon has questions for Care Coordinator. Her number is [phone]; phoned Krista and the phone is busy; Will place referrals in [facility]  PT is a re-admit; she has had her 3 midnights --  17:06 Spoke with pts sister Krista on  the phone; answered questions about SNF.  Krista would like to be updated when pt is discharged.    DISCHARGE PLAN: SNF--DO NOT DISCHARGE PATIENT BEFORE SPEAKING WITH CARE COORDINATION; NEED SNF CHOICES, NEED ACCEPTING SNF; NEED 7000 COMPLETED IN HENS PRIOR TO DISCHARGE.

## 2024-05-10 NOTE — CONSULTS
.Reason For Consult  Acute kidney injury superimposed on chronic kidney disease stage III    History Of Present Illness  Linette Doyle is a 83 y.o. female who has a known history of chronic atrial fibrillation, history of atherosclerotic heart disease, hypertension, pacemaker placement, history of sick sinus syndrome in the past who apparently presented to the emergency room after she had multiple falls according to her story 5 falls upon imaging in the emergency room she was found to have rib fractures on the left side rib #6 and 9 she also had iliopsoas hematoma on the left side I was asked to see the patient in consultation because of acute kidney injury with much higher creatinine level comparing to her baseline patient had been treated recently with Bactrim for urinary tract infection she denies any nausea vomiting diarrhea abdominal pain dysuria hematuria.     Review of Systems  Review of Systems   Constitutional:  Positive for fatigue. Negative for chills and fever.   HENT:  Negative for sinus pressure, sore throat and tinnitus.    Eyes:  Negative for photophobia and visual disturbance.   Respiratory:  Negative for apnea, cough, shortness of breath and wheezing.    Cardiovascular:  Positive for leg swelling. Negative for chest pain and palpitations.   Gastrointestinal:  Negative for abdominal pain, blood in stool, constipation, diarrhea, nausea, rectal pain and vomiting.   Endocrine: Negative for cold intolerance, heat intolerance, polydipsia, polyphagia and polyuria.   Genitourinary:  Negative for decreased urine volume, dysuria, frequency, hematuria and urgency.   Musculoskeletal:  Positive for back pain. Negative for arthralgias, joint swelling, myalgias and neck pain.        Patient has multiple falls fractured ribs and iliopsoas hematoma   Skin:  Negative for color change, pallor, rash and wound.   Neurological:  Positive for weakness. Negative for dizziness, tremors, seizures, syncope, speech  difficulty, light-headedness, numbness and headaches.   Hematological:  Negative for adenopathy. Does not bruise/bleed easily.   Psychiatric/Behavioral:  Negative for confusion, hallucinations, sleep disturbance and suicidal ideas.         Past Medical History  She has a past medical history of Angina pectoris (CMS-HCC) (10/22/2023), Atherosclerosis of coronary artery (08/21/2019), Atherosclerotic heart disease of native coronary artery with other forms of angina pectoris (CMS-HCC) (10/22/2023), Hypercholesterolemia (12/28/2018), Hyperlipidemia (10/22/2023), Presence of cardiac pacemaker (10/22/2023), Primary hypertension (12/28/2018), Shortness of breath (11/26/2019), Sick sinus syndrome (Multi) (10/22/2023), Sinus bradycardia (10/22/2023), and Stage 3a chronic kidney disease (Multi) (11/25/2019).    Surgical History  She has a past surgical history that includes Cardiac catheterization (N/A, 02/02/2024); Cardiac catheterization (N/A, 02/02/2024); Cardiac catheterization (N/A, 02/08/2024); Cardiac catheterization (N/A, 02/08/2024); and pacemaker placement (07/04/2019).     Social History  She reports that she has never smoked. She has never been exposed to tobacco smoke. She has never used smokeless tobacco. She reports that she does not drink alcohol and does not use drugs.    Family History  Family History   Problem Relation Name Age of Onset    No Known Problems Mother      No Known Problems Father          Current Facility-Administered Medications:     acetaminophen (Tylenol) tablet 650 mg, 650 mg, oral, q4h PRN **OR** acetaminophen (Tylenol) oral liquid 650 mg, 650 mg, oral, q4h PRN **OR** acetaminophen (Tylenol) suppository 650 mg, 650 mg, rectal, q4h PRN, Henry Ricci DO    [START ON 5/11/2024] amiodarone (Pacerone) tablet 200 mg, 200 mg, oral, Daily, Becky Vela MD    [Held by provider] apixaban (Eliquis) tablet 2.5 mg, 2.5 mg, oral, BID, Henry R Scharlotte, DO    atorvastatin (Lipitor) tablet 80  mg, 80 mg, oral, Nightly, Henry Ricci DO, 80 mg at 05/09/24 2340    benzocaine-menthol (Cepastat Sore Throat) lozenge 1 lozenge, 1 lozenge, Mouth/Throat, q2h PRN, Henry Ricci DO    clopidogrel (Plavix) tablet 75 mg, 75 mg, oral, Daily, Becky Vela MD    guaiFENesin (Mucinex) 12 hr tablet 600 mg, 600 mg, oral, q12h PRN, Henry Ricci DO    levothyroxine (Synthroid, Levoxyl) tablet 100 mcg, 100 mcg, oral, Daily before breakfast, Henry Ricci DO, 100 mcg at 05/10/24 0626    lidocaine 4 % patch 1 patch, 1 patch, transdermal, Daily, Henry Ricci DO, 1 patch at 05/09/24 2240    melatonin tablet 5 mg, 5 mg, oral, Nightly PRN, Henry Ricci DO    metoprolol tartrate (Lopressor) tablet 50 mg, 50 mg, oral, BID, Henry Ricci DO, 50 mg at 05/10/24 0904    ondansetron (Zofran) injection 4 mg, 4 mg, intravenous, q6h PRN, Henry Ricci DO    pantoprazole (ProtoNix) EC tablet 20 mg, 20 mg, oral, Daily before breakfast, Henry Ricci DO, 20 mg at 05/10/24 0626    polyethylene glycol (Glycolax, Miralax) packet 17 g, 17 g, oral, Daily, Henry Ricci DO    sertraline (Zoloft) tablet 200 mg, 200 mg, oral, Daily, Henry Ricci DO, 200 mg at 05/10/24 0904    sodium chloride 0.9% infusion, 75 mL/hr, intravenous, Continuous, Henry Ricci DO, Last Rate: 75 mL/hr at 05/09/24 2241, 75 mL/hr at 05/09/24 2241    Current Outpatient Medications:     acetaminophen/diphenhydramine (TYLENOL PM EXTRA STRENGTH ORAL), Take 1 tablet by mouth as needed at bedtime (sleep). Indications: sleep, Disp: , Rfl:     amiodarone (Pacerone) 200 mg tablet, Take 1 tablet (200 mg) by mouth 2 times a day., Disp: 180 tablet, Rfl: 3    apixaban (Eliquis) 5 mg tablet, Take 1 tablet (5 mg) by mouth 2 times a day., Disp: 180 tablet, Rfl: 3    ascorbic acid/collagen hydr (COLLAGEN PLUS VITAMIN C ORAL), Take 1 capsule by mouth early in the morning. Indications: supplement, Disp: , Rfl:      atorvastatin (Lipitor) 80 mg tablet, Take 1 tablet (80 mg) by mouth once daily at bedtime., Disp: 30 tablet, Rfl: 3    cholecalciferol (Vitamin D-3) 50 MCG (2000 UT) tablet, Take 1 tablet (2,000 Units) by mouth once daily., Disp: , Rfl:     clopidogrel (Plavix) 75 mg tablet, Take 1 tablet (75 mg) by mouth once daily. Do not start before February 15, 2024., Disp: 30 tablet, Rfl: 3    dilTIAZem (Cardizem) 120 mg immediate release tablet, Take 1 tablet (120 mg) by mouth 2 times a day., Disp: , Rfl:     hydrOXYzine HCL (Atarax) 10 mg tablet, Take 1 tablet (10 mg) by mouth every 6 hours if needed for anxiety., Disp: , Rfl:     levothyroxine (Synthroid, Levoxyl) 100 mcg tablet, Take 1 tablet (100 mcg) by mouth once daily. 1 tablet in the morning on an empty stomach Orally Once a day for 30 day(s), Disp: 90 tablet, Rfl: 0    metoprolol tartrate (Lopressor) 50 mg tablet, Take 1 tablet by mouth 2 times a day., Disp: 180 tablet, Rfl: 3    mv-min/FA/vit K/lycop/lut/zeax (OCUVITE EYE PLUS MULTI ORAL), Take 1 tablet by mouth once daily., Disp: , Rfl:     omeprazole (PriLOSEC) 20 mg DR capsule, Take 1 capsule (20 mg) by mouth once daily., Disp: 90 capsule, Rfl: 0    oxygen (O2) gas therapy, Inhale 1 each every 12 hours. (Patient taking differently: Inhale 3 L/min if needed (sob, or low oxygen levels).), Disp: , Rfl:     potassium chloride CR (Klor-Con) 10 mEq ER tablet, Take 1 tablet (10 mEq) by mouth 2 times a day. Do not crush, chew, or split., Disp: 180 tablet, Rfl: 3    sertraline (Zoloft) 100 mg tablet, Take 2 tablets (200 mg) by mouth once daily., Disp: 90 tablet, Rfl: 0    sulfamethoxazole-trimethoprim (Bactrim DS) 800-160 mg tablet, Take 1 tablet by mouth 2 times a day for 7 days., Disp: 14 tablet, Rfl: 0    torsemide (Demadex) 20 mg tablet, Take 1 tablet (20 mg) by mouth once daily., Disp: 90 tablet, Rfl: 0   Allergies  Iodinated contrast media         Physical Exam  Physical Exam  Constitutional:       General: She is  "not in acute distress.     Appearance: She is not toxic-appearing.   HENT:      Head: Normocephalic and atraumatic.   Eyes:      Extraocular Movements: Extraocular movements intact.      Pupils: Pupils are equal, round, and reactive to light.   Neck:      Vascular: No carotid bruit.   Cardiovascular:      Rate and Rhythm: Normal rate. Rhythm irregular.   Pulmonary:      Effort: No respiratory distress.      Breath sounds: No stridor. No wheezing, rhonchi or rales.   Chest:      Chest wall: No tenderness.   Abdominal:      General: There is no distension.      Palpations: There is no mass.      Tenderness: There is no abdominal tenderness. There is no right CVA tenderness, left CVA tenderness or guarding.      Hernia: No hernia is present.   Musculoskeletal:         General: No swelling or tenderness.      Cervical back: No rigidity.      Right lower leg: No edema.      Left lower leg: No edema.   Lymphadenopathy:      Cervical: No cervical adenopathy.   Skin:     General: Skin is warm and dry.      Coloration: Skin is not jaundiced or pale.      Findings: No bruising or erythema.   Neurological:      General: No focal deficit present.      Mental Status: She is alert and oriented to person, place, and time.   Psychiatric:         Mood and Affect: Mood normal.         Behavior: Behavior normal.              I&O 24HR    Intake/Output Summary (Last 24 hours) at 5/10/2024 1355  Last data filed at 5/9/2024 2000  Gross per 24 hour   Intake 1000 ml   Output --   Net 1000 ml       Vitals 24HR  Heart Rate:  [60-68]   Temperature:  [36.6 °C (97.9 °F)]   Respirations:  [12-28]   BP: (117-161)/()   Height:  [162.6 cm (5' 4\")]   Weight:  [63.5 kg (139 lb 15.9 oz)]   Pulse Ox:  [90 %-98 %]     Relevant Results        Results for orders placed or performed during the hospital encounter of 05/09/24 (from the past 96 hour(s))   CBC and Auto Differential   Result Value Ref Range    WBC 9.2 4.4 - 11.3 x10*3/uL    nRBC 0.0 0.0 - " 0.0 /100 WBCs    RBC 4.09 4.00 - 5.20 x10*6/uL    Hemoglobin 11.2 (L) 12.0 - 16.0 g/dL    Hematocrit 35.8 (L) 36.0 - 46.0 %    MCV 88 80 - 100 fL    MCH 27.4 26.0 - 34.0 pg    MCHC 31.3 (L) 32.0 - 36.0 g/dL    RDW 15.6 (H) 11.5 - 14.5 %    Platelets 146 (L) 150 - 450 x10*3/uL    Neutrophils % 80.7 40.0 - 80.0 %    Immature Granulocytes %, Automated 0.4 0.0 - 0.9 %    Lymphocytes % 7.9 13.0 - 44.0 %    Monocytes % 7.2 2.0 - 10.0 %    Eosinophils % 3.1 0.0 - 6.0 %    Basophils % 0.7 0.0 - 2.0 %    Neutrophils Absolute 7.45 (H) 1.60 - 5.50 x10*3/uL    Immature Granulocytes Absolute, Automated 0.04 0.00 - 0.50 x10*3/uL    Lymphocytes Absolute 0.73 (L) 0.80 - 3.00 x10*3/uL    Monocytes Absolute 0.66 0.05 - 0.80 x10*3/uL    Eosinophils Absolute 0.29 0.00 - 0.40 x10*3/uL    Basophils Absolute 0.06 0.00 - 0.10 x10*3/uL   Comprehensive metabolic panel   Result Value Ref Range    Glucose 114 (H) 65 - 99 mg/dL    Sodium 137 133 - 145 mmol/L    Potassium 4.4 3.4 - 5.1 mmol/L    Chloride 95 (L) 97 - 107 mmol/L    Bicarbonate 25 24 - 31 mmol/L    Urea Nitrogen 31 (H) 8 - 25 mg/dL    Creatinine 2.60 (H) 0.40 - 1.60 mg/dL    eGFR 18 (L) >60 mL/min/1.73m*2    Calcium 9.9 8.5 - 10.4 mg/dL    Albumin 4.9 3.5 - 5.0 g/dL    Alkaline Phosphatase 103 35 - 125 U/L    Total Protein 7.9 5.9 - 7.9 g/dL    AST 41 (H) 5 - 40 U/L    Bilirubin, Total 0.4 0.1 - 1.2 mg/dL    ALT 29 5 - 40 U/L    Anion Gap 17 <=19 mmol/L   Serial Troponin, Initial ([facility])   Result Value Ref Range    Troponin T, High Sensitivity 39 () <=14 ng/L   Serial Troponin, 2 Hour ([facility])   Result Value Ref Range    Troponin T, High Sensitivity 33 () <=14 ng/L   Serial Troponin, 6 Hour ([facility])   Result Value Ref Range    Troponin T, High Sensitivity 40 () <=14 ng/L   CBC and Auto Differential   Result Value Ref Range    WBC 7.4 4.4 - 11.3 x10*3/uL    nRBC 0.0 0.0 - 0.0 /100 WBCs    RBC 3.54 (L) 4.00 - 5.20 x10*6/uL    Hemoglobin 9.7 (L) 12.0 - 16.0 g/dL    Hematocrit 31.0  (L) 36.0 - 46.0 %    MCV 88 80 - 100 fL    MCH 27.4 26.0 - 34.0 pg    MCHC 31.3 (L) 32.0 - 36.0 g/dL    RDW 15.4 (H) 11.5 - 14.5 %    Platelets 124 (L) 150 - 450 x10*3/uL    Neutrophils % 71.1 40.0 - 80.0 %    Immature Granulocytes %, Automated 0.5 0.0 - 0.9 %    Lymphocytes % 14.7 13.0 - 44.0 %    Monocytes % 9.4 2.0 - 10.0 %    Eosinophils % 3.8 0.0 - 6.0 %    Basophils % 0.5 0.0 - 2.0 %    Neutrophils Absolute 5.22 1.60 - 5.50 x10*3/uL    Immature Granulocytes Absolute, Automated 0.04 0.00 - 0.50 x10*3/uL    Lymphocytes Absolute 1.08 0.80 - 3.00 x10*3/uL    Monocytes Absolute 0.69 0.05 - 0.80 x10*3/uL    Eosinophils Absolute 0.28 0.00 - 0.40 x10*3/uL    Basophils Absolute 0.04 0.00 - 0.10 x10*3/uL   Renal function panel   Result Value Ref Range    Glucose 92 65 - 99 mg/dL    Sodium 137 133 - 145 mmol/L    Potassium 4.2 3.4 - 5.1 mmol/L    Chloride 99 97 - 107 mmol/L    Bicarbonate 23 (L) 24 - 31 mmol/L    Urea Nitrogen 26 (H) 8 - 25 mg/dL    Creatinine 2.20 (H) 0.40 - 1.60 mg/dL    eGFR 21 (L) >60 mL/min/1.73m*2    Calcium 9.1 8.5 - 10.4 mg/dL    Phosphorus 3.6 2.5 - 4.5 mg/dL    Albumin 4.0 3.5 - 5.0 g/dL    Anion Gap 15 <=19 mmol/L          Assessment/Plan     CT abdomen pelvis wo IV contrast    Result Date: 5/9/2024  Interpreted By:  Magno Krishnamurthy, STUDY: CT ABDOMEN PELVIS WO IV CONTRAST;  5/9/2024 7:14 pm   INDICATION: Signs/Symptoms:multiple falls, bruising to left flank and tender to palpation. On eliquis. Allergy to contrast..   COMPARISON: None   ACCESSION NUMBER(S): [id number]   ORDERING CLINICIAN: JANAY STORM   TECHNIQUE: CT of the abdomen and pelvis was performed. Contiguous axial images were obtained through the abdomen and pelvis. Coronal and sagittal reconstructions at 3 mm slice thickness were performed.  No intravenous contrast was administered.   FINDINGS: Please note that the evaluation of vessels, lymph nodes and organs is limited without intravenous contrast.   LOWER CHEST: No acute  abnormality in the lung bases. There is cardiomegaly and partially visualized pacemaker.   ABDOMEN:   LIVER: The liver is normal in size and contour.   BILE DUCTS: The intrahepatic and extrahepatic ducts are not dilated.   GALLBLADDER: There is dependent gallbladder calculus without gallbladder wall thickening or distention.   PANCREAS: The pancreas is not enlarged.   SPLEEN: Within normal limits.   ADRENAL GLANDS: Within normal limits.   KIDNEYS AND URETERS: The kidneys are normal in size and unremarkable in appearance.  No hydroureteronephrosis or nephroureterolithiasis is identified.   PELVIS:   BLADDER: The urinary bladder is incompletely distended, limiting assessment.   REPRODUCTIVE ORGANS: No pelvic masses. Status post hysterectomy.   BOWEL: The stomach is incompletely distended, limiting evaluation for focal wall thickening. There is no bowel wall dilatation or obstruction. The appendix is not visualized without secondary signs of appendicitis. Moderate amount of stool throughout the colon without wall thickening or acute inflammatory change.   VESSELS: The abdominal aorta is normal in caliber. Moderate atherosclerotic calcifications of the abdominal aorta.   PERITONEUM/RETROPERITONEUM/LYMPH NODES: There is no ascites or intraperitoneal free air. There is no lymphadenopathy by CT criteria.   ABDOMINAL WALL: The abdominal wall soft tissues appear normal.   BONES: No suspicious osseous lesions are identified. Multilevel endplate degenerative changes throughout the lumbar spine with multilevel vacuum disc phenomena.. Nondisplaced fractures of the left anterolateral 6th and 9th ribs are visualized, axial image 40 and 57 of 201, which may be acute or subacute. There is mild asymmetric fullness of the left iliopsoas muscle and adjacent fat stranding, for example axial image 72 of 144 suggestive of trace iliopsoas hematoma. There is subcutaneous fat stranding in the left lateral flank, for example axial image 46 of  144 consistent with soft tissue injury.       1.  Mild fullness of the left iliopsoas muscle and mild adjacent fat stranding suggestive of trace iliopsoas/retroperitoneal hematoma. Left lateral flank subcutaneous fat stranding consistent with superficial injury. 2. Nondisplaced left anterolateral 6th and 9th rib fractures are visualized, which may be acute or subacute.     Signed by: Magno Krishnamurthy 5/9/2024 7:37 PM Dictation workstation:   [id number]    CT head wo IV contrast    Result Date: 5/9/2024  Interpreted By:  Magno Krishnamurthy, STUDY: CT HEAD WO IV CONTRAST; CT CERVICAL SPINE WO IV CONTRAST;  5/9/2024 7:14 pm   INDICATION: Signs/Symptoms:multiple falls, hit head on right side, on eliquis; Signs/Symptoms:fall, head injury and upper spine pain. Altered mental status   COMPARISON: Head CT and CT cervical spine dated 05/02/2024.   ACCESSION NUMBER(S): [id number]; [id number]   ORDERING CLINICIAN: JANAY STORM   TECHNIQUE: Axial noncontrast CT images of the head and cervical spine.   FINDINGS: BRAIN PARENCHYMA: Gray-white matter interfaces are preserved. No mass, mass effect or midline shift. Scattered periventricular and deep white matter hypodensities suggestive of mild-to-moderate chronic microvascular ischemic change.   HEMORRHAGE: No acute intracranial hemorrhage. VENTRICLES and EXTRA-AXIAL SPACES: No hydrocephalus. No extra-axial collections. Mild generalized cerebral volume loss and associated ventricular and sulcal enlargement. EXTRACRANIAL SOFT TISSUES: There is interval decrease in right posterior parietal scalp soft tissue swelling compared to prior. CALVARIUM: No depressed calvarial fracture.     SOFT TISSUES: Within normal limits. PARANASAL SINUSES: No hemorrhage in the paranasal sinuses. MASTOIDS: Within normal limits. ORBITS: Grossly normal. Bilateral cataract surgeries.   ALIGNMENT: Normal cervical lordosis. Mild degenerative anterolisthesis of C4 over C5. VERTEBRAE: No acute fracture.  Vertebral body heights are maintained. There are no suspicious osseous lesions. There is ankylosis of the left C2-C3 facets, which may be developmental. Mild degenerative disc space narrowing at C5-C6. Mild synovial facet arthropathy bilaterally in midcervical spine. SPINAL CANAL: No critical spinal canal stenosis. PREVERTEBRAL SOFT TISSUES: No prevertebral soft tissue swelling. LUNG APICES: Mild right greater than left biapical lung scarring.   OTHER FINDINGS: Partially visualized left subclavian approach pacemaker.         No acute intracranial abnormality.   No evidence of cervical spine fracture or acute traumatic malalignment.   Signed by: Magno Krishnamurthy 5/9/2024 7:26 PM Dictation workstation:   [id number]    CT cervical spine wo IV contrast    Result Date: 5/9/2024  Interpreted By:  Magno Krishnamurthy, STUDY: CT HEAD WO IV CONTRAST; CT CERVICAL SPINE WO IV CONTRAST;  5/9/2024 7:14 pm   INDICATION: Signs/Symptoms:multiple falls, hit head on right side, on eliquis; Signs/Symptoms:fall, head injury and upper spine pain. Altered mental status   COMPARISON: Head CT and CT cervical spine dated 05/02/2024.   ACCESSION NUMBER(S): [id number]; [id number]   ORDERING CLINICIAN: JANAY STORM   TECHNIQUE: Axial noncontrast CT images of the head and cervical spine.   FINDINGS: BRAIN PARENCHYMA: Gray-white matter interfaces are preserved. No mass, mass effect or midline shift. Scattered periventricular and deep white matter hypodensities suggestive of mild-to-moderate chronic microvascular ischemic change.   HEMORRHAGE: No acute intracranial hemorrhage. VENTRICLES and EXTRA-AXIAL SPACES: No hydrocephalus. No extra-axial collections. Mild generalized cerebral volume loss and associated ventricular and sulcal enlargement. EXTRACRANIAL SOFT TISSUES: There is interval decrease in right posterior parietal scalp soft tissue swelling compared to prior. CALVARIUM: No depressed calvarial fracture.     SOFT TISSUES: Within normal  limits. PARANASAL SINUSES: No hemorrhage in the paranasal sinuses. MASTOIDS: Within normal limits. ORBITS: Grossly normal. Bilateral cataract surgeries.   ALIGNMENT: Normal cervical lordosis. Mild degenerative anterolisthesis of C4 over C5. VERTEBRAE: No acute fracture. Vertebral body heights are maintained. There are no suspicious osseous lesions. There is ankylosis of the left C2-C3 facets, which may be developmental. Mild degenerative disc space narrowing at C5-C6. Mild synovial facet arthropathy bilaterally in midcervical spine. SPINAL CANAL: No critical spinal canal stenosis. PREVERTEBRAL SOFT TISSUES: No prevertebral soft tissue swelling. LUNG APICES: Mild right greater than left biapical lung scarring.   OTHER FINDINGS: Partially visualized left subclavian approach pacemaker.         No acute intracranial abnormality.   No evidence of cervical spine fracture or acute traumatic malalignment.   Signed by: Magno Krishnamurthy 5/9/2024 7:26 PM Dictation workstation:   [id number]    XR chest 1 view    Result Date: 5/9/2024  Interpreted By:  Magno Krishnamurthy, STUDY: XR CHEST 1 VIEW;  5/9/2024 6:55 pm   INDICATION: Signs/Symptoms:multiple falls, bruising to left flank.   COMPARISON: Chest radiograph 04/08/2024.   ACCESSION NUMBER(S): [id number]   ORDERING CLINICIAN: JANAY STORM   FINDINGS:   CARDIOMEDIASTINAL SILHOUETTE: Cardiomediastinal silhouette is enlarged and stable. There is unchanged left subclavian approach dual lead pacemaker.   LUNGS/PLEURA: There are no consolidations.There are no pleural effusions. There is no demonstrated pneumothorax.     BONES: No evidence of acute osseous abnormality.       1.  No evidence of acute cardiopulmonary process. Cardiomegaly, similar to prior.     Signed by: Magno Krishnamurthy 5/9/2024 7:03 PM Dictation workstation:   [id number]     Impression:  Acute kidney injury superimposed on chronic kidney disease stage III secondary to diuretics dehydration and use of Bactrim DS  possible interstitial nephritis  Chronic kidney disease stage IIIb  Multiple falls  Left iliopsoas hematoma  Left ribs fracture  Hypertension  Hyperlipidemia    Recommendations:  Discontinue Bactrim DS  Discontinue Demadex  Gentle IV hydration with isotonic normal saline  Avoid nephrotoxic medications no ARB's or ACE inhibitors  Indication for dialysis therapy we will continue to monitor renal function very closely    Q for consultation        Principal Problem:    Closed fracture of multiple ribs of left side, initial encounter        I spent 50 minutes in the professional and overall care of this patient.      Ronnie Giang MDInpatient consult to Renal Care  Consult performed by: Ronnie Giang MD  Consult ordered by: Henry Ricci DO

## 2024-05-10 NOTE — CONSULTS
Trauma Surgery Service    Trauma Documentation Timeline       Trauma: Today (05/09/2024) 18:26 to 20:02       Time Event Details User    18:26:00 Trauma Documentation Start    Jil Pacheco RN    18:26:00 Trauma Start    Jil Pacheco RN    18:26:00 Mechanism Of Injury Mechanism Of Injury  Subjective:  fall from standing  Injury Date:  05/09/24  Injury Time:  1630  Injury Occurence Location:  bathroom  Fall/Jump  Fall/Jump:  Yes  Fall/Jump from:  standing  Comments:  multiple falls over the past 2 days.  does take jude Pacheco RN    18:26:00 Trauma Activation Trauma Activation Level  Trauma Activation:  Limited Activation   Jli Pacheco RN    18:28:00 Arrival Documentation Prehospital Treatment  Ambulance Company:  Antwon Rodriguez, EMT    18:28:10 Emergency encounter created    Sole Rodriguez, EMT    18:30:00 Expected arrival    Sole Rodriguez, EMT    18:31:21 Patient arrived in ED    Jil Pacheco RN    18:31:21 Patient roomed in ED To room TR03   Jil Pacheco RN    18:31:21 Assign Nurse Gwen Wu, RN assigned as Registered Nurse   Jil Pacheco, RN    18:31:21 Arrival Complaint Fall Injuries        18:31:23 Assign Nurse Jil Pacheco RN assigned as Registered Nurse   Jil Pacheco RN    18:31:36 Staff Arrived Jil Pacheco RN [Registered Nurse]; Marcelino Schwarz DO [Attending]; Twyla Vyas PA-C [Physician Assistant]   Jil Pacheco RN    18:34:58 Arrival Documentation Prehospital Treatment  Prehospital Treatment:  Yes  Ambulance Company:  Antwon Rocha  Patient Alert:  Trauma  [place] Coma Scale  Best Eye Response:  Spontaneous  Best Verbal Response:  Oriented  Best Motor Response:  Follows commands  [place] Coma Scale Score:  15  Prehospital Care  Cervical Collar:  Yes   Jil Pacheco RN    18:35:00 Cardiac Monitor Cardiac Monitor  Cardiac Rhythm:  Other (Comment)  (paced)   Jil Pacheco RN    18:35:00 Cardiac Monitoring Bedside Cardiac  "Monitor  Bedside Cardiac Monitor On:  Yes  Bedside Cardiac Audible:  Yes  Bedside Cardiac Alarms Set:  Yes   Jil Pacheco RN    18:35:20 [place] Coma Scale [place] Coma Scale  Best Eye Response:  Spontaneous  Best Verbal Response:  Oriented  Best Motor Response:  Follows commands  [place] Coma Scale Score:  15   Jil Pacheco RN    18:35:25 Trauma Assessments Airway  Airway (WDL):  Within Defined Limits  Breathing  Breathing (WDL):  Within Defined Limits  Circulation  Circulation (WDL):  Within Defined Limits  Mental Status  Mental Status:  Alert; Oriented   Jil Pacheco RN    18:35:35 Vitals Vitals Timer  Restart Vitals Timer:  Yes  Restart Vitals Timer:  Yes   Vitals  Temperature:  36.6 °C (97.9 °F)  Heart Rate:  61  Respirations:  16  BP:  156/71  Pulse Ox:  96 %  Medical Gas Therapy:  None (Room air)  [place] Coma Scale  Best Eye Response:  Spontaneous  Best Verbal Response:  Oriented  Best Motor Response:  Follows commands  [place] Coma Scale Score:  15  Pupils  L Pupil Size (mm):  3  L Pupil Reaction:  Brisk  R Pupil Size (mm):  3  R Pupil Reaction:  Brisk  Pain Assessment  Pain Assessment:  0-10  Pain Score:  5 - Moderate pain  Pain Type:  Acute pain  Pain Location:  Neck  Pain Descriptors:  Aching  Medical Gas Therapy  Pulse Ox:  96 %  Medical Gas Therapy:  None (Room air)   Jil Pacheco RN    18:35:35 Pain Assessment Pain Assessment Timer  Restart Pain Assessment Timer:  Yes   Jil Pacheco RN    18:36:46 Height and Weight Height and Weight  Height:  162.6 cm (5' 4\")  Height Method:  Stated  Weight:  63.5 kg (139 lb 15.9 oz)  Weight Method:  Bed scale   Jil Pacheco RN    18:36:46 Custom Formula Data Other flowsheet entries  BSA (Calculated - sq m):  1.69 sq meters   Jil Pacheco RN    18:37:31 HEENT HEENT  HEENT (WDL):  Exceptions to WDL  HEENT Pertinent Negatives:  Vision/hearing clear; No drainage/congestion; No dysphagia  R Eye:  Intact; Pupils equal and reactive  L Eye:  Intact; " Pupils equal and reactive  R Ear:  Intact  L Ear:  Intact  Nose:  Intact  Head and Face:  Tenderness; Trauma/injury; Swelling  (rt sided facial swelling)  Neck:  No swelling; Trauma/injury; Symmetrical; Trachea midline; Other (Comment)  (complains of posterior base of the neck pain)  Throat:  Intact  Tongue:  Pink; Moist  Mucous Membrane(s):  Moist; Pink; Intact  Teeth:  Intact   Jil Pacheco RN    18:38:48 Respiratory Respiratory  Respiratory (WDL):  Within Defined Limits  Respiratory Pertinent Negatives:  Lungs clear to auscultation; Respirations regular/unlabored; No cough  Medical Gas Therapy:  None (Room air)  Cough  Cough Present:  No   Jil Pacheco RN    18:38:54 GI/ GI/  Abdomen Inspection:  Soft; Nondistended  Bowel Sounds (All Quadrants):  Active  Rectal Exam-Tone:  Within Defined Limits   Jil Pacheco RN    18:39:05 Genitourinary Genitourinary  Genitourinary (WDL):  Within Defined Limits  Genitourinary Pertinent Negatives:  Continent; Denies complaints  Genitourinary  Genitourinary Symptoms:  None   Jil Pacheco RN    18:39:10 Musculoskeletal Musculoskeletal  Musculoskeletal (WDL):  Exceptions to WDL  (bruising noted to left flank)  Musculoskeletal Pertinent Negatives:  Moves all extremities  Cervical Collar:  On and aligned  RUE:  Full movement; No injury/trauma  LUE:  Full movement; No injury/trauma  RLE:  Full movement; No injury/trauma  LLE:  Full movement; No injury/trauma  Range of Motion:  Active   Jil Pacheco RN    18:41:00 Triage Completed    Jil Pacheco RN    18:41:00 Travel Screening Do you have any of the following new or worsening symptoms? None of these ; In the last 10 days, have you been in contact with someone who was confirmed or suspected to have Coronavirus/COVID-19? No / Unsure ; Have you had a COVID-19 viral test in the last 10 days? No ; Have you traveled internationally or domestically in the last month? No   Jil Pacheco RN    18:41:00 Acuity/Destination  Acuity  Patient Acuity:  2  Triage Complete:  Triage complete   Jil Pacheco RN    18:41:00 Fall Risk Assessment [place] Fall Risk  History of Falling, Immediate or Within 3 Months:  Yes  Secondary Diagnosis:  Yes  Ambulatory Aid:  Crutches/cane/walker  Intravenous Therapy/Heparin Lock:  No  Gait/Transferring:  Weak  Mental Status:  Oriented to own ability  Manuel Fall Risk Score:  65   Jil Pacheco RN    18:41:07 Acuity 2 Selected    Jil Pacheco RN    18:41:24 Assign Mid-level Twyla Vyas PA-C assigned as Physician Assistant   Twyla Vyas PA-C    18:41:24 Assign Physician    Twyla Vyas PA-C    18:41:24 First Provider Evaluation    Twyla Vyas PA-C    18:41:37 Assign Attending Marcleino Schwarz DO assigned as Attending   Marcelino Schwarz DO    18:41:37 Assign Physician    Marcelino Schwarz DO    18:42:00 Primary Assessment Airway  Airway (WDL):  Within Defined Limits  Breathing  Breathing (WDL):  Within Defined Limits  Circulation  Circulation (WDL):  Within Defined Limits  Cardiac Rhythm:    (Paced)  Disability  Disability (WDL):  Within Defined Limits  Best Eye Response:  Spontaneous  Best Verbal Response:  Oriented  R Pupil Size (mm):  3  R Pupil Reaction:  Brisk  L Pupil Size (mm):  3  L Pupil Reaction:  Brisk   Jil Pacheco RN    18:42:01 History Reviewed Sections Reviewed: Medical, Surgical, Sexual Activity, Alcohol, Tobacco, Drug Use, Custom, Social Documentation, Family, Gynecology, Socioeconomic   Jil Pacheco RN    18:42:12 Allergies Reviewed    Marcelino Schwarz DO    18:43:34 Triage Started    Jil Pacheco RN    18:43:34 Chief Complaints Updated Fall (Presents to ED for multiple falls over the past 2 days.)     Jil Pacheco RN    18:43:41 Allergies Reviewed    Jil Pacheco RN    18:44:06 ECG ECG/Cardiac Events  ECG Performed:  Yes  Date:  05/09/24   Jil Pacheco RN    18:44:14 ECG ECG/Cardiac Events  ECG Performed:  Yes  Date:  05/09/24  Time:   1843   Jil Pacheco RN    18:44:28 Orders Placed Medications  - sodium chloride 0.9 % bolus 1,000 mL  Lab  - CBC and Auto Differential; Comprehensive metabolic panel; Troponin T Series, High Sensitivity (0, 2HR, 6HR)  Imaging  - CT head wo IV contrast; CT cervical spine wo IV contrast; CT abdomen pelvis wo IV contrast; XR chest 1 view  ECG  - ECG 12 lead   Marcelino Schwarz, DO    18:44:30 Lab Ordered  TROPONIN T SERIES, HIGH SENSITIVITY (0, 2 HR, 6 HR), COMPREHENSIVE METABOLIC PANEL, CBC WITH AUTO DIFFERENTIAL   Marcelino Schwarz, DO    18:44:30 XR Ordered  XR CHEST 1 VIEW   Marcelinoluis Schwarz, DO    18:44:30 CT Ordered  CT ABDOMEN PELVIS WO IV CONTRAST, CT CERVICAL SPINE WO IV CONTRAST, CT HEAD WO IV CONTRAST   Marcelino Schwarz, DO    18:44:30 ECG Ordered  ECG 12-LEAD   Marcelino Schwarz, DO    18:44:33 Lab Ordered  SERIAL TROPONIN, INITIAL ([facility])   Marcelino Schwarz, DO    18:45:00 Vital Signs  Vital Signs  Heart Rate:  61  (Device Time: 18:44:38)  Respirations:  28  (Device Time: 18:44:38)  BP:  161/148  MAP (mmHg):  154  Medical Gas Therapy  Pulse Ox:  96 %  (Device Time: 18:44:38)  Medical Gas Therapy:  None (Room air)  Vitals Timer  Restart Vitals Timer:  Yes   Jil Pacheco RN    18:45:15 Imaging Exam Started ECG 12 lead   SYSTEMGENERATED, DOCUMENTATION    18:45:15 ECG Performed ECG 12 lead   SYSTEMGENERATED, DOCUMENTATION    18:51:00 Specimens Collected Troponin T Series, High Sensitivity (0, 2HR, 6HR)  -  ID:  [id number] Type:  Blood       18:51:12 Imaging Exam Started XR chest 1 view   Naya Su    18:51:42 Lab Ordered  SERIAL TROPONIN,  2 HOUR ([facility])   Jil Pacheco RN    18:51:53 Specimens Collected CBC and Auto Differential  -  ID:  [id number] Type:  Blood Comprehensive metabolic panel  -  ID:  [id number] Type:  Blood Serial Troponin, Initial ([facility])  -  ID:  [id number] Type:  Blood   Jil Pacheco RN    18:52:00 Peripheral IV 05/09/24 20 G Left;Proximal  Forearm Placed Placement Date/Time: 05/09/24 1852   Size (Gauge): 20 G  Orientation: Left;Proximal  Location: Forearm  Site Prep: Alcohol  Insertion attempts: 1  Patient Tolerance: Tolerated well   Jil Pacheco RN    18:52:36 Peripheral IV 05/09/24 20 G Left;Proximal Forearm Assessment Site Assessment:  Clean; Dry; Intact  Dressing Status:  Clean; Dry  Line Status:  Blood return noted; Saline locked  Dressing Type:  Transparent   Jil Pacheco RN    18:52:50 Imaging Studies Imaging Studies  Studies done in Trauma Room:  Chest   Jil Pacheco RN    18:54:01 Imaging Studies Imaging Studies  CT/MRI/X-RAY/ANGIO:  Abdomen/Pelvis; C-Spine; Head  Time to CT/MRI:  1854  Return Time:  1907  Monitored by:  RIO Pacheco RN    18:55:08 Imaging Exam Ended XR chest 1 view   Naya Su    18:56:26 Orders Acknowledged New  - CBC and Auto Differential; Comprehensive metabolic panel; ECG 12 lead; sodium chloride 0.9 % bolus 1,000 mL; CT head wo IV contrast; CT cervical spine wo IV contrast; CT abdomen pelvis wo IV contrast; Troponin T Series, High Sensitivity (0, 2HR, 6HR); XR chest 1 view   Jil Pacheco RN    18:57:00 Medication New Bag sodium chloride 0.9 % bolus 1,000 mL -  Dose:  1,000 mL ; Rate:  999 mL/hr ; Route:  intravenous ; Line:  Peripheral IV 05/09/24 20 G Left;Proximal Forearm ; Scheduled Time:  1845   Jil Pacheco RN    18:57:27 Medication Not Given sodium chloride 0.9 % bolus -  Dose:  1,000 mL ; Rate:  999 mL/hr ; Route:  intravenous ; Line:  Peripheral IV 05/09/24 20 G Left;Proximal Forearm ; Site:  Left Forearm ; Reason:  Other ; Comment:  duplicate, charted in error   Jil Pacheco RN    18:58:25 Imaging Exam Started CT head wo IV contrast   Curtis RAFAELA Farley    18:58:25 Imaging Exam Started CT cervical spine wo IV contrast   Curtis M Giovannik    18:58:29 Imaging Exam Started CT abdomen pelvis wo IV contrast   Curtis RAFAELA Farley    19:00:00 Lab Resulted (Final result) CBC WITH AUTO  DIFFERENTIAL   Lab, Background User    19:00:00 CBC and Auto Differential Resulted Abnormal Result Collected: 5/9/2024 18:51 Last updated: 5/9/2024 19:00 Status: Final result WBC: 9.2 x10*3/uL [Ref Range: 4.4 - 11.3] nRBC: 0.0 /100 WBCs [Ref Range: 0.0 - 0.0] RBC: 4.09 x10*6/uL [Ref Range: 4.00 - 5.20] Hemoglobin: 11.2 g/dL [Ref Range: 12.0 - 16.0] Hematocrit: 35.8 % [Ref Range: 36.0 - 46.0] MCV: 88 fL [Ref Range: 80 - 100] MCH: 27.4 pg [Ref Range: 26.0 - 34.0] MCHC: 31.3 g/dL [Ref Range: 32.0 - 36.0] RDW: 15.6 % [Ref Range: 11.5 - 14.5] Platelets: 146 x10*3/uL [Ref Range: 150 - 450] Neutrophils %: 80.7 % [Ref Range: 40.0 - 80.0] Immature Granulocytes %, Automated: 0.4 % [Ref Range: 0.0 - 0.9] (Immature Granulocyte Count (IG) includes promyelocytes, myelocytes and metamyelocytes but does not include bands. Percent differential counts (%) should be interpreted in the context of the absolute cell counts (cells/UL).) Lymphocytes %: 7.9 % [Ref Range: 13.0 - 44.0] Monocytes %: 7.2 % [Ref Range: 2.0 - 10.0] Eosinophils %: 3.1 % [Ref Range: 0.0 - 6.0] Basophils %: 0.7 % [Ref Range: 0.0 - 2.0] Neutrophils Absolute: 7.45 x10*3/uL [Ref Range: 1.60 - 5.50] (Percent differential counts (%) should be interpreted in the context of the absolute cell counts (cells/uL).) Immature Granulocytes Absolute, Automated: 0.04 x10*3/uL [Ref Range: 0.00 - 0.50] Lymphocytes Absolute: 0.73 x10*3/uL [Ref Range: 0.80 - 3.00] Monocytes Absolute: 0.66 x10*3/uL [Ref Range: 0.05 - 0.80] Eosinophils Absolute: 0.29 x10*3/uL [Ref Range: 0.00 - 0.40] Basophils Absolute: 0.06 x10*3/uL [Ref Range: 0.00 - 0.10]    Lab, Background User    19:00:58 Vitals Vitals Timer  Restart Vitals Timer:  Yes  Restart Vitals Timer:  Yes   Vitals  Temperature:  36.6 °C (97.9 °F)  Heart Rate:  61  Respirations:  20  BP:  141/59  Pulse Ox:  93 %  Medical Gas Therapy:  None (Room air)  [place] Coma Scale  Best Eye Response:  Spontaneous  Best Verbal Response:  Oriented  Best  Motor Response:  Follows commands  [place] Coma Scale Score:  15  Pupils  L Pupil Size (mm):  3  L Pupil Reaction:  Brisk  R Pupil Size (mm):  3  R Pupil Reaction:  Brisk  Pain Assessment  Pain Assessment:  0-10  Pain Score:  5 - Moderate pain  Pain Type:  Acute pain  Pain Location:  Neck  Pain Orientation:  Posterior  Pain Descriptors:  Aching  Medical Gas Therapy  Pulse Ox:  93 %  Medical Gas Therapy:  None (Room air)   Jil Pacheco RN    19:00:58 Pain Assessment Pain Assessment Timer  Restart Pain Assessment Timer:  Yes   Jil Pacheco RN    19:03:29 XR chest 1 view Resulted Collected: 5/9/2024 19:04 Last updated: 5/9/2024 19:09 Status: Final result    Interface, Radiology Results In    19:04:46 Order Performed XR chest 1 view  -  ID:  [id number]       19:09:15 Rad Resulted XR chest 1 view   Interface, Radiology Results In    19:09:15 Xray Final Result (Final result) XR CHEST 1 VIEW   Interface, Radiology Results In    19:14:32 Imaging Exam Ended CT head wo IV contrast   Curtis M Herczak    19:14:32 Imaging Exam Ended CT cervical spine wo IV contrast   Curtis M Herczak    19:14:40 Imaging Exam Ended CT abdomen pelvis wo IV contrast   Curtis M Herczak    19:15:06 Vitals Vitals Timer  Restart Vitals Timer:  Yes   Vitals  Temperature:  36.6 °C (97.9 °F)  Heart Rate:  61  Respirations:  19  BP:  141/59  Pulse Ox:  95 %  Medical Gas Therapy:  None (Room air)  [place] Coma Scale  Best Eye Response:  Spontaneous  Best Verbal Response:  Oriented  Best Motor Response:  Follows commands  [place] Coma Scale Score:  15  Pupils  L Pupil Size (mm):  3  L Pupil Reaction:  Brisk  R Pupil Size (mm):  3  R Pupil Reaction:  Brisk  Pain Assessment  Pain Assessment:  0-10  Pain Score:  9  Pain Type:  Acute pain  Pain Location:  Neck  Pain Descriptors:  Throbbing  Pain Frequency:  Constant/continuous  Clinical Progression:  Not changed  Medical Gas Therapy  Pulse Ox:  95 %  Medical Gas Therapy:  None (Room air)   Gwen Person  RIO Wu    19:15:06 Pain Assessment Pain Assessment Timer  Restart Pain Assessment Timer:  Yes   Gwen Wu RN    19:24:05 Lab Resulted (Preliminary result) COMPREHENSIVE METABOLIC PANEL   Lab, Background User    19:25:46 Lab Resulted (Final result) COMPREHENSIVE METABOLIC PANEL   Lab, Background User    19:25:46 Comprehensive metabolic panel Resulted Abnormal Result Collected: 5/9/2024 18:51 Last updated: 5/9/2024 19:25 Status: Final result Glucose: 114 mg/dL [Ref Range: 65 - 99] Sodium: 137 mmol/L [Ref Range: 133 - 145] Potassium: 4.4 mmol/L [Ref Range: 3.4 - 5.1] Chloride: 95 mmol/L [Ref Range: 97 - 107] Bicarbonate: 25 mmol/L [Ref Range: 24 - 31] Urea Nitrogen: 31 mg/dL [Ref Range: 8 - 25] Creatinine: 2.60 mg/dL [Ref Range: 0.40 - 1.60] eGFR: 18 mL/min/1.73m*2 [Ref Range: >60] (Calculations of estimated GFR are performed using the 2021 CKD-EPI Study Refit equation without the race variable for the IDMS-Traceable creatinine methods.  https://jasn.asnjournals.org/content/early/2021/09/22/ASN.2912444109) Calcium: 9.9 mg/dL [Ref Range: 8.5 - 10.4] Albumin: 4.9 g/dL [Ref Range: 3.5 - 5.0] Alkaline Phosphatase: 103 U/L [Ref Range: 35 - 125] Total Protein: 7.9 g/dL [Ref Range: 5.9 - 7.9] AST: 41 U/L [Ref Range: 5 - 40] Bilirubin, Total: 0.4 mg/dL [Ref Range: 0.1 - 1.2] ALT: 29 U/L [Ref Range: 5 - 40] Anion Gap: 17 mmol/L [Ref Range: <=19]    Lab, Background User    19:25:56 Serial Troponin, Initial ([facility]) Resulted Abnormal Result Collected: 5/9/2024 18:51 Last updated: 5/9/2024 19:25 Status: Final result Troponin T, High Sensitivity: 39 ng/L [Ref Range: <=14]    Rivka Gamez, [place]    19:25:56 Lab Resulted (Final result) SERIAL TROPONIN, INITIAL ([facility])   Lab, Background User    19:26:27 CT head wo IV contrast Resulted Collected: 5/9/2024 19:27 Last updated: 5/9/2024 19:27 Status: Final result    Interface, Radiology Results In    19:26:27 CT cervical spine wo IV contrast Resulted Collected: 5/9/2024  19:27 Last updated: 5/9/2024 19:27 Status: Final result    Interface, Radiology Results In    19:27:46 Order Performed CT head wo IV contrast  -  ID:  [id number] CT cervical spine wo IV contrast  -  ID:  [id number]       19:27:47 CT Final Result CT head wo IV contrast   Interface, Radiology Results In    19:27:47 CT Final Result (Final result) CT HEAD WO IV CONTRAST   Interface, Radiology Results In    19:27:47 CT Final Result CT cervical spine wo IV contrast   Interface, Radiology Results In    19:27:47 CT Final Result (Final result) CT CERVICAL SPINE WO IV CONTRAST   Interface, Radiology Results In    19:28:36 Note Shared ED Prov Note filed by SAMRA Thompson PA-C    19:30:00 Vitals Vitals Timer  Restart Vitals Timer:  Yes   Vitals  Temperature:  36.6 °C (97.9 °F)  Heart Rate:  63  Respirations:  15  BP:  141/53  Pulse Ox:  96 %  Medical Gas Therapy:  None (Room air)  [place] Coma Scale  Best Eye Response:  Spontaneous  Best Verbal Response:  Oriented  Best Motor Response:  Follows commands  [place] Coma Scale Score:  15  Pupils  L Pupil Size (mm):  3  L Pupil Reaction:  Brisk  R Pupil Size (mm):  3  R Pupil Reaction:  Brisk  Pain Assessment  Clinical Progression:  Not changed  Medical Gas Therapy  Pulse Ox:  96 %  Medical Gas Therapy:  None (Room air)   Gwen Wu RN    19:32:19 Registration Completed    Stacie Olvera    19:35:00 Vitals Pupils  L Pupil Size (mm):  3  L Pupil Reaction:  Brisk  R Pupil Size (mm):  3  R Pupil Reaction:  Brisk   Gwen Wu RN    19:35:00 Arrival Documentation [place] Coma Scale  Best Eye Response:  Spontaneous  Best Verbal Response:  Oriented  Best Motor Response:  Follows commands  [place] Coma Scale Score:  15   Gwen Wu RN    19:36:00 Team Member Assigned Jamel LEA MD assigned as Consulting Physician   Jamel LEA MD    19:37:26 CT abdomen pelvis wo IV contrast Resulted Collected: 5/9/2024 19:38 Last updated:  5/9/2024 19:38 Status: Final result    Interface, Radiology Results In    19:38:44 CT Final Result CT abdomen pelvis wo IV contrast   Interface, Radiology Results In    19:38:44 CT Final Result (Final result) CT ABDOMEN PELVIS WO IV CONTRAST   Interface, Radiology Results In    19:38:44 Order Performed CT abdomen pelvis wo IV contrast  -  ID:  [id number]       19:45:00 Vitals Vitals  Temperature:  36.6 °C (97.9 °F)  Medical Gas Therapy:  None (Room air)  [place] Coma Scale  Best Eye Response:  Spontaneous  Best Verbal Response:  Oriented  Best Motor Response:  Follows commands  [place] Coma Scale Score:  15  Pupils  L Pupil Size (mm):  3  L Pupil Reaction:  Brisk  R Pupil Size (mm):  3  R Pupil Reaction:  Brisk  Pain Assessment  Pain Assessment:  0-10  Pain Score:  5 - Moderate pain  Pain Type:  Acute pain  Pain Location:  Neck  Medical Gas Therapy  Medical Gas Therapy:  None (Room air)   Jil Pacheco RN    19:45:00 Pain Assessment Pain Assessment Timer  Restart Pain Assessment Timer:  Yes   Jil Pacheco RN    19:45:00 Vital Signs  Vital Signs  Heart Rate:  60  (Device Time: 19:44:38)  Respirations:  17  (Device Time: 19:44:38)  BP:  140/58  (Device Time: 19:46:38)  MAP (mmHg):  82  (Device Time: 19:46:38)  Medical Gas Therapy  Pulse Ox:  96 %  (Device Time: 19:44:38)  Vitals Timer  Restart Vitals Timer:  Yes   Gwen Wu RN    20:00:00 Vitals Vitals Timer  Restart Vitals Timer:  Yes   Vitals  Temperature:  36.6 °C (97.9 °F)  Heart Rate:  61  Respirations:  18  BP:  143/61  Pulse Ox:  97 %  Medical Gas Therapy:  None (Room air)  [place] Coma Scale  Best Eye Response:  Spontaneous  Best Verbal Response:  Oriented  Best Motor Response:  Follows commands  [place] Coma Scale Score:  15  Pupils  L Pupil Size (mm):  3  L Pupil Reaction:  Brisk  R Pupil Size (mm):  3  R Pupil Reaction:  Brisk  Pain Assessment  Pain Assessment:  0-10  Pain Score:  5 - Moderate pain  Pain Type:  Acute pain  Pain Location:   Neck  Pain Orientation:  Posterior  Pain Descriptors:  Aching  Clinical Progression:  Not changed  Medical Gas Therapy  Pulse Ox:  97 %  Medical Gas Therapy:  None (Room air)   Jil Pacheco RN    20:00:00 Pain Assessment Pain Assessment Timer  Restart Pain Assessment Timer:  Yes   Jil Pacheco RN    20:00:00 Device Removal C-Spine/B-Board  Device Removal:  C-Spine Cleared   Jil Pacheco RN    20:01:10 Staff Arrived Jamel LEA MD [Surgeon]   Jil Pacheco RN    20:02:50 Trauma End    Jil Pacheco RN    20:02:53 Trauma Documentation End    Jil Pacheco RN    20:02:53 Staff Departed Jamel LEA MD [Surgeon] (Automatically marked out by Trauma Documentation End event); Jil Pacheco RN [Registered Nurse] (Automatically marked out by Trauma Documentation End event); Marcelino Schwarz DO [Attending] (Automatically marked out by Trauma Documentation End event); Twyla Vyas PA-C [Physician Assistant] (Automatically marked out by Trauma Documentation End event)   Jil Pacheco RN                            History Of Present Illness  Time of activation: 1826  Time of evaluation: 2000  Linette Doyle is a 83 y.o. female with a history of CAD, atrial fibrillation on Eliqus, and CKD who presented to ER as limited trauma activation following a fall from standing.  She has been having multiple recent falls, and was just discharged from the hospital 2 days ago another fall, and workup for TIA.  She endorses hitting her left side as well as her right jaw and right neck.  She denies any loss of consciousness.     Past Medical History  She has a past medical history of Angina pectoris ([facility]-formerly [facility]) (10/22/2023), Atherosclerosis of coronary artery (08/21/2019), Atherosclerotic heart disease of native coronary artery with other forms of angina pectoris (CMS-formerly [facility]) (10/22/2023), Hypercholesterolemia (12/28/2018), Hyperlipidemia (10/22/2023), Presence of cardiac pacemaker (10/22/2023), Primary  hypertension (12/28/2018), Shortness of breath (11/26/2019), Sick sinus syndrome ([facility]) (10/22/2023), Sinus bradycardia (10/22/2023), and Stage 3a chronic kidney disease (Multi) (11/25/2019).    Scheduled medications    Continuous medications    PRN medications       Surgical History  She has a past surgical history that includes Cardiac catheterization (N/A, 02/02/2024); Cardiac catheterization (N/A, 02/02/2024); Cardiac catheterization (N/A, 02/08/2024); Cardiac catheterization (N/A, 02/08/2024); and pacemaker placement (07/04/2019).     Social History  She reports that she has never smoked. She has never been exposed to tobacco smoke. She has never used smokeless tobacco. She reports that she does not drink alcohol and does not use drugs.    Family History  Family History   Problem Relation Name Age of Onset    No Known Problems Mother      No Known Problems Father          Allergies  Iodinated contrast media    Review of Systems   Constitutional:  Negative for activity change, appetite change, chills, diaphoresis and fever.   Respiratory:  Negative for shortness of breath.    Cardiovascular:  Negative for chest pain.   Gastrointestinal:  Positive for abdominal pain.   Musculoskeletal:  Positive for arthralgias.   Neurological:  Negative for dizziness.   Psychiatric/Behavioral:  Negative for agitation and confusion.    All other systems reviewed and are negative.       Physical Exam  HENT:      Head: Normocephalic.   Pulmonary:      Effort: Pulmonary effort is normal.   Abdominal:      General: Abdomen is flat.      Comments: Hematoma over left lateral abdominal wall, tender to palpation.  No abdominal tenderness to palpation.     Neurological:      General: No focal deficit present.      Mental Status: She is alert.          Last Recorded Vitals  /61   Pulse 61   Temp 36.6 °C (97.9 °F)   Resp 18   Wt 63.5 kg (139 lb 15.9 oz)   SpO2 97%   Regulo Coma Scale Score: 15       Relevant  Results      Results for orders placed or performed during the hospital encounter of 05/09/24 (from the past 24 hour(s))   CBC and Auto Differential   Result Value Ref Range    WBC 9.2 4.4 - 11.3 x10*3/uL    nRBC 0.0 0.0 - 0.0 /100 WBCs    RBC 4.09 4.00 - 5.20 x10*6/uL    Hemoglobin 11.2 (L) 12.0 - 16.0 g/dL    Hematocrit 35.8 (L) 36.0 - 46.0 %    MCV 88 80 - 100 fL    MCH 27.4 26.0 - 34.0 pg    MCHC 31.3 (L) 32.0 - 36.0 g/dL    RDW 15.6 (H) 11.5 - 14.5 %    Platelets 146 (L) 150 - 450 x10*3/uL    Neutrophils % 80.7 40.0 - 80.0 %    Immature Granulocytes %, Automated 0.4 0.0 - 0.9 %    Lymphocytes % 7.9 13.0 - 44.0 %    Monocytes % 7.2 2.0 - 10.0 %    Eosinophils % 3.1 0.0 - 6.0 %    Basophils % 0.7 0.0 - 2.0 %    Neutrophils Absolute 7.45 (H) 1.60 - 5.50 x10*3/uL    Immature Granulocytes Absolute, Automated 0.04 0.00 - 0.50 x10*3/uL    Lymphocytes Absolute 0.73 (L) 0.80 - 3.00 x10*3/uL    Monocytes Absolute 0.66 0.05 - 0.80 x10*3/uL    Eosinophils Absolute 0.29 0.00 - 0.40 x10*3/uL    Basophils Absolute 0.06 0.00 - 0.10 x10*3/uL   Comprehensive metabolic panel   Result Value Ref Range    Glucose 114 (H) 65 - 99 mg/dL    Sodium 137 133 - 145 mmol/L    Potassium 4.4 3.4 - 5.1 mmol/L    Chloride 95 (L) 97 - 107 mmol/L    Bicarbonate 25 24 - 31 mmol/L    Urea Nitrogen 31 (H) 8 - 25 mg/dL    Creatinine 2.60 (H) 0.40 - 1.60 mg/dL    eGFR 18 (L) >60 mL/min/1.73m*2    Calcium 9.9 8.5 - 10.4 mg/dL    Albumin 4.9 3.5 - 5.0 g/dL    Alkaline Phosphatase 103 35 - 125 U/L    Total Protein 7.9 5.9 - 7.9 g/dL    AST 41 (H) 5 - 40 U/L    Bilirubin, Total 0.4 0.1 - 1.2 mg/dL    ALT 29 5 - 40 U/L    Anion Gap 17 <=19 mmol/L   Serial Troponin, Initial ([facility])   Result Value Ref Range    Troponin T, High Sensitivity 39 (HH) <=14 ng/L      Lab Results   Component Value Date    HGBA1C 6.0 (H) 02/01/2024      CT abdomen pelvis wo IV contrast    Result Date: 5/9/2024  Interpreted By:  Magno Krishnamurthy, STUDY: CT ABDOMEN PELVIS WO IV CONTRAST;   5/9/2024 7:14 pm   INDICATION: Signs/Symptoms:multiple falls, bruising to left flank and tender to palpation. On eliquis. Allergy to contrast..   COMPARISON: None   ACCESSION NUMBER(S): [id number]   ORDERING CLINICIAN: JANAY STORM   TECHNIQUE: CT of the abdomen and pelvis was performed. Contiguous axial images were obtained through the abdomen and pelvis. Coronal and sagittal reconstructions at 3 mm slice thickness were performed.  No intravenous contrast was administered.   FINDINGS: Please note that the evaluation of vessels, lymph nodes and organs is limited without intravenous contrast.   LOWER CHEST: No acute abnormality in the lung bases. There is cardiomegaly and partially visualized pacemaker.   ABDOMEN:   LIVER: The liver is normal in size and contour.   BILE DUCTS: The intrahepatic and extrahepatic ducts are not dilated.   GALLBLADDER: There is dependent gallbladder calculus without gallbladder wall thickening or distention.   PANCREAS: The pancreas is not enlarged.   SPLEEN: Within normal limits.   ADRENAL GLANDS: Within normal limits.   KIDNEYS AND URETERS: The kidneys are normal in size and unremarkable in appearance.  No hydroureteronephrosis or nephroureterolithiasis is identified.   PELVIS:   BLADDER: The urinary bladder is incompletely distended, limiting assessment.   REPRODUCTIVE ORGANS: No pelvic masses. Status post hysterectomy.   BOWEL: The stomach is incompletely distended, limiting evaluation for focal wall thickening. There is no bowel wall dilatation or obstruction. The appendix is not visualized without secondary signs of appendicitis. Moderate amount of stool throughout the colon without wall thickening or acute inflammatory change.   VESSELS: The abdominal aorta is normal in caliber. Moderate atherosclerotic calcifications of the abdominal aorta.   PERITONEUM/RETROPERITONEUM/LYMPH NODES: There is no ascites or intraperitoneal free air. There is no lymphadenopathy by CT criteria.    ABDOMINAL WALL: The abdominal wall soft tissues appear normal.   BONES: No suspicious osseous lesions are identified. Multilevel endplate degenerative changes throughout the lumbar spine with multilevel vacuum disc phenomena.. Nondisplaced fractures of the left anterolateral 6th and 9th ribs are visualized, axial image 40 and 57 of 201, which may be acute or subacute. There is mild asymmetric fullness of the left iliopsoas muscle and adjacent fat stranding, for example axial image 72 of 144 suggestive of trace iliopsoas hematoma. There is subcutaneous fat stranding in the left lateral flank, for example axial image 46 of 144 consistent with soft tissue injury.       1.  Mild fullness of the left iliopsoas muscle and mild adjacent fat stranding suggestive of trace iliopsoas/retroperitoneal hematoma. Left lateral flank subcutaneous fat stranding consistent with superficial injury. 2. Nondisplaced left anterolateral 6th and 9th rib fractures are visualized, which may be acute or subacute.     Signed by: Magno Krishnamurthy 5/9/2024 7:37 PM Dictation workstation:   [id number]    CT head wo IV contrast    Result Date: 5/9/2024  Interpreted By:  Magno Krishnamurthy, STUDY: CT HEAD WO IV CONTRAST; CT CERVICAL SPINE WO IV CONTRAST;  5/9/2024 7:14 pm   INDICATION: Signs/Symptoms:multiple falls, hit head on right side, on eliquis; Signs/Symptoms:fall, head injury and upper spine pain. Altered mental status   COMPARISON: Head CT and CT cervical spine dated 05/02/2024.   ACCESSION NUMBER(S): [id number]; [id number]   ORDERING CLINICIAN: JANAY STORM   TECHNIQUE: Axial noncontrast CT images of the head and cervical spine.   FINDINGS: BRAIN PARENCHYMA: Gray-white matter interfaces are preserved. No mass, mass effect or midline shift. Scattered periventricular and deep white matter hypodensities suggestive of mild-to-moderate chronic microvascular ischemic change.   HEMORRHAGE: No acute intracranial hemorrhage. VENTRICLES and  EXTRA-AXIAL SPACES: No hydrocephalus. No extra-axial collections. Mild generalized cerebral volume loss and associated ventricular and sulcal enlargement. EXTRACRANIAL SOFT TISSUES: There is interval decrease in right posterior parietal scalp soft tissue swelling compared to prior. CALVARIUM: No depressed calvarial fracture.     SOFT TISSUES: Within normal limits. PARANASAL SINUSES: No hemorrhage in the paranasal sinuses. MASTOIDS: Within normal limits. ORBITS: Grossly normal. Bilateral cataract surgeries.   ALIGNMENT: Normal cervical lordosis. Mild degenerative anterolisthesis of C4 over C5. VERTEBRAE: No acute fracture. Vertebral body heights are maintained. There are no suspicious osseous lesions. There is ankylosis of the left C2-C3 facets, which may be developmental. Mild degenerative disc space narrowing at C5-C6. Mild synovial facet arthropathy bilaterally in midcervical spine. SPINAL CANAL: No critical spinal canal stenosis. PREVERTEBRAL SOFT TISSUES: No prevertebral soft tissue swelling. LUNG APICES: Mild right greater than left biapical lung scarring.   OTHER FINDINGS: Partially visualized left subclavian approach pacemaker.         No acute intracranial abnormality.   No evidence of cervical spine fracture or acute traumatic malalignment.   Signed by: Magno Krishnamurthy 5/9/2024 7:26 PM Dictation workstation:   [id number]    CT cervical spine wo IV contrast    Result Date: 5/9/2024  Interpreted By:  Magno Krishnamurthy, STUDY: CT HEAD WO IV CONTRAST; CT CERVICAL SPINE WO IV CONTRAST;  5/9/2024 7:14 pm   INDICATION: Signs/Symptoms:multiple falls, hit head on right side, on eliquis; Signs/Symptoms:fall, head injury and upper spine pain. Altered mental status   COMPARISON: Head CT and CT cervical spine dated 05/02/2024.   ACCESSION NUMBER(S): [id number]; [id number]   ORDERING CLINICIAN: JANAY STORM   TECHNIQUE: Axial noncontrast CT images of the head and cervical spine.   FINDINGS: BRAIN PARENCHYMA:  Gray-white matter interfaces are preserved. No mass, mass effect or midline shift. Scattered periventricular and deep white matter hypodensities suggestive of mild-to-moderate chronic microvascular ischemic change.   HEMORRHAGE: No acute intracranial hemorrhage. VENTRICLES and EXTRA-AXIAL SPACES: No hydrocephalus. No extra-axial collections. Mild generalized cerebral volume loss and associated ventricular and sulcal enlargement. EXTRACRANIAL SOFT TISSUES: There is interval decrease in right posterior parietal scalp soft tissue swelling compared to prior. CALVARIUM: No depressed calvarial fracture.     SOFT TISSUES: Within normal limits. PARANASAL SINUSES: No hemorrhage in the paranasal sinuses. MASTOIDS: Within normal limits. ORBITS: Grossly normal. Bilateral cataract surgeries.   ALIGNMENT: Normal cervical lordosis. Mild degenerative anterolisthesis of C4 over C5. VERTEBRAE: No acute fracture. Vertebral body heights are maintained. There are no suspicious osseous lesions. There is ankylosis of the left C2-C3 facets, which may be developmental. Mild degenerative disc space narrowing at C5-C6. Mild synovial facet arthropathy bilaterally in midcervical spine. SPINAL CANAL: No critical spinal canal stenosis. PREVERTEBRAL SOFT TISSUES: No prevertebral soft tissue swelling. LUNG APICES: Mild right greater than left biapical lung scarring.   OTHER FINDINGS: Partially visualized left subclavian approach pacemaker.         No acute intracranial abnormality.   No evidence of cervical spine fracture or acute traumatic malalignment.   Signed by: Magno Krishnamurthy 5/9/2024 7:26 PM Dictation workstation:   [id number]    XR chest 1 view    Result Date: 5/9/2024  Interpreted By:  Magno Krishnamurthy, STUDY: XR CHEST 1 VIEW;  5/9/2024 6:55 pm   INDICATION: Signs/Symptoms:multiple falls, bruising to left flank.   COMPARISON: Chest radiograph 04/08/2024.   ACCESSION NUMBER(S): [id number]   ORDERING CLINICIAN: JANAY STORM   FINDINGS:    CARDIOMEDIASTINAL SILHOUETTE: Cardiomediastinal silhouette is enlarged and stable. There is unchanged left subclavian approach dual lead pacemaker.   LUNGS/PLEURA: There are no consolidations.There are no pleural effusions. There is no demonstrated pneumothorax.     BONES: No evidence of acute osseous abnormality.       1.  No evidence of acute cardiopulmonary process. Cardiomegaly, similar to prior.     Signed by: Magno Krishnamurthy 5/9/2024 7:03 PM Dictation workstation:   [id number]    Vascular US Carotid Artery Duplex Bilateral    Result Date: 5/7/2024           [place] [id number]            Phone [phone]  Vascular Lab Report  [facility] US CAROTID ARTERY DUPLEX BILATERAL Patient Name:     SAMUEL VILLALOBOS        Reading           [id number] Mason Julian MD                                        Physician: Study Date:       5/3/2024             Ordering          [id number]                                        Provider:         MADISON MRN/PID:          [id number]             Fellow: Accession#:       [id number]         Technologist:     Molly Cook RVT Date of           1940 / 83      Technologist 2: Birth/Age:        years Gender:           F                    Encounter#:       [id number] Admission Status: Emergency            Location          [facility]                                        Performed:  Diagnosis/ICD: Syncope and collapse-R55 Indication:    Syncope CPT Codes:     81755 Cerebrovascular Carotid Duplex scan complete  CONCLUSIONS: Right Carotid: Findings are consistent with less than 50% stenosis of the right proximal internal carotid artery. Right external carotid artery appears patent with no evidence of stenosis. The right vertebral artery is patent with antegrade flow. No evidence of hemodynamically significant stenosis in the right subclavian artery. Left Carotid: Findings are consistent with 50 to 69% stenosis of the left proximal  internal carotid artery. Left external carotid artery appears patent with no evidence of stenosis. The left vertebral artery is patent with antegrade flow. No evidence of hemodynamically significant stenosis in the left subclavian artery.  Imaging & Doppler Findings: Right Plaque Morph: The proximal right internal carotid artery demonstrates heterogenous and calcified plaque. The distal right common carotid artery demonstrates heterogenous and calcified plaque. Left Plaque Morph: The proximal left internal carotid artery demonstrates heterogenous and calcified plaque. The proximal left external carotid artery demonstrates heterogenous plaque. The distal left common carotid artery demonstrates heterogenous, calcified and irregular plaque.   Right                       Left   PSV     EDV                PSV      EDV 75 cm/s           CCA P    68 cm/s 57 cm/s           CCA D    60 cm/s                    Bulb    170 cm/s 30 cm/s 98 cm/s 17 cm/s   ICA P    166 cm/s 25 cm/s 64 cm/s 12 cm/s   ICA M    79 cm/s  14 cm/s 80 cm/s 18 cm/s   ICA D    59 cm/s  11 cm/s 76 cm/s            ECA     95 cm/s 28 cm/s 6 cm/s  Vertebral  52 cm/s  9 cm/s 83 cm/s         Subclavian 114 cm/s                Right Left ICA/CCA Ratio  1.7  2.8   [id number] Mason Julian MD Electronically signed by [id number] Mason Julian MD on 5/7/2024 at 12:29:24 PM  ** Final **     ECG 12 lead    Result Date: 5/7/2024  AV dual-paced rhythm with prolonged AV conduction Abnormal ECG When compared with ECG of 07-APR-2024 16:49, Electronic ventricular pacemaker has replaced Electronic atrial pacemaker Confirmed by Siva Dutta ([id number]) on 5/7/2024 10:17:51 AM    MR brain wo IV contrast    Result Date: 5/6/2024  Interpreted By:  Luciano Danielson, STUDY: MR ANGIO NECK WO IV CONTRAST; MR BRAIN WO IV CONTRAST; MR ANGIO HEAD WO IV CONTRAST;  5/6/2024 3:25 pm   INDICATION: Signs/Symptoms:TIA.   COMPARISON: CT head dated 05/02/2024.   ACCESSION NUMBER(S): [id number]; [id number];  [id number]   ORDERING CLINICIAN: LENNY MARAVILLA   TECHNIQUE: 1) Standard multiplanar multisequence MR imaging was performed through the brain without intravenous contrast. 2) Noncontrast 3D time-of-flight MRA imaging was performed through the brain. 3) Noncontrast 2D time-of-flight MRA imaging was performed through the neck.   Motion artifact degrades assessment, particularly on MRA neck imaging.   FINDINGS: BRAIN:   Parenchyma: There is no diffusion restriction abnormality to suggest acute infarct.  No evidence of recent hemorrhage. There is no mass effect or midline shift. Moderate chronic small vessel ischemic disease involving the bilateral cerebral hemispheres and central joann. Questionable tiny remote infarcts within the cerebellum.   CSF Spaces: The ventricles, sulci and basal cisterns are within normal limits for age with generalized brain atrophy. Basilar cisterns are patent.   Extra-axial spaces: No extra-axial fluid collection.   Paranasal Sinuses: Visualized paranasal sinuses are clear.   Mastoids: Trace fluid bilaterally.   Orbits: Bilateral native lens extractions.   Calvarium: No suspicious osseous marrow signal.   Scalp: Mild subgaleal soft tissue swelling posteriorly.     VASCULAR FINDINGS:   Common carotid arteries: Visualized segments demonstrate patent flow signal bilaterally.   External carotid arteries: Patent flow signal bilaterally.   Internal carotid arteries: Patent flow signal bilaterally. Likely mild atherosclerotic narrowing of the right internal carotid artery origin with no substantial (less than 20%) stenosis by NASCET criteria with motion degrading assessment. There is concern for at least moderate, 50% stenosis of the left internal carotid artery origin by NASCET criteria with motion artifact limiting quantification. The cervical left internal carotid artery also appears smaller in caliber relative to the right, possibly reflecting the flow limiting nature of the proximal stenosis  versus congenital variant. Intracranial segments appear patent without flow significant stenosis. No definite aneurysm.   Anterior cerebral arteries: Hypoplastic versus absent left A1 segment. Otherwise normal flow signal bilaterally.   Middle cerebral arteries: Normal flow signal bilaterally.   Vertebral arteries: The visualized segments of the vertebral arteries appear patent bilaterally with no flow significant stenosis.   Basilar artery: Normal flow signal.   Posterior communicating arteries: Visualized on the right, diminutive versus absent on the left.   Posterior cerebral arteries: Fetal versus near fetal origin on the right. Normal flow signal bilaterally.       No acute infarct, recent hemorrhage, or intracranial mass effect. Moderate chronic small vessel ischemic disease and generalized brain atrophy.   Motion artifact degrades assessment, particularly on MRA neck imaging. No source vessel arterial occlusion identified within the head and neck. Multiple anatomic variations as above.   Concern for at least moderate (50%) stenosis of the left internal carotid artery origin by NASCET criteria with motion artifact degrading assessment.   MACRO: None   Signed by: Luciano Danielson 5/6/2024 3:49 PM Dictation workstation:   [id number]    MR angio neck wo IV contrast    Result Date: 5/6/2024  Interpreted By:  Luciano Danielson, STUDY: MR ANGIO NECK WO IV CONTRAST; MR BRAIN WO IV CONTRAST; MR ANGIO HEAD WO IV CONTRAST;  5/6/2024 3:25 pm   INDICATION: Signs/Symptoms:TIA.   COMPARISON: CT head dated 05/02/2024.   ACCESSION NUMBER(S): [id number]; [id number]; [id number]   ORDERING CLINICIAN: LENNY MARAVILLA   TECHNIQUE: 1) Standard multiplanar multisequence MR imaging was performed through the brain without intravenous contrast. 2) Noncontrast 3D time-of-flight MRA imaging was performed through the brain. 3) Noncontrast 2D time-of-flight MRA imaging was performed through the neck.   Motion artifact degrades assessment,  particularly on MRA neck imaging.   FINDINGS: BRAIN:   Parenchyma: There is no diffusion restriction abnormality to suggest acute infarct.  No evidence of recent hemorrhage. There is no mass effect or midline shift. Moderate chronic small vessel ischemic disease involving the bilateral cerebral hemispheres and central joann. Questionable tiny remote infarcts within the cerebellum.   CSF Spaces: The ventricles, sulci and basal cisterns are within normal limits for age with generalized brain atrophy. Basilar cisterns are patent.   Extra-axial spaces: No extra-axial fluid collection.   Paranasal Sinuses: Visualized paranasal sinuses are clear.   Mastoids: Trace fluid bilaterally.   Orbits: Bilateral native lens extractions.   Calvarium: No suspicious osseous marrow signal.   Scalp: Mild subgaleal soft tissue swelling posteriorly.     VASCULAR FINDINGS:   Common carotid arteries: Visualized segments demonstrate patent flow signal bilaterally.   External carotid arteries: Patent flow signal bilaterally.   Internal carotid arteries: Patent flow signal bilaterally. Likely mild atherosclerotic narrowing of the right internal carotid artery origin with no substantial (less than 20%) stenosis by NASCET criteria with motion degrading assessment. There is concern for at least moderate, 50% stenosis of the left internal carotid artery origin by NASCET criteria with motion artifact limiting quantification. The cervical left internal carotid artery also appears smaller in caliber relative to the right, possibly reflecting the flow limiting nature of the proximal stenosis versus congenital variant. Intracranial segments appear patent without flow significant stenosis. No definite aneurysm.   Anterior cerebral arteries: Hypoplastic versus absent left A1 segment. Otherwise normal flow signal bilaterally.   Middle cerebral arteries: Normal flow signal bilaterally.   Vertebral arteries: The visualized segments of the vertebral arteries  appear patent bilaterally with no flow significant stenosis.   Basilar artery: Normal flow signal.   Posterior communicating arteries: Visualized on the right, diminutive versus absent on the left.   Posterior cerebral arteries: Fetal versus near fetal origin on the right. Normal flow signal bilaterally.       No acute infarct, recent hemorrhage, or intracranial mass effect. Moderate chronic small vessel ischemic disease and generalized brain atrophy.   Motion artifact degrades assessment, particularly on MRA neck imaging. No source vessel arterial occlusion identified within the head and neck. Multiple anatomic variations as above.   Concern for at least moderate (50%) stenosis of the left internal carotid artery origin by NASCET criteria with motion artifact degrading assessment.   MACRO: None   Signed by: Luciano Danielson 5/6/2024 3:49 PM Dictation workstation:   [id number]    MR angio head wo IV contrast    Result Date: 5/6/2024  Interpreted By:  Luciano Danielson, STUDY: MR ANGIO NECK WO IV CONTRAST; MR BRAIN WO IV CONTRAST; MR ANGIO HEAD WO IV CONTRAST;  5/6/2024 3:25 pm   INDICATION: Signs/Symptoms:TIA.   COMPARISON: CT head dated 05/02/2024.   ACCESSION NUMBER(S): [id number]; [id number]; [id number]   ORDERING CLINICIAN: LENNY MARAVILLA   TECHNIQUE: 1) Standard multiplanar multisequence MR imaging was performed through the brain without intravenous contrast. 2) Noncontrast 3D time-of-flight MRA imaging was performed through the brain. 3) Noncontrast 2D time-of-flight MRA imaging was performed through the neck.   Motion artifact degrades assessment, particularly on MRA neck imaging.   FINDINGS: BRAIN:   Parenchyma: There is no diffusion restriction abnormality to suggest acute infarct.  No evidence of recent hemorrhage. There is no mass effect or midline shift. Moderate chronic small vessel ischemic disease involving the bilateral cerebral hemispheres and central joann. Questionable tiny remote infarcts within  the cerebellum.   CSF Spaces: The ventricles, sulci and basal cisterns are within normal limits for age with generalized brain atrophy. Basilar cisterns are patent.   Extra-axial spaces: No extra-axial fluid collection.   Paranasal Sinuses: Visualized paranasal sinuses are clear.   Mastoids: Trace fluid bilaterally.   Orbits: Bilateral native lens extractions.   Calvarium: No suspicious osseous marrow signal.   Scalp: Mild subgaleal soft tissue swelling posteriorly.     VASCULAR FINDINGS:   Common carotid arteries: Visualized segments demonstrate patent flow signal bilaterally.   External carotid arteries: Patent flow signal bilaterally.   Internal carotid arteries: Patent flow signal bilaterally. Likely mild atherosclerotic narrowing of the right internal carotid artery origin with no substantial (less than 20%) stenosis by NASCET criteria with motion degrading assessment. There is concern for at least moderate, 50% stenosis of the left internal carotid artery origin by NASCET criteria with motion artifact limiting quantification. The cervical left internal carotid artery also appears smaller in caliber relative to the right, possibly reflecting the flow limiting nature of the proximal stenosis versus congenital variant. Intracranial segments appear patent without flow significant stenosis. No definite aneurysm.   Anterior cerebral arteries: Hypoplastic versus absent left A1 segment. Otherwise normal flow signal bilaterally.   Middle cerebral arteries: Normal flow signal bilaterally.   Vertebral arteries: The visualized segments of the vertebral arteries appear patent bilaterally with no flow significant stenosis.   Basilar artery: Normal flow signal.   Posterior communicating arteries: Visualized on the right, diminutive versus absent on the left.   Posterior cerebral arteries: Fetal versus near fetal origin on the right. Normal flow signal bilaterally.       No acute infarct, recent hemorrhage, or intracranial  mass effect. Moderate chronic small vessel ischemic disease and generalized brain atrophy.   Motion artifact degrades assessment, particularly on MRA neck imaging. No source vessel arterial occlusion identified within the head and neck. Multiple anatomic variations as above.   Concern for at least moderate (50%) stenosis of the left internal carotid artery origin by NASCET criteria with motion artifact degrading assessment.   MACRO: None   Signed by: Luciano Danielson 5/6/2024 3:49 PM Dictation workstation:   [id number]    CT head wo IV contrast    Result Date: 5/2/2024  Interpreted By:  David Hoang, STUDY: CT CERVICAL SPINE WO IV CONTRAST; CT HEAD WO IV CONTRAST; ;  5/2/2024 10:35 pm   INDICATION: Signs/Symptoms:fall neck trauma; Signs/Symptoms:fall, head injury on plavix.   COMPARISON: Noncontrast CT exams of head and cervical spine of 10/09/2020.   ACCESSION NUMBER(S): [id number]; [id number]   ORDERING CLINICIAN: VIKTORIA AVILES   TECHNIQUE: Noncontrast CT exams of the head and cervical spine with multiplanar reformations.   FINDINGS: BRAIN PARENCHYMA: Moderate to advanced volume loss.  There is periventricular and subcortical white matter hypoattenuation, most in keeping with chronic microvascular ischemic change.  Gray-white matter interfaces are preserved. No mass, mass effect or midline shift.   HEMORRHAGE: No acute intracranial hemorrhage. VENTRICLES and EXTRA-AXIAL SPACES: Normal size. EXTRACRANIAL SOFT TISSUES: Moderate to large right parieto-occipital scalp hematoma. PARANASAL SINUSES/MASTOIDS: The visualized paranasal sinuses and mastoid air cells are aerated. CALVARIUM: No depressed skull fracture. No destructive osseous lesion.   OTHER FINDINGS: None.   CERVICAL SPINE:   Straightening of normal cervical lordosis could reflect positioning or muscle spasm. ALIGNMENT: Normal. VERTEBRAE: No acute fracture. Redemonstrated small ossific fragment along the anterior aspect of the inferior C6 endplate.  Vertebral stature is maintained. Moderate to severe multilevel hypertrophic facet osteoarthropathy. SPINAL CANAL: No critical spinal canal stenosis. PREVERTEBRAL SOFT TISSUES: No prevertebral soft tissue swelling. LUNG APICES: Interstitial prominence in the visible lungs probably relates to chronic changes with or without acute pulmonary interstitial edema/CHF. Please correlate clinically with volume status.   OTHER FINDINGS: None.       No evidence of acute intracranial abnormality.   No acute cervical spine fracture or malalignment.     MACRO: None   Signed by: David Hoang 5/2/2024 10:45 PM Dictation workstation:   [id number]    CT cervical spine wo IV contrast    Result Date: 5/2/2024  Interpreted By:  David Hoang, STUDY: CT CERVICAL SPINE WO IV CONTRAST; CT HEAD WO IV CONTRAST; ;  5/2/2024 10:35 pm   INDICATION: Signs/Symptoms:fall neck trauma; Signs/Symptoms:fall, head injury on plavix.   COMPARISON: Noncontrast CT exams of head and cervical spine of 10/09/2020.   ACCESSION NUMBER(S): [id number]; [id number]   ORDERING CLINICIAN: VIKTORIA AVILES   TECHNIQUE: Noncontrast CT exams of the head and cervical spine with multiplanar reformations.   FINDINGS: BRAIN PARENCHYMA: Moderate to advanced volume loss.  There is periventricular and subcortical white matter hypoattenuation, most in keeping with chronic microvascular ischemic change.  Gray-white matter interfaces are preserved. No mass, mass effect or midline shift.   HEMORRHAGE: No acute intracranial hemorrhage. VENTRICLES and EXTRA-AXIAL SPACES: Normal size. EXTRACRANIAL SOFT TISSUES: Moderate to large right parieto-occipital scalp hematoma. PARANASAL SINUSES/MASTOIDS: The visualized paranasal sinuses and mastoid air cells are aerated. CALVARIUM: No depressed skull fracture. No destructive osseous lesion.   OTHER FINDINGS: None.   CERVICAL SPINE:   Straightening of normal cervical lordosis could reflect positioning or muscle spasm. ALIGNMENT: Normal.  VERTEBRAE: No acute fracture. Redemonstrated small ossific fragment along the anterior aspect of the inferior C6 endplate. Vertebral stature is maintained. Moderate to severe multilevel hypertrophic facet osteoarthropathy. SPINAL CANAL: No critical spinal canal stenosis. PREVERTEBRAL SOFT TISSUES: No prevertebral soft tissue swelling. LUNG APICES: Interstitial prominence in the visible lungs probably relates to chronic changes with or without acute pulmonary interstitial edema/CHF. Please correlate clinically with volume status.   OTHER FINDINGS: None.       No evidence of acute intracranial abnormality.   No acute cervical spine fracture or malalignment.     MACRO: None   Signed by: David Hoang 5/2/2024 10:45 PM Dictation workstation:   [id number]    Transesophageal Echo ([facility])    Result Date: 4/10/2024           [place] [address]            Phone [phone] TRANSESOPHAGEAL ECHOCARDIOGRAM REPORT  Patient Name:     SAMUEL Buenrostro Physician:   Zuri Vela MD Study Date:       4/10/2024            Ordering Provider:   Zuri VELA MRN/PID:          [id number]             Fellow: Accession#:       [id number]         Nurse: Date of           1940 / 83      Sonographer:         Jordy Perales RDCS Birth/Age:        years Gender:           F                    Additional Staff: Height:           162.00 cm            Admit Date: Weight:           67.00 kg             Admission Status:    Inpatient - Routine BSA / BMI:        1.72 m2 / 25.53      Department Location: [facility]                   kg/m2 Blood Pressure: 137 /77 mmHg Study Type:    TRANSESOPHAGEAL ECHO ([facility]) Diagnosis/ICD: Cardiac murmur, unspecified-R01.1 Indication:    Murmur / MR CPT Codes:     [facility]IN Complete-[id number] Patient History: Pertinent         CAD, Chest Pain, CHF and Murmur. Abn Ekg, Pacemaker, Murmur,  History:          PCI. Study Detail: The following Echo studies were performed: 2D, Doppler and color               flow.  PHYSICIAN INTERPRETATION: [facility]UG[facility]IN Details: The AUGUSTIN probe used was [id number]. Technically adequate omniplane transesophageal echocardiogram performed. Color flow Doppler echo was performed to assess for the presence of a patent foramen ovale. AUG[facility]IN Medication: The patient was sedated by Anesthesia; please refer to anesthesia flow sheet for medications used. AUGUSTIN Procedure: The probe was passed without difficulty. The following complication was encountered during the procedure: Patient tolerated the procedure well without any apparent complications. Left Ventricle: Left ventricular systolic function is normal, with an estimated ejection fraction of 55-60%. There are no regional wall motion abnormalities. The left ventricular cavity size is normal. Spectral Doppler shows a normal pattern of left ventricular diastolic filling. Left Atrium: The left atrium is mildly dilated. There is a normal sized left atrial appendage. Right Ventricle: The right ventricle is normal in size. There is normal right ventricular global systolic function. Right Atrium: The right atrium is mildly dilated. Aortic Valve: The aortic valve appears structurally normal. There is no evidence of aortic valve regurgitation. Mitral Valve: The mitral valve is normal in structure. There is moderate mitral valve regurgitation. There is moderate mitral valve regurgitation at the most central jet the mitral valve structure within normal limit. Tricuspid Valve: The tricuspid valve is structurally normal. There is mild tricuspid regurgitation. Pulmonic Valve: The pulmonic valve is structurally normal. There is no indication of pulmonic valve regurgitation. Pericardium: There is no pericardial effusion noted. Aorta: The aortic root is normal. Systemic Veins: The inferior vena cava appears to be of normal size. There is IVC inspiratory collapse  greater than 50%.  CONCLUSIONS:  1. Left ventricular systolic function is normal with a 55-60% estimated ejection fraction.  2. There is moderate mitral valve regurgitation at the most central jet the mitral valve structure within normal limit.  3. Moderate mitral valve regurgitation.  4. Mild tricuspid regurgitation is visualized. QUANTITATIVE DATA SUMMARY: MITRAL INSUFFICIENCY:                           Normal Ranges: PISA Radius:  0.6 cm MR VTI:       170.00 cm MR Vmax:      567.00 cm/s MR Alias Yasir: 30.8 cm/s MR Volume:    20.89 ml MR Flow Rt:   69.67 ml/s MR EROA:      0.12 cm2  [id number] Becky Vela MD Electronically signed on 4/10/2024 at 1:33:21 PM  ** Final **           Active Problems:  There are no active Hospital Problems.     Assessment/Plan   Active Problems:  There are no active Hospital Problems.      The patient is an 83-year-old woman with multiple comorbidities who presents with recurrent falls at home.  Trauma workup including CT of the head and neck show no acute injuries.  She does have bruising along her left lateral abdominal wall, no active extravasation.  She has subacute rib fractures on the left side.  She was recently admitted for TIA and multiple falls two days ago.  Patient will likely need rehab given her recurrent falls.         Jamel Mukherjee MD

## 2024-05-10 NOTE — PROGRESS NOTES
[facility], [facility]  Address[facility] [place] [address]  Phone: [phone]   Linette Doyle is a 83 y.o. female on day 1 of admission presenting with Closed fracture of multiple ribs of left side, initial encounter.      Subjective   States she feels frustrated that she's back in the hospital. Reports falling 4 times at home since last hospital discharge. Feels like her legs are weak and give out on her.        Objective     Last Recorded Vitals  /80   Pulse 66   Temp 36.6 °C (97.9 °F)   Resp 20   Wt 63.5 kg (139 lb 15.9 oz)   SpO2 96%   Intake/Output last 3 Shifts:    Intake/Output Summary (Last 24 hours) at 5/10/2024 1150  Last data filed at 5/9/2024 2000  Gross per 24 hour   Intake 1000 ml   Output --   Net 1000 ml       Admission Weight  Weight: 63.5 kg (139 lb 15.9 oz) (05/09/24 1836)    Daily Weight  05/09/24 : 63.5 kg (139 lb 15.9 oz)    Image Results  Electrocardiogram, 12-lead PRN ACS symptoms  AV dual-paced rhythm with prolonged AV conduction  Abnormal ECG  When compared with ECG of 03-MAY-2024 00:01,  No significant change was found      Physical Exam  Constitutional:       General: She is not in acute distress.     Appearance: She is not toxic-appearing.   HENT:      Head: Normocephalic.      Mouth/Throat:      Pharynx: Oropharynx is clear.   Eyes:      General: No scleral icterus.  Cardiovascular:      Rate and Rhythm: Normal rate.   Pulmonary:      Effort: No respiratory distress.      Breath sounds: No wheezing.   Abdominal:      General: There is no distension.      Palpations: Abdomen is soft.   Musculoskeletal:      Right lower leg: No edema.      Left lower leg: No edema.   Neurological:      Mental Status: She is alert.   Psychiatric:         Behavior: Behavior normal.         Relevant Results               Assessment/Plan          Principal Problem:    Closed fracture of multiple ribs of left side, initial encounter    Mechanical fall/frequent falls  Iliopsoas hematoma  Subacute left rib fractures  Trauma surgery following  Check orthostats  Fall  precautions  Hold eliquis for 48hrs  PT/OT  Patient agreeable to SNF placement    Paroxsymal atrial fibrillation  Cardiology following  Diltiazem discontinued  Amiodarone decreased to 200mg daily  Continue metoprolol  Hold eliquis for 48hrs  Consider watchman device evaluation as outpatient per cardiology    GARY on CKD stage 3, improving  Baseline Cr ~1.4  Nephrology consulted  May be due to bactrim use vs dehydration  Improving with IVF    Recent UTI  Treated with bactrim during last hospitalization  Hold off on antibiotics for now   UA ordered    CAD s/p PCI  Continue plavix per cardiology    Dispo: pending therapy evals. Anticipate likely discharge to SNF when medically cleared              Karol Reyna MD

## 2024-05-10 NOTE — PROGRESS NOTES
Occupational Therapy    Evaluation/Treatment    Patient Name: Linette Doyle  MRN: [id number]  : 1940  Today's Date: 05/10/24  Time Calculation  Start Time: 815  Stop Time: 838  Time Calculation (min): 23 min       Assessment:  OT Assessment: Pt would benefit from acute OT services  End of Session Communication: Bedside nurse  End of Session Patient Position: Bed, 3 rail up, Alarm on (all needs in reach)  OT Assessment Results: Decreased ADL status, Decreased upper extremity strength, Decreased upper extremity range of motion, Decreased safe judgment during ADL, Decreased endurance, Decreased functional mobility  Plan:  Treatment Interventions: ADL retraining, Functional transfer training, UE strengthening/ROM, Patient/family training, Equipment evaluation/education  OT Frequency: 4 times per week  OT Discharge Recommendations: Moderate intensity level of continued care  OT - OK to Discharge: Yes  Treatment Interventions: ADL retraining, Functional transfer training, UE strengthening/ROM, Patient/family training, Equipment evaluation/education    Subjective     General:      General  Reason for Referral: Pt is a 83 y.o female admitted after 4 falls in 2 days time. +rib fractures, left side (6 and 9) pt recently discharged from the hospital after work up for TIA and UTI.  Past Medical History Relevant to Rehab: SSS s/p pacemaker, CAD, MI, cardiac stent, afib, TIA diverticulitis, hypothyroid, HTN, CKD,  Family/Caregiver Present: No  Co-Treatment: PT  Prior to Session Communication: Bedside nurse  Patient Position Received: Bed, 4 rail up, Alarm off, not on at start of session  General Comment: Cleared by nursing. Pt plesant and agreeable to therapy  Precautions:  Hearing/Visual Limitations: reading glasses  Medical Precautions: Fall precautions    Pain:  Pain Assessment  Pain Assessment: 0-10  Pain Score: 6  Pain Type: Acute pain  Pain Location: Rib cage  Pain Orientation: Left    Objective    Cognition:  Overall Cognitive Status:  (AOX3)  Insight:  (impaired)           Home Living:  Type of Home: House  Lives With: Alone  Home Adaptive Equipment: Walker rolling or standard  Home Layout: One level  Home Access: Stairs to enter without rails  Entrance Stairs-Rails: None  Entrance Stairs-Number of Steps: 1  Bathroom Shower/Tub: Tub/shower unit  Bathroom Toilet: Standard  Bathroom Equipment: Grab bars in shower, Shower chair with back  Prior Function:  Level of [place]: Independent with ADLs and functional transfers, Independent with homemaking with ambulation  Receives Help From: Family  ADL Assistance: Independent  Homemaking Assistance: Independent  Ambulatory Assistance: Independent (RW prn)  Vocational: Retired  Hand Dominance: Right  Prior Function Comments: Pt with h/o multiple falls       ADL:  Eating Assistance:  (set up)  Grooming Assistance:  (set up)  Bathing Assistance: Minimal  UE Dressing Assistance: Minimal  LE Dressing Assistance: Minimal  Toileting Assistance with Device: Minimal    Activity Tolerance:  Endurance: Decreased tolerance for upright activites     Bed Mobility/Transfers: Bed Mobility  Bed Mobility:  (min A for trunk control supine<>seated EOB)    Transfers  Transfer:  (min A for balance and controlled descent sit<>stand bed level, VCs for safe hand and leg placement)    Toilet Transfers  Toilet Transfer Type: To and from  Toilet Transfer to: Standard toilet  Toilet Transfers: Minimal assistance  Toilet Transfers Comments: transfer on/off toilet with use of grab bar     Functional Mobility:  Functional Mobility  Functional Mobility Performed:  (min A for functional mobility extended household distance with use of RW, 2 minor LOB noted requiring min A to correct)  Sitting Balance:  Static Sitting Balance  Static Sitting-Level of Assistance: Close supervision  Sensation:  Sensation Comment: pt denied numbness/paresthesia BUEs  Strength:  Strength Comments: BRENNA WFL; NIA NT  formally secondary to pain    Hand Function:  Hand Function  Gross Grasp: Functional  Coordination: Functional  Extremities: RUE   RUE : Within Functional Limits and LUE   LUE:  (shoulder flexion~90 degrees, limited by rib pain; WFL distally)    Outcome Measures: [facility] Daily Activity  Putting on and taking off regular lower body clothing: A little  Bathing (including washing, rinsing, drying): A lot  Putting on and taking off regular upper body clothing: A little  Toileting, which includes using toilet, bedpan or urinal: A little  Taking care of personal grooming such as brushing teeth: A little  Eating Meals: A little  Daily Activity - Total Score: 17    Education Documentation  ADL Training, taught by Ana Ye OT at 5/10/2024  1:00 PM.  Learner: Patient  Readiness: Acceptance  Method: Explanation, Demonstration  Response: Verbalizes Understanding, Needs Reinforcement    Education Comments  No comments found.      Goals:  Encounter Problems       Encounter Problems (Active)       ADLs       Pt will complete ADL tasks at mod I with use of AE prn  (Progressing)       Start:  05/10/24    Expected End:  06/01/24               Functional Mobility       Pt will perform functional mobility household distances at [facility] with use of LRAD.    (Progressing)       Start:  05/10/24    Expected End:  06/01/24               Instrumental Activities of Daily Living       Pt will perform simple IADLs/retrieval of items at mod I.   (Progressing)       Start:  05/10/24    Expected End:  06/01/24               OT Transfers       Pt will perform functional transfers at [facility] (Progressing)       Start:  05/10/24    Expected End:  06/01/24

## 2024-05-10 NOTE — ED PROCEDURE NOTE
Procedure  Critical Care    Performed by: Marcelino Schwarz DO  Authorized by: Marcelino Schwarz DO    Critical care provider statement:     Critical care time (minutes):  36    Critical care time was exclusive of:  Separately billable procedures and treating other patients    Critical care was necessary to treat or prevent imminent or life-threatening deterioration of the following conditions:  Trauma    Critical care was time spent personally by me on the following activities:  Ordering and review of laboratory studies, ordering and review of radiographic studies, re-evaluation of patient's condition, discussions with consultants, development of treatment plan with patient or surrogate, obtaining history from patient or surrogate, examination of patient and review of old charts    Care discussed with: admitting provider                 Marcelino Schwarz DO  05/10/24 0856

## 2024-05-10 NOTE — PROGRESS NOTES
Physical Therapy    Physical Therapy Evaluation & Treatment    Patient Name: Linette Doyle  MRN: [id number]  Today's Date: 5/10/2024   Time Calculation  Start Time: 0814  Stop Time: 0837  Time Calculation (min): 23 min    Assessment/Plan   PT Assessment  PT Assessment Results: Decreased strength, Decreased endurance, Impaired balance, Decreased mobility, Decreased cognition, Impaired judgement, Decreased safety awareness, Impaired tone  Rehab Prognosis: Good  Evaluation/Treatment Tolerance: Patient limited by fatigue  Medical Staff Made Aware: Yes  Strengths: Premorbid level of function, Housing layout  Barriers to Participation: Comorbidities, Insight into problems  End of Session Communication: Bedside nurse  Assessment Comment: pt with decreased functional mobility, increased weakness, impaired tolerance to activity, and poor safety awareness. pt presents with a right facial droop she states began yesterday and can not verbalize why she fell so many times at home.  pt is very unsteady with ambulation with the rolling walker and is a very high risk for falls. pt will benefit from skilled therapy services to improve functional performance and maximize safety.  End of Session Patient Position: Bed, 3 rail up, Alarm on (needs in reach)   IP OR SWING BED PT PLAN  Inpatient or Swing Bed: Inpatient  PT Plan  Treatment/Interventions: Bed mobility, Transfer training, Gait training, Stair training, Balance training, Strengthening, Endurance training, Therapeutic exercise, Therapeutic activity  PT Plan: Skilled PT  PT Frequency: 4 times per week  PT Discharge Recommendations: Moderate intensity level of continued care  Equipment Recommended upon Discharge: Wheeled walker  PT Recommended Transfer Status: Assist x1, Assistive device (rolling walker)  PT - OK to Discharge: Yes (with continued skilled therapy services at next level of care)    Subjective     General Visit Information:  General  Reason for Referral: Impaired  Mobility  Referred By: Henry Ricci DO  Past Medical History Relevant to Rehab: SSS s/p pacemaker, CAD, MI, cardiac stent, afib, TIA diverticulitis, hypothyroid, HTN, CKD,  Family/Caregiver Present: No  Co-Treatment: OT  Co-Treatment Reason: optimization of patient outcomes of an initial evaluation in the ED.  Prior to Session Communication: Bedside nurse  Patient Position Received: Bed, 4 rail up, Alarm off, not on at start of session  Preferred Learning Style: verbal  General Comment: 83 y.o female admitted after 4 falls in 2 days time. +rib fractures, left side (6 and 9) pt recently discharged from the hospital after work up for TIA and UTI.  Home Living:  Home Living  Type of Home: House  Lives With: Alone  Home Adaptive Equipment: Walker rolling or standard  Home Layout: One level  Home Access: Stairs to enter without rails  Entrance Stairs-Rails: None  Entrance Stairs-Number of Steps: 1  Bathroom Shower/Tub: Tub/shower unit  Bathroom Toilet: Standard  Bathroom Equipment: Grab bars in shower, Shower chair with back  Prior Level of Function:  Prior Function Per Pt/Caregiver Report  Level of [place]: Independent with ADLs and functional transfers, Independent with homemaking with ambulation  Receives Help From: Family  ADL Assistance: Independent  Homemaking Assistance: Independent  Ambulatory Assistance: Independent  Vocational: Retired  Hand Dominance: Right  Prior Function Comments: Multiple falls at home, intermittent use of rolling walker, usually independent without assistive device.  Precautions:  Precautions  Hearing/Visual Limitations: reading glasses, no hearing aids  Medical Precautions: Fall precautions  Vital Signs:       Objective   Pain:  Pain Assessment  Pain Assessment: 0-10  Pain Score: 6  Pain Type: Acute pain  Pain Location: Rib cage  Pain Orientation: Left  Pain Radiating Towards: down left flank into left thigh  Pain Descriptors: Aching, Sharp  Patient's Stated Pain Goal: No  pain  Pain Interventions: Repositioned  Response to Interventions: decreased pain at rest, increased pain wiht movement  Cognition:  Cognition  Overall Cognitive Status: Impaired  Orientation Level: Oriented X4 (pt hesitating with answers)  Cognition Comments: pt seems forgetful, lacks insight into deficits  Insight: Moderate    General Assessments:  General Observation  General Observation: pt wearing a pain patch on her back, B UE fine tremors appreciated    Activity Tolerance  Endurance: Decreased tolerance for upright activites  Activity Tolerance Comments: fair, easily distracted  Rate of Perceived Exertion (RPE): 3    Sensation  Light Touch: No apparent deficits    Strength  Strength Comments: LE strength appears equal bilaterally, 4-/5  Strength  Strength Comments: LE strength appears equal bilaterally, 4-/5    Perception  Inattention/Neglect: Appears intact      Coordination  Coordination Comment: grossly intact, right facial droop    Postural Control  Posture Comment: rounded shoulders, forward head posture    Static Sitting Balance  Static Sitting-Balance Support: Bilateral upper extremity supported, Feet supported  Static Sitting-Level of Assistance: Distant supervision  Static Sitting-Comment/Number of Minutes: good balance seated at EOB    Static Standing Balance  Static Standing-Balance Support: Bilateral upper extremity supported (rolling walker)  Static Standing-Level of Assistance: Contact guard  Static Standing-Comment/Number of Minutes: fair+ blaance at rolling walker  Dynamic Standing Balance  Dynamic Standing-Balance Support: Bilateral upper extremity supported (rolling walker)  Dynamic Standing-Balance: Turning (ambulation)  Dynamic Standing-Comments: fair to fair- balance during dynamic activity, unsteady/veering side to side.  Functional Assessments:  ADL  ADL's Addressed: No    Bed Mobility  Bed Mobility: Yes  Bed Mobility 1  Bed Mobility 1: Supine to sitting  Level of Assistance 1: Minimum  assistance  Bed Mobility Comments 1: min A for trunk support and trunk elevation, pt able to manage B LEs without assist  Bed Mobility 2  Bed Mobility  2: Sitting to supine  Level of Assistance 2: Minimum assistance  Bed Mobility Comments 2: min A to guide trunk down and min A to complete lifting B LEs into the bed, increased time and effort to complete. verbal cues for centering self in the bed, min A for trunk.    Transfers  Transfer: Yes  Transfer 1  Transfer From 1: Sit to  Transfer to 1: Stand  Technique 1: Sit to stand  Transfer Device 1: Walker  Transfer Level of Assistance 1: Minimum assistance  Trials/Comments 1: pt pulling on stabilized walker despite verbal cues for hand placement, min A for balance, safety, and guiding trunk up.  Transfers 2  Transfer From 2: Stand to  Transfer to 2: Sit  Technique 2: Stand to sit  Transfer Device 2: Walker  Transfer Level of Assistance 2: Minimum assistance  Trials/Comments 2: pt keeping right hand on the walker while moving it aside, turning and sitting wiht decreased eccentric control, min A for safety. pt not adhering to safety cues.    Ambulation/Gait Training  Ambulation/Gait Training Performed: Yes  Ambulation/Gait Training 1  Surface 1: Level tile  Device 1: Rolling walker  Assistance 1: Minimum assistance, Moderate assistance  Quality of Gait 1: Narrow base of support, Decreased step length, Forward flexed posture  Comments/Distance (ft) 1: 30'x2, slow gloria, pushing walker too far forward, veering side to side, difficulty maintaining a straight pathway. verbal cues to place hands fully on  of walker and remain inside PENELOPE provided by the walker. min A for balance except when turning, mod A due to LOB.    Stairs  Stairs: No  Extremity/Trunk Assessments:  RLE   RLE : Within Functional Limits  LLE   LLE : Within Functional Limits  Treatments:  Therapeutic Activity  Therapeutic Activity Performed:  (see balance assessment, bed mobiltiy, transfers, and gait  training for details.)    Other Activity  Other Activity Performed:  (pt educated on falls precautions and therapy expectations while in the hospital.)  Outcome Measures:  [facility] Basic Mobility  Turning from your back to your side while in a flat bed without using bedrails: A little  Moving from lying on your back to sitting on the side of a flat bed without using bedrails: A lot  Moving to and from bed to chair (including a wheelchair): A little  Standing up from a chair using your arms (e.g. wheelchair or bedside chair): A little  To walk in hospital room: A little  Climbing 3-5 steps with railing: A lot  Basic Mobility - Total Score: 16    Encounter Problems       Encounter Problems (Active)       Balance       LTG - Patient will maintain static/dynamic standing and sitting balance to allow for safe completion of functional activities at a distant supervision level. (Progressing)       Start:  05/10/24    Expected End:  06/09/24               Mobility       STG - Patient will ambulate 100' with rolling walker and distant supervision. (Progressing)       Start:  05/10/24    Expected End:  06/09/24            STG - Patient will ambulate up and down a curb/step with close supervision and no assistive device (Not Progressing)       Start:  05/10/24    Expected End:  06/09/24               PT Transfers       STG - Transfer from bed to chair with distant supervision. (Progressing)       Start:  05/10/24    Expected End:  06/09/24            STG - Patient to transfer to and from sit to supine with distant supervision. (Progressing)       Start:  05/10/24    Expected End:  06/09/24            STG - Patient will transfer sit to and from stand with distant supervision and correct hand placement. (Progressing)       Start:  05/10/24    Expected End:  06/09/24               Safety       LTG - Patient will demonstrate safety requirements appropriate to situation/environment (Progressing)       Start:  05/10/24    Expected End:   06/09/24                   Education Documentation  Precautions, taught by Shayna Prince PT at 5/10/2024 11:06 AM.  Learner: Patient  Readiness: Acceptance  Method: Explanation  Response: Verbalizes Understanding, Needs Reinforcement    Mobility Training, taught by Shayna Prince PT at 5/10/2024 11:06 AM.  Learner: Patient  Readiness: Acceptance  Method: Explanation  Response: Verbalizes Understanding, Needs Reinforcement    Education Comments  No comments found.

## 2024-05-10 NOTE — CONSULTS
Inpatient consult to Cardiology  Consult performed by: Becky Vela MD  Consult ordered by: Henry Ricci DO  Reason for consult: Atrial fibrillation, recurrent falls, coronary disease        History Of Present Illness:    Linette Doyle is a 83 y.o. female presenting with shortness of breath.  Patient reports being at usual state of health until the last week or so where she has been complaining of shortness of breath.  Patient has a history of atrial fibrillation on oral anticoagulation status post cardioversion in the past.  History of acute non-ST elevation myocardial infarction in February 2, 2024 was found to have 100% occluded small left circumflex artery.  The right coronary artery was treated with drug-eluting stent.  Attempted intervention to the left circumflex artery was not successful.  Patient admitted to the hospital for frequent falls.  Reports weakness in her legs but no dizziness lightheadedness or syncope.  Denies having nausea vomiting.    Review of systems.  10 point review of systems otherwise negative     Last Recorded Vitals:  Vitals:    05/10/24 0545 05/10/24 0600 05/10/24 0615 05/10/24 0636   BP:    159/80   BP Location:       Patient Position:       Pulse: 60 64 60 66   Resp: 15 (!) 22 (!) 22 20   Temp:       SpO2:    96%   Weight:       Height:           Last Labs:  CBC - 5/10/2024:  6:51 AM  7.4 9.7 124    31.0      CMP - 5/10/2024:  6:51 AM  9.1 7.9 41 --- 0.4   3.6 4.0 29 103      PTT - 2/9/2024:  4:59 AM  1.1   11.9 24.6     Hemoglobin A1C   Date/Time Value Ref Range Status   02/01/2024 05:58 AM 6.0 (H) See below % Final   07/05/2023 08:49 AM 5.6 4.0 - 6.0 % Final     Comment:     Hemoglobin A1C levels are related to mean blood glucose during the   preceding 2-3 months. The relationship table below may be used as a   general guide. Each 1% increase in HGB A1C is a reflection of an   increase in mean glucose of approximately 30 mg/dl.   Reference: Diabetes Care, volume 29,  supplement 1 Jan. 2006                        HGB A1C ................. Approx. Mean Glucose   _______________________________________________   6%   ...............................  120 mg/dl   7%   ...............................  150 mg/dl   8%   ...............................  180 mg/dl   9%   ...............................  210 mg/dl   10%  ...............................  240 mg/dl  Performed at [address] [address]     12/14/2022 09:13 AM 5.7 4.0 - 6.0 % Final     Comment:     Hemoglobin A1C levels are related to mean blood glucose during the   preceding 2-3 months. The relationship table below may be used as a   general guide. Each 1% increase in HGB A1C is a reflection of an   increase in mean glucose of approximately 30 mg/dl.   Reference: Diabetes Care, volume 29, supplement 1 Jan. 2006                        HGB A1C ................. Approx. Mean Glucose   _______________________________________________   6%   ...............................  120 mg/dl   7%   ...............................  150 mg/dl   8%   ...............................  180 mg/dl   9%   ...............................  210 mg/dl   10%  ...............................  240 mg/dl  Performed at [address] [address]       LDL Calculated   Date/Time Value Ref Range Status   07/05/2023 08:49 AM 57 (L) 65 - 130 MG/DL Final   12/14/2022 09:13 AM 64 (L) 65 - 130 MG/DL Final   06/29/2022 08:43 AM 58 (L) 65 - 130 MG/DL Final      Last I/O:  I/O last 3 completed shifts:  In: 1000 (15.7 mL/kg) [IV Piggyback:1000]  Out: - (0 mL/kg)   Weight: 63.5 kg     Past Cardiology Tests (Last 3 Years):  EKG:  ECG 12 lead 05/03/2024      ECG 12 lead 04/05/2024      Electrocardiogram, 12-lead PRN ACS symptoms 04/05/2024      ECG 12 lead 04/05/2024      Electrocardiogram, 12-lead PRN ACS symptoms 04/05/2024 (Preliminary)      ECG 12 lead 02/07/2024      ECG 12 lead 02/06/2024      ECG 12 Lead  01/31/2024    Echo:  Transesophageal Echo (AUGUSTIN) 04/10/2024      Transthoracic Echo (TTE) Limited 04/08/2024      Transthoracic Echo (TTE) Limited 02/09/2024      Transthoracic Echo (TTE) Complete 02/01/2024    Ejection Fractions:  EF   Date/Time Value Ref Range Status   02/09/2024 08:59 AM 58 %    02/01/2024 11:43 AM 59 %      Cath:  Cardiac catheterization - coronary 02/08/2024      Cardiac catheterization - coronary 02/02/2024      Cardiac catheterization - coronary 02/02/2024    Stress Test:  No results found for this or any previous visit from the past 1095 days.    Cardiac Imaging:  No results found for this or any previous visit from the past 1095 days.      Past Medical History:  She has a past medical history of Angina pectoris (CMS-HCC) (10/22/2023), Atherosclerosis of coronary artery (08/21/2019), Atherosclerotic heart disease of native coronary artery with other forms of angina pectoris (CMS-HCC) (10/22/2023), Hypercholesterolemia (12/28/2018), Hyperlipidemia (10/22/2023), Presence of cardiac pacemaker (10/22/2023), Primary hypertension (12/28/2018), Shortness of breath (11/26/2019), Sick sinus syndrome (Multi) (10/22/2023), Sinus bradycardia (10/22/2023), and Stage 3a chronic kidney disease (Multi) (11/25/2019).    Past Surgical History:  She has a past surgical history that includes Cardiac catheterization (N/A, 02/02/2024); Cardiac catheterization (N/A, 02/02/2024); Cardiac catheterization (N/A, 02/08/2024); Cardiac catheterization (N/A, 02/08/2024); and pacemaker placement (07/04/2019).      Social History:  She reports that she has never smoked. She has never been exposed to tobacco smoke. She has never used smokeless tobacco. She reports that she does not drink alcohol and does not use drugs.    Family History:  Family History   Problem Relation Name Age of Onset    No Known Problems Mother      No Known Problems Father          Allergies:  Iodinated contrast media    Inpatient  Medications:  Scheduled medications   Medication Dose Route Frequency    [START ON 5/11/2024] amiodarone  200 mg oral Daily    [Held by provider] apixaban  2.5 mg oral BID    atorvastatin  80 mg oral Nightly    [Held by provider] clopidogrel  75 mg oral Daily    [Held by provider] dilTIAZem  120 mg oral BID    levothyroxine  100 mcg oral Daily before breakfast    lidocaine  1 patch transdermal Daily    metoprolol tartrate  50 mg oral BID    pantoprazole  20 mg oral Daily before breakfast    polyethylene glycol  17 g oral Daily    sertraline  200 mg oral Daily     PRN medications   Medication    acetaminophen    Or    acetaminophen    Or    acetaminophen    benzocaine-menthol    guaiFENesin    melatonin    ondansetron     Continuous Medications   Medication Dose Last Rate    sodium chloride 0.9%  75 mL/hr 75 mL/hr (05/09/24 2039)     Outpatient Medications:  Current Outpatient Medications   Medication Instructions    acetaminophen/diphenhydramine (TYLENOL PM EXTRA STRENGTH ORAL) 1 tablet, oral, Nightly PRN    amiodarone (PACERONE) 200 mg, oral, 2 times daily    apixaban (ELIQUIS) 5 mg, oral, 2 times daily    ascorbic acid/collagen hydr (COLLAGEN PLUS VITAMIN C ORAL) 1 capsule, oral, Daily    atorvastatin (LIPITOR) 80 mg, oral, Nightly    cholecalciferol (Vitamin D-3) 50 MCG (2000 UT) tablet 1 tablet, oral, Daily    clopidogrel (PLAVIX) 75 mg, oral, Daily    dilTIAZem (Cardizem) 120 mg immediate release tablet 1 tablet, oral, 2 times daily    hydrOXYzine HCL (ATARAX) 10 mg, oral, Every 6 hours PRN    levothyroxine (SYNTHROID, LEVOXYL) 100 mcg, oral, Daily, 1 tablet in the morning on an empty stomach Orally Once a day for 30 day(s)    metoprolol tartrate (LOPRESSOR) 50 mg, oral, 2 times daily    mv-min/FA/vit K/lycop/lut/zeax (OCUVITE EYE PLUS MULTI ORAL) 1 tablet, oral, Daily    omeprazole (PRILOSEC) 20 mg, oral, Daily    oxygen (O2) gas therapy 1 each, inhalation, Every 12 hours    potassium chloride CR (Klor-Con) 10  mEq ER tablet 10 mEq, oral, 2 times daily, Do not crush, chew, or split.    sertraline (ZOLOFT) 200 mg, oral, Daily    sulfamethoxazole-trimethoprim (Bactrim DS) 800-160 mg tablet 1 tablet, oral, 2 times daily    torsemide (DEMADEX) 20 mg, oral, Daily       Physical Exam:  General: Patient is in no acute distress.  HEENT: atraumatic normocephalic.  Neck: is supple jugular venous pressure within normal limits no thyromegaly.  Cardiovascular regular rate and rhythm normal heart sounds no murmurs rubs or gallops.  Lungs: clear to auscultation bilaterally.  Abdomen: is soft nontender.  Extremities warm to touch no edema.  Neurologic examination: patient is awake alert oriented to person, place, date/time.  Psychiatric examination: patient has good insight denies feeling suicidal and depressed.  Pulses 2+ intact bilaterally     Assessment/Plan   #1 frequent falls.  Appears more to be mechanical falls.  Patient has been having weakness in her legs.  Recommend PT OT as well as consider placement for rehab.  We will check orthostatics.  Last 2D echo showed normal ejection fraction.  We will stop diltiazem heart rate is controlled.  She has hypertension.  Will monitor blood pressure remains started on hydrochlorothiazide 25 mg oral daily for blood pressure remains elevated.    2.  Paroxysmal atrial fibrillation on amiodarone metoprolol diltiazem.  Will stop diltiazem.  Continue metoprolol.  Decrease amiodarone to 200 mg oral daily.  If she remains tachycardic with metoprolol amiodarone only will increase metoprolol to 100 mg oral twice daily.  Has been having frequent falls okay to hold Eliquis for 48 hours restart after.  Consider Watchman device evaluation as an outpatient.    3.  Coronary artery disease status post PCI to RCA.  Recommend to continue Plavix 75 mg oral daily.  Had her stent back in February.  Recommend 6 to 12 months of clopidogrel and addition of Eliquis no aspirin.    4.  Hypertension as above.    5.   Hyperlipidemia continue statin.    6. Acute on chronic renal failure.  Patient is currently on IV fluids.  Etiology unclear.  Could be related to dehydration.  For now recommend hydration will reassess.  Her CK were slightly elevated but not in the range to cause rhabdomyolysis and renal failure      Thank you for allowing to persuade in her care      Peripheral IV 05/09/24 20 G Left;Proximal Forearm (Active)   Site Assessment Clean;Dry;Intact 05/09/24 1852   Dressing Type Transparent 05/09/24 1852   Line Status Blood return noted;Saline locked 05/09/24 1852   Dressing Status Clean;Dry 05/09/24 1852   Number of days: 1       Code Status:  DNR and No Intubation      Becky Vela MD

## 2024-05-11 ENCOUNTER — APPOINTMENT (OUTPATIENT)
Dept: HOME HEALTH SERVICES | Facility: HOME HEALTH | Age: 84
End: 2024-05-11
Payer: MEDICARE

## 2024-05-11 LAB
ANION GAP SERPL CALC-SCNC: 16 MMOL/L
BASOPHILS # BLD AUTO: 0.03 X10*3/UL (ref 0–0.1)
BASOPHILS NFR BLD AUTO: 0.5 %
BUN SERPL-MCNC: 18 MG/DL (ref 8–25)
CALCIUM SERPL-MCNC: 8.9 MG/DL (ref 8.5–10.4)
CHLORIDE SERPL-SCNC: 105 MMOL/L (ref 97–107)
CO2 SERPL-SCNC: 19 MMOL/L (ref 24–31)
CREAT SERPL-MCNC: 1.7 MG/DL (ref 0.4–1.6)
EGFRCR SERPLBLD CKD-EPI 2021: 29 ML/MIN/1.73M*2
EOSINOPHIL # BLD AUTO: 0.31 X10*3/UL (ref 0–0.4)
EOSINOPHIL NFR BLD AUTO: 4.9 %
ERYTHROCYTE [DISTWIDTH] IN BLOOD BY AUTOMATED COUNT: 15.7 % (ref 11.5–14.5)
GLUCOSE BLD MANUAL STRIP-MCNC: 125 MG/DL (ref 74–99)
GLUCOSE SERPL-MCNC: 121 MG/DL (ref 65–99)
HCT VFR BLD AUTO: 29.9 % (ref 36–46)
HGB BLD-MCNC: 9.4 G/DL (ref 12–16)
IMM GRANULOCYTES # BLD AUTO: 0.06 X10*3/UL (ref 0–0.5)
IMM GRANULOCYTES NFR BLD AUTO: 1 % (ref 0–0.9)
LYMPHOCYTES # BLD AUTO: 0.69 X10*3/UL (ref 0.8–3)
LYMPHOCYTES NFR BLD AUTO: 10.9 %
MCH RBC QN AUTO: 27.8 PG (ref 26–34)
MCHC RBC AUTO-ENTMCNC: 31.4 G/DL (ref 32–36)
MCV RBC AUTO: 89 FL (ref 80–100)
MONOCYTES # BLD AUTO: 0.61 X10*3/UL (ref 0.05–0.8)
MONOCYTES NFR BLD AUTO: 9.7 %
NEUTROPHILS # BLD AUTO: 4.61 X10*3/UL (ref 1.6–5.5)
NEUTROPHILS NFR BLD AUTO: 73 %
NRBC BLD-RTO: 0 /100 WBCS (ref 0–0)
PLATELET # BLD AUTO: 103 X10*3/UL (ref 150–450)
POTASSIUM SERPL-SCNC: 4.1 MMOL/L (ref 3.4–5.1)
RBC # BLD AUTO: 3.38 X10*6/UL (ref 4–5.2)
SODIUM SERPL-SCNC: 140 MMOL/L (ref 133–145)
WBC # BLD AUTO: 6.3 X10*3/UL (ref 4.4–11.3)

## 2024-05-11 PROCEDURE — 85025 COMPLETE CBC W/AUTO DIFF WBC: CPT | Performed by: STUDENT IN AN ORGANIZED HEALTH CARE EDUCATION/TRAINING PROGRAM

## 2024-05-11 PROCEDURE — 2500000005 HC RX 250 GENERAL PHARMACY W/O HCPCS: Performed by: INTERNAL MEDICINE

## 2024-05-11 PROCEDURE — 82374 ASSAY BLOOD CARBON DIOXIDE: CPT | Performed by: STUDENT IN AN ORGANIZED HEALTH CARE EDUCATION/TRAINING PROGRAM

## 2024-05-11 PROCEDURE — 82947 ASSAY GLUCOSE BLOOD QUANT: CPT

## 2024-05-11 PROCEDURE — 2500000006 HC RX 250 W HCPCS SELF ADMINISTERED DRUGS (ALT 637 FOR ALL PAYERS): Mod: MUE | Performed by: INTERNAL MEDICINE

## 2024-05-11 PROCEDURE — 1090000002 HH PPS REVENUE DEBIT

## 2024-05-11 PROCEDURE — 2500000001 HC RX 250 WO HCPCS SELF ADMINISTERED DRUGS (ALT 637 FOR MEDICARE OP): Performed by: INTERNAL MEDICINE

## 2024-05-11 PROCEDURE — 1090000001 HH PPS REVENUE CREDIT

## 2024-05-11 PROCEDURE — 1210000001 HC SEMI-PRIVATE ROOM DAILY

## 2024-05-11 PROCEDURE — 36415 COLL VENOUS BLD VENIPUNCTURE: CPT | Performed by: STUDENT IN AN ORGANIZED HEALTH CARE EDUCATION/TRAINING PROGRAM

## 2024-05-11 RX ADMIN — AMIODARONE HYDROCHLORIDE 200 MG: 200 TABLET ORAL at 08:39

## 2024-05-11 RX ADMIN — SERTRALINE 200 MG: 100 TABLET, FILM COATED ORAL at 08:39

## 2024-05-11 RX ADMIN — METOPROLOL TARTRATE 50 MG: 50 TABLET, FILM COATED ORAL at 20:45

## 2024-05-11 RX ADMIN — PANTOPRAZOLE SODIUM 20 MG: 20 TABLET, DELAYED RELEASE ORAL at 05:10

## 2024-05-11 RX ADMIN — CLOPIDOGREL BISULFATE 75 MG: 75 TABLET ORAL at 08:39

## 2024-05-11 RX ADMIN — LIDOCAINE 1 PATCH: 4 PATCH TOPICAL at 20:45

## 2024-05-11 RX ADMIN — LEVOTHYROXINE SODIUM 100 MCG: 0.1 TABLET ORAL at 05:10

## 2024-05-11 RX ADMIN — ATORVASTATIN CALCIUM 80 MG: 80 TABLET, FILM COATED ORAL at 20:45

## 2024-05-11 RX ADMIN — METOPROLOL TARTRATE 50 MG: 50 TABLET, FILM COATED ORAL at 08:39

## 2024-05-11 ASSESSMENT — COGNITIVE AND FUNCTIONAL STATUS - GENERAL
DRESSING REGULAR UPPER BODY CLOTHING: A LITTLE
PERSONAL GROOMING: A LITTLE
CLIMB 3 TO 5 STEPS WITH RAILING: A LITTLE
MOBILITY SCORE: 20
STANDING UP FROM CHAIR USING ARMS: A LITTLE
WALKING IN HOSPITAL ROOM: A LITTLE
TOILETING: A LITTLE
HELP NEEDED FOR BATHING: A LITTLE
DRESSING REGULAR LOWER BODY CLOTHING: A LITTLE
MOBILITY SCORE: 20
DAILY ACTIVITIY SCORE: 19
WALKING IN HOSPITAL ROOM: A LITTLE
HELP NEEDED FOR BATHING: A LITTLE
TOILETING: A LITTLE
DRESSING REGULAR LOWER BODY CLOTHING: A LITTLE
CLIMB 3 TO 5 STEPS WITH RAILING: A LITTLE
DRESSING REGULAR UPPER BODY CLOTHING: A LITTLE
MOVING TO AND FROM BED TO CHAIR: A LITTLE
STANDING UP FROM CHAIR USING ARMS: A LITTLE
DAILY ACTIVITIY SCORE: 19
MOVING TO AND FROM BED TO CHAIR: A LITTLE
PERSONAL GROOMING: A LITTLE

## 2024-05-11 ASSESSMENT — PAIN SCALES - GENERAL
PAINLEVEL_OUTOF10: 7
PAINLEVEL_OUTOF10: 4
PAINLEVEL_OUTOF10: 1
PAINLEVEL_OUTOF10: 0 - NO PAIN

## 2024-05-11 ASSESSMENT — PAIN DESCRIPTION - LOCATION: LOCATION: RIB CAGE

## 2024-05-11 ASSESSMENT — PAIN DESCRIPTION - ORIENTATION: ORIENTATION: LEFT

## 2024-05-11 NOTE — CARE PLAN
The patient's goals for the shift include no falls, no pain    The clinical goals for the shift include no falls,no pain    Over the shift, the patient did not make progress toward the following goals. Barriers to progression include weakness, frustrationb. Recommendations to address these barriers include encouragement/pt/ot.

## 2024-05-11 NOTE — PROGRESS NOTES
"Linette Doyle is a 83 y.o. female on day 2 of admission presenting with Closed fracture of multiple ribs of left side, initial encounter.    Subjective   Patient was seen yesterday for acute kidney injury superimposed on chronic kidney disease stage III patient was admitted with multiple falls with broken ribs on the left side as well as iliopsoas hematoma she is resting comfortably in bed less pain no nausea vomiting diarrhea shortness of breath no overnight events noted.          Objective     Physical Exam  Neck:      Vascular: No carotid bruit.   Cardiovascular:      Rate and Rhythm: Normal rate and regular rhythm.      Heart sounds: No murmur heard.     No friction rub. No gallop.   Pulmonary:      Breath sounds: No wheezing, rhonchi or rales.   Chest:      Chest wall: No tenderness.   Abdominal:      General: There is no distension.      Tenderness: There is no abdominal tenderness. There is no guarding or rebound.   Musculoskeletal:         General: No swelling or tenderness.      Cervical back: Neck supple.      Right lower leg: No edema.      Left lower leg: No edema.   Lymphadenopathy:      Cervical: No cervical adenopathy.         Last Recorded Vitals  Blood pressure 134/56, pulse 60, temperature 36.5 °C (97.7 °F), temperature source Oral, resp. rate 16, height 1.626 m (5' 4\"), weight 65 kg (143 lb 4.8 oz), SpO2 93%, unknown if currently breastfeeding.    Intake/Output last 3 Shifts:  I/O last 3 completed shifts:  In: 1407 (21.6 mL/kg) [P.O.:200; I.V.:207 (3.2 mL/kg); IV Piggyback:1000]  Out: - (0 mL/kg)   Weight: 65 kg     Current Facility-Administered Medications:     acetaminophen (Tylenol) tablet 650 mg, 650 mg, oral, q4h PRN **OR** acetaminophen (Tylenol) oral liquid 650 mg, 650 mg, oral, q4h PRN **OR** acetaminophen (Tylenol) suppository 650 mg, 650 mg, rectal, q4h PRN, Henry Ricci DO    amiodarone (Pacerone) tablet 200 mg, 200 mg, oral, Daily, Becky Vela MD, 200 mg at 05/11/24 0839    " [Held by provider] apixaban (Eliquis) tablet 2.5 mg, 2.5 mg, oral, BID, Henry Ricci DO    atorvastatin (Lipitor) tablet 80 mg, 80 mg, oral, Nightly, Henry Ricci DO, 80 mg at 05/10/24 2132    benzocaine-menthol (Cepastat Sore Throat) lozenge 1 lozenge, 1 lozenge, Mouth/Throat, q2h PRN, Henry Ricci DO    clopidogrel (Plavix) tablet 75 mg, 75 mg, oral, Daily, Becky Vela MD, 75 mg at 05/11/24 0839    guaiFENesin (Mucinex) 12 hr tablet 600 mg, 600 mg, oral, q12h PRN, Henry Ricci DO    levothyroxine (Synthroid, Levoxyl) tablet 100 mcg, 100 mcg, oral, Daily before breakfast, Henry Ricci DO, 100 mcg at 05/11/24 0510    lidocaine 4 % patch 1 patch, 1 patch, transdermal, Daily, Henry Ricci DO, 1 patch at 05/10/24 2132    melatonin tablet 5 mg, 5 mg, oral, Nightly PRN, Henry Ricci DO    metoprolol tartrate (Lopressor) tablet 50 mg, 50 mg, oral, BID, Henry Ricci DO, 50 mg at 05/11/24 0839    ondansetron (Zofran) injection 4 mg, 4 mg, intravenous, q6h PRN, Henry Ricci DO    pantoprazole (ProtoNix) EC tablet 20 mg, 20 mg, oral, Daily before breakfast, Henry Ricci DO, 20 mg at 05/11/24 0510    polyethylene glycol (Glycolax, Miralax) packet 17 g, 17 g, oral, Daily, Henry Ricci DO    sertraline (Zoloft) tablet 200 mg, 200 mg, oral, Daily, Henry Ricci DO, 200 mg at 05/11/24 0839    sodium chloride 0.9% infusion, 75 mL/hr, intravenous, Continuous, Henry Ricci DO, Last Rate: 75 mL/hr at 05/11/24 0404, 75 mL/hr at 05/11/24 0404   Relevant Results    Results for orders placed or performed during the hospital encounter of 05/09/24 (from the past 96 hour(s))   CBC and Auto Differential   Result Value Ref Range    WBC 9.2 4.4 - 11.3 x10*3/uL    nRBC 0.0 0.0 - 0.0 /100 WBCs    RBC 4.09 4.00 - 5.20 x10*6/uL    Hemoglobin 11.2 (L) 12.0 - 16.0 g/dL    Hematocrit 35.8 (L) 36.0 - 46.0 %    MCV 88 80 - 100 fL    MCH 27.4 26.0 - 34.0 pg     MCHC 31.3 (L) 32.0 - 36.0 g/dL    RDW 15.6 (H) 11.5 - 14.5 %    Platelets 146 (L) 150 - 450 x10*3/uL    Neutrophils % 80.7 40.0 - 80.0 %    Immature Granulocytes %, Automated 0.4 0.0 - 0.9 %    Lymphocytes % 7.9 13.0 - 44.0 %    Monocytes % 7.2 2.0 - 10.0 %    Eosinophils % 3.1 0.0 - 6.0 %    Basophils % 0.7 0.0 - 2.0 %    Neutrophils Absolute 7.45 (H) 1.60 - 5.50 x10*3/uL    Immature Granulocytes Absolute, Automated 0.04 0.00 - 0.50 x10*3/uL    Lymphocytes Absolute 0.73 (L) 0.80 - 3.00 x10*3/uL    Monocytes Absolute 0.66 0.05 - 0.80 x10*3/uL    Eosinophils Absolute 0.29 0.00 - 0.40 x10*3/uL    Basophils Absolute 0.06 0.00 - 0.10 x10*3/uL   Comprehensive metabolic panel   Result Value Ref Range    Glucose 114 (H) 65 - 99 mg/dL    Sodium 137 133 - 145 mmol/L    Potassium 4.4 3.4 - 5.1 mmol/L    Chloride 95 (L) 97 - 107 mmol/L    Bicarbonate 25 24 - 31 mmol/L    Urea Nitrogen 31 (H) 8 - 25 mg/dL    Creatinine 2.60 (H) 0.40 - 1.60 mg/dL    eGFR 18 (L) >60 mL/min/1.73m*2    Calcium 9.9 8.5 - 10.4 mg/dL    Albumin 4.9 3.5 - 5.0 g/dL    Alkaline Phosphatase 103 35 - 125 U/L    Total Protein 7.9 5.9 - 7.9 g/dL    AST 41 (H) 5 - 40 U/L    Bilirubin, Total 0.4 0.1 - 1.2 mg/dL    ALT 29 5 - 40 U/L    Anion Gap 17 <=19 mmol/L   Serial Troponin, Initial ([facility])   Result Value Ref Range    Troponin T, High Sensitivity 39 () <=14 ng/L   Serial Troponin, 2 Hour ([facility])   Result Value Ref Range    Troponin T, High Sensitivity 33 () <=14 ng/L   Serial Troponin, 6 Hour ([facility])   Result Value Ref Range    Troponin T, High Sensitivity 40 (HH) <=14 ng/L   CBC and Auto Differential   Result Value Ref Range    WBC 7.4 4.4 - 11.3 x10*3/uL    nRBC 0.0 0.0 - 0.0 /100 WBCs    RBC 3.54 (L) 4.00 - 5.20 x10*6/uL    Hemoglobin 9.7 (L) 12.0 - 16.0 g/dL    Hematocrit 31.0 (L) 36.0 - 46.0 %    MCV 88 80 - 100 fL    MCH 27.4 26.0 - 34.0 pg    MCHC 31.3 (L) 32.0 - 36.0 g/dL    RDW 15.4 (H) 11.5 - 14.5 %    Platelets 124 (L) 150 - 450 x10*3/uL     Neutrophils % 71.1 40.0 - 80.0 %    Immature Granulocytes %, Automated 0.5 0.0 - 0.9 %    Lymphocytes % 14.7 13.0 - 44.0 %    Monocytes % 9.4 2.0 - 10.0 %    Eosinophils % 3.8 0.0 - 6.0 %    Basophils % 0.5 0.0 - 2.0 %    Neutrophils Absolute 5.22 1.60 - 5.50 x10*3/uL    Immature Granulocytes Absolute, Automated 0.04 0.00 - 0.50 x10*3/uL    Lymphocytes Absolute 1.08 0.80 - 3.00 x10*3/uL    Monocytes Absolute 0.69 0.05 - 0.80 x10*3/uL    Eosinophils Absolute 0.28 0.00 - 0.40 x10*3/uL    Basophils Absolute 0.04 0.00 - 0.10 x10*3/uL   Renal function panel   Result Value Ref Range    Glucose 92 65 - 99 mg/dL    Sodium 137 133 - 145 mmol/L    Potassium 4.2 3.4 - 5.1 mmol/L    Chloride 99 97 - 107 mmol/L    Bicarbonate 23 (L) 24 - 31 mmol/L    Urea Nitrogen 26 (H) 8 - 25 mg/dL    Creatinine 2.20 (H) 0.40 - 1.60 mg/dL    eGFR 21 (L) >60 mL/min/1.73m*2    Calcium 9.1 8.5 - 10.4 mg/dL    Phosphorus 3.6 2.5 - 4.5 mg/dL    Albumin 4.0 3.5 - 5.0 g/dL    Anion Gap 15 <=19 mmol/L   Urinalysis with Reflex Microscopic   Result Value Ref Range    Color, Urine Light-Yellow Light-Yellow, Yellow, Dark-Yellow    Appearance, Urine Clear Clear    Specific Gravity, Urine 1.019 1.005 - 1.035    pH, Urine 6.0 5.0, 5.5, 6.0, 6.5, 7.0, 7.5, 8.0    Protein, Urine NEGATIVE NEGATIVE, 10 (TRACE), 20 (TRACE) mg/dL    Glucose, Urine Normal Normal mg/dL    Blood, Urine NEGATIVE NEGATIVE    Ketones, Urine NEGATIVE NEGATIVE mg/dL    Bilirubin, Urine NEGATIVE NEGATIVE    Urobilinogen, Urine Normal Normal mg/dL    Nitrite, Urine NEGATIVE NEGATIVE    Leukocyte Esterase, Urine NEGATIVE NEGATIVE   Basic metabolic panel   Result Value Ref Range    Glucose 121 (H) 65 - 99 mg/dL    Sodium 140 133 - 145 mmol/L    Potassium 4.1 3.4 - 5.1 mmol/L    Chloride 105 97 - 107 mmol/L    Bicarbonate 19 (L) 24 - 31 mmol/L    Urea Nitrogen 18 8 - 25 mg/dL    Creatinine 1.70 (H) 0.40 - 1.60 mg/dL    eGFR 29 (L) >60 mL/min/1.73m*2    Calcium 8.9 8.5 - 10.4 mg/dL    Anion Gap  16 <=19 mmol/L   CBC and Auto Differential   Result Value Ref Range    WBC 6.3 4.4 - 11.3 x10*3/uL    nRBC 0.0 0.0 - 0.0 /100 WBCs    RBC 3.38 (L) 4.00 - 5.20 x10*6/uL    Hemoglobin 9.4 (L) 12.0 - 16.0 g/dL    Hematocrit 29.9 (L) 36.0 - 46.0 %    MCV 89 80 - 100 fL    MCH 27.8 26.0 - 34.0 pg    MCHC 31.4 (L) 32.0 - 36.0 g/dL    RDW 15.7 (H) 11.5 - 14.5 %    Platelets 103 (L) 150 - 450 x10*3/uL    Neutrophils % 73.0 40.0 - 80.0 %    Immature Granulocytes %, Automated 1.0 (H) 0.0 - 0.9 %    Lymphocytes % 10.9 13.0 - 44.0 %    Monocytes % 9.7 2.0 - 10.0 %    Eosinophils % 4.9 0.0 - 6.0 %    Basophils % 0.5 0.0 - 2.0 %    Neutrophils Absolute 4.61 1.60 - 5.50 x10*3/uL    Immature Granulocytes Absolute, Automated 0.06 0.00 - 0.50 x10*3/uL    Lymphocytes Absolute 0.69 (L) 0.80 - 3.00 x10*3/uL    Monocytes Absolute 0.61 0.05 - 0.80 x10*3/uL    Eosinophils Absolute 0.31 0.00 - 0.40 x10*3/uL    Basophils Absolute 0.03 0.00 - 0.10 x10*3/uL       Assessment/Plan     Acute kidney injury superimposed on chronic kidney disease stage III secondary to diuretics dehydration and use of Bactrim DS patient's creatinine started trending down plan continue to avoid nephrotoxic medications discontinue IV fluids and encourage oral intake and continue to monitor renal function very closely no indication for dialysis therapy  Chronic kidney disease stage IIIb  Multiple falls  Left iliopsoas hematoma  Left ribs fracture  Hypertension  Hyperlipidemia           Ronnie Giang MD

## 2024-05-11 NOTE — CARE PLAN
The patient's goals for the shift include no falls, no pain    The clinical goals for the shift include no falls,no pain    Over the shift, the patient did not make progress toward the following goals. Barriers to progression include pt will have adequate meal intake. Recommendations to address these barriers include assist pt to order food that she likes  .

## 2024-05-11 NOTE — PROGRESS NOTES
Linette Doyle is a 83 y.o. female on day 2 of admission presenting with Closed fracture of multiple ribs of left side, initial encounter.      Subjective   Still having some pain on the left side from the subacute rib fractures. Feels frustrated about the weakness. Tolerating PO.         Objective     Last Recorded Vitals  /56 (BP Location: Right arm, Patient Position: Sitting)   Pulse 60   Temp 36.5 °C (97.7 °F) (Oral)   Resp 16   Wt 65 kg (143 lb 4.8 oz)   SpO2 93%   Intake/Output last 3 Shifts:    Intake/Output Summary (Last 24 hours) at 5/11/2024 1233  Last data filed at 5/11/2024 0844  Gross per 24 hour   Intake 647 ml   Output --   Net 647 ml       Admission Weight  Weight: 63.5 kg (139 lb 15.9 oz) (05/09/24 1836)    Daily Weight  05/11/24 : 65 kg (143 lb 4.8 oz)    Image Results  Electrocardiogram, 12-lead PRN ACS symptoms  AV dual-paced rhythm with prolonged AV conduction  Abnormal ECG  When compared with ECG of 03-MAY-2024 00:01,  No significant change was found  Confirmed by Darrius Ibrahim ([id number]) on 5/10/2024 3:34:47 PM      Physical Exam  Constitutional:       General: She is not in acute distress.     Appearance: She is not toxic-appearing.   HENT:      Head: Normocephalic.      Mouth/Throat:      Pharynx: Oropharynx is clear.   Eyes:      General: No scleral icterus.  Cardiovascular:      Rate and Rhythm: Normal rate.   Pulmonary:      Effort: No respiratory distress.      Breath sounds: No wheezing.   Abdominal:      General: There is no distension.      Palpations: Abdomen is soft.   Musculoskeletal:      Right lower leg: No edema.      Left lower leg: No edema.   Neurological:      Mental Status: She is alert.   Psychiatric:         Behavior: Behavior normal.         Relevant Results               Assessment/Plan        Principal Problem:    Closed fracture of multiple ribs of left side, initial encounter    Mechanical fall/frequent falls  Iliopsoas hematoma  Subacute left rib  fractures  Trauma surgery following  Check orthostats  Fall precautions  Hold eliquis for 48hrs - hgb stable, can likely restart tomorrow  PT/OT - recommending moderate intensity rehab  Patient agreeable to SNF placement     Paroxsymal atrial fibrillation  Cardiology following  Diltiazem discontinued  Amiodarone decreased to 200mg daily  Continue metoprolol  Hold eliquis for 48hrs  Consider watchman device evaluation as outpatient per cardiology     GARY on CKD stage 3, improving  Baseline Cr ~1.4  Nephrology consulted  May be due to bactrim use vs dehydration  Improving with IVF     Recent UTI  Treated with bactrim during last hospitalization  UA ordered - noninfectious  No need for additional antibiotics     CAD s/p PCI  Continue plavix per cardiology     Dispo: anticipate discharge to SNF, pending facility acceptance and precert              Karol Reyna MD

## 2024-05-12 LAB
ALBUMIN SERPL-MCNC: 3.8 G/DL (ref 3.5–5)
ANION GAP SERPL CALC-SCNC: 10 MMOL/L
BUN SERPL-MCNC: 16 MG/DL (ref 8–25)
CALCIUM SERPL-MCNC: 9.4 MG/DL (ref 8.5–10.4)
CHLORIDE SERPL-SCNC: 105 MMOL/L (ref 97–107)
CO2 SERPL-SCNC: 23 MMOL/L (ref 24–31)
CREAT SERPL-MCNC: 1.6 MG/DL (ref 0.4–1.6)
EGFRCR SERPLBLD CKD-EPI 2021: 31 ML/MIN/1.73M*2
GLUCOSE SERPL-MCNC: 97 MG/DL (ref 65–99)
PHOSPHATE SERPL-MCNC: 2.9 MG/DL (ref 2.5–4.5)
POTASSIUM SERPL-SCNC: 4.4 MMOL/L (ref 3.4–5.1)
SODIUM SERPL-SCNC: 138 MMOL/L (ref 133–145)

## 2024-05-12 PROCEDURE — 2500000006 HC RX 250 W HCPCS SELF ADMINISTERED DRUGS (ALT 637 FOR ALL PAYERS): Performed by: INTERNAL MEDICINE

## 2024-05-12 PROCEDURE — 2500000001 HC RX 250 WO HCPCS SELF ADMINISTERED DRUGS (ALT 637 FOR MEDICARE OP): Performed by: STUDENT IN AN ORGANIZED HEALTH CARE EDUCATION/TRAINING PROGRAM

## 2024-05-12 PROCEDURE — 2500000001 HC RX 250 WO HCPCS SELF ADMINISTERED DRUGS (ALT 637 FOR MEDICARE OP): Performed by: INTERNAL MEDICINE

## 2024-05-12 PROCEDURE — 1210000001 HC SEMI-PRIVATE ROOM DAILY

## 2024-05-12 PROCEDURE — 36415 COLL VENOUS BLD VENIPUNCTURE: CPT | Performed by: INTERNAL MEDICINE

## 2024-05-12 PROCEDURE — 1090000002 HH PPS REVENUE DEBIT

## 2024-05-12 PROCEDURE — 2500000006 HC RX 250 W HCPCS SELF ADMINISTERED DRUGS (ALT 637 FOR ALL PAYERS): Mod: MUE | Performed by: INTERNAL MEDICINE

## 2024-05-12 PROCEDURE — 99232 SBSQ HOSP IP/OBS MODERATE 35: CPT | Performed by: INTERNAL MEDICINE

## 2024-05-12 PROCEDURE — 2500000005 HC RX 250 GENERAL PHARMACY W/O HCPCS: Performed by: INTERNAL MEDICINE

## 2024-05-12 PROCEDURE — 1090000001 HH PPS REVENUE CREDIT

## 2024-05-12 PROCEDURE — 84100 ASSAY OF PHOSPHORUS: CPT | Performed by: INTERNAL MEDICINE

## 2024-05-12 RX ORDER — CITALOPRAM 10 MG/1
10 TABLET ORAL DAILY
Status: DISCONTINUED | OUTPATIENT
Start: 2024-05-13 | End: 2024-05-15 | Stop reason: HOSPADM

## 2024-05-12 RX ADMIN — SERTRALINE 200 MG: 100 TABLET, FILM COATED ORAL at 08:52

## 2024-05-12 RX ADMIN — PANTOPRAZOLE SODIUM 20 MG: 20 TABLET, DELAYED RELEASE ORAL at 05:33

## 2024-05-12 RX ADMIN — LIDOCAINE 1 PATCH: 4 PATCH TOPICAL at 21:14

## 2024-05-12 RX ADMIN — METOPROLOL TARTRATE 50 MG: 50 TABLET, FILM COATED ORAL at 08:53

## 2024-05-12 RX ADMIN — ACETAMINOPHEN 650 MG: 325 TABLET ORAL at 09:54

## 2024-05-12 RX ADMIN — LEVOTHYROXINE SODIUM 100 MCG: 0.1 TABLET ORAL at 05:33

## 2024-05-12 RX ADMIN — ATORVASTATIN CALCIUM 80 MG: 80 TABLET, FILM COATED ORAL at 21:14

## 2024-05-12 RX ADMIN — APIXABAN 2.5 MG: 2.5 TABLET, FILM COATED ORAL at 21:14

## 2024-05-12 RX ADMIN — CLOPIDOGREL BISULFATE 75 MG: 75 TABLET ORAL at 08:53

## 2024-05-12 RX ADMIN — METOPROLOL TARTRATE 50 MG: 50 TABLET, FILM COATED ORAL at 21:14

## 2024-05-12 RX ADMIN — AMIODARONE HYDROCHLORIDE 200 MG: 200 TABLET ORAL at 08:53

## 2024-05-12 RX ADMIN — Medication 5 MG: at 21:14

## 2024-05-12 ASSESSMENT — PAIN SCALES - GENERAL
PAINLEVEL_OUTOF10: 0 - NO PAIN
PAINLEVEL_OUTOF10: 3
PAINLEVEL_OUTOF10: 6
PAINLEVEL_OUTOF10: 0 - NO PAIN

## 2024-05-12 ASSESSMENT — PAIN - FUNCTIONAL ASSESSMENT
PAIN_FUNCTIONAL_ASSESSMENT: 0-10

## 2024-05-12 ASSESSMENT — COGNITIVE AND FUNCTIONAL STATUS - GENERAL
MOBILITY SCORE: 20
CLIMB 3 TO 5 STEPS WITH RAILING: A LITTLE
STANDING UP FROM CHAIR USING ARMS: A LITTLE
HELP NEEDED FOR BATHING: A LITTLE
DAILY ACTIVITIY SCORE: 19
MOVING TO AND FROM BED TO CHAIR: A LITTLE
TOILETING: A LITTLE
HELP NEEDED FOR BATHING: A LITTLE
DRESSING REGULAR UPPER BODY CLOTHING: A LITTLE
MOVING TO AND FROM BED TO CHAIR: A LITTLE
MOBILITY SCORE: 20
PERSONAL GROOMING: A LITTLE
STANDING UP FROM CHAIR USING ARMS: A LITTLE
PERSONAL GROOMING: A LITTLE
DRESSING REGULAR LOWER BODY CLOTHING: A LITTLE
DRESSING REGULAR LOWER BODY CLOTHING: A LITTLE
DRESSING REGULAR UPPER BODY CLOTHING: A LITTLE
DAILY ACTIVITIY SCORE: 19
WALKING IN HOSPITAL ROOM: A LITTLE
CLIMB 3 TO 5 STEPS WITH RAILING: A LITTLE
TOILETING: A LITTLE
WALKING IN HOSPITAL ROOM: A LITTLE

## 2024-05-12 ASSESSMENT — PAIN DESCRIPTION - LOCATION: LOCATION: RIB CAGE

## 2024-05-12 ASSESSMENT — PAIN DESCRIPTION - ORIENTATION: ORIENTATION: LEFT

## 2024-05-12 NOTE — PROGRESS NOTES
"Linette Doyle is a 83 y.o. female on day 3 of admission presenting with Closed fracture of multiple ribs of left side, initial encounter.    Subjective   Patient was seen yesterday for acute kidney injury superimposed on chronic kidney disease stage III patient was admitted with multiple falls with broken ribs on the left side as well as iliopsoas hematoma overall condition is stable without any new complaints          Objective     Physical Exam  Neck:      Vascular: No carotid bruit.   Cardiovascular:      Rate and Rhythm: Normal rate and regular rhythm.      Heart sounds: No murmur heard.     No friction rub. No gallop.   Pulmonary:      Breath sounds: No wheezing, rhonchi or rales.   Chest:      Chest wall: No tenderness.   Abdominal:      General: There is no distension.      Tenderness: There is no abdominal tenderness. There is no guarding or rebound.   Musculoskeletal:         General: No swelling or tenderness.      Cervical back: Neck supple.      Right lower leg: No edema.      Left lower leg: No edema.   Lymphadenopathy:      Cervical: No cervical adenopathy.       Last Recorded Vitals  Blood pressure 150/62, pulse 59, temperature 36.7 °C (98.1 °F), temperature source Oral, resp. rate 16, height 1.626 m (5' 4\"), weight 65 kg (143 lb 4.8 oz), SpO2 91%, unknown if currently breastfeeding.    Intake/Output last 3 Shifts:  I/O last 3 completed shifts:  In: 647 (10 mL/kg) [P.O.:440; I.V.:207 (3.2 mL/kg)]  Out: - (0 mL/kg)   Weight: 65 kg     Current Facility-Administered Medications:   •  acetaminophen (Tylenol) tablet 650 mg, 650 mg, oral, q4h PRN, 650 mg at 05/12/24 0954 **OR** acetaminophen (Tylenol) oral liquid 650 mg, 650 mg, oral, q4h PRN **OR** acetaminophen (Tylenol) suppository 650 mg, 650 mg, rectal, q4h PRN, Henry Ricci DO  •  amiodarone (Pacerone) tablet 200 mg, 200 mg, oral, Daily, Becky Vela MD, 200 mg at 05/12/24 0853  •  [Held by provider] apixaban (Eliquis) tablet 2.5 mg, 2.5 mg, " oral, BID, Henry Ricci DO  •  atorvastatin (Lipitor) tablet 80 mg, 80 mg, oral, Nightly, Henry Ricci DO, 80 mg at 05/11/24 2045  •  benzocaine-menthol (Cepastat Sore Throat) lozenge 1 lozenge, 1 lozenge, Mouth/Throat, q2h PRN, Henry Ricci DO  •  [START ON 5/13/2024] citalopram (CeleXA) tablet 10 mg, 10 mg, oral, Daily, Darrius Ibrahim MD  •  clopidogrel (Plavix) tablet 75 mg, 75 mg, oral, Daily, Becky Vela MD, 75 mg at 05/12/24 0853  •  guaiFENesin (Mucinex) 12 hr tablet 600 mg, 600 mg, oral, q12h PRN, Henry Ricci DO  •  levothyroxine (Synthroid, Levoxyl) tablet 100 mcg, 100 mcg, oral, Daily before breakfast, Henry Ricci DO, 100 mcg at 05/12/24 0533  •  lidocaine 4 % patch 1 patch, 1 patch, transdermal, Daily, Henry Ricci DO, 1 patch at 05/11/24 2045  •  melatonin tablet 5 mg, 5 mg, oral, Nightly PRN, Henry Ricci DO  •  metoprolol tartrate (Lopressor) tablet 50 mg, 50 mg, oral, BID, Henry Ricci DO, 50 mg at 05/12/24 0853  •  ondansetron (Zofran) injection 4 mg, 4 mg, intravenous, q6h PRN, Henry Ricci DO  •  pantoprazole (ProtoNix) EC tablet 20 mg, 20 mg, oral, Daily before breakfast, Henry Ricci DO, 20 mg at 05/12/24 0533  •  polyethylene glycol (Glycolax, Miralax) packet 17 g, 17 g, oral, Daily, Henry Ricci DO   Relevant Results    Results for orders placed or performed during the hospital encounter of 05/09/24 (from the past 96 hour(s))   CBC and Auto Differential   Result Value Ref Range    WBC 9.2 4.4 - 11.3 x10*3/uL    nRBC 0.0 0.0 - 0.0 /100 WBCs    RBC 4.09 4.00 - 5.20 x10*6/uL    Hemoglobin 11.2 (L) 12.0 - 16.0 g/dL    Hematocrit 35.8 (L) 36.0 - 46.0 %    MCV 88 80 - 100 fL    MCH 27.4 26.0 - 34.0 pg    MCHC 31.3 (L) 32.0 - 36.0 g/dL    RDW 15.6 (H) 11.5 - 14.5 %    Platelets 146 (L) 150 - 450 x10*3/uL    Neutrophils % 80.7 40.0 - 80.0 %    Immature Granulocytes %, Automated 0.4 0.0 - 0.9 %    Lymphocytes % 7.9 13.0  - 44.0 %    Monocytes % 7.2 2.0 - 10.0 %    Eosinophils % 3.1 0.0 - 6.0 %    Basophils % 0.7 0.0 - 2.0 %    Neutrophils Absolute 7.45 (H) 1.60 - 5.50 x10*3/uL    Immature Granulocytes Absolute, Automated 0.04 0.00 - 0.50 x10*3/uL    Lymphocytes Absolute 0.73 (L) 0.80 - 3.00 x10*3/uL    Monocytes Absolute 0.66 0.05 - 0.80 x10*3/uL    Eosinophils Absolute 0.29 0.00 - 0.40 x10*3/uL    Basophils Absolute 0.06 0.00 - 0.10 x10*3/uL   Comprehensive metabolic panel   Result Value Ref Range    Glucose 114 (H) 65 - 99 mg/dL    Sodium 137 133 - 145 mmol/L    Potassium 4.4 3.4 - 5.1 mmol/L    Chloride 95 (L) 97 - 107 mmol/L    Bicarbonate 25 24 - 31 mmol/L    Urea Nitrogen 31 (H) 8 - 25 mg/dL    Creatinine 2.60 (H) 0.40 - 1.60 mg/dL    eGFR 18 (L) >60 mL/min/1.73m*2    Calcium 9.9 8.5 - 10.4 mg/dL    Albumin 4.9 3.5 - 5.0 g/dL    Alkaline Phosphatase 103 35 - 125 U/L    Total Protein 7.9 5.9 - 7.9 g/dL    AST 41 (H) 5 - 40 U/L    Bilirubin, Total 0.4 0.1 - 1.2 mg/dL    ALT 29 5 - 40 U/L    Anion Gap 17 <=19 mmol/L   Serial Troponin, Initial ([facility])   Result Value Ref Range    Troponin T, High Sensitivity 39 (HH) <=14 ng/L   Serial Troponin, 2 Hour ([facility])   Result Value Ref Range    Troponin T, High Sensitivity 33 (HH) <=14 ng/L   Serial Troponin, 6 Hour ([facility])   Result Value Ref Range    Troponin T, High Sensitivity 40 (HH) <=14 ng/L   CBC and Auto Differential   Result Value Ref Range    WBC 7.4 4.4 - 11.3 x10*3/uL    nRBC 0.0 0.0 - 0.0 /100 WBCs    RBC 3.54 (L) 4.00 - 5.20 x10*6/uL    Hemoglobin 9.7 (L) 12.0 - 16.0 g/dL    Hematocrit 31.0 (L) 36.0 - 46.0 %    MCV 88 80 - 100 fL    MCH 27.4 26.0 - 34.0 pg    MCHC 31.3 (L) 32.0 - 36.0 g/dL    RDW 15.4 (H) 11.5 - 14.5 %    Platelets 124 (L) 150 - 450 x10*3/uL    Neutrophils % 71.1 40.0 - 80.0 %    Immature Granulocytes %, Automated 0.5 0.0 - 0.9 %    Lymphocytes % 14.7 13.0 - 44.0 %    Monocytes % 9.4 2.0 - 10.0 %    Eosinophils % 3.8 0.0 - 6.0 %    Basophils % 0.5 0.0 - 2.0 %     Neutrophils Absolute 5.22 1.60 - 5.50 x10*3/uL    Immature Granulocytes Absolute, Automated 0.04 0.00 - 0.50 x10*3/uL    Lymphocytes Absolute 1.08 0.80 - 3.00 x10*3/uL    Monocytes Absolute 0.69 0.05 - 0.80 x10*3/uL    Eosinophils Absolute 0.28 0.00 - 0.40 x10*3/uL    Basophils Absolute 0.04 0.00 - 0.10 x10*3/uL   Renal function panel   Result Value Ref Range    Glucose 92 65 - 99 mg/dL    Sodium 137 133 - 145 mmol/L    Potassium 4.2 3.4 - 5.1 mmol/L    Chloride 99 97 - 107 mmol/L    Bicarbonate 23 (L) 24 - 31 mmol/L    Urea Nitrogen 26 (H) 8 - 25 mg/dL    Creatinine 2.20 (H) 0.40 - 1.60 mg/dL    eGFR 21 (L) >60 mL/min/1.73m*2    Calcium 9.1 8.5 - 10.4 mg/dL    Phosphorus 3.6 2.5 - 4.5 mg/dL    Albumin 4.0 3.5 - 5.0 g/dL    Anion Gap 15 <=19 mmol/L   Urinalysis with Reflex Microscopic   Result Value Ref Range    Color, Urine Light-Yellow Light-Yellow, Yellow, Dark-Yellow    Appearance, Urine Clear Clear    Specific Gravity, Urine 1.019 1.005 - 1.035    pH, Urine 6.0 5.0, 5.5, 6.0, 6.5, 7.0, 7.5, 8.0    Protein, Urine NEGATIVE NEGATIVE, 10 (TRACE), 20 (TRACE) mg/dL    Glucose, Urine Normal Normal mg/dL    Blood, Urine NEGATIVE NEGATIVE    Ketones, Urine NEGATIVE NEGATIVE mg/dL    Bilirubin, Urine NEGATIVE NEGATIVE    Urobilinogen, Urine Normal Normal mg/dL    Nitrite, Urine NEGATIVE NEGATIVE    Leukocyte Esterase, Urine NEGATIVE NEGATIVE   Basic metabolic panel   Result Value Ref Range    Glucose 121 (H) 65 - 99 mg/dL    Sodium 140 133 - 145 mmol/L    Potassium 4.1 3.4 - 5.1 mmol/L    Chloride 105 97 - 107 mmol/L    Bicarbonate 19 (L) 24 - 31 mmol/L    Urea Nitrogen 18 8 - 25 mg/dL    Creatinine 1.70 (H) 0.40 - 1.60 mg/dL    eGFR 29 (L) >60 mL/min/1.73m*2    Calcium 8.9 8.5 - 10.4 mg/dL    Anion Gap 16 <=19 mmol/L   CBC and Auto Differential   Result Value Ref Range    WBC 6.3 4.4 - 11.3 x10*3/uL    nRBC 0.0 0.0 - 0.0 /100 WBCs    RBC 3.38 (L) 4.00 - 5.20 x10*6/uL    Hemoglobin 9.4 (L) 12.0 - 16.0 g/dL    Hematocrit  29.9 (L) 36.0 - 46.0 %    MCV 89 80 - 100 fL    MCH 27.8 26.0 - 34.0 pg    MCHC 31.4 (L) 32.0 - 36.0 g/dL    RDW 15.7 (H) 11.5 - 14.5 %    Platelets 103 (L) 150 - 450 x10*3/uL    Neutrophils % 73.0 40.0 - 80.0 %    Immature Granulocytes %, Automated 1.0 (H) 0.0 - 0.9 %    Lymphocytes % 10.9 13.0 - 44.0 %    Monocytes % 9.7 2.0 - 10.0 %    Eosinophils % 4.9 0.0 - 6.0 %    Basophils % 0.5 0.0 - 2.0 %    Neutrophils Absolute 4.61 1.60 - 5.50 x10*3/uL    Immature Granulocytes Absolute, Automated 0.06 0.00 - 0.50 x10*3/uL    Lymphocytes Absolute 0.69 (L) 0.80 - 3.00 x10*3/uL    Monocytes Absolute 0.61 0.05 - 0.80 x10*3/uL    Eosinophils Absolute 0.31 0.00 - 0.40 x10*3/uL    Basophils Absolute 0.03 0.00 - 0.10 x10*3/uL   POCT GLUCOSE   Result Value Ref Range    POCT Glucose 125 (H) 74 - 99 mg/dL   Renal Function Panel   Result Value Ref Range    Glucose 97 65 - 99 mg/dL    Sodium 138 133 - 145 mmol/L    Potassium 4.4 3.4 - 5.1 mmol/L    Chloride 105 97 - 107 mmol/L    Bicarbonate 23 (L) 24 - 31 mmol/L    Urea Nitrogen 16 8 - 25 mg/dL    Creatinine 1.60 0.40 - 1.60 mg/dL    eGFR 31 (L) >60 mL/min/1.73m*2    Calcium 9.4 8.5 - 10.4 mg/dL    Phosphorus 2.9 2.5 - 4.5 mg/dL    Albumin 3.8 3.5 - 5.0 g/dL    Anion Gap 10 <=19 mmol/L       Assessment/Plan     Acute kidney injury superimposed on chronic kidney disease stage III it is resolved creatinine is back to baseline need to monitor  Chronic kidney disease stage IIIb creatinine back to baseline  Multiple falls  Left iliopsoas hematoma  Left ribs fracture  Hypertension  Hyperlipidemia           Ronnie Giang MD

## 2024-05-12 NOTE — CARE PLAN
Problem: ADLs  Goal: Pt will complete ADL tasks at mod I with use of AE prn   Outcome: Progressing     Problem: Instrumental Activities of Daily Living  Goal: Pt will perform simple IADLs/retrieval of items at mod I.    Outcome: Progressing     Problem: Nutrition  Goal: Less than 5 days NPO/clear liquids  Outcome: Progressing  Goal: Oral intake greater than 50%  Outcome: Progressing  Goal: Oral intake greater 75%  Outcome: Progressing  Goal: Consume prescribed supplement  Outcome: Progressing  Goal: Adequate PO fluid intake  Outcome: Progressing  Goal: Nutrition support goals are met within 48 hrs  Outcome: Progressing  Goal: Nutrition support is meeting 75% of nutrient needs  Outcome: Progressing  Goal: Tube feed tolerance  Outcome: Progressing  Goal: BG  mg/dL  Outcome: Progressing  Goal: Lab values WNL  Outcome: Progressing  Goal: Electrolytes WNL  Outcome: Progressing  Goal: Promote healing  Outcome: Progressing  Goal: Maintain stable weight  Outcome: Progressing  Goal: Reduce weight from edema/fluid  Outcome: Progressing  Goal: Gradual weight gain  Outcome: Progressing  Goal: Improve ostomy output  Outcome: Progressing     Problem: Discharge Planning  Goal: Discharge to home or other facility with appropriate resources  Outcome: Progressing     Problem: Chronic Conditions and Co-morbidities  Goal: Patient's chronic conditions and co-morbidity symptoms are monitored and maintained or improved  Outcome: Progressing     Problem: Skin  Goal: Decreased wound size/increased tissue granulation at next dressing change  Outcome: Progressing  Goal: Participates in plan/prevention/treatment measures  Outcome: Progressing  Goal: Prevent/manage excess moisture  Outcome: Progressing  Goal: Prevent/minimize sheer/friction injuries  Outcome: Progressing  Goal: Promote/optimize nutrition  Outcome: Progressing  Goal: Promote skin healing  Outcome: Progressing     Problem: Pain  Goal: Takes deep breaths with improved pain  control throughout the shift  Outcome: Progressing  Goal: Turns in bed with improved pain control throughout the shift  Outcome: Progressing  Goal: Walks with improved pain control throughout the shift  Outcome: Progressing  Goal: Performs ADL's with improved pain control throughout shift  Outcome: Progressing  Goal: Participates in PT with improved pain control throughout the shift  Outcome: Progressing  Goal: Free from opioid side effects throughout the shift  Outcome: Progressing  Goal: Free from acute confusion related to pain meds throughout the shift  Outcome: Progressing   The patient's goals for the shift include no falls, no pain    The clinical goals for the shift include free from falls/injuries; manage pain    Over the shift, the patient did not make progress toward the following goals. Barriers to progression include. Recommendations to address these barriers include.

## 2024-05-12 NOTE — PROGRESS NOTES
Subjective Data:  83 female patient with only history of paroxysmal atrial fibrillation.  Closed fracture of multiple ribs on left side.  Patient currently on amiodarone, Eliquis for atrial fibrillation also on Plavix for PCI in past.  Also continue metoprolol.  Overnight Events:    None  Objective   Last Recorded Vitals  BP (!) 112/43 (BP Location: Right arm, Patient Position: Lying)   Pulse 60   Temp 36.9 °C (98.4 °F) (Oral)   Resp 16   Wt 65 kg (143 lb 4.8 oz)   SpO2 91%   No intake or output data in the 24 hours ending 05/12/24 1448  Physical Exam:  HEENT: Normocephalic/atraumatic pupils equal react light  Neck exam mild JVD, no bruit  Lung exam clear to auscultation, few crackles at the bases  Cardiac exam is regular rhythm S1-S2, soft systolic murmur heard.   Abdomen soft nontender, nondistended  Extremities no clubbing, cyanosis, trace edema  Neuro exam grossly intact.  Image Results  Electrocardiogram, 12-lead PRN ACS symptoms  AV dual-paced rhythm with prolonged AV conduction  Abnormal ECG  When compared with ECG of 03-MAY-2024 00:01,  No significant change was found  Confirmed by Darrisu Ibrahim ([id number]) on 5/10/2024 3:34:47 PM    Last Labs:  CBC - 5/11/2024: 10:11 AM  6.3 9.4 103    29.9      CMP - 5/12/2024:  4:55 AM  9.4 7.9 41 --- 0.4   2.9 3.8 29 103      PTT - 2/9/2024:  4:59 AM  1.1   11.9 24.6     Inpatient Medications:  Scheduled medications   Medication Dose Route Frequency    amiodarone  200 mg oral Daily    apixaban  2.5 mg oral BID    atorvastatin  80 mg oral Nightly    [START ON 5/13/2024] citalopram  10 mg oral Daily    clopidogrel  75 mg oral Daily    levothyroxine  100 mcg oral Daily before breakfast    lidocaine  1 patch transdermal Daily    metoprolol tartrate  50 mg oral BID    pantoprazole  20 mg oral Daily before breakfast    polyethylene glycol  17 g oral Daily     Principal Problem:    Closed fracture of multiple ribs of left side, initial encounter    Assessment/Plan   83-year-old  female patient with a history of Paroxysmal atrial fibrillation, status post permanent pacemaker..  PCI in the past.  Continue rate control medication.  Continue Bactrim for UTI.  Pending rehab placement.  Continue Plavix for history of PCI.    Pt. care time is spent includes review of diagnostic tests, labs, radiographs, EKGs, old echoes, cardiac work-up and coordination of care. Assessment, impression and plans are reflected in the note above as well as the orders.    Code Status:  DNR and No Intubation  I spent 35 minutes in the professional and overall care of this patient.  Darrius Ibarhim MD

## 2024-05-12 NOTE — PROGRESS NOTES
Linette Doyle is a 83 y.o. female on day 3 of admission presenting with Closed fracture of multiple ribs of left side, initial encounter.      Subjective   States she feels well today. Denies any complaints. Tolerating PO. Had BM this morning.        Objective     Last Recorded Vitals  BP (!) 112/43 (BP Location: Right arm, Patient Position: Lying)   Pulse 60   Temp 36.9 °C (98.4 °F) (Oral)   Resp 16   Wt 65 kg (143 lb 4.8 oz)   SpO2 91%   Intake/Output last 3 Shifts:  No intake or output data in the 24 hours ending 05/12/24 1353    Admission Weight  Weight: 63.5 kg (139 lb 15.9 oz) (05/09/24 1836)    Daily Weight  05/12/24 : 65 kg (143 lb 4.8 oz)    Image Results  Electrocardiogram, 12-lead PRN ACS symptoms  AV dual-paced rhythm with prolonged AV conduction  Abnormal ECG  When compared with ECG of 03-MAY-2024 00:01,  No significant change was found  Confirmed by Darrius Ibrahim ([id number]) on 5/10/2024 3:34:47 PM      Physical Exam  Constitutional:       General: She is not in acute distress.     Appearance: She is not toxic-appearing.   HENT:      Head: Normocephalic.      Mouth/Throat:      Pharynx: Oropharynx is clear.   Eyes:      General: No scleral icterus.  Cardiovascular:      Rate and Rhythm: Normal rate.   Pulmonary:      Effort: No respiratory distress.      Breath sounds: No wheezing.   Abdominal:      General: There is no distension.      Palpations: Abdomen is soft.   Musculoskeletal:      Right lower leg: No edema.      Left lower leg: No edema.   Neurological:      Mental Status: She is alert.   Psychiatric:         Behavior: Behavior normal.         Relevant Results               Assessment/Plan                  Principal Problem:    Closed fracture of multiple ribs of left side, initial encounter    Mechanical fall/frequent falls  Iliopsoas hematoma  Subacute left rib fractures  Trauma surgery following  Check orthostats  Fall precautions  Hgb stable, resume eliquis today  PT/OT - recommending  moderate intensity rehab  Patient agreeable to SNF placement     Paroxsymal atrial fibrillation  Cardiology following  Diltiazem discontinued  Amiodarone decreased to 200mg daily  Continue metoprolol  Hgb stable, resume eliquis today  Consider watchman device evaluation as outpatient per cardiology     GARY on CKD stage 3, resolved  Baseline Cr ~1.4  Nephrology consulted  May be due to bactrim use vs dehydration  Encourage PO intake     Recent UTI  Treated with bactrim during last hospitalization  UA ordered - noninfectious  No need for additional antibiotics     CAD s/p PCI  Continue plavix per cardiology     Dispo: anticipate discharge to SNF, pending facility acceptance and precert              Karol Reyna MD

## 2024-05-13 LAB
ANION GAP SERPL CALC-SCNC: 12 MMOL/L
BUN SERPL-MCNC: 19 MG/DL (ref 8–25)
CALCIUM SERPL-MCNC: 9.1 MG/DL (ref 8.5–10.4)
CHLORIDE SERPL-SCNC: 104 MMOL/L (ref 97–107)
CO2 SERPL-SCNC: 22 MMOL/L (ref 24–31)
CREAT SERPL-MCNC: 1.5 MG/DL (ref 0.4–1.6)
EGFRCR SERPLBLD CKD-EPI 2021: 34 ML/MIN/1.73M*2
ERYTHROCYTE [DISTWIDTH] IN BLOOD BY AUTOMATED COUNT: 15.8 % (ref 11.5–14.5)
GLUCOSE SERPL-MCNC: 107 MG/DL (ref 65–99)
HCT VFR BLD AUTO: 30 % (ref 36–46)
HGB BLD-MCNC: 9.3 G/DL (ref 12–16)
HOLD SPECIMEN: NORMAL
MCH RBC QN AUTO: 27.8 PG (ref 26–34)
MCHC RBC AUTO-ENTMCNC: 31 G/DL (ref 32–36)
MCV RBC AUTO: 90 FL (ref 80–100)
NRBC BLD-RTO: 0 /100 WBCS (ref 0–0)
PLATELET # BLD AUTO: 114 X10*3/UL (ref 150–450)
POTASSIUM SERPL-SCNC: 4.4 MMOL/L (ref 3.4–5.1)
RBC # BLD AUTO: 3.34 X10*6/UL (ref 4–5.2)
SODIUM SERPL-SCNC: 138 MMOL/L (ref 133–145)
WBC # BLD AUTO: 6 X10*3/UL (ref 4.4–11.3)

## 2024-05-13 PROCEDURE — 97110 THERAPEUTIC EXERCISES: CPT | Mod: GP

## 2024-05-13 PROCEDURE — 36415 COLL VENOUS BLD VENIPUNCTURE: CPT | Performed by: INTERNAL MEDICINE

## 2024-05-13 PROCEDURE — 97530 THERAPEUTIC ACTIVITIES: CPT | Mod: GP

## 2024-05-13 PROCEDURE — 2500000001 HC RX 250 WO HCPCS SELF ADMINISTERED DRUGS (ALT 637 FOR MEDICARE OP): Performed by: INTERNAL MEDICINE

## 2024-05-13 PROCEDURE — 97535 SELF CARE MNGMENT TRAINING: CPT | Mod: GO,CO

## 2024-05-13 PROCEDURE — 80048 BASIC METABOLIC PNL TOTAL CA: CPT | Performed by: INTERNAL MEDICINE

## 2024-05-13 PROCEDURE — 1210000001 HC SEMI-PRIVATE ROOM DAILY

## 2024-05-13 PROCEDURE — 2500000001 HC RX 250 WO HCPCS SELF ADMINISTERED DRUGS (ALT 637 FOR MEDICARE OP): Performed by: STUDENT IN AN ORGANIZED HEALTH CARE EDUCATION/TRAINING PROGRAM

## 2024-05-13 PROCEDURE — 2500000006 HC RX 250 W HCPCS SELF ADMINISTERED DRUGS (ALT 637 FOR ALL PAYERS): Performed by: INTERNAL MEDICINE

## 2024-05-13 PROCEDURE — 1090000002 HH PPS REVENUE DEBIT

## 2024-05-13 PROCEDURE — 1090000001 HH PPS REVENUE CREDIT

## 2024-05-13 PROCEDURE — 2500000005 HC RX 250 GENERAL PHARMACY W/O HCPCS: Performed by: INTERNAL MEDICINE

## 2024-05-13 PROCEDURE — 85027 COMPLETE CBC AUTOMATED: CPT | Performed by: INTERNAL MEDICINE

## 2024-05-13 RX ADMIN — AMIODARONE HYDROCHLORIDE 200 MG: 200 TABLET ORAL at 11:11

## 2024-05-13 RX ADMIN — LEVOTHYROXINE SODIUM 100 MCG: 0.1 TABLET ORAL at 06:17

## 2024-05-13 RX ADMIN — CITALOPRAM HYDROBROMIDE 10 MG: 10 TABLET ORAL at 11:05

## 2024-05-13 RX ADMIN — CLOPIDOGREL BISULFATE 75 MG: 75 TABLET ORAL at 11:04

## 2024-05-13 RX ADMIN — METOPROLOL TARTRATE 50 MG: 50 TABLET, FILM COATED ORAL at 21:19

## 2024-05-13 RX ADMIN — APIXABAN 2.5 MG: 2.5 TABLET, FILM COATED ORAL at 11:05

## 2024-05-13 RX ADMIN — PANTOPRAZOLE SODIUM 20 MG: 20 TABLET, DELAYED RELEASE ORAL at 06:17

## 2024-05-13 RX ADMIN — APIXABAN 2.5 MG: 2.5 TABLET, FILM COATED ORAL at 21:19

## 2024-05-13 RX ADMIN — LIDOCAINE 1 PATCH: 4 PATCH TOPICAL at 21:19

## 2024-05-13 RX ADMIN — ATORVASTATIN CALCIUM 80 MG: 80 TABLET, FILM COATED ORAL at 21:19

## 2024-05-13 ASSESSMENT — COGNITIVE AND FUNCTIONAL STATUS - GENERAL
CLIMB 3 TO 5 STEPS WITH RAILING: A LOT
TURNING FROM BACK TO SIDE WHILE IN FLAT BAD: A LOT
DRESSING REGULAR UPPER BODY CLOTHING: A LITTLE
STANDING UP FROM CHAIR USING ARMS: A LITTLE
EATING MEALS: A LITTLE
MOVING FROM LYING ON BACK TO SITTING ON SIDE OF FLAT BED WITH BEDRAILS: A LITTLE
MOVING TO AND FROM BED TO CHAIR: A LITTLE
MOBILITY SCORE: 16
PERSONAL GROOMING: A LITTLE
HELP NEEDED FOR BATHING: A LITTLE
DAILY ACTIVITIY SCORE: 18
WALKING IN HOSPITAL ROOM: A LITTLE
TOILETING: A LITTLE
DRESSING REGULAR LOWER BODY CLOTHING: A LITTLE

## 2024-05-13 ASSESSMENT — PAIN - FUNCTIONAL ASSESSMENT
PAIN_FUNCTIONAL_ASSESSMENT: 0-10
PAIN_FUNCTIONAL_ASSESSMENT: WONG-BAKER FACES
PAIN_FUNCTIONAL_ASSESSMENT: 0-10

## 2024-05-13 ASSESSMENT — ACTIVITIES OF DAILY LIVING (ADL): HOME_MANAGEMENT_TIME_ENTRY: 39

## 2024-05-13 ASSESSMENT — PAIN DESCRIPTION - ORIENTATION: ORIENTATION: LEFT

## 2024-05-13 ASSESSMENT — PAIN DESCRIPTION - LOCATION: LOCATION: RIB CAGE

## 2024-05-13 ASSESSMENT — PAIN SCALES - GENERAL
PAINLEVEL_OUTOF10: 7
PAINLEVEL_OUTOF10: 0 - NO PAIN

## 2024-05-13 ASSESSMENT — PAIN SCALES - WONG BAKER: WONGBAKER_NUMERICALRESPONSE: HURTS LITTLE BIT

## 2024-05-13 ASSESSMENT — PAIN SCALES - PAIN ASSESSMENT IN ADVANCED DEMENTIA (PAINAD): TOTALSCORE: MEDICATION (SEE MAR)

## 2024-05-13 NOTE — PROGRESS NOTES
05/13/24 1601   Discharge Planning   Patient expects to be discharged to: [facility] SNF bed available on wednesday

## 2024-05-13 NOTE — PROGRESS NOTES
"Linette Doyle is a 83 y.o. female on day 4 of admission presenting with Closed fracture of multiple ribs of left side, initial encounter.    Subjective   Patient Is a seen for acute kidney injury superimposed on chronic kidney disease stage III patient was admitted with multiple falls with broken ribs on the left side as well as iliopsoas hematoma patient feels okay without any complaints       Objective     Physical Exam  Neck:      Vascular: No carotid bruit.   Cardiovascular:      Rate and Rhythm: Normal rate and regular rhythm.      Heart sounds: No murmur heard.     No friction rub. No gallop.   Pulmonary:      Breath sounds: No wheezing, rhonchi or rales.   Chest:      Chest wall: No tenderness.   Abdominal:      General: There is no distension.      Tenderness: There is no abdominal tenderness. There is no guarding or rebound.   Musculoskeletal:         General: No swelling or tenderness.      Cervical back: Neck supple.      Right lower leg: No edema.      Left lower leg: No edema.   Lymphadenopathy:      Cervical: No cervical adenopathy.         Last Recorded Vitals  Blood pressure 139/54, pulse 60, temperature 36 °C (96.8 °F), temperature source Temporal, resp. rate 15, height 1.626 m (5' 4\"), weight 65.7 kg (144 lb 13.5 oz), SpO2 98%, unknown if currently breastfeeding.    Intake/Output last 3 Shifts:  I/O last 3 completed shifts:  In: 240 (3.7 mL/kg) [P.O.:240]  Out: - (0 mL/kg)   Weight: 65.7 kg     Current Facility-Administered Medications:     acetaminophen (Tylenol) tablet 650 mg, 650 mg, oral, q4h PRN, 650 mg at 05/12/24 0954 **OR** acetaminophen (Tylenol) oral liquid 650 mg, 650 mg, oral, q4h PRN **OR** acetaminophen (Tylenol) suppository 650 mg, 650 mg, rectal, q4h PRN, Henry Ricci DO    amiodarone (Pacerone) tablet 200 mg, 200 mg, oral, Daily, Becky Vela MD, 200 mg at 05/13/24 1111    apixaban (Eliquis) tablet 2.5 mg, 2.5 mg, oral, BID, Karol Reyna MD, 2.5 mg at 05/13/24 1105    " atorvastatin (Lipitor) tablet 80 mg, 80 mg, oral, Nightly, Henry Ricci DO, 80 mg at 05/12/24 2114    benzocaine-menthol (Cepastat Sore Throat) lozenge 1 lozenge, 1 lozenge, Mouth/Throat, q2h PRN, Henry Ricci DO    citalopram (CeleXA) tablet 10 mg, 10 mg, oral, Daily, Darrius Ibrahim MD, 10 mg at 05/13/24 1105    clopidogrel (Plavix) tablet 75 mg, 75 mg, oral, Daily, Becky Vela MD, 75 mg at 05/13/24 1104    guaiFENesin (Mucinex) 12 hr tablet 600 mg, 600 mg, oral, q12h PRN, Henry Ricci DO    levothyroxine (Synthroid, Levoxyl) tablet 100 mcg, 100 mcg, oral, Daily before breakfast, Henry Ricci DO, 100 mcg at 05/13/24 0617    lidocaine 4 % patch 1 patch, 1 patch, transdermal, Daily, Henry Ricci DO, 1 patch at 05/12/24 2114    melatonin tablet 5 mg, 5 mg, oral, Nightly PRN, Henry Ricci DO, 5 mg at 05/12/24 2114    metoprolol tartrate (Lopressor) tablet 50 mg, 50 mg, oral, BID, Henry Ricci DO, 50 mg at 05/12/24 2114    ondansetron (Zofran) injection 4 mg, 4 mg, intravenous, q6h PRN, Henry Ricci DO    pantoprazole (ProtoNix) EC tablet 20 mg, 20 mg, oral, Daily before breakfast, Henry Ricci DO, 20 mg at 05/13/24 0617    polyethylene glycol (Glycolax, Miralax) packet 17 g, 17 g, oral, Daily, Henry Ricci DO   Relevant Results    Results for orders placed or performed during the hospital encounter of 05/09/24 (from the past 96 hour(s))   CBC and Auto Differential   Result Value Ref Range    WBC 9.2 4.4 - 11.3 x10*3/uL    nRBC 0.0 0.0 - 0.0 /100 WBCs    RBC 4.09 4.00 - 5.20 x10*6/uL    Hemoglobin 11.2 (L) 12.0 - 16.0 g/dL    Hematocrit 35.8 (L) 36.0 - 46.0 %    MCV 88 80 - 100 fL    MCH 27.4 26.0 - 34.0 pg    MCHC 31.3 (L) 32.0 - 36.0 g/dL    RDW 15.6 (H) 11.5 - 14.5 %    Platelets 146 (L) 150 - 450 x10*3/uL    Neutrophils % 80.7 40.0 - 80.0 %    Immature Granulocytes %, Automated 0.4 0.0 - 0.9 %    Lymphocytes % 7.9 13.0 - 44.0 %    Monocytes %  7.2 2.0 - 10.0 %    Eosinophils % 3.1 0.0 - 6.0 %    Basophils % 0.7 0.0 - 2.0 %    Neutrophils Absolute 7.45 (H) 1.60 - 5.50 x10*3/uL    Immature Granulocytes Absolute, Automated 0.04 0.00 - 0.50 x10*3/uL    Lymphocytes Absolute 0.73 (L) 0.80 - 3.00 x10*3/uL    Monocytes Absolute 0.66 0.05 - 0.80 x10*3/uL    Eosinophils Absolute 0.29 0.00 - 0.40 x10*3/uL    Basophils Absolute 0.06 0.00 - 0.10 x10*3/uL   Comprehensive metabolic panel   Result Value Ref Range    Glucose 114 (H) 65 - 99 mg/dL    Sodium 137 133 - 145 mmol/L    Potassium 4.4 3.4 - 5.1 mmol/L    Chloride 95 (L) 97 - 107 mmol/L    Bicarbonate 25 24 - 31 mmol/L    Urea Nitrogen 31 (H) 8 - 25 mg/dL    Creatinine 2.60 (H) 0.40 - 1.60 mg/dL    eGFR 18 (L) >60 mL/min/1.73m*2    Calcium 9.9 8.5 - 10.4 mg/dL    Albumin 4.9 3.5 - 5.0 g/dL    Alkaline Phosphatase 103 35 - 125 U/L    Total Protein 7.9 5.9 - 7.9 g/dL    AST 41 (H) 5 - 40 U/L    Bilirubin, Total 0.4 0.1 - 1.2 mg/dL    ALT 29 5 - 40 U/L    Anion Gap 17 <=19 mmol/L   Serial Troponin, Initial ([facility])   Result Value Ref Range    Troponin T, High Sensitivity 39 () <=14 ng/L   Serial Troponin, 2 Hour ([facility])   Result Value Ref Range    Troponin T, High Sensitivity 33 (HH) <=14 ng/L   Serial Troponin, 6 Hour ([facility])   Result Value Ref Range    Troponin T, High Sensitivity 40 () <=14 ng/L   CBC and Auto Differential   Result Value Ref Range    WBC 7.4 4.4 - 11.3 x10*3/uL    nRBC 0.0 0.0 - 0.0 /100 WBCs    RBC 3.54 (L) 4.00 - 5.20 x10*6/uL    Hemoglobin 9.7 (L) 12.0 - 16.0 g/dL    Hematocrit 31.0 (L) 36.0 - 46.0 %    MCV 88 80 - 100 fL    MCH 27.4 26.0 - 34.0 pg    MCHC 31.3 (L) 32.0 - 36.0 g/dL    RDW 15.4 (H) 11.5 - 14.5 %    Platelets 124 (L) 150 - 450 x10*3/uL    Neutrophils % 71.1 40.0 - 80.0 %    Immature Granulocytes %, Automated 0.5 0.0 - 0.9 %    Lymphocytes % 14.7 13.0 - 44.0 %    Monocytes % 9.4 2.0 - 10.0 %    Eosinophils % 3.8 0.0 - 6.0 %    Basophils % 0.5 0.0 - 2.0 %    Neutrophils Absolute  5.22 1.60 - 5.50 x10*3/uL    Immature Granulocytes Absolute, Automated 0.04 0.00 - 0.50 x10*3/uL    Lymphocytes Absolute 1.08 0.80 - 3.00 x10*3/uL    Monocytes Absolute 0.69 0.05 - 0.80 x10*3/uL    Eosinophils Absolute 0.28 0.00 - 0.40 x10*3/uL    Basophils Absolute 0.04 0.00 - 0.10 x10*3/uL   Renal function panel   Result Value Ref Range    Glucose 92 65 - 99 mg/dL    Sodium 137 133 - 145 mmol/L    Potassium 4.2 3.4 - 5.1 mmol/L    Chloride 99 97 - 107 mmol/L    Bicarbonate 23 (L) 24 - 31 mmol/L    Urea Nitrogen 26 (H) 8 - 25 mg/dL    Creatinine 2.20 (H) 0.40 - 1.60 mg/dL    eGFR 21 (L) >60 mL/min/1.73m*2    Calcium 9.1 8.5 - 10.4 mg/dL    Phosphorus 3.6 2.5 - 4.5 mg/dL    Albumin 4.0 3.5 - 5.0 g/dL    Anion Gap 15 <=19 mmol/L   Urinalysis with Reflex Microscopic   Result Value Ref Range    Color, Urine Light-Yellow Light-Yellow, Yellow, Dark-Yellow    Appearance, Urine Clear Clear    Specific Gravity, Urine 1.019 1.005 - 1.035    pH, Urine 6.0 5.0, 5.5, 6.0, 6.5, 7.0, 7.5, 8.0    Protein, Urine NEGATIVE NEGATIVE, 10 (TRACE), 20 (TRACE) mg/dL    Glucose, Urine Normal Normal mg/dL    Blood, Urine NEGATIVE NEGATIVE    Ketones, Urine NEGATIVE NEGATIVE mg/dL    Bilirubin, Urine NEGATIVE NEGATIVE    Urobilinogen, Urine Normal Normal mg/dL    Nitrite, Urine NEGATIVE NEGATIVE    Leukocyte Esterase, Urine NEGATIVE NEGATIVE   Basic metabolic panel   Result Value Ref Range    Glucose 121 (H) 65 - 99 mg/dL    Sodium 140 133 - 145 mmol/L    Potassium 4.1 3.4 - 5.1 mmol/L    Chloride 105 97 - 107 mmol/L    Bicarbonate 19 (L) 24 - 31 mmol/L    Urea Nitrogen 18 8 - 25 mg/dL    Creatinine 1.70 (H) 0.40 - 1.60 mg/dL    eGFR 29 (L) >60 mL/min/1.73m*2    Calcium 8.9 8.5 - 10.4 mg/dL    Anion Gap 16 <=19 mmol/L   CBC and Auto Differential   Result Value Ref Range    WBC 6.3 4.4 - 11.3 x10*3/uL    nRBC 0.0 0.0 - 0.0 /100 WBCs    RBC 3.38 (L) 4.00 - 5.20 x10*6/uL    Hemoglobin 9.4 (L) 12.0 - 16.0 g/dL    Hematocrit 29.9 (L) 36.0 - 46.0  %    MCV 89 80 - 100 fL    MCH 27.8 26.0 - 34.0 pg    MCHC 31.4 (L) 32.0 - 36.0 g/dL    RDW 15.7 (H) 11.5 - 14.5 %    Platelets 103 (L) 150 - 450 x10*3/uL    Neutrophils % 73.0 40.0 - 80.0 %    Immature Granulocytes %, Automated 1.0 (H) 0.0 - 0.9 %    Lymphocytes % 10.9 13.0 - 44.0 %    Monocytes % 9.7 2.0 - 10.0 %    Eosinophils % 4.9 0.0 - 6.0 %    Basophils % 0.5 0.0 - 2.0 %    Neutrophils Absolute 4.61 1.60 - 5.50 x10*3/uL    Immature Granulocytes Absolute, Automated 0.06 0.00 - 0.50 x10*3/uL    Lymphocytes Absolute 0.69 (L) 0.80 - 3.00 x10*3/uL    Monocytes Absolute 0.61 0.05 - 0.80 x10*3/uL    Eosinophils Absolute 0.31 0.00 - 0.40 x10*3/uL    Basophils Absolute 0.03 0.00 - 0.10 x10*3/uL   POCT GLUCOSE   Result Value Ref Range    POCT Glucose 125 (H) 74 - 99 mg/dL   Renal Function Panel   Result Value Ref Range    Glucose 97 65 - 99 mg/dL    Sodium 138 133 - 145 mmol/L    Potassium 4.4 3.4 - 5.1 mmol/L    Chloride 105 97 - 107 mmol/L    Bicarbonate 23 (L) 24 - 31 mmol/L    Urea Nitrogen 16 8 - 25 mg/dL    Creatinine 1.60 0.40 - 1.60 mg/dL    eGFR 31 (L) >60 mL/min/1.73m*2    Calcium 9.4 8.5 - 10.4 mg/dL    Phosphorus 2.9 2.5 - 4.5 mg/dL    Albumin 3.8 3.5 - 5.0 g/dL    Anion Gap 10 <=19 mmol/L   Basic Metabolic Panel   Result Value Ref Range    Glucose 107 (H) 65 - 99 mg/dL    Sodium 138 133 - 145 mmol/L    Potassium 4.4 3.4 - 5.1 mmol/L    Chloride 104 97 - 107 mmol/L    Bicarbonate 22 (L) 24 - 31 mmol/L    Urea Nitrogen 19 8 - 25 mg/dL    Creatinine 1.50 0.40 - 1.60 mg/dL    eGFR 34 (L) >60 mL/min/1.73m*2    Calcium 9.1 8.5 - 10.4 mg/dL    Anion Gap 12 <=19 mmol/L   Lavender Top   Result Value Ref Range    Extra Tube Hold for add-ons.    CBC   Result Value Ref Range    WBC 6.0 4.4 - 11.3 x10*3/uL    nRBC 0.0 0.0 - 0.0 /100 WBCs    RBC 3.34 (L) 4.00 - 5.20 x10*6/uL    Hemoglobin 9.3 (L) 12.0 - 16.0 g/dL    Hematocrit 30.0 (L) 36.0 - 46.0 %    MCV 90 80 - 100 fL    MCH 27.8 26.0 - 34.0 pg    MCHC 31.0 (L) 32.0  - 36.0 g/dL    RDW 15.8 (H) 11.5 - 14.5 %    Platelets 114 (L) 150 - 450 x10*3/uL       Assessment/Plan     Acute kidney injury superimposed on chronic kidney disease stage III renal function is much improved creatinine back to baseline continue to monitor  Chronic kidney disease stage IIIb  Multiple falls  Left iliopsoas hematoma  Left ribs fracture  Hypertension  Hyperlipidemia           Ronnie Giang MD

## 2024-05-13 NOTE — CARE PLAN
Problem: ADLs  Goal: Pt will complete ADL tasks at mod I with use of AE prn   Outcome: Progressing     Problem: Instrumental Activities of Daily Living  Goal: Pt will perform simple IADLs/retrieval of items at mod I.    Outcome: Progressing     Problem: Nutrition  Goal: Less than 5 days NPO/clear liquids  Outcome: Progressing  Goal: Oral intake greater than 50%  Outcome: Progressing  Goal: Oral intake greater 75%  Outcome: Progressing  Goal: Consume prescribed supplement  Outcome: Progressing  Goal: Adequate PO fluid intake  Outcome: Progressing  Goal: Nutrition support goals are met within 48 hrs  Outcome: Progressing  Goal: Nutrition support is meeting 75% of nutrient needs  Outcome: Progressing  Goal: Tube feed tolerance  Outcome: Progressing  Goal: BG  mg/dL  Outcome: Progressing  Goal: Lab values WNL  Outcome: Progressing  Goal: Electrolytes WNL  Outcome: Progressing  Goal: Promote healing  Outcome: Progressing  Goal: Maintain stable weight  Outcome: Progressing  Goal: Reduce weight from edema/fluid  Outcome: Progressing  Goal: Gradual weight gain  Outcome: Progressing  Goal: Improve ostomy output  Outcome: Progressing     Problem: Discharge Planning  Goal: Discharge to home or other facility with appropriate resources  Outcome: Progressing     Problem: Chronic Conditions and Co-morbidities  Goal: Patient's chronic conditions and co-morbidity symptoms are monitored and maintained or improved  Outcome: Progressing     Problem: Skin  Goal: Decreased wound size/increased tissue granulation at next dressing change  Outcome: Progressing  Goal: Participates in plan/prevention/treatment measures  Outcome: Progressing  Goal: Prevent/manage excess moisture  Outcome: Progressing  Goal: Prevent/minimize sheer/friction injuries  Outcome: Progressing  Goal: Promote/optimize nutrition  Outcome: Progressing  Goal: Promote skin healing  Outcome: Progressing     Problem: Pain  Goal: Takes deep breaths with improved pain  control throughout the shift  Outcome: Progressing  Goal: Turns in bed with improved pain control throughout the shift  Outcome: Progressing  Goal: Walks with improved pain control throughout the shift  Outcome: Progressing  Goal: Performs ADL's with improved pain control throughout shift  Outcome: Progressing  Goal: Participates in PT with improved pain control throughout the shift  Outcome: Progressing  Goal: Free from opioid side effects throughout the shift  Outcome: Progressing  Goal: Free from acute confusion related to pain meds throughout the shift  Outcome: Progressing   The patient's goals for the shift include no falls, no pain    The clinical goals for the shift include free from falls/injuries    Over the shift, the patient did not make progress toward the following goals. Barriers to progression include. Recommendations to address these barriers include.

## 2024-05-13 NOTE — PROGRESS NOTES
Linette Doyle is a 83 y.o. female on day 4 of admission presenting with Closed fracture of multiple ribs of left side, initial encounter.      Subjective   Patient feels better today.   she denied abdominal pain.    She denied nausea or vomiting            Objective     Last Recorded Vitals  /53 (BP Location: Right arm, Patient Position: Lying)   Pulse 60   Temp 36.6 °C (97.9 °F) (Temporal)   Resp 16   Wt 65.7 kg (144 lb 13.5 oz)   SpO2 98%   Intake/Output last 3 Shifts:    Intake/Output Summary (Last 24 hours) at 5/13/2024 0818  Last data filed at 5/12/2024 1200  Gross per 24 hour   Intake 120 ml   Output --   Net 120 ml       Admission Weight  Weight: 63.5 kg (139 lb 15.9 oz) (05/09/24 1836)    Daily Weight  05/13/24 : 65.7 kg (144 lb 13.5 oz)    Image Results  Electrocardiogram, 12-lead PRN ACS symptoms  AV dual-paced rhythm with prolonged AV conduction  Abnormal ECG  When compared with ECG of 03-MAY-2024 00:01,  No significant change was found  Confirmed by Darirus Ibrahim ([id number]) on 5/10/2024 3:34:47 PM    Physical  exam    General: In non acute distress, cooperating during physical exam.  HEENT: Pupils are equal and reactive to light and commendation , oral mucosa moist, no JVD oral mucosa is moist.  Cardiovascular: Normal sinus rhythm, no MRG.  Lungs: Clear to auscultation bilaterally, no wheezing, no crackles, no dullness to percussion.  Abdomen: Ecchymotic area on the left side of the abdomen.  No hepatosplenomegaly appreciated, soft , not tender, positive bowel sounds, positive bowel movement.  Neuro: Alert and oriented x3, strength in upper and lower extremities , sensation intact.  Psych: Patient had great insight was going on  Musculoskeletal: No swelling in lower extremities, no limitation in range of motion.  Vascular: Pulses are intact in upper and lower extremities  Skin: Ecchymosis on the left side of abdomen not painful to touch, no flank pain   Assessment/Plan      Mechanical  fall  History of frequent falls  Iliopsoas hematoma  Subacute left rib fracture  Patient was evaluated by trauma surgery  Fall precaution check H&H today patient is on Eliquis and Plavix  Patient was evaluated by physical therapy  Patient will need skilled nursing facility   waiting for the patient to decide regarding SNF  Talk to care coordination on the case    Atrial fibrillation  Dr. Ibrahim on the case  Continue with amiodarone   Eliquis with renal dosage  Patient may need the Watchman device, follow-up with her regular cardiologist as outpatient  Follow-up with Dr. Ibrahim in 1 to 2 weeks    Acute kidney injury on chronic kidney disease  Baseline creatinine 1.4  Creatinine today improved creatinine 1.8  Dr. Giang on the case  Patient was treated with Bactrim for urinary tract infection     Coronary artery disease  Status post stent placement  Cardiology on the case  Continue with Plavix    Recent UTI   treated with Bactrim  She completed full course of antibiotics.      Plan to discharge to plan to discharge to skilled nursing facility  Check H&H today          Arminda Nguyen MD

## 2024-05-13 NOTE — PROGRESS NOTES
Occupational Therapy    OT Treatment    Patient Name: Linette Doyle  MRN: [id number]  Today's Date: 5/13/2024  Time Calculation  Start Time: 1052  Stop Time: 1131  Time Calculation (min): 39 min         Assessment:  OT Assessment: Gradual progress made towards OT goals. Continue with current OT POC to increase strength, balance and functional tolerance to maximize safety and independence during ADLs.  Evaluation/Treatment Tolerance: Patient tolerated treatment well  End of Session Communication: Bedside nurse  End of Session Patient Position: Up in chair, Alarm on (all needs in reach)  OT Assessment Results: Decreased ADL status, Decreased upper extremity strength, Decreased upper extremity range of motion, Decreased safe judgment during ADL, Decreased endurance, Decreased functional mobility  Evaluation/Treatment Tolerance: Patient tolerated treatment well  Plan:  Treatment Interventions: ADL retraining, Functional transfer training, UE strengthening/ROM, Patient/family training, Equipment evaluation/education  OT Frequency: 4 times per week  OT Discharge Recommendations: Moderate intensity level of continued care  OT - OK to Discharge: Yes  Treatment Interventions: ADL retraining, Functional transfer training, UE strengthening/ROM, Patient/family training, Equipment evaluation/education    Subjective   Previous Visit Info:  OT Last Visit  OT Received On: 05/13/24  General:  General  Reason for Referral: impaired ADLs s/p fall resulting in L rib fx  Past Medical History Relevant to Rehab: SSS s/p pacemaker, CAD, MI, cardiac stent, afib, TIA diverticulitis, hypothyroid, HTN, CKD,  Prior to Session Communication: Bedside nurse  Patient Position Received: Up in chair, Alarm on  General Comment: Pt cleared for OT session per nursing, pleasant and cooperative this date.  Precautions:  Hearing/Visual Limitations: reading glasses  Medical Precautions: Fall precautions  Vital Signs:     Pain:  Pain Assessment  Pain  Assessment: Melchor-Baker FACES  Melchor-Baker FACES Pain Rating: Hurts little bit  Pain Type: Acute pain  Pain Location: Rib cage  Pain Orientation: Left  Clinical Progression: Not changed    Objective    Cognition:  Cognition  Overall Cognitive Status: Within Functional Limits  Orientation Level: Oriented X4  Safety/Judgement: Exceptions to WFL  Insight: Mild  Impulsive: Mildly  Task Initiation: WFL  Processing Speed: Within funtional limits  Coordination:  Movements are Fluid and Coordinated: Yes  Activities of Daily Living: UE Bathing  UE Bathing Comments: UB bathing tasks completed from seated position with set-up and close supervision    LE Bathing  LE Bathing Comments: increased time required for LB bathing tasks using cross-leg technique with close supervision. Perineal hygiene completed in supported standing with close supervision and use of FWW    UE Dressing  UE Dressing Comments: pt able to don/doff hospital gown from seated position with close supervision    LE Dressing  LE Dressing: Yes  LE Dressing Comments: pt able to don/doff socks with close supervision using cross-leg technique  and increased time while seated on commode. Walker required to don pants from seated position    Toileting  Toileting Comments: pt able to complete all aspects of toileting with Walker for LB clothing management to don/doff pants over hips in supported standing  Functional Standing Tolerance:     Bed Mobility/Transfers: Bed Mobility  Bed Mobility: No    Transfers  Transfer: Yes  Transfer 1  Trials/Comments 1: sit<>stands completed from standard chair and toilet heights with FWW and CGA    Toilet Transfers  Toilet Transfer From: Chair  Toilet Transfer Type: To and from  Toilet Transfer to: Standard toilet  Toilet Transfer Technique: Ambulating  Toilet Transfers: Contact guard  Toilet Transfers Comments: with FWW    Functional Mobility:  Functional Mobility  Functional Mobility Performed: Yes  Functional Mobility 1  Surface 1: Level  tile  Device 1: Rolling walker  Assistance 1: Contact guard  Comments 1: Functional mobility trial completed <>bathroom with FWW and CGA, pt intermittently impulsive requiring verbal cues for self-pacing to decrease risk of falls.      Outcome Measures:[facility] Daily Activity  Putting on and taking off regular lower body clothing: A little  Bathing (including washing, rinsing, drying): A little  Putting on and taking off regular upper body clothing: A little  Toileting, which includes using toilet, bedpan or urinal: A little  Taking care of personal grooming such as brushing teeth: A little  Eating Meals: A little  Daily Activity - Total Score: 18        Education Documentation  ADL Training, taught by COLLEEN Maier at 5/13/2024  1:09 PM.  Learner: Patient  Readiness: Acceptance  Method: Explanation, Demonstration  Response: Needs Reinforcement    Education Comments  Education provided on role of OT/POC, safety awareness throughout functional tasks/transfers, importance of activity/ rest routine, EC/WS techniques, and use of call light for assistance. Questions, comments and concerns addressed regarding OT.      Goals:  Encounter Problems       Encounter Problems (Active)       ADLs       Pt will complete ADL tasks at mod I with use of AE prn  (Progressing)       Start:  05/10/24    Expected End:  06/01/24               Functional Mobility       Pt will perform functional mobility household distances at mod I with use of LRAD.    (Progressing)       Start:  05/10/24    Expected End:  06/01/24               Instrumental Activities of Daily Living       Pt will perform simple IADLs/retrieval of items at mod I.   (Progressing)       Start:  05/10/24    Expected End:  06/01/24               OT Transfers       Pt will perform functional transfers at mod I (Progressing)       Start:  05/10/24    Expected End:  06/01/24

## 2024-05-13 NOTE — CONSULTS
Nutrition Assessement Note    Nutrition Assessment    Reason for Assessment: Admission nursing screening (MST 3)  Patient known to dept from recent admission. Re-admitted after multiple falls at home w/ribs fractures. Multiple hospital stays this year. Patient states good appetite at this time, declines nutritional supplements. Patient anticipates discharge to SNF today.    Reason for Hospital Admission:  Linette Doyle is a 83 y.o. female who is admitted for neck pain after fall, GARY.    Past Medical History:   Diagnosis Date    Angina pectoris (CMS-HCC) 10/22/2023    Atherosclerosis of coronary artery 08/21/2019    Atherosclerotic heart disease of native coronary artery with other forms of angina pectoris (CMS-HCC) 10/22/2023    Hypercholesterolemia 12/28/2018    Hyperlipidemia 10/22/2023    Presence of cardiac pacemaker 10/22/2023    Primary hypertension 12/28/2018    Shortness of breath 11/26/2019    Sick sinus syndrome (Multi) 10/22/2023    Sinus bradycardia 10/22/2023    Stage 3a chronic kidney disease (Multi) 11/25/2019      Past Surgical History:   Procedure Laterality Date    CARDIAC CATHETERIZATION N/A 02/02/2024    Procedure: Left Heart Cath;  Surgeon: Becky Vela MD;  Location: [facility] Cardiac Cath Lab;  Service: Cardiovascular;  Laterality: N/A;    CARDIAC CATHETERIZATION N/A 02/02/2024    Procedure: PCI;  Surgeon: Becky Vela MD;  Location: [facility] Cardiac Cath Lab;  Service: Cardiovascular;  Laterality: N/A;    CARDIAC CATHETERIZATION N/A 02/08/2024    Procedure: Left Heart Cath;  Surgeon: Darrius Ibrahim MD;  Location: [facility] Cardiac Cath Lab;  Service: Cardiovascular;  Laterality: N/A;    CARDIAC CATHETERIZATION N/A 02/08/2024    Procedure: PCI;  Surgeon: Darrius Ibrahim MD;  Location: [facility] Cardiac Cath Lab;  Service: Cardiovascular;  Laterality: N/A;    PACEMAKER PLACEMENT  07/04/2019    [employer] PACEMAKE AND STENT PLACEMENT       Nutrition History:  Food and Nutrient History: Patient reports good  "appetite  Energy Intake: Good > 75 %  Food Allergies/Intolerances:  None  GI Symptoms: None  Oral Problems: None    Anthropometrics:  Ht: 162.6 cm (5' 4\"), Wt: 65.7 kg (144 lb 13.5 oz), BMI: 24.85  IBW/kg (Dietitian Calculated): 54.55 kg  Percent of IBW: 120 %       Weight Change:  Daily Weight  05/13/24 : 65.7 kg (144 lb 13.5 oz)  05/08/24 : 60.3 kg (133 lb)  05/06/24 : 62.2 kg (137 lb 1.6 oz)  04/25/24 : 60 kg (132 lb 6 oz)  04/23/24 : 60.8 kg (134 lb)  04/18/24 : 61.4 kg (135 lb 6 oz)  04/11/24 : 65 kg (143 lb 4.8 oz)  03/11/24 : 60.3 kg (133 lb)  02/28/24 : 66.7 kg (147 lb)  02/20/24 : 67.6 kg (149 lb)     Nutrition Focused Physical Exam Findings:      Nutrition Significant Labs:  Lab Results   Component Value Date    WBC 6.0 05/13/2024    HGB 9.3 (L) 05/13/2024    HCT 30.0 (L) 05/13/2024     (L) 05/13/2024    CHOL 138 07/05/2023    TRIG 188 (H) 07/05/2023    HDL 43 (L) 07/05/2023    ALT 29 05/09/2024    AST 41 (H) 05/09/2024     05/13/2024    K 4.4 05/13/2024     05/13/2024    CREATININE 1.50 05/13/2024    BUN 19 05/13/2024    CO2 22 (L) 05/13/2024    TSH 2.20 05/03/2024    INR 1.1 02/08/2024    HGBA1C 6.0 (H) 02/01/2024    ALBUR 17 07/05/2023     Nutrition Specific Medications:  amiodarone, 200 mg, oral, Daily  apixaban, 2.5 mg, oral, BID  atorvastatin, 80 mg, oral, Nightly  citalopram, 10 mg, oral, Daily  clopidogrel, 75 mg, oral, Daily  levothyroxine, 100 mcg, oral, Daily before breakfast  lidocaine, 1 patch, transdermal, Daily  metoprolol tartrate, 50 mg, oral, BID  pantoprazole, 20 mg, oral, Daily before breakfast  polyethylene glycol, 17 g, oral, Daily         Dietary Orders (From admission, onward)       Start     Ordered    05/09/24 2222  Adult diet Regular  Diet effective now        Question:  Diet type  Answer:  Regular    05/09/24 2224                   Estimated Needs:   Estimated Energy Needs  Total Energy Estimated Needs (kCal):  (2985-2318)  Total Estimated Energy Need per Day " (kCal/kg):  (25-28)  Method for Estimating Needs: Actual Wt    Estimated Protein Needs  Total Protein Estimated Needs (g): 66 g  Total Protein Estimated Needs (g/kg): 1 g/kg  Method for Estimating Needs: Actual Wt    Estimated Fluid Needs  Total Fluid Estimated Needs (mL): 1650 mL  Total Fluid Estimated Needs (mL/kg): 25 mL/kg  Method for Estimating Needs: Actual Wt      Nutrition Diagnosis   Nutrition Diagnosis:     Nutrition Diagnosis  Patient has Nutrition Diagnosis: No     Nutrition Interventions/Recommendations   Nutrition Interventions and Recommendations:    Nutrition Prescription:  Individualized Nutrition Prescription Provided for : 7682-4585 calories, 66 gm protein to be provided via diet    Nutrition Interventions:   Food and/or Nutrient Delivery Interventions  Interventions: Meals and snacks  Meals and Snacks: General healthful diet  Goal: provide as ordered    Education Documentation  No documentation found.         Nutrition Monitoring and Evaluation   Monitoring/Evaluation:   Food/Nutrient Related History Monitoring  Monitoring and Evaluation Plan: Energy intake  Energy Intake: Estimated energy intake  Criteria: pt to consume >/= 75% estimated needs       Time Spent/Follow-up:   Follow Up  Time Spent (min): 30 minutes  Last Date of Nutrition Visit: 05/13/24  Nutrition Follow-Up Needed?: 7-10 days  Follow up Comment: 5/20/24

## 2024-05-13 NOTE — PROGRESS NOTES
Physical Therapy    Physical Therapy Treatment    Patient Name: Linette Doyle  MRN: [id number]  Today's Date: 5/13/2024  Time Calculation  Start Time: 0940  Stop Time: 1006  Time Calculation (min): 26 min    Assessment/Plan   PT Assessment  End of Session Communication: Bedside nurse  Assessment Comment: pt demonstrating improving ambulation tolerance with FWW----still requires frequent verbal cues for safety; Appears forgetful.  End of Session Patient Position: Up in chair, Alarm on (call button in reach)  PT Plan  Inpatient/Swing Bed or Outpatient: Inpatient  PT Plan  Treatment/Interventions: Bed mobility, Transfer training, Gait training, Stair training, Balance training, Strengthening, Endurance training, Therapeutic exercise, Therapeutic activity  PT Plan: Skilled PT  PT Frequency: 4 times per week  PT Discharge Recommendations: Moderate intensity level of continued care  Equipment Recommended upon Discharge: Wheeled walker  PT Recommended Transfer Status: Assist x1  PT - OK to Discharge: Yes      General Visit Information:   PT  Visit  PT Received On: 05/13/24  General  Prior to Session Communication: Bedside nurse  Patient Position Received: Bed, 4 rail up, Alarm off, not on at start of session  Preferred Learning Style: verbal, visual  General Comment: cleared by nurse for therapy; pt agreeable to therapy    Subjective   Precautions:  Precautions  Hearing/Visual Limitations: reading glasses  Medical Precautions: Fall precautions  Vital Signs:  Vital Signs  Heart Rate: 64  SpO2: 90 %  Patient Position: Lying    Objective   Pain:  Pain Assessment  Pain Assessment: 0-10  Pain Score: 0 - No pain  Cognition:  Cognition  Overall Cognitive Status: Within Functional Limits  Orientation Level: Oriented X4  Safety/Judgement:  (decreased safety and carryover insight during functional mobility)  Insight: Mild  Postural Control:  Postural Control  Posture Comment: mild forward head, protracted shldrs  Extremity/Trunk  Assessments:    Activity Tolerance:  Activity Tolerance  Endurance: Decreased tolerance for upright activites  Activity Tolerance Comments: fair +  Treatments:  Therapeutic Exercise  Therapeutic Exercise Performed: Yes  Therapeutic Exercise Activity 1: supine dylan AP x 30 reps  Therapeutic Exercise Activity 2: supine dylan hip abd/add x 20 reps  Therapeutic Exercise Activity 3: seated dylan LAQ's x 25 reps  Therapeutic Exercise Activity 4: seated dylan marching x 20 reps    Therapeutic Activity  Therapeutic Activity Performed: Yes (see bed mobility, transfers, amb with FWW comments)    Bed Mobility  Bed Mobility: Yes  Bed Mobility 1  Level of Assistance 1: Contact guard, Minimal verbal cues  Bed Mobility Comments 1: head of bed elevated; contact guard of 1 for trunk up, verbal cues for active use of dylan UE's    Ambulation/Gait Training  Ambulation/Gait Training Performed: Yes  Ambulation/Gait Training 1  Surface 1: Level tile  Device 1: Rolling walker  Assistance 1: Minimum assistance, Minimal verbal cues  Quality of Gait 1: Narrow base of support, Diminished heel strike, Decreased step length  Comments/Distance (ft) 1: pt amb 60 ft x 2 with FWW, + turns, min assist of 1, verbal cues for staying within frame of FWW, increased B of S, increased step length. o2 sat 94% with HR 66 bpm; Verbal cues also for safe steering around objects in jeffers.  Transfers  Transfer: Yes  Transfer 1  Transfer From 1: Sit to  Transfer to 1: Stand  Technique 1: Sit to stand  Transfer Level of Assistance 1: Minimum assistance, Minimal verbal cues  Trials/Comments 1: min assist of 1 for trunk up, verbal cues for proper dylan hand placement on bed  Transfers 2  Transfer From 2: Stand to  Transfer to 2: Sit  Technique 2: Stand to sit  Transfer Level of Assistance 2: Minimum assistance, Minimal verbal cues  Trials/Comments 2: min assist of 1 for trunk down, verbal cues for backing fully into chair, reaching back with both hands for arms of  chair    Outcome Measures:  [facility] Basic Mobility  Turning from your back to your side while in a flat bed without using bedrails: A little  Moving from lying on your back to sitting on the side of a flat bed without using bedrails: A lot  Moving to and from bed to chair (including a wheelchair): A little  Standing up from a chair using your arms (e.g. wheelchair or bedside chair): A little  To walk in hospital room: A little  Climbing 3-5 steps with railing: A lot  Basic Mobility - Total Score: 16    Education Documentation  Precautions, taught by Molly Brooke, PT at 5/13/2024 10:23 AM.  Learner: Patient  Readiness: Acceptance  Method: Explanation, Demonstration  Response: Needs Reinforcement          Mobility Training, taught by Molly Brooke PT at 5/13/2024 10:23 AM.  Learner: Patient  Readiness: Acceptance  Method: Explanation, Demonstration  Response: Needs Reinforcement                 Encounter Problems       Encounter Problems (Active)       Balance       LTG - Patient will maintain static/dynamic standing and sitting balance to allow for safe completion of functional activities at a distant supervision level. (Progressing)       Start:  05/10/24    Expected End:  06/09/24               Mobility       STG - Patient will ambulate 100' with rolling walker and distant supervision. (Progressing)       Start:  05/10/24    Expected End:  06/09/24            STG - Patient will ambulate up and down a curb/step with close supervision and no assistive device (Progressing)       Start:  05/10/24    Expected End:  06/09/24               PT Transfers       STG - Transfer from bed to chair with distant supervision. (Progressing)       Start:  05/10/24    Expected End:  06/09/24            STG - Patient to transfer to and from sit to supine with distant supervision. (Progressing)       Start:  05/10/24    Expected End:  06/09/24            STG - Patient will transfer sit to and from stand with distant supervision and  correct hand placement. (Progressing)       Start:  05/10/24    Expected End:  06/09/24                 Safety       LTG - Patient will demonstrate safety requirements appropriate to situation/environment (Progressing)       Start:  05/10/24    Expected End:  06/09/24

## 2024-05-14 ENCOUNTER — APPOINTMENT (OUTPATIENT)
Dept: PRIMARY CARE | Facility: CLINIC | Age: 84
End: 2024-05-14
Payer: MEDICARE

## 2024-05-14 ENCOUNTER — PHARMACY VISIT (OUTPATIENT)
Dept: PHARMACY | Facility: CLINIC | Age: 84
End: 2024-05-14

## 2024-05-14 LAB
ANION GAP SERPL CALC-SCNC: 11 MMOL/L
BUN SERPL-MCNC: 19 MG/DL (ref 8–25)
CALCIUM SERPL-MCNC: 9.2 MG/DL (ref 8.5–10.4)
CHLORIDE SERPL-SCNC: 108 MMOL/L (ref 97–107)
CO2 SERPL-SCNC: 23 MMOL/L (ref 24–31)
CREAT SERPL-MCNC: 1.6 MG/DL (ref 0.4–1.6)
EGFRCR SERPLBLD CKD-EPI 2021: 31 ML/MIN/1.73M*2
GLUCOSE SERPL-MCNC: 100 MG/DL (ref 65–99)
HCT VFR BLD AUTO: 28.1 % (ref 36–46)
HGB BLD-MCNC: 9.1 G/DL (ref 12–16)
POTASSIUM SERPL-SCNC: 4.2 MMOL/L (ref 3.4–5.1)
SODIUM SERPL-SCNC: 142 MMOL/L (ref 133–145)

## 2024-05-14 PROCEDURE — 85014 HEMATOCRIT: CPT | Performed by: INTERNAL MEDICINE

## 2024-05-14 PROCEDURE — RXMED WILLOW AMBULATORY MEDICATION CHARGE

## 2024-05-14 PROCEDURE — 2500000006 HC RX 250 W HCPCS SELF ADMINISTERED DRUGS (ALT 637 FOR ALL PAYERS): Performed by: INTERNAL MEDICINE

## 2024-05-14 PROCEDURE — 97110 THERAPEUTIC EXERCISES: CPT | Mod: GP

## 2024-05-14 PROCEDURE — 2500000001 HC RX 250 WO HCPCS SELF ADMINISTERED DRUGS (ALT 637 FOR MEDICARE OP): Performed by: INTERNAL MEDICINE

## 2024-05-14 PROCEDURE — 36415 COLL VENOUS BLD VENIPUNCTURE: CPT | Performed by: INTERNAL MEDICINE

## 2024-05-14 PROCEDURE — 80048 BASIC METABOLIC PNL TOTAL CA: CPT | Performed by: INTERNAL MEDICINE

## 2024-05-14 PROCEDURE — 97535 SELF CARE MNGMENT TRAINING: CPT | Mod: GO,CO

## 2024-05-14 PROCEDURE — 2500000005 HC RX 250 GENERAL PHARMACY W/O HCPCS: Performed by: INTERNAL MEDICINE

## 2024-05-14 PROCEDURE — 2500000001 HC RX 250 WO HCPCS SELF ADMINISTERED DRUGS (ALT 637 FOR MEDICARE OP): Performed by: STUDENT IN AN ORGANIZED HEALTH CARE EDUCATION/TRAINING PROGRAM

## 2024-05-14 PROCEDURE — 1210000001 HC SEMI-PRIVATE ROOM DAILY

## 2024-05-14 PROCEDURE — 97530 THERAPEUTIC ACTIVITIES: CPT | Mod: GP

## 2024-05-14 PROCEDURE — 1090000001 HH PPS REVENUE CREDIT

## 2024-05-14 PROCEDURE — 1090000002 HH PPS REVENUE DEBIT

## 2024-05-14 PROCEDURE — 97116 GAIT TRAINING THERAPY: CPT | Mod: GP

## 2024-05-14 RX ORDER — POLYETHYLENE GLYCOL 3350 17 G/17G
17 POWDER, FOR SOLUTION ORAL DAILY PRN
Start: 2024-05-14 | End: 2024-05-21

## 2024-05-14 RX ORDER — AMIODARONE HYDROCHLORIDE 200 MG/1
200 TABLET ORAL DAILY
Qty: 30 TABLET | Refills: 0 | Status: SHIPPED | OUTPATIENT
Start: 2024-05-15 | End: 2024-06-14

## 2024-05-14 RX ORDER — ACETAMINOPHEN 325 MG/1
650 TABLET ORAL EVERY 6 HOURS PRN
Start: 2024-05-14 | End: 2024-05-21

## 2024-05-14 RX ORDER — ACETAMINOPHEN 500 MG
5 TABLET ORAL NIGHTLY PRN
Refills: 0
Start: 2024-05-14 | End: 2024-06-07 | Stop reason: HOSPADM

## 2024-05-14 RX ADMIN — LIDOCAINE 1 PATCH: 4 PATCH TOPICAL at 21:40

## 2024-05-14 RX ADMIN — LEVOTHYROXINE SODIUM 100 MCG: 0.1 TABLET ORAL at 05:36

## 2024-05-14 RX ADMIN — CITALOPRAM HYDROBROMIDE 10 MG: 10 TABLET ORAL at 09:23

## 2024-05-14 RX ADMIN — PANTOPRAZOLE SODIUM 20 MG: 20 TABLET, DELAYED RELEASE ORAL at 05:36

## 2024-05-14 RX ADMIN — CLOPIDOGREL BISULFATE 75 MG: 75 TABLET ORAL at 09:23

## 2024-05-14 RX ADMIN — APIXABAN 2.5 MG: 2.5 TABLET, FILM COATED ORAL at 09:23

## 2024-05-14 RX ADMIN — ATORVASTATIN CALCIUM 80 MG: 80 TABLET, FILM COATED ORAL at 21:41

## 2024-05-14 RX ADMIN — APIXABAN 2.5 MG: 2.5 TABLET, FILM COATED ORAL at 21:41

## 2024-05-14 ASSESSMENT — COGNITIVE AND FUNCTIONAL STATUS - GENERAL
STANDING UP FROM CHAIR USING ARMS: A LITTLE
WALKING IN HOSPITAL ROOM: A LITTLE
WALKING IN HOSPITAL ROOM: A LITTLE
MOVING TO AND FROM BED TO CHAIR: A LITTLE
DRESSING REGULAR UPPER BODY CLOTHING: A LITTLE
DAILY ACTIVITIY SCORE: 18
PERSONAL GROOMING: A LITTLE
MOBILITY SCORE: 18
PERSONAL GROOMING: A LITTLE
TURNING FROM BACK TO SIDE WHILE IN FLAT BAD: A LITTLE
MOBILITY SCORE: 18
HELP NEEDED FOR BATHING: A LITTLE
DRESSING REGULAR LOWER BODY CLOTHING: A LITTLE
MOVING FROM LYING ON BACK TO SITTING ON SIDE OF FLAT BED WITH BEDRAILS: A LITTLE
EATING MEALS: A LITTLE
CLIMB 3 TO 5 STEPS WITH RAILING: A LITTLE
CLIMB 3 TO 5 STEPS WITH RAILING: A LITTLE
HELP NEEDED FOR BATHING: A LITTLE
DRESSING REGULAR LOWER BODY CLOTHING: A LITTLE
TURNING FROM BACK TO SIDE WHILE IN FLAT BAD: A LITTLE
TOILETING: A LITTLE
EATING MEALS: A LITTLE
STANDING UP FROM CHAIR USING ARMS: A LITTLE
DRESSING REGULAR UPPER BODY CLOTHING: A LITTLE
TOILETING: A LITTLE
MOVING TO AND FROM BED TO CHAIR: A LITTLE
MOVING FROM LYING ON BACK TO SITTING ON SIDE OF FLAT BED WITH BEDRAILS: A LITTLE
DAILY ACTIVITIY SCORE: 18

## 2024-05-14 ASSESSMENT — PAIN SCALES - GENERAL
PAINLEVEL_OUTOF10: 2
PAINLEVEL_OUTOF10: 0 - NO PAIN
PAINLEVEL_OUTOF10: 8

## 2024-05-14 ASSESSMENT — PAIN - FUNCTIONAL ASSESSMENT
PAIN_FUNCTIONAL_ASSESSMENT: 0-10

## 2024-05-14 ASSESSMENT — ACTIVITIES OF DAILY LIVING (ADL): HOME_MANAGEMENT_TIME_ENTRY: 14

## 2024-05-14 NOTE — PROGRESS NOTES
Physical Therapy    Physical Therapy Treatment    Patient Name: Linette Doyle  MRN: [id number]  Today's Date: 5/14/2024  Time Calculation  Start Time: 1054  Stop Time: 1132  Time Calculation (min): 38 min    Assessment/Plan   PT Assessment  PT Assessment Results: Decreased strength, Decreased endurance, Impaired balance, Decreased mobility, Decreased cognition, Impaired judgement, Decreased safety awareness, Impaired tone  Rehab Prognosis: Good  Evaluation/Treatment Tolerance: Patient tolerated treatment well  End of Session Communication: Bedside nurse  Assessment Comment: pt safe using rolling walker; MIN A to steady and for safety. no balance deficits noted  End of Session Patient Position: Up in chair, Alarm on (call button in reach)    PT Plan  Inpatient/Swing Bed or Outpatient: Inpatient  PT Plan  Treatment/Interventions: Bed mobility, Transfer training, Gait training, Stair training, Balance training, Strengthening, Endurance training, Therapeutic exercise, Therapeutic activity  PT Plan: Skilled PT  PT Frequency: 4 times per week  PT Discharge Recommendations: Moderate intensity level of continued care  Equipment Recommended upon Discharge: Wheeled walker  PT Recommended Transfer Status: Assist x1  PT - OK to Discharge: Yes      General Visit Information:   PT  Visit  PT Received On: 05/14/24  General  Reason for Referral: s/p multiple falls resulting in Left rib fractures; impaired mobility  Referred By: Henry Ricci DO  Past Medical History Relevant to Rehab: SSS s/p pacemaker, CAD, MI, cardiac stent, afib, TIA diverticulitis, hypothyroid, HTN, CKD,  Prior to Session Communication: Bedside nurse  Patient Position Received: Bed, 2 rail up  General Comment: pt alert and cooperative; willing to work with therapy    Subjective   Precautions:  Precautions  Medical Precautions: Fall precautions    Vital Signs:  Vital Signs  SpO2:  (95% on room air)    Objective   Pain:  Pain Assessment  Pain Assessment:   (0/10)    Cognition:  Cognition  Overall Cognitive Status: Within Functional Limits    Coordination:  Coordination Comment: short B step length with narrow PENELOPE    Postural Control:  Postural Control  Posture Comment: mild forward head, protracted shldrs      Activity Tolerance:  Activity Tolerance  Endurance:  (GOOD activity tolerance)      Treatments:    Therapeutic Exercise Performed:  (B LE therex; AP, GS, QS, ABD, LAQ, hip flexion and calf raises x 15 reps;)    Therapeutic Activity Performed:  assisted pt safely onto commode with MIN A. pt was inconstinent of stool. assisted pt with donning soiled pants/socks; pt able to perform pericare. therapist assisted with donning clean briefs, and hospital pants. pt able to don socks with SUPERVISION.    Bed Mobility:  (supine to sit with CGA.)      Ambulation/Gait Training Performed:  pt amb 60' x 1 using rolling walker with MIN A. pt presented with slow gloria, narrow PENELOPE and short B step length. VC to stay inside base of rolling walker while maneuvering turns. pt asked to use bathroom toward end of gait.      Transfer:  sit <-> stand with MIN A. increased time and effort noted.      Outcome Measures:  [facility] Basic Mobility  Turning from your back to your side while in a flat bed without using bedrails: A little  Moving from lying on your back to sitting on the side of a flat bed without using bedrails: A little  Moving to and from bed to chair (including a wheelchair): A little  Standing up from a chair using your arms (e.g. wheelchair or bedside chair): A little  To walk in hospital room: A little  Climbing 3-5 steps with railing: A little  Basic Mobility - Total Score: 18           Encounter Problems       Encounter Problems (Active)       Balance       LTG - Patient will maintain static/dynamic standing and sitting balance to allow for safe completion of functional activities at a distant supervision level. (Progressing)       Start:  05/10/24    Expected End:   06/09/24               Mobility       STG - Patient will ambulate 100' with rolling walker and distant supervision. (Progressing)       Start:  05/10/24    Expected End:  06/09/24            STG - Patient will ambulate up and down a curb/step with close supervision and no assistive device (Progressing)       Start:  05/10/24    Expected End:  06/09/24               PT Transfers       STG - Transfer from bed to chair with distant supervision. (Progressing)       Start:  05/10/24    Expected End:  06/09/24            STG - Patient to transfer to and from sit to supine with distant supervision. (Progressing)       Start:  05/10/24    Expected End:  06/09/24            STG - Patient will transfer sit to and from stand with distant supervision and correct hand placement. (Progressing)       Start:  05/10/24    Expected End:  06/09/24               Pain - Adult          Safety       LTG - Patient will demonstrate safety requirements appropriate to situation/environment (Progressing)       Start:  05/10/24    Expected End:  06/09/24

## 2024-05-14 NOTE — PROGRESS NOTES
Linette Doyle is a 83 y.o. female on day 5 of admission presenting with Closed fracture of multiple ribs of left side, initial encounter.      Subjective     Patient denied abdominal pain.    She denied chest pain or shortness of breath.            Objective     Last Recorded Vitals  /84 (BP Location: Right arm, Patient Position: Lying)   Pulse 60   Temp 36 °C (96.8 °F) (Temporal)   Resp 17   Wt 64.7 kg (142 lb 10.2 oz)   SpO2 100%   Intake/Output last 3 Shifts:  No intake or output data in the 24 hours ending 05/14/24 0735      Admission Weight  Weight: 63.5 kg (139 lb 15.9 oz) (05/09/24 1836)    Daily Weight  05/14/24 : 64.7 kg (142 lb 10.2 oz)    Image Results  Electrocardiogram, 12-lead PRN ACS symptoms  AV dual-paced rhythm with prolonged AV conduction  Abnormal ECG  When compared with ECG of 03-MAY-2024 00:01,  No significant change was found  Confirmed by Darrius Ibrahim ([id number]) on 5/10/2024 3:34:47 PM    Physical  exam    General: In non acute distress, cooperating during physical exam.  HEENT: Pupils are equal and reactive to light and commendation , oral mucosa moist, no JVD oral mucosa is moist.  Cardiovascular: Normal sinus rhythm, no MRG.  Lungs: Clear to auscultation bilaterally, no wheezing, no crackles, no dullness to percussion.  Abdomen: Ecchymotic area on the left side of the abdomen.  No hepatosplenomegaly appreciated, soft , not tender, positive bowel sounds, positive bowel movement.  Neuro: Alert and oriented x3, strength in upper and lower extremities , sensation intact.  Psych: Patient had great insight was going on  Musculoskeletal: No swelling in lower extremities, no limitation in range of motion.  Vascular: Pulses are intact in upper and lower extremities  Skin: Ecchymosis on the left side of abdomen not painful to touch, no flank pain   Assessment/Plan      Mechanical fall  History of frequent falls  Iliopsoas hematoma  Subacute left rib fracture  Patient was evaluated by trauma  surgery  Fall precaution   NH has been stable, patient is on Eliquis and Plavix  Advise regarding the risk and benefits being on this medication.  Patient was evaluated by physical therapy  Patient will need skilled nursing facility   Discussed with care coordination on the case  Plan to discharge to [facility] in AM.    Atrial fibrillation  Dr. Ibrahim on the case  Continue with amiodarone   Eliquis with renal dosage  Patient may need the Watchman device, follow-up with her regular cardiologist as outpatient  Follow-up with Dr. Ibrahim in 1 to 2 weeks    Acute kidney injury on chronic kidney disease  Baseline creatinine 1.4  Creatinine today improved creatinine 1.8  Dr. Giang on the case  Patient was treated with Bactrim for urinary tract infection     Coronary artery disease  Status post stent placement  Cardiology on the case  Continue with Plavix    Recent UTI   treated with Bactrim  She completed full course of antibiotics.      I expect the patient to get discharge 24 hours to [facility]    CBC and BMP in AM.        Principal Problem:    Closed fracture of multiple ribs of left side, initial encounter                  Arminda Nguyen MD

## 2024-05-14 NOTE — DOCUMENTATION CLARIFICATION NOTE
"    PATIENT:               SAMUEL VILLALOBOS  ACCT #:                  [id number]  MRN:                       [id number]  :                       1940  ADMIT DATE:       2024 6:31 PM  DISCH DATE:  RESPONDING PROVIDER #:        [id number]          PROVIDER RESPONSE TEXT:    Iliopsoas hematoma due to or enhanced by Eliquis and Plavix    CDI QUERY TEXT:    Clarification    Instruction:    Based on your assessment of the patient and the clinical information, please provide the requested documentation by clicking on the appropriate radio button and enter any additional information if prompted.    Question: Please further clarify if a relationship exists between the Iliopsoas hematoma and Eliquis and Plavix    When answering this query, please exercise your independent professional judgment. The fact that a question is being asked, does not imply that any particular answer is desired or expected.    The patient's clinical indicators include:  Clinical Information: 83 Y/F presenting with repeated falls.    Clinical Indicators:     CT: \"Nondisplaced fractures of the left anterolateral 6th and 9th ribs are visualized...There is mild asymmetric fullness of the left iliopsoas muscle and adjacent fat stranding, for example axial image 72 of 144 suggestive of trace iliopsoas hematoma.\"     ED:\" Bruising (Hematoma noted to the left side) present.\"    5/10 Cardiology Consult:\" History of acute non-ST elevation myocardial infarction in 2024 was found to have 100% occluded small left circumflex artery.  The right coronary artery was treated with drug-eluting stent.  Attempted intervention to the left circumflex artery was not successful... Recommend to continue Plavix 75 mg oral daily.  Had her stent back in February.  Recommend 6 to 12 months of clopidogrel and addition of Eliquis no aspirin.\"    Treatment: Eliquis and Plavix held and cardiology consult obtained    Risk Factors: 4 mechanical falls at home, on " Eliquis, Plavix  Options provided:  -- Iliopsoas hematoma due to or enhanced by Eliquis and Plavix  -- Iliopsoas hematoma not related to Eliquis and Plavix  -- Other, Please specify additional information below  -- Other - I will add my own diagnosis  -- Refer to Clinical Documentation Reviewer    Query created by: Hailee Ko on 5/13/2024 5:49 PM      Electronically signed by:  SYLVIA ISSA MD 5/14/2024 3:45 PM

## 2024-05-14 NOTE — CARE PLAN
Problem: Fall/Injury  Goal: Not fall by end of shift  Outcome: Met     Problem: Fall/Injury  Goal: Be free from injury by end of the shift  Outcome: Met

## 2024-05-14 NOTE — PROGRESS NOTES
Occupational Therapy    OT Treatment    Patient Name: Linette Doyle  MRN: [id number]  Today's Date: 5/14/2024  Time Calculation  Start Time: 1451  Stop Time: 1505  Time Calculation (min): 14 min         Assessment:  OT Assessment: Gradual progress made towards OT goals. Continue with current OT POC to increase strength, balance and functional tolerance to maximize safety and independence during ADLs.  Evaluation/Treatment Tolerance: Patient limited by fatigue, Patient limited by pain  End of Session Communication: PCT/NA/CTA  End of Session Patient Position: Bed, 3 rail up, Alarm on (all needs in reach)  OT Assessment Results: Decreased ADL status, Decreased upper extremity strength, Decreased upper extremity range of motion, Decreased safe judgment during ADL, Decreased endurance, Decreased functional mobility  Evaluation/Treatment Tolerance: Patient limited by fatigue, Patient limited by pain  Plan:  Treatment Interventions: ADL retraining, Functional transfer training, UE strengthening/ROM, Patient/family training, Equipment evaluation/education  OT Frequency: 4 times per week  OT Discharge Recommendations: Moderate intensity level of continued care  OT - OK to Discharge: Yes  Treatment Interventions: ADL retraining, Functional transfer training, UE strengthening/ROM, Patient/family training, Equipment evaluation/education    Subjective   Previous Visit Info:  OT Last Visit  OT Received On: 05/14/24  General:  General  Reason for Referral: s/p multiple falls resulting in Left rib fractures; impaired ADLs  Past Medical History Relevant to Rehab: SSS s/p pacemaker, CAD, MI, cardiac stent, afib, TIA diverticulitis, hypothyroid, HTN, CKD,  Prior to Session Communication: Bedside nurse  Patient Position Received: Bed, 3 rail up, Alarm off, not on at start of session  General Comment: Pt cleared for OT session per nursing, pleasant and cooperative this date.  Precautions:  Hearing/Visual Limitations: reading  glasses  Medical Precautions: Fall precautions  Vital Signs:     Pain:  Pain Assessment  Pain Assessment: 0-10  Pain Score: 2  Pain Type: Acute pain  Pain Location: Rib cage  Pain Orientation: Left  Clinical Progression: Not changed    Objective    Cognition:  Cognition  Overall Cognitive Status: Within Functional Limits  Orientation Level: Oriented X4  Safety/Judgement: Exceptions to WFL  Insight: Mild  Impulsive: Mildly  Task Initiation: WFL  Processing Speed: Within funtional limits  Coordination:  Movements are Fluid and Coordinated: Yes  Activities of Daily Living: Grooming  Grooming Comments: hand hygiene completed standing at sink with close supervision and FWW- min verbal cues required for AD management to increase safety and decrease risk of falls.    UE Bathing  UE Bathing Comments: pt pleasantly declined participation in bathing tasks this date d/t reports of task completion prior to OT session.    Toileting  Toileting Comments: pt able to complete all aspects of toileting with Walker for LB clothing management to don/doff pants over hips in supported standing  Functional Standing Tolerance:     Bed Mobility/Transfers: Bed Mobility  Bed Mobility: Yes  Bed Mobility 1  Bed Mobility 1: Supine to sitting, Sitting to supine  Level of Assistance 1: Close supervision  Bed Mobility Comments 1: verbal/ tactile cues required for log roll sequencing to decrease L flank pain    Transfers  Transfer: Yes  Transfer 1  Trials/Comments 1: sit<>stands completed from standard EOB and toilet heights with FWW and close supervision- verbal cues required for proper hand placement to increase safety and decrease risk of falls, poor carry-over observed.    Toilet Transfers  Toilet Transfer From: Bed  Toilet Transfer Type: To and from  Toilet Transfer to: Standard toilet  Toilet Transfer Technique: Ambulating  Toilet Transfers: Supervision  Toilet Transfers Comments: with FWW    Functional Mobility:  Functional Mobility  Functional  Mobility Performed: Yes  Functional Mobility 1  Surface 1: Level tile  Device 1: Rolling walker  Assistance 1: Contact guard  Comments 1: Functional mobility trial completed <>bathroom with FWW and CGA, pt intermittently impulsive requiring verbal cues for self-pacing to decrease risk of falls.      Outcome Measures:[facility] Daily Activity  Putting on and taking off regular lower body clothing: A little  Bathing (including washing, rinsing, drying): A little  Putting on and taking off regular upper body clothing: A little  Toileting, which includes using toilet, bedpan or urinal: A little  Taking care of personal grooming such as brushing teeth: A little  Eating Meals: A little  Daily Activity - Total Score: 18        Education Documentation  ADL Training, taught by COLLEEN Maier at 5/14/2024  5:01 PM.  Learner: Patient  Readiness: Acceptance  Method: Explanation, Demonstration  Response: Needs Reinforcement    Education Comments  Education provided on role of OT/POC, safety awareness throughout functional tasks/transfers, importance of activity/ rest routine, EC/WS techniques, and use of call light for assistance. Questions, comments and concerns addressed regarding OT.      Goals:  Encounter Problems       Encounter Problems (Active)       ADLs       Pt will complete ADL tasks at mod I with use of AE prn  (Progressing)       Start:  05/10/24    Expected End:  06/01/24               Functional Mobility       Pt will perform functional mobility household distances at mod I with use of LRAD.    (Progressing)       Start:  05/10/24    Expected End:  06/01/24               Instrumental Activities of Daily Living       Pt will perform simple IADLs/retrieval of items at mod I.   (Progressing)       Start:  05/10/24    Expected End:  06/01/24               OT Transfers       Pt will perform functional transfers at mod I (Progressing)       Start:  05/10/24    Expected End:  06/01/24

## 2024-05-14 NOTE — PROGRESS NOTES
"Linette Doyle is a 83 y.o. female on day 5 of admission presenting with Closed fracture of multiple ribs of left side, initial encounter.    Subjective   Patient is doing well without any complaints less pain in the ribs area and the thigh area     Objective     Physical Exam  Neck:      Vascular: No carotid bruit.   Cardiovascular:      Rate and Rhythm: Normal rate and regular rhythm.      Heart sounds: No murmur heard.     No friction rub. No gallop.   Pulmonary:      Breath sounds: No wheezing, rhonchi or rales.   Chest:      Chest wall: No tenderness.   Abdominal:      General: There is no distension.      Tenderness: There is no abdominal tenderness. There is no guarding or rebound.   Musculoskeletal:         General: No swelling or tenderness.      Cervical back: Neck supple.      Right lower leg: No edema.      Left lower leg: No edema.   Lymphadenopathy:      Cervical: No cervical adenopathy.         Last Recorded Vitals  Blood pressure 167/84, pulse 60, temperature 36 °C (96.8 °F), temperature source Temporal, resp. rate 17, height 1.626 m (5' 4\"), weight 64.7 kg (142 lb 10.2 oz), SpO2 100%, unknown if currently breastfeeding.  Intake/Output last 3 Shifts:  No intake/output data recorded.    Relevant Results     Creatinine today is 1.6 normal electrolytes                 Assessment/Plan    Acute kidney injury superimposed on chronic kidney disease stage III creatinine is back to baseline at 1.6 okay to discharge from renal point review  Chronic kidney disease stage IIIb creatinine back to baseline  Multiple falls  Left iliopsoas hematoma much improved  Left ribs fracture  Hypertension  Hyperlipidemia    Ronnie Giang MD      "

## 2024-05-14 NOTE — PROGRESS NOTES
05/14/24 1140   Discharge Planning   Patient expects to be discharged to: [facility] bed available wednesday     Patient updated and agreeable at this time

## 2024-05-15 VITALS
SYSTOLIC BLOOD PRESSURE: 129 MMHG | RESPIRATION RATE: 18 BRPM | HEART RATE: 60 BPM | TEMPERATURE: 98.2 F | DIASTOLIC BLOOD PRESSURE: 56 MMHG | HEIGHT: 64 IN | BODY MASS INDEX: 24.39 KG/M2 | WEIGHT: 142.86 LBS | OXYGEN SATURATION: 93 %

## 2024-05-15 LAB
ANION GAP SERPL CALC-SCNC: 13 MMOL/L
BUN SERPL-MCNC: 18 MG/DL (ref 8–25)
CALCIUM SERPL-MCNC: 9.3 MG/DL (ref 8.5–10.4)
CHLORIDE SERPL-SCNC: 108 MMOL/L (ref 97–107)
CO2 SERPL-SCNC: 21 MMOL/L (ref 24–31)
CREAT SERPL-MCNC: 1.4 MG/DL (ref 0.4–1.6)
EGFRCR SERPLBLD CKD-EPI 2021: 37 ML/MIN/1.73M*2
GLUCOSE SERPL-MCNC: 95 MG/DL (ref 65–99)
HCT VFR BLD AUTO: 30 % (ref 36–46)
HGB BLD-MCNC: 9.3 G/DL (ref 12–16)
POTASSIUM SERPL-SCNC: 4.2 MMOL/L (ref 3.4–5.1)
SARS-COV-2 RNA RESP QL NAA+PROBE: NOT DETECTED
SODIUM SERPL-SCNC: 142 MMOL/L (ref 133–145)

## 2024-05-15 PROCEDURE — 2500000001 HC RX 250 WO HCPCS SELF ADMINISTERED DRUGS (ALT 637 FOR MEDICARE OP): Performed by: INTERNAL MEDICINE

## 2024-05-15 PROCEDURE — 2500000001 HC RX 250 WO HCPCS SELF ADMINISTERED DRUGS (ALT 637 FOR MEDICARE OP): Performed by: STUDENT IN AN ORGANIZED HEALTH CARE EDUCATION/TRAINING PROGRAM

## 2024-05-15 PROCEDURE — 87635 SARS-COV-2 COVID-19 AMP PRB: CPT | Performed by: INTERNAL MEDICINE

## 2024-05-15 PROCEDURE — 85014 HEMATOCRIT: CPT | Performed by: INTERNAL MEDICINE

## 2024-05-15 PROCEDURE — 2500000006 HC RX 250 W HCPCS SELF ADMINISTERED DRUGS (ALT 637 FOR ALL PAYERS): Performed by: INTERNAL MEDICINE

## 2024-05-15 PROCEDURE — 36415 COLL VENOUS BLD VENIPUNCTURE: CPT | Performed by: INTERNAL MEDICINE

## 2024-05-15 PROCEDURE — 1090000002 HH PPS REVENUE DEBIT

## 2024-05-15 PROCEDURE — 80048 BASIC METABOLIC PNL TOTAL CA: CPT | Performed by: INTERNAL MEDICINE

## 2024-05-15 PROCEDURE — 1090000001 HH PPS REVENUE CREDIT

## 2024-05-15 RX ADMIN — APIXABAN 2.5 MG: 2.5 TABLET, FILM COATED ORAL at 08:29

## 2024-05-15 RX ADMIN — PANTOPRAZOLE SODIUM 20 MG: 20 TABLET, DELAYED RELEASE ORAL at 06:30

## 2024-05-15 RX ADMIN — AMIODARONE HYDROCHLORIDE 200 MG: 200 TABLET ORAL at 08:29

## 2024-05-15 RX ADMIN — CITALOPRAM HYDROBROMIDE 10 MG: 10 TABLET ORAL at 08:29

## 2024-05-15 RX ADMIN — METOPROLOL TARTRATE 50 MG: 50 TABLET, FILM COATED ORAL at 08:29

## 2024-05-15 RX ADMIN — CLOPIDOGREL BISULFATE 75 MG: 75 TABLET ORAL at 08:29

## 2024-05-15 RX ADMIN — LEVOTHYROXINE SODIUM 100 MCG: 0.1 TABLET ORAL at 06:30

## 2024-05-15 ASSESSMENT — COGNITIVE AND FUNCTIONAL STATUS - GENERAL
WALKING IN HOSPITAL ROOM: A LITTLE
MOVING FROM LYING ON BACK TO SITTING ON SIDE OF FLAT BED WITH BEDRAILS: A LITTLE
DAILY ACTIVITIY SCORE: 18
STANDING UP FROM CHAIR USING ARMS: A LITTLE
MOVING TO AND FROM BED TO CHAIR: A LITTLE
CLIMB 3 TO 5 STEPS WITH RAILING: A LITTLE
TOILETING: A LITTLE
MOBILITY SCORE: 18
HELP NEEDED FOR BATHING: A LITTLE
DRESSING REGULAR UPPER BODY CLOTHING: A LITTLE
DRESSING REGULAR LOWER BODY CLOTHING: A LITTLE
TURNING FROM BACK TO SIDE WHILE IN FLAT BAD: A LITTLE
EATING MEALS: A LITTLE
PERSONAL GROOMING: A LITTLE

## 2024-05-15 ASSESSMENT — PAIN SCALES - GENERAL: PAINLEVEL_OUTOF10: 0 - NO PAIN

## 2024-05-15 NOTE — NURSING NOTE
2000  pt admissio completed.  Pt educated on POC, stepdwown unit and call light.  Bed alarm on .  
Assumed care of patient.  Patient in bed resting.  Bedside shift report received.  Call light in reach.   
Patient being discharged to [facility], report called and given to nurse Pearl.  Pearl informed that pt will be coming there with amiodarone which was filled by  pharmacy. All questions answered at this time. Pickup scheduled for 1500   
Patient discharged in stable condition, no sign of acute distress. Transport arranged for pt via stretcher, pt picked up to go to [facility]   
Pt sleeping comfortably.  VSS. Ivf infusing. Calls appropriately for BRP with standby assist and wheeled walker.  Pt is somewhat shaky wih weak lower extremities. Pt gets frustrated that she is no functioning at her baseline with ADLS, much encouragement needed.  
170

## 2024-05-15 NOTE — PROGRESS NOTES
05/15/24 1116   Discharge Planning   What day is the transport expected? 05/15/24   What time is the transport expected? 1500       AVS and goldenrod sent to facility   patient updated   RN made aware and given report number

## 2024-05-15 NOTE — CARE PLAN
The patient's goals for the shift include no falls, no pain    The clinical goals for the shift include Stay of free of falls    Problem: ADLs  Goal: Pt will complete ADL tasks at mod I with use of AE prn   Outcome: Progressing     Problem: Instrumental Activities of Daily Living  Goal: Pt will perform simple IADLs/retrieval of items at mod I.    Outcome: Progressing     Problem: Nutrition  Goal: Less than 5 days NPO/clear liquids  Outcome: Progressing  Goal: Oral intake greater than 50%  Outcome: Progressing  Goal: Oral intake greater 75%  Outcome: Progressing  Goal: Consume prescribed supplement  Outcome: Progressing  Goal: Adequate PO fluid intake  Outcome: Progressing  Goal: Nutrition support goals are met within 48 hrs  Outcome: Progressing  Goal: Nutrition support is meeting 75% of nutrient needs  Outcome: Progressing  Goal: Tube feed tolerance  Outcome: Progressing  Goal: BG  mg/dL  Outcome: Progressing  Goal: Lab values WNL  Outcome: Progressing  Goal: Electrolytes WNL  Outcome: Progressing  Goal: Promote healing  Outcome: Progressing  Goal: Maintain stable weight  Outcome: Progressing  Goal: Reduce weight from edema/fluid  Outcome: Progressing  Goal: Gradual weight gain  Outcome: Progressing  Goal: Improve ostomy output  Outcome: Progressing

## 2024-05-15 NOTE — DISCHARGE SUMMARY
Discharge Diagnosis  Closed fracture of multiple ribs of left side, initial encounter    Issues Requiring Follow-Up  Closed fracture of multiple ribs  Left lateral flank subcutaneous fat stranding consistent   Acute kidney injury on chronic kidney disease  Atrial fibrillation    Discharge Meds     Your medication list        START taking these medications        Instructions Last Dose Given Next Dose Due   acetaminophen 325 mg tablet  Commonly known as: Tylenol      Take 2 tablets (650 mg) by mouth every 6 hours if needed for moderate pain (4 - 6) for up to 7 days.       melatonin 5 mg tablet      Take 1 tablet (5 mg) by mouth as needed at bedtime for sleep.       polyethylene glycol 17 gram packet  Commonly known as: Glycolax, Miralax      Take 17 g by mouth once daily as needed (constipation) for up to 7 days.              CHANGE how you take these medications        Instructions Last Dose Given Next Dose Due   amiodarone 200 mg tablet  Commonly known as: Pacerone  What changed: when to take this      Take 1 tablet (200 mg) by mouth once daily.              CONTINUE taking these medications        Instructions Last Dose Given Next Dose Due   apixaban 5 mg tablet  Commonly known as: Eliquis      Take 1 tablet (5 mg) by mouth 2 times a day.       atorvastatin 80 mg tablet  Commonly known as: Lipitor      Take 1 tablet (80 mg) by mouth once daily at bedtime.       cholecalciferol 50 MCG (2000 UT) tablet  Commonly known as: Vitamin D-3           clopidogrel 75 mg tablet  Commonly known as: Plavix      Take 1 tablet (75 mg) by mouth once daily. Do not start before February 15, 2024.       COLLAGEN PLUS VITAMIN C ORAL           levothyroxine 100 mcg tablet  Commonly known as: Synthroid, Levoxyl      Take 1 tablet (100 mcg) by mouth once daily. 1 tablet in the morning on an empty stomach Orally Once a day for 30 day(s)       metoprolol tartrate 50 mg tablet  Commonly known as: Lopressor      Take 1 tablet by mouth 2  times a day.       OCUVITE EYE PLUS MULTI ORAL           omeprazole 20 mg DR capsule  Commonly known as: PriLOSEC      Take 1 capsule (20 mg) by mouth once daily.       sertraline 100 mg tablet  Commonly known as: Zoloft      Take 2 tablets (200 mg) by mouth once daily.       TYLENOL PM EXTRA STRENGTH ORAL                  STOP taking these medications      dilTIAZem 120 mg immediate release tablet  Commonly known as: Cardizem        hydrOXYzine HCL 10 mg tablet  Commonly known as: Atarax        oxygen gas therapy  Commonly known as: O2        potassium chloride CR 10 mEq ER tablet  Commonly known as: Klor-Con        sulfamethoxazole-trimethoprim 800-160 mg tablet  Commonly known as: Bactrim DS        torsemide 20 mg tablet  Commonly known as: Demadex                  Where to Get Your Medications        These medications were sent to [facility]  [id number] [address]      Hours: 9 AM to 6 PM Mon-Fri, 9 AM to 1 PM Sat Phone: [phone]   amiodarone 200 mg tablet       Information about where to get these medications is not yet available    Ask your nurse or doctor about these medications  acetaminophen 325 mg tablet  melatonin 5 mg tablet  polyethylene glycol 17 gram packet         Test Results Pending At Discharge  Pending Labs       No current pending labs.            Hospital Course     Patient is an 83 years old  female past medical history of coronary artery disease, hyperlipidemia, chronic kidney disease, hypertension.  Presented to [facility] ER with repeated falls.  Patient has been multiple times this year, last admission patient had a urinary tract infection started on Bactrim.  On admission patient was found to be acute kidney injury.  CAT scan abdomen pelvis done in ER revealed mild fullness of the left iliopsoas muscles suggestive of trace iliopsoas and retroperitoneal hematoma.  Patient has been on Eliquis dose of Eliquis was adjusted for renal function.    Padmaja was consulted from nephrology services.  Dr. Ibrahim was consulted from cardiology services after few days in hospital patient was stable .H&H has been stable.  Patient has been on torsemide medication has been on hold because of acute kidney injury.  Patient was evaluated by physical therapy.  Physical therapy suggested skilled nursing facility plan to discharge to acute rehab today.  Dosage of amiodarone was adjusted by Dr. Ibrahim.  Patient was advised to be careful when she ambulate because of high risk of fall and being on anticoagulation.  I advised her to follow-up with Dr. Giang in 2 weeks, Dr. Ibrahim in 2 weeks and primary care physician in 2 weeks.  Patient will be discharged to [facility] today.  I will screen the patient for COVID-19 infection prior to discharge.  Patient will have RFP in 1 week.  Patient stated she use oxygen at home occasionally.  Patient did not need oxygen in the hospital.  Oxygen saturation has been 94 to 100% entire admission on room air .    Pertinent Physical Exam At Time of Discharge    Physical Exam  General: In non acute distress, cooperating during physical exam.  HEENT: Pupils are equal and reactive to light and commendation , oral mucosa moist, no JVD oral mucosa is moist.  Cardiovascular: Irregular irregularly heart rhythm   Lungs: Clear to auscultation bilaterally, no wheezing, no crackles, no dullness to percussion.  Abdomen: Left  flank ecchymosis, no hepatosplenomegaly appreciated, soft , not tender, positive bowel sounds, positive bowel movement.  Neuro: Alert and oriented x2, strength in upper and lower extremities , sensation intact.  Musculoskeletal: No swelling in lower extremities, no limitation in range of motion.  Vascular: Pulses are intact in upper and lower extremities  Skin: No petechiae, ecchymosis or other stigmata for dermatology disease.   Outpatient Follow-Up  Future Appointments   Date Time Provider Department Center   8/28/2024  2:30 PM Darrius ELLISON  MD Patrice [id number] [facility]   9/10/2024 10:30 AM EVARISTO [id number] [id number] [facility]   12/16/2024 11:30 AM Frida Hines MD [id number] [facility]     Follow-up with Dr. Giang in 2 weeks  Follow-up with Dr. Ibrahim in 2 weeks  Follow-up with PCP in 2 weeks.    Arminda Nguyen MD

## 2024-05-22 ENCOUNTER — APPOINTMENT (OUTPATIENT)
Dept: CARDIOLOGY | Facility: CLINIC | Age: 84
End: 2024-05-22
Payer: MEDICARE

## 2024-05-26 RX ORDER — POTASSIUM CHLORIDE 750 MG/1
20 TABLET, FILM COATED, EXTENDED RELEASE ORAL 2 TIMES DAILY
COMMUNITY
Start: 2024-04-11 | End: 2024-06-07 | Stop reason: HOSPADM

## 2024-05-28 ENCOUNTER — DOCUMENTATION (OUTPATIENT)
Dept: HOME HEALTH SERVICES | Facility: HOME HEALTH | Age: 84
End: 2024-05-28
Payer: MEDICARE

## 2024-05-30 ENCOUNTER — APPOINTMENT (OUTPATIENT)
Dept: RADIOLOGY | Facility: HOSPITAL | Age: 84
End: 2024-05-30
Payer: MEDICARE

## 2024-05-30 ENCOUNTER — HOSPITAL ENCOUNTER (EMERGENCY)
Facility: HOSPITAL | Age: 84
Discharge: HOME | End: 2024-05-30
Attending: EMERGENCY MEDICINE
Payer: MEDICARE

## 2024-05-30 VITALS
SYSTOLIC BLOOD PRESSURE: 128 MMHG | BODY MASS INDEX: 24.24 KG/M2 | WEIGHT: 142 LBS | OXYGEN SATURATION: 98 % | TEMPERATURE: 98.2 F | HEIGHT: 64 IN | DIASTOLIC BLOOD PRESSURE: 57 MMHG | RESPIRATION RATE: 16 BRPM | HEART RATE: 60 BPM

## 2024-05-30 DIAGNOSIS — T14.8XXA ABRASION: ICD-10-CM

## 2024-05-30 DIAGNOSIS — Z79.01 LONG TERM (CURRENT) USE OF ANTICOAGULANTS: ICD-10-CM

## 2024-05-30 DIAGNOSIS — S09.90XA CLOSED HEAD INJURY, INITIAL ENCOUNTER: ICD-10-CM
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
## 2024-05-30 PROCEDURE — 90471 IMMUNIZATION ADMIN: CPT | Performed by: EMERGENCY MEDICINE

## 2024-05-30 PROCEDURE — G0390 TRAUMA RESPONS W/HOSP CRITI: HCPCS

## 2024-05-30 PROCEDURE — 90715 TDAP VACCINE 7 YRS/> IM: CPT | Performed by: EMERGENCY MEDICINE

## 2024-05-30 PROCEDURE — 72125 CT NECK SPINE W/O DYE: CPT | Performed by: RADIOLOGY

## 2024-05-30 PROCEDURE — 2500000004 HC RX 250 GENERAL PHARMACY W/ HCPCS (ALT 636 FOR OP/ED): Performed by: EMERGENCY MEDICINE

## 2024-05-30 PROCEDURE — 72125 CT NECK SPINE W/O DYE: CPT

## 2024-05-30 PROCEDURE — 99285 EMERGENCY DEPT VISIT HI MDM: CPT | Mod: 25

## 2024-05-30 PROCEDURE — 70450 CT HEAD/BRAIN W/O DYE: CPT

## 2024-05-30 PROCEDURE — 70450 CT HEAD/BRAIN W/O DYE: CPT | Performed by: RADIOLOGY

## 2024-05-30 RX ADMIN — TETANUS TOXOID, REDUCED DIPHTHERIA TOXOID AND ACELLULAR PERTUSSIS VACCINE, ADSORBED 0.5 ML: 5; 2.5; 8; 8; 2.5 SUSPENSION INTRAMUSCULAR at 18:14

## 2024-05-30 ASSESSMENT — PAIN SCALES - GENERAL
PAINLEVEL_OUTOF10: 0 - NO PAIN

## 2024-05-30 ASSESSMENT — COLUMBIA-SUICIDE SEVERITY RATING SCALE - C-SSRS
1. IN THE PAST MONTH, HAVE YOU WISHED YOU WERE DEAD OR WISHED YOU COULD GO TO SLEEP AND NOT WAKE UP?: NO
2. HAVE YOU ACTUALLY HAD ANY THOUGHTS OF KILLING YOURSELF?: NO
6. HAVE YOU EVER DONE ANYTHING, STARTED TO DO ANYTHING, OR PREPARED TO DO ANYTHING TO END YOUR LIFE?: NO

## 2024-05-30 ASSESSMENT — PAIN - FUNCTIONAL ASSESSMENT: PAIN_FUNCTIONAL_ASSESSMENT: 0-10

## 2024-05-30 NOTE — ED PROVIDER NOTES
EMERGENCY DEPARTMENT ENCOUNTER      Pt Name: Linette Doyle  MRN: [id number]  Birthdate 1940  Date of evaluation: 5/30/2024  ED Provider: Isabel Dunham DO     CHIEF COMPLAINT       Chief Complaint   Patient presents with    Fall       HISTORY OF PRESENT ILLNESS    Linette Doyle is a 83 y.o. who presents to the emergency department via EMS after a fall.  She has a history of frequent falls and was recently discharged from a rehab facility secondary to this.  She was walking out to get her mail.  She was using her walker but then states that she tripped which caused her walker to go out from underneath her.  She fell onto her left side.  She denied hitting her head to EMS however when questioned by nursing she does admit to hitting her head.  She is on Eliquis.  She currently complains of a scrape to her knee and her hand but no other acute abnormalities.  No loss of consciousness.  She denies any head or neck pain.    REVIEW OF SYSTEMS     Focused ROS performed and negative other than as listed in HPI    PAST MEDICAL HISTORY     Past Medical History:   Diagnosis Date    Angina pectoris (CMS-[facility]ly [facility]) 10/22/2023    Atherosclerosis of coronary artery 08/21/2019    Atherosclerotic heart disease of native coronary artery with other forms of angina pectoris (CMS-Formerly [facility]) 10/22/2023    Hypercholesterolemia 12/28/2018    Hyperlipidemia 10/22/2023    Presence of cardiac pacemaker 10/22/2023    Primary hypertension 12/28/2018    Shortness of breath 11/26/2019    Sick sinus syndrome (Multi) 10/22/2023    Sinus bradycardia 10/22/2023    Stage 3a chronic kidney disease (Multi) 11/25/2019       SURGICAL HISTORY       Past Surgical History:   Procedure Laterality Date    CARDIAC CATHETERIZATION N/A 02/02/2024    Procedure: Left Heart Cath;  Surgeon: Becky Vela MD;  Location: [facility] Cardiac Cath Lab;  Service: Cardiovascular;  Laterality: N/A;    CARDIAC CATHETERIZATION N/A 02/02/2024    Procedure: PCI;  Surgeon: Becky Vela MD;   Location: [facility] Cardiac Cath Lab;  Service: Cardiovascular;  Laterality: N/A;    CARDIAC CATHETERIZATION N/A 02/08/2024    Procedure: Left Heart Cath;  Surgeon: Darrius Ibrahim MD;  Location: [facility] Cardiac Cath Lab;  Service: Cardiovascular;  Laterality: N/A;    CARDIAC CATHETERIZATION N/A 02/08/2024    Procedure: PCI;  Surgeon: Darrius Ibrahim MD;  Location: [facility] Cardiac Cath Lab;  Service: Cardiovascular;  Laterality: N/A;    PACEMAKER PLACEMENT  07/04/2019    MEDTRONIC PACEMAKE AND STENT PLACEMENT       CURRENT MEDICATIONS       Previous Medications    ACETAMINOPHEN/DIPHENHYDRAMINE (TYLENOL PM EXTRA STRENGTH ORAL)    Take 1 tablet by mouth as needed at bedtime (sleep). Indications: sleep    AMIODARONE (PACERONE) 200 MG TABLET    Take 1 tablet (200 mg) by mouth once daily.    APIXABAN (ELIQUIS) 5 MG TABLET    Take 1 tablet (5 mg) by mouth 2 times a day.    ASCORBIC ACID/COLLAGEN HYDR (COLLAGEN PLUS VITAMIN C ORAL)    Take 1 capsule by mouth early in the morning. Indications: supplement    ATORVASTATIN (LIPITOR) 80 MG TABLET    Take 1 tablet (80 mg) by mouth once daily at bedtime.    CHOLECALCIFEROL (VITAMIN D-3) 50 MCG (2000 UT) TABLET    Take 1 tablet (2,000 Units) by mouth once daily.    CLOPIDOGREL (PLAVIX) 75 MG TABLET    Take 1 tablet (75 mg) by mouth once daily. Do not start before February 15, 2024.    LEVOTHYROXINE (SYNTHROID, LEVOXYL) 100 MCG TABLET    Take 1 tablet (100 mcg) by mouth once daily. 1 tablet in the morning on an empty stomach Orally Once a day for 30 day(s)    MELATONIN 5 MG TABLET    Take 1 tablet (5 mg) by mouth as needed at bedtime for sleep.    METOPROLOL TARTRATE (LOPRESSOR) 50 MG TABLET    Take 1 tablet by mouth 2 times a day.    MV-MIN/FA/VIT K/LYCOP/LUT/ZEAX (OCUVITE EYE PLUS MULTI ORAL)    Take 1 tablet by mouth once daily.    OMEPRAZOLE (PRILOSEC) 20 MG DR CAPSULE    Take 1 capsule (20 mg) by mouth once daily.    POTASSIUM CHLORIDE CR 10 MEQ ER TABLET    Take 2 tablets (20 mEq) by mouth  2 times a day.    SERTRALINE (ZOLOFT) 100 MG TABLET    Take 2 tablets (200 mg) by mouth once daily.       ALLERGIES     Iodinated contrast media    FAMILY HISTORY       Family History   Problem Relation Name Age of Onset    No Known Problems Mother      No Known Problems Father          SOCIAL HISTORY       Social History     Socioeconomic History    Marital status:      Spouse name: None    Number of children: None    Years of education: None    Highest education level: None   Occupational History    None   Tobacco Use    Smoking status: Never     Passive exposure: Never    Smokeless tobacco: Never   Vaping Use    Vaping status: Never Used   Substance and Sexual Activity    Alcohol use: Never    Drug use: Never    Sexual activity: Defer   Other Topics Concern    None   Social History Narrative    None     Social Determinants of Health     Financial Resource Strain: Low Risk  (5/10/2024)    Overall Financial Resource Strain (CARDIA)     Difficulty of Paying Living Expenses: Not hard at all   Food Insecurity: No Food Insecurity (5/16/2024)    Received from     Hunger Vital Sign     Worried About Running Out of Food in the Last Year: Never true     Ran Out of Food in the Last Year: Never true   Transportation Needs: No Transportation Needs (5/16/2024)    Received from     PRAPARE - Transportation     Lack of Transportation (Medical): No     Lack of Transportation (Non-Medical): No   Physical Activity: Inactive (5/10/2024)    Exercise Vital Sign     Days of Exercise per Week: 0 days     Minutes of Exercise per Session: 0 min   Stress: No Stress Concern Present (5/10/2024)    [place] [employer] - Occupational Stress Questionnaire     Feeling of Stress : Only a little   Social Connections: Socially Isolated (5/10/2024)    Social Connection and Isolation Panel [NHANES]     Frequency of Communication with Friends and Family: Never     Frequency of Social  Gatherings with Friends and Family: Never     Attends Tenriism Services: Never     Active Member of Clubs or Organizations: No     Attends Club or Organization Meetings: Never     Marital Status:    Intimate Partner Violence: Unknown (5/10/2024)    Humiliation, Afraid, Rape, and Kick questionnaire     Fear of Current or Ex-Partner: No     Emotionally Abused: No     Physically Abused: No     Sexually Abused: Patient declined   Housing Stability: Unknown (5/16/2024)    Received from [facility]    Housing Stability Vital Sign     Unable to Pay for Housing in the Last Year: No     Number of Places Lived in the Last Year: 1     Unstable Housing in the Last Year: Not on file   Recent Concern: Housing Stability - High Risk (5/10/2024)    Housing Stability Vital Sign     Unable to Pay for Housing in the Last Year: Yes     Number of Places Lived in the Last Year: 1     Unstable Housing in the Last Year: No       PHYSICAL EXAM       ED Triage Vitals [05/30/24 1737]   Temperature Heart Rate Respirations BP   36.8 °C (98.2 °F) 60 18 119/58      Pulse Ox Temp src Heart Rate Source Patient Position   94 % -- -- --      BP Location FiO2 (%)     -- --        General: Appears as stated age, no acute distress, alert  Head: Head atraumatic; normocephalic  Eyes: normal inspection; no icterus  ENT: mucosa moist without lesion  Neck: Normal inspection, no meningeal signs; no midline spinal tenderness  Resp: Normal breath sounds, no wheeze or crackles; No respiratory distress  Chest Wall: no tenderness or deformity  Heart: Heart rate and rhythm regular; No Murmurs  Abdomen: Soft, Non-tender; No distention, guarding, rigidity, or rebound  MSK: Normal appearance; Moves all extremities; No Pedal edema  Neuro: Alert; no focal deficits, moves all extremities  Psych: Mood and Affect normal  Skin: Color appropriate; warm; Dry, abrasion to the left knee with bleeding controlled, no bony tenderness to the knee, abrasions to the third  and fourth digits of the left hand    DIAGNOSTIC RESULTS   Lab and radiology results are independently interpreted unless noted below.  RADIOLOGY (Per Emergency Physician):     Interpretation per the Radiologist below, if available at the time of this note:  CT head wo IV contrast   Final Result   1. No acute intracranial hemorrhage or mass effect.   2. No acute fracture or traumatic malalignment of the cervical spine.   3. Low-density lesion within the right vallecula measuring 1.5 cm.   Correlation with direct visualization is recommended.        Signed by: Khoi Larsen 5/30/2024 6:10 PM   Dictation workstation:   [id number]      CT cervical spine wo IV contrast   Final Result   1. No acute intracranial hemorrhage or mass effect.   2. No acute fracture or traumatic malalignment of the cervical spine.   3. Low-density lesion within the right vallecula measuring 1.5 cm.   Correlation with direct visualization is recommended.        Signed by: Khoi Larsen 5/30/2024 6:10 PM   Dictation workstation:   [id number]        EMERGENCY DEPARTMENT COURSE/MDM   Patient presents with injury after a fall.  She is on Eliquis and HA's activated.  Chart is reviewed and does not demonstrate the last tetanus administration.  Patient is uncertain of this as well.  Tetanus will be updated.  Head and neck CT are obtained.  She has no bony tenderness to the hand or knee, do not feel x-rays were obtained at this time.  She is agreeable with this plan of care.      ED Course as of 05/30/24 1919   Thu May 30, 2024   1840 CT scans returned showing no acute abnormalities.  There is a low-density lesion seen on the vallecula.  Patient is made aware of this and is to follow-up with primary care.  She is comfortable with plan of discharge home.  Her wounds are dressed by nursing.  Discharged in stable condition. [EF]      ED Course User Index  [EF] Isabel Dunham DO         Diagnoses as of 05/30/24 1919   Fall, initial  encounter   Closed head injury, initial encounter   Abrasion   Long term (current) use of anticoagulants       Meds Administered:  Medications   diphth,pertus(acell),tetanus (BoostRIX) 2.5-8-5 Lf-mcg-Lf/0.5mL vaccine 0.5 mL (0.5 mL intramuscular Given 5/30/24 1814)       PROCEDURES   Unless otherwise noted below, none  Procedures      FINAL IMPRESSION      1. Fall, initial encounter    2. Closed head injury, initial encounter    3. Abrasion    4. Long term (current) use of anticoagulants          DISPOSITION    Discharge 05/30/2024 06:46:14 PM    DISCHARGE   PATIENT REFERRED TO:  Maritza Do MD  [address] [place] [address]  [facility] [address]  [place] [address]  [phone]            DISCHARGE MEDICATIONS:  New Prescriptions    No medications on file        The patient is discharged back to their place of residence.  Discharge diagnosis, instructions and plan were discussed and understood. At the time of discharge the patient was comfortable and was in no apparent distress. Patient is aware of diagnostic uncertainty and was notified though testing is negative here, there is a very small chance that pathology may be missed.  The patient understands these risks and the patient /family understood to return immediately to the emergency department if the symptoms worsen or if they have any additional concerns.      (Comment: Please note this report has been produced using speech recognition software and may contain errors related to that system including errors in grammar, punctuation, and spelling, as well as words and phrases that may be inappropriate.  If there are any questions or concerns please feel free to contact the dictating provider for clarification.)    Isabel Dunham DO (electronically signed)  Emergency Medicine Physician    History Limited by: None  Independent history obtained from: EMS  External records reviewed: Inpatient Notes/Discharge Summary from 5/5/24  Diagnostics interpreted by me: None  Discussions with other  clinicians: None  Chronic conditions impacting care:  atrial fibrillation  Social determinants of health affecting care:  lives alone  Diagnostic tests considered but not performed: n/a  ED Medications managed:  Medications   diphth,pertus(acell),tetanus (BoostRIX) 2.5-8-5 Lf-mcg-Lf/0.5mL vaccine 0.5 mL (0.5 mL intramuscular Given 5/30/24 1814)       Prescription drugs considered: None             Isabel Dunham DO  05/30/24 [id number]

## 2024-05-30 NOTE — DISCHARGE INSTRUCTIONS
There was a finding on your vallecula, which is at the back of your throat on CT scan. It is recommended you follow up with your

## 2024-05-30 NOTE — ED TRIAGE NOTES
Pt tripped and fell at home with her walker getting her mail. Pt states she did hit her head. Pt is on blood thinners

## 2024-05-31 ENCOUNTER — HOSPITAL ENCOUNTER (EMERGENCY)
Facility: HOSPITAL | Age: 84
Discharge: HOME | DRG: 640 | End: 2024-06-01
Attending: EMERGENCY MEDICINE
Payer: MEDICARE

## 2024-05-31 ENCOUNTER — APPOINTMENT (OUTPATIENT)
Dept: RADIOLOGY | Facility: HOSPITAL | Age: 84
DRG: 640 | End: 2024-05-31
Payer: MEDICARE
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
## 2024-05-31 DIAGNOSIS — S09.90XA CLOSED HEAD INJURY, INITIAL ENCOUNTER: ICD-10-CM

## 2024-05-31 DIAGNOSIS — Z79.01 LONG TERM (CURRENT) USE OF ANTICOAGULANTS: ICD-10-CM

## 2024-05-31 PROCEDURE — 70450 CT HEAD/BRAIN W/O DYE: CPT | Performed by: RADIOLOGY

## 2024-05-31 PROCEDURE — 70450 CT HEAD/BRAIN W/O DYE: CPT

## 2024-05-31 PROCEDURE — 72125 CT NECK SPINE W/O DYE: CPT

## 2024-05-31 PROCEDURE — 99285 EMERGENCY DEPT VISIT HI MDM: CPT | Mod: 25

## 2024-05-31 PROCEDURE — 72125 CT NECK SPINE W/O DYE: CPT | Performed by: RADIOLOGY

## 2024-05-31 PROCEDURE — G0390 TRAUMA RESPONS W/HOSP CRITI: HCPCS

## 2024-05-31 ASSESSMENT — PAIN - FUNCTIONAL ASSESSMENT
PAIN_FUNCTIONAL_ASSESSMENT: 0-10

## 2024-05-31 ASSESSMENT — PAIN SCALES - GENERAL
PAINLEVEL_OUTOF10: 0 - NO PAIN
PAINLEVEL_OUTOF10: 5 - MODERATE PAIN

## 2024-05-31 ASSESSMENT — LIFESTYLE VARIABLES
EVER FELT BAD OR GUILTY ABOUT YOUR DRINKING: NO
TOTAL SCORE: 0
EVER HAD A DRINK FIRST THING IN THE MORNING TO STEADY YOUR NERVES TO GET RID OF A HANGOVER: NO
HAVE PEOPLE ANNOYED YOU BY CRITICIZING YOUR DRINKING: NO
HAVE YOU EVER FELT YOU SHOULD CUT DOWN ON YOUR DRINKING: NO

## 2024-05-31 ASSESSMENT — PAIN DESCRIPTION - ORIENTATION: ORIENTATION: LEFT

## 2024-05-31 ASSESSMENT — PAIN DESCRIPTION - DESCRIPTORS: DESCRIPTORS: OTHER (COMMENT)

## 2024-05-31 ASSESSMENT — PAIN DESCRIPTION - LOCATION: LOCATION: NECK

## 2024-05-31 ASSESSMENT — PAIN DESCRIPTION - PAIN TYPE: TYPE: ACUTE PAIN

## 2024-06-01 VITALS
RESPIRATION RATE: 17 BRPM | SYSTOLIC BLOOD PRESSURE: 117 MMHG | HEART RATE: 62 BPM | TEMPERATURE: 97.5 F | DIASTOLIC BLOOD PRESSURE: 52 MMHG | HEIGHT: 64 IN | OXYGEN SATURATION: 98 % | BODY MASS INDEX: 25.78 KG/M2 | WEIGHT: 151.01 LBS

## 2024-06-01 ASSESSMENT — PAIN SCALES - GENERAL
PAINLEVEL_OUTOF10: 0 - NO PAIN

## 2024-06-01 ASSESSMENT — PAIN - FUNCTIONAL ASSESSMENT
PAIN_FUNCTIONAL_ASSESSMENT: 0-10

## 2024-06-01 NOTE — ED TRIAGE NOTES
Pt fell at home trying to get up off the couch, pt did hit her head. Pt currently on blood thinners

## 2024-06-01 NOTE — ED PROVIDER NOTES
EMERGENCY DEPARTMENT ENCOUNTER      Pt Name: Linette Doyle  MRN: [id number]  Birthdate 1940  Date of evaluation: 5/31/2024  ED Provider: Isabel Dunham DO     CHIEF COMPLAINT       Chief Complaint   Patient presents with    Fall       HISTORY OF PRESENT ILLNESS    Linette Doyle is a 83 y.o. who presents to the emergency department as a repeat visit via EMS after a fall.  She was seen yesterday with a negative head CT and discharged home.  She states today she attempted to get out of her recliner when her legs gave out from underneath her and she fell again.  She is on Eliquis.  EMS was contacted and she was brought for further evaluation.  She currently has no acute complaints.  She denies any new pain anywhere.    REVIEW OF SYSTEMS     Focused ROS performed and negative other than as listed in HPI    PAST MEDICAL HISTORY     Past Medical History:   Diagnosis Date    Angina pectoris (CMS-[facility]) 10/22/2023    Atherosclerosis of coronary artery 08/21/2019    Atherosclerotic heart disease of native coronary artery with other forms of angina pectoris (CMS-HCC) 10/22/2023    Hypercholesterolemia 12/28/2018    Hyperlipidemia 10/22/2023    Presence of cardiac pacemaker 10/22/2023    Primary hypertension 12/28/2018    Shortness of breath 11/26/2019    Sick sinus syndrome (Multi) 10/22/2023    Sinus bradycardia 10/22/2023    Stage 3a chronic kidney disease (Multi) 11/25/2019       SURGICAL HISTORY       Past Surgical History:   Procedure Laterality Date    CARDIAC CATHETERIZATION N/A 02/02/2024    Procedure: Left Heart Cath;  Surgeon: Becky Vela MD;  Location: [facility] Cardiac Cath Lab;  Service: Cardiovascular;  Laterality: N/A;    CARDIAC CATHETERIZATION N/A 02/02/2024    Procedure: PCI;  Surgeon: Becky Vela MD;  Location: [facility] Cardiac Cath Lab;  Service: Cardiovascular;  Laterality: N/A;    CARDIAC CATHETERIZATION N/A 02/08/2024    Procedure: Left Heart Cath;  Surgeon: Darrius Ibrahim MD;  Location: [facility] Cardiac Cath  Lab;  Service: Cardiovascular;  Laterality: N/A;    CARDIAC CATHETERIZATION N/A 02/08/2024    Procedure: PCI;  Surgeon: Darrius Ibrahim MD;  Location: [facility] Cath Lab;  Service: Cardiovascular;  Laterality: N/A;    PACEMAKER PLACEMENT  07/04/2019    [employer]TR[employer] PACEMAKE AND STENT PLACEMENT       CURRENT MEDICATIONS       Discharge Medication List as of 6/1/2024  2:03 AM        CONTINUE these medications which have NOT CHANGED    Details   acetaminophen/diphenhydramine (TYLENOL PM EXTRA STRENGTH ORAL) Take 1 tablet by mouth as needed at bedtime (sleep). Indications: sleep, Historical Med - Home Care      amiodarone (Pacerone) 200 mg tablet Take 1 tablet (200 mg) by mouth once daily., Starting Wed 5/15/2024, Until Fri 6/14/2024, Normal      apixaban (Eliquis) 5 mg tablet Take 1 tablet (5 mg) by mouth 2 times a day., Starting Tue 4/30/2024, Normal      ascorbic acid/collagen hydr (COLLAGEN PLUS VITAMIN C ORAL) Take 1 capsule by mouth early in the morning. Indications: supplement, Historical Med - Home Care      atorvastatin (Lipitor) 80 mg tablet Take 1 tablet (80 mg) by mouth once daily at bedtime., Starting Wed 2/14/2024, Normal      cholecalciferol (Vitamin D-3) 50 MCG (2000 UT) tablet Take 1 tablet (2,000 Units) by mouth once daily., Starting Wed 7/17/2019, Historical Med      clopidogrel (Plavix) 75 mg tablet Take 1 tablet (75 mg) by mouth once daily. Do not start before February 15, 2024., Starting Thu 2/15/2024, Normal      levothyroxine (Synthroid, Levoxyl) 100 mcg tablet Take 1 tablet (100 mcg) by mouth once daily. 1 tablet in the morning on an empty stomach Orally Once a day for 30 day(s), Starting Wed 5/1/2024, Normal      melatonin 5 mg tablet Take 1 tablet (5 mg) by mouth as needed at bedtime for sleep., Starting Tue 5/14/2024, Until Thu 6/13/2024 at 2359, No Print      metoprolol tartrate (Lopressor) 50 mg tablet Take 1 tablet by mouth 2 times a day., Starting Wed 5/8/2024, Until Sat 5/3/2025, Normal       mv-min/FA/vit K/lycop/lut/zeax (OCUVITE EYE PLUS MULTI ORAL) Take 1 tablet by mouth once daily., Historical Med      omeprazole (PriLOSEC) 20 mg DR capsule Take 1 capsule (20 mg) by mouth once daily., Starting Wed 5/1/2024, Normal      potassium chloride CR 10 mEq ER tablet Take 2 tablets (20 mEq) by mouth 2 times a day., Starting Thu 4/11/2024, Until Sat 5/3/2025, Historical Med      sertraline (Zoloft) 100 mg tablet Take 2 tablets (200 mg) by mouth once daily., Starting Wed 5/1/2024, Normal             ALLERGIES     Iodinated contrast media    FAMILY HISTORY       Family History   Problem Relation Name Age of Onset    No Known Problems Mother      No Known Problems Father          SOCIAL HISTORY       Social History     Socioeconomic History    Marital status:      Spouse name: None    Number of children: None    Years of education: None    Highest education level: None   Occupational History    None   Tobacco Use    Smoking status: Never     Passive exposure: Never    Smokeless tobacco: Never   Vaping Use    Vaping status: Never Used   Substance and Sexual Activity    Alcohol use: Never    Drug use: Never    Sexual activity: Defer   Other Topics Concern    None   Social History Narrative    None     Social Determinants of Health     Financial Resource Strain: Low Risk  (5/10/2024)    Overall Financial Resource Strain (CARDIA)     Difficulty of Paying Living Expenses: Not hard at all   Food Insecurity: No Food Insecurity (5/16/2024)    Received from [facility]    Hunger Vital Sign     Worried About Running Out of Food in the Last Year: Never true     Ran Out of Food in the Last Year: Never true   Transportation Needs: No Transportation Needs (5/16/2024)    Received from [facility]    PRAPARE - Transportation     Lack of Transportation (Medical): No     Lack of Transportation (Non-Medical): No   Physical Activity: Inactive (5/10/2024)    Exercise Vital Sign     Days of Exercise per Week: 0  days     Minutes of Exercise per Session: 0 min   Stress: No Stress Concern Present (5/10/2024)    [employer] - Occupational Stress Questionnaire     Feeling of Stress : Only a little   Social Connections: Socially Isolated (5/10/2024)    Social Connection and Isolation Panel [NHANES]     Frequency of Communication with Friends and Family: Never     Frequency of Social Gatherings with Friends and Family: Never     Attends Congregation Services: Never     Active Member of Clubs or Organizations: No     Attends Club or Organization Meetings: Never     Marital Status:    Intimate Partner Violence: Unknown (5/10/2024)    Humiliation, Afraid, Rape, and Kick questionnaire     Fear of Current or Ex-Partner: No     Emotionally Abused: No     Physically Abused: No     Sexually Abused: Patient declined   Housing Stability: Unknown (5/16/2024)    Received from [facility]    Housing Stability Vital Sign     Unable to Pay for Housing in the Last Year: No     Number of Places Lived in the Last Year: 1     Unstable Housing in the Last Year: Not on file   Recent Concern: Housing Stability - High Risk (5/10/2024)    Housing Stability Vital Sign     Unable to Pay for Housing in the Last Year: Yes     Number of Places Lived in the Last Year: 1     Unstable Housing in the Last Year: No       PHYSICAL EXAM       ED Triage Vitals [05/31/24 2223]   Temperature Heart Rate Respirations BP   36.4 °C (97.5 °F) 60 16 (!) 107/93      Pulse Ox Temp Source Heart Rate Source Patient Position   97 % Oral Monitor Sitting      BP Location FiO2 (%)     Left arm --        General: Appears well, no acute distress, alert  Head: Head atraumatic; normocephalic  Eyes: normal inspection; no icterus  ENT: mucosa moist without lesion  Neck: Normal inspection, no meningeal signs  Resp: Normal breath sounds, no wheeze or crackles; No respiratory distress  Chest Wall: no tenderness or deformity  Heart: Heart rate and rhythm  regular; No Murmurs  Abdomen: Soft, Non-tender; No distention, guarding, rigidity, or rebound  MSK: Normal appearance; Moves all extremities; No Pedal edema  Neuro: Alert; no focal deficits, moves all extremities  Psych: Mood and Affect normal  Skin: Color appropriate; warm; Dry    DIAGNOSTIC RESULTS   Lab and radiology results are independently interpreted unless noted below.  RADIOLOGY (Per Emergency Physician):     Interpretation per the Radiologist below, if available at the time of this note:  CT head wo IV contrast   Final Result   1. No acute intracranial hemorrhage or mass effect.   2. No acute fracture or traumatic malalignment of the cervical spine.   3. 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 Correlation with direct visualization is recommended.        MACRO:   Critical Finding:  See findings. Notification was initiated on   6/1/2024 at 12:58 am by  Khoi Larsen.  (**-YCF-**)        Signed by: Khoi Larsen 6/1/2024 12:59 AM   Dictation workstation:   [id number]      CT cervical spine wo IV contrast   Final Result   1. No acute intracranial hemorrhage or mass effect.   2. No acute fracture or traumatic malalignment of the cervical spine.   3. 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 Correlation with direct visualization is recommended.        MACRO:   Critical Finding:  See findings. Notification was initiated on   6/1/2024 at 12:58 am by  Khoi Larsen.  (**-YCF-**)        Signed by: Khoi Larsen 6/1/2024 12:59 AM   Dictation workstation:   [id number]          LABS:  Abnormal Labs Reviewed - No data to display    All other labs were within normal range or not returned as of this dictation.    EKG:  Personally interpreted by Isabel Dunham DO      EMERGENCY DEPARTMENT COURSE/MDM   Patient presents as a repeat visit for a fall.  I expressed my concerns for her ability to care for herself at home as this is  her second visit for a fall within 24 hours.  She recently was discharged from a nursing facility.  She states that she has been worked up for shakiness and weakness before with no cause.  I did express that I feel that she may not be safe to proceed home however she is reluctant to return to a nursing facility.  I received a call from her sister who is also power of .  Patient however is able to make her own decisions at this time.  Sister is upset that the patient keeps falling and that there has been no cause found.  I did attempt to relay that we may not always find a cause for things and she already has had a significant workup as well as related rehab stay and this may just be a generalized decline.  I expressed however relayed to the patient my concerns for her safety at home but I am not able to force her to a nursing facility.  Head neck CT scan will be obtained.      ED Course as of 06/01/24 0309   Sat Jun 01, 2024   0144 CT scans returned unchanged from prior again showing this density in the collicular that is unchanged.  On reassessment patient is resting comfortably.  I again had an extensive conversation with her relating my concerns about her ability to care for herself and frequent falls as well as risks and prognosis if she were to have a hip fracture or other significant complication from her falls.  She is of sound mind and medical decision-making capacity.  I relayed that I feel it is in her best interest to be admitted for placement.  Patient respectfully declines.  She like to follow-up with her physicians as an outpatient.  She has her sister coming to visit her tomorrow and does have home health care nursing.  She is advised that she can return at any time should she desire placement.  She is appreciative of care and my keri discussion.  She is discharged in stable condition. [EF]      ED Course User Index  [EF] Isabel Dunham, DO         Diagnoses as of 06/01/24 0309   Fall, initial  encounter   Closed head injury, initial encounter   Long term (current) use of anticoagulants       Meds Administered:  Medications - No data to display    PROCEDURES   Unless otherwise noted below, none  Procedures      FINAL IMPRESSION      1. Fall, initial encounter    2. Closed head injury, initial encounter    3. Long term (current) use of anticoagulants          DISPOSITION    Discharge 06/01/2024 01:43:41 AM    DISCHARGE   PATIENT REFERRED TO:  Maritza Do MD  [address]  [facility] [address]  [facility] [address]  [phone]            DISCHARGE MEDICATIONS:  Discharge Medication List as of 6/1/2024  2:03 AM           The patient is discharged back to their place of residence.  Discharge diagnosis, instructions and plan were discussed and understood. At the time of discharge the patient was comfortable and was in no apparent distress. Patient is aware of diagnostic uncertainty and was notified though testing is negative here, there is a very small chance that pathology may be missed.  The patient understands these risks and the patient /family understood to return immediately to the emergency department if the symptoms worsen or if they have any additional concerns.      (Comment: Please note this report has been produced using speech recognition software and may contain errors related to that system including errors in grammar, punctuation, and spelling, as well as words and phrases that may be inappropriate.  If there are any questions or concerns please feel free to contact the dictating provider for clarification.)    Isabel Dunham DO (electronically signed)  Emergency Medicine Physician    History Limited by: None  Independent history obtained from: EMS and Family Member sister  External records reviewed: None  Diagnostics interpreted by me: CT Scan(s) no acute ICH  Discussions with other clinicians: None  Chronic conditions impacting care:  frequent falls, on eliqu  Social determinants of health  affecting care:  lives alone  Diagnostic tests considered but not performed: n/a  ED Medications managed:  Medications - No data to display    Prescription drugs considered: None             Isabel Dunham,   06/01/24 0318

## 2024-06-02 ENCOUNTER — TELEPHONE (OUTPATIENT)
Dept: PRIMARY CARE | Facility: CLINIC | Age: 84
End: 2024-06-02
Payer: MEDICARE

## 2024-06-02 NOTE — TELEPHONE ENCOUNTER
Needs to schedule 30 minute ED follow up for this week for recurrent falls.  OK to see Rose if I am not available.

## 2024-06-03 ENCOUNTER — APPOINTMENT (OUTPATIENT)
Dept: CARDIOLOGY | Facility: HOSPITAL | Age: 84
DRG: 640 | End: 2024-06-03
Payer: MEDICARE

## 2024-06-03 ENCOUNTER — HOSPITAL ENCOUNTER (INPATIENT)
Facility: HOSPITAL | Age: 84
LOS: 3 days | Discharge: NURSING, REHAB OR LONG-TERM CARE | DRG: 640 | End: 2024-06-07
Attending: INTERNAL MEDICINE | Admitting: INTERNAL MEDICINE
Payer: MEDICARE

## 2024-06-03 ENCOUNTER — APPOINTMENT (OUTPATIENT)
Dept: RADIOLOGY | Facility: HOSPITAL | Age: 84
DRG: 640 | End: 2024-06-03
Payer: MEDICARE

## 2024-06-03 ENCOUNTER — HOSPITAL ENCOUNTER (EMERGENCY)
Facility: HOSPITAL | Age: 84
Discharge: OTHER INSTITUTION | DRG: 640 | End: 2024-06-03
Attending: EMERGENCY MEDICINE
Payer: MEDICARE

## 2024-06-03 ENCOUNTER — APPOINTMENT (OUTPATIENT)
Dept: CARDIOLOGY | Facility: CLINIC | Age: 84
End: 2024-06-03
Payer: MEDICARE

## 2024-06-03 VITALS
WEIGHT: 129.19 LBS | RESPIRATION RATE: 16 BRPM | BODY MASS INDEX: 22.06 KG/M2 | TEMPERATURE: 98.1 F | SYSTOLIC BLOOD PRESSURE: 101 MMHG | OXYGEN SATURATION: 97 % | HEART RATE: 60 BPM | HEIGHT: 64 IN | DIASTOLIC BLOOD PRESSURE: 55 MMHG

## 2024-06-03 DIAGNOSIS — R52 MILD PAIN: ICD-10-CM

## 2024-06-03 DIAGNOSIS — S09.90XA CLOSED HEAD INJURY, INITIAL ENCOUNTER: ICD-10-CM

## 2024-06-03 DIAGNOSIS — N17.9 AKI (ACUTE KIDNEY INJURY) (CMS-HCC): Primary | ICD-10-CM

## 2024-06-03 DIAGNOSIS — R53.1 WEAKNESS: Primary | ICD-10-CM

## 2024-06-03 DIAGNOSIS — K21.9 GASTROESOPHAGEAL REFLUX DISEASE, UNSPECIFIED WHETHER ESOPHAGITIS PRESENT: ICD-10-CM
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
## 2024-06-03 DIAGNOSIS — R09.02 HYPOXEMIA: ICD-10-CM

## 2024-06-03 LAB
ANION GAP SERPL CALC-SCNC: 15 MMOL/L
APPEARANCE UR: CLEAR
BASOPHILS # BLD AUTO: 0.05 X10*3/UL (ref 0–0.1)
BASOPHILS NFR BLD AUTO: 0.7 %
BILIRUB UR STRIP.AUTO-MCNC: NEGATIVE MG/DL
BUN SERPL-MCNC: 47 MG/DL (ref 8–25)
CALCIUM SERPL-MCNC: 9.4 MG/DL (ref 8.5–10.4)
CHLORIDE SERPL-SCNC: 98 MMOL/L (ref 97–107)
CO2 SERPL-SCNC: 26 MMOL/L (ref 24–31)
COLOR UR: ABNORMAL
CREAT SERPL-MCNC: 2.8 MG/DL (ref 0.4–1.6)
EGFRCR SERPLBLD CKD-EPI 2021: 16 ML/MIN/1.73M*2
EOSINOPHIL # BLD AUTO: 0.14 X10*3/UL (ref 0–0.4)
EOSINOPHIL NFR BLD AUTO: 1.9 %
ERYTHROCYTE [DISTWIDTH] IN BLOOD BY AUTOMATED COUNT: 16.8 % (ref 11.5–14.5)
GLUCOSE SERPL-MCNC: 127 MG/DL (ref 65–99)
GLUCOSE UR STRIP.AUTO-MCNC: NORMAL MG/DL
HCT VFR BLD AUTO: 30.2 % (ref 36–46)
HGB BLD-MCNC: 9.6 G/DL (ref 12–16)
HYALINE CASTS #/AREA URNS AUTO: ABNORMAL /LPF
IMM GRANULOCYTES # BLD AUTO: 0.06 X10*3/UL (ref 0–0.5)
IMM GRANULOCYTES NFR BLD AUTO: 0.8 % (ref 0–0.9)
KETONES UR STRIP.AUTO-MCNC: NEGATIVE MG/DL
LEUKOCYTE ESTERASE UR QL STRIP.AUTO: ABNORMAL
LYMPHOCYTES # BLD AUTO: 0.74 X10*3/UL (ref 0.8–3)
LYMPHOCYTES NFR BLD AUTO: 10.1 %
MCH RBC QN AUTO: 27.7 PG (ref 26–34)
MCHC RBC AUTO-ENTMCNC: 31.8 G/DL (ref 32–36)
MCV RBC AUTO: 87 FL (ref 80–100)
MONOCYTES # BLD AUTO: 0.64 X10*3/UL (ref 0.05–0.8)
MONOCYTES NFR BLD AUTO: 8.8 %
NEUTROPHILS # BLD AUTO: 5.67 X10*3/UL (ref 1.6–5.5)
NEUTROPHILS NFR BLD AUTO: 77.7 %
NITRITE UR QL STRIP.AUTO: NEGATIVE
NRBC BLD-RTO: 0 /100 WBCS (ref 0–0)
PH UR STRIP.AUTO: 5.5 [PH]
PLATELET # BLD AUTO: 130 X10*3/UL (ref 150–450)
POTASSIUM SERPL-SCNC: 4.6 MMOL/L (ref 3.4–5.1)
PROT UR STRIP.AUTO-MCNC: NEGATIVE MG/DL
RBC # BLD AUTO: 3.46 X10*6/UL (ref 4–5.2)
RBC # UR STRIP.AUTO: NEGATIVE /UL
RBC #/AREA URNS AUTO: ABNORMAL /HPF
SODIUM SERPL-SCNC: 139 MMOL/L (ref 133–145)
SP GR UR STRIP.AUTO: 1.01
UROBILINOGEN UR STRIP.AUTO-MCNC: NORMAL MG/DL
WBC # BLD AUTO: 7.3 X10*3/UL (ref 4.4–11.3)
WBC #/AREA URNS AUTO: ABNORMAL /HPF

## 2024-06-03 PROCEDURE — 36415 COLL VENOUS BLD VENIPUNCTURE: CPT | Performed by: PHYSICIAN ASSISTANT

## 2024-06-03 PROCEDURE — 93005 ELECTROCARDIOGRAM TRACING: CPT

## 2024-06-03 PROCEDURE — 73564 X-RAY EXAM KNEE 4 OR MORE: CPT | Mod: LEFT SIDE | Performed by: RADIOLOGY

## 2024-06-03 PROCEDURE — G0378 HOSPITAL OBSERVATION PER HR: HCPCS

## 2024-06-03 PROCEDURE — 99285 EMERGENCY DEPT VISIT HI MDM: CPT | Mod: 25

## 2024-06-03 PROCEDURE — 71045 X-RAY EXAM CHEST 1 VIEW: CPT

## 2024-06-03 PROCEDURE — 72100 X-RAY EXAM L-S SPINE 2/3 VWS: CPT | Performed by: RADIOLOGY

## 2024-06-03 PROCEDURE — 96360 HYDRATION IV INFUSION INIT: CPT

## 2024-06-03 PROCEDURE — 72125 CT NECK SPINE W/O DYE: CPT | Performed by: RADIOLOGY

## 2024-06-03 PROCEDURE — 85025 COMPLETE CBC W/AUTO DIFF WBC: CPT | Performed by: PHYSICIAN ASSISTANT

## 2024-06-03 PROCEDURE — 87086 URINE CULTURE/COLONY COUNT: CPT | Mod: WESLAB | Performed by: PHYSICIAN ASSISTANT

## 2024-06-03 PROCEDURE — 73564 X-RAY EXAM KNEE 4 OR MORE: CPT | Mod: LT

## 2024-06-03 PROCEDURE — 71045 X-RAY EXAM CHEST 1 VIEW: CPT | Performed by: RADIOLOGY

## 2024-06-03 PROCEDURE — 2500000004 HC RX 250 GENERAL PHARMACY W/ HCPCS (ALT 636 FOR OP/ED): Performed by: PHYSICIAN ASSISTANT

## 2024-06-03 PROCEDURE — 81001 URINALYSIS AUTO W/SCOPE: CPT | Performed by: PHYSICIAN ASSISTANT

## 2024-06-03 PROCEDURE — 70450 CT HEAD/BRAIN W/O DYE: CPT | Performed by: RADIOLOGY

## 2024-06-03 PROCEDURE — 72125 CT NECK SPINE W/O DYE: CPT

## 2024-06-03 PROCEDURE — 72100 X-RAY EXAM L-S SPINE 2/3 VWS: CPT

## 2024-06-03 PROCEDURE — 70450 CT HEAD/BRAIN W/O DYE: CPT

## 2024-06-03 PROCEDURE — 80048 BASIC METABOLIC PNL TOTAL CA: CPT | Performed by: PHYSICIAN ASSISTANT

## 2024-06-03 RX ORDER — ACETAMINOPHEN 325 MG/1
650 TABLET ORAL ONCE
Status: COMPLETED | OUTPATIENT
Start: 2024-06-03 | End: 2024-06-03

## 2024-06-03 RX ADMIN — SODIUM CHLORIDE 500 ML: 900 INJECTION, SOLUTION INTRAVENOUS at 16:59

## 2024-06-03 RX ADMIN — ACETAMINOPHEN 650 MG: 325 TABLET ORAL at 16:52

## 2024-06-03 SDOH — SOCIAL STABILITY: SOCIAL INSECURITY: HAVE YOU HAD THOUGHTS OF HARMING ANYONE ELSE?: NO

## 2024-06-03 SDOH — SOCIAL STABILITY: SOCIAL INSECURITY: DO YOU FEEL UNSAFE GOING BACK TO THE PLACE WHERE YOU ARE LIVING?: NO

## 2024-06-03 SDOH — SOCIAL STABILITY: SOCIAL INSECURITY: ABUSE: ADULT

## 2024-06-03 SDOH — SOCIAL STABILITY: SOCIAL INSECURITY: DO YOU FEEL ANYONE HAS EXPLOITED OR TAKEN ADVANTAGE OF YOU FINANCIALLY OR OF YOUR PERSONAL PROPERTY?: NO

## 2024-06-03 SDOH — SOCIAL STABILITY: SOCIAL INSECURITY: HAVE YOU HAD ANY THOUGHTS OF HARMING ANYONE ELSE?: NO

## 2024-06-03 SDOH — SOCIAL STABILITY: SOCIAL INSECURITY: HAS ANYONE EVER THREATENED TO HURT YOUR FAMILY OR YOUR PETS?: NO

## 2024-06-03 SDOH — SOCIAL STABILITY: SOCIAL INSECURITY: DOES ANYONE TRY TO KEEP YOU FROM HAVING/CONTACTING OTHER FRIENDS OR DOING THINGS OUTSIDE YOUR HOME?: NO

## 2024-06-03 SDOH — SOCIAL STABILITY: SOCIAL INSECURITY: ARE YOU OR HAVE YOU BEEN THREATENED OR ABUSED PHYSICALLY, EMOTIONALLY, OR SEXUALLY BY ANYONE?: NO

## 2024-06-03 SDOH — SOCIAL STABILITY: SOCIAL INSECURITY: ARE THERE ANY APPARENT SIGNS OF INJURIES/BEHAVIORS THAT COULD BE RELATED TO ABUSE/NEGLECT?: NO

## 2024-06-03 ASSESSMENT — COGNITIVE AND FUNCTIONAL STATUS - GENERAL
HELP NEEDED FOR BATHING: A LITTLE
WALKING IN HOSPITAL ROOM: A LITTLE
DAILY ACTIVITIY SCORE: 19
PATIENT BASELINE BEDBOUND: NO
PERSONAL GROOMING: A LITTLE
DRESSING REGULAR LOWER BODY CLOTHING: A LITTLE
MOVING TO AND FROM BED TO CHAIR: A LITTLE
TOILETING: A LITTLE
DRESSING REGULAR UPPER BODY CLOTHING: A LITTLE
TURNING FROM BACK TO SIDE WHILE IN FLAT BAD: A LITTLE
MOBILITY SCORE: 17
STANDING UP FROM CHAIR USING ARMS: A LITTLE
CLIMB 3 TO 5 STEPS WITH RAILING: A LOT
MOVING FROM LYING ON BACK TO SITTING ON SIDE OF FLAT BED WITH BEDRAILS: A LITTLE

## 2024-06-03 ASSESSMENT — COLUMBIA-SUICIDE SEVERITY RATING SCALE - C-SSRS
6. HAVE YOU EVER DONE ANYTHING, STARTED TO DO ANYTHING, OR PREPARED TO DO ANYTHING TO END YOUR LIFE?: NO
2. HAVE YOU ACTUALLY HAD ANY THOUGHTS OF KILLING YOURSELF?: NO
1. IN THE PAST MONTH, HAVE YOU WISHED YOU WERE DEAD OR WISHED YOU COULD GO TO SLEEP AND NOT WAKE UP?: NO
2. HAVE YOU ACTUALLY HAD ANY THOUGHTS OF KILLING YOURSELF?: NO
1. IN THE PAST MONTH, HAVE YOU WISHED YOU WERE DEAD OR WISHED YOU COULD GO TO SLEEP AND NOT WAKE UP?: NO
6. HAVE YOU EVER DONE ANYTHING, STARTED TO DO ANYTHING, OR PREPARED TO DO ANYTHING TO END YOUR LIFE?: NO

## 2024-06-03 ASSESSMENT — ACTIVITIES OF DAILY LIVING (ADL)
HEARING - LEFT EAR: FUNCTIONAL
ADEQUATE_TO_COMPLETE_ADL: YES
JUDGMENT_ADEQUATE_SAFELY_COMPLETE_DAILY_ACTIVITIES: YES
LACK_OF_TRANSPORTATION: NO
HEARING - RIGHT EAR: FUNCTIONAL
FEEDING YOURSELF: INDEPENDENT
PATIENT'S MEMORY ADEQUATE TO SAFELY COMPLETE DAILY ACTIVITIES?: YES
TOILETING: NEEDS ASSISTANCE
ASSISTIVE_DEVICE: WALKER
GROOMING: NEEDS ASSISTANCE
DRESSING YOURSELF: NEEDS ASSISTANCE
WALKS IN HOME: NEEDS ASSISTANCE
BATHING: NEEDS ASSISTANCE

## 2024-06-03 ASSESSMENT — LIFESTYLE VARIABLES
AUDIT-C TOTAL SCORE: 0
HOW OFTEN DO YOU HAVE A DRINK CONTAINING ALCOHOL: NEVER
EVER HAD A DRINK FIRST THING IN THE MORNING TO STEADY YOUR NERVES TO GET RID OF A HANGOVER: NO
TOTAL SCORE: 0
SKIP TO QUESTIONS 9-10: 1
HAVE PEOPLE ANNOYED YOU BY CRITICIZING YOUR DRINKING: NO
HAVE YOU EVER FELT YOU SHOULD CUT DOWN ON YOUR DRINKING: NO
EVER FELT BAD OR GUILTY ABOUT YOUR DRINKING: NO
AUDIT-C TOTAL SCORE: 0
HOW MANY STANDARD DRINKS CONTAINING ALCOHOL DO YOU HAVE ON A TYPICAL DAY: PATIENT DOES NOT DRINK
HOW OFTEN DO YOU HAVE 6 OR MORE DRINKS ON ONE OCCASION: NEVER

## 2024-06-03 ASSESSMENT — PAIN - FUNCTIONAL ASSESSMENT
PAIN_FUNCTIONAL_ASSESSMENT: 0-10
PAIN_FUNCTIONAL_ASSESSMENT: 0-10

## 2024-06-03 ASSESSMENT — PAIN SCALES - GENERAL
PAINLEVEL_OUTOF10: 5 - MODERATE PAIN
PAINLEVEL_OUTOF10: 0 - NO PAIN

## 2024-06-03 ASSESSMENT — PAIN DESCRIPTION - ORIENTATION: ORIENTATION: MID

## 2024-06-03 ASSESSMENT — PATIENT HEALTH QUESTIONNAIRE - PHQ9
2. FEELING DOWN, DEPRESSED OR HOPELESS: NOT AT ALL
SUM OF ALL RESPONSES TO PHQ9 QUESTIONS 1 & 2: 0
1. LITTLE INTEREST OR PLEASURE IN DOING THINGS: NOT AT ALL

## 2024-06-03 ASSESSMENT — PAIN DESCRIPTION - DESCRIPTORS: DESCRIPTORS: SORE

## 2024-06-03 ASSESSMENT — PAIN DESCRIPTION - PROGRESSION: CLINICAL_PROGRESSION: NOT CHANGED

## 2024-06-03 ASSESSMENT — PAIN DESCRIPTION - LOCATION: LOCATION: HEAD

## 2024-06-03 ASSESSMENT — PAIN DESCRIPTION - PAIN TYPE: TYPE: ACUTE PAIN

## 2024-06-03 ASSESSMENT — PAIN DESCRIPTION - FREQUENCY: FREQUENCY: CONSTANT/CONTINUOUS

## 2024-06-03 NOTE — ED PROVIDER NOTES
HPI   Chief Complaint   Patient presents with    Fall     HIA        This is a an 83-year-old female presenting to the emergency department for complaints of mechanical fall at her doctor's office today.  Patient states that she had her walker with her in the bathroom.  She was attempting to pull her walker out of the bathroom when she lost her footing and fell backwards hitting her head.  There was no loss of consciousness.  She does take Plavix.  She states she has pain in the back of her head and the back of her neck.  Patient states that over the course of the last month she has fallen approximately 8 times.  She feels off balance.  She states 2 days ago she fell hurting her left knee and low back.  Patient denies preceding lightheadedness, dizziness, chest pain or shortness of breath prior to the fall today.  Patient denies any chest pain or abdominal pain.      Please see HPI for pertinent positive and negative ROS.                   [place] Coma Scale Score: 15                     Patient History   Past Medical History:   Diagnosis Date    Angina pectoris (CMS-HCC) 10/22/2023    Atherosclerosis of coronary artery 08/21/2019    Atherosclerotic heart disease of native coronary artery with other forms of angina pectoris (CMS-HCC) 10/22/2023    Hypercholesterolemia 12/28/2018    Hyperlipidemia 10/22/2023    Presence of cardiac pacemaker 10/22/2023    Primary hypertension 12/28/2018    Shortness of breath 11/26/2019    Sick sinus syndrome (Multi) 10/22/2023    Sinus bradycardia 10/22/2023    Stage 3a chronic kidney disease (Multi) 11/25/2019     Past Surgical History:   Procedure Laterality Date    CARDIAC CATHETERIZATION N/A 02/02/2024    Procedure: Left Heart Cath;  Surgeon: Becky Vela MD;  Location: [facility] Cardiac Cath Lab;  Service: Cardiovascular;  Laterality: N/A;    CARDIAC CATHETERIZATION N/A 02/02/2024    Procedure: PCI;  Surgeon: Becky Vela MD;  Location: [facility] Cardiac Cath Lab;  Service:  Cardiovascular;  Laterality: N/A;    CARDIAC CATHETERIZATION N/A 02/08/2024    Procedure: Left Heart Cath;  Surgeon: Darrius Ibrahim MD;  Location: [facility] Cardiac Cath Lab;  Service: Cardiovascular;  Laterality: N/A;    CARDIAC CATHETERIZATION N/A 02/08/2024    Procedure: PCI;  Surgeon: Darrius Ibrahim MD;  Location: [facility] Cardiac Cath Lab;  Service: Cardiovascular;  Laterality: N/A;    PACEMAKER PLACEMENT  07/04/2019    [employer] PACEMAKE AND STENT PLACEMENT     Family History   Problem Relation Name Age of Onset    No Known Problems Mother      No Known Problems Father       Social History     Tobacco Use    Smoking status: Never     Passive exposure: Never    Smokeless tobacco: Never   Vaping Use    Vaping status: Never Used   Substance Use Topics    Alcohol use: Never    Drug use: Never       Physical Exam   ED Triage Vitals [06/03/24 1545]   Temp Heart Rate Respirations BP   -- 60 17 132/70      Pulse Ox Temp src Heart Rate Source Patient Position   96 % -- -- Sitting      BP Location FiO2 (%)     Left arm --       Physical Exam  GENERAL APPEARANCE: Awake and alert. No acute respiratory distress.  Patient does have a mild right-sided facial droop.  This is not new today.  VITAL SIGNS: As per the nurses' triage record.  HEENT: Normocephalic, atraumatic. Extraocular muscles are intact. Conjunctiva are pink. Negative scleral icterus. Mucous membranes are moist. Tongue in the midline. Oropharynx clear, uvula midline.   Orbital bones grossly intact without ecchymosis or tenderness to palpation bilaterally.  No nasal gross abnormalities.  No midface instability.  No facial abrasions.  NECK: Soft, nontender and supple.  There is to palpation over the cervical spinous processes without any bony step-offs appreciated.  C-collar was placed.    CHEST: Nontender to palpation. Clear to auscultation bilaterally. No rales, rhonchi, or wheezing. Symmetric rise and fall of chest wall.   HEART: Clear S1 and S2. Regular rate and  rhythm. Strong and equal pulses in the extremities.  ABDOMEN: Soft, nontender, nondistended  MUSCULOSKELETAL: The calves are nontender to palpation. Full gross active range of motion.  Patient does not have any tenderness to palpation over the long bones of upper extremities bilaterally.  No tenderness palpation over the pelvis.  No tenderness palpation over the right lower extremity long bones.  Patient does have tenderness palpation over the left knee with ecchymosis and swelling noted.  She does have tenderness to palpation over the lumbar spinous process, no tenderness to palpation over the thoracic spinous processes.  No tenderness palpation over the chest wall.  NEUROLOGICAL: Awake, alert and oriented x 3. Motor power intact in the upper and lower extremities. Sensation is intact to light touch in the upper and lower extremities. Patient answering questions appropriately.   IMMUNOLOGICAL: No lymphatic streaking noted  DERMATOLOGIC: Warm and dry without petechiae, rashes, or ecchymosis noted on visible skin.   PYSCH: Cooperative with appropriate mood and affect.  ED Course & MDM   Diagnoses as of 06/03/24 6615   GARY (acute kidney injury) (CMS-[facility])   Fall, initial encounter   Closed head injury, initial encounter       Medical Decision Making  Parts of this chart have been completed using voice recognition software. Please excuse any errors of transcription.  My thought process and reason for plan has been formulated from the time that I saw the patient until the time of disposition and is not specific to one specific moment during their visit and furthermore my MDM encompasses this entire chart and not only this text box.      HPI: Detailed above.    Exam: A medically appropriate exam performed, outlined above, given the known history and presentation.    History obtained from: Patient    EKG: See my supervising physician's EKG interpretation        Medications given during visit:  Medications   acetaminophen  (Tylenol) tablet 650 mg (650 mg oral Given 6/3/24 3142)   sodium chloride 0.9 % bolus 500 mL (0 mL intravenous Stopped 6/3/24 1023)        Diagnostic/tests  Labs Reviewed   CBC WITH AUTO DIFFERENTIAL - Abnormal       Result Value    WBC 7.3      nRBC 0.0      RBC 3.46 (*)     Hemoglobin 9.6 (*)     Hematocrit 30.2 (*)     MCV 87      MCH 27.7      MCHC 31.8 (*)     RDW 16.8 (*)     Platelets 130 (*)     Neutrophils % 77.7      Immature Granulocytes %, Automated 0.8      Lymphocytes % 10.1      Monocytes % 8.8      Eosinophils % 1.9      Basophils % 0.7      Neutrophils Absolute 5.67 (*)     Immature Granulocytes Absolute, Automated 0.06      Lymphocytes Absolute 0.74 (*)     Monocytes Absolute 0.64      Eosinophils Absolute 0.14      Basophils Absolute 0.05     BASIC METABOLIC PANEL - Abnormal    Glucose 127 (*)     Sodium 139      Potassium 4.6      Chloride 98      Bicarbonate 26      Urea Nitrogen 47 (*)     Creatinine 2.80 (*)     eGFR 16 (*)     Calcium 9.4      Anion Gap 15     URINALYSIS WITH REFLEX CULTURE AND MICROSCOPIC - Abnormal    Color, Urine Light-Yellow      Appearance, Urine Clear      Specific Gravity, Urine 1.011      pH, Urine 5.5      Protein, Urine NEGATIVE      Glucose, Urine Normal      Blood, Urine NEGATIVE      Ketones, Urine NEGATIVE      Bilirubin, Urine NEGATIVE      Urobilinogen, Urine Normal      Nitrite, Urine NEGATIVE      Leukocyte Esterase, Urine 25 Rick/µL (*)    MICROSCOPIC ONLY, URINE - Abnormal    WBC, Urine 1-5      RBC, Urine NONE      Hyaline Casts, Urine 2+ (*)    URINE CULTURE   URINALYSIS WITH REFLEX CULTURE AND MICROSCOPIC    Narrative:     The following orders were created for panel order Urinalysis with Reflex Culture and Microscopic.  Procedure                               Abnormality         Status                     ---------                               -----------         ------                     Urinalysis with Reflex C...[[id number]]  Abnormal             Final result               Extra Urine Gray Tube[[id number]]                                                         Please view results for these tests on the individual orders.      CT head wo IV contrast   Final Result   No evidence of acute cortical infarct or intracranial hemorrhage.        Posterior scalp hematoma.        MACRO:   None        Signed by: Munir Cordon 6/3/2024 5:50 PM   Dictation workstation:   [id number]      CT cervical spine wo IV contrast   Final Result   No evidence for an acute fracture or subluxation of the cervical   spine. Multilevel discogenic degenerative changes.        MACRO:   None        Signed by: Munir Cordon 6/3/2024 5:55 PM   Dictation workstation:   [id number]      XR chest 1 view   Final Result   No evidence of acute intrathoracic abnormality.        Signed by: Jose French 6/3/2024 5:17 PM   Dictation workstation:   [id number]      XR knee left 4+ views   Final Result   Normal left knee radiographs.        Signed by: Jose French 6/3/2024 5:17 PM   Dictation workstation:   [id number]      XR lumbar spine 2-3 views   Final Result   Advanced lumbar degenerative changes. No acute findings.        Signed by: Jose French 6/3/2024 5:17 PM   Dictation workstation:   [id number]           Considerations/further MDM:  Patient was seen in conjucntion with my supervising physician,  Dr. Mukherjee. Please refer to her note.    Patient presents with stable vital signs.  Blood pressure 128/53, heart rate 60 beats minute, respirations 18, 94% on room air    Differential diagnosis includes but not limited to intracranial abnormality versus cervical spine abnormality versus electrolyte disturbance.  Due to fall 2 days ago with low back pain and right knee pain, x-ray of lumbar spine and left knee were obtained as well.  Chest x-ray was also obtained to rule out pneumothorax or obvious rib fractures due to falling straight back.    Patient was treated with oral Tylenol.    Right CT head and  cervical spine shows no acute findings.  Posterior scalp hematoma.  No overlying abrasions or laceration of the scalp.  X-ray of the lumbar spine and left knee also show no acute findings.  Patient BMP does show evidence of GARY.  Creatinine is 2.8, BUN 47, GFR 16.  Weeks ago creatinine was 1.4.  CBC shows normocytic anemia that remains fairly unchanged from previous CBCs.  Urinalysis unremarkable.  Will be admitted for acute kidney injury and frequent falls.  Patient was agreeable to transfer to [facility] for admission due to bed availability.  I did discuss case with admitting physician, Dr. Henderson who agreed to admission.  Patient was transferred in stable condition.      Procedure  Procedures     Margoth Jansen PA-C  06/03/24 0895

## 2024-06-03 NOTE — PROGRESS NOTES
Attestation/Supervisory note for CLEVELAND Jansen      The patient is an 83-year-old female presenting to the emergency department for evaluation of a head injury after she tripped and fell while she was at her doctor's office today about 45 minutes prior to arrival.  She states that she was trying to use her walker to get out of the bathroom when she lost her footing and she tripped and she fell backwards hitting her head.  She states that she did not lose consciousness.  She does use Plavix.  She states that she has pain in the back of her head and neck.  It is a dull aching pain.  No better or worse.  No radiation.  She states that she does have chronic balance issues and that is why she walks with a walker.  She does have a history of frequent falls.  She states that she fell 2 days ago and she injured her left knee and her lower back.  She denies any other new symptoms today.  No visual changes.  No focal weakness or numbness.  No fever or chills.  No chest pain or shortness of breath.  No abdominal pain.  No nausea vomiting.  No diarrhea or constipation.  No urinary complaints.  No bowel or bladder incontinence.  No urinary retention.  All pertinent positives and negatives are recorded above.  All other systems reviewed and otherwise negative.  Vital signs within normal limits.  Physical exam with a well-nourished well-developed female in no acute distress.  HEENT exam with a cephalohematoma to the posterior scalp but otherwise unremarkable.  She does not have any midface instability.  No septal hematomas.  No hemotympanum.  Pupils are equal round reactive to light.  She has no gross motor, neurologic or vascular deficits on exam.  She does have pain with palpation range of motion of the left knee.  No visible or palpable bony deformity.  She has no evidence of airway compromise or respiratory distress.  Abdominal exam is benign.  Pulses are equal in all 4 extremities.      HIA activation was initiated by the  EMS quarterback. Dr Peraza responded to the activation      EKG with an atrial paced rhythm at 60 bpm, incomplete right bundle branch block pattern, normal axis, normal ST segment, no diffuse flattening of the T waves      Oral acetaminophen and IV fluids ordered.      Diagnostic labs with anemia and acute on chronic renal insufficiency but otherwise unremarkable.      CT head wo IV contrast   Final Result   No evidence of acute cortical infarct or intracranial hemorrhage.        Posterior scalp hematoma.        MACRO:   None        Signed by: Munir Cordon 6/3/2024 5:50 PM   Dictation workstation:   [id number]      CT cervical spine wo IV contrast   Final Result   No evidence for an acute fracture or subluxation of the cervical   spine. Multilevel discogenic degenerative changes.        MACRO:   None        Signed by: Munri Cordon 6/3/2024 5:55 PM   Dictation workstation:   [id number]      XR chest 1 view   Final Result   No evidence of acute intrathoracic abnormality.        Signed by: Jose French 6/3/2024 5:17 PM   Dictation workstation:   [id number]      XR knee left 4+ views   Final Result   Normal left knee radiographs.        Signed by: Jose French 6/3/2024 5:17 PM   Dictation workstation:   [id number]      XR lumbar spine 2-3 views   Final Result   Advanced lumbar degenerative changes. No acute findings.        Signed by: Jose French 6/3/2024 5:17 PM   Dictation workstation:   [id number]           The patient does not have any evidence of ischemia on EKG.  No events on telemetry.  The patient does have an acute on chronic renal insufficiency and mild anemia on labs.  She does not have any visible or palpable bony injury on exam.  She does not have any gross motor, neurologic or vascular deficits on exam.  CT head without evidence of intracranial hemorrhage or skull fracture.  No mass effect.  CT C-spine without evidence of fracture or dislocation.  Chest x-ray without acute process.  No evidence of  pneumonia, rib fracture, pneumothorax or CHF.  X-ray of the left knee without evidence of fracture or dislocation.  X-ray of the LS-spine shows degenerative disease but no acute fracture.      The patient does have frequent falls and her only member is concerned about her going home and falling again.  The patient is amenable to going to rehab.  She was admitted by Dr. Henderson at [facility] for further management of her acute on chronic renal insufficiency and for anticipated transition to rehab versus long-term care.      Impression/diagnosis:  Fall from standing height, initial encounter  Closed head injury  Low back pain, acute on chronic  Left knee pain, acute on chronic  Acute on chronic renal insufficiency      Critical care time of  11 minutes billed for management of the HIA activation with initiation of the HIA activation, consultation with surgery, consultation with the patient regarding her results, assessment of the patient for any neurologic injuries and monitoring the patient on telemetry.  This time excludes time for billable procedures.      critical care time billed for by me is non concurrent with time billed for by CLEVELAND Jansen      I personally saw the patient and made/approve the management plan and take responsibility for the patient management.      I independently interpreted the following study (S) EKG and diagnostic labs      I personally discussed the patient's management with the patient and her family member      I reviewed the results of the diagnostic labs and diagnostic imaging.  Formal radiology read was completed by the radiologist.      Ashley Mukherjee MD

## 2024-06-04 ENCOUNTER — APPOINTMENT (OUTPATIENT)
Dept: PRIMARY CARE | Facility: CLINIC | Age: 84
End: 2024-06-04
Payer: MEDICARE

## 2024-06-04 ENCOUNTER — APPOINTMENT (OUTPATIENT)
Dept: RADIOLOGY | Facility: HOSPITAL | Age: 84
DRG: 640 | End: 2024-06-04
Payer: MEDICARE

## 2024-06-04 ENCOUNTER — TELEPHONE (OUTPATIENT)
Dept: PRIMARY CARE | Facility: CLINIC | Age: 84
End: 2024-06-04

## 2024-06-04 ENCOUNTER — APPOINTMENT (OUTPATIENT)
Dept: CARDIOLOGY | Facility: HOSPITAL | Age: 84
DRG: 640 | End: 2024-06-04
Payer: MEDICARE

## 2024-06-04 ENCOUNTER — HOME CARE VISIT (OUTPATIENT)
Dept: HOME HEALTH SERVICES | Facility: HOME HEALTH | Age: 84
End: 2024-06-04
Payer: MEDICARE

## 2024-06-04 ENCOUNTER — TELEPHONE (OUTPATIENT)
Dept: CARDIOLOGY | Facility: CLINIC | Age: 84
End: 2024-06-04

## 2024-06-04 PROBLEM — R53.1 WEAKNESS: Status: ACTIVE | Noted: 2024-06-04

## 2024-06-04 LAB
ALBUMIN SERPL-MCNC: 3.8 G/DL (ref 3.5–5)
ALP BLD-CCNC: 85 U/L (ref 35–125)
ALT SERPL-CCNC: 33 U/L (ref 5–40)
ANION GAP SERPL CALC-SCNC: 9 MMOL/L
AST SERPL-CCNC: 51 U/L (ref 5–40)
ATRIAL RATE: 60 BPM
BACTERIA UR CULT: NORMAL
BILIRUB SERPL-MCNC: 0.4 MG/DL (ref 0.1–1.2)
BUN SERPL-MCNC: 44 MG/DL (ref 8–25)
CALCIUM SERPL-MCNC: 8.6 MG/DL (ref 8.5–10.4)
CHLORIDE SERPL-SCNC: 102 MMOL/L (ref 97–107)
CK SERPL-CCNC: 425 U/L (ref 24–195)
CO2 SERPL-SCNC: 24 MMOL/L (ref 24–31)
CREAT SERPL-MCNC: 2.4 MG/DL (ref 0.4–1.6)
EGFRCR SERPLBLD CKD-EPI 2021: 19 ML/MIN/1.73M*2
ERYTHROCYTE [DISTWIDTH] IN BLOOD BY AUTOMATED COUNT: 16.7 % (ref 11.5–14.5)
GLUCOSE SERPL-MCNC: 118 MG/DL (ref 65–99)
HCT VFR BLD AUTO: 26.5 % (ref 36–46)
HGB BLD-MCNC: 8.4 G/DL (ref 12–16)
MCH RBC QN AUTO: 27.2 PG (ref 26–34)
MCHC RBC AUTO-ENTMCNC: 31.7 G/DL (ref 32–36)
MCV RBC AUTO: 86 FL (ref 80–100)
NRBC BLD-RTO: 0 /100 WBCS (ref 0–0)
NT-PROBNP SERPL-MCNC: 5402 PG/ML (ref 0–624)
P AXIS: 64 DEGREES
P OFFSET: 120 MS
P ONSET: 98 MS
PLATELET # BLD AUTO: 105 X10*3/UL (ref 150–450)
POTASSIUM SERPL-SCNC: 4.7 MMOL/L (ref 3.4–5.1)
PR INTERVAL: 294 MS
PROT SERPL-MCNC: 6 G/DL (ref 5.9–7.9)
Q ONSET: 223 MS
QRS COUNT: 10 BEATS
QRS DURATION: 92 MS
QT INTERVAL: 450 MS
QTC CALCULATION(BAZETT): 450 MS
QTC FREDERICIA: 450 MS
R AXIS: 52 DEGREES
RBC # BLD AUTO: 3.09 X10*6/UL (ref 4–5.2)
SODIUM SERPL-SCNC: 135 MMOL/L (ref 133–145)
T AXIS: 2 DEGREES
T OFFSET: 448 MS
TROPONIN T SERPL-MCNC: 31 NG/L
TROPONIN T SERPL-MCNC: 35 NG/L
TROPONIN T SERPL-MCNC: 36 NG/L
VENTRICULAR RATE: 60 BPM
WBC # BLD AUTO: 7.4 X10*3/UL (ref 4.4–11.3)

## 2024-06-04 PROCEDURE — 76770 US EXAM ABDO BACK WALL COMP: CPT | Performed by: RADIOLOGY

## 2024-06-04 PROCEDURE — 84484 ASSAY OF TROPONIN QUANT: CPT | Mod: 91 | Performed by: INTERNAL MEDICINE

## 2024-06-04 PROCEDURE — 97165 OT EVAL LOW COMPLEX 30 MIN: CPT | Mod: GO

## 2024-06-04 PROCEDURE — 2500000004 HC RX 250 GENERAL PHARMACY W/ HCPCS (ALT 636 FOR OP/ED): Performed by: INTERNAL MEDICINE

## 2024-06-04 PROCEDURE — 2500000004 HC RX 250 GENERAL PHARMACY W/ HCPCS (ALT 636 FOR OP/ED): Performed by: REGISTERED NURSE

## 2024-06-04 PROCEDURE — 94762 N-INVAS EAR/PLS OXIMTRY CONT: CPT

## 2024-06-04 PROCEDURE — 80053 COMPREHEN METABOLIC PANEL: CPT | Performed by: INTERNAL MEDICINE

## 2024-06-04 PROCEDURE — 93005 ELECTROCARDIOGRAM TRACING: CPT

## 2024-06-04 PROCEDURE — 2500000002 HC RX 250 W HCPCS SELF ADMINISTERED DRUGS (ALT 637 FOR MEDICARE OP, ALT 636 FOR OP/ED): Mod: MUE | Performed by: INTERNAL MEDICINE

## 2024-06-04 PROCEDURE — 36415 COLL VENOUS BLD VENIPUNCTURE: CPT | Performed by: INTERNAL MEDICINE

## 2024-06-04 PROCEDURE — 2500000001 HC RX 250 WO HCPCS SELF ADMINISTERED DRUGS (ALT 637 FOR MEDICARE OP): Performed by: INTERNAL MEDICINE

## 2024-06-04 PROCEDURE — 85027 COMPLETE CBC AUTOMATED: CPT | Performed by: INTERNAL MEDICINE

## 2024-06-04 PROCEDURE — 76770 US EXAM ABDO BACK WALL COMP: CPT

## 2024-06-04 PROCEDURE — 84484 ASSAY OF TROPONIN QUANT: CPT | Performed by: INTERNAL MEDICINE

## 2024-06-04 PROCEDURE — 83880 ASSAY OF NATRIURETIC PEPTIDE: CPT | Performed by: INTERNAL MEDICINE

## 2024-06-04 PROCEDURE — 99221 1ST HOSP IP/OBS SF/LOW 40: CPT | Performed by: INTERNAL MEDICINE

## 2024-06-04 PROCEDURE — 1200000002 HC GENERAL ROOM WITH TELEMETRY DAILY

## 2024-06-04 PROCEDURE — 97161 PT EVAL LOW COMPLEX 20 MIN: CPT | Mod: GP

## 2024-06-04 PROCEDURE — 99231 SBSQ HOSP IP/OBS SF/LOW 25: CPT | Performed by: NURSE PRACTITIONER

## 2024-06-04 PROCEDURE — 82550 ASSAY OF CK (CPK): CPT | Performed by: INTERNAL MEDICINE

## 2024-06-04 PROCEDURE — 97530 THERAPEUTIC ACTIVITIES: CPT | Mod: GP

## 2024-06-04 RX ORDER — SERTRALINE HYDROCHLORIDE 100 MG/1
200 TABLET, FILM COATED ORAL NIGHTLY
Status: DISCONTINUED | OUTPATIENT
Start: 2024-06-04 | End: 2024-06-07 | Stop reason: HOSPADM

## 2024-06-04 RX ORDER — POLYETHYLENE GLYCOL 3350 17 G/17G
17 POWDER, FOR SOLUTION ORAL DAILY
Status: DISCONTINUED | OUTPATIENT
Start: 2024-06-04 | End: 2024-06-07 | Stop reason: HOSPADM

## 2024-06-04 RX ORDER — ACETAMINOPHEN 650 MG/1
650 SUPPOSITORY RECTAL EVERY 4 HOURS PRN
Status: DISCONTINUED | OUTPATIENT
Start: 2024-06-04 | End: 2024-06-07 | Stop reason: HOSPADM

## 2024-06-04 RX ORDER — CLOPIDOGREL BISULFATE 75 MG/1
75 TABLET ORAL DAILY
Status: DISCONTINUED | OUTPATIENT
Start: 2024-06-04 | End: 2024-06-05

## 2024-06-04 RX ORDER — PANTOPRAZOLE SODIUM 40 MG/1
40 TABLET, DELAYED RELEASE ORAL
Status: DISCONTINUED | OUTPATIENT
Start: 2024-06-04 | End: 2024-06-07 | Stop reason: HOSPADM

## 2024-06-04 RX ORDER — ACETAMINOPHEN 500 MG
5 TABLET ORAL NIGHTLY PRN
Status: DISCONTINUED | OUTPATIENT
Start: 2024-06-04 | End: 2024-06-07 | Stop reason: HOSPADM

## 2024-06-04 RX ORDER — AMIODARONE HYDROCHLORIDE 200 MG/1
200 TABLET ORAL DAILY
Status: DISCONTINUED | OUTPATIENT
Start: 2024-06-04 | End: 2024-06-07 | Stop reason: HOSPADM

## 2024-06-04 RX ORDER — SODIUM CHLORIDE 9 MG/ML
100 INJECTION, SOLUTION INTRAVENOUS CONTINUOUS
Status: DISCONTINUED | OUTPATIENT
Start: 2024-06-04 | End: 2024-06-05

## 2024-06-04 RX ORDER — LEVOTHYROXINE SODIUM 100 UG/1
100 TABLET ORAL DAILY
Status: DISCONTINUED | OUTPATIENT
Start: 2024-06-04 | End: 2024-06-07 | Stop reason: HOSPADM

## 2024-06-04 RX ORDER — METOPROLOL TARTRATE 50 MG/1
50 TABLET ORAL 2 TIMES DAILY
Status: DISCONTINUED | OUTPATIENT
Start: 2024-06-04 | End: 2024-06-07 | Stop reason: HOSPADM

## 2024-06-04 RX ORDER — ACETAMINOPHEN 325 MG/1
650 TABLET ORAL EVERY 4 HOURS PRN
Status: DISCONTINUED | OUTPATIENT
Start: 2024-06-04 | End: 2024-06-07 | Stop reason: HOSPADM

## 2024-06-04 RX ORDER — PANTOPRAZOLE SODIUM 40 MG/10ML
40 INJECTION, POWDER, LYOPHILIZED, FOR SOLUTION INTRAVENOUS
Status: DISCONTINUED | OUTPATIENT
Start: 2024-06-04 | End: 2024-06-07 | Stop reason: HOSPADM

## 2024-06-04 RX ORDER — ACETAMINOPHEN 160 MG/5ML
650 SOLUTION ORAL EVERY 4 HOURS PRN
Status: DISCONTINUED | OUTPATIENT
Start: 2024-06-04 | End: 2024-06-07 | Stop reason: HOSPADM

## 2024-06-04 RX ORDER — ONDANSETRON HYDROCHLORIDE 2 MG/ML
4 INJECTION, SOLUTION INTRAVENOUS EVERY 8 HOURS PRN
Status: DISCONTINUED | OUTPATIENT
Start: 2024-06-04 | End: 2024-06-07 | Stop reason: HOSPADM

## 2024-06-04 RX ADMIN — SERTRALINE 200 MG: 100 TABLET, FILM COATED ORAL at 21:11

## 2024-06-04 RX ADMIN — APIXABAN 5 MG: 5 TABLET, FILM COATED ORAL at 05:10

## 2024-06-04 RX ADMIN — APIXABAN 2.5 MG: 2.5 TABLET, FILM COATED ORAL at 17:39

## 2024-06-04 RX ADMIN — AMIODARONE HYDROCHLORIDE 200 MG: 200 TABLET ORAL at 08:48

## 2024-06-04 RX ADMIN — LEVOTHYROXINE SODIUM 100 MCG: 100 TABLET ORAL at 05:09

## 2024-06-04 RX ADMIN — METOPROLOL TARTRATE 50 MG: 50 TABLET, FILM COATED ORAL at 08:48

## 2024-06-04 RX ADMIN — SERTRALINE 200 MG: 100 TABLET, FILM COATED ORAL at 00:47

## 2024-06-04 RX ADMIN — ONDANSETRON 4 MG: 2 INJECTION INTRAMUSCULAR; INTRAVENOUS at 01:30

## 2024-06-04 RX ADMIN — CLOPIDOGREL BISULFATE 75 MG: 75 TABLET ORAL at 08:48

## 2024-06-04 RX ADMIN — SODIUM CHLORIDE 100 ML/HR: 900 INJECTION, SOLUTION INTRAVENOUS at 17:40

## 2024-06-04 RX ADMIN — SODIUM CHLORIDE 100 ML/HR: 900 INJECTION, SOLUTION INTRAVENOUS at 00:47

## 2024-06-04 RX ADMIN — PANTOPRAZOLE SODIUM 40 MG: 40 TABLET, DELAYED RELEASE ORAL at 05:10

## 2024-06-04 ASSESSMENT — COGNITIVE AND FUNCTIONAL STATUS - GENERAL
DRESSING REGULAR UPPER BODY CLOTHING: A LITTLE
MOBILITY SCORE: 15
MOVING FROM LYING ON BACK TO SITTING ON SIDE OF FLAT BED WITH BEDRAILS: A LITTLE
DAILY ACTIVITIY SCORE: 17
DAILY ACTIVITIY SCORE: 16
MOVING TO AND FROM BED TO CHAIR: A LITTLE
TOILETING: A LITTLE
DRESSING REGULAR LOWER BODY CLOTHING: A LOT
STANDING UP FROM CHAIR USING ARMS: A LITTLE
MOBILITY SCORE: 17
PERSONAL GROOMING: A LITTLE
DRESSING REGULAR UPPER BODY CLOTHING: A LOT
MOVING FROM LYING ON BACK TO SITTING ON SIDE OF FLAT BED WITH BEDRAILS: A LITTLE
CLIMB 3 TO 5 STEPS WITH RAILING: A LOT
DRESSING REGULAR LOWER BODY CLOTHING: A LITTLE
MOVING FROM LYING ON BACK TO SITTING ON SIDE OF FLAT BED WITH BEDRAILS: A LITTLE
MOBILITY SCORE: 13
WALKING IN HOSPITAL ROOM: A LITTLE
DRESSING REGULAR LOWER BODY CLOTHING: A LOT
TURNING FROM BACK TO SIDE WHILE IN FLAT BAD: A LITTLE
STANDING UP FROM CHAIR USING ARMS: A LITTLE
CLIMB 3 TO 5 STEPS WITH RAILING: TOTAL
DAILY ACTIVITIY SCORE: 20
MOVING TO AND FROM BED TO CHAIR: A LITTLE
CLIMB 3 TO 5 STEPS WITH RAILING: TOTAL
TOILETING: A LITTLE
WALKING IN HOSPITAL ROOM: A LOT
HELP NEEDED FOR BATHING: A LOT
HELP NEEDED FOR BATHING: A LOT
TURNING FROM BACK TO SIDE WHILE IN FLAT BAD: A LITTLE
WALKING IN HOSPITAL ROOM: A LOT
STANDING UP FROM CHAIR USING ARMS: A LITTLE
TURNING FROM BACK TO SIDE WHILE IN FLAT BAD: A LOT
HELP NEEDED FOR BATHING: A LITTLE
MOVING TO AND FROM BED TO CHAIR: A LOT
DRESSING REGULAR UPPER BODY CLOTHING: A LOT
TOILETING: A LITTLE

## 2024-06-04 ASSESSMENT — ACTIVITIES OF DAILY LIVING (ADL)
ADL_ASSISTANCE: INDEPENDENT
BATHING_ASSISTANCE: MODERATE
ADL_ASSISTANCE: INDEPENDENT

## 2024-06-04 ASSESSMENT — ENCOUNTER SYMPTOMS
NEUROLOGICAL NEGATIVE: 1
RESPIRATORY NEGATIVE: 1
GASTROINTESTINAL NEGATIVE: 1
CARDIOVASCULAR NEGATIVE: 1
EYES NEGATIVE: 1
HEMATOLOGIC/LYMPHATIC NEGATIVE: 1
CONSTITUTIONAL NEGATIVE: 1
ALLERGIC/IMMUNOLOGIC NEGATIVE: 1
PSYCHIATRIC NEGATIVE: 1
MUSCULOSKELETAL NEGATIVE: 1
ENDOCRINE NEGATIVE: 1

## 2024-06-04 ASSESSMENT — PAIN SCALES - GENERAL
PAINLEVEL_OUTOF10: 0 - NO PAIN

## 2024-06-04 ASSESSMENT — PAIN - FUNCTIONAL ASSESSMENT
PAIN_FUNCTIONAL_ASSESSMENT: 0-10
PAIN_FUNCTIONAL_ASSESSMENT: 0-10

## 2024-06-04 NOTE — PROGRESS NOTES
Occupational Therapy    Evaluation    Patient Name: Linette Doyle  MRN: [id number]  Today's Date: 6/4/2024  Time Calculation  Start Time: 1346  Stop Time: 1416  Time Calculation (min): 30 min    Assessment  IP OT Assessment  OT Assessment: Pt is 84 y/o female admitted for weakness. ,falls. Pt prewents w/ decreased ADL skills/ functional mobility, decresedactivity tolerance, decreased BUE sttrength. recommend OT services to address above deficits.  Prognosis: Good  Barriers to Discharge:  (Pt lives alone, has limited support locally, h/o freq. falls)  End of Session Communication: Bedside nurse  End of Session Patient Position: Bed, 3 rail up, Alarm on  Plan:  Treatment Interventions: ADL retraining, UE strengthening/ROM, Functional transfer training, Endurance training, Patient/family training, Equipment evaluation/education  OT Frequency: 3 times per week  OT Discharge Recommendations: Moderate intensity level of continued care  Equipment Recommended upon Discharge: Wheeled walker  OT - OK to Discharge: Yes    Subjective   Current Problem:  1. Weakness          General:  General  Reason for Referral: decreased ADL's  Referred By: Sarthak Henderson MD  Past Medical History Relevant to Rehab: A-fib, CND3, pacemaker, HTN, angina, CAD  Family/Caregiver Present: No  Co-Treatment: PT  Co-Treatment Reason: Patial co-eval w/ PT for pt safety and limited endurance  Prior to Session Communication: Bedside nurse  Patient Position Received: Bed, 3 rail up, Alarm on  Preferred Learning Style: verbal, visual  General Comment: *% y/o female admitted after fall at cajrdiologist office. Pt agreeable to therapy.  Precautions:  Hearing/Visual Limitations: reading glasses  Medical Precautions: Fall precautions, Oxygen therapy device and L/min, Spinal precautions (1 L O2)  Vital Signs:     Pain:  Pain Assessment  Pain Assessment: 0-10  Pain Score: 0 - No pain (at rest. 5/10 w/ mobility)  Pain Type: Acute pain  Pain Location: Back  Pain  Orientation: Lower    Objective   Cognition:  Overall Cognitive Status: Within Functional Limits  Insight: Mild  Impulsive: Mildly  Processing Speed: Delayed           Home Living:  Type of Home: House  Lives With: Alone  Home Adaptive Equipment: Walker rolling or standard  Home Layout: One level  Home Access: Stairs to enter without rails  Entrance Stairs-Rails: None  Entrance Stairs-Number of Steps: 1  Bathroom Shower/Tub: Tub/shower unit  Bathroom Toilet: Standard  Bathroom Equipment: Grab bars in shower, Shower chair with back   Prior Function:  Level of [place]: Independent with ADLs and functional transfers, Needs assistance with homemaking  Receives Help From: Family (brother and sister live far away but area ble to assist as needed)  ADL Assistance: Independent  Homemaking Assistance:  (assist from ortiz, pt eats out frequently)  Ambulatory Assistance: Independent  Vocational: Retired  Hand Dominance: Right  IADL History:     ADL:  Eating Assistance: Stand by  Eating Deficit: Setup (per clinical judgement)  Grooming Assistance: Stand by  Grooming Deficit: Setup (per clinical judgement)  Bathing Assistance: Moderate  Bathing Deficit: Steadying, Supervision/safety, Left lower leg including foot, Right lower leg including foot, Buttocks, Perineal area (per clinical judgement)  UE Dressing Assistance: Moderate  UE Dressing Deficit: Pull over head, Pull around back, Pull down in back, Supervision/safety (per clincal judgement)  LE Dressing Assistance: Moderate  LE Dressing Deficit: Steadying, Supervision/safety, Don/doff R sock, Don/doff L sock, Thread RLE into pants, Thread LLE into pants, Thread RLE into underwear, Thread LLE into underwear, Pull up over hips (per clincal judgement)  Toileting Assistance with Device: Not performed  Toileting Deficit: Urinary Catheter  Functional Assistance: Not performed  Activity Tolerance:  Endurance: Decreased tolerance for upright activites  Bed Mobility/Transfers:  Bed Mobility  Bed Mobility: Yes  Bed Mobility 1  Bed Mobility 1: Supine to sitting  Level of Assistance 1: Minimum assistance, Moderate verbal cues  Bed Mobility Comments 1: HOB elev. assist for trunk up, verbal cues for logroll tech.  Bed Mobility 2  Bed Mobility  2: Sitting to supine  Level of Assistance 2: Minimum assistance  Bed Mobility Comments 2: Assist for trunk down, legs in    Transfers  Transfer: Yes  Transfer 1  Transfer From 1: Sit to  Transfer to 1: Stand  Technique 1: Sit to stand  Transfer Device 1: Walker  Transfer Level of Assistance 1: Minimum assistance  Trials/Comments 1: Verbal cues for hand placement  Transfers 2  Transfer From 2: Stand to  Transfer to 2: Sit  Technique 2: Stand to sit  Transfer Device 2: Walker  Transfer Level of Assistance 2: Minimum assistance  Trials/Comments 2: Verbal cues for hand placement      Functional Mobility:  Functional Mobility  Functional Mobility Performed: Yes  Functional Mobility 1  Surface 1: Level tile  Device 1: Rolling walker  Assistance 1: Minimum assistance  Comments 1: Pt ambulated 8-10 steps forward/ back w/ min. assist and FWW.  Modalities:     IADL's:      Vision: Vision - Basic Assessment  Current Vision: Wears glasses only for reading  Sensation:  Light Touch: No apparent deficits  Strength:  Strength Comments: 3+/5 BUE's  Perception:     Coordination:  Movements are Fluid and Coordinated: Yes  Coordination Comment: teremors noted in BUE hands that increased w/ activity.   Hand Function:  Hand Function  Gross Grasp: Functional  Coordination: Functional  Extremities: RUE   RUE : Within Functional Limits and LUE   LUE: Within Functional Limits      Outcome Measures: [facility] Daily Activity  Putting on and taking off regular lower body clothing: A lot  Bathing (including washing, rinsing, drying): A lot  Putting on and taking off regular upper body clothing: A lot  Toileting, which includes using toilet, bedpan or urinal: A little  Taking care of  personal grooming such as brushing teeth: A little  Eating Meals: None  Daily Activity - Total Score: 16      Education Documentation  Precautions, taught by Daria Thomson OT at 6/4/2024  3:26 PM.  Learner: Patient  Readiness: Acceptance  Method: Explanation  Response: Needs Reinforcement    ADL Training, taught by Daria Thomson OT at 6/4/2024  3:26 PM.  Learner: Patient  Readiness: Acceptance  Method: Explanation  Response: Needs Reinforcement    Education Comments  No comments found.      Goals:   Encounter Problems       Encounter Problems (Active)       OT Goals       ADL's (Progressing)       Start:  06/04/24    Expected End:  06/18/24       Pt will complete bathing, dressing and grooming tasks w/ close supervision w/ AE/ compensatory tech for safety and increased independence in self care tasks.         Functional transfers (Progressing)       Start:  06/04/24    Expected End:  06/18/24       Pt will demon bed, chair and toilet transfers w/ mod I w/ LRD for safety and increased independence in daily tasks.         Functional endurance (Progressing)       Start:  06/04/24    Expected End:  06/18/24       Pt will tolerate 20 minutes of functional activity to improve functional endurance for daily tasks and functional mobility         Precautions (Progressing)       Start:  06/04/24    Expected End:  06/18/24       Pt will verbalize/demon precautions consistently 100% of the time for safety and increased independence in daily tasks and functional mobility.

## 2024-06-04 NOTE — CARE PLAN
The patient's goals for the shift include  no falls, and rest    The clinical goals for the shift include IV fluids, safety- no falls, and promote rest      06/03/24 at 11:30 PM - Kristina Rodriguez RN

## 2024-06-04 NOTE — PROGRESS NOTES
"Linette Doyle is a 83 y.o. female on day 0 of admission presenting with Weakness.      Subjective   Pt admitted this am for repeated falls. Per nursing the patient \"threw herself back\" on BSC this am. Reported having sudden shooting electrical sensation in back.        Objective     Last Recorded Vitals  /59 (BP Location: Right arm, Patient Position: Lying)   Pulse 60   Temp 36.9 °C (98.4 °F) (Temporal)   Resp 20   Wt 61.3 kg (135 lb 2.3 oz)   SpO2 99%   Intake/Output last 3 Shifts:    Intake/Output Summary (Last 24 hours) at 6/4/2024 1059  Last data filed at 6/4/2024 0942  Gross per 24 hour   Intake 783.33 ml   Output 575 ml   Net 208.33 ml       Admission Weight  Weight: 61.3 kg (135 lb 2.3 oz) (06/03/24 2233)    Daily Weight  06/03/24 : 61.3 kg (135 lb 2.3 oz)    Image Results  US renal complete  Narrative: Interpreted By:  Jony Fair,   STUDY:  US RENAL COMPLETE; 6/4/2024 7:57 am      INDICATION:  Signs/Symptoms:renal failure.      COMPARISON:  None      ACCESSION NUMBER(S):  [id number]      ORDERING CLINICIAN:  GIOVANY PATEL      TECHNIQUE:  Grayscale and color Doppler imaging of the kidneys      FINDINGS:  The right kidney measures 8.9 cm x 4.0 cm x 3.2 cm .  The left kidney measures 8.6 cm x 3.4 cm x 3.8 cm. There is a 1 cm  parapelvic cyst on the left midpole.          No renal stones are identified.  The renal cortical thickness and  echogenicity is normal.  No hydronephrosis is identified.      Urinary bladder is collapsed around an indwelling Pacheco catheter  limiting assessment.      Impression: Both kidneys are small.  Parapelvic renal cyst is present on the left.  No evidence for hydronephrosis.  Urinary bladder is collapsed around a Pacheco catheter.      MACRO:  none      Signed by: Jony Fair 6/4/2024 8:14 AM  Dictation workstation:   [id number]  ECG 12 lead  Atrial-paced rhythm with prolonged AV conduction  Incomplete right bundle branch block  Nonspecific T wave " abnormality  Abnormal ECG  When compared with ECG of 09-MAY-2024 18:43,  Electronic atrial pacemaker has replaced Electronic ventricular pacemaker      Physical Exam  HENT:      Head: Normocephalic and atraumatic.      Nose: Nose normal.      Mouth/Throat:      Mouth: Mucous membranes are moist.      Pharynx: Oropharynx is clear.   Eyes:      Extraocular Movements: Extraocular movements intact.      Pupils: Pupils are equal, round, and reactive to light.   Cardiovascular:      Rate and Rhythm: Normal rate and regular rhythm.      Pulses: Normal pulses.   Pulmonary:      Effort: Pulmonary effort is normal.      Breath sounds: Normal breath sounds.   Abdominal:      General: Abdomen is flat. Bowel sounds are normal.      Palpations: Abdomen is soft.   Musculoskeletal:         General: Normal range of motion.   Skin:     General: Skin is warm and dry.      Capillary Refill: Capillary refill takes less than 2 seconds.   Neurological:      General: No focal deficit present.      Mental Status: She is oriented to person, place, and time.   Psychiatric:         Mood and Affect: Mood normal.         Relevant Results  Results for orders placed or performed during the hospital encounter of 06/03/24 (from the past 24 hour(s))   NT-PROBNP   Result Value Ref Range    PROBNP 5,402 (H) 0 - 624 pg/mL   Creatine Kinase   Result Value Ref Range    Creatine Kinase 425 (H) 24 - 195 U/L   Serial Troponin, Initial ([facility])   Result Value Ref Range    Troponin T, High Sensitivity 35 (HH) <=14 ng/L   Serial Troponin, 2 Hour ([facility])   Result Value Ref Range    Troponin T, High Sensitivity 36 (HH) <=14 ng/L   CBC   Result Value Ref Range    WBC 7.4 4.4 - 11.3 x10*3/uL    nRBC 0.0 0.0 - 0.0 /100 WBCs    RBC 3.09 (L) 4.00 - 5.20 x10*6/uL    Hemoglobin 8.4 (L) 12.0 - 16.0 g/dL    Hematocrit 26.5 (L) 36.0 - 46.0 %    MCV 86 80 - 100 fL    MCH 27.2 26.0 - 34.0 pg    MCHC 31.7 (L) 32.0 - 36.0 g/dL    RDW 16.7 (H) 11.5 - 14.5 %    Platelets 105 (L)  150 - 450 x10*3/uL   Comprehensive metabolic panel   Result Value Ref Range    Glucose 118 (H) 65 - 99 mg/dL    Sodium 135 133 - 145 mmol/L    Potassium 4.7 3.4 - 5.1 mmol/L    Chloride 102 97 - 107 mmol/L    Bicarbonate 24 24 - 31 mmol/L    Urea Nitrogen 44 (H) 8 - 25 mg/dL    Creatinine 2.40 (H) 0.40 - 1.60 mg/dL    eGFR 19 (L) >60 mL/min/1.73m*2    Calcium 8.6 8.5 - 10.4 mg/dL    Albumin 3.8 3.5 - 5.0 g/dL    Alkaline Phosphatase 85 35 - 125 U/L    Total Protein 6.0 5.9 - 7.9 g/dL    AST 51 (H) 5 - 40 U/L    Bilirubin, Total 0.4 0.1 - 1.2 mg/dL    ALT 33 5 - 40 U/L    Anion Gap 9 <=19 mmol/L   Serial Troponin, 6 Hour ([facility])   Result Value Ref Range    Troponin T, High Sensitivity 31 (HH) <=14 ng/L     Assessment/Plan   Repeated Falls  -Of unknown etiology  -Will check lumbar MRI along with C-spine  -Fall precautions  -Neurology to evaluate  -Pt is on Plavix and Eliquis. Last MRI brain showed no stroke. Cardiology and Neurology to determine need for Plavix  -Statin held on admit  -PT/OT    Atrial Fibrillation  -Currently paced   -Continue Eliquis, Amiodarone and BB  -Monitor on tele    GARY on CKD  -Creatinine baseline 1.5, currently 2.4. Possibly secondary to urinary retention  -Continue IV fluids  -Nephrology to evaluate  -Monitor     Urinary retention  -C/w Pacheco  -Will do trial void prior to discharge    DVT Prophylaxis  -Eliquis    Dispo  PT/OT to evaluate however pt unsafe to return home alone due to chronic falls. Pt was instructed she will need ATC care or nursing home eventually and she is open to this.        Maryam Karimi, APRN-CNP

## 2024-06-04 NOTE — TELEPHONE ENCOUNTER
Patient's brother (Ede) called to inform C that patient fell again yesterday at Dr. Ibrahim's office. She hit her head and was admitted to [facility]. He and the family are concerned about her living alone and her constant falling. He reports that she is shaky and that her legs don't want to seem to hold her up at times. He is wondering what the next steps should be and if the patient can be referred to homecare or something to help her out. I told Ede I would give [facility] the message, and encouraged him to share his concerns with the doctor and nursing staff at [facility] as well.

## 2024-06-04 NOTE — CARE PLAN
The patient's goals for the shift include  safety, and rest    The clinical goals for the shift include IV fluids, pain management, safety, and promote rest      06/04/24 at 7:40 PM - Kristina Rodriguez RN

## 2024-06-04 NOTE — TELEPHONE ENCOUNTER
Spoke with Ede.  She is being evaluated by neurology to assist in determining if there is an underlying cause to her unsteadiness

## 2024-06-04 NOTE — PROGRESS NOTES
06/04/24 1338   Discharge Planning   Living Arrangements Alone   Support Systems Family members   Assistance Needed walker   Type of Residence Private residence   Number of Stairs to Enter Residence 1   Number of Stairs Within Residence 0   Do you have animals or pets at home? No   Who is requesting discharge planning? Patient   Home or Post Acute Services Post acute facilities (Rehab/SNF/etc)   Type of Post Acute Facility Services Rehab   Patient expects to be discharged to: Skilled Rehab   Does the patient need discharge transport arranged? Yes   RoundTrip coordination needed? Yes   Has discharge transport been arranged? No   Patient Choice   Provider Choice list and CMS website (https://medicare.gov/care-compare#search) for post-acute Quality and Resource Measure Data were provided and reviewed with: Patient   Patient / Family choosing to utilize agency / facility established prior to hospitalization No     SNF list given.  Patient may need Assisted living due to the repeated falls.  Will get her to rehab  And family (sister and brother) can help her get to [place].

## 2024-06-04 NOTE — PROGRESS NOTES
Physical Therapy    Physical Therapy Evaluation & Treatment    Patient Name: Linette Doyle  MRN: [id number]  Today's Date: 6/4/2024   Time Calculation  Start Time: 1345  Stop Time: 1415  Time Calculation (min): 30 min    Assessment/Plan   PT Assessment  PT Assessment Results: Decreased strength, Decreased endurance, Impaired balance, Decreased mobility, Decreased coordination, Decreased safety awareness, Pain (spinal precautions due to multiple falls)  Rehab Prognosis: Good  Evaluation/Treatment Tolerance: Patient limited by pain, Patient limited by fatigue  Medical Staff Made Aware: Yes  Strengths: Ability to acquire knowledge, Housing layout, Living arrangement secure  Barriers to Participation: Comorbidities, Support of extended family/friends (family lives far away, > 40 min)  End of Session Communication: Bedside nurse  Assessment Comment: pt required fluctuating close supervision of 1 to min assist of 2 for the above mobility; Pt demonstrates decreased strength, balance, endurance, mobility ease and safety, + high fall risk due to fluctuating posterior lean, recent multiple falls with ? etiology; Pt will benefit from mod int rehab post hospital d/c.  End of Session Patient Position: Bed, 3 rail up, Alarm on (call button in reach)   IP OR SWING BED PT PLAN  Inpatient or Swing Bed: Inpatient  PT Plan  Treatment/Interventions: Bed mobility, Transfer training, Gait training, Balance training, Stair training, Strengthening, Endurance training, Therapeutic exercise, Therapeutic activity  PT Plan: Skilled PT  PT Frequency: 4 times per week  PT Discharge Recommendations: Moderate intensity level of continued care  Equipment Recommended upon Discharge: Wheeled walker  PT Recommended Transfer Status: Assist x1  PT - OK to Discharge: Yes      Subjective     General Visit Information:  General  Reason for Referral: impaired mobility; s/p multiple falls  Referred By: Sarthak Henderson MD  Past Medical History Relevant to  Rehab: A-fib, CND3, pacemaker, HTN, angina, CAD  Family/Caregiver Present: No  Co-Treatment: OT  Co-Treatment Reason: partial co-eval with OT to maximize pt safety and limited endurance  Prior to Session Communication: Bedside nurse  Patient Position Received: Bed, 3 rail up, Alarm on  Preferred Learning Style: verbal, visual  General Comment: 84 yo WF admitted to [facility] via ED 6/3/24 after falling at her cardiologist';s office. Pt with recent h/o multiple falls, SNF placement, and only having been home x 3 days. All tests negative at this time for acute event/fxs however +posterior scalp hematoma from fall; Waiting to go for MRI of C and L-spine. Cleared by nurse for therapy; Pt agreeable to therapy. + Pacheco cath, + telemetry  Home Living:  Home Living  Type of Home: House  Lives With: Alone  Home Adaptive Equipment: Walker rolling or standard  Home Layout: One level  Home Access: Stairs to enter without rails  Entrance Stairs-Rails: None  Entrance Stairs-Number of Steps: 1  Bathroom Shower/Tub: Tub/shower unit  Bathroom Toilet: Standard  Bathroom Equipment: Grab bars in shower, Shower chair with back  Prior Level of Function:  Prior Function Per Pt/Caregiver Report  Level of [place]: Needs assistance with homemaking, Independent with ADLs and functional transfers  Receives Help From: Family (brother and sister live far away but able to assist some upon return home from SNF===brother provided transportation to Cardiology appt and stayed overnight with pt)  ADL Assistance: Independent  Homemaking Assistance:  (assist from family)  Ambulatory Assistance: Independent (with and without FWW per pt)  Vocational: Retired  Prior Function Comments: pt has not driven since March 2024; pt manages her own neds at home  Precautions:  Precautions  Hearing/Visual Limitations: reading glasses; mild [place]  Medical Precautions: Fall precautions, Oxygen therapy device and L/min, Spinal precautions (1 liter O2 via  nc)  Precautions Comment: PT educated pt in spinal precautions due to multiple recent falls, last one 1 day ago  Vital Signs:  Vital Signs  Heart Rate: 60  SpO2: 94 %  BP: (!) 117/47  MAP (mmHg): 70  BP Location: Right arm  BP Method: Automatic  Patient Position: Lying    Objective   Pain:  Pain Assessment  Pain Assessment: 0-10  Pain Score: 0 - No pain (at rest, 5/10 pain back with mobility)  Pain Type: Acute pain  Pain Location: Back  Pain Orientation: Lower  Pain Radiating Towards: around to left flank  Pain Interventions:  (pain meds per nurse, positioning, spinal precautions, posture)  Cognition:  Cognition  Overall Cognitive Status: Within Functional Limits  Memory:  (appears decreased STM)  Safety/Judgement:  (decreased safety insight during functional mobility)  Insight: Mild  Impulsive: Mildly  Processing Speed: Delayed    General Assessments:  General Observation  General Observation: pleasant, cooperative, slightly anxious, multiple brusing left hand and knee with mild edema, posterior scalp hematoma               Activity Tolerance  Endurance: Decreased tolerance for upright activites    Sensation  Sensation Comment: no complaints per pt    Strength  Strength Comments: dylan hips 3+/5, knees 4-/5, ankles 3+/5  Coordination  Heel to Shin: Impaired (due to c/o back pain)  Coordination Comment: mild dylan hand and feet tremors    Postural Control  Posture Comment: mild forward head, protracted shldrs    Static Sitting Balance  Static Sitting-Balance Support: Feet supported  Static Sitting-Level of Assistance: Close supervision, Minimum assistance  Static Sitting-Comment/Number of Minutes: 3-4 min with initial min posterior trunk support progressing to close supervision of 1, due to posterior lean; fair - to fair +; /57 ( 78)  Dynamic Sitting Balance  Dynamic Sitting-Balance Support: Feet supported  Dynamic Sitting-Comments: contact guard of 1, fair - to fair +    Static Standing Balance  Static  Standing-Balance Support: Bilateral upper extremity supported  Static Standing-Level of Assistance: Minimum assistance  Static Standing-Comment/Number of Minutes: 2-3 min with fair - to fair balance; posterior lean, FWW; /46 (72)  Dynamic Standing Balance  Dynamic Standing-Balance Support: Bilateral upper extremity supported  Dynamic Standing-Comments: FWW, min assist of 1, fair - balance  Functional Assessments:  Bed Mobility  Bed Mobility: Yes  Bed Mobility 1  Bed Mobility 1: Supine to sitting  Level of Assistance 1: Minimum assistance, Moderate verbal cues  Bed Mobility Comments 1: head of bed elevated; min assist of 1for trunk up, verbal cues for modified logrolling  Bed Mobility 2  Bed Mobility  2: Sitting to supine  Level of Assistance 2: Minimum assistance, Minimal verbal cues  Bed Mobility Comments 2: min assist of 1 for trunk down, dylan LE's onto bed via modified logrolling, head of bed flat    Transfers  Transfer: Yes  Transfer 1  Transfer From 1: Sit to  Transfer to 1: Stand  Technique 1: Sit to stand  Transfer Device 1: Walker  Transfer Level of Assistance 1: Minimum assistance, Minimal verbal cues  Trials/Comments 1: min assist of 1 for trunk up, verbal cues for proper dylan hand placement on bed  Transfers 2  Transfer From 2: Stand to  Transfer to 2: Sit  Technique 2: Stand to sit  Transfer Device 2: Walker  Transfer Level of Assistance 2: Minimum assistance, Minimal verbal cues  Trials/Comments 2: min germán tof 1 for trunk down, verbal cues for reaching back for bed with both hands    Ambulation/Gait Training  Ambulation/Gait Training Performed: Yes  Ambulation/Gait Training 1  Surface 1: Level tile  Device 1: Rolling walker  Assistance 1: Minimum assistance, Moderate verbal cues (of 2)  Quality of Gait 1: Narrow base of support, Decreased step length, Diminished heel strike (posterior leam increased with retro amb vs forward amb)  Comments/Distance (ft) 1: pt amb 12 ft x 1 forward, FWW, min assist  of 2 with fair - balance; 12 ft x 1 retro with FWW, min assist of 2 with mod posterior lean, poor + balance    Stairs  Stairs: No  Extremity/Trunk Assessments:  Cervical Spine   Cervical Spine:  (mild forward head)  RLE   RLE :  (see above strength comments)  LLE   LLE :  (see above strength comments)  Treatments:  Therapeutic Activity  Therapeutic Activity Performed: Yes (see bed mobility, transfers, amb with FWW comments)  Outcome Measures:  [facility] Basic Mobility  Turning from your back to your side while in a flat bed without using bedrails: A little  Moving from lying on your back to sitting on the side of a flat bed without using bedrails: A lot  Moving to and from bed to chair (including a wheelchair): A lot  Standing up from a chair using your arms (e.g. wheelchair or bedside chair): A little  To walk in hospital room: A lot  Climbing 3-5 steps with railing: Total  Basic Mobility - Total Score: 13    Encounter Problems       Encounter Problems (Active)       Balance       STG - Maintains dynamic standing balance with upper extremity support (Progressing)       Start:  06/04/24    Expected End:  07/02/24       INTERVENTIONS:  1. Practice standing with minimal support.  2. Educate patient about standing tolerance.  3. Educate patient about independence with gait, transfers, and ADL's.  4. Educate patient about use of assistive device.  5. Educate patient about self-directed care.            Mobility       STG - Patient will ambulate 100 ft, FWW, + turns, contact guard of 1 (Progressing)       Start:  06/04/24    Expected End:  07/02/24            pt will ascend/descend 1 step without rail, contact guard of 1 (Progressing)       Start:  06/04/24    Expected End:  07/02/24               PT Transfers       STG - Patient to transfer to and from sit to supine close supervision of 1 via logrolling (Progressing)       Start:  06/04/24    Expected End:  07/02/24            STG - Patient will transfer sit to and from  stand close supervision of 1 (Progressing)       Start:  06/04/24    Expected End:  07/02/24               Pain       pt will verbalize no > 2/10 pain during functional mobility (Progressing)       Start:  06/04/24    Expected End:  07/02/24               Safety       pt will verbalize spinal precautions without verbal reminders (Progressing)       Start:  06/04/24    Expected End:  07/02/24                   Education Documentation  Mobility Training, taught by Molly Brooke, PT at 6/4/2024  2:48 PM.  Learner: Patient  Readiness: Acceptance  Method: Explanation, Demonstration  Response: Needs Reinforcement

## 2024-06-04 NOTE — H&P
History Of Present Illness  Linette Doyle is a 83 y.o. female presenting with fall.  This patient has been having recurrent falls since she has had several admissions over the recent months for the same.  She was admitted in the beginning of May discharged to rehab got out of rehab literally 3 days ago and today while she was in her cardiologist office she tripped and fell and hit the back of her head.  She is on Eliquis colitis and Plavix and she developed a hematoma in the back of her head.  She has been admitted on due to concerns for recurrent falls.  The patient denies passing out she does not know why she keeps falling.  According to her nobody has been able to explain this to her.  Denies any focal weakness.  Denies any dizziness denies any chest pain abdominal pain nausea vomiting or any other complaints.  She is not a very detailed historian  Past Medical History  She has a past medical history of Angina pectoris (CMS-HCC) (10/22/2023), Atherosclerosis of coronary artery (08/21/2019), Atherosclerotic heart disease of native coronary artery with other forms of angina pectoris (CMS-HCC) (10/22/2023), Hypercholesterolemia (12/28/2018), Hyperlipidemia (10/22/2023), Presence of cardiac pacemaker (10/22/2023), Primary hypertension (12/28/2018), Shortness of breath (11/26/2019), Sick sinus syndrome (Multi) (10/22/2023), Sinus bradycardia (10/22/2023), and Stage 3a chronic kidney disease (Multi) (11/25/2019).  Reviewed  Surgical History  She has a past surgical history that includes Cardiac catheterization (N/A, 02/02/2024); Cardiac catheterization (N/A, 02/02/2024); Cardiac catheterization (N/A, 02/08/2024); Cardiac catheterization (N/A, 02/08/2024); and pacemaker placement (07/04/2019).  Reviewed  Social History  She reports that she has never smoked. She has never been exposed to tobacco smoke. She has never used smokeless tobacco. She reports that she does not drink alcohol and does not use  drugs.  Reviewed  Family History  Family History   Problem Relation Name Age of Onset    No Known Problems Mother      No Known Problems Father          Allergies  Iodinated contrast media    ROS  Review of Systems   Constitutional: Negative.    HENT: Negative.     Eyes: Negative.    Respiratory: Negative.     Cardiovascular: Negative.    Gastrointestinal: Negative.    Endocrine: Negative.    Genitourinary: Negative.    Musculoskeletal: Negative.    Skin: Negative.    Allergic/Immunologic: Negative.    Neurological: Negative.    Hematological: Negative.    Psychiatric/Behavioral: Negative.     All other systems reviewed and are negative.       Last Recorded Vitals  /52 (BP Location: Right arm, Patient Position: Lying)   Pulse 59   Temp 36.2 °C (97.2 °F) (Temporal)   Resp 17   Wt 61.3 kg (135 lb 2.3 oz)   SpO2 97%     Physical Exam  I saw patient in room 448.   female on room air alert Daniel x 3 cooperative no distress very pleasant  Normocephalic there is some abrasion back of her head no active bleeding EOMI PERRLA  Neck supple no JVD  Chest clear  Heart regular S1-S2 distant  Abdomen soft nontender benign exam  Extremities no peripheral edema good pedal pulses  Neurologic exam gross motor sensor nonfocal  Skin different bruises and ecchymosis can be observed no acute rash  Psych no psychosis    Relevant Results  Extensive imaging reviewed creatinine 2.8 which is new compared to before hemoglobin platelet count consistent with prior results EKG paced rhythm    ASSESSMENT/PLAN  Assessment/Plan   Principal Problem:    Weakness    Judith 3,  1.  Recurrent falls this is actually significant problem for her mostly due to the fact she is on Eliquis and Plavix.  She does not appear focally weak on neurologic exam.  Will consult neurology for further input this is a chronic problem for her check CK levels hold high-dose Lipitor PT OT eval  2.  History of coronary disease  3.  History of atrial  fibrillation  4.  Acute renal failure on top of chronic kidney disease will check hydration suggest urinary recheck bladder scan ordered renal ultrasound nephrology to see hold daily potassium  5.  DNR CCA DNI discussed with her       Rivas Henderson MD

## 2024-06-04 NOTE — CONSULTS
Nutrition Assessement Note    Nutrition Assessment    Reason for Assessment: Admission nursing screening    Reason for Hospital Admission:  Linette Doyle is a 83 y.o. female who is admitted for increased weakness and recurrent falls.     Past Medical History:   Diagnosis Date    Angina pectoris (CMS-HCC) 10/22/2023    Atherosclerosis of coronary artery 08/21/2019    Atherosclerotic heart disease of native coronary artery with other forms of angina pectoris (CMS-HCC) 10/22/2023    Hypercholesterolemia 12/28/2018    Hyperlipidemia 10/22/2023    Presence of cardiac pacemaker 10/22/2023    Primary hypertension 12/28/2018    Shortness of breath 11/26/2019    Sick sinus syndrome (Multi) 10/22/2023    Sinus bradycardia 10/22/2023    Stage 3a chronic kidney disease (Multi) 11/25/2019      Past Surgical History:   Procedure Laterality Date    CARDIAC CATHETERIZATION N/A 02/02/2024    Procedure: Left Heart Cath;  Surgeon: Becky Vela MD;  Location: [facility];  Service: Cardiovascular;  Laterality: N/A;    CARDIAC CATHETERIZATION N/A 02/02/2024    Procedure: PCI;  Surgeon: Becky Vela MD;  Location: [facility];  Service: Cardiovascular;  Laterality: N/A;    CARDIAC CATHETERIZATION N/A 02/08/2024    Procedure: Left Heart Cath;  Surgeon: Darrius Ibrahim MD;  Location: [facility];  Service: Cardiovascular;  Laterality: N/A;    CARDIAC CATHETERIZATION N/A 02/08/2024    Procedure: PCI;  Surgeon: Darrius Ibrahim MD;  Location: [facility];  Service: Cardiovascular;  Laterality: N/A;    PACEMAKER PLACEMENT  07/04/2019    [employer] PACEMAKE AND STENT PLACEMENT       Nutrition History:  Food and Nutrient History: pt reports having a decreased appetite. she reports her  and son passing away from covid in 2021 and 2022 and having little interest/decreased ability to prepare meals. she has nutrition supplements at home but has not tried them. discussed need for adequate protein intake  "to maintain muscle mass and strength - she is agreeable to try supplements at this time.  Energy Intake: Fair 50-75 %    Anthropometrics:  Ht: 162.6 cm (5' 4.02\"), Wt: 61.3 kg (135 lb 2.3 oz), BMI: 23.19    Weight Change:  Daily Weight  06/03/24 : 61.3 kg (135 lb 2.3 oz)  06/03/24 : 58.6 kg (129 lb 3 oz)  05/31/24 : 68.5 kg (151 lb 0.2 oz)  05/30/24 : 64.4 kg (142 lb)  05/15/24 : 64.8 kg (142 lb 13.7 oz)  05/08/24 : 60.3 kg (133 lb)  05/06/24 : 62.2 kg (137 lb 1.6 oz)  04/25/24 : 60 kg (132 lb 6 oz)  04/23/24 : 60.8 kg (134 lb)  04/18/24 : 61.4 kg (135 lb 6 oz)  04/11/24 : 65 kg (143 lb 4.8 oz)  03/11/24 : 60.3 kg (133 lb)  02/28/24 : 66.7 kg (147 lb)  02/20/24 : 67.6 kg (149 lb)  02/14/24 : 82.1 kg (181 lb)  01/29/24 : 68.3 kg (150 lb 9.6 oz)  12/15/23 : 68.9 kg (151 lb 12.6 oz)  10/24/23 : 68.2 kg (150 lb 5.7 oz)  07/12/23 : 68.9 kg (152 lb)  06/06/23 : 68.5 kg (151 lb)     Weight History / % Weight Change: lost wt unintentionally and has not been able to regain wt.  Significant Weight Loss: Yes  Interpretation of Weight Loss:  (18# (12%) wt loss over ~3 months (1/29-4/25))    Nutrition Focused Physical Exam Findings:   Subcutaneous Fat Loss  Orbital Fat Pads: Well nourished (slightly bulging fat pads)  Buccal Fat Pads: Mild-Moderate (flat cheeks, minimal bounce)    Muscle Wasting  Temporalis: Well nourished (well-defined muscle)  Pectoralis (Clavicular Region): Mild-Moderate (some protrusion of clavicle)  Deltoid/Trapezius: Well nourished (rounded appearance at arm, shoulder, neck)    Nutrition Significant Labs:  Lab Results   Component Value Date    WBC 7.4 06/04/2024    HGB 8.4 (L) 06/04/2024    HCT 26.5 (L) 06/04/2024     (L) 06/04/2024    CHOL 138 07/05/2023    TRIG 188 (H) 07/05/2023    HDL 43 (L) 07/05/2023    ALT 33 06/04/2024    AST 51 (H) 06/04/2024     06/04/2024    K 4.7 06/04/2024     06/04/2024    CREATININE 2.40 (H) 06/04/2024    BUN 44 (H) 06/04/2024    CO2 24 06/04/2024    TSH " 2.20 05/03/2024    INR 1.1 02/08/2024    HGBA1C 6.0 (H) 02/01/2024    ALBUR 17 07/05/2023     Nutrition Specific Medications:  amiodarone, 200 mg, oral, Daily  apixaban, 2.5 mg, oral, BID  clopidogrel, 75 mg, oral, Daily  levothyroxine, 100 mcg, oral, Daily  metoprolol tartrate, 50 mg, oral, BID  pantoprazole, 40 mg, oral, Daily before breakfast   Or  pantoprazole, 40 mg, intravenous, Daily before breakfast  polyethylene glycol, 17 g, oral, Daily  sertraline, 200 mg, oral, Nightly      sodium chloride 0.9%, 100 mL/hr, Last Rate: 100 mL/hr (06/04/24 1112)      Dietary Orders (From admission, onward)       Start     Ordered    06/04/24 0002  Adult diet Regular  Diet effective now        Question:  Diet type  Answer:  Regular    06/04/24 0008                  Estimated Needs:   Estimated Energy Needs  Total Energy Estimated Needs (kCal): 1841 kCal  Total Estimated Energy Need per Day (kCal/kg): 30 kCal/kg  Method for Estimating Needs: actual wt    Estimated Protein Needs  Total Protein Estimated Needs (g): 74 g  Total Protein Estimated Needs (g/kg): 1.2 g/kg  Method for Estimating Needs: actual wt    Estimated Fluid Needs  Method for Estimating Needs: 1 ml/kcal        Nutrition Diagnosis   Nutrition Diagnosis:  Malnutrition Diagnosis  Patient has Malnutrition Diagnosis: Yes  Diagnosis Status: New  Malnutrition Diagnosis: Severe malnutrition related to chronic disease or condition  As Evidenced by: mild subcutaneous fat loss and muscle wasting, po intake </= 75% estimated needs for >/= 1 month, 18# (12%) wt loss over ~3 months (1/29-4/25) with no wt gain since       Nutrition Interventions/Recommendations   Nutrition Interventions and Recommendations:    Nutrition Prescription:  Individualized Nutrition Prescription Provided for : 1841 kcals and 74g protein to be provided via diet and supplements    Nutrition Interventions:   Food and/or Nutrient Delivery Interventions  Interventions: Meals and snacks, Medical food  supplement  Meals and Snacks: General healthful diet  Goal: provide as ordered  Medical Food Supplement: Commercial beverage  Goal: chocolate ensure compact BID to provide 220 kcals and 9g protein each    Education Documentation  No documentation found.           Nutrition Monitoring and Evaluation   Monitoring/Evaluation:   Food/Nutrient Related History Monitoring  Monitoring and Evaluation Plan: Energy intake  Energy Intake: Estimated energy intake  Criteria: pt to consume >/= 75% estimated needs    Body Composition/Growth/Weight History  Monitoring and Evaluation Plan: Weight  Weight: Measured weight  Criteria: pt will maintain wt at this time       Time Spent/Follow-up:   Follow Up  Time Spent (min): 30 minutes  Last Date of Nutrition Visit: 06/04/24  Nutrition Follow-Up Needed?: 3-5 days  Follow up Comment: 6/7/24

## 2024-06-04 NOTE — CONSULTS
Reason For Consult  GARY on CKD Stage 3    History Of Present Illness  Linette Doyle is a 83 y.o. female with a past medical history of chronic kidney disease stage III, hypertension who presents to the hospital with recurrent falls.  Patient says that her legs and arms have been unsteady.  She denies any other symptoms.  She was found to have acute kidney injury with a creatinine of 2.8, starting to trend down to 2.4.  She is not checking her blood pressure at home.  She says she has been eating and drinking well.  Her renal ultrasound and urinalysis were fairly unremarkable.  CK level 425.  We are consulted for management of acute kidney injury on chronic kidney disease stage III.     Past Medical History  She has a past medical history of Angina pectoris (CMS-HCC) (10/22/2023), Atherosclerosis of coronary artery (08/21/2019), Atherosclerotic heart disease of native coronary artery with other forms of angina pectoris (CMS-HCC) (10/22/2023), Hypercholesterolemia (12/28/2018), Hyperlipidemia (10/22/2023), Presence of cardiac pacemaker (10/22/2023), Primary hypertension (12/28/2018), Shortness of breath (11/26/2019), Sick sinus syndrome (Multi) (10/22/2023), Sinus bradycardia (10/22/2023), and Stage 3a chronic kidney disease (Multi) (11/25/2019).    Surgical History  She has a past surgical history that includes Cardiac catheterization (N/A, 02/02/2024); Cardiac catheterization (N/A, 02/02/2024); Cardiac catheterization (N/A, 02/08/2024); Cardiac catheterization (N/A, 02/08/2024); and pacemaker placement (07/04/2019).     Social History  She reports that she has never smoked. She has never been exposed to tobacco smoke. She has never used smokeless tobacco. She reports that she does not drink alcohol and does not use drugs.    Family History  Family History   Problem Relation Name Age of Onset    No Known Problems Mother      No Known Problems Father          Allergies  Iodinated contrast media    Review of  "Systems  10 point review of systems was obtained and is negative other than what is indicated above in the HPI     Physical Exam  General: Awake, alert, no acute distress  Head/ears/nose/throat:  Normocephalic, atraumatic, moist mucous membranes  Neck:  No jugular venous distention, neck supple  Respiratory:  Clear to auscultation bilaterally, normal respiratory effort  Cardiovascular:  S1 and S2, no rubs  Gastrointestinal:  Soft, positive bowel sounds, no rebound or guarding  Extremities:  No edema, cyanosis  Neurologic:  Alert, responsive, cooperative with exam  Skin:  No ulcers noted, dry   Psychiatric: fairly normal mood and affect         I&O 24HR    Intake/Output Summary (Last 24 hours) at 6/4/2024 1711  Last data filed at 6/4/2024 1300  Gross per 24 hour   Intake 1601.66 ml   Output 575 ml   Net 1026.66 ml       Vitals 24HR  Heart Rate:  [59-69]   Temp:  [36.2 °C (97.2 °F)-37.5 °C (99.5 °F)]   Resp:  [14-23]   BP: (100-131)/(47-68)   Height:  [162.6 cm (5' 4.02\")]   Weight:  [61.3 kg (135 lb 2.3 oz)]   SpO2:  [88 %-99 %]       Relevant Results  Results for orders placed or performed during the hospital encounter of 06/03/24 (from the past 24 hour(s))   NT-PROBNP   Result Value Ref Range    PROBNP 5,402 (H) 0 - 624 pg/mL   Creatine Kinase   Result Value Ref Range    Creatine Kinase 425 (H) 24 - 195 U/L   Serial Troponin, Initial ([facility])   Result Value Ref Range    Troponin T, High Sensitivity 35 (HH) <=14 ng/L   Serial Troponin, 2 Hour ([facility])   Result Value Ref Range    Troponin T, High Sensitivity 36 (HH) <=14 ng/L   CBC   Result Value Ref Range    WBC 7.4 4.4 - 11.3 x10*3/uL    nRBC 0.0 0.0 - 0.0 /100 WBCs    RBC 3.09 (L) 4.00 - 5.20 x10*6/uL    Hemoglobin 8.4 (L) 12.0 - 16.0 g/dL    Hematocrit 26.5 (L) 36.0 - 46.0 %    MCV 86 80 - 100 fL    MCH 27.2 26.0 - 34.0 pg    MCHC 31.7 (L) 32.0 - 36.0 g/dL    RDW 16.7 (H) 11.5 - 14.5 %    Platelets 105 (L) 150 - 450 x10*3/uL   Comprehensive metabolic panel   Result " Value Ref Range    Glucose 118 (H) 65 - 99 mg/dL    Sodium 135 133 - 145 mmol/L    Potassium 4.7 3.4 - 5.1 mmol/L    Chloride 102 97 - 107 mmol/L    Bicarbonate 24 24 - 31 mmol/L    Urea Nitrogen 44 (H) 8 - 25 mg/dL    Creatinine 2.40 (H) 0.40 - 1.60 mg/dL    eGFR 19 (L) >60 mL/min/1.73m*2    Calcium 8.6 8.5 - 10.4 mg/dL    Albumin 3.8 3.5 - 5.0 g/dL    Alkaline Phosphatase 85 35 - 125 U/L    Total Protein 6.0 5.9 - 7.9 g/dL    AST 51 (H) 5 - 40 U/L    Bilirubin, Total 0.4 0.1 - 1.2 mg/dL    ALT 33 5 - 40 U/L    Anion Gap 9 <=19 mmol/L   Serial Troponin, 6 Hour ([facility])   Result Value Ref Range    Troponin T, High Sensitivity 31 (HH) <=14 ng/L          Assessment/Plan   Acute kidney injury likely secondary to volume depletion, also consider orthostatic hypotension  Chronic kidney disease stage III (baseline creatinine 1.4-1.6)  Hypertension  Recurrent falls    Plan: Continue IV fluid hydration, check orthostatic vitals. Neurology and Cardiology on consult. Thank you for your consultation.     Yelena Giang MD

## 2024-06-04 NOTE — NURSING NOTE
Attempted to take O2 off of patient and her SpO2 dropped to 88% on RA. NC2L reapplied, SpO2 now at 97%.

## 2024-06-04 NOTE — TELEPHONE ENCOUNTER
Linette was scheduled for an appointment on 6/3/24. Upon entering the building she went to the restroom and had a fall backwards bumping her head. Transportation tried to get her to go the ER. Linette insisted on not going and wanted to checking for her visit with Dr. Ibrahim. He instructed the patient to go RE or call to have them come escort her over to the ER in which she did end up going but asked if Dr. Ibrahim could arrange to have a home nurse assigned to her at her residence.

## 2024-06-05 ENCOUNTER — APPOINTMENT (OUTPATIENT)
Dept: RADIOLOGY | Facility: HOSPITAL | Age: 84
DRG: 640 | End: 2024-06-05
Payer: MEDICARE

## 2024-06-05 LAB
ALBUMIN SERPL-MCNC: 3.6 G/DL (ref 3.5–5)
ANION GAP SERPL CALC-SCNC: 9 MMOL/L
BUN SERPL-MCNC: 37 MG/DL (ref 8–25)
CALCIUM SERPL-MCNC: 8.7 MG/DL (ref 8.5–10.4)
CHLORIDE SERPL-SCNC: 105 MMOL/L (ref 97–107)
CO2 SERPL-SCNC: 26 MMOL/L (ref 24–31)
CREAT SERPL-MCNC: 1.8 MG/DL (ref 0.4–1.6)
EGFRCR SERPLBLD CKD-EPI 2021: 27 ML/MIN/1.73M*2
ERYTHROCYTE [DISTWIDTH] IN BLOOD BY AUTOMATED COUNT: 16.7 % (ref 11.5–14.5)
GLUCOSE SERPL-MCNC: 102 MG/DL (ref 65–99)
HCT VFR BLD AUTO: 25.4 % (ref 36–46)
HGB BLD-MCNC: 7.8 G/DL (ref 12–16)
MCH RBC QN AUTO: 27.4 PG (ref 26–34)
MCHC RBC AUTO-ENTMCNC: 30.7 G/DL (ref 32–36)
MCV RBC AUTO: 89 FL (ref 80–100)
NRBC BLD-RTO: 0 /100 WBCS (ref 0–0)
PHOSPHATE SERPL-MCNC: 3.2 MG/DL (ref 2.5–4.5)
PLATELET # BLD AUTO: 86 X10*3/UL (ref 150–450)
POTASSIUM SERPL-SCNC: 4.7 MMOL/L (ref 3.4–5.1)
RBC # BLD AUTO: 2.85 X10*6/UL (ref 4–5.2)
SODIUM SERPL-SCNC: 140 MMOL/L (ref 133–145)
WBC # BLD AUTO: 5.8 X10*3/UL (ref 4.4–11.3)

## 2024-06-05 PROCEDURE — 72148 MRI LUMBAR SPINE W/O DYE: CPT | Performed by: STUDENT IN AN ORGANIZED HEALTH CARE EDUCATION/TRAINING PROGRAM

## 2024-06-05 PROCEDURE — 85027 COMPLETE CBC AUTOMATED: CPT | Performed by: INTERNAL MEDICINE

## 2024-06-05 PROCEDURE — 97110 THERAPEUTIC EXERCISES: CPT | Mod: GP,CQ

## 2024-06-05 PROCEDURE — 97116 GAIT TRAINING THERAPY: CPT | Mod: GP,CQ

## 2024-06-05 PROCEDURE — 2500000002 HC RX 250 W HCPCS SELF ADMINISTERED DRUGS (ALT 637 FOR MEDICARE OP, ALT 636 FOR OP/ED): Mod: MUE | Performed by: INTERNAL MEDICINE

## 2024-06-05 PROCEDURE — 72141 MRI NECK SPINE W/O DYE: CPT

## 2024-06-05 PROCEDURE — 2500000004 HC RX 250 GENERAL PHARMACY W/ HCPCS (ALT 636 FOR OP/ED): Performed by: INTERNAL MEDICINE

## 2024-06-05 PROCEDURE — 72148 MRI LUMBAR SPINE W/O DYE: CPT

## 2024-06-05 PROCEDURE — 1200000002 HC GENERAL ROOM WITH TELEMETRY DAILY

## 2024-06-05 PROCEDURE — 99231 SBSQ HOSP IP/OBS SF/LOW 25: CPT | Performed by: NURSE PRACTITIONER

## 2024-06-05 PROCEDURE — 36415 COLL VENOUS BLD VENIPUNCTURE: CPT | Performed by: INTERNAL MEDICINE

## 2024-06-05 PROCEDURE — 2500000001 HC RX 250 WO HCPCS SELF ADMINISTERED DRUGS (ALT 637 FOR MEDICARE OP): Performed by: NURSE PRACTITIONER

## 2024-06-05 PROCEDURE — 80069 RENAL FUNCTION PANEL: CPT | Performed by: INTERNAL MEDICINE

## 2024-06-05 PROCEDURE — 72141 MRI NECK SPINE W/O DYE: CPT | Performed by: STUDENT IN AN ORGANIZED HEALTH CARE EDUCATION/TRAINING PROGRAM

## 2024-06-05 PROCEDURE — 2500000001 HC RX 250 WO HCPCS SELF ADMINISTERED DRUGS (ALT 637 FOR MEDICARE OP): Performed by: INTERNAL MEDICINE

## 2024-06-05 RX ORDER — ASPIRIN 81 MG/1
81 TABLET ORAL DAILY
Status: DISCONTINUED | OUTPATIENT
Start: 2024-06-05 | End: 2024-06-06

## 2024-06-05 RX ADMIN — APIXABAN 2.5 MG: 2.5 TABLET, FILM COATED ORAL at 17:46

## 2024-06-05 RX ADMIN — AMIODARONE HYDROCHLORIDE 200 MG: 200 TABLET ORAL at 08:45

## 2024-06-05 RX ADMIN — LEVOTHYROXINE SODIUM 100 MCG: 100 TABLET ORAL at 05:01

## 2024-06-05 RX ADMIN — PANTOPRAZOLE SODIUM 40 MG: 40 TABLET, DELAYED RELEASE ORAL at 05:01

## 2024-06-05 RX ADMIN — POLYETHYLENE GLYCOL 3350 17 G: 17 POWDER, FOR SOLUTION ORAL at 08:45

## 2024-06-05 RX ADMIN — ASPIRIN 81 MG: 81 TABLET, COATED ORAL at 17:46

## 2024-06-05 RX ADMIN — SODIUM CHLORIDE 100 ML/HR: 900 INJECTION, SOLUTION INTRAVENOUS at 02:40

## 2024-06-05 RX ADMIN — CLOPIDOGREL BISULFATE 75 MG: 75 TABLET ORAL at 08:46

## 2024-06-05 RX ADMIN — SERTRALINE 200 MG: 100 TABLET, FILM COATED ORAL at 21:14

## 2024-06-05 RX ADMIN — METOPROLOL TARTRATE 50 MG: 50 TABLET, FILM COATED ORAL at 21:14

## 2024-06-05 RX ADMIN — METOPROLOL TARTRATE 50 MG: 50 TABLET, FILM COATED ORAL at 08:45

## 2024-06-05 RX ADMIN — APIXABAN 2.5 MG: 2.5 TABLET, FILM COATED ORAL at 05:01

## 2024-06-05 ASSESSMENT — COGNITIVE AND FUNCTIONAL STATUS - GENERAL
MOVING TO AND FROM BED TO CHAIR: A LOT
MOVING FROM LYING ON BACK TO SITTING ON SIDE OF FLAT BED WITH BEDRAILS: A LITTLE
TOILETING: A LITTLE
STANDING UP FROM CHAIR USING ARMS: A LITTLE
DAILY ACTIVITIY SCORE: 17
CLIMB 3 TO 5 STEPS WITH RAILING: TOTAL
TURNING FROM BACK TO SIDE WHILE IN FLAT BAD: A LOT
STANDING UP FROM CHAIR USING ARMS: A LITTLE
HELP NEEDED FOR BATHING: A LOT
DRESSING REGULAR UPPER BODY CLOTHING: A LOT
WALKING IN HOSPITAL ROOM: A LOT
MOVING FROM LYING ON BACK TO SITTING ON SIDE OF FLAT BED WITH BEDRAILS: A LITTLE
WALKING IN HOSPITAL ROOM: A LITTLE
CLIMB 3 TO 5 STEPS WITH RAILING: TOTAL
MOVING TO AND FROM BED TO CHAIR: A LOT
DRESSING REGULAR LOWER BODY CLOTHING: A LOT
MOBILITY SCORE: 14
TURNING FROM BACK TO SIDE WHILE IN FLAT BAD: A LITTLE
MOBILITY SCORE: 14

## 2024-06-05 ASSESSMENT — PAIN SCALES - GENERAL
PAINLEVEL_OUTOF10: 8
PAINLEVEL_OUTOF10: 0 - NO PAIN

## 2024-06-05 ASSESSMENT — PAIN - FUNCTIONAL ASSESSMENT: PAIN_FUNCTIONAL_ASSESSMENT: 0-10

## 2024-06-05 NOTE — PROGRESS NOTES
06/05/24 1119   Discharge Planning   Patient expects to be discharged to: Skilled Rehab   Does the patient need discharge transport arranged? Yes   RoundTrip coordination needed? Yes   Has discharge transport been arranged? No     Met with patient for choices.  States, she and her sister reviewing, but, not sure yet.  Call to Krista roblero -[phone], no answer, no voicemail.  Will try again later.  Medicare, no precert needed.  MRI today    1130 - Spoke with sister, Krista, on the phone.  Choices obtained and submitted to [facility] for processing.  No precert needed.  Not medically cleared at this time.

## 2024-06-05 NOTE — PROGRESS NOTES
Physical Therapy    Physical Therapy Treatment    Patient Name: Linette Doyle  MRN: [id number]  Today's Date: 6/5/2024  Time Calculation  Start Time: 1113  Stop Time: 1141  Time Calculation (min): 28 min    Assessment/Plan   PT Assessment  Rehab Prognosis: Good  Barriers to Discharge: Pt lives alone, requires min assist of 1 for mobility. History of multiple falls.  Evaluation/Treatment Tolerance: Patient limited by fatigue, Patient limited by pain  Medical Staff Made Aware: Yes  End of Session Communication: Bedside nurse  End of Session Patient Position: Up in chair, Alarm on     PT Plan  Treatment/Interventions: Bed mobility, Transfer training, Gait training, Therapeutic exercise  PT Plan: Skilled PT  PT Frequency: 4 times per week  PT Discharge Recommendations: Moderate intensity level of continued care  Equipment Recommended upon Discharge: Wheeled walker  PT Recommended Transfer Status: Assist x1, Assistive device  PT - OK to Discharge: Yes      General Visit Information:   PT  Visit  PT Received On: 06/05/24  General  Prior to Session Communication: Bedside nurse  Patient Position Received: Bed, 3 rail up, Alarm off, not on at start of session  Preferred Learning Style: verbal, visual  General Comment: Pt agreeable to therapy    Subjective   Precautions:  Precautions  Hearing/Visual Limitations: reading glasses  Medical Precautions: Fall precautions, Oxygen therapy device and L/min (2L oxygen)  Vital Signs:       Objective   Pain:  Pain Assessment  Pain Assessment: 0-10  Pain Score: 8  Pain Type: Acute pain  Pain Location: Back  Pain Orientation: Lower  Pain Radiating Towards: Bilat LE's  Cognition:     Coordination:     Postural Control:     Extremity/Trunk Assessments:    Activity Tolerance:     Treatments:  Therapeutic Exercise  Therapeutic Exercise Performed: Yes  Therapeutic Exercise Activity 1: Pt performed seated bilat LE ankle pumps, heel raises, LAQ, hip flexion, renard hip adduction and resisted hip  abduction x15 reps each.    Bed Mobility 1  Bed Mobility 1: Supine to sitting  Level of Assistance 1: Minimum assistance, Moderate verbal cues  Bed Mobility Comments 1: Head of bed elevated. Min assist for trunk up.    Ambulation/Gait Training 1  Surface 1: Level tile  Device 1: Rolling walker  Assistance 1: Minimum assistance  Quality of Gait 1: Diminished heel strike, Decreased step length  Comments/Distance (ft) 1: Pt ambulated with RW ~20' x1 min assist. Min/mod unsteadiness throughout, a little shaky. Quick fatigue.  Transfer 1  Transfer From 1: Bed to  Transfer to 1: Stand  Technique 1: Sit to stand  Transfer Device 1: Walker  Transfer Level of Assistance 1: Minimum assistance, Minimal verbal cues  Trials/Comments 1: Verbal cues to push from bed sit to stand.  Transfers 2  Transfer From 2: Stand to  Transfer to 2: Sit, Chair with arms  Technique 2: Stand to sit  Transfer Level of Assistance 2: Minimum assistance, Minimal verbal cues  Trials/Comments 2: Verbal cues to reach back for chair arms stand to sit.    Outcome Measures:  [facility] Basic Mobility  Turning from your back to your side while in a flat bed without using bedrails: A little  Moving from lying on your back to sitting on the side of a flat bed without using bedrails: A lot  Moving to and from bed to chair (including a wheelchair): A lot  Standing up from a chair using your arms (e.g. wheelchair or bedside chair): A little  To walk in hospital room: A little  Climbing 3-5 steps with railing: Total  Basic Mobility - Total Score: 14    Education Documentation  Mobility Training, taught by Adela Nielson PTA at 6/5/2024 11:51 AM.  Learner: Patient  Readiness: Acceptance  Method: Explanation  Response: Needs Reinforcement    Education Comments  No comments found.        OP EDUCATION:       Encounter Problems       Encounter Problems (Active)       Balance       STG - Maintains dynamic standing balance with upper extremity support (Progressing)        Start:  06/04/24    Expected End:  07/02/24       INTERVENTIONS:  1. Practice standing with minimal support.  2. Educate patient about standing tolerance.  3. Educate patient about independence with gait, transfers, and ADL's.  4. Educate patient about use of assistive device.  5. Educate patient about self-directed care.            Mobility       STG - Patient will ambulate 100 ft, FWW, + turns, contact guard of 1 (Progressing)       Start:  06/04/24    Expected End:  07/02/24            pt will ascend/descend 1 step without rail, contact guard of 1 (Progressing)       Start:  06/04/24    Expected End:  07/02/24               PT Transfers       STG - Patient to transfer to and from sit to supine close supervision of 1 via logrolling (Progressing)       Start:  06/04/24    Expected End:  07/02/24            STG - Patient will transfer sit to and from stand close supervision of 1 (Progressing)       Start:  06/04/24    Expected End:  07/02/24               Pain       pt will verbalize no > 2/10 pain during functional mobility (Progressing)       Start:  06/04/24    Expected End:  07/02/24               Safety       pt will verbalize spinal precautions without verbal reminders (Progressing)       Start:  06/04/24    Expected End:  07/02/24

## 2024-06-05 NOTE — CONSULTS
Consults  History Of Present Illness:    Linette Dyole is a 83 y.o. female patient known to me for many years and no history of hypertension hyperlipidemia patient with history of paroxysmal atrial fibrillation.  Patient had multiple admission due to fall.  Came to office and she fell in the bathroom prior to seeing patient went to emergency room now transferred to [facility] for further evaluation.  Patient complaining of dehydration as well as weakness is episode of fall.  Pending orthostatic blood pressure.  No active chest pain tightness.  Bruise on back of the head.  Other history significant for pacemaker as well as PCI in the past.  Currently Plavix and Eliquis.  Last Recorded Vitals:  Vitals:    06/04/24 1905 06/04/24 2330 06/05/24 0404 06/05/24 0730   BP: 130/67 123/60 (!) 127/48 (!) 113/49   BP Location: Right arm Right arm Right arm Right arm   Patient Position: Lying Lying Lying Lying   Pulse: 60 59 60 60   Resp: 18 17 19 19   Temp: 37.1 °C (98.8 °F) 36.4 °C (97.5 °F) 36.7 °C (98.1 °F) 37 °C (98.6 °F)   TempSrc: Temporal Temporal Temporal Temporal   SpO2: 94% 95% 94% 93%   Weight:       Height:           Past Medical History:  She has a past medical history of Angina pectoris (CMS-Formerly [facility]) (10/22/2023), Atherosclerosis of coronary artery (08/21/2019), Atherosclerotic heart disease of native coronary artery with other forms of angina pectoris (CMS-Formerly [facility]) (10/22/2023), Hypercholesterolemia (12/28/2018), Hyperlipidemia (10/22/2023), Presence of cardiac pacemaker (10/22/2023), Primary hypertension (12/28/2018), Shortness of breath (11/26/2019), Sick sinus syndrome (Multi) (10/22/2023), Sinus bradycardia (10/22/2023), and Stage 3a chronic kidney disease (Multi) (11/25/2019).    Past Surgical History:  She has a past surgical history that includes Cardiac catheterization (N/A, 02/02/2024); Cardiac catheterization (N/A, 02/02/2024); Cardiac catheterization (N/A, 02/08/2024); Cardiac catheterization (N/A, 02/08/2024);  and pacemaker placement (07/04/2019).      Social History:  She reports that she has never smoked. She has never been exposed to tobacco smoke. She has never used smokeless tobacco. She reports that she does not drink alcohol and does not use drugs.    Family History:  Family History   Problem Relation Name Age of Onset    No Known Problems Mother      No Known Problems Father          Allergies:  Iodinated contrast media    Inpatient Medications:  Scheduled medications   Medication Dose Route Frequency    amiodarone  200 mg oral Daily    apixaban  2.5 mg oral BID    clopidogrel  75 mg oral Daily    levothyroxine  100 mcg oral Daily    metoprolol tartrate  50 mg oral BID    pantoprazole  40 mg oral Daily before breakfast    Or    pantoprazole  40 mg intravenous Daily before breakfast    polyethylene glycol  17 g oral Daily    sertraline  200 mg oral Nightly     Outpatient Medications:  Current Outpatient Medications   Medication Instructions    acetaminophen/diphenhydramine (TYLENOL PM EXTRA STRENGTH ORAL) 1 tablet, oral, Nightly PRN    amiodarone (PACERONE) 200 mg, oral, Daily    apixaban (ELIQUIS) 5 mg, oral, 2 times daily    ascorbic acid/collagen hydr (COLLAGEN PLUS VITAMIN C ORAL) 1 capsule, oral, Daily    atorvastatin (LIPITOR) 80 mg, oral, Nightly    cholecalciferol (Vitamin D-3) 50 MCG (2000 UT) tablet 1 tablet, oral, Daily    clopidogrel (PLAVIX) 75 mg, oral, Daily    levothyroxine (SYNTHROID, LEVOXYL) 100 mcg, oral, Daily, 1 tablet in the morning on an empty stomach Orally Once a day for 30 day(s)    melatonin 5 mg, oral, Nightly PRN    metoprolol tartrate (LOPRESSOR) 50 mg, oral, 2 times daily    mv-min/FA/vit K/lycop/lut/zeax (OCUVITE EYE PLUS MULTI ORAL) 1 tablet, oral, Daily    omeprazole (PRILOSEC) 20 mg, oral, Daily    potassium chloride CR 10 mEq ER tablet 20 mEq, oral, 2 times daily    sertraline (ZOLOFT) 200 mg, oral, Daily       Physical Exam:  HEENT: Normocephalic/atraumatic pupils equal react  light  Neck exam mild JVD, no bruit  Lung exam clear to auscultation, few crackles at the bases  Cardiac exam is regular rhythm S1-S2, soft systolic murmur heard.   Abdomen soft nontender, nondistended  Extremities no clubbing, cyanosis but trace edema  Neuro exam grossly intact.,  Small bruise ecchymosis on the back of the head.  Last Labs:  CBC - 6/5/2024:  4:56 AM  5.8 7.8 86    25.4      CMP - 6/5/2024:  4:56 AM  8.7 6.0 51 --- 0.4   3.2 3.6 33 85      PTT - 2/9/2024:  4:59 AM  1.1   11.9 24.6     LDL Calculated   Date/Time Value Ref Range Status   07/05/2023 08:49 AM 57 (L) 65 - 130 MG/DL Final   12/14/2022 09:13 AM 64 (L) 65 - 130 MG/DL Final   06/29/2022 08:43 AM 58 (L) 65 - 130 MG/DL Final          ECG 12 lead  Atrial-paced rhythm with prolonged AV conduction  Incomplete right bundle branch block  Nonspecific T wave abnormality  Abnormal ECG  When compared with ECG of 09-MAY-2024 18:43,  Electronic atrial pacemaker has replaced Electronic ventricular pacemaker  Confirmed by Becky Vela ([id number]) on 6/4/2024 3:13:43 PM  US renal complete  Narrative: Interpreted By:  Jony Fair,   STUDY:  US RENAL COMPLETE; 6/4/2024 7:57 am      INDICATION:  Signs/Symptoms:renal failure.      COMPARISON:  None      ACCESSION NUMBER(S):  [id number]      ORDERING CLINICIAN:  GIOVANY PATEL      TECHNIQUE:  Grayscale and color Doppler imaging of the kidneys      FINDINGS:  The right kidney measures 8.9 cm x 4.0 cm x 3.2 cm .  The left kidney measures 8.6 cm x 3.4 cm x 3.8 cm. There is a 1 cm  parapelvic cyst on the left midpole.          No renal stones are identified.  The renal cortical thickness and  echogenicity is normal.  No hydronephrosis is identified.      Urinary bladder is collapsed around an indwelling Pacheco catheter  limiting assessment.      Impression: Both kidneys are small.  Parapelvic renal cyst is present on the left.  No evidence for hydronephrosis.  Urinary bladder is collapsed around a Pacheco  catheter.      MACRO:  none      Signed by: Jony Fair 6/4/2024 8:14 AM  Dictation workstation:   [id number]      Principal Problem:    Weakness    Assessment/Plan   83-year-old female patient with a recurrent fall.  Currently on Eliquis and Plavix.  Patient also currently positive for orthostatic  1.  Fall: Check orthostatic blood pressure.  With IV fluids.  Patient does need amiodarone as well as metoprolol for A-fib rate control medication.  2.  CAD: Continue current Plavix.  3.  Atrial fibrillation: Continue current Eliquis for A-fib rate control.  Also anticoagulation.  Continue amiodarone.  Patient due to multiple fall with a poor social support system probably needs rehab placement.  Avoid multiple readmissions.    Pt. care time is spent includes review of diagnostic tests, labs, radiographs, EKGs, old echoes, cardiac work-up and coordination of care. Assessment, impression and plans are reflected in the note above as well as the orders.    Code Status:  DNR and No Intubation  I spent 60 minutes in the professional and overall care of this patient.  Darrius Ibrahim MD

## 2024-06-05 NOTE — PROGRESS NOTES
Linette Doyle is a 83 y.o. female on day 1 of admission presenting with Weakness.      Subjective   No acute issues.     Objective     Last Recorded Vitals  /74 (BP Location: Right arm, Patient Position: Lying)   Pulse 60   Temp 37.2 °C (99 °F) (Temporal)   Resp 19   Wt 61.3 kg (135 lb 2.3 oz)   SpO2 91%   Intake/Output last 3 Shifts:    Intake/Output Summary (Last 24 hours) at 6/5/2024 1608  Last data filed at 6/5/2024 1031  Gross per 24 hour   Intake 2058.34 ml   Output 925 ml   Net 1133.34 ml       Admission Weight  Weight: 61.3 kg (135 lb 2.3 oz) (06/03/24 2233)    Daily Weight  06/03/24 : 61.3 kg (135 lb 2.3 oz)    Image Results  MR lumbar spine wo IV contrast, MR cervical spine wo IV contrast  Narrative: Interpreted By:  Rj Espinoza,   STUDY:  MR LUMBAR SPINE WO IV CONTRAST; MR CERVICAL SPINE WO IV CONTRAST;  6/5/2024 10:41 am; 6/5/2024 10:21 am      INDICATION:  Signs/Symptoms:Bilateral LE weakness, falls.      COMPARISON:  Lumbar spine radiograph 12/22/2021 and CT cervical spine 05/31/2024  and 06/03/2024      ACCESSION NUMBER(S):  [id number]; [id number]      ORDERING CLINICIAN:  BUBBA RODRIGUEZ      TECHNIQUE:  Sagittal T1, T2, STIR, axial T1 and T2 weighted images of the  cervical and lumbar spine were acquired.      FINDINGS:  Cervical spine:      Alignment: Cervical lordosis is maintained. Atlantoaxial interval is  within normal limits. Craniocervical junction is within normal limits.      Vertebrae/Intervertebral Discs: The vertebral bodies demonstrate  expected height. The marrow signal is within normal limits. The  intervertebral discs demonstrate expected signal and morphology.      Cord: Normal in caliber and signal.      Multilevel degenerative changes of the cervical spine including  endplate remodeling, disc osteophyte complexes, ligamentum flavum  hypertrophy and facet degenerative changes.      C2-C3: Disc osteophyte complex, uncinate hypertrophy and facet  degenerative  changes without significant canal stenosis. Neural  foramen are patent.      C3-C4: Small disc osteophyte complex, uncinate hypertrophy and facet  degenerative changes with mild canal stenosis. Mild bilateral neural  foraminal narrowing.      C4-C5: Disc osteophyte complex, left-greater-than-right uncinate  hypertrophy and facet degenerative changes mild canal stenosis.  Moderate bilateral neural foraminal narrowing.      C5-C6: Disc osteophyte complex, left-greater-than-right uncinate  hypertrophy and facet degenerative changes without significant canal  stenosis. Mild bilateral neural foraminal narrowing.      C6-C7: Disc osteophyte complex, left-greater-than-right uncinate  hypertrophy and facet degenerative changes with mild canal stenosis.  Mild bilateral neural foraminal narrowing.      C7-T1: There is no posterior disc contour abnormality. There is no  significant central canal or neural foraminal stenosis.      The upper thoracic vertebrae and spinal canal are unremarkable.      The prevertebral and posterior paraspinous soft tissues are within  normal limits.      13 mm right T2 hyperintense lesion (series 7, image 10), at the level  of the vallecula likely represents a vallecular cyst. This lesion was  also seen on the prior CTs of the cervical spine and is unchanged.          Lumbar spine:      Examination is degraded by motion.      Alignment: Lumbar lordosis is straightened.      Vertebrae/Intervertebral Discs: The vertebral bodies demonstrate  expected height. Heterogeneous marrow signal without suspicious  replacement. Multilevel loss of disc space throughout the lumbar  spine.      Conus: The lower thoracic cord appears unremarkable. The conus  terminates at L2.      Advanced multilevel degenerative changes of the lumbar spine  including endplate remodeling, vacuum phenomenon, facet degenerative  changes, disc bulges and ligamentum flavum hypertrophy.      T12-L1:  There is no significant central  canal or neural foraminal  stenosis.      L1-2: Small circumferential disc bulge, mild facet degenerative  change with flattening of the ventral thecal sac. There is no  significant central canal or neural foraminal stenosis.      L2-3: Disc bulge asymmetric to the left, ligamentum flavum  hypertrophy and facet degenerative changes with moderate canal  stenosis. Mild-to-moderate bilateral neural foraminal narrowing.      L3-4: Disc bulge asymmetric to the left, ligamentum flavum  hypertrophy and facet degenerative changes with moderate-severe canal  stenosis. Moderate bilateral neural foraminal narrowing.      L4-5: Circumferential disc bulge mild ligamentum flavum hypertrophy  and facet degenerative change with severe canal stenosis. Bilateral  subarticular recess narrowing, more prominent on the left. Severe  left and moderate right neural foraminal narrowing.      L5-S1: Disc bulge asymmetric to the left, facet degenerative changes  and ligamentum flavum hypertrophy with mild canal stenosis. Moderate  right and mild left neural foraminal narrowing.      The prevertebral and posterior paraspinous soft tissues are  unremarkable.      Diffuse fatty atrophy of the paraspinal musculature.      Impression: Cervical spine:  Multilevel degenerative changes with up to mild canal stenosis and  neural foraminal narrowing throughout the cervical spine.      Lumbar spine:  Multilevel degenerative changes, with up to severe canal stenosis and  neural foraminal narrowing most prominent at L4-L5. Multilevel  subarticular recess narrowing, also most prominent at L4-L5.      Signed by: Rj Espinoza 6/5/2024 11:06 AM  Dictation workstation:   [id number]      Physical Exam  HENT:      Head: Normocephalic and atraumatic.      Nose: Nose normal.      Mouth/Throat:      Mouth: Mucous membranes are moist.      Pharynx: Oropharynx is clear.   Eyes:      Extraocular Movements: Extraocular movements intact.      Pupils: Pupils are  equal, round, and reactive to light.   Cardiovascular:      Rate and Rhythm: Normal rate and regular rhythm.      Pulses: Normal pulses.   Pulmonary:      Effort: Pulmonary effort is normal.      Breath sounds: Normal breath sounds.   Abdominal:      General: Abdomen is flat. Bowel sounds are normal.      Palpations: Abdomen is soft.   Musculoskeletal:         General: Normal range of motion.   Skin:     General: Skin is warm and dry.      Capillary Refill: Capillary refill takes less than 2 seconds.   Neurological:      General: No focal deficit present.      Mental Status: She is oriented to person, place, and time.   Psychiatric:         Mood and Affect: Mood normal.         Relevant Results  Results for orders placed or performed during the hospital encounter of 06/03/24 (from the past 24 hour(s))   CBC   Result Value Ref Range    WBC 5.8 4.4 - 11.3 x10*3/uL    nRBC 0.0 0.0 - 0.0 /100 WBCs    RBC 2.85 (L) 4.00 - 5.20 x10*6/uL    Hemoglobin 7.8 (L) 12.0 - 16.0 g/dL    Hematocrit 25.4 (L) 36.0 - 46.0 %    MCV 89 80 - 100 fL    MCH 27.4 26.0 - 34.0 pg    MCHC 30.7 (L) 32.0 - 36.0 g/dL    RDW 16.7 (H) 11.5 - 14.5 %    Platelets 86 (L) 150 - 450 x10*3/uL   Renal Function Panel   Result Value Ref Range    Glucose 102 (H) 65 - 99 mg/dL    Sodium 140 133 - 145 mmol/L    Potassium 4.7 3.4 - 5.1 mmol/L    Chloride 105 97 - 107 mmol/L    Bicarbonate 26 24 - 31 mmol/L    Urea Nitrogen 37 (H) 8 - 25 mg/dL    Creatinine 1.80 (H) 0.40 - 1.60 mg/dL    eGFR 27 (L) >60 mL/min/1.73m*2    Calcium 8.7 8.5 - 10.4 mg/dL    Phosphorus 3.2 2.5 - 4.5 mg/dL    Albumin 3.6 3.5 - 5.0 g/dL    Anion Gap 9 <=19 mmol/L     Assessment/Plan   Repeated Falls  -Of unknown etiology  -MRI C-spine shows mild stenosis. Lumbar MRI shows multilevel degenerative changes, with severe canal stenosis and neural foraminal narrowing most prominent at L4-L5.   -Will consult Ortho Spine   -Fall precautions  -Neurology saw and signed off   -Pt is on Plavix and  Eliquis. Unsure why on Plavix. Will stop Plavix and pt to continue ASA/Eliquis per Cardiology.  -Statin held on admit  -PT/OT    Atrial Fibrillation  -Currently paced   -Continue Eliquis, Amiodarone and BB  -Monitor on tele    GARY on CKD  -Creatinine baseline 1.5  -Nephrology follows  -Improved, monitor     Urinary retention  -Pacheco placed  -Will do trial void today    DVT Prophylaxis  -Eliquis    Dispo  To SNF on discharge    JUNG Pacheco-CNP

## 2024-06-05 NOTE — CONSULTS
Inpatient consult to Neurology  Consult performed by: Gary Mcdowell MD  Consult ordered by: Rivas Henderson MD      Chief complaint: Recurrent falls    History Of Present Illness  Linette Doyle is a 83 y.o. female presenting with multiple medical problems and a long history of chronic history of falls with gait imbalance was admitted after patient had a fall.  Patient has known history of chronic kidney disease and was also noticed to have acute on chronic renal failure.  A neurological consultation was requested for further evaluation management.  Patient denies any neurological complaints at this time.     Past Medical History  She has a past medical history of Angina pectoris (CMS-HCC) (10/22/2023), Atherosclerosis of coronary artery (08/21/2019), Atherosclerotic heart disease of native coronary artery with other forms of angina pectoris (CMS-HCC) (10/22/2023), Hypercholesterolemia (12/28/2018), Hyperlipidemia (10/22/2023), Presence of cardiac pacemaker (10/22/2023), Primary hypertension (12/28/2018), Shortness of breath (11/26/2019), Sick sinus syndrome (Multi) (10/22/2023), Sinus bradycardia (10/22/2023), and Stage 3a chronic kidney disease (Multi) (11/25/2019).    Surgical History  She has a past surgical history that includes Cardiac catheterization (N/A, 02/02/2024); Cardiac catheterization (N/A, 02/02/2024); Cardiac catheterization (N/A, 02/08/2024); Cardiac catheterization (N/A, 02/08/2024); and pacemaker placement (07/04/2019).     Social History  She reports that she has never smoked. She has never been exposed to tobacco smoke. She has never used smokeless tobacco. She reports that she does not drink alcohol and does not use drugs.    Family history: No significant family history is noted    Allergies  Iodinated contrast media    Medications  Medications Prior to Admission   Medication Sig Dispense Refill Last Dose    acetaminophen/diphenhydramine (TYLENOL PM EXTRA STRENGTH ORAL) Take 1  tablet by mouth as needed at bedtime (sleep). Indications: sleep       amiodarone (Pacerone) 200 mg tablet Take 1 tablet (200 mg) by mouth once daily. 30 tablet 0     apixaban (Eliquis) 5 mg tablet Take 1 tablet (5 mg) by mouth 2 times a day. 180 tablet 3     ascorbic acid/collagen hydr (COLLAGEN PLUS VITAMIN C ORAL) Take 1 capsule by mouth early in the morning. Indications: supplement       atorvastatin (Lipitor) 80 mg tablet Take 1 tablet (80 mg) by mouth once daily at bedtime. 30 tablet 3     cholecalciferol (Vitamin D-3) 50 MCG (2000 UT) tablet Take 1 tablet (2,000 Units) by mouth once daily.       clopidogrel (Plavix) 75 mg tablet Take 1 tablet (75 mg) by mouth once daily. Do not start before February 15, 2024. 30 tablet 3     levothyroxine (Synthroid, Levoxyl) 100 mcg tablet Take 1 tablet (100 mcg) by mouth once daily. 1 tablet in the morning on an empty stomach Orally Once a day for 30 day(s) 90 tablet 0     melatonin 5 mg tablet Take 1 tablet (5 mg) by mouth as needed at bedtime for sleep.  0     metoprolol tartrate (Lopressor) 50 mg tablet Take 1 tablet by mouth 2 times a day. 180 tablet 3     mv-min/FA/vit K/lycop/lut/zeax (OCUVITE EYE PLUS MULTI ORAL) Take 1 tablet by mouth once daily.       omeprazole (PriLOSEC) 20 mg DR capsule Take 1 capsule (20 mg) by mouth once daily. 90 capsule 0     potassium chloride CR 10 mEq ER tablet Take 2 tablets (20 mEq) by mouth 2 times a day.       sertraline (Zoloft) 100 mg tablet Take 2 tablets (200 mg) by mouth once daily. (Patient taking differently: Take 2 tablets (200 mg) by mouth once daily at bedtime.) 90 tablet 0      Review of Systems  14 point review of systems was reviewed and negative except as noted above.    Neurological Exam  Physical Exam  Mental Status :  Alert , O x 3  Attention and concentration normal      Speech : Clear , fluent  No aphasia or dysarthria noted    CN 2-12 :  Pupils round , regular reacting to light  Fundus could not be assessed  VA  "intact, EOM intact  Facial sensation intact  No facial asymmetry noted  Hearing acuity intact bilaterally  Tongue midline  Shoulder shrug intact    Motor:  Strength moves all extremities against gravity  No pronator drift    Sensory:   Intact to light touch    Reflexes : 1+  symmetric with equivocal toes    Coordination : Intact to Finger to Nose    Gait : Unable to assess.      Last Recorded Vitals   Blood pressure 130/67, pulse 60, temperature 37.1 °C (98.8 °F), temperature source Temporal, resp. rate 18, height 1.626 m (5' 4.02\"), weight 61.3 kg (135 lb 2.3 oz), SpO2 94%, unknown if currently breastfeeding.    Relevant Results  ECG 12 lead    Result Date: 6/4/2024  Atrial-paced rhythm with prolonged AV conduction Incomplete right bundle branch block Nonspecific T wave abnormality Abnormal ECG When compared with ECG of 09-MAY-2024 18:43, Electronic atrial pacemaker has replaced Electronic ventricular pacemaker Confirmed by Becky Vela ([id number]) on 6/4/2024 3:13:43 PM    US renal complete    Result Date: 6/4/2024  Interpreted By:  Jony Fair, STUDY: US RENAL COMPLETE; 6/4/2024 7:57 am   INDICATION: Signs/Symptoms:renal failure.   COMPARISON: None   ACCESSION NUMBER(S): [id number]   ORDERING CLINICIAN: GIOVANY PATEL   TECHNIQUE: Grayscale and color Doppler imaging of the kidneys   FINDINGS: The right kidney measures 8.9 cm x 4.0 cm x 3.2 cm . The left kidney measures 8.6 cm x 3.4 cm x 3.8 cm. There is a 1 cm parapelvic cyst on the left midpole.     No renal stones are identified.  The renal cortical thickness and echogenicity is normal.  No hydronephrosis is identified.   Urinary bladder is collapsed around an indwelling Pacheco catheter limiting assessment.       Both kidneys are small. Parapelvic renal cyst is present on the left. No evidence for hydronephrosis. Urinary bladder is collapsed around a Pacheco catheter.   MACRO: none   Signed by: Jony Fair 6/4/2024 8:14 AM Dictation workstation:   " [id number]    CT cervical spine wo IV contrast    Result Date: 6/3/2024  Interpreted By:  Munir Cordon, STUDY: CT CERVICAL SPINE WO IV CONTRAST;  6/3/2024 4:59 pm   INDICATION: Signs/Symptoms:fall, on blood thinner.   COMPARISON: 05/31/2024   ACCESSION NUMBER(S): [id number]   ORDERING CLINICIAN: OLIVE PARIKH   TECHNIQUE: Axial CT images of the cervical spine are obtained. Axial, coronal and sagittal reconstructions are provided for review.   FINDINGS: Straightening of normal cervical lordosis. Apparent osteopenia. The vertebral body heights and disc heights are preserved. Endplate sclerosis and osteophytes are seen at multiple levels. Bilateral neural foraminal narrowing at multiple levels. . The included apices demonstrate biapical scarring.       No evidence for an acute fracture or subluxation of the cervical spine. Multilevel discogenic degenerative changes.   MACRO: None   Signed by: Munir Cordon 6/3/2024 5:55 PM Dictation workstation:   [id number]    CT head wo IV contrast    Result Date: 6/3/2024  Interpreted By:  Munir Cordon, STUDY: CT HEAD WO IV CONTRAST;  6/3/2024 4:59 pm   INDICATION: Signs/Symptoms:fall, on plavix.   COMPARISON: None.   ACCESSION NUMBER(S): [id number]   ORDERING CLINICIAN: OLIVE PARIKH   TECHNIQUE: Noncontrast axial CT scan of head was performed. Angled reformats in brain and bone windows were generated. The images were reviewed in bone, brain, blood and soft tissue windows.   FINDINGS: The ventricles, cisterns and sulci are prominent, consistent with mild diffuse volume loss. There are areas of nonspecific white matter hypodensity, which are probably age-related or microvascular in nature.   Gray-white differentiation is intact and there is no evidence of acute cortical infarct. No mass, mass effect or midline shift is seen. There is no evidence of hemorrhage.   The visualized paranasal sinuses are clear.   Soft tissue swelling/scalp hematoma posteriorly and  superiorly.       No evidence of acute cortical infarct or intracranial hemorrhage.   Posterior scalp hematoma.   MACRO: None   Signed by: Munir Lorenapaola 6/3/2024 5:50 PM Dictation workstation:   [id number]    XR knee left 4+ views    Result Date: 6/3/2024  Interpreted By:  Jose French, STUDY: XR KNEE LEFT 4+ VIEWS   INDICATION: Signs/Symptoms:fall 2 days ago.   COMPARISON: None   ACCESSION NUMBER(S): [id number]   ORDERING CLINICIAN: OLIVE PARIKH   FINDINGS: No osseous, articular, or soft tissue abnormality.       Normal left knee radiographs.   Signed by: Jose French 6/3/2024 5:17 PM Dictation workstation:   [id number]    XR lumbar spine 2-3 views    Result Date: 6/3/2024  Interpreted By:  Jose French, STUDY: XR LUMBAR SPINE 2-3 VIEWS   INDICATION: Signs/Symptoms:pain after fall.   COMPARISON: None   ACCESSION NUMBER(S): [id number]   ORDERING CLINICIAN: OLIVE PARIKH   FINDINGS: Advanced lumbar degenerative changes L2-S1.   No evidence of fracture or traumatic malalignment.       Advanced lumbar degenerative changes. No acute findings.   Signed by: Jose French 6/3/2024 5:17 PM Dictation workstation:   [id number]    XR chest 1 view    Result Date: 6/3/2024  Interpreted By:  Jose French, STUDY: XR CHEST 1 VIEW   INDICATION: Signs/Symptoms:fall today.   COMPARISON: May 9   ACCESSION NUMBER(S): [id number]   ORDERING CLINICIAN: OLIEV PARIKH   FINDINGS: No consolidation, effusion, edema, or pneumothorax. Cardiomegaly with pacemaker unchanged.       No evidence of acute intrathoracic abnormality.   Signed by: Jose French 6/3/2024 5:17 PM Dictation workstation:   [id number]     Results for orders placed or performed during the hospital encounter of 06/03/24 (from the past 24 hour(s))   NT-PROBNP   Result Value Ref Range    PROBNP 5,402 (H) 0 - 624 pg/mL   Creatine Kinase   Result Value Ref Range    Creatine Kinase 425 (H) 24 - 195 U/L   Serial Troponin, Initial ([facility])   Result Value Ref Range     Troponin T, High Sensitivity 35 (HH) <=14 ng/L   Serial Troponin, 2 Hour ([facility])   Result Value Ref Range    Troponin T, High Sensitivity 36 (HH) <=14 ng/L   CBC   Result Value Ref Range    WBC 7.4 4.4 - 11.3 x10*3/uL    nRBC 0.0 0.0 - 0.0 /100 WBCs    RBC 3.09 (L) 4.00 - 5.20 x10*6/uL    Hemoglobin 8.4 (L) 12.0 - 16.0 g/dL    Hematocrit 26.5 (L) 36.0 - 46.0 %    MCV 86 80 - 100 fL    MCH 27.2 26.0 - 34.0 pg    MCHC 31.7 (L) 32.0 - 36.0 g/dL    RDW 16.7 (H) 11.5 - 14.5 %    Platelets 105 (L) 150 - 450 x10*3/uL   Comprehensive metabolic panel   Result Value Ref Range    Glucose 118 (H) 65 - 99 mg/dL    Sodium 135 133 - 145 mmol/L    Potassium 4.7 3.4 - 5.1 mmol/L    Chloride 102 97 - 107 mmol/L    Bicarbonate 24 24 - 31 mmol/L    Urea Nitrogen 44 (H) 8 - 25 mg/dL    Creatinine 2.40 (H) 0.40 - 1.60 mg/dL    eGFR 19 (L) >60 mL/min/1.73m*2    Calcium 8.6 8.5 - 10.4 mg/dL    Albumin 3.8 3.5 - 5.0 g/dL    Alkaline Phosphatase 85 35 - 125 U/L    Total Protein 6.0 5.9 - 7.9 g/dL    AST 51 (H) 5 - 40 U/L    Bilirubin, Total 0.4 0.1 - 1.2 mg/dL    ALT 33 5 - 40 U/L    Anion Gap 9 <=19 mmol/L   Serial Troponin, 6 Hour ([facility])   Result Value Ref Range    Troponin T, High Sensitivity 31 (HH) <=14 ng/L         Assessment/Plan   Principal Problem:    Weakness    Patient is a 18-year-old female with multiple medical problems admitted with status post fall with known history of multiple admissions for falls most likely multifactorial and metabolic abnormalities contributing to some of her symptoms and rule out other causes, stable    Recommendations;  Reviewed CT scan brain  results.  Sepsis workup and treatment  Correct underlying metabolic abnormalities  PT/OT eval and treatment  Blood pressure / Blood sugar monitoring and control  Telemetry monitoring   Continue current management  Discussed the above plan with the patient / nurse   No further recommendations from neurology standpoint.  Will sign off for now  Parts of this chart  have been completed using voice recognition software. Please excuse any errors of transcription.

## 2024-06-05 NOTE — CARE PLAN
The patient's goals for the shift include  ambulate with assistance     The clinical goals for the shift include IV fluids, maintain patient safety, and ambulate with PT/OT    Problem: Fall/Injury  Goal: Not fall by end of shift  Outcome: Progressing  Goal: Be free from injury by end of the shift  Outcome: Progressing  Goal: Verbalize understanding of personal risk factors for fall in the hospital  Outcome: Progressing  Goal: Verbalize understanding of risk factor reduction measures to prevent injury from fall in the home  Outcome: Progressing  Goal: Use assistive devices by end of the shift  Outcome: Progressing  Goal: Pace activities to prevent fatigue by end of the shift  Outcome: Progressing     Problem: Safety  Goal: I will remain free of falls  Outcome: Progressing

## 2024-06-05 NOTE — PROGRESS NOTES
Cr improving down to 1.8,  stop IV fluids and monitor.  Pending orthostatic vitals, following along.    Yelena Giang MD

## 2024-06-06 LAB
ALBUMIN SERPL-MCNC: 3.4 G/DL (ref 3.5–5)
ANION GAP SERPL CALC-SCNC: 9 MMOL/L
BUN SERPL-MCNC: 32 MG/DL (ref 8–25)
CALCIUM SERPL-MCNC: 8.8 MG/DL (ref 8.5–10.4)
CHLORIDE SERPL-SCNC: 105 MMOL/L (ref 97–107)
CO2 SERPL-SCNC: 24 MMOL/L (ref 24–31)
CREAT SERPL-MCNC: 1.7 MG/DL (ref 0.4–1.6)
EGFRCR SERPLBLD CKD-EPI 2021: 29 ML/MIN/1.73M*2
ERYTHROCYTE [DISTWIDTH] IN BLOOD BY AUTOMATED COUNT: 16.7 % (ref 11.5–14.5)
GLUCOSE SERPL-MCNC: 100 MG/DL (ref 65–99)
HCT VFR BLD AUTO: 25.7 % (ref 36–46)
HGB BLD-MCNC: 7.9 G/DL (ref 12–16)
MCH RBC QN AUTO: 27.7 PG (ref 26–34)
MCHC RBC AUTO-ENTMCNC: 30.7 G/DL (ref 32–36)
MCV RBC AUTO: 90 FL (ref 80–100)
NRBC BLD-RTO: 0 /100 WBCS (ref 0–0)
PHOSPHATE SERPL-MCNC: 3.3 MG/DL (ref 2.5–4.5)
PLATELET # BLD AUTO: 88 X10*3/UL (ref 150–450)
POTASSIUM SERPL-SCNC: 4.6 MMOL/L (ref 3.4–5.1)
RBC # BLD AUTO: 2.85 X10*6/UL (ref 4–5.2)
SODIUM SERPL-SCNC: 138 MMOL/L (ref 133–145)
WBC # BLD AUTO: 5.2 X10*3/UL (ref 4.4–11.3)

## 2024-06-06 PROCEDURE — 2500000001 HC RX 250 WO HCPCS SELF ADMINISTERED DRUGS (ALT 637 FOR MEDICARE OP): Performed by: INTERNAL MEDICINE

## 2024-06-06 PROCEDURE — 1200000002 HC GENERAL ROOM WITH TELEMETRY DAILY

## 2024-06-06 PROCEDURE — 97116 GAIT TRAINING THERAPY: CPT | Mod: GP,CQ

## 2024-06-06 PROCEDURE — 99232 SBSQ HOSP IP/OBS MODERATE 35: CPT | Performed by: INTERNAL MEDICINE

## 2024-06-06 PROCEDURE — 80069 RENAL FUNCTION PANEL: CPT | Performed by: INTERNAL MEDICINE

## 2024-06-06 PROCEDURE — 85027 COMPLETE CBC AUTOMATED: CPT | Performed by: INTERNAL MEDICINE

## 2024-06-06 PROCEDURE — 97535 SELF CARE MNGMENT TRAINING: CPT | Mod: GO,CO

## 2024-06-06 PROCEDURE — 2500000004 HC RX 250 GENERAL PHARMACY W/ HCPCS (ALT 636 FOR OP/ED): Performed by: INTERNAL MEDICINE

## 2024-06-06 PROCEDURE — 2500000001 HC RX 250 WO HCPCS SELF ADMINISTERED DRUGS (ALT 637 FOR MEDICARE OP): Performed by: NURSE PRACTITIONER

## 2024-06-06 PROCEDURE — 97110 THERAPEUTIC EXERCISES: CPT | Mod: GP,CQ

## 2024-06-06 PROCEDURE — 99231 SBSQ HOSP IP/OBS SF/LOW 25: CPT | Performed by: NURSE PRACTITIONER

## 2024-06-06 PROCEDURE — 2500000002 HC RX 250 W HCPCS SELF ADMINISTERED DRUGS (ALT 637 FOR MEDICARE OP, ALT 636 FOR OP/ED): Performed by: INTERNAL MEDICINE

## 2024-06-06 PROCEDURE — 36415 COLL VENOUS BLD VENIPUNCTURE: CPT | Performed by: INTERNAL MEDICINE

## 2024-06-06 RX ORDER — BISACODYL 10 MG/1
10 SUPPOSITORY RECTAL ONCE
Status: DISCONTINUED | OUTPATIENT
Start: 2024-06-06 | End: 2024-06-07 | Stop reason: HOSPADM

## 2024-06-06 RX ORDER — CLOPIDOGREL BISULFATE 75 MG/1
75 TABLET ORAL DAILY
Status: DISCONTINUED | OUTPATIENT
Start: 2024-06-06 | End: 2024-06-07 | Stop reason: HOSPADM

## 2024-06-06 RX ADMIN — PANTOPRAZOLE SODIUM 40 MG: 40 TABLET, DELAYED RELEASE ORAL at 06:04

## 2024-06-06 RX ADMIN — AMIODARONE HYDROCHLORIDE 200 MG: 200 TABLET ORAL at 09:02

## 2024-06-06 RX ADMIN — METOPROLOL TARTRATE 50 MG: 50 TABLET, FILM COATED ORAL at 09:02

## 2024-06-06 RX ADMIN — METOPROLOL TARTRATE 50 MG: 50 TABLET, FILM COATED ORAL at 22:13

## 2024-06-06 RX ADMIN — CLOPIDOGREL BISULFATE 75 MG: 75 TABLET ORAL at 12:29

## 2024-06-06 RX ADMIN — LEVOTHYROXINE SODIUM 100 MCG: 100 TABLET ORAL at 05:53

## 2024-06-06 RX ADMIN — SERTRALINE 200 MG: 100 TABLET, FILM COATED ORAL at 22:13

## 2024-06-06 RX ADMIN — ASPIRIN 81 MG: 81 TABLET, COATED ORAL at 09:02

## 2024-06-06 RX ADMIN — APIXABAN 2.5 MG: 2.5 TABLET, FILM COATED ORAL at 05:53

## 2024-06-06 RX ADMIN — POLYETHYLENE GLYCOL 3350 17 G: 17 POWDER, FOR SOLUTION ORAL at 09:02

## 2024-06-06 RX ADMIN — ACETAMINOPHEN 650 MG: 325 TABLET ORAL at 17:43

## 2024-06-06 RX ADMIN — APIXABAN 2.5 MG: 2.5 TABLET, FILM COATED ORAL at 17:43

## 2024-06-06 RX ADMIN — ACETAMINOPHEN 650 MG: 325 TABLET ORAL at 09:02

## 2024-06-06 ASSESSMENT — COGNITIVE AND FUNCTIONAL STATUS - GENERAL
MOVING FROM LYING ON BACK TO SITTING ON SIDE OF FLAT BED WITH BEDRAILS: A LITTLE
DRESSING REGULAR LOWER BODY CLOTHING: A LOT
DAILY ACTIVITIY SCORE: 17
DRESSING REGULAR UPPER BODY CLOTHING: A LOT
DRESSING REGULAR LOWER BODY CLOTHING: A LITTLE
MOBILITY SCORE: 14
TURNING FROM BACK TO SIDE WHILE IN FLAT BAD: A LOT
TOILETING: A LITTLE
CLIMB 3 TO 5 STEPS WITH RAILING: A LOT
STANDING UP FROM CHAIR USING ARMS: A LITTLE
MOBILITY SCORE: 17
TOILETING: A LITTLE
MOVING FROM LYING ON BACK TO SITTING ON SIDE OF FLAT BED WITH BEDRAILS: A LITTLE
WALKING IN HOSPITAL ROOM: A LITTLE
WALKING IN HOSPITAL ROOM: A LITTLE
STANDING UP FROM CHAIR USING ARMS: A LITTLE
MOVING TO AND FROM BED TO CHAIR: A LITTLE
DRESSING REGULAR UPPER BODY CLOTHING: A LITTLE
HELP NEEDED FOR BATHING: A LITTLE
TURNING FROM BACK TO SIDE WHILE IN FLAT BAD: A LITTLE
HELP NEEDED FOR BATHING: A LOT
CLIMB 3 TO 5 STEPS WITH RAILING: TOTAL
MOVING TO AND FROM BED TO CHAIR: A LOT
DAILY ACTIVITIY SCORE: 20

## 2024-06-06 ASSESSMENT — PAIN SCALES - GENERAL
PAINLEVEL_OUTOF10: 4
PAINLEVEL_OUTOF10: 10 - WORST POSSIBLE PAIN
PAINLEVEL_OUTOF10: 0 - NO PAIN
PAINLEVEL_OUTOF10: 0 - NO PAIN
PAINLEVEL_OUTOF10: 2
PAINLEVEL_OUTOF10: 0 - NO PAIN
PAINLEVEL_OUTOF10: 2
PAINLEVEL_OUTOF10: 0 - NO PAIN

## 2024-06-06 ASSESSMENT — PAIN - FUNCTIONAL ASSESSMENT
PAIN_FUNCTIONAL_ASSESSMENT: 0-10

## 2024-06-06 ASSESSMENT — PAIN DESCRIPTION - LOCATION
LOCATION: BACK
LOCATION: BACK

## 2024-06-06 ASSESSMENT — PAIN DESCRIPTION - ORIENTATION
ORIENTATION: LOWER
ORIENTATION: LOWER

## 2024-06-06 ASSESSMENT — PAIN SCALES - WONG BAKER: WONGBAKER_NUMERICALRESPONSE: HURTS LITTLE BIT

## 2024-06-06 NOTE — DOCUMENTATION CLARIFICATION NOTE
"    PATIENT:               SAMUEL VILLALOBOS  ACCT #:                  [id number]  MRN:                       [id number]  :                       1940  ADMIT DATE:       6/3/2024 10:18 PM  DISCH DATE:  RESPONDING PROVIDER #:        [id number]          PROVIDER RESPONSE TEXT:    Severe Protein Calorie Malnutrition    CDI QUERY TEXT:    Clarification        Instruction:    Based on your assessment of the patient and the clinical information, please provide the requested documentation by clicking on the appropriate radio button and enter any additional information if prompted.    Question: Please further clarify this patient nutritional status as    When answering this query, please exercise your independent professional judgment. The fact that a question is being asked, does not imply that any particular answer is desired or expected.    The patient's clinical indicators include:  Clinical Information: 83 y.o. female who is admitted for increased weakness and recurrent falls.    Clinical Indicators:    : Nutrition assessment:  \"Nutrition Diagnosis:  Patient has Malnutrition Diagnosis: Yes  Diagnosis Status: New  Malnutrition Diagnosis: Severe malnutrition related to chronic disease or condition  As Evidenced by: mild subcutaneous fat loss and muscle wasting\"    \"Weight History / percent Weight Change: lost wt unintentionally and has not been able to regain wt.  Significant Weight Loss: Yes  Interpretation of Weight Loss:  18 lbs/ 12 percent wt loss over 3 months: -\"    Treatment: Meals and Snacks: General healthful diet  Medical Food Supplement: Commercial beverage  Goal: chocolate ensure compact BID to provide 220 kcals and 9g protein each    Risk Factors: loss of spouse and son leading to lack of motivation in food prep, decreased appetite  Options provided:  -- Severe Protein Calorie Malnutrition  -- Protein Calorie Malnutrition, Please specify severity  -- Other - I will add my own diagnosis  -- Refer to " Clinical Documentation Reviewer    Query created by: Osorio Coelho on 6/5/2024 1:09 PM      Electronically signed by:  BUBBA NO 6/6/2024 1:07 PM

## 2024-06-06 NOTE — PROGRESS NOTES
Occupational Therapy    OT Treatment    Patient Name: Linette Doyle  MRN: [id number]  Today's Date: 6/6/2024  Time Calculation  Start Time: 1006  Stop Time: 1039  Time Calculation (min): 33 min         Assessment:  OT Assessment: Session limited due to pt not wanting to get off commode. Pt demonstrates fair balance, decreased activity and mobility tolerance due to 10/10 pain due to constipation.  End of Session Communication: PCT/NA/CTA  End of Session Patient Position: Up in bathroom (Pt instructed to pull call light for assist back to chair.)  OT Assessment Results: Decreased ADL status, Decreased endurance, Decreased functional mobility  Plan:  Treatment Interventions: ADL retraining, Functional transfer training  OT Frequency: 3 times per week  OT Discharge Recommendations: Moderate intensity level of continued care  Equipment Recommended upon Discharge: Wheeled walker  OT Recommended Transfer Status: Assist of 1  OT - OK to Discharge: Yes  Treatment Interventions: ADL retraining, Functional transfer training    Subjective   Previous Visit Info:  OT Last Visit  OT Received On: 06/06/24  General:  General  Reason for Referral: decreased ADL's  Referred By: Sarthak Henderson MD  Past Medical History Relevant to Rehab: A-fib, CND3, pacemaker, HTN, angina, CAD  Prior to Session Communication: Bedside nurse  Patient Position Received: Up in chair, Alarm on  General Comment: Pt agreeable to treatment with encouragement, stating I cant do anything until I have a BM, my stomach hurts too much.  Precautions:  Hearing/Visual Limitations: reading glasses  Medical Precautions: Fall precautions, Oxygen therapy device and L/min       Pain:  Pain Assessment  Pain Assessment: 0-10  Pain Score: 10 - Worst possible pain  Pain Location: Abdomen  Pain Orientation: Lower  Pain Interventions:  (walking, bilateral feet on stool while on commode to promote BM)    Objective    Cognition:  Cognition  Overall Cognitive Status: Within  Functional Limits  Insight: Mild  Impulsive: Mildly     Activities of Daily Living: LE Dressing  Pants Level of Assistance: Contact guard  LE Dressing Where Assessed: Chair  LE Dressing Comments: pt donned brief, slight difficulty with figure 4    Toileting  Where Assessed: Toilet  Toileting Comments: clothing management, pt remaining on commode attempting to have BM end of session.  Bed Mobility/Transfers:      Transfer 1  Transfer From 1: Chair with arms to  Transfer to 1: Chair with arms  Technique 1: To right  Transfer Device 1: Walker  Transfer Level of Assistance 1: Minimum assistance  Trials/Comments 1: cues for sfae hand placement  Transfers 2  Transfer From 2:  (couch)  Transfer to 2:  (couch)  Technique 2: Sit to stand, Stand to sit  Transfer Device 2: Walker  Transfer Level of Assistance 2: Minimum assistance  Trials/Comments 2: cues for sfe hand placement    Toilet Transfers  Toilet Transfer From: Rolling walker  Toilet Transfer Type: To and from  Toilet Transfer to: Standard toilet  Toilet Transfer Technique: Ambulating  Toilet Transfers: Contact guard  Toilet Transfers Comments: cues for use of grab bar    Functional Mobility:  Functional Mobility 1  Surface 1: Level tile  Device 1: Rolling walker  Assistance 1: Minimum assistance  Quality of Functional Mobility 1:  (antalgic, cautious)  Comments 1: household distance in room from chair>doorway>window>bathroom>chair    Outcome Measures:[facility] Daily Activity  Putting on and taking off regular lower body clothing: A little  Bathing (including washing, rinsing, drying): A little  Putting on and taking off regular upper body clothing: A little  Toileting, which includes using toilet, bedpan or urinal: A little  Taking care of personal grooming such as brushing teeth: None  Eating Meals: None  Daily Activity - Total Score: 20        Education Documentation  ADL Training, taught by COLLEEN Leach at 6/6/2024 10:46 AM.  Learner: Patient  Readiness:  Acceptance  Method: Explanation  Response: Verbalizes Understanding, Demonstrated Understanding    Education Comments  No comments found.    IP EDUCATION:  Education  Individual(s) Educated: Patient  Education Provided: Diagnosis & Precautions, Fall precautons  Risk and Benefits Discussed with Patient/Caregiver/Other: yes  Patient/Caregiver Demonstrated Understanding: yes  Plan of Care Discussed and Agreed Upon: yes    Goals:  Encounter Problems       Encounter Problems (Active)       OT Goals       ADL's (Progressing)       Start:  06/04/24    Expected End:  06/18/24       Pt will complete bathing, dressing and grooming tasks w/ close supervision w/ AE/ compensatory tech for safety and increased independence in self care tasks.         Functional transfers (Progressing)       Start:  06/04/24    Expected End:  06/18/24       Pt will demon bed, chair and toilet transfers w/ mod I w/ LRD for safety and increased independence in daily tasks.         Functional endurance (Progressing)       Start:  06/04/24    Expected End:  06/18/24       Pt will tolerate 20 minutes of functional activity to improve functional endurance for daily tasks and functional mobility         Precautions (Progressing)       Start:  06/04/24    Expected End:  06/18/24       Pt will verbalize/demon precautions consistently 100% of the time for safety and increased independence in daily tasks and functional mobility.

## 2024-06-06 NOTE — CARE PLAN
The patient's goals for the shift include  comfort and safety, PT/OT, rest.    The clinical goals for the shift include Monitor labs and VS, work with therapy, maintain safety, no falls, promote rest, determine discharge plan.    No barriers to meeting these goals.       Problem: Fall/Injury  Goal: Not fall by end of shift  Outcome: Progressing  Goal: Be free from injury by end of the shift  Outcome: Progressing  Goal: Verbalize understanding of personal risk factors for fall in the hospital  Outcome: Progressing  Goal: Verbalize understanding of risk factor reduction measures to prevent injury from fall in the home  Outcome: Progressing  Goal: Use assistive devices by end of the shift  Outcome: Progressing  Goal: Pace activities to prevent fatigue by end of the shift  Outcome: Progressing     Problem: Safety  Goal: I will remain free of falls  Outcome: Progressing

## 2024-06-06 NOTE — PROGRESS NOTES
Physical Therapy    Physical Therapy Treatment    Patient Name: Linette Doyle  MRN: [id number]  Today's Date: 6/6/2024  Time Calculation  Start Time: 1455  Stop Time: 1523  Time Calculation (min): 28 min    Assessment/Plan   PT Assessment  Rehab Prognosis: Good  Barriers to Discharge: Pt lives alone, requires min assist of 1 for mobility. History of multiple falls.  End of Session Communication: PCT/NA/CTA  End of Session Patient Position: Bed, 3 rail up, Alarm on     PT Plan  Treatment/Interventions: Bed mobility, Transfer training, Gait training, Therapeutic exercise, Therapeutic activity  PT Plan: Skilled PT  PT Frequency: 4 times per week  PT Discharge Recommendations: Moderate intensity level of continued care  Equipment Recommended upon Discharge: Wheeled walker  PT Recommended Transfer Status: Assist x1, Assistive device  PT - OK to Discharge: Yes      General Visit Information:   PT  Visit  PT Received On: 06/06/24  General  Prior to Session Communication: Bedside nurse  Patient Position Received: Bed, 3 rail up, Alarm off, not on at start of session  Preferred Learning Style: verbal, visual  General Comment: Pt agreeable to therapy. Pt reports feeling much better after having a BM.    Subjective   Precautions:  Precautions  Medical Precautions: Fall precautions, Oxygen therapy device and L/min (2L oxygen)  Vital Signs:       Objective   Pain:  Pain Assessment  Pain Assessment: 0-10  Pain Score: 0 - No pain  Cognition:  Cognition  Insight: Mild  Impulsive: Mildly  Processing Speed: Delayed  Coordination:     Postural Control:     Extremity/Trunk Assessments:    Activity Tolerance:     Treatments:  Therapeutic Exercise  Therapeutic Exercise Activity 1: Pt performed seated bilat LE ankle pumps, heel raises, LAQ, hip flexion, renard hip adduction and resisted hip abduction x15 reps each.    Bed Mobility 1  Bed Mobility 1: Supine to sitting  Level of Assistance 1: Minimum assistance, Moderate verbal cues  Bed  Mobility Comments 1: Head of bed elevated. Pt utilizing bedrails. Light min assist with trunk up.  Bed Mobility 2  Bed Mobility  2: Sitting to supine  Level of Assistance 2: Close supervision    Ambulation/Gait Training 1  Surface 1: Level tile  Device 1: Rolling walker  Assistance 1: Minimum assistance  Quality of Gait 1: Diminished heel strike, Decreased step length  Comments/Distance (ft) 1: Pt ambulated with RW and 2L oxygen ~40' x2 trials and 15' x2 to/from bathroom min assist of 1. Mild unsteadiness throughout but no significant LOB.  Transfer 1  Transfer From 1: Bed to  Transfer to 1: Stand  Technique 1: Sit to stand, Stand to sit  Transfer Device 1: Walker  Transfer Level of Assistance 1: Minimum assistance, Minimal verbal cues  Trials/Comments 1: Verbal cues to push from bed sit to stand. Verbal cues to reach back for bed stand to sit.  Transfers 2  Transfer From 2: Stand to  Transfer to 2: Sit, Chair with arms  Technique 2: Stand to sit  Transfer Level of Assistance 2: Minimum assistance  Transfers 3  Transfer From 3: Chair with arms to  Transfer to 3: Stand  Technique 3: Sit to stand  Transfer Device 3: Walker  Transfer Level of Assistance 3: Minimum assistance  Transfers 4  Transfer From 4: Stand to  Transfer to 4: Sit, Toilet  Technique 4: Stand to sit  Transfer Level of Assistance 4: Minimum assistance, Minimal verbal cues  Trials/Comments 4: Verbal cues to reach for grab bar stand to sit  Transfers 5  Transfer From 5: Toilet to  Transfer to 5: Stand  Technique 5: Sit to stand  Transfer Device 5: Walker  Transfer Level of Assistance 5: Minimum assistance    Outcome Measures:  [facility] Basic Mobility  Turning from your back to your side while in a flat bed without using bedrails: A little  Moving from lying on your back to sitting on the side of a flat bed without using bedrails: A little  Moving to and from bed to chair (including a wheelchair): A little  Standing up from a chair using your arms (e.g.  wheelchair or bedside chair): A little  To walk in hospital room: A little  Climbing 3-5 steps with railing: A lot  Basic Mobility - Total Score: 17    Education Documentation  Mobility Training, taught by Adela Nielson PTA at 6/6/2024  3:46 PM.  Learner: Patient  Readiness: Acceptance  Method: Explanation  Response: Verbalizes Understanding, Needs Reinforcement    Education Comments  No comments found.        OP EDUCATION:       Encounter Problems       Encounter Problems (Active)       Balance       STG - Maintains dynamic standing balance with upper extremity support (Progressing)       Start:  06/04/24    Expected End:  07/02/24       INTERVENTIONS:  1. Practice standing with minimal support.  2. Educate patient about standing tolerance.  3. Educate patient about independence with gait, transfers, and ADL's.  4. Educate patient about use of assistive device.  5. Educate patient about self-directed care.            Mobility       STG - Patient will ambulate 100 ft, FWW, + turns, contact guard of 1 (Progressing)       Start:  06/04/24    Expected End:  07/02/24            pt will ascend/descend 1 step without rail, contact guard of 1 (Progressing)       Start:  06/04/24    Expected End:  07/02/24               PT Transfers       STG - Patient to transfer to and from sit to supine close supervision of 1 via logrolling (Progressing)       Start:  06/04/24    Expected End:  07/02/24            STG - Patient will transfer sit to and from stand close supervision of 1 (Progressing)       Start:  06/04/24    Expected End:  07/02/24               Pain       pt will verbalize no > 2/10 pain during functional mobility (Progressing)       Start:  06/04/24    Expected End:  07/02/24               Safety       pt will verbalize spinal precautions without verbal reminders (Progressing)       Start:  06/04/24    Expected End:  07/02/24

## 2024-06-06 NOTE — CARE PLAN
The patient's goals for the shift include  maintain patient safety, work with PT and OT,     The clinical goals for the shift include IV fluids, maintain patient safety, and work with PT/OT    Over the shift, the patient did not make progress toward the following goals. Barriers to progression include none. Recommendations to address these barriers include none.      06/05/24 at 11:12 PM - MANJU LOW RN

## 2024-06-06 NOTE — PROGRESS NOTES
06/06/24 1152   Discharge Planning   Patient expects to be discharged to: Rehab - [facility] 6/7   Does the patient need discharge transport arranged? Yes   RoundTrip coordination needed? Yes   Has discharge transport been arranged? No     [facility]   Sister, Krista, updated.    1443- Facility requests after 1200 for transport.  Will set up for 1 pm so patient can have lunch.

## 2024-06-06 NOTE — PROGRESS NOTES
Linette Doyle is a 83 y.o. female on day 2 of admission presenting with Weakness.      Subjective   Renal function continues to improve. Awaiting Ortho Spine eval. C/o constipation.     Objective     Last Recorded Vitals  /58 (BP Location: Right arm, Patient Position: Lying)   Pulse 62 Comment: per monitor  Temp 36.4 °C (97.5 °F) (Temporal)   Resp 16   Wt 61.3 kg (135 lb 2.3 oz)   SpO2 93%   Intake/Output last 3 Shifts:    Intake/Output Summary (Last 24 hours) at 6/6/2024 1002  Last data filed at 6/6/2024 0939  Gross per 24 hour   Intake 2158.34 ml   Output 750 ml   Net 1408.34 ml       Admission Weight  Weight: 61.3 kg (135 lb 2.3 oz) (06/03/24 2233)    Daily Weight  06/03/24 : 61.3 kg (135 lb 2.3 oz)    Image Results  MR lumbar spine wo IV contrast, MR cervical spine wo IV contrast  Narrative: Interpreted By:  Rj Espinoza,   STUDY:  MR LUMBAR SPINE WO IV CONTRAST; MR CERVICAL SPINE WO IV CONTRAST;  6/5/2024 10:41 am; 6/5/2024 10:21 am      INDICATION:  Signs/Symptoms:Bilateral LE weakness, falls.      COMPARISON:  Lumbar spine radiograph 12/22/2021 and CT cervical spine 05/31/2024  and 06/03/2024      ACCESSION NUMBER(S):  [id number]; [id number]      ORDERING CLINICIAN:  BUBBA RODRIGUEZ      TECHNIQUE:  Sagittal T1, T2, STIR, axial T1 and T2 weighted images of the  cervical and lumbar spine were acquired.      FINDINGS:  Cervical spine:      Alignment: Cervical lordosis is maintained. Atlantoaxial interval is  within normal limits. Craniocervical junction is within normal limits.      Vertebrae/Intervertebral Discs: The vertebral bodies demonstrate  expected height. The marrow signal is within normal limits. The  intervertebral discs demonstrate expected signal and morphology.      Cord: Normal in caliber and signal.      Multilevel degenerative changes of the cervical spine including  endplate remodeling, disc osteophyte complexes, ligamentum flavum  hypertrophy and facet degenerative  changes.      C2-C3: Disc osteophyte complex, uncinate hypertrophy and facet  degenerative changes without significant canal stenosis. Neural  foramen are patent.      C3-C4: Small disc osteophyte complex, uncinate hypertrophy and facet  degenerative changes with mild canal stenosis. Mild bilateral neural  foraminal narrowing.      C4-C5: Disc osteophyte complex, left-greater-than-right uncinate  hypertrophy and facet degenerative changes mild canal stenosis.  Moderate bilateral neural foraminal narrowing.      C5-C6: Disc osteophyte complex, left-greater-than-right uncinate  hypertrophy and facet degenerative changes without significant canal  stenosis. Mild bilateral neural foraminal narrowing.      C6-C7: Disc osteophyte complex, left-greater-than-right uncinate  hypertrophy and facet degenerative changes with mild canal stenosis.  Mild bilateral neural foraminal narrowing.      C7-T1: There is no posterior disc contour abnormality. There is no  significant central canal or neural foraminal stenosis.      The upper thoracic vertebrae and spinal canal are unremarkable.      The prevertebral and posterior paraspinous soft tissues are within  normal limits.      13 mm right T2 hyperintense lesion (series 7, image 10), at the level  of the vallecula likely represents a vallecular cyst. This lesion was  also seen on the prior CTs of the cervical spine and is unchanged.          Lumbar spine:      Examination is degraded by motion.      Alignment: Lumbar lordosis is straightened.      Vertebrae/Intervertebral Discs: The vertebral bodies demonstrate  expected height. Heterogeneous marrow signal without suspicious  replacement. Multilevel loss of disc space throughout the lumbar  spine.      Conus: The lower thoracic cord appears unremarkable. The conus  terminates at L2.      Advanced multilevel degenerative changes of the lumbar spine  including endplate remodeling, vacuum phenomenon, facet degenerative  changes, disc  bulges and ligamentum flavum hypertrophy.      T12-L1:  There is no significant central canal or neural foraminal  stenosis.      L1-2: Small circumferential disc bulge, mild facet degenerative  change with flattening of the ventral thecal sac. There is no  significant central canal or neural foraminal stenosis.      L2-3: Disc bulge asymmetric to the left, ligamentum flavum  hypertrophy and facet degenerative changes with moderate canal  stenosis. Mild-to-moderate bilateral neural foraminal narrowing.      L3-4: Disc bulge asymmetric to the left, ligamentum flavum  hypertrophy and facet degenerative changes with moderate-severe canal  stenosis. Moderate bilateral neural foraminal narrowing.      L4-5: Circumferential disc bulge mild ligamentum flavum hypertrophy  and facet degenerative change with severe canal stenosis. Bilateral  subarticular recess narrowing, more prominent on the left. Severe  left and moderate right neural foraminal narrowing.      L5-S1: Disc bulge asymmetric to the left, facet degenerative changes  and ligamentum flavum hypertrophy with mild canal stenosis. Moderate  right and mild left neural foraminal narrowing.      The prevertebral and posterior paraspinous soft tissues are  unremarkable.      Diffuse fatty atrophy of the paraspinal musculature.      Impression: Cervical spine:  Multilevel degenerative changes with up to mild canal stenosis and  neural foraminal narrowing throughout the cervical spine.      Lumbar spine:  Multilevel degenerative changes, with up to severe canal stenosis and  neural foraminal narrowing most prominent at L4-L5. Multilevel  subarticular recess narrowing, also most prominent at L4-L5.      Signed by: Rj Espinoza 6/5/2024 11:06 AM  Dictation workstation:   [id number]      Physical Exam  HENT:      Head: Normocephalic and atraumatic.      Nose: Nose normal.      Mouth/Throat:      Mouth: Mucous membranes are moist.      Pharynx: Oropharynx is clear.    Eyes:      Extraocular Movements: Extraocular movements intact.      Pupils: Pupils are equal, round, and reactive to light.   Cardiovascular:      Rate and Rhythm: Normal rate and regular rhythm.      Pulses: Normal pulses.   Pulmonary:      Effort: Pulmonary effort is normal.      Breath sounds: Normal breath sounds.   Abdominal:      General: Abdomen is flat. Bowel sounds are normal.      Palpations: Abdomen is soft.   Musculoskeletal:         General: Normal range of motion.   Skin:     General: Skin is warm and dry.      Capillary Refill: Capillary refill takes less than 2 seconds.   Neurological:      General: No focal deficit present.      Mental Status: She is oriented to person, place, and time.   Psychiatric:         Mood and Affect: Mood normal.         Relevant Results  Results for orders placed or performed during the hospital encounter of 06/03/24 (from the past 24 hour(s))   CBC   Result Value Ref Range    WBC 5.2 4.4 - 11.3 x10*3/uL    nRBC 0.0 0.0 - 0.0 /100 WBCs    RBC 2.85 (L) 4.00 - 5.20 x10*6/uL    Hemoglobin 7.9 (L) 12.0 - 16.0 g/dL    Hematocrit 25.7 (L) 36.0 - 46.0 %    MCV 90 80 - 100 fL    MCH 27.7 26.0 - 34.0 pg    MCHC 30.7 (L) 32.0 - 36.0 g/dL    RDW 16.7 (H) 11.5 - 14.5 %    Platelets 88 (L) 150 - 450 x10*3/uL   Renal Function Panel   Result Value Ref Range    Glucose 100 (H) 65 - 99 mg/dL    Sodium 138 133 - 145 mmol/L    Potassium 4.6 3.4 - 5.1 mmol/L    Chloride 105 97 - 107 mmol/L    Bicarbonate 24 24 - 31 mmol/L    Urea Nitrogen 32 (H) 8 - 25 mg/dL    Creatinine 1.70 (H) 0.40 - 1.60 mg/dL    eGFR 29 (L) >60 mL/min/1.73m*2    Calcium 8.8 8.5 - 10.4 mg/dL    Phosphorus 3.3 2.5 - 4.5 mg/dL    Albumin 3.4 (L) 3.5 - 5.0 g/dL    Anion Gap 9 <=19 mmol/L     Assessment/Plan   Repeated Falls  -Of unknown etiology  -MRI C-spine shows mild stenosis. Lumbar MRI shows multilevel degenerative changes, with severe canal stenosis and neural foraminal narrowing most prominent at L4-L5.   -Ortho  Spine to evaluate  -Fall precautions  -Neurology saw and signed off   -Pt is on Plavix and Eliquis. Continue Plavix and Eliquis per Cardiology. Will stop ASA   -Statin held on admit  -PT/OT recommending SNF    Atrial Fibrillation  -Currently paced   -Continue Eliquis, Amiodarone and BB  -Monitor on tele    GARY on CKD  -Creatinine baseline 1.5  -Nephrology follows  -Improved, monitor     Urinary retention  -Pt voiding without issues.   -Bladder scan to evaluate for retention    DVT Prophylaxis  -Eliquis    Dispo  To SNF on discharge    Maryam Karimi, APRN-CNP

## 2024-06-06 NOTE — PROGRESS NOTES
Subjective Data:  Patient stable cardiac wise underlying history of PCI of RCA in the February this year.  Does needs a Plavix as well as Eliquis for atrial fibrillation.  On and off weakness and tires probably better to go to rehab facility to avoid multiple readmissions.  Overnight Events:    None  Objective   Last Recorded Vitals  /58 (BP Location: Right arm, Patient Position: Lying)   Pulse 62 Comment: per monitor  Temp 36.4 °C (97.5 °F) (Temporal)   Resp 16   Wt 61.3 kg (135 lb 2.3 oz)   SpO2 93%     Intake/Output Summary (Last 24 hours) at 6/6/2024 1108  Last data filed at 6/6/2024 0939  Gross per 24 hour   Intake 200 ml   Output 750 ml   Net -550 ml     Physical Exam:  HEENT: Normocephalic/atraumatic pupils equal react light  Neck exam mild JVD, no bruit  Lung exam clear to auscultation, few crackles at the bases  Cardiac exam is regular rhythm S1-S2, soft systolic murmur heard.   Abdomen soft nontender, nondistended  Extremities no clubbing, cyanosis, trace edema  Neuro exam grossly intact.  Image Results  MR lumbar spine wo IV contrast, MR cervical spine wo IV contrast  Narrative: Interpreted By:  Rj Espinoza,   STUDY:  MR LUMBAR SPINE WO IV CONTRAST; MR CERVICAL SPINE WO IV CONTRAST;  6/5/2024 10:41 am; 6/5/2024 10:21 am      INDICATION:  Signs/Symptoms:Bilateral LE weakness, falls.      COMPARISON:  Lumbar spine radiograph 12/22/2021 and CT cervical spine 05/31/2024  and 06/03/2024      ACCESSION NUMBER(S):  [id number]; [id number]      ORDERING CLINICIAN:  BUBBA RODRIGUEZ      TECHNIQUE:  Sagittal T1, T2, STIR, axial T1 and T2 weighted images of the  cervical and lumbar spine were acquired.      FINDINGS:  Cervical spine:      Alignment: Cervical lordosis is maintained. Atlantoaxial interval is  within normal limits. Craniocervical junction is within normal limits.      Vertebrae/Intervertebral Discs: The vertebral bodies demonstrate  expected height. The marrow signal is within normal  limits. The  intervertebral discs demonstrate expected signal and morphology.      Cord: Normal in caliber and signal.      Multilevel degenerative changes of the cervical spine including  endplate remodeling, disc osteophyte complexes, ligamentum flavum  hypertrophy and facet degenerative changes.      C2-C3: Disc osteophyte complex, uncinate hypertrophy and facet  degenerative changes without significant canal stenosis. Neural  foramen are patent.      C3-C4: Small disc osteophyte complex, uncinate hypertrophy and facet  degenerative changes with mild canal stenosis. Mild bilateral neural  foraminal narrowing.      C4-C5: Disc osteophyte complex, left-greater-than-right uncinate  hypertrophy and facet degenerative changes mild canal stenosis.  Moderate bilateral neural foraminal narrowing.      C5-C6: Disc osteophyte complex, left-greater-than-right uncinate  hypertrophy and facet degenerative changes without significant canal  stenosis. Mild bilateral neural foraminal narrowing.      C6-C7: Disc osteophyte complex, left-greater-than-right uncinate  hypertrophy and facet degenerative changes with mild canal stenosis.  Mild bilateral neural foraminal narrowing.      C7-T1: There is no posterior disc contour abnormality. There is no  significant central canal or neural foraminal stenosis.      The upper thoracic vertebrae and spinal canal are unremarkable.      The prevertebral and posterior paraspinous soft tissues are within  normal limits.      13 mm right T2 hyperintense lesion (series 7, image 10), at the level  of the vallecula likely represents a vallecular cyst. This lesion was  also seen on the prior CTs of the cervical spine and is unchanged.          Lumbar spine:      Examination is degraded by motion.      Alignment: Lumbar lordosis is straightened.      Vertebrae/Intervertebral Discs: The vertebral bodies demonstrate  expected height. Heterogeneous marrow signal without suspicious  replacement.  Multilevel loss of disc space throughout the lumbar  spine.      Conus: The lower thoracic cord appears unremarkable. The conus  terminates at L2.      Advanced multilevel degenerative changes of the lumbar spine  including endplate remodeling, vacuum phenomenon, facet degenerative  changes, disc bulges and ligamentum flavum hypertrophy.      T12-L1:  There is no significant central canal or neural foraminal  stenosis.      L1-2: Small circumferential disc bulge, mild facet degenerative  change with flattening of the ventral thecal sac. There is no  significant central canal or neural foraminal stenosis.      L2-3: Disc bulge asymmetric to the left, ligamentum flavum  hypertrophy and facet degenerative changes with moderate canal  stenosis. Mild-to-moderate bilateral neural foraminal narrowing.      L3-4: Disc bulge asymmetric to the left, ligamentum flavum  hypertrophy and facet degenerative changes with moderate-severe canal  stenosis. Moderate bilateral neural foraminal narrowing.      L4-5: Circumferential disc bulge mild ligamentum flavum hypertrophy  and facet degenerative change with severe canal stenosis. Bilateral  subarticular recess narrowing, more prominent on the left. Severe  left and moderate right neural foraminal narrowing.      L5-S1: Disc bulge asymmetric to the left, facet degenerative changes  and ligamentum flavum hypertrophy with mild canal stenosis. Moderate  right and mild left neural foraminal narrowing.      The prevertebral and posterior paraspinous soft tissues are  unremarkable.      Diffuse fatty atrophy of the paraspinal musculature.      Impression: Cervical spine:  Multilevel degenerative changes with up to mild canal stenosis and  neural foraminal narrowing throughout the cervical spine.      Lumbar spine:  Multilevel degenerative changes, with up to severe canal stenosis and  neural foraminal narrowing most prominent at L4-L5. Multilevel  subarticular recess narrowing, also most  prominent at L4-L5.      Signed by: Rj Espinoza 6/5/2024 11:06 AM  Dictation workstation:   [id number]    Last Labs:  CBC - 6/6/2024:  5:24 AM  5.2 7.9 88    25.7      CMP - 6/6/2024:  5:24 AM  8.8 6.0 51 --- 0.4   3.3 3.4 33 85      PTT - 2/9/2024:  4:59 AM  1.1   11.9 24.6     Inpatient Medications:  Scheduled medications   Medication Dose Route Frequency    amiodarone  200 mg oral Daily    apixaban  2.5 mg oral BID    bisacodyl  10 mg rectal Once    clopidogrel  75 mg oral Daily    levothyroxine  100 mcg oral Daily    metoprolol tartrate  50 mg oral BID    pantoprazole  40 mg oral Daily before breakfast    Or    pantoprazole  40 mg intravenous Daily before breakfast    polyethylene glycol  17 g oral Daily    sertraline  200 mg oral Nightly     Principal Problem:    Weakness    Assessment/Plan   83-year-old female patient with a history of atrial fibrillation, GARY on CKD.  Multiple fall more likely therapy issue  No active chest pain tightness patient currently on Plavix due to recent PCI in February.  Patient currently on Eliquis due to atrial fibrillation.  1.  Atrial fibrillation: Continue current amiodarone.  Also continue Eliquis.  2.  CAD: Continue current Plavix 75 mg once a day.  Also Toprol all [place] tartrate.  3.  Fall: Patient with multiple falls and needs a physical therapy.  Probably better off going to rehab facility for few weeks for optimal functionality..    Pt. care time is spent includes review of diagnostic tests, labs, radiographs, EKGs, old echoes, cardiac work-up and coordination of care. Assessment, impression and plans are reflected in the note above as well as the orders.    Code Status:  DNR and No Intubation  I spent 35 minutes in the professional and overall care of this patient.  Darrius Ibrahim MD

## 2024-06-06 NOTE — CONSULTS
Ortho spine interprofessional consultation  83-year-old woman who was readmitted to the hospital [facility] for episodes of repeated falling.  I have reviewed the electronic medical record.  Patient is ambulatory with physical therapy.  She has intact strength on manual testing.  She has bowel and bladder control.  I have reviewed the images of the cervical spine MRI.  She has a spacious spinal canal with no stenosis or spinal cord deformity or spinal cord signal change  The lumbar spine MRI images are reviewed.  She has a slight degenerative spondylolisthesis at L4-5 with mild narrowing of the spinal canal, but no extreme spinal stenosis or foraminal stenosis.  There is no recent fracture or instability.  There is age related spondylosis of the entire lumbar spine.  I cannot identify any spinal abnormality which would contribute to her repeated episodes of falling.  Furthermore she is not a candidate for any elective surgical procedure on the spine.  I have spent 15 minutes in the review of the medical records, reviewing the images of her scans and preparing this report

## 2024-06-07 VITALS
HEIGHT: 64 IN | SYSTOLIC BLOOD PRESSURE: 110 MMHG | BODY MASS INDEX: 23.07 KG/M2 | OXYGEN SATURATION: 98 % | RESPIRATION RATE: 17 BRPM | HEART RATE: 68 BPM | DIASTOLIC BLOOD PRESSURE: 53 MMHG | WEIGHT: 135.14 LBS | TEMPERATURE: 97.5 F

## 2024-06-07 LAB
ANION GAP SERPL CALC-SCNC: 8 MMOL/L
BUN SERPL-MCNC: 27 MG/DL (ref 8–25)
CALCIUM SERPL-MCNC: 8.7 MG/DL (ref 8.5–10.4)
CHLORIDE SERPL-SCNC: 105 MMOL/L (ref 97–107)
CO2 SERPL-SCNC: 25 MMOL/L (ref 24–31)
CREAT SERPL-MCNC: 1.5 MG/DL (ref 0.4–1.6)
EGFRCR SERPLBLD CKD-EPI 2021: 34 ML/MIN/1.73M*2
ERYTHROCYTE [DISTWIDTH] IN BLOOD BY AUTOMATED COUNT: 16.5 % (ref 11.5–14.5)
GLUCOSE SERPL-MCNC: 100 MG/DL (ref 65–99)
HCT VFR BLD AUTO: 25.8 % (ref 36–46)
HGB BLD-MCNC: 8 G/DL (ref 12–16)
MCH RBC QN AUTO: 27.3 PG (ref 26–34)
MCHC RBC AUTO-ENTMCNC: 31 G/DL (ref 32–36)
MCV RBC AUTO: 88 FL (ref 80–100)
NRBC BLD-RTO: 0 /100 WBCS (ref 0–0)
PLATELET # BLD AUTO: 95 X10*3/UL (ref 150–450)
POTASSIUM SERPL-SCNC: 4.5 MMOL/L (ref 3.4–5.1)
RBC # BLD AUTO: 2.93 X10*6/UL (ref 4–5.2)
SODIUM SERPL-SCNC: 138 MMOL/L (ref 133–145)
WBC # BLD AUTO: 5.4 X10*3/UL (ref 4.4–11.3)

## 2024-06-07 PROCEDURE — 2500000001 HC RX 250 WO HCPCS SELF ADMINISTERED DRUGS (ALT 637 FOR MEDICARE OP): Performed by: INTERNAL MEDICINE

## 2024-06-07 PROCEDURE — 99231 SBSQ HOSP IP/OBS SF/LOW 25: CPT | Performed by: NURSE PRACTITIONER

## 2024-06-07 PROCEDURE — 36415 COLL VENOUS BLD VENIPUNCTURE: CPT | Performed by: NURSE PRACTITIONER

## 2024-06-07 PROCEDURE — 97535 SELF CARE MNGMENT TRAINING: CPT | Mod: GO,CO

## 2024-06-07 PROCEDURE — 85027 COMPLETE CBC AUTOMATED: CPT | Performed by: NURSE PRACTITIONER

## 2024-06-07 PROCEDURE — 80048 BASIC METABOLIC PNL TOTAL CA: CPT | Performed by: NURSE PRACTITIONER

## 2024-06-07 PROCEDURE — 2500000001 HC RX 250 WO HCPCS SELF ADMINISTERED DRUGS (ALT 637 FOR MEDICARE OP): Performed by: NURSE PRACTITIONER

## 2024-06-07 PROCEDURE — 2500000002 HC RX 250 W HCPCS SELF ADMINISTERED DRUGS (ALT 637 FOR MEDICARE OP, ALT 636 FOR OP/ED): Performed by: INTERNAL MEDICINE

## 2024-06-07 PROCEDURE — 97110 THERAPEUTIC EXERCISES: CPT | Mod: GP,CQ

## 2024-06-07 PROCEDURE — 97110 THERAPEUTIC EXERCISES: CPT | Mod: GO,CO

## 2024-06-07 PROCEDURE — 2500000004 HC RX 250 GENERAL PHARMACY W/ HCPCS (ALT 636 FOR OP/ED): Performed by: INTERNAL MEDICINE

## 2024-06-07 PROCEDURE — 97116 GAIT TRAINING THERAPY: CPT | Mod: GP,CQ

## 2024-06-07 RX ORDER — PANTOPRAZOLE SODIUM 40 MG/1
40 TABLET, DELAYED RELEASE ORAL
Start: 2024-06-08

## 2024-06-07 RX ORDER — ACETAMINOPHEN 325 MG/1
650 TABLET ORAL EVERY 4 HOURS PRN
Start: 2024-06-07

## 2024-06-07 RX ADMIN — LEVOTHYROXINE SODIUM 100 MCG: 100 TABLET ORAL at 06:32

## 2024-06-07 RX ADMIN — AMIODARONE HYDROCHLORIDE 200 MG: 200 TABLET ORAL at 09:05

## 2024-06-07 RX ADMIN — PANTOPRAZOLE SODIUM 40 MG: 40 TABLET, DELAYED RELEASE ORAL at 06:32

## 2024-06-07 RX ADMIN — METOPROLOL TARTRATE 50 MG: 50 TABLET, FILM COATED ORAL at 09:05

## 2024-06-07 RX ADMIN — APIXABAN 2.5 MG: 2.5 TABLET, FILM COATED ORAL at 06:32

## 2024-06-07 RX ADMIN — CLOPIDOGREL BISULFATE 75 MG: 75 TABLET ORAL at 09:05

## 2024-06-07 RX ADMIN — POLYETHYLENE GLYCOL 3350 17 G: 17 POWDER, FOR SOLUTION ORAL at 09:05

## 2024-06-07 ASSESSMENT — COGNITIVE AND FUNCTIONAL STATUS - GENERAL
WALKING IN HOSPITAL ROOM: A LITTLE
TOILETING: A LITTLE
DAILY ACTIVITIY SCORE: 20
HELP NEEDED FOR BATHING: A LITTLE
TURNING FROM BACK TO SIDE WHILE IN FLAT BAD: A LITTLE
DRESSING REGULAR UPPER BODY CLOTHING: A LITTLE
CLIMB 3 TO 5 STEPS WITH RAILING: A LOT
DAILY ACTIVITIY SCORE: 20
TURNING FROM BACK TO SIDE WHILE IN FLAT BAD: A LITTLE
DRESSING REGULAR UPPER BODY CLOTHING: A LITTLE
HELP NEEDED FOR BATHING: A LITTLE
DAILY ACTIVITIY SCORE: 20
DRESSING REGULAR LOWER BODY CLOTHING: A LITTLE
STANDING UP FROM CHAIR USING ARMS: A LITTLE
MOVING TO AND FROM BED TO CHAIR: A LITTLE
MOVING FROM LYING ON BACK TO SITTING ON SIDE OF FLAT BED WITH BEDRAILS: A LITTLE
MOVING TO AND FROM BED TO CHAIR: A LITTLE
MOVING FROM LYING ON BACK TO SITTING ON SIDE OF FLAT BED WITH BEDRAILS: A LITTLE
MOVING TO AND FROM BED TO CHAIR: A LITTLE
WALKING IN HOSPITAL ROOM: A LITTLE
DRESSING REGULAR LOWER BODY CLOTHING: A LITTLE
TOILETING: A LITTLE
CLIMB 3 TO 5 STEPS WITH RAILING: A LOT
CLIMB 3 TO 5 STEPS WITH RAILING: A LOT
MOBILITY SCORE: 17
STANDING UP FROM CHAIR USING ARMS: A LITTLE
MOBILITY SCORE: 17
STANDING UP FROM CHAIR USING ARMS: A LITTLE
HELP NEEDED FOR BATHING: A LITTLE
MOVING FROM LYING ON BACK TO SITTING ON SIDE OF FLAT BED WITH BEDRAILS: A LITTLE
TOILETING: A LITTLE
MOBILITY SCORE: 17
DRESSING REGULAR UPPER BODY CLOTHING: A LITTLE
DRESSING REGULAR LOWER BODY CLOTHING: A LITTLE
TURNING FROM BACK TO SIDE WHILE IN FLAT BAD: A LITTLE
WALKING IN HOSPITAL ROOM: A LITTLE

## 2024-06-07 ASSESSMENT — PAIN - FUNCTIONAL ASSESSMENT
PAIN_FUNCTIONAL_ASSESSMENT: 0-10
PAIN_FUNCTIONAL_ASSESSMENT: 0-10

## 2024-06-07 ASSESSMENT — PAIN SCALES - GENERAL
PAINLEVEL_OUTOF10: 3
PAINLEVEL_OUTOF10: 0 - NO PAIN
PAINLEVEL_OUTOF10: 0 - NO PAIN
PAINLEVEL_OUTOF10: 3

## 2024-06-07 NOTE — PROGRESS NOTES
"Physical Therapy    Physical Therapy Treatment    Patient Name: Linette Doyle  MRN: [id number]  Today's Date: 6/7/2024  Time Calculation  Start Time: 1053  Stop Time: 1119  Time Calculation (min): 26 min    Assessment/Plan   PT Assessment  Rehab Prognosis: Good  Barriers to Discharge: Pt lives alone, requires min assist of 1 for mobility. History of multiple falls.  End of Session Communication: PCT/NA/CTA  End of Session Patient Position: Bed, 3 rail up, Alarm on     PT Plan  Treatment/Interventions: Bed mobility, Transfer training, Gait training, Therapeutic exercise, Therapeutic activity  PT Plan: Skilled PT  PT Frequency: 4 times per week  PT Discharge Recommendations: Moderate intensity level of continued care  Equipment Recommended upon Discharge: Wheeled walker  PT Recommended Transfer Status: Assist x1, Assistive device  PT - OK to Discharge: Yes      General Visit Information:   PT  Visit  PT Received On: 06/07/24  General  Prior to Session Communication: Bedside nurse  Patient Position Received: Up in chair, Alarm on  Preferred Learning Style: verbal, visual  General Comment: Pt agreeable to therapy. \"I just want to take a nap\"    Subjective   Precautions:  Precautions  Hearing/Visual Limitations: reading glasses  Medical Precautions: Fall precautions, Oxygen therapy device and L/min (2L Oxygen)  Vital Signs:       Objective   Pain:  Pain Assessment  Pain Assessment: 0-10  Pain Score: 3  Pain Type: Acute pain  Pain Location: Back  Pain Orientation: Lower  Cognition:  Cognition  Insight: Mild  Impulsive: Mildly  Processing Speed: Delayed  Coordination:     Postural Control:     Extremity/Trunk Assessments:    Activity Tolerance:     Treatments:  Therapeutic Exercise  Therapeutic Exercise Activity 1: Pt performed seated bilat LE ankle pumps, heel raises, glute sets, LAQ, hip flexion, renard hip adduction and resisted hip abduction 2 x10 reps each. Rest breaks throughout due to mild fatigue.    Bed Mobility " 1  Bed Mobility 1: Sitting to supine  Level of Assistance 1: Close supervision  Bed Mobility Comments 1: Head of bed elevated. Pt utilizing bedrails. Light min assist with trunk up.  Bed Mobility 2  Bed Mobility  2: Sitting to supine  Level of Assistance 2: Close supervision    Ambulation/Gait Training 1  Surface 1: Level tile  Device 1: Rolling walker  Assistance 1: Minimum assistance  Quality of Gait 1: Diminished heel strike, Decreased step length  Comments/Distance (ft) 1: Pt ambulated with RW and 2L oxygen ~40' x1 and 35' x1 min assist of 1. Pt ambulated at slow pace, verbal cues to stay inside frame of walker. Mild unsteadiness throughout, one small LOB requiring light min assist to correct.  Transfer 1  Transfer From 1: Chair with arms to  Transfer to 1: Stand  Technique 1: Sit to stand  Transfer Device 1: Walker  Transfer Level of Assistance 1: Contact guard  Trials/Comments 1: Verbal cues to push from bed sit to stand. Verbal cues to reach back for bed stand to sit.  Transfers 2  Transfer From 2: Stand to  Transfer to 2: Sit, Bed  Technique 2: Stand to sit  Transfer Level of Assistance 2: Contact guard, Minimal verbal cues  Trials/Comments 2: Verbal cues for position on bed and to reach back for bed/rail.  Transfers 3  Transfer From 3: Chair with arms to  Transfer to 3: Stand  Technique 3: Sit to stand  Transfer Device 3: Walker  Transfer Level of Assistance 3: Minimum assistance  Transfers 4  Transfer From 4: Stand to  Transfer to 4: Sit, Toilet  Technique 4: Stand to sit  Transfer Level of Assistance 4: Minimum assistance, Minimal verbal cues  Trials/Comments 4: Verbal cues to reach for grab bar stand to sit  Transfers 5  Transfer From 5: Toilet to  Transfer to 5: Stand  Technique 5: Sit to stand  Transfer Device 5: Walker  Transfer Level of Assistance 5: Minimum assistance    Outcome Measures:  [facility] Basic Mobility  Turning from your back to your side while in a flat bed without using bedrails: A  little  Moving from lying on your back to sitting on the side of a flat bed without using bedrails: A little  Moving to and from bed to chair (including a wheelchair): A little  Standing up from a chair using your arms (e.g. wheelchair or bedside chair): A little  To walk in hospital room: A little  Climbing 3-5 steps with railing: A lot  Basic Mobility - Total Score: 17    Education Documentation  No documentation found.  Education Comments  No comments found.        OP EDUCATION:       Encounter Problems       Encounter Problems (Active)       Balance       STG - Maintains dynamic standing balance with upper extremity support (Progressing)       Start:  06/04/24    Expected End:  07/02/24       INTERVENTIONS:  1. Practice standing with minimal support.  2. Educate patient about standing tolerance.  3. Educate patient about independence with gait, transfers, and ADL's.  4. Educate patient about use of assistive device.  5. Educate patient about self-directed care.            Mobility       STG - Patient will ambulate 100 ft, FWW, + turns, contact guard of 1 (Progressing)       Start:  06/04/24    Expected End:  07/02/24            pt will ascend/descend 1 step without rail, contact guard of 1 (Progressing)       Start:  06/04/24    Expected End:  07/02/24               PT Transfers       STG - Patient to transfer to and from sit to supine close supervision of 1 via logrolling (Progressing)       Start:  06/04/24    Expected End:  07/02/24            STG - Patient will transfer sit to and from stand close supervision of 1 (Progressing)       Start:  06/04/24    Expected End:  07/02/24               Pain       pt will verbalize no > 2/10 pain during functional mobility (Progressing)       Start:  06/04/24    Expected End:  07/02/24               Safety       pt will verbalize spinal precautions without verbal reminders (Progressing)       Start:  06/04/24    Expected End:  07/02/24

## 2024-06-07 NOTE — DISCHARGE SUMMARY
Discharge Diagnosis  Weakness    Issues Requiring Follow-Up  Falls    Discharge Meds     Your medication list        START taking these medications        Instructions Last Dose Given Next Dose Due   acetaminophen 325 mg tablet  Commonly known as: Tylenol      Take 2 tablets (650 mg) by mouth every 4 hours if needed for mild pain (1 - 3), headaches or fever (temp greater than 38.0 C).       pantoprazole 40 mg EC tablet  Commonly known as: ProtoNix  Start taking on: June 8, 2024      Take 1 tablet (40 mg) by mouth once daily in the morning. Take before meals. Do not crush, chew, or split.              CHANGE how you take these medications        Instructions Last Dose Given Next Dose Due   sertraline 100 mg tablet  Commonly known as: Zoloft  What changed: when to take this      Take 2 tablets (200 mg) by mouth once daily.              CONTINUE taking these medications        Instructions Last Dose Given Next Dose Due   amiodarone 200 mg tablet  Commonly known as: Pacerone      Take 1 tablet (200 mg) by mouth once daily.       apixaban 5 mg tablet  Commonly known as: Eliquis      Take 1 tablet (5 mg) by mouth 2 times a day.       clopidogrel 75 mg tablet  Commonly known as: Plavix      Take 1 tablet (75 mg) by mouth once daily. Do not start before February 15, 2024.       levothyroxine 100 mcg tablet  Commonly known as: Synthroid, Levoxyl      Take 1 tablet (100 mcg) by mouth once daily. 1 tablet in the morning on an empty stomach Orally Once a day for 30 day(s)       metoprolol tartrate 50 mg tablet  Commonly known as: Lopressor      Take 1 tablet by mouth 2 times a day.              STOP taking these medications      atorvastatin 80 mg tablet  Commonly known as: Lipitor        cholecalciferol 50 MCG (2000 UT) tablet  Commonly known as: Vitamin D-3        COLLAGEN PLUS VITAMIN C ORAL        melatonin 5 mg tablet        OCUVITE EYE PLUS MULTI ORAL        omeprazole 20 mg DR capsule  Commonly known as: PriLOSEC         potassium chloride CR 10 mEq ER tablet  Commonly known as: Klor-Con        TYLENOL PM EXTRA STRENGTH ORAL                  Where to Get Your Medications        Information about where to get these medications is not yet available    Ask your nurse or doctor about these medications  acetaminophen 325 mg tablet  pantoprazole 40 mg EC tablet         Test Results Pending At Discharge  Pending Labs       No current pending labs.            Hospital Course  Admitted for Repeated Falls Of unknown etiology. MRI C-spine shows mild stenosis. Lumbar MRI shows multilevel degenerative changes, with severe canal stenosis and neural foraminal narrowing most prominent at L4-L5. Was evaluated by Ortho Spine and no indication for surgery and this is not the cause of falls per Dr. Maxwell. Feels pt is frail and has chronic gait issues. Was also evaluated by Neurology and no further recs given. Pt to follow up outpatient. BP stable and pt has had no orthostasis.    Was evaluated by Cardiology. Pt with Afib and recent cardiac stent. On Plavix and Eliquis  and to continue this. ASA was dc'd and pt is to follow-up with Dr. Ibrahim outpatient. No medical cause has been found for repeated falls. Pt was educated on use of walker along with slow movements. Medically stable for discharge.       Pertinent Physical Exam At Time of Discharge  Physical Exam  HENT:      Head: Normocephalic and atraumatic.      Nose: Nose normal.      Mouth/Throat:      Mouth: Mucous membranes are moist.      Pharynx: Oropharynx is clear.   Eyes:      Extraocular Movements: Extraocular movements intact.      Pupils: Pupils are equal, round, and reactive to light.   Cardiovascular:      Rate and Rhythm: Normal rate and regular rhythm.      Pulses: Normal pulses.   Pulmonary:      Effort: Pulmonary effort is normal.      Breath sounds: Normal breath sounds.   Abdominal:      General: Abdomen is flat. Bowel sounds are normal.      Palpations: Abdomen is soft.    Musculoskeletal:         General: Normal range of motion.   Skin:     General: Skin is warm and dry.      Capillary Refill: Capillary refill takes less than 2 seconds.      Comments: Resolving hematoma posterior scalp   Neurological:      General: No focal deficit present.      Mental Status: She is oriented to person, place, and time.   Psychiatric:         Mood and Affect: Mood normal.         Outpatient Follow-Up  Future Appointments   Date Time Provider Department Center   8/28/2024  2:30 PM Darrius Ibrahim MD [id number] [facility]   9/10/2024 10:30 AM EVARISTO [id number] [facility] [id number] [facility]   12/16/2024 11:30 AM Frida Hines MD [id number] [facility]         JUNG Pacheco-CNP

## 2024-06-07 NOTE — PROGRESS NOTES
Acute kidney injury resolved, creatinine back to baseline, per the nurse the orthostatic vitals were negative. Will sign off at this time, please call back with any questions, thank you. Patient can follow-up with us with a renal function panel in 1 week and office visit in 2 weeks.     Yelena Giang MD

## 2024-06-07 NOTE — NURSING NOTE
Called [facility] at this time. Nurse not available at this time. Gave Pastora a number to give me a call back.

## 2024-06-07 NOTE — PROGRESS NOTES
06/07/24 1013   Discharge Planning   Patient expects to be discharged to: [facility]   Does the patient need discharge transport arranged? Yes   RoundTrip coordination needed? Yes   What day is the transport expected? 06/07/24   What time is the transport expected? 1300     Patient to discharge today at 1 pm to [facility].  Family aware.

## 2024-06-07 NOTE — CARE PLAN
The patient's goals for the shift include  monitor labs and vitals, work with PT/OT, maintain safety, no falls, promote rest, determine discharge plan    The clinical goals for the shift include Monitor labs and VS, work with therapy, maintain safety, no falls, promote rest, determine discharge plan.    Over the shift, the patient did not make progress toward the following goals. Barriers to progression include none. Recommendations to address these barriers include none.      06/07/24 at 12:35 AM - MANJU LOW RN

## 2024-06-07 NOTE — CARE PLAN
The patient's goals for the shift include  understand discharge plan     The clinical goals for the shift include monitor labs and vitals, work with PT/OT, maintain safety, no falls, promote rest, determine discharge plan    Problem: Fall/Injury  Goal: Not fall by end of shift  Outcome: Progressing  Goal: Be free from injury by end of the shift  Outcome: Progressing  Goal: Verbalize understanding of personal risk factors for fall in the hospital  Outcome: Progressing  Goal: Verbalize understanding of risk factor reduction measures to prevent injury from fall in the home  Outcome: Progressing  Goal: Use assistive devices by end of the shift  Outcome: Progressing  Goal: Pace activities to prevent fatigue by end of the shift  Outcome: Progressing     Problem: Safety  Goal: I will remain free of falls  Outcome: Progressing

## 2024-06-10 NOTE — TELEPHONE ENCOUNTER
Pt has called in to request a referral for Home care services Pt was seen at the ER\ED due to a fall she had in the bathroom of the hospital. Pt was discharged from the hospital on the 06.07 and is currently at home.

## 2024-06-19 ENCOUNTER — APPOINTMENT (OUTPATIENT)
Dept: CARDIOLOGY | Facility: CLINIC | Age: 84
End: 2024-06-19
Payer: MEDICARE

## 2024-06-19 VITALS
BODY MASS INDEX: 23.05 KG/M2 | HEART RATE: 58 BPM | TEMPERATURE: 98.6 F | SYSTOLIC BLOOD PRESSURE: 145 MMHG | WEIGHT: 135 LBS | RESPIRATION RATE: 16 BRPM | OXYGEN SATURATION: 97 % | HEIGHT: 64 IN | DIASTOLIC BLOOD PRESSURE: 67 MMHG

## 2024-06-19 DIAGNOSIS — I10 PRIMARY HYPERTENSION: ICD-10-CM

## 2024-06-19 DIAGNOSIS — Z95.0 PRESENCE OF CARDIAC PACEMAKER: Primary | ICD-10-CM

## 2024-06-19 DIAGNOSIS — I25.118 ATHEROSCLEROTIC HEART DISEASE OF NATIVE CORONARY ARTERY WITH OTHER FORMS OF ANGINA PECTORIS (CMS-HCC): ICD-10-CM

## 2024-06-19 DIAGNOSIS — E78.00 HYPERCHOLESTEROLEMIA: ICD-10-CM

## 2024-06-19 DIAGNOSIS — I48.0 PAROXYSMAL A-FIB (MULTI): ICD-10-CM

## 2024-06-19 DIAGNOSIS — R29.6 RECURRENT FALLS: ICD-10-CM

## 2024-06-19 PROCEDURE — 1159F MED LIST DOCD IN RCRD: CPT | Performed by: INTERNAL MEDICINE

## 2024-06-19 PROCEDURE — 3078F DIAST BP <80 MM HG: CPT | Performed by: INTERNAL MEDICINE

## 2024-06-19 PROCEDURE — 1036F TOBACCO NON-USER: CPT | Performed by: INTERNAL MEDICINE

## 2024-06-19 PROCEDURE — 1126F AMNT PAIN NOTED NONE PRSNT: CPT | Performed by: INTERNAL MEDICINE

## 2024-06-19 PROCEDURE — 1111F DSCHRG MED/CURRENT MED MERGE: CPT | Performed by: INTERNAL MEDICINE

## 2024-06-19 PROCEDURE — 3077F SYST BP >= 140 MM HG: CPT | Performed by: INTERNAL MEDICINE

## 2024-06-19 PROCEDURE — 1123F ACP DISCUSS/DSCN MKR DOCD: CPT | Performed by: INTERNAL MEDICINE

## 2024-06-19 PROCEDURE — 99213 OFFICE O/P EST LOW 20 MIN: CPT | Performed by: INTERNAL MEDICINE

## 2024-06-19 ASSESSMENT — PATIENT HEALTH QUESTIONNAIRE - PHQ9
2. FEELING DOWN, DEPRESSED OR HOPELESS: SEVERAL DAYS
1. LITTLE INTEREST OR PLEASURE IN DOING THINGS: NOT AT ALL
10. IF YOU CHECKED OFF ANY PROBLEMS, HOW DIFFICULT HAVE THESE PROBLEMS MADE IT FOR YOU TO DO YOUR WORK, TAKE CARE OF THINGS AT HOME, OR GET ALONG WITH OTHER PEOPLE: SOMEWHAT DIFFICULT
SUM OF ALL RESPONSES TO PHQ9 QUESTIONS 1 AND 2: 1

## 2024-06-19 ASSESSMENT — LIFESTYLE VARIABLES
AUDIT-C TOTAL SCORE: 0
HOW OFTEN DO YOU HAVE A DRINK CONTAINING ALCOHOL: NEVER
HOW MANY STANDARD DRINKS CONTAINING ALCOHOL DO YOU HAVE ON A TYPICAL DAY: PATIENT DOES NOT DRINK
SKIP TO QUESTIONS 9-10: 1
HOW OFTEN DO YOU HAVE SIX OR MORE DRINKS ON ONE OCCASION: NEVER

## 2024-06-19 ASSESSMENT — ENCOUNTER SYMPTOMS
LOSS OF SENSATION IN FEET: 0
OCCASIONAL FEELINGS OF UNSTEADINESS: 1
DEPRESSION: 0

## 2024-06-19 ASSESSMENT — PAIN SCALES - GENERAL: PAINLEVEL: 0-NO PAIN

## 2024-06-19 NOTE — PROGRESS NOTES
Subjective   Chief Complaint   Patient presents with    Hospital Follow-up     Linette Doyle is a/an established patient who presents to the office today for a hospital follow up. They were admitted for falls/weakness. Patient states she has swelling in her lower extremities.      83-year-old female patient with a multiple colon condition.  History of hypertension, hyperlipidemia, sick sinus syndrome with a permanent pacemaker placed also history of CKD stage III..  Patient has also had a PCI in the past currently on the Plavix.  Also patient on Eliquis and on metoprolol for underlying atrial fibrillation.  No active angina or CHF signs symptoms.  Patient also currently on levothyroxine for hypothyroidism.  Currently at the assisted living facility.  Patient had multiple episode of fall due to some spinal stenosis.  So far stable at the moment.  Continue current Eliquis and Plavix for underlying atrial fibrillation and recent PCI.  Aspirin was discontinued due to high risk for fall and multiple episodes of bruising all over.  Patient appears to be comfortable.  No active angina or CHF sign symptoms.    Past Medical History:   Diagnosis Date    Angina pectoris (CMS-HCC) 10/22/2023    Atherosclerosis of coronary artery 08/21/2019    Atherosclerotic heart disease of native coronary artery with other forms of angina pectoris (CMS-[facility]) 10/22/2023    Hypercholesterolemia 12/28/2018    Hyperlipidemia 10/22/2023    Presence of cardiac pacemaker 10/22/2023    Primary hypertension 12/28/2018    Shortness of breath 11/26/2019    Sick sinus syndrome (Multi) 10/22/2023    Sinus bradycardia 10/22/2023    Stage 3a chronic kidney disease (Multi) 11/25/2019     Past Surgical History:   Procedure Laterality Date    CARDIAC CATHETERIZATION N/A 02/02/2024    Procedure: Left Heart Cath;  Surgeon: Becky Vela MD;  Location: [facility] Cardiac Cath Lab;  Service: Cardiovascular;  Laterality: N/A;    CARDIAC CATHETERIZATION N/A 02/02/2024     Procedure: PCI;  Surgeon: Becky Vela MD;  Location: [facility] Cardiac Cath Lab;  Service: Cardiovascular;  Laterality: N/A;    CARDIAC CATHETERIZATION N/A 02/08/2024    Procedure: Left Heart Cath;  Surgeon: Darrius Ibrahim MD;  Location: [facility] Cardiac Cath Lab;  Service: Cardiovascular;  Laterality: N/A;    CARDIAC CATHETERIZATION N/A 02/08/2024    Procedure: PCI;  Surgeon: Darrius Ibrahim MD;  Location: [facility] Cardiac Cath Lab;  Service: Cardiovascular;  Laterality: N/A;    PACEMAKER PLACEMENT  07/04/2019    [employer] PACEMAKE AND STENT PLACEMENT     No relevant family history has been documented for this patient.  Current Outpatient Medications   Medication Sig Dispense Refill    acetaminophen (Tylenol) 325 mg tablet Take 2 tablets (650 mg) by mouth every 4 hours if needed for mild pain (1 - 3), headaches or fever (temp greater than 38.0 C).      apixaban (Eliquis) 5 mg tablet Take 1 tablet (5 mg) by mouth 2 times a day. 180 tablet 3    clopidogrel (Plavix) 75 mg tablet Take 1 tablet (75 mg) by mouth once daily. Do not start before February 15, 2024. 30 tablet 3    levothyroxine (Synthroid, Levoxyl) 100 mcg tablet Take 1 tablet (100 mcg) by mouth once daily. 1 tablet in the morning on an empty stomach Orally Once a day for 30 day(s) 90 tablet 0    metoprolol tartrate (Lopressor) 50 mg tablet Take 1 tablet by mouth 2 times a day. 180 tablet 3    oxygen (O2) gas therapy Inhale 1 each continuously.      pantoprazole (ProtoNix) 40 mg EC tablet Take 1 tablet (40 mg) by mouth once daily in the morning. Take before meals. Do not crush, chew, or split.      sertraline (Zoloft) 100 mg tablet Take 2 tablets (200 mg) by mouth once daily. (Patient taking differently: Take 2 tablets (200 mg) by mouth once daily at bedtime.) 90 tablet 0     No current facility-administered medications for this visit.      reports that she has never smoked. She has never been exposed to tobacco smoke. She has never used smokeless tobacco. She reports  "that she does not drink alcohol and does not use drugs.  Iodinated contrast media  Presence of cardiac pacemaker    Vitals:    06/19/24 1018   BP: 145/67   Pulse: 58   Resp: 16   Temp: 37 °C (98.6 °F)   SpO2: 97%   Weight: 61.2 kg (135 lb)   Height: 1.626 m (5' 4\")   PainSc: 0-No pain      BMI:Body mass index is 23.17 kg/m².   General Cardiology:  General Appearance: Alert, oriented and in no acute distress.  HEENT: extra ocular movements intact (EOMI), pupils equal,  round, reactive to light and accommodation (PERRLA).  Carotid Upstroke: no bruit, normal.  Jugular Venous Distention (JVD): flat.  Chest: normal.  Lungs: Clear to auscultation,   Heart Sounds: no S3 or S4, normal S1, S2, regular rate.  Murmur, Click, Gallop: no systolic murmur.  Abdomen: no hepatomegaly, no masses felt, soft.  Extremities: no leg edema.  Peripheral pulses: 2 plus bilateral.  NEUROLOGY Cranial nerves II-XII grossly intact.     Patient Active Problem List   Diagnosis    Abnormal EKG    Presence of cardiac pacemaker    Neck pain    Contusion of left shoulder    Atherosclerotic heart disease of native coronary artery with other forms of angina pectoris (CMS-HCC)    Coronary arteriosclerosis    Atherosclerosis of coronary artery    Diverticulitis of large intestine    Recurrent falls    Hematoma    Hypothyroidism    Primary hypertension    Hypercholesterolemia    Sick sinus syndrome (Multi)    Sinus bradycardia    Anemia    Hyperkalemia    Left flank pain    Stage 3a chronic kidney disease (Multi)    Arthralgia of wrist    Candidiasis of skin    Common cold    Overweight with body mass index (BMI) 25.0-29.9    Acute diastolic heart failure (Multi)    Paroxysmal A-fib (Multi)    Hypoxemia    Heart murmur    Mitral valve insufficiency    Chronic kidney disease, stage 3a (Multi)    Acute cystitis    Diarrhea of presumed infectious origin    Closed fracture of multiple ribs of left side, initial encounter    Weakness       Problem List Items " Addressed This Visit       Presence of cardiac pacemaker - Primary    Atherosclerotic heart disease of native coronary artery with other forms of angina pectoris (CMS-HCC)    Recurrent falls    Primary hypertension    Hypercholesterolemia    Paroxysmal A-fib (Multi)      83-year-old female patient with history of CAD, atrial fibrillation.  Patient with a history of multiple fall due to imbalance currently in a wheelchair with assisted facility.  1.  Atrial fibrillation: Continue current Eliquis and rate contraindication metoprolol.  2.  Hypertension: Continue current metoprolol.  Blood pressure stable range.  3.  CAD: Patient with a PCI recently therefore continue Plavix for 1 year post PCI.  Continue Plavix and Eliquis.  Not aspirin due to high risk for fall and bleeding.  Will check lipid profile.    Advised patient to avoid lunch meats, canned soups, pizzas, bread rolls, and sandwiches. Advised patient to limit salt intake 1,500 mg daily. Advised patient to exercise 30 mins/3 times a week including treadmill or aerobic type, Goal to achieve 65% target HR.    Diet and exercise reviewed with patient..advice to walk about 10,000 steps or about 2 hours during day time. Cut back on salt, sugar and flour.    Advised patient to check blood pressure twice a day for next 10 days and give us a call if her blood pressure remains above 170 systolic and diastolic above 95.  Meanwhile cut back on salt, caffeine.  If blood pressure persistently stays above 200 systolic then go to nearest emergency room or call 911.    Darrius Ibrahim MD

## 2024-06-21 ENCOUNTER — DOCUMENTATION (OUTPATIENT)
Dept: HOME HEALTH SERVICES | Facility: HOME HEALTH | Age: 84
End: 2024-06-21
Payer: MEDICARE

## 2024-06-21 ENCOUNTER — HOME HEALTH ADMISSION (OUTPATIENT)
Dept: HOME HEALTH SERVICES | Facility: HOME HEALTH | Age: 84
End: 2024-06-21
Payer: MEDICARE

## 2024-06-21 NOTE — HH CARE COORDINATION
Home Care received a Referral for Nursing, Physical Therapy, Occupational Therapy, Home Health Aide, and Speech Language Pathology. We have processed the referral for a Start of Care on 6/23-6/24/24 .     If you have any questions or concerns, please feel free to contact us at [phone]. Follow the prompts, enter your five digit zip code, and you will be directed to your care team on EAST 1.

## 2024-06-25 ENCOUNTER — APPOINTMENT (OUTPATIENT)
Dept: PRIMARY CARE | Facility: CLINIC | Age: 84
End: 2024-06-25
Payer: MEDICARE

## 2024-06-25 ENCOUNTER — HOME CARE VISIT (OUTPATIENT)
Dept: HOME HEALTH SERVICES | Facility: HOME HEALTH | Age: 84
End: 2024-06-25
Payer: MEDICARE

## 2024-06-25 ENCOUNTER — TELEPHONE (OUTPATIENT)
Dept: PRIMARY CARE | Facility: CLINIC | Age: 84
End: 2024-06-25

## 2024-06-25 VITALS
SYSTOLIC BLOOD PRESSURE: 124 MMHG | RESPIRATION RATE: 18 BRPM | DIASTOLIC BLOOD PRESSURE: 70 MMHG | TEMPERATURE: 96.8 F | OXYGEN SATURATION: 97 % | HEART RATE: 60 BPM

## 2024-06-25 DIAGNOSIS — E03.9 HYPOTHYROIDISM, UNSPECIFIED TYPE: ICD-10-CM

## 2024-06-25 PROCEDURE — 1090000002 HH PPS REVENUE DEBIT

## 2024-06-25 PROCEDURE — 169592 NO-PAY CLAIM PROCEDURE

## 2024-06-25 PROCEDURE — G0299 HHS/HOSPICE OF RN EA 15 MIN: HCPCS | Mod: HHH

## 2024-06-25 PROCEDURE — 1090000001 HH PPS REVENUE CREDIT

## 2024-06-25 PROCEDURE — 0023 HH SOC

## 2024-06-25 RX ORDER — LEVOTHYROXINE SODIUM 100 UG/1
100 TABLET ORAL DAILY
Qty: 90 TABLET | Refills: 3 | Status: SHIPPED | OUTPATIENT
Start: 2024-06-25

## 2024-06-25 ASSESSMENT — ENCOUNTER SYMPTOMS
CONSTIPATION: 1
PAIN LOCATION: GENERALIZED
PAIN: 1
APPETITE LEVEL: GOOD
HIGHEST PAIN SEVERITY IN PAST 24 HOURS: 3/10
SHORTNESS OF BREATH: 1

## 2024-06-25 ASSESSMENT — ACTIVITIES OF DAILY LIVING (ADL)
CURRENT_FUNCTION: STAND BY ASSIST
AMBULATION ASSISTANCE: STAND BY ASSIST
OASIS_M1830: 03
ENTERING_EXITING_HOME: MODERATE ASSIST

## 2024-06-25 NOTE — TELEPHONE ENCOUNTER
SHANICE FROM  HOME CARE CALLED TO REQUEST A REFILL OF LEVOTHYROXINE 100 MCG FOR PATIENT SAMUEL VILLALOBOS, BE FORWARDED TO [facility] [place].  PATIENT WAS JUST RELEASED FROM REHAB.  PLEASE ADVISE.

## 2024-06-25 NOTE — TELEPHONE ENCOUNTER
Already sent earlier today by DDC.   [facility]  ENDOCRINOLOGY    Osteoporosis Follow Up 12/15/2019    Sandy Spencer, 1944, [id number]          Reason for visit      1. Osteopenia of multiple sites        History     Sandy Spencer is a very pleasant 75 y.o. old female who presents for follow up.   SUMMARY:  Sandy is here today as a MEG from Dr Olivera. She does have a hx of taking Fosamax for 5 years without difficulty. She has had 4 doses of Prolia. Her current Dexa Scan shows: Since the previous bone density dated  October 25, 2017, there has been no statistically significant  change in the bone density of the spine.  Additionally there has been a 4.3 %  in the left total hip and no statistically significant % change in the right total hip.  Stability and/or an increase in bone mineral density is considered a positive response to therapy. She does walk regularly, as well as use a stationary bike.  Her current Vit D level is 77.5 and Calcium level is 9.         Risk Factors     The following high- risk conditions have been ruled out: celiac disease, eating disorders, gastric bypass, hyperparathyroidism, inflammatory bowel disease, hyperthyroidism, rheumatoid arthritis, lupus, chronic kidney disease.    Sandy Spencer has the following risk factors: Age, Female gender,  and Family history of osteoporosis    She is not on high risk medications such as glucocorticoids, anti-coagulants, anti-convulsants, chemotherapy.    Patient deniesBreast cancer and Family history of breast cancer.      Past Medical History     Patient Active Problem List   Diagnosis     Chronic kidney disease (CKD) stage G3a/A1, moderately decreased glomerular filtration rate (GFR) between 45-59 mL/min/1.73 square meter and albuminuria creatinine ratio less than 30 mg/g (H)     Focal glomerular sclerosis     Anxiety and depression     RSD lower limb, bilateral     ADD (attention deficit disorder)     RSD upper limb, right     Osteopenia     Major depression      Hypertension     Insomnia     Mixed hyperlipidemia     Right rotator cuff tear     Cluster headaches     Lumbar radiculopathy     Stenosis of lateral recess of lumbosacral spine     Temporomandibular joint-pain-dysfunction syndrome (TMJ)     Pancreatitis     Localized swelling of lower leg     Acquired hypothyroidism     Chronic pain syndrome     Snoring     Diarrhea     Abdominal pain, generalized     Dermatochalasis of left eyelid     Bilateral carotid artery stenosis     Coronary artery disease involving native coronary artery of native heart without angina pectoris     Sinus bradycardia     Other chronic pulmonary embolism without acute cor pulmonale (H)     Splenic infarction     Opioid type dependence, continuous (H)     Hematuria     Anemia due to blood loss, acute     Hydronephrosis     Bladder spasms     Hypotension, unspecified hypotension type     Acute reaction to stress     Anxiety disorder due to medical condition     Family relationship problem     History of posttraumatic stress disorder (PTSD)     Generalized muscle weakness     Myofascial pain     Moderate major depression (H)     Elevated lipase     Abdominal bloating     Abdominal distension, gaseous     Acquired absence of other specified parts of digestive tract     Change in bowel habit     Ampullary stenosis     Obstruction of biliary tree     Anemia     Aphthous ulcer of ileum     Ileitis     Bipolar disorder, unspecified (H)     Disorder of phosphorus metabolism     Edema     Gastroesophageal reflux disease without esophagitis     Obstruction of common bile duct     Other and unspecified noninfectious gastroenteritis and colitis     Noninfectious gastroenteritis     Overflow diarrhea     History of partial colectomy     Schizoaffective disorder (H)     Kidney stone     Calculus of kidney     Complex regional pain syndrome       Family History       family history includes Aortic aneurysm in her mother; Heart disease in her father and  "mother; Kidney disease in her father and mother; Stroke in her father.    Social History      reports that she quit smoking about 19 years ago. She has a 20.00 pack-year smoking history. She has never used smokeless tobacco. She reports that she does not drink alcohol or use drugs.      Review of Systems     Patient denies current pain, limited mobility, fractures.   Remainder per HPI.      Vital Signs     /66 (Patient Site: Right Arm, Patient Position: Sitting, Cuff Size: Adult Regular)   Pulse 84   Ht 5' 2\" (1.575 m)   Wt 124 lb (56.2 kg)   BMI 22.68 kg/m      Physical Exam     GENERAL:  Normal, NIRD  EYES:  Pupils equal, round and reactive to light; no proptosis, lid lag or  periorbital edema.  THYROID:  Thyroid is normal.  No tenderness or bruit  NECK: No lymph nodes  MUSCULOSKELETAL: No joint abnormalities, FROM in all four extremities. No kyphosis. Muscle strength grossly normal without evidence of wasting.  HEART:  Regular rate and rhythm without murmur.  LUNGS:  Clear to auscultation.  ABDOMEN:Soft, non-tender, no masses or organomegaly  NEURO:  Patella Reflexes were normal.No tremors  SKIN:  No acanthosis nigricans or vitiligo        Assessment     1. Osteopenia of multiple sites        Plan     Pt will remain on Prolia injections and has her next one scheduled.  She will f/u with me in 1 year, sooner with problems or concerns.       Total visit minutes:25  Time spent counseling and coordination of care:23    [facility]  12/15/2019  9:04 AM      Current Medications     Outpatient Medications Prior to Visit   Medication Sig Dispense Refill     calcium carb/vitamin D3/vit K1 (VIACTIV ORAL) Take 1 tablet by mouth daily.       cyanocobalamin, vitamin B-12, 5,000 mcg Subl Take 1 tablet by mouth.       diclofenac sodium (VOLTAREN) 1 % Gel Apply 4 g topically 4 (four) times a day. (apply to knees Q AM and Q 2pm and prn.  May self-administer) 100 g 5     fentaNYL (DURAGESIC) 75 " mcg/hr Place 1 patch on the skin every other day. 15 patch 0     lamoTRIgine (LAMICTAL) 25 MG tablet One twice a day for two weeks, then two morning and one bedtime 90 tablet 2     levothyroxine (LEVO-T) 50 MCG tablet Take 1 tablet (50 mcg total) by mouth Daily at 6:00 am. 30 tablet 3     MAGNESIUM ORAL Take 400 mg by mouth daily.              methocarbamol (ROBAXIN) 500 MG tablet Take 1 tablet (500 mg total) by mouth 4 (four) times a day. 360 tablet 3     naloxone (NARCAN) 4 mg/actuation nasal spray 1 spray (4 mg dose) into one nostril for opioid reversal. Call 911. May repeat if no response in 3 minutes. 1 Box 0     rosuvastatin (CRESTOR) 20 MG tablet Take 1 tablet (20 mg total) by mouth daily. With one 40 mg tablet to equal 60 mg daily 90 tablet 1     rosuvastatin (CRESTOR) 40 MG tablet Take 1.5 tablets (60 mg total) by mouth at bedtime. 135 tablet 3     SUMAtriptan (IMITREX) 50 MG tablet Take 1 tablet (50 mg total) by mouth every 2 (two) hours as needed for migraine. 27 tablet 1     topiramate (TOPAMAX) 100 MG tablet Take 1 tablet (100 mg total) by mouth 2 (two) times a day. 180 tablet 1     traZODone (DESYREL) 100 MG tablet TAKE 2 TO 4 TABLETS(200  MG) BY MOUTH AT BEDTIME AS NEEDED FOR SLEEP 360 tablet 0     warfarin (COUMADIN/JANTOVEN) 5 MG tablet Take one to two tablets (5 to 10 mg) by mouth daily. Adjust dose based on INR results as directed. 180 tablet 1     acetaminophen (TYLENOL) 500 MG tablet Take 650 mg by mouth 3 (three) times a day.              colestipol (COLESTID) 1 gram tablet Take 2 tablets (2 g total) by mouth daily for 14 days, THEN 2 tablets (2 g total) 2 (two) times a day. 120 tablet 1     enoxaparin (LOVENOX) 60 mg/0.6 mL syringe Inject 0.6 mL (60 mg total) under the skin every 12 (twelve) hours. 10 Syringe 1     ferrous sulfate 325 (65 FE) MG tablet Take 1 tablet (325 mg total) by mouth daily with breakfast. 90 tablet 3     hydrOXYzine pamoate (VISTARIL) 25 MG capsule Take one tab  every  6-8 hours for symptoms of withdrawal 30 capsule 0     Lactobacillus acidoph-L.bulgar (FLORANEX) 1 million cell Tab tablet Take 1 tablet by mouth daily. 30 tablet 0     promethazine (PHENERGAN) 12.5 MG tablet Take 1 tablet (12.5 mg total) by mouth every 6 (six) hours as needed for nausea. 20 tablet 1     Facility-Administered Medications Prior to Visit   Medication Dose Route Frequency Provider Last Rate Last Dose     denosumab 60 mg (PROLIA 60 mg/ml)  60 mg Subcutaneous Q6 Months Andrei Olivera MD   60 mg at 10/11/19 1319         Lab Results     TSH   Date Value Ref Range Status   10/31/2019 0.62 0.30 - 5.00 uIU/mL Final     PTH   Date Value Ref Range Status   07/20/2016 41 10 - 86 pg/mL Final     Calcium   Date Value Ref Range Status   11/27/2019 8.5 8.5 - 10.5 mg/dL Final     Phosphorus   Date Value Ref Range Status   10/12/2017 3.2 2.5 - 4.5 mg/dL Final     Iron   Date Value Ref Range Status   11/27/2019 62 42 - 175 ug/dL Final           Imaging Results   Last DEXA scan:  Results for orders placed in visit on 01/04/19   DXA Bone Density Scan    Narrative 1/15/2019      RE: Sandy Spencer  YOB: 1944        Dear Caitlyn Rees,    Patient Profile:  74 y.o. female, postmenopausal, is here for the follow up bone density   test.   History of fractures - Yes; Both feet. Family history of osteoporosis -   Yes;  mother and sibling.  Family history of hip fracture: None. Smoking   history - Past. Osteoporosis treatment past -  Yes;  HRT and   Bisphosphonates. Osteoporosis treatment current - Yes;  Prolia (On 1   year).  Chronic medical problems - Chronic low back problems,   Hysterectomy, Oophorectomy, Premature menopause and Spine surgery. High   risk medications -  Thyroid;  Yes, Currently.      Assessment:    1. The spine bone density L1-L2 with T-score 1.0.  Degenerative changes   are noted which artifactually elevate the bone mineral density.  2. Femoral bone densities show  left femoral neck T- score -1.6 and right   femoral neck T-score -1.5.  3. Trabecular bone score indicates good trabecular bone architecture.  4.  Left one third radius T score -2.3.    74 y.o. female with LOW BONE DENSITY (OSTEOPENIA) currently receiving   treatment with Prolia therapy.    Since the previous bone density dated  October 25, 2017, there has been no   statistically significant  change in the bone density of the spine.    Additionally there has been a 4.3 %  in the left total hip and no   statistically significant % change in the right total hip.  Stability   and/or an increase in bone mineral density is considered a positive   response to therapy.    Recommendations:  Continuation of current treatment is recommended with follow up bone   density scan in 2 years.      Bone densitometry was performed on your patient using our SpeechVive   densitometer. The results are summarized and a copy of the actual scans   are included for your review. In conformity with the International Society   of Clinical Densitometry's most recent position statement for DXA   interpretation (2015), the diagnosis will be made on the lowest measured   T-score of the lumbar spine, femoral neck, total proximal femur or 33%   radius. Note the change in terminology for diagnostic classification from   OSTEOPENIA to LOW BONE MASS. All trending for sequential exams will be   done using multiple vertebrae or the total proximal femur. Fracture risk   is based on the WHO Fracture Risk Assessment Tool (FRAX). If additional   information is needed or if you would like to discuss the results, please   do not hesitate to call me.       Thank you for referring this patient to [facility] Osteoporosis Services.   We are happy to be of service in support of you and your practice. If you   have any questions or suggestions to improve our service, please call me   at [phone].     Sincerely,     Kimberly Lopez M.D. C.C.LIN.  Osteoporosis  Services, [facility]

## 2024-06-26 PROCEDURE — 1090000002 HH PPS REVENUE DEBIT

## 2024-06-26 PROCEDURE — 1090000001 HH PPS REVENUE CREDIT

## 2024-06-27 PROCEDURE — 1090000002 HH PPS REVENUE DEBIT

## 2024-06-27 PROCEDURE — 1090000001 HH PPS REVENUE CREDIT

## 2024-06-28 ENCOUNTER — HOME CARE VISIT (OUTPATIENT)
Dept: HOME HEALTH SERVICES | Facility: HOME HEALTH | Age: 84
End: 2024-06-28
Payer: MEDICARE

## 2024-06-28 ENCOUNTER — TELEPHONE (OUTPATIENT)
Dept: PRIMARY CARE | Facility: CLINIC | Age: 84
End: 2024-06-28

## 2024-06-28 VITALS
HEART RATE: 65 BPM | RESPIRATION RATE: 18 BRPM | SYSTOLIC BLOOD PRESSURE: 118 MMHG | TEMPERATURE: 97.6 F | OXYGEN SATURATION: 98 % | DIASTOLIC BLOOD PRESSURE: 66 MMHG

## 2024-06-28 VITALS
SYSTOLIC BLOOD PRESSURE: 118 MMHG | OXYGEN SATURATION: 96 % | TEMPERATURE: 97.3 F | HEART RATE: 66 BPM | DIASTOLIC BLOOD PRESSURE: 62 MMHG | RESPIRATION RATE: 18 BRPM

## 2024-06-28 PROCEDURE — G0151 HHCP-SERV OF PT,EA 15 MIN: HCPCS | Mod: HHH

## 2024-06-28 PROCEDURE — G0180 MD CERTIFICATION HHA PATIENT: HCPCS | Performed by: FAMILY MEDICINE

## 2024-06-28 PROCEDURE — 1090000002 HH PPS REVENUE DEBIT

## 2024-06-28 PROCEDURE — 1090000001 HH PPS REVENUE CREDIT

## 2024-06-28 PROCEDURE — G0299 HHS/HOSPICE OF RN EA 15 MIN: HCPCS | Mod: HHH

## 2024-06-28 SDOH — HEALTH STABILITY: PHYSICAL HEALTH: EXERCISE ACTIVITIES SETS: 1

## 2024-06-28 SDOH — ECONOMIC STABILITY: GENERAL

## 2024-06-28 SDOH — HEALTH STABILITY: PHYSICAL HEALTH: PHYSICAL EXERCISE: SITTING

## 2024-06-28 SDOH — HEALTH STABILITY: PHYSICAL HEALTH

## 2024-06-28 SDOH — HEALTH STABILITY: PHYSICAL HEALTH: PHYSICAL EXERCISE: 10

## 2024-06-28 SDOH — HEALTH STABILITY: PHYSICAL HEALTH: EXERCISE ACTIVITY: APS, MARCHING, LAQ, HIP ABD/ADD

## 2024-06-28 ASSESSMENT — ACTIVITIES OF DAILY LIVING (ADL)
AMBULATION_DISTANCE/DURATION_TOLERATED: 50 FEET
CURRENT_FUNCTION: STAND BY ASSIST
AMBULATION ASSISTANCE: STAND BY ASSIST
AMBULATION ASSISTANCE ON FLAT SURFACES: 1

## 2024-06-28 ASSESSMENT — ENCOUNTER SYMPTOMS
PAIN: 1
HIGHEST PAIN SEVERITY IN PAST 24 HOURS: 4/10
OCCASIONAL FEELINGS OF UNSTEADINESS: 1
PAIN LOCATION: RIGHT ARM
APPETITE LEVEL: GOOD

## 2024-06-28 NOTE — TELEPHONE ENCOUNTER
PATIENT HAS AN APPOINTMENT ON 07-02-24 FOR A HOSPITAL DISCHARGE AND SHE THOUGHT IT WAS FOR HER CPE. I ADVISED PATIENT THAT HER LAST CPE WAS 07-12-23, SO SHE'S NOT DUE UNTIL AFTER THAT DATE. PATIENT STATED SHE PAID SOMEONE TO TAKE HER TO THIS APPOINTMENT AND WOULD LIKE TO SWITCH IT TO A CPE. SHE CAN'T DRIVE SO SHE WOULD HAVE TO PAY SOMEONE ELSE FOR ANOTHER APPOINTMENT. PATIENT WOULD LIKE TO KNOW SOON.    PLEASE ADVISE    PATIENT CAN BE REACHED [phone]

## 2024-06-29 PROCEDURE — 1090000001 HH PPS REVENUE CREDIT

## 2024-06-29 PROCEDURE — 1090000002 HH PPS REVENUE DEBIT

## 2024-07-01 ENCOUNTER — LAB (OUTPATIENT)
Dept: LAB | Facility: LAB | Age: 84
End: 2024-07-01
Payer: MEDICARE

## 2024-07-01 ENCOUNTER — HOME CARE VISIT (OUTPATIENT)
Dept: HOME HEALTH SERVICES | Facility: HOME HEALTH | Age: 84
End: 2024-07-01
Payer: MEDICARE

## 2024-07-01 VITALS
RESPIRATION RATE: 18 BRPM | TEMPERATURE: 97.7 F | SYSTOLIC BLOOD PRESSURE: 115 MMHG | OXYGEN SATURATION: 96 % | HEART RATE: 54 BPM | DIASTOLIC BLOOD PRESSURE: 60 MMHG

## 2024-07-01 DIAGNOSIS — N18.32 CHRONIC KIDNEY DISEASE, STAGE 3B (MULTI): ICD-10-CM

## 2024-07-01 DIAGNOSIS — I48.0 PAROXYSMAL A-FIB (MULTI): ICD-10-CM

## 2024-07-01 DIAGNOSIS — N18.31 CHRONIC KIDNEY DISEASE, STAGE 3A (MULTI): ICD-10-CM

## 2024-07-01 LAB
ALBUMIN SERPL-MCNC: 4.4 G/DL (ref 3.5–5)
ANION GAP SERPL CALC-SCNC: 14 MMOL/L
BUN SERPL-MCNC: 26 MG/DL (ref 8–25)
CALCIUM SERPL-MCNC: 9.4 MG/DL (ref 8.5–10.4)
CHLORIDE SERPL-SCNC: 104 MMOL/L (ref 97–107)
CO2 SERPL-SCNC: 25 MMOL/L (ref 24–31)
CREAT SERPL-MCNC: 1.7 MG/DL (ref 0.4–1.6)
EGFRCR SERPLBLD CKD-EPI 2021: 29 ML/MIN/1.73M*2
ERYTHROCYTE [DISTWIDTH] IN BLOOD BY AUTOMATED COUNT: 17.5 % (ref 11.5–14.5)
GLUCOSE SERPL-MCNC: 96 MG/DL (ref 65–99)
HCT VFR BLD AUTO: 34.4 % (ref 36–46)
HGB BLD-MCNC: 10.2 G/DL (ref 12–16)
MCH RBC QN AUTO: 27.2 PG (ref 26–34)
MCHC RBC AUTO-ENTMCNC: 29.7 G/DL (ref 32–36)
MCV RBC AUTO: 92 FL (ref 80–100)
NRBC BLD-RTO: 0 /100 WBCS (ref 0–0)
PHOSPHATE SERPL-MCNC: 3.8 MG/DL (ref 2.5–4.5)
PLATELET # BLD AUTO: 163 X10*3/UL (ref 150–450)
POTASSIUM SERPL-SCNC: 4.6 MMOL/L (ref 3.4–5.1)
PTH-INTACT SERPL-MCNC: 75.2 PG/ML (ref 18.5–88)
RBC # BLD AUTO: 3.75 X10*6/UL (ref 4–5.2)
SODIUM SERPL-SCNC: 143 MMOL/L (ref 133–145)
WBC # BLD AUTO: 7.4 X10*3/UL (ref 4.4–11.3)

## 2024-07-01 PROCEDURE — 36415 COLL VENOUS BLD VENIPUNCTURE: CPT

## 2024-07-01 PROCEDURE — G0299 HHS/HOSPICE OF RN EA 15 MIN: HCPCS | Mod: HHH

## 2024-07-01 PROCEDURE — 80069 RENAL FUNCTION PANEL: CPT

## 2024-07-01 PROCEDURE — 85027 COMPLETE CBC AUTOMATED: CPT

## 2024-07-01 PROCEDURE — 83970 ASSAY OF PARATHORMONE: CPT

## 2024-07-01 SDOH — ECONOMIC STABILITY: GENERAL

## 2024-07-01 ASSESSMENT — ENCOUNTER SYMPTOMS
APPETITE LEVEL: GOOD
PAIN LOCATION: RIGHT ARM
PAIN: 1

## 2024-07-01 ASSESSMENT — ACTIVITIES OF DAILY LIVING (ADL)
AMBULATION ASSISTANCE: STAND BY ASSIST
CURRENT_FUNCTION: STAND BY ASSIST

## 2024-07-02 ENCOUNTER — APPOINTMENT (OUTPATIENT)
Dept: PRIMARY CARE | Facility: CLINIC | Age: 84
End: 2024-07-02
Payer: MEDICARE

## 2024-07-02 VITALS
HEIGHT: 63 IN | SYSTOLIC BLOOD PRESSURE: 124 MMHG | OXYGEN SATURATION: 92 % | BODY MASS INDEX: 24.1 KG/M2 | DIASTOLIC BLOOD PRESSURE: 80 MMHG | HEART RATE: 76 BPM | WEIGHT: 136 LBS

## 2024-07-02 DIAGNOSIS — E03.9 ACQUIRED HYPOTHYROIDISM: ICD-10-CM

## 2024-07-02 DIAGNOSIS — N18.31 CHRONIC KIDNEY DISEASE, STAGE 3A (MULTI): ICD-10-CM

## 2024-07-02 DIAGNOSIS — I10 PRIMARY HYPERTENSION: ICD-10-CM

## 2024-07-02 DIAGNOSIS — I48.0 PAROXYSMAL A-FIB (MULTI): ICD-10-CM

## 2024-07-02 DIAGNOSIS — R29.6 RECURRENT FALLS: ICD-10-CM

## 2024-07-02 DIAGNOSIS — E78.00 HYPERCHOLESTEROLEMIA: ICD-10-CM

## 2024-07-02 DIAGNOSIS — I25.118 ATHEROSCLEROTIC HEART DISEASE OF NATIVE CORONARY ARTERY WITH OTHER FORMS OF ANGINA PECTORIS (CMS-HCC): ICD-10-CM

## 2024-07-02 DIAGNOSIS — Z00.00 ROUTINE GENERAL MEDICAL EXAMINATION AT HEALTH CARE FACILITY: Primary | ICD-10-CM

## 2024-07-02 DIAGNOSIS — I48.0 PAROXYSMAL A-FIB (MULTI): Primary | ICD-10-CM

## 2024-07-02 PROCEDURE — 1158F ADVNC CARE PLAN TLK DOCD: CPT | Performed by: FAMILY MEDICINE

## 2024-07-02 PROCEDURE — 1111F DSCHRG MED/CURRENT MED MERGE: CPT | Performed by: FAMILY MEDICINE

## 2024-07-02 PROCEDURE — 3079F DIAST BP 80-89 MM HG: CPT | Performed by: FAMILY MEDICINE

## 2024-07-02 PROCEDURE — 1123F ACP DISCUSS/DSCN MKR DOCD: CPT | Performed by: FAMILY MEDICINE

## 2024-07-02 PROCEDURE — 1159F MED LIST DOCD IN RCRD: CPT | Performed by: FAMILY MEDICINE

## 2024-07-02 PROCEDURE — G0439 PPPS, SUBSEQ VISIT: HCPCS | Performed by: FAMILY MEDICINE

## 2024-07-02 PROCEDURE — 1126F AMNT PAIN NOTED NONE PRSNT: CPT | Performed by: FAMILY MEDICINE

## 2024-07-02 PROCEDURE — 99213 OFFICE O/P EST LOW 20 MIN: CPT | Performed by: FAMILY MEDICINE

## 2024-07-02 PROCEDURE — 3074F SYST BP LT 130 MM HG: CPT | Performed by: FAMILY MEDICINE

## 2024-07-02 PROCEDURE — 1160F RVW MEDS BY RX/DR IN RCRD: CPT | Performed by: FAMILY MEDICINE

## 2024-07-02 PROCEDURE — 1170F FXNL STATUS ASSESSED: CPT | Performed by: FAMILY MEDICINE

## 2024-07-02 ASSESSMENT — PAIN SCALES - GENERAL: PAINLEVEL: 0-NO PAIN

## 2024-07-02 ASSESSMENT — ACTIVITIES OF DAILY LIVING (ADL)
GROCERY_SHOPPING: INDEPENDENT
BATHING: INDEPENDENT
DRESSING: INDEPENDENT
MANAGING_FINANCES: INDEPENDENT
TAKING_MEDICATION: INDEPENDENT
DOING_HOUSEWORK: INDEPENDENT

## 2024-07-02 ASSESSMENT — ENCOUNTER SYMPTOMS
LOSS OF SENSATION IN FEET: 1
DEPRESSION: 0
OCCASIONAL FEELINGS OF UNSTEADINESS: 1

## 2024-07-02 NOTE — PROGRESS NOTES
"Subjective   Reason for Visit: Linette Doyle is an 83 y.o. female here for a Medicare Wellness visit.     Past Medical, Surgical, and Family History reviewed and updated in chart.         HPI    Patient Care Team:  Maritza Do MD as PCP - General (Family Medicine)  Ziggy Neal MD as Consulting Physician (Hematology and Oncology)  Yelena Giang MD as Consulting Physician (Nephrology)  Darrius Ibrahim MD as Consulting Physician (Cardiology)  Lucinao Figueroa MD as Referring Physician (Cardiology)  [facility] as Pharmacist (Pharmacy)   Linette Presents for Annual Wellness Visit. PMHx, FMHx, SHx, and Social history reviewed and updated in chart.   Hypothyroidism - stable on medication, takes on an empty stomach  Hypertension - stable on current medication, no chest pain or palpitations  Hypercholesterolemia -monitoring diet  Home health coming weekly - RN and PT. Strength is improving.  Very frustrated with her recurrent falls and being alone.  Good support from brother. Seeing Dr. Giang for CKD and Dr. Ibrahim. Diagnosed with mild dementia in hospital but that has frustrated her because she wants to go out alone and her family doesn't want her to. MR spine showed severe lumbar sinal stenosis w/ most prominent narrowing at L4-5.  Despite her severe frustration she states she is not depressed.  She just wants to live the remainder of her life doing what she wants and not listening to everybody telling her that she needs to sit down and relax.  She understands the risk of recurrent falls including the potential for death however she is willing to accept that risk in order to gain her ability to enjoy the rest of her life.    Review of Systems  All other systems have been reviewed and are negative except as noted in the HPI.       Objective   Vitals:  /80   Pulse 76   Ht 1.6 m (5' 3\")   Wt 61.7 kg (136 lb)   LMP  (LMP Unknown)   SpO2 92%   BMI 24.09 kg/m²       Physical Exam  Vitals and " nursing note reviewed.   Constitutional:       General: She is not in acute distress.  HENT:      Right Ear: Tympanic membrane and ear canal normal.      Left Ear: Tympanic membrane and ear canal normal.      Nose: Nose normal.      Mouth/Throat:      Mouth: Mucous membranes are moist.   Eyes:      Extraocular Movements: Extraocular movements intact.      Pupils: Pupils are equal, round, and reactive to light.   Neck:      Vascular: No carotid bruit.   Cardiovascular:      Rate and Rhythm: Normal rate and regular rhythm.   Pulmonary:      Effort: Pulmonary effort is normal.      Breath sounds: Normal breath sounds.   Abdominal:      General: Abdomen is flat.      Palpations: Abdomen is soft.      Tenderness: There is no abdominal tenderness.   Musculoskeletal:         General: Normal range of motion.   Lymphadenopathy:      Cervical: No cervical adenopathy.   Skin:     General: Skin is warm.      Findings: No rash.   Neurological:      General: No focal deficit present.      Mental Status: She is alert.   Psychiatric:         Mood and Affect: Mood normal.         Assessment/Plan   Problem List Items Addressed This Visit       Atherosclerotic heart disease of native coronary artery with other forms of angina pectoris (CMS-HCC)    Current Assessment & Plan     No current chest pains or palpitations.  Continue management per Dr. Ibrahim         Recurrent falls    Current Assessment & Plan     Continue with use of walker.  Again reviewed the risks of injury and potential death related to falls.  She is adamant that she wants to stay at home and live independently.  Her judgment and insight are intact.  Recommend continuing physical therapy and Occupational Therapy to rebuild her muscle strength and decrease her risk of falls.         Hypothyroidism    Current Assessment & Plan     Euthyroid on current dose of levothyroxine. Continue to take on empty stomach.            Primary hypertension    Current Assessment & Plan      Well-controlled.  Continue with metoprolol and DASH diet.         Hypercholesterolemia    Current Assessment & Plan     Stable.  Diet controlled.         Paroxysmal A-fib (Multi)    Current Assessment & Plan     Intermittent.  Eliquis dose reduced to 2.5 mg twice daily secondary to her recurrent falls and risk of bleed         Chronic kidney disease, stage 3a (Multi)    Current Assessment & Plan     Stable.          Other Visit Diagnoses       Routine general medical examination at health care facility    -  Primary    Relevant Orders    1 Year Follow Up In Primary Care - Wellness Exam

## 2024-07-03 ENCOUNTER — HOME CARE VISIT (OUTPATIENT)
Dept: HOME HEALTH SERVICES | Facility: HOME HEALTH | Age: 84
End: 2024-07-03
Payer: MEDICARE

## 2024-07-03 VITALS
TEMPERATURE: 97.3 F | DIASTOLIC BLOOD PRESSURE: 68 MMHG | HEART RATE: 72 BPM | OXYGEN SATURATION: 95 % | RESPIRATION RATE: 18 BRPM | SYSTOLIC BLOOD PRESSURE: 118 MMHG

## 2024-07-03 PROBLEM — D64.9 ANEMIA: Status: RESOLVED | Noted: 2021-03-01 | Resolved: 2024-07-03

## 2024-07-03 PROBLEM — I25.10 CORONARY ARTERIOSCLEROSIS: Status: RESOLVED | Noted: 2023-10-22 | Resolved: 2024-07-03

## 2024-07-03 PROBLEM — M25.539 ARTHRALGIA OF WRIST: Status: RESOLVED | Noted: 2020-06-11 | Resolved: 2024-07-03

## 2024-07-03 PROBLEM — N18.31 STAGE 3A CHRONIC KIDNEY DISEASE (MULTI): Status: RESOLVED | Noted: 2019-11-25 | Resolved: 2024-07-03

## 2024-07-03 PROBLEM — T14.8XXA HEMATOMA: Status: RESOLVED | Noted: 2023-10-22 | Resolved: 2024-07-03

## 2024-07-03 PROBLEM — K57.32 DIVERTICULITIS OF LARGE INTESTINE: Status: RESOLVED | Noted: 2018-09-06 | Resolved: 2024-07-03

## 2024-07-03 PROBLEM — R10.9 LEFT FLANK PAIN: Status: RESOLVED | Noted: 2021-12-13 | Resolved: 2024-07-03

## 2024-07-03 PROBLEM — N30.00 ACUTE CYSTITIS: Status: RESOLVED | Noted: 2024-02-29 | Resolved: 2024-07-03

## 2024-07-03 PROBLEM — I50.31 ACUTE DIASTOLIC HEART FAILURE (MULTI): Status: RESOLVED | Noted: 2024-02-28 | Resolved: 2024-07-03

## 2024-07-03 PROBLEM — B37.2 CANDIDIASIS OF SKIN: Status: RESOLVED | Noted: 2024-01-29 | Resolved: 2024-07-03

## 2024-07-03 PROBLEM — E87.5 HYPERKALEMIA: Status: RESOLVED | Noted: 2021-12-13 | Resolved: 2024-07-03

## 2024-07-03 PROBLEM — S22.42XA CLOSED FRACTURE OF MULTIPLE RIBS OF LEFT SIDE, INITIAL ENCOUNTER: Status: RESOLVED | Noted: 2024-05-09 | Resolved: 2024-07-03

## 2024-07-03 PROBLEM — R94.31 ABNORMAL EKG: Status: RESOLVED | Noted: 2019-04-12 | Resolved: 2024-07-03

## 2024-07-03 PROBLEM — R53.1 WEAKNESS: Status: RESOLVED | Noted: 2024-06-04 | Resolved: 2024-07-03

## 2024-07-03 PROBLEM — J00 COMMON COLD: Status: RESOLVED | Noted: 2023-06-06 | Resolved: 2024-07-03

## 2024-07-03 PROBLEM — R09.02 HYPOXEMIA: Status: RESOLVED | Noted: 2024-04-11 | Resolved: 2024-07-03

## 2024-07-03 PROBLEM — E66.3 OVERWEIGHT WITH BODY MASS INDEX (BMI) 25.0-29.9: Status: RESOLVED | Noted: 2024-01-29 | Resolved: 2024-07-03

## 2024-07-03 PROBLEM — S40.012A CONTUSION OF LEFT SHOULDER: Status: RESOLVED | Noted: 2021-05-18 | Resolved: 2024-07-03

## 2024-07-03 PROBLEM — R01.1 HEART MURMUR: Status: RESOLVED | Noted: 2024-05-07 | Resolved: 2024-07-03

## 2024-07-03 PROBLEM — R19.7 DIARRHEA OF PRESUMED INFECTIOUS ORIGIN: Status: RESOLVED | Noted: 2024-05-07 | Resolved: 2024-07-03

## 2024-07-03 PROBLEM — M54.2 NECK PAIN: Status: RESOLVED | Noted: 2018-09-03 | Resolved: 2024-07-03

## 2024-07-03 PROBLEM — R00.1 SINUS BRADYCARDIA: Status: RESOLVED | Noted: 2023-10-22 | Resolved: 2024-07-03

## 2024-07-03 PROCEDURE — G0151 HHCP-SERV OF PT,EA 15 MIN: HCPCS | Mod: HHH

## 2024-07-03 SDOH — HEALTH STABILITY: PHYSICAL HEALTH: PHYSICAL EXERCISE: SITTING

## 2024-07-03 SDOH — HEALTH STABILITY: PHYSICAL HEALTH: EXERCISE ACTIVITIES SETS: 1

## 2024-07-03 SDOH — HEALTH STABILITY: PHYSICAL HEALTH: PHYSICAL EXERCISE: 10

## 2024-07-03 SDOH — HEALTH STABILITY: PHYSICAL HEALTH

## 2024-07-03 SDOH — HEALTH STABILITY: PHYSICAL HEALTH: EXERCISE ACTIVITY: CALF RAISES, HIP ABD/EXT

## 2024-07-03 SDOH — HEALTH STABILITY: PHYSICAL HEALTH: PHYSICAL EXERCISE: STANDING

## 2024-07-03 SDOH — HEALTH STABILITY: PHYSICAL HEALTH: EXERCISE ACTIVITY: APS, MARCHING, LAQ, HIP ABD/ADD

## 2024-07-03 ASSESSMENT — ENCOUNTER SYMPTOMS: DENIES PAIN: 1

## 2024-07-03 NOTE — ASSESSMENT & PLAN NOTE
Intermittent.  Eliquis dose reduced to 2.5 mg twice daily secondary to her recurrent falls and risk of bleed

## 2024-07-03 NOTE — ASSESSMENT & PLAN NOTE
Continue with use of walker.  Again reviewed the risks of injury and potential death related to falls.  She is adamant that she wants to stay at home and live independently.  Her judgment and insight are intact.  Recommend continuing physical therapy and Occupational Therapy to rebuild her muscle strength and decrease her risk of falls.

## 2024-07-05 ENCOUNTER — TELEPHONE (OUTPATIENT)
Dept: PRIMARY CARE | Facility: CLINIC | Age: 84
End: 2024-07-05

## 2024-07-05 ENCOUNTER — HOME CARE VISIT (OUTPATIENT)
Dept: HOME HEALTH SERVICES | Facility: HOME HEALTH | Age: 84
End: 2024-07-05
Payer: MEDICARE

## 2024-07-05 VITALS
RESPIRATION RATE: 18 BRPM | HEART RATE: 60 BPM | SYSTOLIC BLOOD PRESSURE: 110 MMHG | TEMPERATURE: 97 F | DIASTOLIC BLOOD PRESSURE: 60 MMHG | OXYGEN SATURATION: 95 %

## 2024-07-05 PROCEDURE — G0299 HHS/HOSPICE OF RN EA 15 MIN: HCPCS | Mod: HHH

## 2024-07-05 SDOH — ECONOMIC STABILITY: GENERAL

## 2024-07-05 ASSESSMENT — ENCOUNTER SYMPTOMS
HIGHEST PAIN SEVERITY IN PAST 24 HOURS: 4/10
SHORTNESS OF BREATH: 1
APPETITE LEVEL: GOOD
PAIN: 1
PAIN LOCATION: RIGHT ARM

## 2024-07-05 ASSESSMENT — ACTIVITIES OF DAILY LIVING (ADL)
CURRENT_FUNCTION: STAND BY ASSIST
AMBULATION ASSISTANCE: STAND BY ASSIST

## 2024-07-05 NOTE — TELEPHONE ENCOUNTER
Patient states she received a letter in the mail with a date of 6/28/24 from our office asking her to call to set up home care, but she believes she already has it because someone comes to her house twice a week to give her pills.

## 2024-07-08 ENCOUNTER — TELEPHONE (OUTPATIENT)
Dept: CARDIOLOGY | Facility: CLINIC | Age: 84
End: 2024-07-08
Payer: MEDICARE

## 2024-07-08 NOTE — TELEPHONE ENCOUNTER
Patient called to confirm time for today's appointment.     Called patient back to inform that patient does not have a appointment today and will not be back in office until 08/28/2024 with Dr. Ibrahim.

## 2024-07-09 ENCOUNTER — HOME CARE VISIT (OUTPATIENT)
Dept: HOME HEALTH SERVICES | Facility: HOME HEALTH | Age: 84
End: 2024-07-09
Payer: MEDICARE

## 2024-07-09 VITALS
HEART RATE: 64 BPM | OXYGEN SATURATION: 98 % | TEMPERATURE: 96.6 F | RESPIRATION RATE: 24 BRPM | SYSTOLIC BLOOD PRESSURE: 142 MMHG | DIASTOLIC BLOOD PRESSURE: 88 MMHG | BODY MASS INDEX: 24.71 KG/M2 | WEIGHT: 139.5 LBS

## 2024-07-09 PROCEDURE — G0300 HHS/HOSPICE OF LPN EA 15 MIN: HCPCS | Mod: HHH

## 2024-07-09 ASSESSMENT — ENCOUNTER SYMPTOMS
APPETITE LEVEL: GOOD
PAIN: 1
HIGHEST PAIN SEVERITY IN PAST 24 HOURS: 8/10
PAIN SEVERITY GOAL: 0/10
PAIN LOCATION: LEFT ARM
CHANGE IN APPETITE: UNCHANGED
SHORTNESS OF BREATH: 1
PAIN LOCATION - RELIEVING FACTORS: REST
DYSPNEA ACTIVITY LEVEL: AFTER AMBULATING LESS THAN 10 FT
PAIN LOCATION - PAIN SEVERITY: 0/10
SUBJECTIVE PAIN PROGRESSION: WAXING AND WANING
PAIN LOCATION - EXACERBATING FACTORS: MOVEMENT
PAIN LOCATION - PAIN FREQUENCY: INTERMITTENT
LOWEST PAIN SEVERITY IN PAST 24 HOURS: 0/10

## 2024-07-10 ENCOUNTER — HOME CARE VISIT (OUTPATIENT)
Dept: HOME HEALTH SERVICES | Facility: HOME HEALTH | Age: 84
End: 2024-07-10
Payer: MEDICARE

## 2024-07-10 VITALS
OXYGEN SATURATION: 95 % | SYSTOLIC BLOOD PRESSURE: 124 MMHG | HEART RATE: 82 BPM | TEMPERATURE: 97.7 F | DIASTOLIC BLOOD PRESSURE: 60 MMHG | RESPIRATION RATE: 18 BRPM

## 2024-07-10 PROCEDURE — G0151 HHCP-SERV OF PT,EA 15 MIN: HCPCS | Mod: HHH

## 2024-07-10 SDOH — HEALTH STABILITY: PHYSICAL HEALTH: EXERCISE ACTIVITIES SETS: 1

## 2024-07-10 SDOH — HEALTH STABILITY: PHYSICAL HEALTH: EXERCISE ACTIVITY: APS, MARCHING, LAQ, HIP ABD/ADD

## 2024-07-10 SDOH — HEALTH STABILITY: PHYSICAL HEALTH

## 2024-07-10 SDOH — HEALTH STABILITY: PHYSICAL HEALTH: PHYSICAL EXERCISE: 10

## 2024-07-10 SDOH — HEALTH STABILITY: PHYSICAL HEALTH: PHYSICAL EXERCISE: SITTING

## 2024-07-10 SDOH — HEALTH STABILITY: PHYSICAL HEALTH: EXERCISE ACTIVITY: CALF RAISES, HIP FLEX/EXT/ABD, HS CURL, MARCHING, MINI SQUATS

## 2024-07-10 SDOH — HEALTH STABILITY: PHYSICAL HEALTH: PHYSICAL EXERCISE: STANDING

## 2024-07-10 ASSESSMENT — ENCOUNTER SYMPTOMS: DENIES PAIN: 1

## 2024-07-12 ENCOUNTER — HOME CARE VISIT (OUTPATIENT)
Dept: HOME HEALTH SERVICES | Facility: HOME HEALTH | Age: 84
End: 2024-07-12
Payer: MEDICARE

## 2024-07-12 VITALS
RESPIRATION RATE: 18 BRPM | TEMPERATURE: 97.2 F | DIASTOLIC BLOOD PRESSURE: 80 MMHG | HEART RATE: 62 BPM | SYSTOLIC BLOOD PRESSURE: 122 MMHG | OXYGEN SATURATION: 94 %

## 2024-07-12 PROCEDURE — G0299 HHS/HOSPICE OF RN EA 15 MIN: HCPCS | Mod: HHH

## 2024-07-12 SDOH — ECONOMIC STABILITY: GENERAL

## 2024-07-12 ASSESSMENT — ENCOUNTER SYMPTOMS
DENIES PAIN: 1
APPETITE LEVEL: GOOD

## 2024-07-12 ASSESSMENT — ACTIVITIES OF DAILY LIVING (ADL)
CURRENT_FUNCTION: STAND BY ASSIST
MONEY MANAGEMENT (EXPENSES/BILLS): NEEDS ASSISTANCE
AMBULATION ASSISTANCE: STAND BY ASSIST

## 2024-07-16 ENCOUNTER — HOME CARE VISIT (OUTPATIENT)
Dept: HOME HEALTH SERVICES | Facility: HOME HEALTH | Age: 84
End: 2024-07-16
Payer: MEDICARE

## 2024-07-16 VITALS
DIASTOLIC BLOOD PRESSURE: 70 MMHG | TEMPERATURE: 97.5 F | RESPIRATION RATE: 18 BRPM | HEART RATE: 60 BPM | SYSTOLIC BLOOD PRESSURE: 140 MMHG | OXYGEN SATURATION: 94 %

## 2024-07-16 PROCEDURE — G0299 HHS/HOSPICE OF RN EA 15 MIN: HCPCS | Mod: HHH

## 2024-07-16 SDOH — ECONOMIC STABILITY: GENERAL

## 2024-07-16 ASSESSMENT — ENCOUNTER SYMPTOMS
DENIES PAIN: 1
APPETITE LEVEL: GOOD

## 2024-07-16 NOTE — TELEPHONE ENCOUNTER
Patient called again  [phone] from message, she did not hear back, wants to know if Dr Do ordered home care for her , she received a letter a from [facility] [phone] to contact her PCP for home care services bur is already receiving home care, told her Dr Do is out this week

## 2024-07-17 ENCOUNTER — HOME CARE VISIT (OUTPATIENT)
Dept: HOME HEALTH SERVICES | Facility: HOME HEALTH | Age: 84
End: 2024-07-17
Payer: MEDICARE

## 2024-07-17 VITALS
DIASTOLIC BLOOD PRESSURE: 70 MMHG | RESPIRATION RATE: 18 BRPM | TEMPERATURE: 97.6 F | OXYGEN SATURATION: 98 % | HEART RATE: 72 BPM | SYSTOLIC BLOOD PRESSURE: 128 MMHG

## 2024-07-17 PROCEDURE — G0151 HHCP-SERV OF PT,EA 15 MIN: HCPCS | Mod: HHH

## 2024-07-17 SDOH — HEALTH STABILITY: PHYSICAL HEALTH: EXERCISE ACTIVITY: CALF RAISES, HIP FLEX/EXT/ABD, HS CURL, MARCHING, MINI SQUATS

## 2024-07-17 SDOH — HEALTH STABILITY: PHYSICAL HEALTH: PHYSICAL EXERCISE: STANDING

## 2024-07-17 SDOH — HEALTH STABILITY: PHYSICAL HEALTH: PHYSICAL EXERCISE: SITTING

## 2024-07-17 SDOH — HEALTH STABILITY: PHYSICAL HEALTH

## 2024-07-17 SDOH — HEALTH STABILITY: PHYSICAL HEALTH: PHYSICAL EXERCISE: 10

## 2024-07-17 SDOH — HEALTH STABILITY: PHYSICAL HEALTH: EXERCISE ACTIVITIES SETS: 1

## 2024-07-17 SDOH — HEALTH STABILITY: PHYSICAL HEALTH: EXERCISE ACTIVITY: APS, MARCHING, LAQ, HIP ABD/ADD

## 2024-07-17 ASSESSMENT — ACTIVITIES OF DAILY LIVING (ADL)
AMBULATION_DISTANCE/DURATION_TOLERATED: 150 FEET
AMBULATION ASSISTANCE ON FLAT SURFACES: 1

## 2024-07-17 ASSESSMENT — ENCOUNTER SYMPTOMS
DENIES PAIN: 1
OCCASIONAL FEELINGS OF UNSTEADINESS: 1

## 2024-07-23 ENCOUNTER — HOME CARE VISIT (OUTPATIENT)
Dept: HOME HEALTH SERVICES | Facility: HOME HEALTH | Age: 84
End: 2024-07-23
Payer: MEDICARE

## 2024-07-23 VITALS
TEMPERATURE: 98 F | RESPIRATION RATE: 18 BRPM | DIASTOLIC BLOOD PRESSURE: 62 MMHG | SYSTOLIC BLOOD PRESSURE: 124 MMHG | OXYGEN SATURATION: 97 % | HEART RATE: 68 BPM

## 2024-07-23 VITALS
HEART RATE: 60 BPM | TEMPERATURE: 97.7 F | OXYGEN SATURATION: 95 % | RESPIRATION RATE: 18 BRPM | DIASTOLIC BLOOD PRESSURE: 70 MMHG | SYSTOLIC BLOOD PRESSURE: 128 MMHG

## 2024-07-23 DIAGNOSIS — I25.118 ATHEROSCLEROTIC HEART DISEASE OF NATIVE CORONARY ARTERY WITH OTHER FORMS OF ANGINA PECTORIS (CMS-HCC): ICD-10-CM

## 2024-07-23 PROCEDURE — G0299 HHS/HOSPICE OF RN EA 15 MIN: HCPCS | Mod: HHH

## 2024-07-23 PROCEDURE — G0151 HHCP-SERV OF PT,EA 15 MIN: HCPCS | Mod: HHH

## 2024-07-23 RX ORDER — CLOPIDOGREL BISULFATE 75 MG/1
75 TABLET ORAL DAILY
Qty: 90 TABLET | Refills: 1 | Status: SHIPPED | OUTPATIENT
Start: 2024-07-23 | End: 2025-01-19

## 2024-07-23 SDOH — HEALTH STABILITY: PHYSICAL HEALTH: PHYSICAL EXERCISE: STANDING

## 2024-07-23 SDOH — HEALTH STABILITY: PHYSICAL HEALTH: EXERCISE ACTIVITY: CALF RAISES, HIP FLEX/EXT/ABD, HS CURL, MARCHING, MINI SQUATS

## 2024-07-23 SDOH — HEALTH STABILITY: PHYSICAL HEALTH

## 2024-07-23 SDOH — HEALTH STABILITY: PHYSICAL HEALTH: EXERCISE ACTIVITIES SETS: 1

## 2024-07-23 SDOH — HEALTH STABILITY: PHYSICAL HEALTH: PHYSICAL EXERCISE: SITTING

## 2024-07-23 SDOH — HEALTH STABILITY: PHYSICAL HEALTH: PHYSICAL EXERCISE: 10

## 2024-07-23 SDOH — HEALTH STABILITY: PHYSICAL HEALTH: EXERCISE ACTIVITY: APS, MARCHING, LAQ, HIP ABD/ADD

## 2024-07-23 ASSESSMENT — ENCOUNTER SYMPTOMS
DENIES PAIN: 1
APPETITE LEVEL: GOOD
DENIES PAIN: 1

## 2024-07-23 ASSESSMENT — ACTIVITIES OF DAILY LIVING (ADL)
CURRENT_FUNCTION: STAND BY ASSIST
AMBULATION ASSISTANCE: STAND BY ASSIST

## 2024-07-30 ENCOUNTER — HOME CARE VISIT (OUTPATIENT)
Dept: HOME HEALTH SERVICES | Facility: HOME HEALTH | Age: 84
End: 2024-07-30
Payer: MEDICARE

## 2024-07-30 ENCOUNTER — TELEPHONE (OUTPATIENT)
Dept: PRIMARY CARE | Facility: CLINIC | Age: 84
End: 2024-07-30
Payer: MEDICARE

## 2024-07-30 VITALS
OXYGEN SATURATION: 93 % | RESPIRATION RATE: 18 BRPM | HEART RATE: 62 BPM | DIASTOLIC BLOOD PRESSURE: 80 MMHG | TEMPERATURE: 97.5 F | SYSTOLIC BLOOD PRESSURE: 122 MMHG

## 2024-07-30 PROCEDURE — G0299 HHS/HOSPICE OF RN EA 15 MIN: HCPCS | Mod: HHH

## 2024-07-30 SDOH — ECONOMIC STABILITY: GENERAL

## 2024-07-30 ASSESSMENT — ACTIVITIES OF DAILY LIVING (ADL)
AMBULATION ASSISTANCE: STAND BY ASSIST
CURRENT_FUNCTION: STAND BY ASSIST
MONEY MANAGEMENT (EXPENSES/BILLS): NEEDS ASSISTANCE

## 2024-07-30 ASSESSMENT — ENCOUNTER SYMPTOMS
DENIES PAIN: 1
LOWER EXTREMITY EDEMA: 1
APPETITE LEVEL: GOOD

## 2024-07-30 NOTE — TELEPHONE ENCOUNTER
PAP sent her the Eloquis 5mg.  Patient's dose was changed to 2.5mg bid due to recurrent falls and risk of bleeding during her last hospital stay.  Please update PAP and have them send her correct dosing. Chart was already updated to correct dosing.

## 2024-07-31 ENCOUNTER — HOME CARE VISIT (OUTPATIENT)
Dept: HOME HEALTH SERVICES | Facility: HOME HEALTH | Age: 84
End: 2024-07-31
Payer: MEDICARE

## 2024-07-31 VITALS
DIASTOLIC BLOOD PRESSURE: 72 MMHG | RESPIRATION RATE: 16 BRPM | OXYGEN SATURATION: 94 % | TEMPERATURE: 97.5 F | HEART RATE: 81 BPM | SYSTOLIC BLOOD PRESSURE: 124 MMHG

## 2024-07-31 PROCEDURE — G0151 HHCP-SERV OF PT,EA 15 MIN: HCPCS | Mod: HHH

## 2024-07-31 SDOH — HEALTH STABILITY: PHYSICAL HEALTH: PHYSICAL EXERCISE: SITTING

## 2024-07-31 SDOH — HEALTH STABILITY: PHYSICAL HEALTH: EXERCISE ACTIVITY: APS, MARCHING, LAQ, HIP ABD/ADD

## 2024-07-31 SDOH — HEALTH STABILITY: PHYSICAL HEALTH: EXERCISE ACTIVITY: CALF RAISES, HIP FLEX/EXT/ABD, HS CURL, MARCHING, MINI SQUATS

## 2024-07-31 SDOH — HEALTH STABILITY: PHYSICAL HEALTH: PHYSICAL EXERCISE: 10

## 2024-07-31 SDOH — HEALTH STABILITY: PHYSICAL HEALTH: EXERCISE ACTIVITIES SETS: 1

## 2024-07-31 SDOH — HEALTH STABILITY: PHYSICAL HEALTH: PHYSICAL EXERCISE: STANDING

## 2024-07-31 SDOH — HEALTH STABILITY: PHYSICAL HEALTH

## 2024-07-31 ASSESSMENT — ENCOUNTER SYMPTOMS: DENIES PAIN: 1

## 2024-08-06 ENCOUNTER — HOME CARE VISIT (OUTPATIENT)
Dept: HOME HEALTH SERVICES | Facility: HOME HEALTH | Age: 84
End: 2024-08-06
Payer: MEDICARE

## 2024-08-07 ENCOUNTER — DOCUMENTATION (OUTPATIENT)
Dept: PRIMARY CARE | Facility: CLINIC | Age: 84
End: 2024-08-07
Payer: MEDICARE

## 2024-08-07 ENCOUNTER — HOME CARE VISIT (OUTPATIENT)
Dept: HOME HEALTH SERVICES | Facility: HOME HEALTH | Age: 84
End: 2024-08-07
Payer: MEDICARE

## 2024-08-07 ENCOUNTER — PATIENT OUTREACH (OUTPATIENT)
Dept: PRIMARY CARE | Facility: CLINIC | Age: 84
End: 2024-08-07
Payer: MEDICARE

## 2024-08-07 PROCEDURE — G0151 HHCP-SERV OF PT,EA 15 MIN: HCPCS | Mod: HHH

## 2024-08-07 SDOH — ECONOMIC STABILITY: HOUSING INSECURITY: HOME SAFETY: COGNITIVE IMPAIRMENT

## 2024-08-07 ASSESSMENT — ENCOUNTER SYMPTOMS
PERSON REPORTING PAIN: PATIENT
DENIES PAIN: 1

## 2024-08-07 ASSESSMENT — ACTIVITIES OF DAILY LIVING (ADL)
AMBULATION_DISTANCE/DURATION_TOLERATED: FACILITY
AMBULATION ASSISTANCE ON FLAT SURFACES: 1
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
## 2024-08-08 ENCOUNTER — HOME CARE VISIT (OUTPATIENT)
Dept: HOME HEALTH SERVICES | Facility: HOME HEALTH | Age: 84
End: 2024-08-08
Payer: MEDICARE

## 2024-08-09 ENCOUNTER — HOME CARE VISIT (OUTPATIENT)
Dept: HOME HEALTH SERVICES | Facility: HOME HEALTH | Age: 84
End: 2024-08-09
Payer: MEDICARE

## 2024-08-09 PROCEDURE — G0151 HHCP-SERV OF PT,EA 15 MIN: HCPCS | Mod: HHH

## 2024-08-09 PROCEDURE — G0152 HHCP-SERV OF OT,EA 15 MIN: HCPCS | Mod: HHH

## 2024-08-09 SDOH — ECONOMIC STABILITY: HOUSING INSECURITY: HOME SAFETY: PATIENT REPORTED SHE FEELS LIKE SHE CAN WALK WITHOUT HER WALKER.

## 2024-08-09 ASSESSMENT — ACTIVITIES OF DAILY LIVING (ADL)
AMBULATION ASSISTANCE ON FLAT SURFACES: 1
AMBULATION_DISTANCE/DURATION_TOLERATED: FACILITY

## 2024-08-09 ASSESSMENT — ENCOUNTER SYMPTOMS
PERSON REPORTING PAIN: PATIENT
DENIES PAIN: 1

## 2024-08-12 ENCOUNTER — HOME CARE VISIT (OUTPATIENT)
Dept: HOME HEALTH SERVICES | Facility: HOME HEALTH | Age: 84
End: 2024-08-12
Payer: MEDICARE

## 2024-08-12 PROCEDURE — G0151 HHCP-SERV OF PT,EA 15 MIN: HCPCS | Mod: HHH

## 2024-08-12 SDOH — ECONOMIC STABILITY: HOUSING INSECURITY: HOME SAFETY: NO CHANGE

## 2024-08-12 ASSESSMENT — ACTIVITIES OF DAILY LIVING (ADL)
AMBULATION_DISTANCE/DURATION_TOLERATED: FACILITY
AMBULATION ASSISTANCE ON FLAT SURFACES: 1

## 2024-08-12 ASSESSMENT — ENCOUNTER SYMPTOMS
PAIN: 1
PERSON REPORTING PAIN: PATIENT

## 2024-08-13 ENCOUNTER — HOME CARE VISIT (OUTPATIENT)
Dept: HOME HEALTH SERVICES | Facility: HOME HEALTH | Age: 84
End: 2024-08-13
Payer: MEDICARE

## 2024-08-13 PROCEDURE — G0152 HHCP-SERV OF OT,EA 15 MIN: HCPCS | Mod: HHH

## 2024-08-13 ASSESSMENT — ENCOUNTER SYMPTOMS
PAIN: 1
SUBJECTIVE PAIN PROGRESSION: WAXING AND WANING
PAIN LOCATION: LEFT ARM
PAIN LOCATION: RIGHT ARM
PERSON REPORTING PAIN: PATIENT

## 2024-08-14 ENCOUNTER — HOME CARE VISIT (OUTPATIENT)
Dept: HOME HEALTH SERVICES | Facility: HOME HEALTH | Age: 84
End: 2024-08-14
Payer: MEDICARE

## 2024-08-14 VITALS
OXYGEN SATURATION: 97 % | SYSTOLIC BLOOD PRESSURE: 140 MMHG | DIASTOLIC BLOOD PRESSURE: 72 MMHG | HEART RATE: 64 BPM | RESPIRATION RATE: 12 BRPM | TEMPERATURE: 97.2 F

## 2024-08-14 PROCEDURE — G0151 HHCP-SERV OF PT,EA 15 MIN: HCPCS | Mod: HHH

## 2024-08-14 PROCEDURE — G0299 HHS/HOSPICE OF RN EA 15 MIN: HCPCS | Mod: HHH

## 2024-08-14 PROCEDURE — G0153 HHCP-SVS OF S/L PATH,EA 15MN: HCPCS | Mod: HHH

## 2024-08-14 SDOH — ECONOMIC STABILITY: GENERAL

## 2024-08-14 SDOH — ECONOMIC STABILITY: HOUSING INSECURITY: HOME SAFETY: PATIENT LACKS COGNITIVE ABILITY TO CARE FOR HERSELF

## 2024-08-14 ASSESSMENT — ENCOUNTER SYMPTOMS
SUBJECTIVE PAIN PROGRESSION: UNCHANGED
PAIN LOCATION - PAIN FREQUENCY: INTERMITTENT
PAIN LOCATION - PAIN FREQUENCY: INTERMITTENT
MUSCLE WEAKNESS: 1
PAIN LOCATION - EXACERBATING FACTORS: ARTHRITIS
FATIGUE: 1
PAIN LOCATION - PAIN SEVERITY: 5/10
PAIN LOCATION: LEFT KNEE
DESCRIPTION OF MEMORY LOSS: LONG TERM
PAIN SEVERITY GOAL: 1/10
PAIN LOCATION - RELIEVING FACTORS: MEDICATION, REST
FATIGUES EASILY: 1
DENIES PAIN: 1
PAIN LOCATION - PAIN SEVERITY: 5/10
PAIN LOCATION - EXACERBATING FACTORS: ARTHRITIS
PAIN LOCATION: RIGHT KNEE
PAIN LOCATION - PAIN DURATION: VARIES
APPETITE LEVEL: FAIR
DESCRIPTION OF MEMORY LOSS: SHORT TERM
PAIN LOCATION - RELIEVING FACTORS: MEDICATION, REST
PERSON REPORTING PAIN: PATIENT
PAIN LOCATION - PAIN QUALITY: ACHING, THROBBING
HIGHEST PAIN SEVERITY IN PAST 24 HOURS: 5/10
PAIN LOCATION - PAIN QUALITY: ACHING, THROBBING
PAIN: 1
CHANGE IN APPETITE: UNCHANGED
PERSON REPORTING PAIN: PATIENT
DIZZINESS: 1
LOWEST PAIN SEVERITY IN PAST 24 HOURS: 3/10
FORGETFULNESS: 1
PAIN LOCATION - PAIN DURATION: VARIES
DESCRIPTION OF MEMORY LOSS: IMMEDIATE

## 2024-08-14 ASSESSMENT — ACTIVITIES OF DAILY LIVING (ADL)
MONEY MANAGEMENT (EXPENSES/BILLS): TOTALLY DEPENDENT
AMBULATION ASSISTANCE: 1
AMBULATION_DISTANCE/DURATION_TOLERATED: FACILITY
AMBULATION ASSISTANCE ON FLAT SURFACES: 1
AMBULATION ASSISTANCE: STAND BY ASSIST

## 2024-08-14 NOTE — CASE COMMUNICATION
Pt seen for skilled ST evaluation. Pt presents with severe cognitive linguistic deficits demonstrated by score of 6 on SLUMS. Last cognitive test in chart was the BIMS, administered at the [facility] on 6/25/24. At that time pt scored a 15/15 indicating normal cognitive function. Pt states that her memory is fine and appears hesistant to participate in speech therapy. Pt agreed to participate in therapy sessions to improve memory. Pt recently  relocated to assisted living facility which may be contributing to cognitive deficits. ST recommended 2wk4.

## 2024-08-16 ENCOUNTER — HOME CARE VISIT (OUTPATIENT)
Dept: HOME HEALTH SERVICES | Facility: HOME HEALTH | Age: 84
End: 2024-08-16
Payer: MEDICARE

## 2024-08-16 PROCEDURE — G0153 HHCP-SVS OF S/L PATH,EA 15MN: HCPCS | Mod: HHH

## 2024-08-16 PROCEDURE — G0151 HHCP-SERV OF PT,EA 15 MIN: HCPCS | Mod: HHH

## 2024-08-16 SDOH — ECONOMIC STABILITY: HOUSING INSECURITY: HOME SAFETY: NO CHANGE

## 2024-08-16 ASSESSMENT — ENCOUNTER SYMPTOMS
PERSON REPORTING PAIN: PATIENT
DENIES PAIN: 1

## 2024-08-16 ASSESSMENT — ACTIVITIES OF DAILY LIVING (ADL)
AMBULATION ASSISTANCE ON FLAT SURFACES: 1
OASIS_M1830: 03
AMBULATION_DISTANCE/DURATION_TOLERATED: HOUSEHOLD
HOME_HEALTH_OASIS: 01

## 2024-08-20 ENCOUNTER — HOSPITAL ENCOUNTER (EMERGENCY)
Facility: HOSPITAL | Age: 84
Discharge: HOME | End: 2024-08-20
Attending: EMERGENCY MEDICINE
Payer: MEDICARE

## 2024-08-20 ENCOUNTER — APPOINTMENT (OUTPATIENT)
Dept: RADIOLOGY | Facility: HOSPITAL | Age: 84
End: 2024-08-20
Payer: MEDICARE

## 2024-08-20 VITALS
DIASTOLIC BLOOD PRESSURE: 96 MMHG | HEART RATE: 59 BPM | SYSTOLIC BLOOD PRESSURE: 185 MMHG | OXYGEN SATURATION: 97 % | RESPIRATION RATE: 16 BRPM | TEMPERATURE: 97.7 F | WEIGHT: 147.27 LBS | HEIGHT: 64 IN | BODY MASS INDEX: 25.14 KG/M2
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
## 2024-08-20 PROCEDURE — 70450 CT HEAD/BRAIN W/O DYE: CPT | Performed by: STUDENT IN AN ORGANIZED HEALTH CARE EDUCATION/TRAINING PROGRAM

## 2024-08-20 PROCEDURE — 70450 CT HEAD/BRAIN W/O DYE: CPT

## 2024-08-20 PROCEDURE — 72125 CT NECK SPINE W/O DYE: CPT

## 2024-08-20 PROCEDURE — G0390 TRAUMA RESPONS W/HOSP CRITI: HCPCS

## 2024-08-20 PROCEDURE — 73560 X-RAY EXAM OF KNEE 1 OR 2: CPT | Mod: LEFT SIDE | Performed by: STUDENT IN AN ORGANIZED HEALTH CARE EDUCATION/TRAINING PROGRAM

## 2024-08-20 PROCEDURE — 72125 CT NECK SPINE W/O DYE: CPT | Performed by: STUDENT IN AN ORGANIZED HEALTH CARE EDUCATION/TRAINING PROGRAM

## 2024-08-20 PROCEDURE — 73560 X-RAY EXAM OF KNEE 1 OR 2: CPT | Mod: LT

## 2024-08-20 PROCEDURE — 99285 EMERGENCY DEPT VISIT HI MDM: CPT | Mod: 25 | Performed by: EMERGENCY MEDICINE

## 2024-08-20 ASSESSMENT — PAIN - FUNCTIONAL ASSESSMENT
PAIN_FUNCTIONAL_ASSESSMENT: 0-10
PAIN_FUNCTIONAL_ASSESSMENT: 0-10

## 2024-08-20 ASSESSMENT — LIFESTYLE VARIABLES
HAVE PEOPLE ANNOYED YOU BY CRITICIZING YOUR DRINKING: NO
EVER HAD A DRINK FIRST THING IN THE MORNING TO STEADY YOUR NERVES TO GET RID OF A HANGOVER: NO
HAVE YOU EVER FELT YOU SHOULD CUT DOWN ON YOUR DRINKING: NO
TOTAL SCORE: 0
EVER FELT BAD OR GUILTY ABOUT YOUR DRINKING: NO

## 2024-08-20 ASSESSMENT — PAIN DESCRIPTION - PROGRESSION: CLINICAL_PROGRESSION: NOT CHANGED

## 2024-08-20 ASSESSMENT — PAIN SCALES - GENERAL
PAINLEVEL_OUTOF10: 0 - NO PAIN
PAINLEVEL_OUTOF10: 0 - NO PAIN

## 2024-08-20 ASSESSMENT — COLUMBIA-SUICIDE SEVERITY RATING SCALE - C-SSRS
2. HAVE YOU ACTUALLY HAD ANY THOUGHTS OF KILLING YOURSELF?: NO
6. HAVE YOU EVER DONE ANYTHING, STARTED TO DO ANYTHING, OR PREPARED TO DO ANYTHING TO END YOUR LIFE?: NO
1. IN THE PAST MONTH, HAVE YOU WISHED YOU WERE DEAD OR WISHED YOU COULD GO TO SLEEP AND NOT WAKE UP?: NO

## 2024-08-20 NOTE — ED PROVIDER NOTES
Linette Doyle is a 83 y.o. patient presenting to the ED for fall.  The patient presents from nursing home for unwitnessed fall.  She denies any significant injuries but states she did hit her head.  She is anticoagulated on Eliquis.  She states that the floor she was walking on was wood and slippery.  Her foot slipped out from under her causing her fall.  She denies any dizziness.  Denies loss of consciousness.    Additional History Obtained from: Nursing home report  Limitations to History: Patient does seem somewhat confused regarding details about time and location.  History of dementia.  ------------------------------------------------------------------------------------------------------------------------------------------  Physical Exam:  Appearance: Alert, cooperative.  Skin: Warm, dry, appropriate color for ethnicity.  Eyes: Cornea clear. No scleral icterus or injection.   ENT: Mucous membranes moist.  Pulmonary: No accessory muscle use or stridor. Clear lung sounds bilaterally without rhonchi or wheezing.   Cardiac: Heart sounds regular without murmur. B/L radial pulses full and symmetric.   Abdomen: Soft, not tender.  No rebound or guarding.   Musculoskeletal: No gross deformities.   Neurological: Face symmetrical. Voice clear. Appropriately conversant.  Motor and sensation grossly intact.  Psychiatric: Appropriate mood and affect.    Medical Decision Making:  Chronic Medical Conditions Significantly Affecting Care:  has a past medical history of Angina pectoris (CMS-HCC) (10/22/2023), Atherosclerosis of coronary artery (08/21/2019), Atherosclerotic heart disease of native coronary artery with other forms of angina pectoris (CMS-HCC) (10/22/2023), Hypercholesterolemia (12/28/2018), Hyperlipidemia (10/22/2023), Presence of cardiac pacemaker (10/22/2023), Primary hypertension (12/28/2018), Shortness of breath (11/26/2019), Sick sinus syndrome (Multi) (10/22/2023), Sinus bradycardia (10/22/2023), and Stage 3a  chronic kidney disease (Multi) (11/25/2019).    Social Determinants of Health Significantly Affecting Care: Nursing home resident    Differential Diagnosis Considered but not limited to: Patient without significant injury identifiable on exam however given that she is anticoagulated after unwitnessed fall we will obtain imaging of her head and C-spine to rule out intracranial hemorrhage or fracture.  The patient does have a history of dementia and prior falls.      External Records Reviewed:   I reviewed recent and relevant outside records including:     Independent Interpretation of Studies: The following studies were ordered as part of the emergency department work up and independently interpreted by me. See ED Course for details.    Initial head and C-spine CTs are negative for intracranial hemorrhage or fracture by my interpretation.  Confirmed by radiology to be without acute findings.    ED Course as of 08/20/24 0540   Tue Aug 20, 2024   0429 I was called to the patient's room by nursing staff.  The patient was in the bathroom when she fell.  This was not witnessed.  She states she hit her left knee but denies any injuries.  She denies hitting her head.  Given that she is anticoagulated and had an unwitnessed fall will repeat CT head imaging and obtain x-rays of her knee. [SP]   0515 Repeat head CT is negative for any intracranial hemorrhage or other acute findings.  X-rays of the left knee do not show any evidence of fracture, dislocation, or other acute findings. [SP]      ED Course User Index  [SP] Margoth Stapleton DO         Diagnoses as of 08/20/24 0540   Fall, initial encounter         Escalation of Care: Patient continues to be without complaint while in the department.  She is moderately hypertensive with otherwise normal vital signs.  I do believe she is stable for outpatient management.       Margoth Stapleton DO  08/20/24 0571

## 2024-08-23 ENCOUNTER — HOSPITAL ENCOUNTER (EMERGENCY)
Facility: HOSPITAL | Age: 84
Discharge: HOME | End: 2024-08-24
Attending: EMERGENCY MEDICINE
Payer: MEDICARE
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
## 2024-08-23 DIAGNOSIS — G89.29 CHRONIC BACK PAIN, UNSPECIFIED BACK LOCATION, UNSPECIFIED BACK PAIN LATERALITY: ICD-10-CM

## 2024-08-23 DIAGNOSIS — M54.9 CHRONIC BACK PAIN, UNSPECIFIED BACK LOCATION, UNSPECIFIED BACK PAIN LATERALITY: ICD-10-CM

## 2024-08-23 PROCEDURE — 99285 EMERGENCY DEPT VISIT HI MDM: CPT | Performed by: EMERGENCY MEDICINE

## 2024-08-23 RX ORDER — ACETAMINOPHEN 325 MG/1
975 TABLET ORAL ONCE
Status: COMPLETED | OUTPATIENT
Start: 2024-08-23 | End: 2024-08-24

## 2024-08-23 ASSESSMENT — PAIN - FUNCTIONAL ASSESSMENT: PAIN_FUNCTIONAL_ASSESSMENT: 0-10

## 2024-08-23 ASSESSMENT — PAIN DESCRIPTION - PROGRESSION: CLINICAL_PROGRESSION: NOT CHANGED

## 2024-08-23 ASSESSMENT — PAIN SCALES - GENERAL: PAINLEVEL_OUTOF10: 0 - NO PAIN

## 2024-08-24 ENCOUNTER — APPOINTMENT (OUTPATIENT)
Dept: RADIOLOGY | Facility: HOSPITAL | Age: 84
End: 2024-08-24
Payer: MEDICARE

## 2024-08-24 VITALS
HEIGHT: 64 IN | HEART RATE: 60 BPM | TEMPERATURE: 97.7 F | RESPIRATION RATE: 18 BRPM | WEIGHT: 147 LBS | BODY MASS INDEX: 25.1 KG/M2 | SYSTOLIC BLOOD PRESSURE: 148 MMHG | OXYGEN SATURATION: 97 % | DIASTOLIC BLOOD PRESSURE: 93 MMHG

## 2024-08-24 PROCEDURE — 70450 CT HEAD/BRAIN W/O DYE: CPT | Performed by: RADIOLOGY

## 2024-08-24 PROCEDURE — 70450 CT HEAD/BRAIN W/O DYE: CPT

## 2024-08-24 PROCEDURE — 72100 X-RAY EXAM L-S SPINE 2/3 VWS: CPT

## 2024-08-24 PROCEDURE — 72125 CT NECK SPINE W/O DYE: CPT | Performed by: RADIOLOGY

## 2024-08-24 PROCEDURE — 72100 X-RAY EXAM L-S SPINE 2/3 VWS: CPT | Performed by: RADIOLOGY

## 2024-08-24 PROCEDURE — 72125 CT NECK SPINE W/O DYE: CPT

## 2024-08-24 RX ADMIN — ACETAMINOPHEN 975 MG: 325 TABLET ORAL at 01:31

## 2024-08-24 NOTE — ED PROVIDER NOTES
HPI   Chief Complaint   Patient presents with    Fall     PT Fell from bed reaching for something, Denies hitting head. No complaints from PT. Facility wanted PT Evaluated d/t being on Elilquis.        HPI:  83-year-old female with dementia and paroxysmal paroxysmal A-fib on Eliquis sent in from her nursing home facility at [facility] after a fall out of the bed.  Patient is complaining of lower back pain history is limited as she has dementia and is forgetful but was able to tell me that she normally walks with a walker and denies any other pain no lightheadedness or dizziness no chest discomfort or shortness of breath    Limitations to history: Forgetfulness due to dementia  Independent Historians: Nursing home paperwork EMS report  External Records Reviewed: EMR records  ------------------------------------------------------------------------------------------------------------------------------------------  ROS: a ten point review of systems was performed and negative except as per HPI.  ------------------------------------------------------------------------------------------------------------------------------------------  PMH / PSH: as per HPI, reviewed in EMR and discussed with the patient []  MEDS:  reviewed in EMR and discussed with the patient [] and reviewed against nursing home paperwork  ALLERGIES: reviewed in EMR[]  SocH:  as per HPI, otherwise reviewed in EMR []  FH:  as per HPI, otherwise reviewed in EMR []  ------------------------------------------------------------------------------------------------------------------------------------------  Physical Exam:  VS: As documented in the triage note and EMR flowsheet from this visit was reviewed  General: Well appearing. No acute distress.   Eyes:  Extraocular movements grossly intact. No scleral icterus.   HEENT: Atraumatic. Normocephalic.    Neck: Supple. No gross masses  CV: RRR, audible S1/S2, 2+ symmetric peripheral pulses  Resp: Clear to  auscultation bilaterally. No respiratory distress.  Non-labored respirations  GI: Soft, non-tender, non-distended, no rebound or gaurding  MSK: Buttocks with no wounds or bruising symmetric muscle bulk. No gross step offs or deformities.  Back: No midline C-spine T or L-spine tenderness to palpation  Skin: Warm, dry, no obvious rash.  Neuro: Speech fluent. Awake. Alert. Appropriate conversation.  Psych: Appropriate mood and affect for situation  ------------------------------------------------------------------------------------------------------------------------------------------  Hospital Course / Medical Decision Making:  Patient well-appearing on my assessment physical exam was reassuring because of her being on Eliquis CT of the head and C-spine was obtained which was negative for any acute intracranial hemorrhage and negative for any acute traumatic injury x-rays of the lumbar spine were obtained which were negative for any acute traumatic injury from her fall.  Patient was given reassurance regarding her ED workup she was comfortable and declined anything for pain control while here in the ED.  She was discharged home back to her nursing facility in stable condition and is currently awaiting a ride    Final diagnosis and disposition: [] Initial encounter for fall, chronic back pain            Landy Cage MD                          Patient History   Past Medical History:   Diagnosis Date    Angina pectoris (CMS-HCC) 10/22/2023    Atherosclerosis of coronary artery 08/21/2019    Atherosclerotic heart disease of native coronary artery with other forms of angina pectoris (CMS-HCC) 10/22/2023    Hypercholesterolemia 12/28/2018    Hyperlipidemia 10/22/2023    Presence of cardiac pacemaker 10/22/2023    Primary hypertension 12/28/2018    Shortness of breath 11/26/2019    Sick sinus syndrome (Multi) 10/22/2023    Sinus bradycardia 10/22/2023    Stage 3a chronic kidney disease (Multi) 11/25/2019     Past  Surgical History:   Procedure Laterality Date    CARDIAC CATHETERIZATION N/A 02/02/2024    Procedure: Left Heart Cath;  Surgeon: Becky Vela MD;  Location: [facility] Cardiac Cath Lab;  Service: Cardiovascular;  Laterality: N/A;    CARDIAC CATHETERIZATION N/A 02/02/2024    Procedure: PCI;  Surgeon: Becky Vela MD;  Location: [facility] Cardiac Cath Lab;  Service: Cardiovascular;  Laterality: N/A;    CARDIAC CATHETERIZATION N/A 02/08/2024    Procedure: Left Heart Cath;  Surgeon: Darrius Ibrahim MD;  Location: [facility] Cardiac Cath Lab;  Service: Cardiovascular;  Laterality: N/A;    CARDIAC CATHETERIZATION N/A 02/08/2024    Procedure: PCI;  Surgeon: Darrius Ibrahim MD;  Location: [facility] Cardiac Cath Lab;  Service: Cardiovascular;  Laterality: N/A;    PACEMAKER PLACEMENT  07/04/2019    [employer] PACEMAKE AND STENT PLACEMENT     Family History   Problem Relation Name Age of Onset    No Known Problems Mother      No Known Problems Father       Social History     Tobacco Use    Smoking status: Never     Passive exposure: Never    Smokeless tobacco: Never   Vaping Use    Vaping status: Never Used   Substance Use Topics    Alcohol use: Never    Drug use: Never       Physical Exam   ED Triage Vitals [08/23/24 2308]   Temperature Heart Rate Respirations BP   36.5 °C (97.7 °F) 57 16 (!) 172/102      Pulse Ox Temp src Heart Rate Source Patient Position   95 % -- -- --      BP Location FiO2 (%)     -- --       Physical Exam      ED Course & MDM   Diagnoses as of 08/24/24 0452   Fall, initial encounter   Chronic back pain, unspecified back location, unspecified back pain laterality                 No data recorded     [place] Coma Scale Score: 15 (08/23/24 2308 : Pilo Burnett, RN)                           Medical Decision Making      Procedure  Procedures     Landy Cage MD  08/24/24 5973

## 2024-08-24 NOTE — ED TRIAGE NOTES
PT Fell from bed reaching for something, Denies hitting head. No complaints from PT. Facility wanted PT Evaluated d/t being on Elilquis. PT is resident @ [facility]. PT A&Ox4, GCS 15, Airway patent.

## 2024-08-25 ENCOUNTER — HOSPITAL ENCOUNTER (EMERGENCY)
Facility: HOSPITAL | Age: 84
Discharge: HOME | End: 2024-08-26
Attending: EMERGENCY MEDICINE
Payer: MEDICARE

## 2024-08-25 ENCOUNTER — APPOINTMENT (OUTPATIENT)
Dept: RADIOLOGY | Facility: HOSPITAL | Age: 84
End: 2024-08-25
Payer: MEDICARE

## 2024-08-25 DIAGNOSIS — F03.90 DEMENTIA, UNSPECIFIED DEMENTIA SEVERITY, UNSPECIFIED DEMENTIA TYPE, UNSPECIFIED WHETHER BEHAVIORAL, PSYCHOTIC, OR MOOD DISTURBANCE OR ANXIETY (MULTI): ICD-10-CM
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
## 2024-08-25 PROCEDURE — 72125 CT NECK SPINE W/O DYE: CPT | Performed by: RADIOLOGY

## 2024-08-25 PROCEDURE — 70450 CT HEAD/BRAIN W/O DYE: CPT

## 2024-08-25 PROCEDURE — 70450 CT HEAD/BRAIN W/O DYE: CPT | Performed by: RADIOLOGY

## 2024-08-25 PROCEDURE — 99285 EMERGENCY DEPT VISIT HI MDM: CPT | Mod: 25

## 2024-08-25 PROCEDURE — 72125 CT NECK SPINE W/O DYE: CPT

## 2024-08-25 ASSESSMENT — LIFESTYLE VARIABLES
HAVE PEOPLE ANNOYED YOU BY CRITICIZING YOUR DRINKING: NO
HAVE YOU EVER FELT YOU SHOULD CUT DOWN ON YOUR DRINKING: NO
TOTAL SCORE: 0
EVER FELT BAD OR GUILTY ABOUT YOUR DRINKING: NO
EVER HAD A DRINK FIRST THING IN THE MORNING TO STEADY YOUR NERVES TO GET RID OF A HANGOVER: NO

## 2024-08-25 ASSESSMENT — PAIN SCALES - GENERAL: PAINLEVEL_OUTOF10: 0 - NO PAIN

## 2024-08-25 ASSESSMENT — PAIN - FUNCTIONAL ASSESSMENT: PAIN_FUNCTIONAL_ASSESSMENT: 0-10

## 2024-08-26 ENCOUNTER — APPOINTMENT (OUTPATIENT)
Dept: CARDIOLOGY | Facility: CLINIC | Age: 84
End: 2024-08-26
Payer: MEDICARE

## 2024-08-26 VITALS
HEART RATE: 60 BPM | RESPIRATION RATE: 18 BRPM | OXYGEN SATURATION: 99 % | HEIGHT: 64 IN | BODY MASS INDEX: 25.1 KG/M2 | SYSTOLIC BLOOD PRESSURE: 156 MMHG | TEMPERATURE: 98 F | WEIGHT: 147 LBS | DIASTOLIC BLOOD PRESSURE: 88 MMHG

## 2024-08-26 ASSESSMENT — PAIN - FUNCTIONAL ASSESSMENT: PAIN_FUNCTIONAL_ASSESSMENT: 0-10

## 2024-08-26 ASSESSMENT — PAIN SCALES - GENERAL: PAINLEVEL_OUTOF10: 0 - NO PAIN

## 2024-08-26 NOTE — ED PROVIDER NOTES
HPI   Chief Complaint   Patient presents with   • Fall     Pt here by EMS with unwitnnesed fall to the ground. Pt states that they do not believe that they hit their head and shows no signs of pain nor injury on the head. PT axox 3 ( baseline) gcs 15       Patient presents after a fall at the nursing home.  It was unwitnessed.  They found her on the ground.  She is on Plavix and Eliquis.  She denies any complaints at this time.  She is a DNR comfort care only.  She has a history of dementia so history is limited from her.  She was seen here last night for another fall and had negative scans at that time and was discharged back to her facility.      History limited by:  Dementia                      [place] Coma Scale Score: 15                  Patient History   Past Medical History:   Diagnosis Date   • Angina pectoris (CMS-HCC) 10/22/2023   • Atherosclerosis of coronary artery 08/21/2019   • Atherosclerotic heart disease of native coronary artery with other forms of angina pectoris (CMS-HCC) 10/22/2023   • Hypercholesterolemia 12/28/2018   • Hyperlipidemia 10/22/2023   • Presence of cardiac pacemaker 10/22/2023   • Primary hypertension 12/28/2018   • Shortness of breath 11/26/2019   • Sick sinus syndrome (Multi) 10/22/2023   • Sinus bradycardia 10/22/2023   • Stage 3a chronic kidney disease (Multi) 11/25/2019     Past Surgical History:   Procedure Laterality Date   • CARDIAC CATHETERIZATION N/A 02/02/2024    Procedure: Left Heart Cath;  Surgeon: Becky Vela MD;  Location: [facility] Cardiac Cath Lab;  Service: Cardiovascular;  Laterality: N/A;   • CARDIAC CATHETERIZATION N/A 02/02/2024    Procedure: PCI;  Surgeon: Becky Vela MD;  Location: [facility] Cardiac Cath Lab;  Service: Cardiovascular;  Laterality: N/A;   • CARDIAC CATHETERIZATION N/A 02/08/2024    Procedure: Left Heart Cath;  Surgeon: Darrius Ibrahim MD;  Location: [facility] Cardiac Cath Lab;  Service: Cardiovascular;  Laterality: N/A;   • CARDIAC CATHETERIZATION N/A  02/08/2024    Procedure: PCI;  Surgeon: Darrius Ibrahim MD;  Location: [facility] Cardiac Cath Lab;  Service: Cardiovascular;  Laterality: N/A;   • PACEMAKER PLACEMENT  07/04/2019    [employer] PACEMAKE AND STENT PLACEMENT     Family History   Problem Relation Name Age of Onset   • No Known Problems Mother     • No Known Problems Father       Social History     Tobacco Use   • Smoking status: Never     Passive exposure: Never   • Smokeless tobacco: Never   Vaping Use   • Vaping status: Never Used   Substance Use Topics   • Alcohol use: Never   • Drug use: Never       Physical Exam   ED Triage Vitals [08/25/24 2109]   Temperature Heart Rate Respirations BP   36.7 °C (98 °F) 60 18 (!) 183/91      Pulse Ox Temp src Heart Rate Source Patient Position   94 % -- -- --      BP Location FiO2 (%)     -- --       Physical Exam  Vitals and nursing note reviewed.   Constitutional:       Appearance: Normal appearance.   HENT:      Head: Normocephalic and atraumatic.      Mouth/Throat:      Mouth: Mucous membranes are moist.   Eyes:      Extraocular Movements: Extraocular movements intact.      Pupils: Pupils are equal, round, and reactive to light.   Cardiovascular:      Rate and Rhythm: Normal rate and regular rhythm.      Heart sounds: No murmur heard.  Pulmonary:      Effort: Pulmonary effort is normal. No respiratory distress.      Breath sounds: Normal breath sounds.   Abdominal:      General: There is no distension.      Palpations: Abdomen is soft.      Tenderness: There is no abdominal tenderness.   Musculoskeletal:         General: No tenderness or deformity. Normal range of motion.      Right lower leg: No edema.      Left lower leg: No edema.   Skin:     General: Skin is warm and dry.      Findings: No lesion or rash.   Neurological:      Mental Status: She is alert. Mental status is at baseline.      Sensory: No sensory deficit.      Motor: No weakness.      Comments: Alert to self and time but not to place to be baseline    Psychiatric:         Behavior: Behavior normal.       Labs Reviewed - No data to display  CT head wo IV contrast    (Results Pending)   CT cervical spine wo IV contrast    (Results Pending)     ED Course & MDM   Diagnoses as of 08/25/24 2254   Fall, initial encounter   Dementia, unspecified dementia severity, unspecified dementia type, unspecified whether behavioral, psychotic, or mood disturbance or anxiety (Multi)       Medical Decision Making  Differentials include intracranial hemorrhage, fracture.  Imaging studies, if performed, were independently reviewed and interpreted by myself and confirmed by radiologist. EKG(s), if performed, were interpreted by myself.The patient a CAT scan of the head and C-spine shows nothing acute.  She does have some subacute compression fractures, likely from previous falls but nothing new.  There is also a right pleural effusion.  Her pulse ox is normal on room air.  I feel that this can be treated as an outpatient.  Patient will be discharged back to her facility.        Procedure  Procedures     Dennis Munguia MD  08/25/24 4845

## 2024-08-26 NOTE — ED TRIAGE NOTES
Pt here by EMS with unwitnnesed fall to the ground. Pt states that they do not believe that they hit their head and shows no signs of pain nor injury on the head. PT axox 3 ( baseline) gcs 15

## 2024-08-27 ENCOUNTER — APPOINTMENT (OUTPATIENT)
Dept: RADIOLOGY | Facility: HOSPITAL | Age: 84
End: 2024-08-27
Payer: MEDICARE

## 2024-08-27 ENCOUNTER — HOSPITAL ENCOUNTER (EMERGENCY)
Facility: HOSPITAL | Age: 84
Discharge: HOME | End: 2024-08-27
Attending: STUDENT IN AN ORGANIZED HEALTH CARE EDUCATION/TRAINING PROGRAM
Payer: MEDICARE

## 2024-08-27 VITALS
TEMPERATURE: 97.6 F | OXYGEN SATURATION: 100 % | RESPIRATION RATE: 16 BRPM | DIASTOLIC BLOOD PRESSURE: 93 MMHG | HEART RATE: 60 BPM | BODY MASS INDEX: 23.36 KG/M2 | SYSTOLIC BLOOD PRESSURE: 180 MMHG | WEIGHT: 140.21 LBS | HEIGHT: 65 IN
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
## 2024-08-27 PROCEDURE — G0390 TRAUMA RESPONS W/HOSP CRITI: HCPCS

## 2024-08-27 PROCEDURE — 73130 X-RAY EXAM OF HAND: CPT | Mod: LT

## 2024-08-27 PROCEDURE — 99285 EMERGENCY DEPT VISIT HI MDM: CPT | Mod: 25

## 2024-08-27 PROCEDURE — 72125 CT NECK SPINE W/O DYE: CPT

## 2024-08-27 PROCEDURE — 70450 CT HEAD/BRAIN W/O DYE: CPT

## 2024-08-27 PROCEDURE — 73130 X-RAY EXAM OF HAND: CPT | Mod: LEFT SIDE | Performed by: RADIOLOGY

## 2024-08-27 PROCEDURE — 70450 CT HEAD/BRAIN W/O DYE: CPT | Performed by: RADIOLOGY

## 2024-08-27 PROCEDURE — 72125 CT NECK SPINE W/O DYE: CPT | Performed by: RADIOLOGY

## 2024-08-27 ASSESSMENT — PAIN - FUNCTIONAL ASSESSMENT
PAIN_FUNCTIONAL_ASSESSMENT: 0-10
PAIN_FUNCTIONAL_ASSESSMENT: 0-10

## 2024-08-27 ASSESSMENT — PAIN SCALES - GENERAL
PAINLEVEL_OUTOF10: 0 - NO PAIN

## 2024-08-27 NOTE — DISCHARGE INSTRUCTIONS
You were seen in the emergency department after a fall.  We did CT scans and x-rays that showed no new traumatic injury.  At this time you can be discharged back to your memory care facility.    Please follow-up with your primary care provider in the next few days.  Please continue to take all medications as prescribed and given by your facility.

## 2024-08-27 NOTE — ED PROVIDER NOTES
[facility] EMERGENCY DEPARTMENT ENCOUNTER    Pt Name:Linette Doyle  MRN: [id number]  Birthdate 1940  Date of evaluation: 08/28/24  PCP:  Dorene Cárdenas, JUNG-RANDALL    CHIEF COMPLAINT       Chief Complaint   Patient presents with    Fall     HISTORY OF PRESENT ILLNESS  (Location/Symptom, Timing/Onset, Context/Setting, Quality, Duration, Modifying Factors, Severity.)      Linette Doyle is a 83 y.o. female who presents with history of dementia from a memory care facility. Patient has a DNR Comfort Care order and documentation with her on arrival and verified by EMR and patient verbalization. She is on Eliquis. Reportedly was found out of bed on the ground at the [facility], unknown downtime. Patient states she is having pain in her left hand. She states she thinks she was reaching for something on the nightstand and 'tumbled out of bed'. She states she thinks she was awake the whole time and did not lose consciousness. She otherwise denies neck or back pain, headache, lightheadedness, dizziness, chest pain, shortness of breath, abdominal pain.    PAST MEDICAL / SURGICAL / SOCIAL / FAMILY HISTORY      has a past medical history of Angina pectoris (CMS-[facility]) (10/22/2023), Atherosclerosis of coronary artery (08/21/2019), Atherosclerotic heart disease of native coronary artery with other forms of angina pectoris (CMS-HCC) (10/22/2023), Hypercholesterolemia (12/28/2018), Hyperlipidemia (10/22/2023), Presence of cardiac pacemaker (10/22/2023), Primary hypertension (12/28/2018), Shortness of breath (11/26/2019), Sick sinus syndrome (Multi) (10/22/2023), Sinus bradycardia (10/22/2023), and Stage 3a chronic kidney disease (Multi) (11/25/2019).       has a past surgical history that includes Cardiac catheterization (N/A, 02/02/2024); Cardiac catheterization (N/A, 02/02/2024); Cardiac catheterization (N/A, 02/08/2024); Cardiac catheterization (N/A, 02/08/2024); and pacemaker placement (07/04/2019).      Social  History     Socioeconomic History    Marital status:      Spouse name: Not on file    Number of children: Not on file    Years of education: Not on file    Highest education level: Not on file   Occupational History    Not on file   Tobacco Use    Smoking status: Never     Passive exposure: Never    Smokeless tobacco: Never   Vaping Use    Vaping status: Never Used   Substance and Sexual Activity    Alcohol use: Never    Drug use: Never    Sexual activity: Defer   Other Topics Concern    Not on file   Social History Narrative    Not on file     Social Determinants of Health     Financial Resource Strain: Low Risk  (6/3/2024)    Overall Financial Resource Strain (CARDIA)     Difficulty of Paying Living Expenses: Not hard at all   Food Insecurity: No Food Insecurity (5/16/2024)    [facility]    Hunger Vital Sign     Worried About Running Out of Food in the Last Year: Never true     Ran Out of Food in the Last Year: Never true   Transportation Needs: No Transportation Needs (8/16/2024)    OASIS : Transportation     Lack of Transportation (Medical): No     Lack of Transportation (Non-Medical): No     Patient Unable or Declines to Respond: No   Recent Concern: Transportation Needs - Unmet Transportation Needs (6/25/2024)    OASIS : Transportation     Lack of Transportation (Medical): Yes     Lack of Transportation (Non-Medical): Yes     Patient Unable or Declines to Respond: No   Physical Activity: Inactive (5/10/2024)    Exercise Vital Sign     Days of Exercise per Week: 0 days     Minutes of Exercise per Session: 0 min   Stress: No Stress Concern Present (5/10/2024)    [employer] - Occupational Stress Questionnaire     Feeling of Stress : Only a little   Social Connections: Unknown (8/16/2024)    OASIS : Social Isolation     Frequency of experiencing loneliness or isolation: Patient unable to respond   Intimate Partner Violence:  Unknown (5/10/2024)    Humiliation, Afraid, Rape, and Kick questionnaire     Fear of Current or Ex-Partner: No     Emotionally Abused: No     Physically Abused: No     Sexually Abused: Patient declined   Housing Stability: Low Risk  (6/3/2024)    Housing Stability Vital Sign     Unable to Pay for Housing in the Last Year: No     Number of Places Lived in the Last Year: 1     Unstable Housing in the Last Year: No   Recent Concern: Housing Stability - High Risk (5/10/2024)    Housing Stability Vital Sign     Unable to Pay for Housing in the Last Year: Yes     Number of Places Lived in the Last Year: 1     Unstable Housing in the Last Year: No       Family History   Problem Relation Name Age of Onset    No Known Problems Mother      No Known Problems Father         Allergies:  Iodinated contrast media    Home Medications:  Prior to Admission medications    Medication Sig Start Date End Date Taking? Authorizing Provider   amiodarone (Pacerone) 200 mg tablet Take 1 tablet (200 mg) by mouth once daily.    Historical Provider, MD   apixaban (Eliquis) 2.5 mg tablet Take 1 tablet (2.5 mg) by mouth 2 times a day. 7/2/24   Maritza Do MD   ascorbic acid/collagen hydr (COLLAGEN PLUS VITAMIN C ORAL) Take 1 capsule by mouth once daily. Indications: Instaflex collegen supplement    Historical Provider, MD   cholecalciferol (Vitamin D3) 50 MCG (2000 UT) tablet Take 1 tablet (2,000 Units) by mouth once daily.    Historical Provider, MD   clopidogrel (Plavix) 75 mg tablet Take 1 tablet (75 mg) by mouth once daily. 7/23/24 1/19/25  Maritza Do MD   diphenhydrAMINE-acetaminophen (Tylenol PM Extra Strength)  mg per tablet Take 1 tablet by mouth as needed at bedtime for sleep.    Historical Provider, MD   levothyroxine (Synthroid, Levoxyl) 100 mcg tablet TAKE ONE TABLET BY MOUTH EVERY DAY 6/25/24   Maritza Do MD   metoprolol tartrate (Lopressor) 50 mg tablet Take 1 tablet by mouth 2 times a day. 5/8/24 5/3/25   "Darrius Ibrahim MD   multivit-min/ferrous fumarate (MULTI VITAMIN ORAL) Take 1 tablet by mouth once daily. Indications: One a Day- supplement    Historical Provider, MD kaplan-mn/om3/dha/epa/fish/lut/leanne (OCUVITE ADULT 50 PLUS ORAL) Take 1 tablet by mouth once daily. Indications: supplement    Historical Provider, MD   omeprazole (PriLOSEC) 20 mg tablet,delayed release (DR/EC) EC tablet Take 1 tablet (20 mg) by mouth once daily.    Historical Provider, MD   sertraline (Zoloft) 100 mg tablet Take 2 tablets (200 mg) by mouth once daily. 5/1/24   Maritza Do MD     REVIEW OF SYSTEMS       Review of Systems   Unable to perform ROS: Dementia       PHYSICAL EXAM      INITIAL VITALS:   BP (!) 180/93   Pulse 60   Temp 36.4 °C (97.6 °F)   Resp 16   Ht 1.651 m (5' 5\")   Wt 63.6 kg (140 lb 3.4 oz)   LMP  (LMP Unknown)   SpO2 100%   BMI 23.33 kg/m²     Physical Exam  Vitals reviewed.   Constitutional:       Appearance: She is not toxic-appearing.   HENT:      Head: Normocephalic and atraumatic.      Comments: No hemotympanum, no sun sign, no raccoon eyes     Mouth/Throat:      Pharynx: Oropharynx is clear.   Eyes:      Extraocular Movements: Extraocular movements intact.      Pupils: Pupils are equal, round, and reactive to light.   Neck:      Comments: C collar in place, no midline CTLS tenderness, no step offs or deformities  Cardiovascular:      Rate and Rhythm: Normal rate and regular rhythm.   Pulmonary:      Effort: Pulmonary effort is normal.      Breath sounds: Normal breath sounds. No stridor. No wheezing, rhonchi or rales.   Abdominal:      Palpations: Abdomen is soft.      Tenderness: There is no abdominal tenderness. There is no guarding or rebound.   Musculoskeletal:         General: Swelling (Hematoma to dorsum of left hand) present. Normal range of motion.   Neurological:      Mental Status: She is alert. Mental status is at baseline.           DDX/DIAGNOSTIC RESULTS / EMERGENCY DEPARTMENT COURSE " / MDM     Medical Decision Making  83-year-old female with history of dementia and DNR comfort care order in place presents after fall at facility.  Reportedly tumbled out of bed.  Denies LOC.  She is on Eliquis.  Patient states she is having some pain in her left hand, otherwise denies any pain elsewhere in her body.  On examination, patient awake, alert, appears to be at neurologic baseline and answer questions appropriately.  Bilateral breath sounds on auscultation.  Pulses intact throughout.  Arrives with c-collar in place. Patient with no midline CT LS tenderness.  Noted to have hematoma to the dorsum of her left hand, radial pulses are intact, full ROM of left hand and wrist.  Will obtain x-ray left hand as well as CT head and cervical spine.  Patient currently stating she has no pain, however will provide analgesia if patient begins experiencing any pain.      EMERGENCY DEPARTMENT COURSE:    Diagnoses as of 08/28/24 0228   Fall, initial encounter     Imaging showing no acute traumatic injury.  Patient remains at neurologic baseline during examination and continues to deny any pain.  At this time patient is stable for transport back to her facility.  Discussed return precautions and patient sent with AVS detailing results from her visit today for a memory care facility benefit.    FINAL IMPRESSION      1. Fall, initial encounter          DISPOSITION / PLAN     8/27/2024 10:34 PM     PATIENT REFERRED TO:  Dorene Carney, APRN-CNP  [address]  [facility] [address]  [phone]    Schedule an appointment as soon as possible for a visit       [facility]  [address] [address] [phone]  Go to   If symptoms worsen      DISCHARGE MEDICATIONS:  Discharge Medication List as of 8/27/2024  6:03 PM          Susannah Singh MD  Emergency Medicine Attending Physician    (Please note that portions of this note were completed with a voice  recognition program.  Efforts were made to edit the dictations but occasionally words are mis-transcribed.)     Susannah Singh MD  08/28/24 5924

## 2024-08-28 ENCOUNTER — APPOINTMENT (OUTPATIENT)
Dept: CARDIOLOGY | Facility: CLINIC | Age: 84
End: 2024-08-28
Payer: MEDICARE

## 2024-09-08 ENCOUNTER — APPOINTMENT (OUTPATIENT)
Dept: CARDIOLOGY | Facility: HOSPITAL | Age: 84
End: 2024-09-08
Payer: MEDICARE

## 2024-09-08 ENCOUNTER — APPOINTMENT (OUTPATIENT)
Dept: RADIOLOGY | Facility: HOSPITAL | Age: 84
End: 2024-09-08
Payer: MEDICARE

## 2024-09-08 ENCOUNTER — HOSPITAL ENCOUNTER (INPATIENT)
Facility: HOSPITAL | Age: 84
End: 2024-09-08
Attending: STUDENT IN AN ORGANIZED HEALTH CARE EDUCATION/TRAINING PROGRAM | Admitting: INTERNAL MEDICINE
Payer: MEDICARE

## 2024-09-08 VITALS
SYSTOLIC BLOOD PRESSURE: 156 MMHG | HEIGHT: 62 IN | TEMPERATURE: 96.1 F | HEART RATE: 101 BPM | RESPIRATION RATE: 19 BRPM | DIASTOLIC BLOOD PRESSURE: 92 MMHG | WEIGHT: 115.1 LBS | BODY MASS INDEX: 21.18 KG/M2 | OXYGEN SATURATION: 93 %

## 2024-09-08 DIAGNOSIS — S01.81XA LACERATION OF FOREHEAD, INITIAL ENCOUNTER: ICD-10-CM

## 2024-09-08 DIAGNOSIS — J90 PLEURAL EFFUSION ON RIGHT: ICD-10-CM

## 2024-09-08 DIAGNOSIS — J90 PLEURAL EFFUSION: Primary | ICD-10-CM
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
## 2024-09-08 DIAGNOSIS — I50.9 ACUTE ON CHRONIC CONGESTIVE HEART FAILURE, UNSPECIFIED HEART FAILURE TYPE: ICD-10-CM

## 2024-09-08 PROBLEM — Z95.0 S/P PLACEMENT OF CARDIAC PACEMAKER: Status: ACTIVE | Noted: 2024-08-05

## 2024-09-08 PROBLEM — F03.90 DEMENTIA (MULTI): Status: ACTIVE | Noted: 2024-08-04

## 2024-09-08 LAB
ALBUMIN SERPL BCP-MCNC: 3.9 G/DL (ref 3.4–5)
ALP SERPL-CCNC: 101 U/L (ref 33–136)
ALT SERPL W P-5'-P-CCNC: 69 U/L (ref 7–45)
AMORPH CRY #/AREA UR COMP ASSIST: ABNORMAL /HPF
ANION GAP SERPL CALC-SCNC: 11 MMOL/L (ref 10–20)
APPEARANCE UR: CLEAR
AST SERPL W P-5'-P-CCNC: 86 U/L (ref 9–39)
BACTERIA #/AREA URNS AUTO: ABNORMAL /HPF
BASOPHILS # BLD AUTO: 0.04 X10*3/UL (ref 0–0.1)
BASOPHILS NFR BLD AUTO: 0.7 %
BILIRUB SERPL-MCNC: 1.5 MG/DL (ref 0–1.2)
BILIRUB UR STRIP.AUTO-MCNC: NEGATIVE MG/DL
BNP SERPL-MCNC: 2509 PG/ML (ref 0–99)
BUN SERPL-MCNC: 18 MG/DL (ref 6–23)
CALCIUM SERPL-MCNC: 8.9 MG/DL (ref 8.6–10.3)
CHLORIDE SERPL-SCNC: 104 MMOL/L (ref 98–107)
CK SERPL-CCNC: 80 U/L (ref 0–215)
CO2 SERPL-SCNC: 28 MMOL/L (ref 21–32)
COLOR UR: YELLOW
CREAT SERPL-MCNC: 1.32 MG/DL (ref 0.5–1.05)
EGFRCR SERPLBLD CKD-EPI 2021: 40 ML/MIN/1.73M*2
EOSINOPHIL # BLD AUTO: 0.13 X10*3/UL (ref 0–0.4)
EOSINOPHIL NFR BLD AUTO: 2.2 %
ERYTHROCYTE [DISTWIDTH] IN BLOOD BY AUTOMATED COUNT: 17.6 % (ref 11.5–14.5)
GLUCOSE SERPL-MCNC: 117 MG/DL (ref 74–99)
GLUCOSE UR STRIP.AUTO-MCNC: NORMAL MG/DL
HCT VFR BLD AUTO: 35.5 % (ref 36–46)
HGB BLD-MCNC: 10.9 G/DL (ref 12–16)
HYALINE CASTS #/AREA URNS AUTO: ABNORMAL /LPF
IMM GRANULOCYTES # BLD AUTO: 0.01 X10*3/UL (ref 0–0.5)
IMM GRANULOCYTES NFR BLD AUTO: 0.2 % (ref 0–0.9)
KETONES UR STRIP.AUTO-MCNC: NEGATIVE MG/DL
LACTATE SERPL-SCNC: 1.4 MMOL/L (ref 0.4–2)
LEUKOCYTE ESTERASE UR QL STRIP.AUTO: ABNORMAL
LYMPHOCYTES # BLD AUTO: 0.77 X10*3/UL (ref 0.8–3)
LYMPHOCYTES NFR BLD AUTO: 13.3 %
MAGNESIUM SERPL-MCNC: 1.73 MG/DL (ref 1.6–2.4)
MCH RBC QN AUTO: 26.1 PG (ref 26–34)
MCHC RBC AUTO-ENTMCNC: 30.7 G/DL (ref 32–36)
MCV RBC AUTO: 85 FL (ref 80–100)
MONOCYTES # BLD AUTO: 0.59 X10*3/UL (ref 0.05–0.8)
MONOCYTES NFR BLD AUTO: 10.2 %
MUCOUS THREADS #/AREA URNS AUTO: ABNORMAL /LPF
NEUTROPHILS # BLD AUTO: 4.24 X10*3/UL (ref 1.6–5.5)
NEUTROPHILS NFR BLD AUTO: 73.4 %
NITRITE UR QL STRIP.AUTO: ABNORMAL
NRBC BLD-RTO: 0 /100 WBCS (ref 0–0)
PH UR STRIP.AUTO: 5.5 [PH]
PLATELET # BLD AUTO: 177 X10*3/UL (ref 150–450)
POTASSIUM SERPL-SCNC: 3.1 MMOL/L (ref 3.5–5.3)
PROT SERPL-MCNC: 6.6 G/DL (ref 6.4–8.2)
PROT UR STRIP.AUTO-MCNC: ABNORMAL MG/DL
RBC # BLD AUTO: 4.18 X10*6/UL (ref 4–5.2)
RBC # UR STRIP.AUTO: NEGATIVE /UL
RBC #/AREA URNS AUTO: ABNORMAL /HPF
SODIUM SERPL-SCNC: 140 MMOL/L (ref 136–145)
SP GR UR STRIP.AUTO: 1.01
UROBILINOGEN UR STRIP.AUTO-MCNC: NORMAL MG/DL
WBC # BLD AUTO: 5.8 X10*3/UL (ref 4.4–11.3)
WBC #/AREA URNS AUTO: ABNORMAL /HPF
WBC CLUMPS #/AREA URNS AUTO: ABNORMAL /HPF

## 2024-09-08 PROCEDURE — 1200000002 HC GENERAL ROOM WITH TELEMETRY DAILY

## 2024-09-08 PROCEDURE — 70450 CT HEAD/BRAIN W/O DYE: CPT | Performed by: RADIOLOGY

## 2024-09-08 PROCEDURE — 2500000002 HC RX 250 W HCPCS SELF ADMINISTERED DRUGS (ALT 637 FOR MEDICARE OP, ALT 636 FOR OP/ED): Performed by: STUDENT IN AN ORGANIZED HEALTH CARE EDUCATION/TRAINING PROGRAM

## 2024-09-08 PROCEDURE — 85025 COMPLETE CBC W/AUTO DIFF WBC: CPT | Performed by: STUDENT IN AN ORGANIZED HEALTH CARE EDUCATION/TRAINING PROGRAM

## 2024-09-08 PROCEDURE — 99223 1ST HOSP IP/OBS HIGH 75: CPT

## 2024-09-08 PROCEDURE — 96375 TX/PRO/DX INJ NEW DRUG ADDON: CPT | Mod: 59

## 2024-09-08 PROCEDURE — 83880 ASSAY OF NATRIURETIC PEPTIDE: CPT

## 2024-09-08 PROCEDURE — 72125 CT NECK SPINE W/O DYE: CPT

## 2024-09-08 PROCEDURE — 83735 ASSAY OF MAGNESIUM: CPT | Performed by: STUDENT IN AN ORGANIZED HEALTH CARE EDUCATION/TRAINING PROGRAM

## 2024-09-08 PROCEDURE — 84075 ASSAY ALKALINE PHOSPHATASE: CPT | Performed by: STUDENT IN AN ORGANIZED HEALTH CARE EDUCATION/TRAINING PROGRAM

## 2024-09-08 PROCEDURE — 12011 RPR F/E/E/N/L/M 2.5 CM/<: CPT | Performed by: STUDENT IN AN ORGANIZED HEALTH CARE EDUCATION/TRAINING PROGRAM

## 2024-09-08 PROCEDURE — 2500000001 HC RX 250 WO HCPCS SELF ADMINISTERED DRUGS (ALT 637 FOR MEDICARE OP)

## 2024-09-08 PROCEDURE — 73130 X-RAY EXAM OF HAND: CPT | Mod: LT

## 2024-09-08 PROCEDURE — 99222 1ST HOSP IP/OBS MODERATE 55: CPT | Performed by: SURGERY

## 2024-09-08 PROCEDURE — 99285 EMERGENCY DEPT VISIT HI MDM: CPT | Mod: 25

## 2024-09-08 PROCEDURE — 74176 CT ABD & PELVIS W/O CONTRAST: CPT | Performed by: RADIOLOGY

## 2024-09-08 PROCEDURE — 96365 THER/PROPH/DIAG IV INF INIT: CPT | Mod: 59

## 2024-09-08 PROCEDURE — 0HQ0XZZ REPAIR SCALP SKIN, EXTERNAL APPROACH: ICD-10-PCS | Performed by: STUDENT IN AN ORGANIZED HEALTH CARE EDUCATION/TRAINING PROGRAM

## 2024-09-08 PROCEDURE — 2500000002 HC RX 250 W HCPCS SELF ADMINISTERED DRUGS (ALT 637 FOR MEDICARE OP, ALT 636 FOR OP/ED)

## 2024-09-08 PROCEDURE — 73130 X-RAY EXAM OF HAND: CPT | Mod: LEFT SIDE | Performed by: RADIOLOGY

## 2024-09-08 PROCEDURE — 74176 CT ABD & PELVIS W/O CONTRAST: CPT

## 2024-09-08 PROCEDURE — 71250 CT THORAX DX C-: CPT | Performed by: RADIOLOGY

## 2024-09-08 PROCEDURE — 87086 URINE CULTURE/COLONY COUNT: CPT | Mod: PORLAB | Performed by: STUDENT IN AN ORGANIZED HEALTH CARE EDUCATION/TRAINING PROGRAM

## 2024-09-08 PROCEDURE — 73110 X-RAY EXAM OF WRIST: CPT | Mod: LEFT SIDE | Performed by: RADIOLOGY

## 2024-09-08 PROCEDURE — 93005 ELECTROCARDIOGRAM TRACING: CPT

## 2024-09-08 PROCEDURE — 36415 COLL VENOUS BLD VENIPUNCTURE: CPT | Performed by: STUDENT IN AN ORGANIZED HEALTH CARE EDUCATION/TRAINING PROGRAM

## 2024-09-08 PROCEDURE — 2500000004 HC RX 250 GENERAL PHARMACY W/ HCPCS (ALT 636 FOR OP/ED)

## 2024-09-08 PROCEDURE — 82550 ASSAY OF CK (CPK): CPT | Performed by: STUDENT IN AN ORGANIZED HEALTH CARE EDUCATION/TRAINING PROGRAM

## 2024-09-08 PROCEDURE — 72125 CT NECK SPINE W/O DYE: CPT | Performed by: RADIOLOGY

## 2024-09-08 PROCEDURE — G0390 TRAUMA RESPONS W/HOSP CRITI: HCPCS

## 2024-09-08 PROCEDURE — 83605 ASSAY OF LACTIC ACID: CPT | Performed by: STUDENT IN AN ORGANIZED HEALTH CARE EDUCATION/TRAINING PROGRAM

## 2024-09-08 PROCEDURE — 81001 URINALYSIS AUTO W/SCOPE: CPT | Performed by: STUDENT IN AN ORGANIZED HEALTH CARE EDUCATION/TRAINING PROGRAM

## 2024-09-08 PROCEDURE — 2500000005 HC RX 250 GENERAL PHARMACY W/O HCPCS

## 2024-09-08 PROCEDURE — 73110 X-RAY EXAM OF WRIST: CPT | Mod: LT

## 2024-09-08 PROCEDURE — 70450 CT HEAD/BRAIN W/O DYE: CPT

## 2024-09-08 RX ORDER — POTASSIUM CHLORIDE 750 MG/1
40 TABLET, FILM COATED, EXTENDED RELEASE ORAL ONCE
Status: COMPLETED | OUTPATIENT
Start: 2024-09-08 | End: 2024-09-08

## 2024-09-08 RX ORDER — GUAIFENESIN 600 MG/1
600 TABLET, EXTENDED RELEASE ORAL EVERY 12 HOURS PRN
Status: ACTIVE | OUTPATIENT
Start: 2024-09-08

## 2024-09-08 RX ORDER — ONDANSETRON HYDROCHLORIDE 2 MG/ML
4 INJECTION, SOLUTION INTRAVENOUS EVERY 6 HOURS PRN
Status: ACTIVE | OUTPATIENT
Start: 2024-09-08

## 2024-09-08 RX ORDER — AMIODARONE HYDROCHLORIDE 200 MG/1
200 TABLET ORAL DAILY
Status: DISPENSED | OUTPATIENT
Start: 2024-09-08

## 2024-09-08 RX ORDER — BISACODYL 5 MG
10 TABLET, DELAYED RELEASE (ENTERIC COATED) ORAL DAILY PRN
Status: ACTIVE | OUTPATIENT
Start: 2024-09-08

## 2024-09-08 RX ORDER — SERTRALINE HYDROCHLORIDE 100 MG/1
200 TABLET, FILM COATED ORAL DAILY
Status: DISPENSED | OUTPATIENT
Start: 2024-09-08

## 2024-09-08 RX ORDER — CLOPIDOGREL BISULFATE 75 MG/1
75 TABLET ORAL DAILY
Status: DISPENSED | OUTPATIENT
Start: 2024-09-08

## 2024-09-08 RX ORDER — POLYETHYLENE GLYCOL 3350 17 G/17G
17 POWDER, FOR SOLUTION ORAL DAILY
Status: DISPENSED | OUTPATIENT
Start: 2024-09-08

## 2024-09-08 RX ORDER — TALC
3 POWDER (GRAM) TOPICAL NIGHTLY PRN
Status: ACTIVE | OUTPATIENT
Start: 2024-09-08

## 2024-09-08 RX ORDER — IPRATROPIUM BROMIDE AND ALBUTEROL SULFATE 2.5; .5 MG/3ML; MG/3ML
3 SOLUTION RESPIRATORY (INHALATION)
Status: DISCONTINUED | OUTPATIENT
Start: 2024-09-08 | End: 2024-09-08

## 2024-09-08 RX ORDER — METOPROLOL TARTRATE 50 MG/1
50 TABLET ORAL 2 TIMES DAILY
Status: DISPENSED | OUTPATIENT
Start: 2024-09-08

## 2024-09-08 RX ORDER — ACETAMINOPHEN 325 MG/1
650 TABLET ORAL EVERY 4 HOURS PRN
Status: ACTIVE | OUTPATIENT
Start: 2024-09-08

## 2024-09-08 RX ORDER — PANTOPRAZOLE SODIUM 40 MG/10ML
40 INJECTION, POWDER, LYOPHILIZED, FOR SOLUTION INTRAVENOUS
Status: ACTIVE | OUTPATIENT
Start: 2024-09-09

## 2024-09-08 RX ORDER — LEVOTHYROXINE SODIUM 100 UG/1
100 TABLET ORAL DAILY
Status: ACTIVE | OUTPATIENT
Start: 2024-09-09

## 2024-09-08 RX ORDER — FUROSEMIDE 10 MG/ML
40 INJECTION INTRAMUSCULAR; INTRAVENOUS ONCE
Status: COMPLETED | OUTPATIENT
Start: 2024-09-08 | End: 2024-09-08

## 2024-09-08 RX ORDER — LIDOCAINE HYDROCHLORIDE AND EPINEPHRINE 10; 10 MG/ML; UG/ML
INJECTION, SOLUTION INFILTRATION; PERINEURAL
Status: COMPLETED
Start: 2024-09-08 | End: 2024-09-08

## 2024-09-08 RX ORDER — PANTOPRAZOLE SODIUM 40 MG/1
40 TABLET, DELAYED RELEASE ORAL
Status: ACTIVE | OUTPATIENT
Start: 2024-09-09

## 2024-09-08 RX ORDER — CEFTRIAXONE 2 G/50ML
2 INJECTION, SOLUTION INTRAVENOUS EVERY 24 HOURS
Status: DISCONTINUED | OUTPATIENT
Start: 2024-09-08 | End: 2024-09-08

## 2024-09-08 RX ADMIN — CLOPIDOGREL 75 MG: 75 TABLET ORAL at 19:30

## 2024-09-08 RX ADMIN — FUROSEMIDE 40 MG: 10 INJECTION, SOLUTION INTRAMUSCULAR; INTRAVENOUS at 20:15

## 2024-09-08 RX ADMIN — POTASSIUM CHLORIDE 40 MEQ: 750 TABLET, FILM COATED, EXTENDED RELEASE ORAL at 19:23

## 2024-09-08 RX ADMIN — SERTRALINE HYDROCHLORIDE 200 MG: 50 TABLET ORAL at 19:30

## 2024-09-08 RX ADMIN — METOPROLOL TARTRATE 50 MG: 50 TABLET, FILM COATED ORAL at 20:14

## 2024-09-08 RX ADMIN — LIDOCAINE HYDROCHLORIDE,EPINEPHRINE BITARTRATE: 10; .01 INJECTION, SOLUTION INFILTRATION; PERINEURAL at 19:31

## 2024-09-08 RX ADMIN — POLYETHYLENE GLYCOL 3350 17 G: 17 POWDER, FOR SOLUTION ORAL at 19:30

## 2024-09-08 RX ADMIN — CEFTRIAXONE SODIUM 2 G: 2 INJECTION, SOLUTION INTRAVENOUS at 19:30

## 2024-09-08 RX ADMIN — APIXABAN 2.5 MG: 5 TABLET, FILM COATED ORAL at 20:14

## 2024-09-08 RX ADMIN — Medication 1 EACH: at 19:30

## 2024-09-08 RX ADMIN — AMIODARONE HYDROCHLORIDE 200 MG: 200 TABLET ORAL at 19:30

## 2024-09-08 SDOH — SOCIAL STABILITY: SOCIAL INSECURITY: ARE THERE ANY APPARENT SIGNS OF INJURIES/BEHAVIORS THAT COULD BE RELATED TO ABUSE/NEGLECT?: NO

## 2024-09-08 SDOH — SOCIAL STABILITY: SOCIAL INSECURITY: ARE YOU OR HAVE YOU BEEN THREATENED OR ABUSED PHYSICALLY, EMOTIONALLY, OR SEXUALLY BY ANYONE?: NO

## 2024-09-08 SDOH — SOCIAL STABILITY: SOCIAL INSECURITY: DOES ANYONE TRY TO KEEP YOU FROM HAVING/CONTACTING OTHER FRIENDS OR DOING THINGS OUTSIDE YOUR HOME?: NO

## 2024-09-08 SDOH — SOCIAL STABILITY: SOCIAL INSECURITY: ABUSE: ADULT

## 2024-09-08 SDOH — SOCIAL STABILITY: SOCIAL INSECURITY: DO YOU FEEL UNSAFE GOING BACK TO THE PLACE WHERE YOU ARE LIVING?: NO

## 2024-09-08 SDOH — SOCIAL STABILITY: SOCIAL INSECURITY: HAS ANYONE EVER THREATENED TO HURT YOUR FAMILY OR YOUR PETS?: NO

## 2024-09-08 SDOH — SOCIAL STABILITY: SOCIAL INSECURITY: WERE YOU ABLE TO COMPLETE ALL THE BEHAVIORAL HEALTH SCREENINGS?: YES

## 2024-09-08 SDOH — SOCIAL STABILITY: SOCIAL INSECURITY: HAVE YOU HAD ANY THOUGHTS OF HARMING ANYONE ELSE?: NO

## 2024-09-08 SDOH — SOCIAL STABILITY: SOCIAL INSECURITY: DO YOU FEEL ANYONE HAS EXPLOITED OR TAKEN ADVANTAGE OF YOU FINANCIALLY OR OF YOUR PERSONAL PROPERTY?: NO

## 2024-09-08 SDOH — SOCIAL STABILITY: SOCIAL INSECURITY: HAVE YOU HAD THOUGHTS OF HARMING ANYONE ELSE?: NO

## 2024-09-08 ASSESSMENT — ENCOUNTER SYMPTOMS
TREMORS: 0
COUGH: 0
BLOOD IN STOOL: 0
CHILLS: 0
WHEEZING: 0
NECK PAIN: 1
WEAKNESS: 0
ABDOMINAL PAIN: 0
PALPITATIONS: 0
WOUND: 0
CONSTIPATION: 0
LIGHT-HEADEDNESS: 0
CHEST TIGHTNESS: 0
HEADACHES: 0
NAUSEA: 0
BACK PAIN: 1
DIARRHEA: 0
HEMATURIA: 0
DIZZINESS: 0
VOMITING: 0
JOINT SWELLING: 0
FLANK PAIN: 0
FEVER: 0
FATIGUE: 0
SHORTNESS OF BREATH: 0

## 2024-09-08 ASSESSMENT — LIFESTYLE VARIABLES
TOTAL SCORE: 0
HAVE YOU EVER FELT YOU SHOULD CUT DOWN ON YOUR DRINKING: NO
HAVE PEOPLE ANNOYED YOU BY CRITICIZING YOUR DRINKING: NO
AUDIT-C TOTAL SCORE: 0
AUDIT-C TOTAL SCORE: 0
SKIP TO QUESTIONS 9-10: 1
HOW OFTEN DO YOU HAVE 6 OR MORE DRINKS ON ONE OCCASION: NEVER
EVER HAD A DRINK FIRST THING IN THE MORNING TO STEADY YOUR NERVES TO GET RID OF A HANGOVER: NO
EVER FELT BAD OR GUILTY ABOUT YOUR DRINKING: NO
HOW MANY STANDARD DRINKS CONTAINING ALCOHOL DO YOU HAVE ON A TYPICAL DAY: PATIENT DOES NOT DRINK
HOW OFTEN DO YOU HAVE A DRINK CONTAINING ALCOHOL: NEVER

## 2024-09-08 ASSESSMENT — ACTIVITIES OF DAILY LIVING (ADL)
PATIENT'S MEMORY ADEQUATE TO SAFELY COMPLETE DAILY ACTIVITIES?: NO
GROOMING: NEEDS ASSISTANCE
DRESSING YOURSELF: NEEDS ASSISTANCE
LACK_OF_TRANSPORTATION: NO
FEEDING YOURSELF: INDEPENDENT
WALKS IN HOME: NEEDS ASSISTANCE
BATHING: NEEDS ASSISTANCE
ADEQUATE_TO_COMPLETE_ADL: YES
ASSISTIVE_DEVICE: WALKER
JUDGMENT_ADEQUATE_SAFELY_COMPLETE_DAILY_ACTIVITIES: NO
HEARING - RIGHT EAR: FUNCTIONAL
TOILETING: NEEDS ASSISTANCE
HEARING - LEFT EAR: FUNCTIONAL

## 2024-09-08 ASSESSMENT — COGNITIVE AND FUNCTIONAL STATUS - GENERAL
DRESSING REGULAR UPPER BODY CLOTHING: A LOT
DAILY ACTIVITIY SCORE: 15
STANDING UP FROM CHAIR USING ARMS: A LOT
HELP NEEDED FOR BATHING: A LOT
PATIENT BASELINE BEDBOUND: NO
MOBILITY SCORE: 12
MOVING TO AND FROM BED TO CHAIR: A LOT
DRESSING REGULAR LOWER BODY CLOTHING: A LOT
CLIMB 3 TO 5 STEPS WITH RAILING: TOTAL
WALKING IN HOSPITAL ROOM: TOTAL
TOILETING: A LOT
PERSONAL GROOMING: A LITTLE
MOVING FROM LYING ON BACK TO SITTING ON SIDE OF FLAT BED WITH BEDRAILS: A LITTLE
TURNING FROM BACK TO SIDE WHILE IN FLAT BAD: A LITTLE

## 2024-09-08 ASSESSMENT — PAIN SCALES - GENERAL: PAINLEVEL_OUTOF10: 10 - WORST POSSIBLE PAIN

## 2024-09-08 ASSESSMENT — PAIN - FUNCTIONAL ASSESSMENT: PAIN_FUNCTIONAL_ASSESSMENT: 0-10

## 2024-09-08 NOTE — ED PROVIDER NOTES
HPI   Chief Complaint   Patient presents with    Fall       This is a 83-year-old female with a past medical history of dementia.  Patient is DNR comfort care order she is on Eliquis because of her history coronary artery disease hypertension and a pacemaker she was found on the floor today for an unwitnessed fall.  Unsure of how long patient was on the ground for.  She is of contusion of the left side of her head alert and oriented to self at baseline.     past medical history of paroxysmal A-fib on Eliquis and Plavix, coronary artery disease, dementia, sick sinus syndrome, pacemaker who was comfort care measures on hospice care her power of  is her Sister Krista and her brother Ede                          No data recorded                Patient History   Past Medical History:   Diagnosis Date    Angina pectoris (CMS-HCC) 10/22/2023    Atherosclerosis of coronary artery 08/21/2019    Atherosclerotic heart disease of native coronary artery with other forms of angina pectoris (CMS-HCC) 10/22/2023    Hypercholesterolemia 12/28/2018    Hyperlipidemia 10/22/2023    Presence of cardiac pacemaker 10/22/2023    Primary hypertension 12/28/2018    Shortness of breath 11/26/2019    Sick sinus syndrome (Multi) 10/22/2023    Sinus bradycardia 10/22/2023    Stage 3a chronic kidney disease (Multi) 11/25/2019     Past Surgical History:   Procedure Laterality Date    CARDIAC CATHETERIZATION N/A 02/02/2024    Procedure: Left Heart Cath;  Surgeon: Becky Vela MD;  Location: [facility] Cardiac Cath Lab;  Service: Cardiovascular;  Laterality: N/A;    CARDIAC CATHETERIZATION N/A 02/02/2024    Procedure: PCI;  Surgeon: Becky Vela MD;  Location: [facility] Cardiac Cath Lab;  Service: Cardiovascular;  Laterality: N/A;    CARDIAC CATHETERIZATION N/A 02/08/2024    Procedure: Left Heart Cath;  Surgeon: Darrius Ibrahim MD;  Location: [facility] Cardiac Cath Lab;  Service: Cardiovascular;  Laterality: N/A;    CARDIAC CATHETERIZATION N/A 02/08/2024     Procedure: PCI;  Surgeon: Darrius Ibrahim MD;  Location: [facility] Cardiac Cath Lab;  Service: Cardiovascular;  Laterality: N/A;    PACEMAKER PLACEMENT  07/04/2019    [employer] PACEMAKE AND STENT PLACEMENT     Family History   Problem Relation Name Age of Onset    No Known Problems Mother      No Known Problems Father       Social History     Tobacco Use    Smoking status: Never     Passive exposure: Never    Smokeless tobacco: Never   Vaping Use    Vaping status: Never Used   Substance Use Topics    Alcohol use: Never    Drug use: Never       Physical Exam   ED Triage Vitals [09/08/24 1654]   Temperature Heart Rate Respirations BP   36.2 °C (97.2 °F) 64 20 (!) 175/92      Pulse Ox Temp Source Heart Rate Source Patient Position   95 % Tympanic Monitor --      BP Location FiO2 (%)     -- --       Physical Exam  Constitutional:       General: She is not in acute distress.  HENT:      Head: Normocephalic.        Comments: Large 2 cm diameter contusion above left eye.  Half a centimeter laceration on the left forehead     Right Ear: Tympanic membrane normal.      Left Ear: Tympanic membrane normal.      Mouth/Throat:      Mouth: Mucous membranes are moist.   Eyes:      Extraocular Movements: Extraocular movements intact.      Conjunctiva/sclera: Conjunctivae normal.      Pupils: Pupils are equal, round, and reactive to light.   Cardiovascular:      Rate and Rhythm: Normal rate and regular rhythm.      Heart sounds: No murmur heard.  Pulmonary:      Effort: Pulmonary effort is normal. No respiratory distress.      Breath sounds: Normal breath sounds. No stridor. No wheezing or rales.   Abdominal:      General: Bowel sounds are normal. There is no distension.      Tenderness: There is no abdominal tenderness. There is no guarding or rebound.   Musculoskeletal:         General: No swelling, tenderness or deformity. Normal range of motion.        Arms:       Comments: Tenderness to palpation of the left wrist and left hand  with ecchymosis.   Skin:     General: Skin is warm and dry.      Coloration: Skin is not jaundiced.      Findings: No bruising or lesion.   Neurological:      General: No focal deficit present.      Mental Status: She is alert and oriented to person, place, and time. Mental status is at baseline.      Cranial Nerves: No cranial nerve deficit.      Motor: No weakness.   Psychiatric:         Mood and Affect: Mood normal.       Labs Reviewed   CBC WITH AUTO DIFFERENTIAL - Abnormal       Result Value    WBC 5.8      nRBC 0.0      RBC 4.18      Hemoglobin 10.9 (*)     Hematocrit 35.5 (*)     MCV 85      MCH 26.1      MCHC 30.7 (*)     RDW 17.6 (*)     Platelets 177      Neutrophils % 73.4      Immature Granulocytes %, Automated 0.2      Lymphocytes % 13.3      Monocytes % 10.2      Eosinophils % 2.2      Basophils % 0.7      Neutrophils Absolute 4.24      Immature Granulocytes Absolute, Automated 0.01      Lymphocytes Absolute 0.77 (*)     Monocytes Absolute 0.59      Eosinophils Absolute 0.13      Basophils Absolute 0.04     URINALYSIS WITH REFLEX CULTURE AND MICROSCOPIC    Narrative:     The following orders were created for panel order Urinalysis with Reflex Culture and Microscopic.  Procedure                               Abnormality         Status                     ---------                               -----------         ------                     Urinalysis with Reflex C...[[id number]]                                                 Extra Urine Gray Tube[[id number]]                                                         Please view results for these tests on the individual orders.   MAGNESIUM   COMPREHENSIVE METABOLIC PANEL   LACTATE   CREATINE KINASE   URINALYSIS WITH REFLEX CULTURE AND MICROSCOPIC   EXTRA URINE GRAY TUBE     CT head wo IV contrast    (Results Pending)   CT cervical spine wo IV contrast    (Results Pending)   CT chest abdomen pelvis wo IV contrast    (Results Pending)   XR wrist left 3+ views     (Results Pending)   XR hand left 3+ views    (Results Pending)       ED Course & MDM   ED Course as of 09/08/24 1950   Sun Sep 08, 2024   1747 IMPRESSION:  CT HEAD:  1. No acute intracranial abnormality or calvarial fracture.  2. Large left forehead/frontal scalp hematoma.          CT CERVICAL SPINE:  1. No acute fracture or traumatic malalignment of the cervical spine.  2. Large right pleural effusion.   [CF]   1800 IMPRESSION:  1. Large right pleural effusion with associated volume loss.  Superimposed airspace disease can not be excluded.  2. Subtle age-indeterminate superior endplate deformity of T4.  Correlate for point tenderness on exam.  3. Mild septal thickening and scattered ground-glass opacities in the  lungs likely reflecting edema. Infectious or inflammatory process not  excluded.  4. Hepatomegaly.  5. Small volume perihepatic and splenic ascites.  6. Mild anasarca.   [CF]   1949 EKG on my read shows atrial placed rhythm at a rate of 72 bpm, incomplete right heart bundle branch block, no ST elevations [place] interval is 315 and QTc is 582. [CF]      ED Course User Index  [CF] Shayna Adams MD         Diagnoses as of 09/08/24 1950   Pleural effusion on right   Fall, initial encounter   Laceration of forehead, initial encounter   Pleural effusion       Medical Decision Making  83-year-old female who presents emergency department after a unwitnessed fall.  Her vitals are stable.  She does have a contusion on the left side of her head with a small laceration.  It did appear to be an arterial bleed and required multiple stitches about 5 to gain control and prevent bleeding.  Her CTs showed an old T4 fracture and a new large right pleural effusion along with ascites and some elevation in her LFTs.  Patient does not appear to be on Lasix.  After an extensive conversation with patient's family and power of  both her brother Ede and her Sister Krista they decided to admit patient for her large volume  pleural effusion.  She is not hypoxic.  No other issues or concerns otherwise patient was accepted for admission.  I did speak with trauma Dr. Carnes who is aware of patient's admission        Procedure  Critical Care    Performed by: Shayna Adams MD  Authorized by: Shayna Adams MD    Critical care provider statement:     Critical care time (minutes):  20    Critical care time was exclusive of:  Separately billable procedures and treating other patients    Critical care was necessary to treat or prevent imminent or life-threatening deterioration of the following conditions:  Trauma    Critical care was time spent personally by me on the following activities:  Development of treatment plan with patient or surrogate, discussions with consultants, evaluation of patient's response to treatment, examination of patient, ordering and performing treatments and interventions, ordering and review of laboratory studies, ordering and review of radiographic studies, pulse oximetry, re-evaluation of patient's condition and review of old charts    Care discussed with: admitting provider    Laceration Repair    Performed by: Shayna Adams MD  Authorized by: Shayna Adams MD    Consent:     Consent obtained:  Emergent situation    Risks discussed:  Infection, pain and poor cosmetic result  Universal protocol:     Procedure explained and questions answered to patient or proxy's satisfaction: yes      Relevant documents present and verified: yes      Test results available: yes      Imaging studies available: yes      Required blood products, implants, devices, and special equipment available: yes      Site/side marked: yes      Immediately prior to procedure, a time out was called: yes      Patient identity confirmed:  Verbally with patient  Anesthesia:     Anesthesia method:  Local infiltration    Local anesthetic:  Lidocaine 2% WITH epi  Laceration details:     Location:  Face    Face location:  Forehead     Length (cm):  1  Exploration:     Limited defect created (wound extended): no    Treatment:     Area cleansed with:  Saline    Amount of cleaning:  Standard    Irrigation solution:  Sterile saline    Visualized foreign bodies/material removed: no      Debridement:  None    Undermining:  None  Skin repair:     Repair method:  Sutures    Suture size:  4-0 and 6-0    Suture material:  Prolene    Suture technique:  Simple interrupted (figure 8)    Number of sutures:  5  Approximation:     Approximation:  Close  Repair type:     Repair type:  Simple  Post-procedure details:     Dressing:  Adhesive bandage    Procedure completion:  Tolerated  Comments:      Patient has 3 simple interrupted sutures and 2 figure-of-eight stitches       Shayna Adams MD  09/08/24 1900       Shayna Adams MD  09/08/24 1950

## 2024-09-08 NOTE — H&P
[facility] - GENERAL MEDICINE HISTORY AND PHYSICAL    History Obtained From: Patient, brother, sister  Collateral History: Chart review    History Of Present Illness:  Linette Doyle is a 83 y.o. female with PMHx s/f CAD s/p PCI of RCA in Feb 2024 on plavix, sick sinus syndrome s/p pacemaker, HTN, hypercholesterolemia, pAfib on eliquis, CKD stage IIIa, hypothyroidism, anxiety with depression presenting with unwitnessed fall.  Family states that she had an unwitnessed fall in the bathroom and are unsure of how long she has been on the ground.  She lives at [facility].  They states she has been in a declining of physical and mental health for the past few months.  She has been having frequent falls, the most recent one being last week.  She has a contusion on the left side of her head, denies loss of consciousness.  Patient is unclear about how she fell.  Denies lightheadedness, headache, fever and chills, nausea and vomiting, chest pain, shortness of breath, abdominal pain, changes in urinary or bowel symptoms, leg swelling.    ED Course (Summary):   Vitals on presentation: 97.2F, 64 bpm, 20 RR, 175/92, 95% on RA  Labs: CMP-glucose 117, potassium 3.1, BUN/creatinine 18/1.32, ALT 69, AST 86, bilirubin 1.5   Magnesium 1.73, lactate 1.4  CBC-hemoglobin 10.9, hematocrit 35.5  Imaging: CT head and cervical spine-No acute intracranial abnormality or calvarial fracture. Large left forehead/frontal scalp hematoma. No acute fracture or traumatic malalignment of the cervical spine.  XR left wrist and hand-No evidence of acute fracture or dislocation.   CT chest AP-Large right pleural effusion with associated volume loss. Superimposed airspace disease can not be excluded. Subtle age-indeterminate superior endplate deformity of T4. Mild septal thickening and scattered ground-glass opacities in the lungs likely reflecting edema. Infectious or inflammatory process not excluded. Hepatomegaly. Small  volume perihepatic and splenic ascites. Mild anasarca.  Interventions: Potassium chloride 40 mEq oral, admission for further management    ED Course (From ED Provider):  ED Course as of 09/08/24 1958   Maybell Sep 08, 2024   1747 IMPRESSION:  CT HEAD:  1. No acute intracranial abnormality or calvarial fracture.  2. Large left forehead/frontal scalp hematoma.          CT CERVICAL SPINE:  1. No acute fracture or traumatic malalignment of the cervical spine.  2. Large right pleural effusion.   [CF]   1800 IMPRESSION:  1. Large right pleural effusion with associated volume loss.  Superimposed airspace disease can not be excluded.  2. Subtle age-indeterminate superior endplate deformity of T4.  Correlate for point tenderness on exam.  3. Mild septal thickening and scattered ground-glass opacities in the  lungs likely reflecting edema. Infectious or inflammatory process not  excluded.  4. Hepatomegaly.  5. Small volume perihepatic and splenic ascites.  6. Mild anasarca.   [CF]   1949 EKG on my read shows atrial placed rhythm at a rate of 72 bpm, incomplete right heart bundle branch block, no ST elevations NV interval is 315 and QTc is 582. [CF]      ED Course User Index  [CF] Shayna Adams MD         Diagnoses as of 09/08/24 1958   Pleural effusion on right   Fall, initial encounter   Laceration of forehead, initial encounter   Pleural effusion     Relevant Results  Results for orders placed or performed during the hospital encounter of 09/08/24 (from the past 24 hour(s))   CBC and Auto Differential   Result Value Ref Range    WBC 5.8 4.4 - 11.3 x10*3/uL    nRBC 0.0 0.0 - 0.0 /100 WBCs    RBC 4.18 4.00 - 5.20 x10*6/uL    Hemoglobin 10.9 (L) 12.0 - 16.0 g/dL    Hematocrit 35.5 (L) 36.0 - 46.0 %    MCV 85 80 - 100 fL    MCH 26.1 26.0 - 34.0 pg    MCHC 30.7 (L) 32.0 - 36.0 g/dL    RDW 17.6 (H) 11.5 - 14.5 %    Platelets 177 150 - 450 x10*3/uL    Neutrophils % 73.4 40.0 - 80.0 %    Immature Granulocytes %, Automated 0.2 0.0  - 0.9 %    Lymphocytes % 13.3 13.0 - 44.0 %    Monocytes % 10.2 2.0 - 10.0 %    Eosinophils % 2.2 0.0 - 6.0 %    Basophils % 0.7 0.0 - 2.0 %    Neutrophils Absolute 4.24 1.60 - 5.50 x10*3/uL    Immature Granulocytes Absolute, Automated 0.01 0.00 - 0.50 x10*3/uL    Lymphocytes Absolute 0.77 (L) 0.80 - 3.00 x10*3/uL    Monocytes Absolute 0.59 0.05 - 0.80 x10*3/uL    Eosinophils Absolute 0.13 0.00 - 0.40 x10*3/uL    Basophils Absolute 0.04 0.00 - 0.10 x10*3/uL   Magnesium   Result Value Ref Range    Magnesium 1.73 1.60 - 2.40 mg/dL   Comprehensive metabolic panel   Result Value Ref Range    Glucose 117 (H) 74 - 99 mg/dL    Sodium 140 136 - 145 mmol/L    Potassium 3.1 (L) 3.5 - 5.3 mmol/L    Chloride 104 98 - 107 mmol/L    Bicarbonate 28 21 - 32 mmol/L    Anion Gap 11 10 - 20 mmol/L    Urea Nitrogen 18 6 - 23 mg/dL    Creatinine 1.32 (H) 0.50 - 1.05 mg/dL    eGFR 40 (L) >60 mL/min/1.73m*2    Calcium 8.9 8.6 - 10.3 mg/dL    Albumin 3.9 3.4 - 5.0 g/dL    Alkaline Phosphatase 101 33 - 136 U/L    Total Protein 6.6 6.4 - 8.2 g/dL    AST 86 (H) 9 - 39 U/L    Bilirubin, Total 1.5 (H) 0.0 - 1.2 mg/dL    ALT 69 (H) 7 - 45 U/L   Creatine Kinase   Result Value Ref Range    Creatine Kinase 80 0 - 215 U/L   Lactate   Result Value Ref Range    Lactate 1.4 0.4 - 2.0 mmol/L      XR wrist left 3+ views    Result Date: 9/8/2024  Interpreted By:  Ludwin Dunlap, STUDY: XR WRIST LEFT 3+ VIEWS; XR HAND LEFT 3+ VIEWS  9/8/2024 5:58 pm   INDICATION: Signs/Symptoms:fall   COMPARISON: 08/27/2024   ACCESSION NUMBER(S): [id number]; [id number]   ORDERING CLINICIAN: REZA CHUN   TECHNIQUE: 3 views each of left hand wrist including AP , oblique and lateral projections were obtained.   FINDINGS: There is no evidence of acute fracture or dislocation identified. Moderate hypertrophic degenerative changes are seen trapezium 1st. Mild degenerative changes are seen in       1. No evidence of acute fracture or dislocation. 2. Degenerative  changes, as described above.   MACRO: None.   Signed by: Ludwin Dunlap 9/8/2024 6:29 PM Dictation workstation:   [id number]    XR hand left 3+ views    Result Date: 9/8/2024  Interpreted By:  Ludwin Dunlap, STUDY: XR WRIST LEFT 3+ VIEWS; XR HAND LEFT 3+ VIEWS  9/8/2024 5:58 pm   INDICATION: Signs/Symptoms:fall   COMPARISON: 08/27/2024   ACCESSION NUMBER(S): [id number]; [id number]   ORDERING CLINICIAN: REZA CHUN   TECHNIQUE: 3 views each of left hand wrist including AP , oblique and lateral projections were obtained.   FINDINGS: There is no evidence of acute fracture or dislocation identified. Moderate hypertrophic degenerative changes are seen trapezium 1st. Mild degenerative changes are seen in       1. No evidence of acute fracture or dislocation. 2. Degenerative changes, as described above.   MACRO: None.   Signed by: Ludwin Dunlap 9/8/2024 6:29 PM Dictation workstation:   [id number]    CT chest abdomen pelvis wo IV contrast    Result Date: 9/8/2024  Interpreted By:  Bro Doyle, STUDY: CT CHEST ABDOMEN PELVIS WO CONTRAST;  9/8/2024 5:24 pm   INDICATION: Signs/Symptoms:fall.     COMPARISON: CT abdomen and pelvis 05/29/2024   ACCESSION NUMBER(S): [id number]   ORDERING CLINICIAN: REZA CHUN   TECHNIQUE: Contiguous axial images of the chest, abdomen, and pelvis were obtained without contrast. Coronal and sagittal reformatted images were reconstructed from the axial data.   FINDINGS: Assessment of the vascular, mediastinal, and abdominopelvic hollow and solid visceral structures limited without IV contrast.   CT CHEST:   MEDIASTINUM AND LYMPH NODES: The visualized thyroid is within normal limits. No enlarged intrathoracic or axillary lymph nodes by imaging criteria. Assessment for hilar adenopathy limited by lack of IV contrast. No pneumomediastinum. The esophagus appears within normal limits.   HEART: Cardiomegaly. Right atrial and right ventricular pacing leads. Triple-vessel coronary  vascular calcifications. No significant pericardial effusion.   VESSELS: Thoracic aorta is normal in diameter. Mild calcifications of the aortic arch. The main pulmonary artery is enlarged at 3.2 cm. Vascular patency can not be assessed without IV contrast.   LUNG, AIRWAYS, PLEURA: The trachea and proximal mainstem bronchi are patent. Large right pleural effusion with adjacent volume loss. Mild septal thickening and ground-glass opacities within the bilateral lungs suggesting edema. No pneumothorax.   CHEST WALL SOFT TISSUES: Mild body wall edema.   OSSEOUS STRUCTURES: Subacute to chronic incompletely united fracture deformity of the lateral left 9th rib. Age-indeterminate superior endplate deformity of T4 without significant vertebral body height loss.     CT ABDOMEN/PELVIS:   LIVER: Enlargement of the right hepatic lobe measuring 20 cm.   BILE DUCTS: No significant intrahepatic or extrahepatic dilatation.   GALLBLADDER: Cholelithiasis without CT evidence of acute cholecystitis.   PANCREAS: No significant abnormality.   SPLEEN: No significant abnormality.   ADRENALS: No significant abnormality.   KIDNEYS, URETERS, BLADDER: Mild bilateral renal cortical scarring. Nonspecific perinephric fat stranding bilaterally. No appreciable renal or ureteral calculus or hydro ureteral nephrosis. Bladder is unremarkable.   REPRODUCTIVE ORGANS: Hysterectomy.   GI: No bowel obstruction. No appreciable bowel inflammation in the limitation of motion artifact. The appendix isn't visualized.   VESSELS: No aortic aneurysm. Moderate aortoiliac calcifications. Vascular patency can not be assessed without IV contrast.   PERITONEUM/RETROPERITONEUM: Small amount of perihepatic and pelvic ascites. No intraperitoneal free air. Mild mesenteric edema.   LYMPH NODES: No enlarged lymph nodes.   ABDOMINAL WALL: Mild body wall edema.   OSSEOUS STRUCTURES: No acute osseous abnormality. Severe degenerative disc changes throughout the lumbar spine with  vacuum disc phenomenon in the associated degenerative endplate changes.       1. Large right pleural effusion with associated volume loss. Superimposed airspace disease can not be excluded. 2. Subtle age-indeterminate superior endplate deformity of T4. Correlate for point tenderness on exam. 3. Mild septal thickening and scattered ground-glass opacities in the lungs likely reflecting edema. Infectious or inflammatory process not excluded. 4. Hepatomegaly. 5. Small volume perihepatic and splenic ascites. 6. Mild anasarca.   MACRO: None   Signed by: Bro Doyle 9/8/2024 5:52 PM Dictation workstation:   [id number]    CT head wo IV contrast    Result Date: 9/8/2024  Interpreted By:  Bro Doyle, STUDY: CT HEAD WO IV CONTRAST; CT CERVICAL SPINE WO IV CONTRAST;  9/8/2024 5:24 pm   INDICATION: Signs/Symptoms:fall     COMPARISON: CT head and cervical spine 08/27/2024   ACCESSION NUMBER(S): [id number]; [id number]   ORDERING CLINICIAN: REZA CHUN   TECHNIQUE: Axial noncontrast CT images of head with coronal and sagittal reconstructed images. Axial noncontrast CT images of the cervical spine with coronal and sagittal reconstructed images.   FINDINGS: CT HEAD:   BRAIN PARENCHYMA: Generalized cerebral volume loss. Gray-white differentiation is maintained. No mass-effect, midline shift or effacement of cerebral sulci. Patchy periventricular and subcortical white matter hypodensities, nonspecific but often seen in the setting of chronic microangiopathic change.   HEMORRHAGE: No acute intracranial hemorrhage.   VENTRICLES and EXTRA-AXIAL SPACES: The ventricles and sulci are within normal limits for brain volume. No abnormal extra-axial fluid collection.   ORBITS: The visualized orbits and globes are within normal limits.   EXTRACRANIAL SOFT TISSUES: Large hematoma of the left forehead/frontal scalp.   PARANASAL SINUSES/MASTOIDS: The visualized paranasal sinuses and mastoid air cells are well aerated.   CALVARIUM:  No depressed skull fracture.     CT CERVICAL SPINE:   CRANIOCERVICAL JUNCTION: Intact.   ALIGNMENT: No traumatic malalignment or traumatic facet widening. Straightening of the cervical lordotic curvature which may be related to positioning or muscle spasm.   VERTEBRAE/DISC SPACES: No acute fracture. Vertebral body heights are maintained. Mild disc space height loss throughout the cervical spine with associated mild degenerative endplate changes. No high grade spinal canal stenosis evident by CT.   SOFT TISSUES: Within normal limits.   OTHER: Large right pleural effusion.       CT HEAD: 1. No acute intracranial abnormality or calvarial fracture. 2. Large left forehead/frontal scalp hematoma.     CT CERVICAL SPINE: 1. No acute fracture or traumatic malalignment of the cervical spine. 2. Large right pleural effusion.   MACRO: None   Signed by: Bro Doyle 9/8/2024 5:35 PM Dictation workstation:   [id number]    CT cervical spine wo IV contrast    Result Date: 9/8/2024  Interpreted By:  Bro Doyle, STUDY: CT HEAD WO IV CONTRAST; CT CERVICAL SPINE WO IV CONTRAST;  9/8/2024 5:24 pm   INDICATION: Signs/Symptoms:fall     COMPARISON: CT head and cervical spine 08/27/2024   ACCESSION NUMBER(S): [id number]; [id number]   ORDERING CLINICIAN: REZA CHUN   TECHNIQUE: Axial noncontrast CT images of head with coronal and sagittal reconstructed images. Axial noncontrast CT images of the cervical spine with coronal and sagittal reconstructed images.   FINDINGS: CT HEAD:   BRAIN PARENCHYMA: Generalized cerebral volume loss. Gray-white differentiation is maintained. No mass-effect, midline shift or effacement of cerebral sulci. Patchy periventricular and subcortical white matter hypodensities, nonspecific but often seen in the setting of chronic microangiopathic change.   HEMORRHAGE: No acute intracranial hemorrhage.   VENTRICLES and EXTRA-AXIAL SPACES: The ventricles and sulci are within normal limits for brain  volume. No abnormal extra-axial fluid collection.   ORBITS: The visualized orbits and globes are within normal limits.   EXTRACRANIAL SOFT TISSUES: Large hematoma of the left forehead/frontal scalp.   PARANASAL SINUSES/MASTOIDS: The visualized paranasal sinuses and mastoid air cells are well aerated.   CALVARIUM: No depressed skull fracture.     CT CERVICAL SPINE:   CRANIOCERVICAL JUNCTION: Intact.   ALIGNMENT: No traumatic malalignment or traumatic facet widening. Straightening of the cervical lordotic curvature which may be related to positioning or muscle spasm.   VERTEBRAE/DISC SPACES: No acute fracture. Vertebral body heights are maintained. Mild disc space height loss throughout the cervical spine with associated mild degenerative endplate changes. No high grade spinal canal stenosis evident by CT.   SOFT TISSUES: Within normal limits.   OTHER: Large right pleural effusion.       CT HEAD: 1. No acute intracranial abnormality or calvarial fracture. 2. Large left forehead/frontal scalp hematoma.     CT CERVICAL SPINE: 1. No acute fracture or traumatic malalignment of the cervical spine. 2. Large right pleural effusion.   MACRO: None   Signed by: Bro Doyle 9/8/2024 5:35 PM Dictation workstation:   [id number]    Scheduled medications:  gelatin sponge,absorb-porcine, , ,   lidocaine-epinephrine, , ,   potassium chloride CR, 40 mEq, oral, Once      Continuous medications:     PRN medications:  PRN medications: gelatin sponge,absorb-porcine, lidocaine-epinephrine     Past Medical History  She has a past medical history of Angina pectoris (CMS-HCC) (10/22/2023), Atherosclerosis of coronary artery (08/21/2019), Atherosclerotic heart disease of native coronary artery with other forms of angina pectoris (CMS-HCC) (10/22/2023), Hypercholesterolemia (12/28/2018), Hyperlipidemia (10/22/2023), Presence of cardiac pacemaker (10/22/2023), Primary hypertension (12/28/2018), Shortness of breath (11/26/2019), Sick sinus  syndrome (Multi) (10/22/2023), Sinus bradycardia (10/22/2023), and Stage 3a chronic kidney disease (Multi) (11/25/2019).    Surgical History  She has a past surgical history that includes Cardiac catheterization (N/A, 02/02/2024); Cardiac catheterization (N/A, 02/02/2024); Cardiac catheterization (N/A, 02/08/2024); Cardiac catheterization (N/A, 02/08/2024); and pacemaker placement (07/04/2019).     Social History  She reports that she has never smoked. She has never been exposed to tobacco smoke. She has never used smokeless tobacco. She reports that she does not drink alcohol and does not use drugs.    Family History  Family History   Problem Relation Name Age of Onset    No Known Problems Mother      No Known Problems Father         Allergies  Iodinated contrast media    Code Status  DNR and No Intubation     Review of Systems   Constitutional:  Negative for chills, fatigue and fever.   Respiratory:  Negative for cough, chest tightness, shortness of breath and wheezing.    Cardiovascular:  Negative for chest pain, palpitations and leg swelling.   Gastrointestinal:  Negative for abdominal pain, constipation, diarrhea, nausea and vomiting.   Genitourinary:  Negative for flank pain and hematuria.   Musculoskeletal:  Positive for back pain. Negative for joint swelling.        Chronic back pain   Skin:  Negative for rash and wound.   Neurological:  Negative for tremors, syncope, weakness, light-headedness and headaches.       Last Recorded Vitals  /83   Pulse 60   Temp 36.2 °C (97.2 °F) (Tympanic)   Resp 12   Wt 65.7 kg (144 lb 13.5 oz)   SpO2 97%      Physical Exam:  Vital signs and nursing notes reviewed.   Constitutional: Pleasant and cooperative. Laying in bed in no acute distress with gauze wrapped after laceration repair. Conversant.   Skin: Warm and dry. Good turgor.   Eyes: EOMI. Anicteric sclera.   ENT: Mucous membranes moist; no obvious injury or deformity appreciated.   Head and Neck: Left head  hematoma post laceration repair. Normocephalic, atraumatic. ROM preserved.   Respiratory: Nonlabored on RA. Lungs sounds diminished bilaterally. Chest rise is equal.  Cardiovascular: RRR. No gross murmur, gallop, or rub. Extremities are warm and well-perfused with good capillary refill (< 3 seconds). No chest wall tenderness.   GI: Abdomen soft, nontender, nondistended. No obvious organomegaly appreciated. Bowel sounds are present and normoactive.  : No CVA tenderness.   MSK: No gross abnormalities appreciated. No limitations to AROM/PROM appreciated.   Extremities: 1+ pitting edema in bilateral LE. No cyanosis, or clubbing evident. Neurovascularly intact.   Neuro: A&Ox1, doesn't know place, time. CN 2-12 grossly intact. Able to respond to questions clearly. No acute focal neurologic deficits appreciated.  Psych: Appropriate mood and behavior.    Assessment/Plan     83 y.o. female with PMHx s/f CAD s/p PCI of RCA in Feb 2024 on plavix, sick sinus syndrome s/p pacemaker, HTN, hypercholesterolemia, pAfib on eliquis, CKD stage IIIa, hypothyroidism, anxiety with depression presenting with unwitnessed fall.     Acute heart failure  -BNP 2509, diminished lung sounds and 1+ pitting edema in bilateral LE on exam  -Echo on 4/10/24: LVEF normal 55-60%, moderate mitral valve regurg.  -a dose of IV lasix 40 mg ordered  -Strict I&O’s   -2g salt restriction, 2L fluid restriction   -Monitor fluid status closely   -cardiology consult    Right pleural effusion  -likely secondary to above  -consult pulmonary for thoracentesis  -she has no fever, no elevated WBC or left shift, I don't think it's infectious etiology related but depending on trending up curves for fever/WBC or exudative fluids from fluid analysis, can consider starting IV abx then    Hypokalemia  -received potassium chloride in ED  -recheck in AM and replenish as necessary    CAD s/p PCI  -continue plavix and eliquis    HTN  -initially hypertensive, likely secondary  to pain  -pain meds PRN  -continue metoprolol    pAfib  -continue eliquis    -continue amiodarone and metoprolol    CKD stage IIIa  -BUN/Cr 1.32, 40  -RFP near baseline of 1.40-1.50  -continue to trend while admitted    Hypothyroidism  -continue levothyroxine    Anxiety with depression  -continue zoloft    Frequent falls  -was hospitalized on 06/04/24-06/07/24 for frequent falls of unknown etiology  -she had lumbar MRI that shows multilevel degenerative changes with severe canal stenosis and neural foraminal narrowing, both neurology and ortho spine didn't give further recommendations. Ultimately no medical cause was found for her frequent falls and was discharged as the patient being frail and having chronic gait issues  -PT/OT appreciated    Diet: cardiac, fluid restriction  DVT Prophylaxis: eliquis  Code Status: DNR, DNI     Mikey Salgado PA-C    Dragon dictation software was used to dictate this note and thus there may be minor errors in translation/transcription including garbled speech or misspellings. Please contact for clarification if needed.

## 2024-09-09 ENCOUNTER — TELEPHONE (OUTPATIENT)
Dept: CARDIOLOGY | Facility: CLINIC | Age: 84
End: 2024-09-09
Payer: MEDICARE

## 2024-09-09 ENCOUNTER — APPOINTMENT (OUTPATIENT)
Dept: RADIOLOGY | Facility: HOSPITAL | Age: 84
End: 2024-09-09
Payer: MEDICARE

## 2024-09-09 LAB
ALBUMIN SERPL BCP-MCNC: 3.5 G/DL (ref 3.4–5)
ALBUMIN SERPL BCP-MCNC: 3.6 G/DL (ref 3.4–5)
ALP SERPL-CCNC: 86 U/L (ref 33–136)
ALT SERPL W P-5'-P-CCNC: 59 U/L (ref 7–45)
AMMONIA PLAS-SCNC: 21 UMOL/L (ref 16–53)
ANION GAP SERPL CALC-SCNC: 11 MMOL/L (ref 10–20)
ANION GAP SERPL CALC-SCNC: 13 MMOL/L (ref 10–20)
AST SERPL W P-5'-P-CCNC: 75 U/L (ref 9–39)
BASOPHILS # BLD AUTO: 0.03 X10*3/UL (ref 0–0.1)
BASOPHILS NFR BLD AUTO: 0.6 %
BASOPHILS NFR FLD MANUAL: 0 %
BILIRUB SERPL-MCNC: 1.1 MG/DL (ref 0–1.2)
BLASTS NFR FLD MANUAL: 0 %
BUN SERPL-MCNC: 16 MG/DL (ref 6–23)
BUN SERPL-MCNC: 17 MG/DL (ref 6–23)
CALCIUM SERPL-MCNC: 8.6 MG/DL (ref 8.6–10.3)
CALCIUM SERPL-MCNC: 8.7 MG/DL (ref 8.6–10.3)
CHLORIDE SERPL-SCNC: 101 MMOL/L (ref 98–107)
CHLORIDE SERPL-SCNC: 103 MMOL/L (ref 98–107)
CLARITY FLD: CLEAR
CO2 SERPL-SCNC: 31 MMOL/L (ref 21–32)
CO2 SERPL-SCNC: 33 MMOL/L (ref 21–32)
COLOR FLD: YELLOW
CREAT SERPL-MCNC: 1.12 MG/DL (ref 0.5–1.05)
CREAT SERPL-MCNC: 1.2 MG/DL (ref 0.5–1.05)
EGFRCR SERPLBLD CKD-EPI 2021: 44 ML/MIN/1.73M*2
EGFRCR SERPLBLD CKD-EPI 2021: 48 ML/MIN/1.73M*2
EOSINOPHIL # BLD AUTO: 0.09 X10*3/UL (ref 0–0.4)
EOSINOPHIL NFR BLD AUTO: 1.8 %
EOSINOPHIL NFR FLD MANUAL: 0 %
ERYTHROCYTE [DISTWIDTH] IN BLOOD BY AUTOMATED COUNT: 17.6 % (ref 11.5–14.5)
FOLATE SERPL-MCNC: >22.3 NG/ML
GLUCOSE FLD-MCNC: 113 MG/DL
GLUCOSE SERPL-MCNC: 95 MG/DL (ref 74–99)
GLUCOSE SERPL-MCNC: 99 MG/DL (ref 74–99)
HCT VFR BLD AUTO: 29.5 % (ref 36–46)
HGB BLD-MCNC: 9 G/DL (ref 12–16)
HGB RETIC QN: 28 PG (ref 28–38)
HOLD SPECIMEN: NORMAL
IMM GRANULOCYTES # BLD AUTO: 0.02 X10*3/UL (ref 0–0.5)
IMM GRANULOCYTES NFR BLD AUTO: 0.4 % (ref 0–0.9)
IMMATURE GRANULOCYTES IN FLUID: 0 %
IMMATURE RETIC FRACTION: 28.3 %
IRON SATN MFR SERPL: 13 % (ref 25–45)
IRON SERPL-MCNC: 44 UG/DL (ref 35–150)
LDH FLD L TO P-CCNC: 76 U/L
LDH SERPL L TO P-CCNC: 319 U/L (ref 84–246)
LYMPHOCYTES # BLD AUTO: 0.7 X10*3/UL (ref 0.8–3)
LYMPHOCYTES NFR BLD AUTO: 13.9 %
LYMPHOCYTES NFR FLD MANUAL: 25 %
MAGNESIUM SERPL-MCNC: 1.57 MG/DL (ref 1.6–2.4)
MCH RBC QN AUTO: 25.9 PG (ref 26–34)
MCHC RBC AUTO-ENTMCNC: 30.5 G/DL (ref 32–36)
MCV RBC AUTO: 85 FL (ref 80–100)
MONOCYTES # BLD AUTO: 0.45 X10*3/UL (ref 0.05–0.8)
MONOCYTES NFR BLD AUTO: 8.9 %
MONOS+MACROS NFR FLD MANUAL: 72 %
NEUTROPHILS # BLD AUTO: 3.76 X10*3/UL (ref 1.6–5.5)
NEUTROPHILS NFR BLD AUTO: 74.4 %
NEUTROPHILS NFR FLD MANUAL: 3 %
NRBC BLD-RTO: 0 /100 WBCS (ref 0–0)
OTHER CELLS NFR FLD MANUAL: 0 %
PH FLD: 7.6 [PH]
PHOSPHATE SERPL-MCNC: 3.4 MG/DL (ref 2.5–4.9)
PLASMA CELLS NFR FLD MANUAL: 0 %
PLATELET # BLD AUTO: 134 X10*3/UL (ref 150–450)
POTASSIUM SERPL-SCNC: 3.1 MMOL/L (ref 3.5–5.3)
POTASSIUM SERPL-SCNC: 3.2 MMOL/L (ref 3.5–5.3)
PROT FLD-MCNC: 2.4 G/DL
PROT SERPL-MCNC: 5.6 G/DL (ref 6.4–8.2)
PROT SERPL-MCNC: 5.8 G/DL (ref 6.4–8.2)
RBC # BLD AUTO: 3.47 X10*6/UL (ref 4–5.2)
RBC # FLD AUTO: 200 /UL
RETICS #: 0.06 X10*6/UL (ref 0.02–0.11)
RETICS/RBC NFR AUTO: 1.8 % (ref 0.5–2)
SODIUM SERPL-SCNC: 142 MMOL/L (ref 136–145)
SODIUM SERPL-SCNC: 144 MMOL/L (ref 136–145)
T4 FREE SERPL-MCNC: 1.88 NG/DL (ref 0.61–1.12)
T4 FREE SERPL-MCNC: 1.96 NG/DL (ref 0.61–1.12)
TIBC SERPL-MCNC: 326 UG/DL (ref 240–445)
TOTAL CELLS COUNTED FLD: 100
TSH SERPL-ACNC: 5.42 MIU/L (ref 0.44–3.98)
TSH SERPL-ACNC: 5.57 MIU/L (ref 0.44–3.98)
UIBC SERPL-MCNC: 282 UG/DL (ref 110–370)
VIT B12 SERPL-MCNC: 653 PG/ML (ref 211–911)
WBC # BLD AUTO: 5.1 X10*3/UL (ref 4.4–11.3)
WBC # FLD MANUAL: 78 /UL

## 2024-09-09 PROCEDURE — 36415 COLL VENOUS BLD VENIPUNCTURE: CPT | Performed by: INTERNAL MEDICINE

## 2024-09-09 PROCEDURE — 82140 ASSAY OF AMMONIA: CPT | Performed by: INTERNAL MEDICINE

## 2024-09-09 PROCEDURE — 84443 ASSAY THYROID STIM HORMONE: CPT | Performed by: INTERNAL MEDICINE

## 2024-09-09 PROCEDURE — 82746 ASSAY OF FOLIC ACID SERUM: CPT | Performed by: INTERNAL MEDICINE

## 2024-09-09 PROCEDURE — 85045 AUTOMATED RETICULOCYTE COUNT: CPT | Performed by: INTERNAL MEDICINE

## 2024-09-09 PROCEDURE — 85025 COMPLETE CBC W/AUTO DIFF WBC: CPT

## 2024-09-09 PROCEDURE — 82607 VITAMIN B-12: CPT | Performed by: INTERNAL MEDICINE

## 2024-09-09 PROCEDURE — 80069 RENAL FUNCTION PANEL: CPT | Mod: CCI | Performed by: INTERNAL MEDICINE

## 2024-09-09 PROCEDURE — 84155 ASSAY OF PROTEIN SERUM: CPT | Performed by: INTERNAL MEDICINE

## 2024-09-09 PROCEDURE — 83540 ASSAY OF IRON: CPT | Performed by: INTERNAL MEDICINE

## 2024-09-09 PROCEDURE — 32555 ASPIRATE PLEURA W/ IMAGING: CPT

## 2024-09-09 PROCEDURE — 99231 SBSQ HOSP IP/OBS SF/LOW 25: CPT | Performed by: SURGERY

## 2024-09-09 PROCEDURE — 1200000002 HC GENERAL ROOM WITH TELEMETRY DAILY

## 2024-09-09 PROCEDURE — 84439 ASSAY OF FREE THYROXINE: CPT

## 2024-09-09 PROCEDURE — 99223 1ST HOSP IP/OBS HIGH 75: CPT | Performed by: INTERNAL MEDICINE

## 2024-09-09 PROCEDURE — 84439 ASSAY OF FREE THYROXINE: CPT | Performed by: INTERNAL MEDICINE

## 2024-09-09 PROCEDURE — 83735 ASSAY OF MAGNESIUM: CPT | Performed by: INTERNAL MEDICINE

## 2024-09-09 PROCEDURE — 32555 ASPIRATE PLEURA W/ IMAGING: CPT | Performed by: RADIOLOGY

## 2024-09-09 PROCEDURE — 87070 CULTURE OTHR SPECIMN AEROBIC: CPT | Mod: PORLAB | Performed by: INTERNAL MEDICINE

## 2024-09-09 PROCEDURE — 80053 COMPREHEN METABOLIC PANEL: CPT

## 2024-09-09 PROCEDURE — 88305 TISSUE EXAM BY PATHOLOGIST: CPT | Mod: TC | Performed by: INTERNAL MEDICINE

## 2024-09-09 PROCEDURE — 99233 SBSQ HOSP IP/OBS HIGH 50: CPT | Performed by: INTERNAL MEDICINE

## 2024-09-09 PROCEDURE — 2500000001 HC RX 250 WO HCPCS SELF ADMINISTERED DRUGS (ALT 637 FOR MEDICARE OP): Performed by: INTERNAL MEDICINE

## 2024-09-09 PROCEDURE — 2500000004 HC RX 250 GENERAL PHARMACY W/ HCPCS (ALT 636 FOR OP/ED): Performed by: INTERNAL MEDICINE

## 2024-09-09 PROCEDURE — 84443 ASSAY THYROID STIM HORMONE: CPT

## 2024-09-09 PROCEDURE — 83986 ASSAY PH BODY FLUID NOS: CPT | Performed by: INTERNAL MEDICINE

## 2024-09-09 PROCEDURE — 82945 GLUCOSE OTHER FLUID: CPT | Mod: PORLAB | Performed by: INTERNAL MEDICINE

## 2024-09-09 PROCEDURE — 2500000002 HC RX 250 W HCPCS SELF ADMINISTERED DRUGS (ALT 637 FOR MEDICARE OP, ALT 636 FOR OP/ED)

## 2024-09-09 PROCEDURE — 2500000004 HC RX 250 GENERAL PHARMACY W/ HCPCS (ALT 636 FOR OP/ED)

## 2024-09-09 PROCEDURE — 2500000002 HC RX 250 W HCPCS SELF ADMINISTERED DRUGS (ALT 637 FOR MEDICARE OP, ALT 636 FOR OP/ED): Performed by: INTERNAL MEDICINE

## 2024-09-09 PROCEDURE — 83615 LACTATE (LD) (LDH) ENZYME: CPT | Mod: PORLAB | Performed by: INTERNAL MEDICINE

## 2024-09-09 PROCEDURE — 2500000001 HC RX 250 WO HCPCS SELF ADMINISTERED DRUGS (ALT 637 FOR MEDICARE OP)

## 2024-09-09 PROCEDURE — 84157 ASSAY OF PROTEIN OTHER: CPT | Mod: PORLAB | Performed by: INTERNAL MEDICINE

## 2024-09-09 PROCEDURE — 0W993ZZ DRAINAGE OF RIGHT PLEURAL CAVITY, PERCUTANEOUS APPROACH: ICD-10-PCS | Performed by: RADIOLOGY

## 2024-09-09 PROCEDURE — 36415 COLL VENOUS BLD VENIPUNCTURE: CPT

## 2024-09-09 PROCEDURE — 84481 FREE ASSAY (FT-3): CPT | Mod: PORLAB | Performed by: INTERNAL MEDICINE

## 2024-09-09 PROCEDURE — 83615 LACTATE (LD) (LDH) ENZYME: CPT | Performed by: INTERNAL MEDICINE

## 2024-09-09 PROCEDURE — 89051 BODY FLUID CELL COUNT: CPT | Performed by: INTERNAL MEDICINE

## 2024-09-09 PROCEDURE — 97162 PT EVAL MOD COMPLEX 30 MIN: CPT | Mod: GP

## 2024-09-09 RX ORDER — CEFTRIAXONE 1 G/50ML
1 INJECTION, SOLUTION INTRAVENOUS EVERY 24 HOURS
Status: DISCONTINUED | OUTPATIENT
Start: 2024-09-09 | End: 2024-09-12 | Stop reason: HOSPADM

## 2024-09-09 RX ORDER — SPIRONOLACTONE 25 MG/1
12.5 TABLET ORAL DAILY
Status: DISCONTINUED | OUTPATIENT
Start: 2024-09-09 | End: 2024-09-12 | Stop reason: HOSPADM

## 2024-09-09 RX ORDER — ATORVASTATIN CALCIUM 80 MG/1
80 TABLET, FILM COATED ORAL DAILY
COMMUNITY

## 2024-09-09 RX ORDER — ACETAMINOPHEN 325 MG/1
2 TABLET ORAL EVERY 6 HOURS PRN
COMMUNITY

## 2024-09-09 RX ORDER — POTASSIUM CHLORIDE 20 MEQ/1
40 TABLET, EXTENDED RELEASE ORAL ONCE
Status: COMPLETED | OUTPATIENT
Start: 2024-09-09 | End: 2024-09-09

## 2024-09-09 RX ORDER — ADHESIVE BANDAGE
30 BANDAGE TOPICAL DAILY PRN
COMMUNITY

## 2024-09-09 RX ORDER — ACETAMINOPHEN 500 MG
5 TABLET ORAL NIGHTLY PRN
COMMUNITY

## 2024-09-09 RX ORDER — MORPHINE SULFATE 20 MG/ML
.5-1 SOLUTION ORAL EVERY 2 HOUR PRN
COMMUNITY

## 2024-09-09 RX ORDER — ACETAMINOPHEN 650 MG/1
650 SUPPOSITORY RECTAL EVERY 4 HOURS PRN
COMMUNITY

## 2024-09-09 RX ORDER — MAGNESIUM SULFATE HEPTAHYDRATE 40 MG/ML
2 INJECTION, SOLUTION INTRAVENOUS ONCE
Status: COMPLETED | OUTPATIENT
Start: 2024-09-09 | End: 2024-09-09

## 2024-09-09 RX ORDER — LORAZEPAM 0.5 MG/1
0.5 TABLET ORAL EVERY 4 HOURS PRN
COMMUNITY

## 2024-09-09 RX ORDER — PROCHLORPERAZINE MALEATE 10 MG
TABLET ORAL
COMMUNITY

## 2024-09-09 RX ORDER — HYOSCYAMINE SULFATE 0.12 MG/1
0.12 TABLET, ORALLY DISINTEGRATING ORAL EVERY 2 HOUR PRN
COMMUNITY

## 2024-09-09 RX ORDER — BISACODYL 10 MG/1
10 SUPPOSITORY RECTAL DAILY PRN
COMMUNITY

## 2024-09-09 RX ORDER — LEVOTHYROXINE SODIUM 125 UG/1
125 TABLET ORAL DAILY
COMMUNITY

## 2024-09-09 RX ORDER — FUROSEMIDE 10 MG/ML
40 INJECTION INTRAMUSCULAR; INTRAVENOUS EVERY 24 HOURS
Status: DISCONTINUED | OUTPATIENT
Start: 2024-09-09 | End: 2024-09-10

## 2024-09-09 RX ADMIN — METOPROLOL TARTRATE 50 MG: 50 TABLET, FILM COATED ORAL at 09:26

## 2024-09-09 RX ADMIN — POTASSIUM CHLORIDE 40 MEQ: 1500 TABLET, EXTENDED RELEASE ORAL at 11:09

## 2024-09-09 RX ADMIN — AMIODARONE HYDROCHLORIDE 200 MG: 200 TABLET ORAL at 09:36

## 2024-09-09 RX ADMIN — POTASSIUM CHLORIDE 40 MEQ: 1500 TABLET, EXTENDED RELEASE ORAL at 18:32

## 2024-09-09 RX ADMIN — Medication 3 MG: at 20:01

## 2024-09-09 RX ADMIN — MAGNESIUM SULFATE HEPTAHYDRATE 2 G: 40 INJECTION, SOLUTION INTRAVENOUS at 19:37

## 2024-09-09 RX ADMIN — METOPROLOL TARTRATE 50 MG: 50 TABLET, FILM COATED ORAL at 20:01

## 2024-09-09 RX ADMIN — POLYETHYLENE GLYCOL 3350 17 G: 17 POWDER, FOR SOLUTION ORAL at 09:26

## 2024-09-09 RX ADMIN — FUROSEMIDE 40 MG: 10 INJECTION, SOLUTION INTRAMUSCULAR; INTRAVENOUS at 14:34

## 2024-09-09 RX ADMIN — EMPAGLIFLOZIN 10 MG: 10 TABLET, FILM COATED ORAL at 11:21

## 2024-09-09 RX ADMIN — ACETAMINOPHEN 650 MG: 325 TABLET ORAL at 11:09

## 2024-09-09 RX ADMIN — PANTOPRAZOLE SODIUM 40 MG: 40 TABLET, DELAYED RELEASE ORAL at 06:00

## 2024-09-09 RX ADMIN — APIXABAN 2.5 MG: 5 TABLET, FILM COATED ORAL at 09:26

## 2024-09-09 RX ADMIN — LEVOTHYROXINE SODIUM 100 MCG: 0.1 TABLET ORAL at 06:00

## 2024-09-09 RX ADMIN — SERTRALINE HYDROCHLORIDE 200 MG: 50 TABLET ORAL at 20:01

## 2024-09-09 RX ADMIN — CEFTRIAXONE SODIUM 1 G: 1 INJECTION, SOLUTION INTRAVENOUS at 18:32

## 2024-09-09 RX ADMIN — SPIRONOLACTONE 12.5 MG: 25 TABLET ORAL at 11:13

## 2024-09-09 ASSESSMENT — COGNITIVE AND FUNCTIONAL STATUS - GENERAL
PERSONAL GROOMING: A LITTLE
CLIMB 3 TO 5 STEPS WITH RAILING: TOTAL
MOVING TO AND FROM BED TO CHAIR: TOTAL
DRESSING REGULAR UPPER BODY CLOTHING: A LOT
STANDING UP FROM CHAIR USING ARMS: A LOT
EATING MEALS: A LITTLE
DAILY ACTIVITIY SCORE: 14
DRESSING REGULAR UPPER BODY CLOTHING: A LOT
WALKING IN HOSPITAL ROOM: TOTAL
EATING MEALS: A LITTLE
STANDING UP FROM CHAIR USING ARMS: A LOT
DRESSING REGULAR LOWER BODY CLOTHING: A LOT
MOVING TO AND FROM BED TO CHAIR: A LOT
MOBILITY SCORE: 12
MOVING TO AND FROM BED TO CHAIR: A LOT
TOILETING: A LOT
MOVING FROM LYING ON BACK TO SITTING ON SIDE OF FLAT BED WITH BEDRAILS: A LITTLE
WALKING IN HOSPITAL ROOM: TOTAL
MOVING FROM LYING ON BACK TO SITTING ON SIDE OF FLAT BED WITH BEDRAILS: TOTAL
TURNING FROM BACK TO SIDE WHILE IN FLAT BAD: A LITTLE
MOBILITY SCORE: 12
HELP NEEDED FOR BATHING: A LOT
DRESSING REGULAR LOWER BODY CLOTHING: A LOT
TOILETING: A LOT
TURNING FROM BACK TO SIDE WHILE IN FLAT BAD: TOTAL
WALKING IN HOSPITAL ROOM: TOTAL
CLIMB 3 TO 5 STEPS WITH RAILING: TOTAL
PERSONAL GROOMING: A LITTLE
TURNING FROM BACK TO SIDE WHILE IN FLAT BAD: A LITTLE
MOVING FROM LYING ON BACK TO SITTING ON SIDE OF FLAT BED WITH BEDRAILS: A LITTLE
CLIMB 3 TO 5 STEPS WITH RAILING: TOTAL
STANDING UP FROM CHAIR USING ARMS: TOTAL
MOBILITY SCORE: 6
DAILY ACTIVITIY SCORE: 14
HELP NEEDED FOR BATHING: A LOT

## 2024-09-09 ASSESSMENT — PAIN - FUNCTIONAL ASSESSMENT
PAIN_FUNCTIONAL_ASSESSMENT: PAINAD (PAIN ASSESSMENT IN ADVANCED DEMENTIA SCALE)
PAIN_FUNCTIONAL_ASSESSMENT: FLACC (FACE, LEGS, ACTIVITY, CRY, CONSOLABILITY)
PAIN_FUNCTIONAL_ASSESSMENT: FLACC (FACE, LEGS, ACTIVITY, CRY, CONSOLABILITY)

## 2024-09-09 ASSESSMENT — PAIN SCALES - PAIN ASSESSMENT IN ADVANCED DEMENTIA (PAINAD)
BODYLANGUAGE: RELAXED
TOTALSCORE: 0
FACIALEXPRESSION: SMILING OR INEXPRESSIVE
BREATHING: NORMAL
CONSOLABILITY: NO NEED TO CONSOLE

## 2024-09-09 ASSESSMENT — PAIN SCALES - GENERAL
PAINLEVEL_OUTOF10: 0 - NO PAIN

## 2024-09-09 ASSESSMENT — ACTIVITIES OF DAILY LIVING (ADL): LACK_OF_TRANSPORTATION: NO

## 2024-09-09 NOTE — CONSULTS
Nutrition Initial Assessment:   Nutrition Assessment    Reason for Assessment: Admission nursing screening    Medical history per chart:   83 y.o. female with PMHx s/f CAD s/p PCI of RCA in Feb 2024 on plavix, sick sinus syndrome s/p pacemaker, HTN, hypercholesterolemia, pAfib on eliquis, CKD stage IIIa, hypothyroidism, anxiety with depression presenting with unwitnessed fall.     9/9:  Pt very fatigued during visit, noted breakfast tray, and only bites consumed.  Per records poor PO intake, and dementia.  Will send supplements with meals, and recommend diet liberalization if able.    Nutrition History:  Food and Nutrient History: Pt fatigued, and noted dementia. Per old records PO intake has been decreased.       Current Diet: Adult diet Cardiac; 70 gm fat; 2 - 3 grams Sodium; 1500 mL fluid    Average meal Intake during admission: <25%       Nutrition Related Findings:     Food allergies: NKFA. is allergic to iodinated contrast media.  Meds/Labs reviewed.  amiodarone, 200 mg, oral, Daily  [Held by provider] apixaban, 2.5 mg, oral, BID  cefTRIAXone, 1 g, intravenous, q24h  [Held by provider] clopidogrel, 75 mg, oral, Daily  empagliflozin, 10 mg, oral, Daily  furosemide, 40 mg, intravenous, q24h  levothyroxine, 100 mcg, oral, Daily  metoprolol tartrate, 50 mg, oral, BID  pantoprazole, 40 mg, oral, Daily before breakfast   Or  pantoprazole, 40 mg, intravenous, Daily before breakfast  polyethylene glycol, 17 g, oral, Daily  sertraline, 200 mg, oral, Daily  spironolactone, 12.5 mg, oral, Daily             Nutrition Significant Labs:    Results from last 7 days   Lab Units 09/09/24  0559 09/08/24  1707   GLUCOSE mg/dL 99 117*   SODIUM mmol/L 144 140   POTASSIUM mmol/L 3.1* 3.1*   CHLORIDE mmol/L 103 104   CO2 mmol/L 31 28   BUN mg/dL 17 18   CREATININE mg/dL 1.20* 1.32*   EGFR mL/min/1.73m*2 44* 40*   CALCIUM mg/dL 8.6 8.9   MAGNESIUM mg/dL  --  1.73     Lab Results   Component Value Date    HGBA1C 6.0 (H) 02/01/2024     "HGBA1C 5.6 07/05/2023           Anthropometrics:  Height: 157.5 cm (5' 2.01\")   Weight: 52.2 kg (115 lb 1.6 oz)   BMI (Calculated): 21.05  IBW/kg (Dietitian Calculated): 50 kg          Weight History:   Wt Readings from Last 10 Encounters:   09/08/24 52.2 kg (115 lb 1.6 oz)   08/27/24 63.6 kg (140 lb 3.4 oz)   08/25/24 66.7 kg (147 lb)   08/23/24 66.7 kg (147 lb)   08/20/24 66.8 kg (147 lb 4.3 oz)   07/09/24 63.3 kg (139 lb 8 oz)   07/02/24 61.7 kg (136 lb)   06/19/24 61.2 kg (135 lb)   06/03/24 61.3 kg (135 lb 2.3 oz)   06/03/24 58.6 kg (129 lb 3 oz)        Weight Change %:  Weight History / % Weight Change: Noted a sig. loss of ~21% x 1 month  Significant Weight Loss: Yes  Interpretation of Weight Loss: >5% in 1 month       Nutrition Focused Physical Exam Findings:    Subcutaneous Fat Loss:   Orbital Fat Pads: Severe (dark circles, hollowing and loose skin)  Buccal Fat Pads: Severe (hollow, sunken and narrow face)  Triceps: Severe (negligible fat tissue)  Muscle Wasting:  Temporalis: Severe (hollowed scooping depression)  Pectoralis (Clavicular Region): Mild-Moderate (some protrusion of clavicle)  Interosseous: Severe (depressed area between thumb and forefinger)  Edema:     Physical Findings:  Skin: Positive (Face laceration)    Estimated Needs:   Total Energy Estimated Needs (kCal): 1827 kCal  Method for Estimating Needs: 35 kcal/kg  Total Protein Estimated Needs (g): 78 g  Method for Estimating Needs: 1.5 gm/kg     Method for Estimating Needs: per MD        Nutrition Diagnosis   Nutrition Diagnosis:  Malnutrition Diagnosis  Patient has Malnutrition Diagnosis: Yes  Diagnosis Status: New  Malnutrition Diagnosis: Severe malnutrition related to chronic disease or condition  As Evidenced by: moderate to severe muscle/fat loss per NFPE, PO intake <75% for >1 month, and sig weight loss of 21% x 1 month  Additional Assessment Information: dx of Dementia    Nutrition Diagnosis  Patient has Nutrition Diagnosis: " Yes  Diagnosis Status (1): New  Nutrition Diagnosis 1: Predicted inadequate nutrient intake  Related to (1): decreased PO intake, weakness, dementia  As Evidenced by (1): PO intake <75%       Nutrition Interventions/Recommendations   Nutrition Interventions and Recommendations:        Nutrition Prescription:  Individualized Nutrition Prescription Provided for : Supplements, Liberalize diet        Nutrition Interventions:   Food and/or Nutrient Delivery Interventions  Interventions: Medical food supplement  Medical Food Supplement: Commercial beverage  Goal: Con Ensure plus high protein BID  Additional Interventions: Regular diet         Nutrition Education:   Education Documentation  No documentation found.      Nutrition Counseling  Counseling Theoretical Approach: Nutrition counseling based on health belief model  Goal: briefly reviewd supplements, pt falling asleep       Nutrition Monitoring and Evaluation   Monitoring/Evaluation:   Food/Nutrient Related History Monitoring  Monitoring and Evaluation Plan: Energy intake  Energy Intake: Estimated energy intake  Criteria: PO intake >50%    Body Composition/Growth/Weight History  Monitoring and Evaluation Plan: Weight  Weight: Measured weight  Criteria: Stable weight    Biochemical Data, Medical Tests and Procedures  Monitoring and Evaluation Plan: Electrolyte/renal panel, Glucose/endocrine profile  Electrolyte and Renal Panel: BUN, Sodium, Calcium, serum, Creatinine, Potassium, Phosphorus  Criteria: WNL  Glucose/Endocrine Profile: Glucose, casual  Criteria: WNL    Nutrition Focused Physical Findings  Monitoring and Evaluation Plan: Skin  Skin: Other (Comment)  Criteria: Promote intact skin            Time Spent/Follow-up Reminder:   Follow Up  Time Spent (min): 45 minutes  Last Date of Nutrition Visit: 09/09/24  Nutrition Follow-Up Needed?: Dietitian to reassess per policy  Follow up Comment: 9/12-9/13

## 2024-09-09 NOTE — CONSULTS
"Inpatient consult to Cardiology  Consult performed by: Demetra Grissom MD  Consult ordered by: Mikey Salgado PA-C  Reason for consult: Acute CHF        History Of Present Illness:    Linette Doyle is a 83 y.o. female presenting with weakness and fall and found to have a possible UTI as well as acute on chronic diastolic heart failure.    Appear she has had multiple falls.  In April 2024 she was found to have moderate to severe mitral regurgitation and had a AUGUSTIN that demonstrated possible functional MR.  No abnormality was noted with the mitral valve apparatus.    Patient herself is somewhat confused unable to put a good story together.  She states she slipped and fell in the bathroom and hit her head her head hurts at this time.  She does not have shortness of breath.  She does have some ankle swelling.    It appears that after an admission in [facility] in May 2024 her diuretics were discontinued because of GARY.  At this time those are not on her home medication list and unlikely that she is on any diuretics.    PMHx s/f CAD s/p PCI of RCA in Feb 2024 on plavix, sick sinus syndrome s/p pacemaker, HTN, hypercholesterolemia, pAfib on eliquis, CKD stage IIIa, hypothyroidism, anxiety with depression presenting with unwitnessed fall      Last Recorded Vitals:  Vitals:    09/08/24 2249 09/08/24 2346 09/09/24 0557 09/09/24 0919   BP: 134/53 (!) 156/92 153/90 (!) 159/95   BP Location:  Left arm Left arm Left arm   Patient Position:  Lying Lying Lying   Pulse: 65 101 102 109   Resp: 18 19 18 18   Temp:  35.6 °C (96.1 °F) 36.6 °C (97.9 °F) 36.6 °C (97.9 °F)   TempSrc:  Temporal Temporal Temporal   SpO2: 97% 93% 95% 95%   Weight:  52.2 kg (115 lb 1.6 oz)     Height:  1.575 m (5' 2.01\")         Last Labs:  CBC - 9/9/2024:  5:59 AM  5.1 9.0 134    29.5      CMP - 9/9/2024:  5:59 AM  8.6 5.8; 5.6 75 --- 1.1   3.8 3.5 59 86      PTT - 2/9/2024:  4:59 AM  1.1   11.9 24.6     BNP   Date/Time Value Ref Range Status "   09/08/2024 05:07 PM 2,509 (H) 0 - 99 pg/mL Final     Hemoglobin A1C   Date/Time Value Ref Range Status   02/01/2024 05:58 AM 6.0 (H) See below % Final   07/05/2023 08:49 AM 5.6 4.0 - 6.0 % Final     Comment:     Hemoglobin A1C levels are related to mean blood glucose during the   preceding 2-3 months. The relationship table below may be used as a   general guide. Each 1% increase in HGB A1C is a reflection of an   increase in mean glucose of approximately 30 mg/dl.   Reference: Diabetes Care, volume 29, supplement 1 Jan. 2006                        HGB A1C ................. Approx. Mean Glucose   _______________________________________________   6%   ...............................  120 mg/dl   7%   ...............................  150 mg/dl   8%   ...............................  180 mg/dl   9%   ...............................  210 mg/dl   10%  ...............................  240 mg/dl  Performed at [address] [address]     12/14/2022 09:13 AM 5.7 4.0 - 6.0 % Final     Comment:     Hemoglobin A1C levels are related to mean blood glucose during the   preceding 2-3 months. The relationship table below may be used as a   general guide. Each 1% increase in HGB A1C is a reflection of an   increase in mean glucose of approximately 30 mg/dl.   Reference: Diabetes Care, volume 29, supplement 1 Jan. 2006                        HGB A1C ................. Approx. Mean Glucose   _______________________________________________   6%   ...............................  120 mg/dl   7%   ...............................  150 mg/dl   8%   ...............................  180 mg/dl   9%   ...............................  210 mg/dl   10%  ...............................  240 mg/dl  Performed at [address] [address]       LDL Calculated   Date/Time Value Ref Range Status   07/05/2023 08:49 AM 57 (L) 65 - 130 MG/DL Final   12/14/2022 09:13 AM 64 (L) 65 - 130 MG/DL Final   06/29/2022  08:43 AM 58 (L) 65 - 130 MG/DL Final      Last I/O:  I/O last 3 completed shifts:  In: - (0 mL/kg)   Out: 1400 (26.8 mL/kg) [Urine:1400 (0.7 mL/kg/hr)]  Weight: 52.2 kg     Past Cardiology Tests (Last 3 Years):  EKG:  ECG 12 lead 09/08/2024 (Preliminary)      ECG 12 lead 06/03/2024      ECG 12 lead 05/03/2024      ECG 12 lead 04/05/2024      Electrocardiogram, 12-lead PRN ACS symptoms 04/05/2024      ECG 12 lead 04/05/2024      Electrocardiogram, 12-lead PRN ACS symptoms 04/05/2024      ECG 12 lead 02/07/2024      ECG 12 lead 02/06/2024      ECG 12 Lead 01/31/2024    Echo:  Transesophageal Echo (AUGUSTIN) 04/10/2024      Transthoracic Echo (TTE) Limited 04/08/2024      Transthoracic Echo (TTE) Limited 02/09/2024      Transthoracic Echo (TTE) Complete 02/01/2024    Ejection Fractions:  EF   Date/Time Value Ref Range Status   02/09/2024 08:59 AM 58 %    02/01/2024 11:43 AM 59 %      Cath:  Cardiac catheterization - coronary 02/08/2024      Cardiac catheterization - coronary 02/02/2024      Cardiac catheterization - coronary 02/02/2024    Stress Test:  No results found for this or any previous visit from the past 1095 days.    Cardiac Imaging:  No results found for this or any previous visit from the past 1095 days.      Past Medical History:  She has a past medical history of Angina pectoris (CMS-[facility]) (10/22/2023), Atherosclerosis of coronary artery (08/21/2019), Atherosclerotic heart disease of native coronary artery with other forms of angina pectoris (CMS-[facility]) (10/22/2023), Hypercholesterolemia (12/28/2018), Hyperlipidemia (10/22/2023), Presence of cardiac pacemaker (10/22/2023), Primary hypertension (12/28/2018), Shortness of breath (11/26/2019), Sick sinus syndrome (Multi) (10/22/2023), Sinus bradycardia (10/22/2023), and Stage 3a chronic kidney disease (Multi) (11/25/2019).    Past Surgical History:  She has a past surgical history that includes Cardiac catheterization (N/A, 02/02/2024); Cardiac catheterization (N/A,  02/02/2024); Cardiac catheterization (N/A, 02/08/2024); Cardiac catheterization (N/A, 02/08/2024); and pacemaker placement (07/04/2019).      Social History:  She reports that she has never smoked. She has never been exposed to tobacco smoke. She has never used smokeless tobacco. She reports that she does not drink alcohol and does not use drugs.    Family History:  Family History   Problem Relation Name Age of Onset    No Known Problems Mother      No Known Problems Father          Allergies:  Iodinated contrast media    Inpatient Medications:  Scheduled medications   Medication Dose Route Frequency    amiodarone  200 mg oral Daily    [Held by provider] apixaban  2.5 mg oral BID    cefTRIAXone  1 g intravenous q24h    [Held by provider] clopidogrel  75 mg oral Daily    empagliflozin  10 mg oral Daily    furosemide  40 mg intravenous q24h    levothyroxine  100 mcg oral Daily    metoprolol tartrate  50 mg oral BID    pantoprazole  40 mg oral Daily before breakfast    Or    pantoprazole  40 mg intravenous Daily before breakfast    polyethylene glycol  17 g oral Daily    potassium chloride CR  40 mEq oral Once    sertraline  200 mg oral Daily    spironolactone  12.5 mg oral Daily     PRN medications   Medication    acetaminophen    bisacodyl    guaiFENesin    melatonin    ondansetron     Continuous Medications   Medication Dose Last Rate     Outpatient Medications:  Current Outpatient Medications   Medication Instructions    amiodarone (Pacerone) 200 mg tablet 1 tablet, oral, Daily    apixaban (ELIQUIS) 2.5 mg, oral, 2 times daily    ascorbic acid/collagen hydr (COLLAGEN PLUS VITAMIN C ORAL) 1 capsule, oral, Daily    cholecalciferol (VITAMIN D3) 2,000 Units, oral, Daily    clopidogrel (PLAVIX) 75 mg, oral, Daily    diphenhydrAMINE-acetaminophen (Tylenol PM Extra Strength)  mg per tablet 1 tablet, oral, Nightly PRN    levothyroxine (SYNTHROID, LEVOXYL) 100 mcg, oral, Daily    metoprolol tartrate (LOPRESSOR) 50 mg,  oral, 2 times daily    multivit-min/ferrous fumarate (MULTI VITAMIN ORAL) 1 tablet, oral, Daily    mv-mn/om3/dha/epa/fish/lut/leanne (OCUVITE ADULT 50 PLUS ORAL) 1 tablet, oral, Daily    omeprazole (PriLOSEC) 20 mg tablet,delayed release (DR/EC) EC tablet 1 tablet, oral, Daily    sertraline (ZOLOFT) 200 mg, oral, Daily       Physical Exam:  Physical Exam  Vitals reviewed.   Constitutional:       Appearance: Normal appearance.   Neck:      Vascular: No carotid bruit or JVD.   Cardiovascular:      Rate and Rhythm: Normal rate and regular rhythm.      Pulses: Normal pulses.      Heart sounds: S1 normal and S2 normal. Murmur heard.      Systolic murmur is present with a grade of 2/6.   Pulmonary:      Effort: Pulmonary effort is normal. No respiratory distress.      Breath sounds: No wheezing or rales.   Abdominal:      General: Abdomen is flat.      Palpations: Abdomen is soft.   Musculoskeletal:      Right lower le+ Pitting Edema present.      Left lower le+ Pitting Edema present.   Skin:     General: Skin is warm.   Neurological:      Mental Status: She is alert and oriented to person, place, and time. Mental status is at baseline.   Psychiatric:         Mood and Affect: Mood normal.         Behavior: Behavior normal.           Assessment/Plan   1-acute on chronic diastolic heart failure-multifactorial but primarily due to discontinuation of diuretics due to GARY.  This patient is going to need reinstitution of diuretics also recommend adding Aldactone at low-dose and SGLT2 inhibitors.  Patient will require a close follow-up not only with her cardiologist Dr. Ibrahim but also with advanced heart failure specialist.  I recommend 1 week follow-up upon discharge.    Monitor renal function and switch to oral diuretics once patient is euvolemic.    2-functional moderate to severe MR-likely secondary to heart failure, medical management is recommended- follow-up with advanced heart failure specialist and if this  persists, despite aggressive medical therapy and follow-up, she COULD be a candidate for MitraClip(if she is willing).    3-coronary artery disease-continue dual antiplatelet therapy, if you are planning on holding Eliquis due to recurrent falls.  Patient had stent to the RCA in April 2024.    4-persistent atrial fibrillation-maintaining sinus rhythm with amiodarone, Eliquis being held recommend starting dual antiplatelet therapy.  Patient with very high bleeding risk due to recurrent falls but needs to follow-up with her cardiologist for risk-benefit discussion for long-term maintenance therapy.    5-presence of permanent pacemaker-follow through pacer clinic..      Peripheral IV 09/08/24 20 G Right Antecubital (Active)   Site Assessment Clean;Dry;Intact 09/09/24 0900   Dressing Type Tape;Transparent 09/09/24 0900   Line Status Flushed;Saline locked 09/09/24 0900   Dressing Status Clean;Dry;Occlusive 09/09/24 0900   Number of days: 1       Code Status:  DNR and No Intubation        Demetra Grissom MD

## 2024-09-09 NOTE — PROGRESS NOTES
Linette Doyle is a 83 y.o. female on day 1 of admission presenting with Pleural effusion.      Subjective     Background to Admission  Linette Doyle is an 83 y.o. female with PMHx s/f CAD s/p PCI of RCA in Feb 2024 on plavix, sick sinus syndrome s/p pacemaker, HTN, hypercholesterolemia, pAfib on eliquis, CKD stage IIIa, hypothyroidism, anxiety with depression who presented on 9/8 with unwitnessed fall.  Family stated that she had an unwitnessed fall in the bathroom and were unsure of how long she had been on the ground.  She lives at [facility].  They stated she had been having declining physical and mental health for the past few months.  She had been having frequent falls, the most recent one being the week prior to presentation.  She had a contusion on the left side of her head, denied loss of consciousness.  Patient was unclear about how she fell.  Denied lightheadedness, headache, fever and chills, nausea and vomiting, chest pain, shortness of breath, abdominal pain, changes in urinary or bowel symptoms, leg swelling. ED eval was notable for hypertension (), hypokalemia, creatinine 1.34, elevated LFTs. CT head and cervical spine showed a large frontal scalp hematoma but no acute intracranial abnormality. No cervical spine acute pathology. X-ray of the left wrist and hand without fracture or dislocation. CT C/A/P showed large right pleural effusion with associated volume loss. Small volume perihepatic and splenic ascites with mild anasarca noted. Patient was given oral Klor-con and admitted for further eval and treatment.      Interval History  Patient seen in follow up. Nursing reported patient tried to get out of bed multiple times but was redirectable. Patient notes feeling OK on my interview and appears calm. She denies complaints but admits to limited awareness of surroundings.        Objective     Last Recorded Vitals  BP (!) 159/95 (BP Location: Left arm, Patient Position: Lying)    Pulse 109   Temp 36.6 °C (97.9 °F) (Temporal)   Resp 18   Wt 52.2 kg (115 lb 1.6 oz)   SpO2 95%   Intake/Output last 3 Shifts:    Intake/Output Summary (Last 24 hours) at 9/9/2024 0929  Last data filed at 9/9/2024 0849  Gross per 24 hour   Intake 0 ml   Output 2200 ml   Net -2200 ml       Admission Weight  Weight: 65.7 kg (144 lb 13.5 oz) (09/08/24 1654)    Daily Weight  09/08/24 : 52.2 kg (115 lb 1.6 oz)    Image Results  ECG 12 lead  Atrial-paced complexes  Prolonged AL interval  Incomplete right bundle branch block  Nonspecific repol abnormality, lateral leads  Baseline wander in lead(s) II,III,aVL,aVF,V3      Physical Exam  Vitals and nursing note reviewed.   Constitutional:       General: She is not in acute distress.     Appearance: She is ill-appearing.   HENT:      Head: Normocephalic and atraumatic.      Comments: L head hematoma s/p laceration repair     Right Ear: External ear normal.      Left Ear: External ear normal.      Nose: Nose normal. No rhinorrhea.      Mouth/Throat:      Mouth: Mucous membranes are moist.      Pharynx: Oropharynx is clear. No oropharyngeal exudate.   Eyes:      General: No scleral icterus.        Right eye: No discharge.         Left eye: No discharge.      Conjunctiva/sclera: Conjunctivae normal.   Cardiovascular:      Rate and Rhythm: Normal rate and regular rhythm.      Pulses: Normal pulses.      Heart sounds: Normal heart sounds. No murmur heard.  Pulmonary:      Effort: Pulmonary effort is normal. No respiratory distress.      Breath sounds: Normal breath sounds. No wheezing or rales.   Abdominal:      General: Abdomen is flat. There is no distension.      Palpations: Abdomen is soft.      Tenderness: There is no abdominal tenderness.   Musculoskeletal:         General: No tenderness.      Right lower leg: Edema present.      Left lower leg: Edema present.   Skin:     General: Skin is warm and dry.      Capillary Refill: Capillary refill takes less than 2 seconds.       Findings: No rash.   Neurological:      General: No focal deficit present.      Mental Status: She is alert and oriented to person, place, and time. Mental status is at baseline.   Psychiatric:         Mood and Affect: Mood normal.         Behavior: Behavior normal.         Thought Content: Thought content normal.         Judgment: Judgment normal.         Relevant Results    Scheduled medications  amiodarone, 200 mg, oral, Daily  apixaban, 2.5 mg, oral, BID  clopidogrel, 75 mg, oral, Daily  levothyroxine, 100 mcg, oral, Daily  metoprolol tartrate, 50 mg, oral, BID  pantoprazole, 40 mg, oral, Daily before breakfast   Or  pantoprazole, 40 mg, intravenous, Daily before breakfast  polyethylene glycol, 17 g, oral, Daily  sertraline, 200 mg, oral, Daily      Continuous medications     PRN medications  PRN medications: acetaminophen, bisacodyl, guaiFENesin, melatonin, ondansetron       Results for orders placed or performed during the hospital encounter of 09/08/24 (from the past 24 hour(s))   CBC and Auto Differential   Result Value Ref Range    WBC 5.8 4.4 - 11.3 x10*3/uL    nRBC 0.0 0.0 - 0.0 /100 WBCs    RBC 4.18 4.00 - 5.20 x10*6/uL    Hemoglobin 10.9 (L) 12.0 - 16.0 g/dL    Hematocrit 35.5 (L) 36.0 - 46.0 %    MCV 85 80 - 100 fL    MCH 26.1 26.0 - 34.0 pg    MCHC 30.7 (L) 32.0 - 36.0 g/dL    RDW 17.6 (H) 11.5 - 14.5 %    Platelets 177 150 - 450 x10*3/uL    Neutrophils % 73.4 40.0 - 80.0 %    Immature Granulocytes %, Automated 0.2 0.0 - 0.9 %    Lymphocytes % 13.3 13.0 - 44.0 %    Monocytes % 10.2 2.0 - 10.0 %    Eosinophils % 2.2 0.0 - 6.0 %    Basophils % 0.7 0.0 - 2.0 %    Neutrophils Absolute 4.24 1.60 - 5.50 x10*3/uL    Immature Granulocytes Absolute, Automated 0.01 0.00 - 0.50 x10*3/uL    Lymphocytes Absolute 0.77 (L) 0.80 - 3.00 x10*3/uL    Monocytes Absolute 0.59 0.05 - 0.80 x10*3/uL    Eosinophils Absolute 0.13 0.00 - 0.40 x10*3/uL    Basophils Absolute 0.04 0.00 - 0.10 x10*3/uL   Magnesium   Result Value  Ref Range    Magnesium 1.73 1.60 - 2.40 mg/dL   Comprehensive metabolic panel   Result Value Ref Range    Glucose 117 (H) 74 - 99 mg/dL    Sodium 140 136 - 145 mmol/L    Potassium 3.1 (L) 3.5 - 5.3 mmol/L    Chloride 104 98 - 107 mmol/L    Bicarbonate 28 21 - 32 mmol/L    Anion Gap 11 10 - 20 mmol/L    Urea Nitrogen 18 6 - 23 mg/dL    Creatinine 1.32 (H) 0.50 - 1.05 mg/dL    eGFR 40 (L) >60 mL/min/1.73m*2    Calcium 8.9 8.6 - 10.3 mg/dL    Albumin 3.9 3.4 - 5.0 g/dL    Alkaline Phosphatase 101 33 - 136 U/L    Total Protein 6.6 6.4 - 8.2 g/dL    AST 86 (H) 9 - 39 U/L    Bilirubin, Total 1.5 (H) 0.0 - 1.2 mg/dL    ALT 69 (H) 7 - 45 U/L   Creatine Kinase   Result Value Ref Range    Creatine Kinase 80 0 - 215 U/L   B-type natriuretic peptide   Result Value Ref Range    BNP 2,509 (H) 0 - 99 pg/mL   Lactate   Result Value Ref Range    Lactate 1.4 0.4 - 2.0 mmol/L   ECG 12 lead   Result Value Ref Range    Ventricular Rate 72 BPM    Atrial Rate 0 BPM    NE Interval 315 ms    QRS Duration 116 ms    QT Interval 531 ms    QTC Calculation(Bazett) 582 ms    R Axis 65 degrees    T Axis -76 degrees    QRS Count 11 beats    Q Onset 249 ms    T Offset 515 ms    QTC Fredericia 564 ms   Urinalysis with Reflex Culture and Microscopic   Result Value Ref Range    Color, Urine Yellow Light-Yellow, Yellow, Dark-Yellow    Appearance, Urine Clear Clear    Specific Gravity, Urine 1.015 1.005 - 1.035    pH, Urine 5.5 5.0, 5.5, 6.0, 6.5, 7.0, 7.5, 8.0    Protein, Urine 30 (1+) (A) NEGATIVE, 10 (TRACE), 20 (TRACE) mg/dL    Glucose, Urine Normal Normal mg/dL    Blood, Urine NEGATIVE NEGATIVE    Ketones, Urine NEGATIVE NEGATIVE mg/dL    Bilirubin, Urine NEGATIVE NEGATIVE    Urobilinogen, Urine Normal Normal mg/dL    Nitrite, Urine 2+ (A) NEGATIVE    Leukocyte Esterase, Urine 250 Rick/µL (A) NEGATIVE   Extra Urine Gray Tube   Result Value Ref Range    Extra Tube Hold for add-ons.    Microscopic Only, Urine   Result Value Ref Range    WBC, Urine  21-50 (A) 1-5, NONE /HPF    WBC Clumps, Urine RARE Reference range not established. /HPF    RBC, Urine 1-2 NONE, 1-2, 3-5 /HPF    Bacteria, Urine 2+ (A) NONE SEEN /HPF    Mucus, Urine FEW Reference range not established. /LPF    Hyaline Casts, Urine OCCASIONAL (A) NONE /LPF    Amorphous Crystals, Urine 1+ NONE, 1+, 2+ /HPF   CBC and Auto Differential   Result Value Ref Range    WBC 5.1 4.4 - 11.3 x10*3/uL    nRBC 0.0 0.0 - 0.0 /100 WBCs    RBC 3.47 (L) 4.00 - 5.20 x10*6/uL    Hemoglobin 9.0 (L) 12.0 - 16.0 g/dL    Hematocrit 29.5 (L) 36.0 - 46.0 %    MCV 85 80 - 100 fL    MCH 25.9 (L) 26.0 - 34.0 pg    MCHC 30.5 (L) 32.0 - 36.0 g/dL    RDW 17.6 (H) 11.5 - 14.5 %    Platelets 134 (L) 150 - 450 x10*3/uL    Neutrophils % 74.4 40.0 - 80.0 %    Immature Granulocytes %, Automated 0.4 0.0 - 0.9 %    Lymphocytes % 13.9 13.0 - 44.0 %    Monocytes % 8.9 2.0 - 10.0 %    Eosinophils % 1.8 0.0 - 6.0 %    Basophils % 0.6 0.0 - 2.0 %    Neutrophils Absolute 3.76 1.60 - 5.50 x10*3/uL    Immature Granulocytes Absolute, Automated 0.02 0.00 - 0.50 x10*3/uL    Lymphocytes Absolute 0.70 (L) 0.80 - 3.00 x10*3/uL    Monocytes Absolute 0.45 0.05 - 0.80 x10*3/uL    Eosinophils Absolute 0.09 0.00 - 0.40 x10*3/uL    Basophils Absolute 0.03 0.00 - 0.10 x10*3/uL   Comprehensive Metabolic Panel   Result Value Ref Range    Glucose 99 74 - 99 mg/dL    Sodium 144 136 - 145 mmol/L    Potassium 3.1 (L) 3.5 - 5.3 mmol/L    Chloride 103 98 - 107 mmol/L    Bicarbonate 31 21 - 32 mmol/L    Anion Gap 13 10 - 20 mmol/L    Urea Nitrogen 17 6 - 23 mg/dL    Creatinine 1.20 (H) 0.50 - 1.05 mg/dL    eGFR 44 (L) >60 mL/min/1.73m*2    Calcium 8.6 8.6 - 10.3 mg/dL    Albumin 3.5 3.4 - 5.0 g/dL    Alkaline Phosphatase 86 33 - 136 U/L    Total Protein 5.8 (L) 6.4 - 8.2 g/dL    AST 75 (H) 9 - 39 U/L    Bilirubin, Total 1.1 0.0 - 1.2 mg/dL    ALT 59 (H) 7 - 45 U/L   TSH with reflex to Free T4 if abnormal   Result Value Ref Range    Thyroid Stimulating Hormone 5.42 (H)  0.44 - 3.98 mIU/L   Thyroxine, Free   Result Value Ref Range    Thyroxine, Free 1.88 (H) 0.61 - 1.12 ng/dL                Assessment/Plan        Acute Congestive Heart Failure  -Suggested by effusion, fluid overload  -Given furosemide 40 mg IV x 1 at admission  -Echo 4/10/24 showed normal EF and moderate MR  -1.5 L fluid restriction, 2 g sodium diet, strict I/O, daily weights  -Cardiology consult  -Telemetry  -Will continue furosemide 40 mg IV Q24H for now    Right Pleural Effusion  -Pulmonology consulted  -Consulted IR for thoracentesis, performed today  -Studies ordered to further characterize pending    Recurrent Falls  -PT/OT eval and treat    Hypokalemia  -Potassium 3.1 this morning.  -Will give klor-con 40 mEq PO x 1  -Recheck K and Mg this afternoon  -Telemetry    Coronary Artery Disease  -s/p PCI    Hypertension  -continue home metoprolol with hold parameters  -Monitor with diuresis    Paroxysmal Atrial Fibrillation  -Hold apixaban temporarily  -Continue home amiodarone and metoprolol    Chronic Kidney Disease stage 3a  -Creatinine baseline ~1.4  -Continue to monitor while hospitalized    Hypothyroidism  -Continue home levothyroxine  -TFTs with nonspecific pattern, will repeat with free T3    Depression/Anxiety  -Continue home sertraline    Frequent Falls  -Was hospitalized on 06/04/24-06/07/24 for frequent falls of unknown etiology  -She had lumbar MRI that shows multilevel degenerative changes with severe canal stenosis and neural foraminal narrowing, both neurology and ortho spine saw patient. Ultimately no medical cause was found for her frequent falls and was discharged as the patient being frail and having chronic gait issues  -PT/OT appreciated  -Trauma team also following    Abnormal LFTs  -Mild elevation of transaminases  -Total bilirubin normalized today  -Continue to trend  -Add on ammonia level    Anemia  -Hemoglobin 10.9-9.0 since presentation  -Check B12, folate, retic %, iron group    Urinary  Tract Infection  -Suggested by UA  -Empiric ceftriaxone  -Await culture           Henry Leyva MD

## 2024-09-09 NOTE — PROGRESS NOTES
09/09/24 1309   Discharge Planning   Living Arrangements Children   Support Systems Family members   Assistance Needed yes   Type of Residence Private residence   Number of Stairs to Enter Residence 1   Number of Stairs Within Residence 0   Do you have animals or pets at home? No   Who is requesting discharge planning? Provider   Home or Post Acute Services Post acute facilities (Rehab/SNF/etc)   Type of Post Acute Facility Services Skilled nursing   Expected Discharge Disposition SNF   Does the patient need discharge transport arranged? Yes   RoundTrip coordination needed? Yes   Has discharge transport been arranged? No   Financial Resource Strain   How hard is it for you to pay for the very basics like food, housing, medical care, and heating? Not very   Housing Stability   In the last 12 months, was there a time when you were not able to pay the mortgage or rent on time? N   In the past 12 months, how many times have you moved where you were living? 2   At any time in the past 12 months, were you homeless or living in a shelter (including now)? N   Transportation Needs   In the past 12 months, has lack of transportation kept you from medical appointments or from getting medications? no   In the past 12 months, has lack of transportation kept you from meetings, work, or from getting things needed for daily living? No     Care Transition: Spoke with pt via phone and verified address, phone number and emergency contact information. PCP is [place] per the notes this pt is confused and was unable to confirm the PCP name. Kim according to the chart but the pt sated Yann Jerry. According to the chart this pt is from [facility] and will be returning there on discharge. AMPAC per nursing is 15/12. ADOD was 9/8 CT to follow. Iman HATHAWAYN/RN-TCC .

## 2024-09-09 NOTE — PROGRESS NOTES
Physical Therapy    Physical Therapy Evaluation    Patient Name: Linette Doyle  MRN: [id number]  Today's Date: 9/9/2024   Time Calculation  Start Time: 1158  Stop Time: 1230  Time Calculation (min): 32 min    Assessment/Plan   PT Assessment  PT Assessment Results: Decreased strength, Decreased endurance, Impaired balance, Decreased mobility, Decreased cognition, Impaired judgement, Decreased safety awareness, Impaired hearing  Rehab Prognosis: Fair  Evaluation/Treatment Tolerance: Patient limited by fatigue  End of Session Communication: Bedside nurse (OK FOR THERAPY)  Assessment Comment: MAX A FOR BED MOBILITY AND TO STAND EOB, ATTEMPTED TO TAKE A FEW STEPS W MAX A, NOT ABLE TO MOVE MORE THAN 4 INCHES W/ MAX A FOR WT SHIFT, VERY FATIGUED, LEGS USNTEADY,HIGH FALL RISK NEEDS MOD REHAB ON DISCH  End of Session Patient Position: Bed, 3 rail up, Alarm on (CALL LIGHT IN REACH)  IP OR SWING BED PT PLAN  Inpatient or Swing Bed: Inpatient  PT Plan  Treatment/Interventions: Bed mobility, Transfer training, Gait training, Strengthening, Endurance training  PT Plan: Ongoing PT  PT Frequency: 3 times per week  PT Discharge Recommendations: Moderate intensity level of continued care  Equipment Recommended upon Discharge:  (TBD)  PT Recommended Transfer Status: Assist x2, Assist x1 (BEDLEVEL)  PT - OK to Discharge: Yes (WHENMEDICALLY CLEARED)      Subjective   General Visit Information:  General  Reason for Referral: IMPAIRED MOBILITY  Referred By: EUGENE EDWARD  Past Medical History Relevant to Rehab: FALL; DX: PLEURAL EFFUSION, CONTUSION L FOREHEAD, ACUTE HF,HYPOKALEMIA; HX: DEMENTIA, CAD, HTN, PACER, SSS, SINUS MAURICIO, FALLS, CKD, MI  Family/Caregiver Present: Yes  Caregiver Feedback: BROTHER- GIVES MOBILITY INFO  Prior to Session Communication: Bedside nurse (OK PER RN FOR THERAPY)  Patient Position Received: Bed, 3 rail up, Alarm on (ROOM 2307 SLEEPING AROUSED SLOWLY TO NAME AND GENTLE TOUCH TO ARM)  General Comment: BRUISING AND  LACERATION LATERAL L ORBIT, PURE WICK IN PLACE  Home Living:  Home Living  Home Living Comments: [facility]ARE [facility]-HAS BEENTHERE 5 WKS, PRIOR TO THAT ALONE IN HER HOME(WAS SCAMMED OUT OF $12,000 SO [facility]EV [employer] IN MEMORY CARE FOR SAFETY- FINANCIAL ANDPHYSICAL 2/2 FALLS) SHE IS TO USE FW TO AMB BUT DOESNOT/WILLNOT USE, A FOR ADL  Prior Level of Function:     Precautions:  Precautions  Hearing/Visual Limitations: [place]  Medical Precautions: Fall precautions              Objective   Pain:  Pain Assessment  Pain Location:  (MILD PAIN INDICATED W/ LIGHT TOUCH TO L PERIORBITAL AREA)  Cognition:  Cognition  Overall Cognitive Status: Impaired, Impaired at baseline  Arousal/Alertness: Delayed responses to stimuli (LETHARGIC)  Orientation Level: Disoriented to place, Disoriented to time, Disoriented to situation  Following Commands:  (INCRESED TIME AND REPETITION)  Safety Judgment: Decreased awareness of need for assistance  Problem Solving: Assistance required to identify errors made  Attention: Exceptions to WFL  Sustained Attention: Impaired  Memory: Exceptions to WFL  Long-Term Memory: Impaired  Short-Term Memory: Impaired  Safety/Judgement: Exceptions to WFL  Complex Functional Tasks: Maximal  Novel Situations: Maximal  Routine Tasks: Maximal  Unable to Self-Monitor and Self-Correct Consistently: Maximal  Insight: Severe  Impulsive: Severely (PER RN KEPT TRYNIG TO GET OOB WHEN TOLD TO STAY IN BED)  Task Initiation: Initiates with cues  Flexibility of Thought: Reduced flexibility  Planning: No planning skills  Organization: Severely disorganized  Processing Speed: Delayed    General Assessments:                  Activity Tolerance  Endurance: Decreased tolerance for upright activites    Sensation  Sensation Comment: NO C/O    Strength  Strength Comments: ROM LEGS WFL, STRENGTH 3-/5, DECRESED MUSCULAR ENDURANCE  Strength  Strength Comments: ROM LEGS WFL, STRENGTH 3-/5, DECRESED MUSCULAR ENDURANCE                      Static Sitting Balance  Static Sitting-Comment/Number of Minutes: FAIR/FAIR-  Dynamic Sitting Balance  Dynamic Sitting-Comments: POOR    Static Standing Balance  Static Standing-Comment/Number of Minutes: POOR  Dynamic Standing Balance  Dynamic Standing-Comments: POOR  Functional Assessments:  Bed Mobility  Bed Mobility:  (SUPINE>SIT MAX X1-2, SAT EOB TOTAL 8 IN MIN A FOR BALACNE TENDS TO LEAN BACKWARDS, SIT >SUPINE, MIN A X1, UP IN BED MOD X2)    Transfers  Transfer:  (SIT<.STAADN MAX X1 BEAR HUG USED,  PT. TOO SUPPORT ON BACK OF LEGS FROM BED, MOD RETRO LEAN W/ HIPS/KNEES FLEXED,  STOOD 30 SEC NEEDS VC TO  STAND STRAIGHT, VERY FATIGUED W/ STANDING)    Ambulation/Gait Training  Ambulation/Gait Training Performed:  (ATTEMPTED STEPS NEEDED MAX A FOR WT SHIFT TO MOVE FEET 2 INCHES 2 REPS TRIED, KNEES INCREASED THEIR FLEXION W/ EACH STEP ADN DARNELL NEEDED TOSIT, DID NOT HAVE THE ENERGY TO TRY AGAIN, VERY READY TO LAY DOWN, ONCE IN SUPINE SHE FELL RIGHT TO SLEEP)  Extremity/Trunk Assessments:     Outcome Measures:  [facility] Basic Mobility  Turning from your back to your side while in a flat bed without using bedrails: Total  Moving from lying on your back to sitting on the side of a flat bed without using bedrails: Total  Moving to and from bed to chair (including a wheelchair): Total  Standing up from a chair using your arms (e.g. wheelchair or bedside chair): Total  To walk in hospital room: Total  Climbing 3-5 steps with railing: Total  Basic Mobility - Total Score: 6    Encounter Problems       Encounter Problems (Active)       Mobility       STG - Patient will ambulate (Not Progressing)       Start:  09/09/24    Expected End:  09/23/24       FWW MOD A X1         Goal 1 (Not Progressing)       Start:  09/09/24    Expected End:  09/30/24       20 REPS AROM/RROM INCREASING STRENGTH TO STABILIZE OOB ACTIVITIES            PT Transfers       STG - Transfer from bed to chair (Not Progressing)       Start:  09/09/24     Expected End:  09/13/24       FWW MOD X1         STG - Patient to transfer to and from sit to supine (Not Progressing)       Start:  09/09/24    Expected End:  09/13/24       MOD A X1         STG - Patient will transfer sit to and from stand (Not Progressing)       Start:  09/09/24    Expected End:  09/12/24       FWW MOD X1 A USING PROPER TECHNIQUE            Pain - Adult              Education Documentation  Mobility Training, taught by Negrita Hatch PT at 9/9/2024  2:01 PM.  Learner: Patient  Readiness: Acceptance  Method: Explanation  Response: Needs Reinforcement  Comment: MOBILITY SAFETY    Education Comments  No comments found.

## 2024-09-09 NOTE — PROGRESS NOTES
"GENERAL SURGERY PROGRESS NOTE    Linette Doyle   1940   [id number]     Linette Doyle is a 83 y.o. female on day 1 of admission presenting with Pleural effusion.      Subjective  NAOE. Patient denies pain at this time and is resting comfortably without complaint. Denies fever, chills, chest pain, SOB, headaches, lightheadedness, and dizziness.     Review of Systems    Objective    Last Recorded Vitals  Blood pressure (!) 159/95, pulse 109, temperature 36.6 °C (97.9 °F), temperature source Temporal, resp. rate 18, height 1.575 m (5' 2.01\"), weight 52.2 kg (115 lb 1.6 oz), SpO2 95%, unknown if currently breastfeeding.    Intake/Output last 3 Shifts:  I/O last 3 completed shifts:  In: - (0 mL/kg)   Out: 1400 (26.8 mL/kg) [Urine:1400 (0.7 mL/kg/hr)]  Weight: 52.2 kg     Gen: NAD. Awake and pleasant, mild confusion  HEENT: Normocephalic, Left forehead laceration with gelfoam in place and significant cephalohematoma  Neck: Normal range of motion.  CV: Regular rate.  Chest: Normal chest rise.  Normal respiratory effort on RA  Abdomen: Soft, nontender, nondistended  Extremities: No edema.  Moving all extremities.  Skin: Warm and dry    Relevant Results  Results for orders placed or performed during the hospital encounter of 09/08/24 (from the past 24 hour(s))   CBC and Auto Differential   Result Value Ref Range    WBC 5.8 4.4 - 11.3 x10*3/uL    nRBC 0.0 0.0 - 0.0 /100 WBCs    RBC 4.18 4.00 - 5.20 x10*6/uL    Hemoglobin 10.9 (L) 12.0 - 16.0 g/dL    Hematocrit 35.5 (L) 36.0 - 46.0 %    MCV 85 80 - 100 fL    MCH 26.1 26.0 - 34.0 pg    MCHC 30.7 (L) 32.0 - 36.0 g/dL    RDW 17.6 (H) 11.5 - 14.5 %    Platelets 177 150 - 450 x10*3/uL    Neutrophils % 73.4 40.0 - 80.0 %    Immature Granulocytes %, Automated 0.2 0.0 - 0.9 %    Lymphocytes % 13.3 13.0 - 44.0 %    Monocytes % 10.2 2.0 - 10.0 %    Eosinophils % 2.2 0.0 - 6.0 %    Basophils % 0.7 0.0 - 2.0 %    Neutrophils Absolute 4.24 1.60 - 5.50 x10*3/uL    Immature Granulocytes " Absolute, Automated 0.01 0.00 - 0.50 x10*3/uL    Lymphocytes Absolute 0.77 (L) 0.80 - 3.00 x10*3/uL    Monocytes Absolute 0.59 0.05 - 0.80 x10*3/uL    Eosinophils Absolute 0.13 0.00 - 0.40 x10*3/uL    Basophils Absolute 0.04 0.00 - 0.10 x10*3/uL   Magnesium   Result Value Ref Range    Magnesium 1.73 1.60 - 2.40 mg/dL   Comprehensive metabolic panel   Result Value Ref Range    Glucose 117 (H) 74 - 99 mg/dL    Sodium 140 136 - 145 mmol/L    Potassium 3.1 (L) 3.5 - 5.3 mmol/L    Chloride 104 98 - 107 mmol/L    Bicarbonate 28 21 - 32 mmol/L    Anion Gap 11 10 - 20 mmol/L    Urea Nitrogen 18 6 - 23 mg/dL    Creatinine 1.32 (H) 0.50 - 1.05 mg/dL    eGFR 40 (L) >60 mL/min/1.73m*2    Calcium 8.9 8.6 - 10.3 mg/dL    Albumin 3.9 3.4 - 5.0 g/dL    Alkaline Phosphatase 101 33 - 136 U/L    Total Protein 6.6 6.4 - 8.2 g/dL    AST 86 (H) 9 - 39 U/L    Bilirubin, Total 1.5 (H) 0.0 - 1.2 mg/dL    ALT 69 (H) 7 - 45 U/L   Creatine Kinase   Result Value Ref Range    Creatine Kinase 80 0 - 215 U/L   B-type natriuretic peptide   Result Value Ref Range    BNP 2,509 (H) 0 - 99 pg/mL   Lactate   Result Value Ref Range    Lactate 1.4 0.4 - 2.0 mmol/L   ECG 12 lead   Result Value Ref Range    Ventricular Rate 72 BPM    Atrial Rate 0 BPM    WI Interval 315 ms    QRS Duration 116 ms    QT Interval 531 ms    QTC Calculation(Bazett) 582 ms    R Axis 65 degrees    T Axis -76 degrees    QRS Count 11 beats    Q Onset 249 ms    T Offset 515 ms    QTC Fredericia 564 ms   Urinalysis with Reflex Culture and Microscopic   Result Value Ref Range    Color, Urine Yellow Light-Yellow, Yellow, Dark-Yellow    Appearance, Urine Clear Clear    Specific Gravity, Urine 1.015 1.005 - 1.035    pH, Urine 5.5 5.0, 5.5, 6.0, 6.5, 7.0, 7.5, 8.0    Protein, Urine 30 (1+) (A) NEGATIVE, 10 (TRACE), 20 (TRACE) mg/dL    Glucose, Urine Normal Normal mg/dL    Blood, Urine NEGATIVE NEGATIVE    Ketones, Urine NEGATIVE NEGATIVE mg/dL    Bilirubin, Urine NEGATIVE NEGATIVE     Urobilinogen, Urine Normal Normal mg/dL    Nitrite, Urine 2+ (A) NEGATIVE    Leukocyte Esterase, Urine 250 Rick/µL (A) NEGATIVE   Extra Urine Gray Tube   Result Value Ref Range    Extra Tube Hold for add-ons.    Microscopic Only, Urine   Result Value Ref Range    WBC, Urine 21-50 (A) 1-5, NONE /HPF    WBC Clumps, Urine RARE Reference range not established. /HPF    RBC, Urine 1-2 NONE, 1-2, 3-5 /HPF    Bacteria, Urine 2+ (A) NONE SEEN /HPF    Mucus, Urine FEW Reference range not established. /LPF    Hyaline Casts, Urine OCCASIONAL (A) NONE /LPF    Amorphous Crystals, Urine 1+ NONE, 1+, 2+ /HPF   CBC and Auto Differential   Result Value Ref Range    WBC 5.1 4.4 - 11.3 x10*3/uL    nRBC 0.0 0.0 - 0.0 /100 WBCs    RBC 3.47 (L) 4.00 - 5.20 x10*6/uL    Hemoglobin 9.0 (L) 12.0 - 16.0 g/dL    Hematocrit 29.5 (L) 36.0 - 46.0 %    MCV 85 80 - 100 fL    MCH 25.9 (L) 26.0 - 34.0 pg    MCHC 30.5 (L) 32.0 - 36.0 g/dL    RDW 17.6 (H) 11.5 - 14.5 %    Platelets 134 (L) 150 - 450 x10*3/uL    Neutrophils % 74.4 40.0 - 80.0 %    Immature Granulocytes %, Automated 0.4 0.0 - 0.9 %    Lymphocytes % 13.9 13.0 - 44.0 %    Monocytes % 8.9 2.0 - 10.0 %    Eosinophils % 1.8 0.0 - 6.0 %    Basophils % 0.6 0.0 - 2.0 %    Neutrophils Absolute 3.76 1.60 - 5.50 x10*3/uL    Immature Granulocytes Absolute, Automated 0.02 0.00 - 0.50 x10*3/uL    Lymphocytes Absolute 0.70 (L) 0.80 - 3.00 x10*3/uL    Monocytes Absolute 0.45 0.05 - 0.80 x10*3/uL    Eosinophils Absolute 0.09 0.00 - 0.40 x10*3/uL    Basophils Absolute 0.03 0.00 - 0.10 x10*3/uL   Comprehensive Metabolic Panel   Result Value Ref Range    Glucose 99 74 - 99 mg/dL    Sodium 144 136 - 145 mmol/L    Potassium 3.1 (L) 3.5 - 5.3 mmol/L    Chloride 103 98 - 107 mmol/L    Bicarbonate 31 21 - 32 mmol/L    Anion Gap 13 10 - 20 mmol/L    Urea Nitrogen 17 6 - 23 mg/dL    Creatinine 1.20 (H) 0.50 - 1.05 mg/dL    eGFR 44 (L) >60 mL/min/1.73m*2    Calcium 8.6 8.6 - 10.3 mg/dL    Albumin 3.5 3.4 - 5.0 g/dL     Alkaline Phosphatase 86 33 - 136 U/L    Total Protein 5.8 (L) 6.4 - 8.2 g/dL    AST 75 (H) 9 - 39 U/L    Bilirubin, Total 1.1 0.0 - 1.2 mg/dL    ALT 59 (H) 7 - 45 U/L   TSH with reflex to Free T4 if abnormal   Result Value Ref Range    Thyroid Stimulating Hormone 5.42 (H) 0.44 - 3.98 mIU/L   Thyroxine, Free   Result Value Ref Range    Thyroxine, Free 1.88 (H) 0.61 - 1.12 ng/dL   Lactate Dehydrogenase   Result Value Ref Range     (H) 84 - 246 U/L   Protein, Total   Result Value Ref Range    Total Protein 5.6 (L) 6.4 - 8.2 g/dL   Iron and TIBC   Result Value Ref Range    Iron 44 35 - 150 ug/dL    UIBC 282 110 - 370 ug/dL    TIBC 326 240 - 445 ug/dL    % Saturation 13 (L) 25 - 45 %   Vitamin B12   Result Value Ref Range    Vitamin B12 653 211 - 911 pg/mL   Folate   Result Value Ref Range    Folate, Serum >22.3 >5.0 ng/mL   Reticulocytes   Result Value Ref Range    Retic % 1.8 0.5 - 2.0 %    Retic Absolute 0.062 0.017 - 0.110 x10*6/uL    Reticulocyte Hemoglobin 28 28 - 38 pg    Immature Retic fraction 28.3 (H) <=16.0 %       ECG 12 lead    Result Date: 9/9/2024  Atrial-paced complexes Prolonged SD interval Incomplete right bundle branch block Nonspecific repol abnormality, lateral leads Baseline wander in lead(s) II,III,aVL,aVF,V3    XR wrist left 3+ views    Result Date: 9/8/2024  Interpreted By:  Ludwin Dunlap, STUDY: XR WRIST LEFT 3+ VIEWS; XR HAND LEFT 3+ VIEWS  9/8/2024 5:58 pm   INDICATION: Signs/Symptoms:fall   COMPARISON: 08/27/2024   ACCESSION NUMBER(S): [id number]; [id number]   ORDERING CLINICIAN: REZA CHUN   TECHNIQUE: 3 views each of left hand wrist including AP , oblique and lateral projections were obtained.   FINDINGS: There is no evidence of acute fracture or dislocation identified. Moderate hypertrophic degenerative changes are seen trapezium 1st. Mild degenerative changes are seen in       1. No evidence of acute fracture or dislocation. 2. Degenerative changes, as described  above.   MACRO: None.   Signed by: Ludwin Dunlap 9/8/2024 6:29 PM Dictation workstation:   [id number]    XR hand left 3+ views    Result Date: 9/8/2024  Interpreted By:  Ludwin Dunlap, STUDY: XR WRIST LEFT 3+ VIEWS; XR HAND LEFT 3+ VIEWS  9/8/2024 5:58 pm   INDICATION: Signs/Symptoms:fall   COMPARISON: 08/27/2024   ACCESSION NUMBER(S): [id number]; [id number]   ORDERING CLINICIAN: REZA CHUN   TECHNIQUE: 3 views each of left hand wrist including AP , oblique and lateral projections were obtained.   FINDINGS: There is no evidence of acute fracture or dislocation identified. Moderate hypertrophic degenerative changes are seen trapezium 1st. Mild degenerative changes are seen in       1. No evidence of acute fracture or dislocation. 2. Degenerative changes, as described above.   MACRO: None.   Signed by: Ludwin Dunlap 9/8/2024 6:29 PM Dictation workstation:   [id number]    CT chest abdomen pelvis wo IV contrast    Result Date: 9/8/2024  Interpreted By:  Bro Doyle, STUDY: CT CHEST ABDOMEN PELVIS WO CONTRAST;  9/8/2024 5:24 pm   INDICATION: Signs/Symptoms:fall.     COMPARISON: CT abdomen and pelvis 05/29/2024   ACCESSION NUMBER(S): [id number]   ORDERING CLINICIAN: REZA CHUN   TECHNIQUE: Contiguous axial images of the chest, abdomen, and pelvis were obtained without contrast. Coronal and sagittal reformatted images were reconstructed from the axial data.   FINDINGS: Assessment of the vascular, mediastinal, and abdominopelvic hollow and solid visceral structures limited without IV contrast.   CT CHEST:   MEDIASTINUM AND LYMPH NODES: The visualized thyroid is within normal limits. No enlarged intrathoracic or axillary lymph nodes by imaging criteria. Assessment for hilar adenopathy limited by lack of IV contrast. No pneumomediastinum. The esophagus appears within normal limits.   HEART: Cardiomegaly. Right atrial and right ventricular pacing leads. Triple-vessel coronary vascular calcifications.  No significant pericardial effusion.   VESSELS: Thoracic aorta is normal in diameter. Mild calcifications of the aortic arch. The main pulmonary artery is enlarged at 3.2 cm. Vascular patency can not be assessed without IV contrast.   LUNG, AIRWAYS, PLEURA: The trachea and proximal mainstem bronchi are patent. Large right pleural effusion with adjacent volume loss. Mild septal thickening and ground-glass opacities within the bilateral lungs suggesting edema. No pneumothorax.   CHEST WALL SOFT TISSUES: Mild body wall edema.   OSSEOUS STRUCTURES: Subacute to chronic incompletely united fracture deformity of the lateral left 9th rib. Age-indeterminate superior endplate deformity of T4 without significant vertebral body height loss.     CT ABDOMEN/PELVIS:   LIVER: Enlargement of the right hepatic lobe measuring 20 cm.   BILE DUCTS: No significant intrahepatic or extrahepatic dilatation.   GALLBLADDER: Cholelithiasis without CT evidence of acute cholecystitis.   PANCREAS: No significant abnormality.   SPLEEN: No significant abnormality.   ADRENALS: No significant abnormality.   KIDNEYS, URETERS, BLADDER: Mild bilateral renal cortical scarring. Nonspecific perinephric fat stranding bilaterally. No appreciable renal or ureteral calculus or hydro ureteral nephrosis. Bladder is unremarkable.   REPRODUCTIVE ORGANS: Hysterectomy.   GI: No bowel obstruction. No appreciable bowel inflammation in the limitation of motion artifact. The appendix isn't visualized.   VESSELS: No aortic aneurysm. Moderate aortoiliac calcifications. Vascular patency can not be assessed without IV contrast.   PERITONEUM/RETROPERITONEUM: Small amount of perihepatic and pelvic ascites. No intraperitoneal free air. Mild mesenteric edema.   LYMPH NODES: No enlarged lymph nodes.   ABDOMINAL WALL: Mild body wall edema.   OSSEOUS STRUCTURES: No acute osseous abnormality. Severe degenerative disc changes throughout the lumbar spine with vacuum disc phenomenon  in the associated degenerative endplate changes.       1. Large right pleural effusion with associated volume loss. Superimposed airspace disease can not be excluded. 2. Subtle age-indeterminate superior endplate deformity of T4. Correlate for point tenderness on exam. 3. Mild septal thickening and scattered ground-glass opacities in the lungs likely reflecting edema. Infectious or inflammatory process not excluded. 4. Hepatomegaly. 5. Small volume perihepatic and splenic ascites. 6. Mild anasarca.   MACRO: None   Signed by: Bro Doyle 9/8/2024 5:52 PM Dictation workstation:   [id number]    CT head wo IV contrast    Result Date: 9/8/2024  Interpreted By:  Bro Doyle, STUDY: CT HEAD WO IV CONTRAST; CT CERVICAL SPINE WO IV CONTRAST;  9/8/2024 5:24 pm   INDICATION: Signs/Symptoms:fall     COMPARISON: CT head and cervical spine 08/27/2024   ACCESSION NUMBER(S): [id number]; [id number]   ORDERING CLINICIAN: REZA CHUN   TECHNIQUE: Axial noncontrast CT images of head with coronal and sagittal reconstructed images. Axial noncontrast CT images of the cervical spine with coronal and sagittal reconstructed images.   FINDINGS: CT HEAD:   BRAIN PARENCHYMA: Generalized cerebral volume loss. Gray-white differentiation is maintained. No mass-effect, midline shift or effacement of cerebral sulci. Patchy periventricular and subcortical white matter hypodensities, nonspecific but often seen in the setting of chronic microangiopathic change.   HEMORRHAGE: No acute intracranial hemorrhage.   VENTRICLES and EXTRA-AXIAL SPACES: The ventricles and sulci are within normal limits for brain volume. No abnormal extra-axial fluid collection.   ORBITS: The visualized orbits and globes are within normal limits.   EXTRACRANIAL SOFT TISSUES: Large hematoma of the left forehead/frontal scalp.   PARANASAL SINUSES/MASTOIDS: The visualized paranasal sinuses and mastoid air cells are well aerated.   CALVARIUM: No depressed skull  fracture.     CT CERVICAL SPINE:   CRANIOCERVICAL JUNCTION: Intact.   ALIGNMENT: No traumatic malalignment or traumatic facet widening. Straightening of the cervical lordotic curvature which may be related to positioning or muscle spasm.   VERTEBRAE/DISC SPACES: No acute fracture. Vertebral body heights are maintained. Mild disc space height loss throughout the cervical spine with associated mild degenerative endplate changes. No high grade spinal canal stenosis evident by CT.   SOFT TISSUES: Within normal limits.   OTHER: Large right pleural effusion.       CT HEAD: 1. No acute intracranial abnormality or calvarial fracture. 2. Large left forehead/frontal scalp hematoma.     CT CERVICAL SPINE: 1. No acute fracture or traumatic malalignment of the cervical spine. 2. Large right pleural effusion.   MACRO: None   Signed by: Bro Doyle 9/8/2024 5:35 PM Dictation workstation:   [id number]    CT cervical spine wo IV contrast    Result Date: 9/8/2024  Interpreted By:  Bro Doyle, STUDY: CT HEAD WO IV CONTRAST; CT CERVICAL SPINE WO IV CONTRAST;  9/8/2024 5:24 pm   INDICATION: Signs/Symptoms:fall     COMPARISON: CT head and cervical spine 08/27/2024   ACCESSION NUMBER(S): [id number]; [id number]   ORDERING CLINICIAN: REZA CHUN   TECHNIQUE: Axial noncontrast CT images of head with coronal and sagittal reconstructed images. Axial noncontrast CT images of the cervical spine with coronal and sagittal reconstructed images.   FINDINGS: CT HEAD:   BRAIN PARENCHYMA: Generalized cerebral volume loss. Gray-white differentiation is maintained. No mass-effect, midline shift or effacement of cerebral sulci. Patchy periventricular and subcortical white matter hypodensities, nonspecific but often seen in the setting of chronic microangiopathic change.   HEMORRHAGE: No acute intracranial hemorrhage.   VENTRICLES and EXTRA-AXIAL SPACES: The ventricles and sulci are within normal limits for brain volume. No abnormal  extra-axial fluid collection.   ORBITS: The visualized orbits and globes are within normal limits.   EXTRACRANIAL SOFT TISSUES: Large hematoma of the left forehead/frontal scalp.   PARANASAL SINUSES/MASTOIDS: The visualized paranasal sinuses and mastoid air cells are well aerated.   CALVARIUM: No depressed skull fracture.     CT CERVICAL SPINE:   CRANIOCERVICAL JUNCTION: Intact.   ALIGNMENT: No traumatic malalignment or traumatic facet widening. Straightening of the cervical lordotic curvature which may be related to positioning or muscle spasm.   VERTEBRAE/DISC SPACES: No acute fracture. Vertebral body heights are maintained. Mild disc space height loss throughout the cervical spine with associated mild degenerative endplate changes. No high grade spinal canal stenosis evident by CT.   SOFT TISSUES: Within normal limits.   OTHER: Large right pleural effusion.       CT HEAD: 1. No acute intracranial abnormality or calvarial fracture. 2. Large left forehead/frontal scalp hematoma.     CT CERVICAL SPINE: 1. No acute fracture or traumatic malalignment of the cervical spine. 2. Large right pleural effusion.   MACRO: None   Signed by: Bro Doyle 9/8/2024 5:35 PM Dictation workstation:   [id number]    CT cervical spine wo IV contrast    Result Date: 8/27/2024  Interpreted By:  Brendon Gooden, STUDY: CT CERVICAL SPINE WO IV CONTRAST;  8/27/2024 5:26 pm   INDICATION: Signs/Symptoms:Fall out of bed, on eliquis.     COMPARISON: CT cervical spine 08/25/2024   ACCESSION NUMBER(S): [id number]   ORDERING CLINICIAN: ANGIE PRICE   TECHNIQUE: Axial CT images of the cervical spine are obtained. Axial, coronal and sagittal reconstructions are provided for review.   FINDINGS:     Fractures: Stable superior endplate irregularities of the C7, T1, and T2 vertebral bodies.   Vertebral Alignment: No posttraumatic malalignment. There is overall straightening of the normal cervical lordosis, presumed secondary to patient  positioning or spasm.   Craniocervical Junction: The odontoid process and craniocervical junction are intact.   Vertebrae/Disc Spaces:  Multilevel superior endplate irregularities of the T1 and T2 vertebral bodies are stable, favored to be chronic. Multilevel disc space narrowing. Multilevel facet arthropathy Multilevel uncovertebral hypertrophy.Multilevel osteophyte formation.   Prevertebral/Paraspinal Soft Tissues: The prevertebral and paraspinal soft tissues are unremarkable.   Incompletely assessed large right-sided pleural effusion.       Stable superior endplate irregularities of the C7, T1, and T2 vertebral bodies which were described on 08/25/2024.   No new CT evidence for acute injury of the cervical spine since recent comparison examination.   Incompletely assessed large right-sided pleural effusion.   MACRO: None   Signed by: Brendon Gooden 8/27/2024 5:50 PM Dictation workstation:   [id number]    CT head wo IV contrast    Result Date: 8/27/2024  Interpreted By:  Brendon Gooden, STUDY: CT HEAD WO IV CONTRAST;  8/27/2024 5:26 pm   INDICATION: Signs/Symptoms:Fall out of bed, on eliquis.   COMPARISON: Head CT 08/25/2024   ACCESSION NUMBER(S): [id number]   ORDERING CLINICIAN: ANGIE PRICE   TECHNIQUE: Noncontrast axial CT scan of head was performed. Angled reformats in brain and bone windows were generated. The images were reviewed in bone, brain, blood and soft tissue windows.   FINDINGS: CSF Spaces: The ventricles, sulci and basal cisterns are within normal limits. There is no extraaxial fluid collection.   Parenchyma: Similar appearing parenchymal atrophy. Periventricular and subcortical white matter hypoattenuation is nonspecific, however likely reflects chronic microvascular ischemic versus chronic hypertensive changes. The grey-white differentiation is intact. There is no mass effect or midline shift.  There is no intracranial hemorrhage.   Calvarium: The calvarium is unremarkable.   Paranasal  sinuses and mastoids: Visualized paranasal sinuses and mastoids are clear.       No CT evidence of acute intracranial injury.       MACRO: None     Signed by: Brendon Gooden 8/27/2024 5:48 PM Dictation workstation:   [id number]    XR hand left 3+ views    Result Date: 8/27/2024  STUDY: Hand Radiographs; 8/27/2024 4:14PM INDICATION: Ecchymosis to lateral dorsum of left hand, fall. COMPARISON: None Available. ACCESSION NUMBER(S): [id number] ORDERING CLINICIAN: ANGIE PRICE TECHNIQUE:  Three view(s) of the left hand. FINDINGS:  There is soft tissue swelling along the lateral hand. There is no visible acute fracture or dislocation. There is narrowing of the thumb carpal-metacarpal joint space consistent with degenerative joint disease.     1. Soft tissue swelling along lateral hand. 2. No visible acute fracture or dislocation. 3. Thumb carpal-metacarpal degenerative joint disease. Signed by Jose Green MD    CT head wo IV contrast    Result Date: 8/25/2024  Interpreted By:  Khoi Larsen, STUDY: CT HEAD WO IV CONTRAST; CT CERVICAL SPINE WO IV CONTRAST;  8/25/2024 9:55 pm   INDICATION: Signs/Symptoms:fall.   COMPARISON: None.   ACCESSION NUMBER(S): [id number]; [id number]   ORDERING CLINICIAN: PAIGE LAGUNA   TECHNIQUE: Axial noncontrast CT images of the head and cervical spine. Sagittal and coronal reformats were provided.   FINDINGS: Head: BRAIN: No acute intracranial hemorrhage. No mass effect or midline shift. Gray-white matter interfaces are preserved.Patchy white matter hypodensities which are nonspecific but likely related to microangiopathic white matter changes.   VENTRICLES and EXTRA-AXIAL SPACES: Prominent ventricles and extra-axial CSF spaces, reflecting parenchymal volume loss.   EXTRACRANIAL SOFT TISSUES:  Within normal limits.   PARANASAL SINUSES/MASTOIDS: The visualized paranasal sinuses and mastoid air cells are aerated.   BONES AND ORBITS: No displaced skull fracture. Status  post bilateral lens replacement.   OTHER FINDINGS: None.     Cervical Spine:   FRACTURES: Subtle minimal height loss of the anterosuperior C7, T1, and T2 vertebral bodies with underlying sclerosis, new/increased from 06/03/2024, likely subacute compression fractures. Incomplete fusion of the posterior arch of C1.   VERTEBRAL ALIGNMENT: There is mild grade 1 anterolisthesis of C3 on C4 and C4 on C5 as well as C6 on C7. Straightening of normal cervical lordosis.   CRANIOCERVICAL JUNCTION: Within normal limits.   VERTEBRA/DISC SPACES: Moderate multilevel disc space narrowing, endplate osteophytes, and facet/uncovertebral arthropathy. No osseous spinal canal stenosis. Uncovertebral and facet arthropathy contribute to multilevel neural foraminal stenosis which is most pronounced at the right C3-C4, C4-C5, and C5-C6 levels, where there is moderate stenosis.   SOFT TISSUES: No acute soft tissue abnormality. Atherosclerosis.   UPPER CHEST: Large right pleural effusion.       1. No acute intracranial hemorrhage or mass effect. 2. No acute fracture or traumatic malalignment of the cervical spine. 3. Subtle minimal height loss of the anterosuperior C7, T1, and T2 vertebral bodies with underlying sclerosis, new/increased from 06/03/2024, likely subacute compression fractures. 4. Large right pleural effusion.   Signed by: Khoi Larsen 8/25/2024 10:51 PM Dictation workstation:   [id number]    CT cervical spine wo IV contrast    Result Date: 8/25/2024  Interpreted By:  Khoi Larsen, STUDY: CT HEAD WO IV CONTRAST; CT CERVICAL SPINE WO IV CONTRAST;  8/25/2024 9:55 pm   INDICATION: Signs/Symptoms:fall.   COMPARISON: None.   ACCESSION NUMBER(S): [id number]; [id number]   ORDERING CLINICIAN: PAIGE LAGUNA   TECHNIQUE: Axial noncontrast CT images of the head and cervical spine. Sagittal and coronal reformats were provided.   FINDINGS: Head: BRAIN: No acute intracranial hemorrhage. No mass effect or midline shift.  Gray-white matter interfaces are preserved.Patchy white matter hypodensities which are nonspecific but likely related to microangiopathic white matter changes.   VENTRICLES and EXTRA-AXIAL SPACES: Prominent ventricles and extra-axial CSF spaces, reflecting parenchymal volume loss.   EXTRACRANIAL SOFT TISSUES:  Within normal limits.   PARANASAL SINUSES/MASTOIDS: The visualized paranasal sinuses and mastoid air cells are aerated.   BONES AND ORBITS: No displaced skull fracture. Status post bilateral lens replacement.   OTHER FINDINGS: None.     Cervical Spine:   FRACTURES: Subtle minimal height loss of the anterosuperior C7, T1, and T2 vertebral bodies with underlying sclerosis, new/increased from 06/03/2024, likely subacute compression fractures. Incomplete fusion of the posterior arch of C1.   VERTEBRAL ALIGNMENT: There is mild grade 1 anterolisthesis of C3 on C4 and C4 on C5 as well as C6 on C7. Straightening of normal cervical lordosis.   CRANIOCERVICAL JUNCTION: Within normal limits.   VERTEBRA/DISC SPACES: Moderate multilevel disc space narrowing, endplate osteophytes, and facet/uncovertebral arthropathy. No osseous spinal canal stenosis. Uncovertebral and facet arthropathy contribute to multilevel neural foraminal stenosis which is most pronounced at the right C3-C4, C4-C5, and C5-C6 levels, where there is moderate stenosis.   SOFT TISSUES: No acute soft tissue abnormality. Atherosclerosis.   UPPER CHEST: Large right pleural effusion.       1. No acute intracranial hemorrhage or mass effect. 2. No acute fracture or traumatic malalignment of the cervical spine. 3. Subtle minimal height loss of the anterosuperior C7, T1, and T2 vertebral bodies with underlying sclerosis, new/increased from 06/03/2024, likely subacute compression fractures. 4. Large right pleural effusion.   Signed by: Khoi Larsen 8/25/2024 10:51 PM Dictation workstation:   [id number]    XR lumbar spine 2-3 views    Result Date:  8/24/2024  Interpreted By:  Giuseppe Richmond, STUDY: XR LUMBAR SPINE 2-3 VIEWS; ;  8/24/2024 12:48 am   INDICATION: Signs/Symptoms:low back pain s/p fall.   COMPARISON: None.   ACCESSION NUMBER(S): [id number]   ORDERING CLINICIAN: GEE SALAZAR   FINDINGS: No acute fracture of the lumbar spine. There is multilevel degenerative change of the lumbar spine. There is grade 1 anterolisthesis of L4 on L5. There is multilevel intervertebral disc space narrowing and anterior osseous spurring. There is multilevel degenerative facet arthropathy.       No acute fracture of the lumbar spine.   Multilevel degenerative change of the lumbar spine.     MACRO: None   Signed by: Giuseppe Richmond 8/24/2024 1:25 AM Dictation workstation:   [id number]    CT cervical spine wo IV contrast    Result Date: 8/24/2024  Interpreted By:  Giuseppe Richmond, STUDY: CT CERVICAL SPINE WO IV CONTRAST;  8/24/2024 1:05 am   INDICATION: Signs/Symptoms:fall.   COMPARISON: 8/20/2024   ACCESSION NUMBER(S): [id number]   ORDERING CLINICIAN: GEE SALAZAR   TECHNIQUE: Contiguous axial images of the cervical spine were obtained without intravenous contrast. Coronal and sagittal reformatted images were obtained from the axial images.   FINDINGS: No acute fracture of the cervical spine. There is stable mild subacute superior endplate fractures and mild compression deformities of T1 on T2. There is multilevel degenerative change of the cervical spine. There is grade 1 anterolisthesis of C4 on C5. There is multilevel intervertebral disc space narrowing and anterior osseous spurring. There is limited evaluation of the soft tissues of the spinal canal. There is multilevel degenerative facet and uncovertebral arthropathy. No significant prevertebral soft tissue edema.   Partially imaged right pleural effusion in the lung apex.       No evidence of acute fracture of the cervical spine.   Stable subacute superior endplate fractures and mild compression deformities of  T1 and T2.   Multilevel degenerative change of the cervical spine.   Partially imaged right pleural effusion in the lung apex.   MACRO: None   Signed by: Giuseppe Richmond 8/24/2024 1:24 AM Dictation workstation:   [id number]    CT head wo IV contrast    Result Date: 8/24/2024  Interpreted By:  Giuseppe Richmond, STUDY: CT HEAD WO IV CONTRAST;  8/24/2024 1:05 am   INDICATION: Signs/Symptoms:fall.   COMPARISON: 8/20/2024   ACCESSION NUMBER(S): [id number]   ORDERING CLINICIAN: GEE SALAZAR   TECHNIQUE: Contiguous axial images of the head were obtained without intravenous contrast.   FINDINGS: BRAIN PARENCHYMA:  There is cerebral atrophy and bilateral chronic periventricular white matter small vessel ischemic change. The gray white matter differentiation is preserved.  No mass effect or midline shift.   HEMORRHAGE:  No evidence of acute intracranial hemorrhage. VENTRICLES AND EXTRA-AXIAL SPACES:  The ventricles are within normal limits in size for brain volume.  No evidence of abnormal extraaxial fluid collection. EXTRACRANIAL SOFT TISSUES:  Within normal limits. PARANASAL SINUSES/MASTOIDS:  The visualized paranasal sinuses and mastoid air cells are clear and well pneumatized. CALVARIUM:  No evidence of depressed calvarial fracture.   OTHER FINDINGS:  None       No evidence of acute intracranial hemorrhage or depressed calvarial fracture.   Cerebral atrophy and chronic periventricular white matter small vessel ischemic change.   MACRO: None   Signed by: Giuseppe Richmond 8/24/2024 1:21 AM Dictation workstation:   [id number]    CT head wo IV contrast    Result Date: 8/20/2024  Interpreted By:  Magno Krishnamurthy, STUDY: CT HEAD WO IV CONTRAST;  8/20/2024 4:55 am   INDICATION: Signs/Symptoms:fall.   COMPARISON: None.   ACCESSION NUMBER(S): [id number]   ORDERING CLINICIAN: OLIVE AGUIRRE   TECHNIQUE: Axial noncontrast CT images of the head.   FINDINGS: Gray-white matter differentiation is maintained. There are no extra-axial  collections. No mass effect or midline shift. There is no hydrocephalus. Generalized cerebral volume loss and associated ventricular and sulcal enlargement. Scattered periventricular and deep white matter hypodensities, suggestive of mild-to-moderate chronic microvascular ischemic change.   HEMORRHAGE: No acute intracranial hemorrhage.   CALVARIUM: No depressed skull fracture.   EXTRACRANIAL SOFT TISSUES:. Unremarkable..   PARANASAL SINUSES/MASTOIDS: The visualized paranasal sinuses are well aerated. Mastoid air cells are clear.   ORBITS: Grossly normal.       No acute cortical infarct or acute intracranial hemorrhage.     Signed by: Magno Krishnamurthy 8/20/2024 4:59 AM Dictation workstation:   [id number]    XR knee left 1-2 views    Result Date: 8/20/2024  Interpreted By:  Magno Krishnamurthy, STUDY: XR KNEE LEFT 1-2 VIEWS; ;  8/20/2024 4:44 am   INDICATION: Signs/Symptoms:fall.   COMPARISON: None.   ACCESSION NUMBER(S): [id number]   ORDERING CLINICIAN: OLIVE AGUIRRE   FINDINGS: Bone mineralization is normal. There is no evidence of fracture or dislocation. The joint spaces are maintained. There is no evidence of joint effusion. There are atherosclerotic calcifications of the visualized distal femoral and popliteal artery.       No evidence of fracture or dislocation.     Signed by: Magno Krishnamurthy 8/20/2024 4:56 AM Dictation workstation:   [id number]    CT cervical spine wo IV contrast    Result Date: 8/20/2024  Interpreted By:  Homero Romo, STUDY: CT CERVICAL SPINE WO IV CONTRAST;  8/20/2024 3:51 am   INDICATION: Signs/Symptoms:fall.   COMPARISON: CT C-spine 06/03/2024   ACCESSION NUMBER(S): [id number]   ORDERING CLINICIAN: OLIVE AGUIRRE   TECHNIQUE: Axial noncontrast images of the cervical spine with coronal and sagittal reconstructed images.   FINDINGS: ALIGNMENT: Straightening of the cervical spine, likely positional. VERTEBRAE: Congenital nonunion of the C1 ring. Very subtle superior endplate height loss of the T1  vertebral body with increased sclerosis underlying the endplate compared to 06/03/2024, likely a subacute fracture. SPINAL CANAL: Multilevel degenerative change without severe central or neural foraminal stenosis. PREVERTEBRAL SOFT TISSUES: Tortuous ICAs. Previously noted right vallecular lesion is less apparent on today's exam due to limitations in field of view. LUNG APICES: Left lung apex is unremarkable. Partially visualized effusion is present on the right, new compared to prior.   OTHER FINDINGS: None.       1. No acute fractures or traumatic subluxations of the cervical spine. 2. Subtle superior endplate height loss of the T1 vertebral body with underlying sclerosis, increased compared to 06/03/2020 and raises suspicion for a subacute fracture at this site. Correlate with focal pain on exam. 3. Partially visualized effusion in the right pleural space, new compared to 06/03/2020.   MACRO: None   Signed by: Homero Romo 8/20/2024 4:08 AM Dictation workstation:   [id number]    CT head wo IV contrast    Result Date: 8/20/2024  Interpreted By:  Homero Romo, STUDY: CT HEAD WO IV CONTRAST;  8/20/2024 3:51 am   INDICATION: Signs/Symptoms:fall.   COMPARISON: Head CT 05/31/2020   ACCESSION NUMBER(S): [id number]   ORDERING CLINICIAN: OLIVE AGUIRRE   TECHNIQUE: Axial non-contrast CT images of the head. Coronal and sagittal images were reconstructed.   FINDINGS: BRAIN PARENCHYMA: Sub-cortical and periventricular hypoattenuating foci, likely sequelae of chronic ischemic microangiopathy. Gray-white matter differentiation is preserved.   VENTRICLES: The ventricles and sulci are within normal limits in size for brain volume. No midline shift. EXTRA-AXIAL SPACES: No abnormal extraaxial fluid collection. EXTRACRANIAL SOFT TISSUES: Within normal limits. PARANASAL SINUSES: The visualized paranasal sinuses are clear. MASTOIDS: Mastoid air cells are aerated. CALVARIUM: No depressed skull fracture. No destructive osseous lesion.  VESSELS: Calcification of the cavernous carotids and vertebral arteries.   OTHER FINDINGS: Bilateral lens replacements.       No acute intracranial abnormality.   Additional chronic findings are stable compared to prior exam.   MACRO: None   Signed by: Homero Romo 8/20/2024 3:59 AM Dictation workstation:   [id number]      Assessment and Plan  Principal Problem:    Pleural effusion    83 y.o. female limited trauma activation s/p fall. Patient with laceration to left forehead and on Eliquis/Plavix (currently held).     Plan:  - No trauma surgical intervention indicated  - Patient hemodynamically stable  - IR thoracentesis 9/9/24 for pleural effusion  - General surgery to sign off. Please page or message with any new questions or concerns    Patient discussed with attending, Dr. Deyvi Herndon MD    PGY 2 General Surgery

## 2024-09-09 NOTE — PROGRESS NOTES
Social work consult placed for discharge planning. SW reviewed pt's chart and communicated with TCC. No SW needs foreseen at this time. SW signing off; available upon request.    Bladimir Arrieta, MSW, LSW ([id number])   Care Transitions

## 2024-09-09 NOTE — CARE PLAN
Problem: Pain - Adult  Goal: Verbalizes/displays adequate comfort level or baseline comfort level  Outcome: Progressing     Problem: Safety - Adult  Goal: Free from fall injury  Outcome: Progressing     Problem: Discharge Planning  Goal: Discharge to home or other facility with appropriate resources  Outcome: Progressing     Problem: Chronic Conditions and Co-morbidities  Goal: Patient's chronic conditions and co-morbidity symptoms are monitored and maintained or improved  Outcome: Progressing     Problem: Skin  Goal: Decreased wound size/increased tissue granulation at next dressing change  Outcome: Progressing  Goal: Participates in plan/prevention/treatment measures  Outcome: Progressing  Goal: Prevent/manage excess moisture  Outcome: Progressing  Goal: Prevent/minimize sheer/friction injuries  Outcome: Progressing  Goal: Promote/optimize nutrition  Outcome: Progressing  Goal: Promote skin healing  Outcome: Progressing  Flowsheets (Taken 9/9/2024 2567)  Promote skin healing: Turn/reposition every 2 hours/use positioning/transfer devices     Problem: Fall/Injury  Goal: Not fall by end of shift  Outcome: Progressing  Goal: Be free from injury by end of the shift  Outcome: Progressing  Goal: Verbalize understanding of personal risk factors for fall in the hospital  Outcome: Progressing  Goal: Verbalize understanding of risk factor reduction measures to prevent injury from fall in the home  Outcome: Progressing  Goal: Use assistive devices by end of the shift  Outcome: Progressing  Goal: Pace activities to prevent fatigue by end of the shift  Outcome: Progressing     Problem: Pain  Goal: Takes deep breaths with improved pain control throughout the shift  Outcome: Progressing  Goal: Turns in bed with improved pain control throughout the shift  Outcome: Progressing  Goal: Walks with improved pain control throughout the shift  Outcome: Progressing  Goal: Performs ADL's with improved pain control throughout  shift  Outcome: Progressing  Goal: Participates in PT with improved pain control throughout the shift  Outcome: Progressing  Goal: Free from opioid side effects throughout the shift  Outcome: Progressing  Goal: Free from acute confusion related to pain meds throughout the shift  Outcome: Progressing     Problem: Nutrition  Goal: Less than 5 days NPO/clear liquids  Outcome: Progressing  Goal: Oral intake greater than 50%  Outcome: Progressing  Goal: Oral intake greater 75%  Outcome: Progressing  Goal: Consume prescribed supplement  Outcome: Progressing  Goal: Adequate PO fluid intake  Outcome: Progressing  Goal: Nutrition support goals are met within 48 hrs  Outcome: Progressing  Goal: Nutrition support is meeting 75% of nutrient needs  Outcome: Progressing  Goal: Tube feed tolerance  Outcome: Progressing  Goal: BG  mg/dL  Outcome: Progressing  Goal: Lab values WNL  Outcome: Progressing  Goal: Electrolytes WNL  Outcome: Progressing  Goal: Promote healing  Outcome: Progressing  Goal: Maintain stable weight  Outcome: Progressing  Goal: Reduce weight from edema/fluid  Outcome: Progressing  Goal: Gradual weight gain  Outcome: Progressing  Goal: Improve ostomy output  Outcome: Progressing

## 2024-09-09 NOTE — CONSULTS
GENERAL SURGERY CONSULTATION NOTE    Linette Doyle   1940   [id number]     Consults    Reason For Consult  Limited trauma activation: activation 1726, evaluated 2100.     History Of Present Illness  Linette Doyle is a 83 y.o. female presenting following a fall. She has a history of CAD s/p PCI on Plavix, sick sinus syndrome with a pacemaker, HTN, HLD, pAF on Eliquis, CKD, who has been falling many times in the recent past. She lives at a memory care center and fell last night, but this was not mentioned to the family at the time. Earlier today, she fell again and had significant bleeding from a laceration on her scalp. She was brought to the ED for further evaluation. Unknown LOC.    Her brother and sister in law were present at the bedside. She was living in assisted living but due to her advanced dementia, was moved to the [facility] 1 month ago.  She is currently DNRCC on hospice care     Past Medical History  She has a past medical history of Angina pectoris (CMS-[facility]) (10/22/2023), Atherosclerosis of coronary artery (08/21/2019), Atherosclerotic heart disease of native coronary artery with other forms of angina pectoris (CMS-HCC) (10/22/2023), Hypercholesterolemia (12/28/2018), Hyperlipidemia (10/22/2023), Presence of cardiac pacemaker (10/22/2023), Primary hypertension (12/28/2018), Shortness of breath (11/26/2019), Sick sinus syndrome (Multi) (10/22/2023), Sinus bradycardia (10/22/2023), and Stage 3a chronic kidney disease (Multi) (11/25/2019).    Surgical History  She has a past surgical history that includes Cardiac catheterization (N/A, 02/02/2024); Cardiac catheterization (N/A, 02/02/2024); Cardiac catheterization (N/A, 02/08/2024); Cardiac catheterization (N/A, 02/08/2024); and pacemaker placement (07/04/2019).    Medications  No current facility-administered medications on file prior to encounter.     Current Outpatient Medications on File Prior to Encounter   Medication Sig Dispense Refill     amiodarone (Pacerone) 200 mg tablet Take 1 tablet (200 mg) by mouth once daily.      apixaban (Eliquis) 2.5 mg tablet Take 1 tablet (2.5 mg) by mouth 2 times a day. 180 tablet 1    ascorbic acid/collagen hydr (COLLAGEN PLUS VITAMIN C ORAL) Take 1 capsule by mouth once daily. Indications: Instaflex [facility] supplement      cholecalciferol (Vitamin D3) 50 MCG (2000 UT) tablet Take 1 tablet (2,000 Units) by mouth once daily.      clopidogrel (Plavix) 75 mg tablet Take 1 tablet (75 mg) by mouth once daily. 90 tablet 1    diphenhydrAMINE-acetaminophen (Tylenol PM Extra Strength)  mg per tablet Take 1 tablet by mouth as needed at bedtime for sleep.      levothyroxine (Synthroid, Levoxyl) 100 mcg tablet TAKE ONE TABLET BY MOUTH EVERY DAY 90 tablet 3    metoprolol tartrate (Lopressor) 50 mg tablet Take 1 tablet by mouth 2 times a day. 180 tablet 3    multivit-min/ferrous fumarate (MULTI VITAMIN ORAL) Take 1 tablet by mouth once daily. Indications: One a Day- supplement      mv-mn/om3/dha/epa/fish/lut/leanne (OCUVITE ADULT 50 PLUS ORAL) Take 1 tablet by mouth once daily. Indications: supplement      omeprazole (PriLOSEC) 20 mg tablet,delayed release (DR/EC) EC tablet Take 1 tablet (20 mg) by mouth once daily.      sertraline (Zoloft) 100 mg tablet Take 2 tablets (200 mg) by mouth once daily. 90 tablet 0       Allergies  Iodinated contrast media     Social History  She reports that she has never smoked. She has never been exposed to tobacco smoke. She has never used smokeless tobacco. She reports that she does not drink alcohol and does not use drugs.    Family History  Family History   Problem Relation Name Age of Onset    No Known Problems Mother      No Known Problems Father          Review of Systems   Constitutional:  Negative for chills and fever.   Respiratory:  Negative for cough and shortness of breath.    Cardiovascular:  Negative for chest pain and palpitations.   Gastrointestinal:  Negative for abdominal  "pain, blood in stool, constipation, diarrhea, nausea and vomiting.   Musculoskeletal:  Positive for back pain and neck pain.   Neurological:  Negative for dizziness and headaches.   All other systems reviewed and are negative.      Last Recorded Vitals  Blood pressure 139/72, pulse 66, temperature 36.2 °C (97.2 °F), temperature source Tympanic, resp. rate (!) 22, height 1.575 m (5' 2\"), weight 65.7 kg (144 lb 13.5 oz), SpO2 99%, unknown if currently breastfeeding.     Physical Exam  Constitutional:       General: She is not in acute distress.     Appearance: Normal appearance. She is not ill-appearing.   HENT:      Head: Normocephalic.      Comments: L forehead laceration and ecchymosis with sutures in place, no active bleeding       Right Ear: Tympanic membrane and external ear normal.      Left Ear: Tympanic membrane and external ear normal.      Nose: Nose normal.      Mouth/Throat:      Mouth: Mucous membranes are moist.   Eyes:      Extraocular Movements: Extraocular movements intact.      Conjunctiva/sclera: Conjunctivae normal.      Pupils: Pupils are equal, round, and reactive to light.   Cardiovascular:      Rate and Rhythm: Normal rate and regular rhythm.   Pulmonary:      Effort: Pulmonary effort is normal. No respiratory distress.   Abdominal:      General: There is no distension.      Palpations: Abdomen is soft.      Tenderness: There is no abdominal tenderness. There is no guarding.   Musculoskeletal:         General: No swelling.      Cervical back: Normal range of motion. Tenderness present.      Comments: Significant ecchymosis involving L hand/wrist from a fall several days ago. Normal ROM of that hand   Skin:     General: Skin is warm and dry.   Neurological:      Mental Status: She is alert and oriented to person, place, and time. Mental status is at baseline.   Psychiatric:         Mood and Affect: Mood normal.         Behavior: Behavior normal.        Labs  Results for orders placed or " performed during the hospital encounter of 09/08/24 (from the past 24 hour(s))   CBC and Auto Differential   Result Value Ref Range    WBC 5.8 4.4 - 11.3 x10*3/uL    nRBC 0.0 0.0 - 0.0 /100 WBCs    RBC 4.18 4.00 - 5.20 x10*6/uL    Hemoglobin 10.9 (L) 12.0 - 16.0 g/dL    Hematocrit 35.5 (L) 36.0 - 46.0 %    MCV 85 80 - 100 fL    MCH 26.1 26.0 - 34.0 pg    MCHC 30.7 (L) 32.0 - 36.0 g/dL    RDW 17.6 (H) 11.5 - 14.5 %    Platelets 177 150 - 450 x10*3/uL    Neutrophils % 73.4 40.0 - 80.0 %    Immature Granulocytes %, Automated 0.2 0.0 - 0.9 %    Lymphocytes % 13.3 13.0 - 44.0 %    Monocytes % 10.2 2.0 - 10.0 %    Eosinophils % 2.2 0.0 - 6.0 %    Basophils % 0.7 0.0 - 2.0 %    Neutrophils Absolute 4.24 1.60 - 5.50 x10*3/uL    Immature Granulocytes Absolute, Automated 0.01 0.00 - 0.50 x10*3/uL    Lymphocytes Absolute 0.77 (L) 0.80 - 3.00 x10*3/uL    Monocytes Absolute 0.59 0.05 - 0.80 x10*3/uL    Eosinophils Absolute 0.13 0.00 - 0.40 x10*3/uL    Basophils Absolute 0.04 0.00 - 0.10 x10*3/uL   Magnesium   Result Value Ref Range    Magnesium 1.73 1.60 - 2.40 mg/dL   Comprehensive metabolic panel   Result Value Ref Range    Glucose 117 (H) 74 - 99 mg/dL    Sodium 140 136 - 145 mmol/L    Potassium 3.1 (L) 3.5 - 5.3 mmol/L    Chloride 104 98 - 107 mmol/L    Bicarbonate 28 21 - 32 mmol/L    Anion Gap 11 10 - 20 mmol/L    Urea Nitrogen 18 6 - 23 mg/dL    Creatinine 1.32 (H) 0.50 - 1.05 mg/dL    eGFR 40 (L) >60 mL/min/1.73m*2    Calcium 8.9 8.6 - 10.3 mg/dL    Albumin 3.9 3.4 - 5.0 g/dL    Alkaline Phosphatase 101 33 - 136 U/L    Total Protein 6.6 6.4 - 8.2 g/dL    AST 86 (H) 9 - 39 U/L    Bilirubin, Total 1.5 (H) 0.0 - 1.2 mg/dL    ALT 69 (H) 7 - 45 U/L   Creatine Kinase   Result Value Ref Range    Creatine Kinase 80 0 - 215 U/L   B-type natriuretic peptide   Result Value Ref Range    BNP 2,509 (H) 0 - 99 pg/mL   Lactate   Result Value Ref Range    Lactate 1.4 0.4 - 2.0 mmol/L   Urinalysis with Reflex Culture and Microscopic    Result Value Ref Range    Color, Urine Yellow Light-Yellow, Yellow, Dark-Yellow    Appearance, Urine Clear Clear    Specific Gravity, Urine 1.015 1.005 - 1.035    pH, Urine 5.5 5.0, 5.5, 6.0, 6.5, 7.0, 7.5, 8.0    Protein, Urine 30 (1+) (A) NEGATIVE, 10 (TRACE), 20 (TRACE) mg/dL    Glucose, Urine Normal Normal mg/dL    Blood, Urine NEGATIVE NEGATIVE    Ketones, Urine NEGATIVE NEGATIVE mg/dL    Bilirubin, Urine NEGATIVE NEGATIVE    Urobilinogen, Urine Normal Normal mg/dL    Nitrite, Urine 2+ (A) NEGATIVE    Leukocyte Esterase, Urine 250 Rick/µL (A) NEGATIVE   Microscopic Only, Urine   Result Value Ref Range    WBC, Urine 21-50 (A) 1-5, NONE /HPF    WBC Clumps, Urine RARE Reference range not established. /HPF    RBC, Urine 1-2 NONE, 1-2, 3-5 /HPF    Bacteria, Urine 2+ (A) NONE SEEN /HPF    Mucus, Urine FEW Reference range not established. /LPF    Hyaline Casts, Urine OCCASIONAL (A) NONE /LPF    Amorphous Crystals, Urine 1+ NONE, 1+, 2+ /HPF       Radiology    XR wrist left 3+ views  1. No evidence of acute fracture or dislocation. 2. Degenerative changes, as described above.   MACRO: None.   Signed by: Ludwin Dunlap 9/8/2024 6:29 PM Dictation workstation:   [id number]    XR hand left 3+ views  1. No evidence of acute fracture or dislocation. 2. Degenerative changes, as described above.   MACRO: None.   Signed by: Ludwin Dunlap 9/8/2024 6:29 PM Dictation workstation:   [id number]    CT chest abdomen pelvis wo IV contrast  1. Large right pleural effusion with associated volume loss. Superimposed airspace disease can not be excluded. 2. Subtle age-indeterminate superior endplate deformity of T4. Correlate for point tenderness on exam. 3. Mild septal thickening and scattered ground-glass opacities in the lungs likely reflecting edema. Infectious or inflammatory process not excluded. 4. Hepatomegaly. 5. Small volume perihepatic and splenic ascites. 6. Mild anasarca.   MACRO: None   Signed by: Bro Doyle 9/8/2024 5:52  PM Dictation workstation:   [id number]    CT head wo IV contrast  CT HEAD: 1. No acute intracranial abnormality or calvarial fracture. 2. Large left forehead/frontal scalp hematoma.     CT CERVICAL SPINE: 1. No acute fracture or traumatic malalignment of the cervical spine. 2. Large right pleural effusion.   MACRO: None   Signed by: Bro Doyle 9/8/2024 5:35 PM Dictation workstation:   [id number]    Assessment and Plan  Principal Problem:    Pleural effusion    83 y.o. female with a history of multiple falls who presents following a fall with bleeding from a forehead laceration. Bleeding was controlled by the ED. The patient is being admitted to [facility] for medical management. Will reexamine tomorrow as the patient is on blood-thinning medications.     Ellie Carnes MD, [facility]  General Surgery     Rinvoq Counseling: I discussed with the patient the risks of Rinvoq therapy including but not limited to upper respiratory tract infections, shingles, cold sores, bronchitis, nausea, cough, fever, acne, and headache. Live vaccines should be avoided.  This medication has been linked to serious infections; higher rate of mortality; malignancy and lymphoproliferative disorders; major adverse cardiovascular events; thrombosis; thrombocytopenia, anemia, and neutropenia; lipid elevations; liver enzyme elevations; and gastrointestinal perforations.

## 2024-09-09 NOTE — NURSING NOTE
Patient returned back to room from thoracentesis. Right back dressing cdi, vitals stable, patient resting comfortably in bed. No c/o pain, lung sounds clear diminished.

## 2024-09-09 NOTE — CARE PLAN
Problem: Pain - Adult  Goal: Verbalizes/displays adequate comfort level or baseline comfort level  Outcome: Progressing     Problem: Safety - Adult  Goal: Free from fall injury  Outcome: Progressing     Problem: Skin  Goal: Participates in plan/prevention/treatment measures  Outcome: Progressing  Goal: Prevent/manage excess moisture  Outcome: Progressing  Goal: Prevent/minimize sheer/friction injuries  Outcome: Progressing  Goal: Promote/optimize nutrition  Outcome: Progressing  Goal: Promote skin healing  Outcome: Progressing   The patient's goals for the shift include

## 2024-09-09 NOTE — ED NOTES
Pt arrives to ED via EMS from assisted living    Code Status:  DNR and No Intubation    HPI     Chief Complaint   Patient presents with    Fall       /53   Pulse 65   Temp 36.2 °C (97.2 °F) (Tympanic)   Resp 18   Wt 65.7 kg (144 lb 13.5 oz)   SpO2 97%     [place] Coma Scale Score: 14      LDA:   Peripheral IV 09/08/24 20 G Right Antecubital (Active)   Placement Date/Time: 09/08/24 1711   Hand Hygiene Completed: Yes  Size (Gauge): 20 G  Orientation: Right  Location: Antecubital  Insertion attempts: 1   Number of days: 0        BACKGROUND  Past Medical History:   Diagnosis Date    Angina pectoris (CMS-HCC) 10/22/2023    Atherosclerosis of coronary artery 08/21/2019    Atherosclerotic heart disease of native coronary artery with other forms of angina pectoris (CMS-HCC) 10/22/2023    Hypercholesterolemia 12/28/2018    Hyperlipidemia 10/22/2023    Presence of cardiac pacemaker 10/22/2023    Primary hypertension 12/28/2018    Shortness of breath 11/26/2019    Sick sinus syndrome (Multi) 10/22/2023    Sinus bradycardia 10/22/2023    Stage 3a chronic kidney disease (Multi) 11/25/2019     Past Surgical History:   Procedure Laterality Date    CARDIAC CATHETERIZATION N/A 02/02/2024    Procedure: Left Heart Cath;  Surgeon: Becky Vela MD;  Location: [facility] Cardiac Cath Lab;  Service: Cardiovascular;  Laterality: N/A;    CARDIAC CATHETERIZATION N/A 02/02/2024    Procedure: PCI;  Surgeon: Becky Vela MD;  Location: [facility] Cardiac Cath Lab;  Service: Cardiovascular;  Laterality: N/A;    CARDIAC CATHETERIZATION N/A 02/08/2024    Procedure: Left Heart Cath;  Surgeon: Darrius Ibrahim MD;  Location: [facility] Cardiac Cath Lab;  Service: Cardiovascular;  Laterality: N/A;    CARDIAC CATHETERIZATION N/A 02/08/2024    Procedure: PCI;  Surgeon: Darrius Ibrahim MD;  Location: [facility] Cardiac Cath Lab;  Service: Cardiovascular;  Laterality: N/A;    PACEMAKER PLACEMENT  07/04/2019    MEDTRONIC PACEMAKE AND STENT PLACEMENT     No current  facility-administered medications on file prior to encounter.     Current Outpatient Medications on File Prior to Encounter   Medication Sig Dispense Refill    amiodarone (Pacerone) 200 mg tablet Take 1 tablet (200 mg) by mouth once daily.      apixaban (Eliquis) 2.5 mg tablet Take 1 tablet (2.5 mg) by mouth 2 times a day. 180 tablet 1    ascorbic acid/collagen hydr (COLLAGEN PLUS VITAMIN C ORAL) Take 1 capsule by mouth once daily. Indications: Instaflex [facility] supplement      cholecalciferol (Vitamin D3) 50 MCG (2000 UT) tablet Take 1 tablet (2,000 Units) by mouth once daily.      clopidogrel (Plavix) 75 mg tablet Take 1 tablet (75 mg) by mouth once daily. 90 tablet 1    diphenhydrAMINE-acetaminophen (Tylenol PM Extra Strength)  mg per tablet Take 1 tablet by mouth as needed at bedtime for sleep.      levothyroxine (Synthroid, Levoxyl) 100 mcg tablet TAKE ONE TABLET BY MOUTH EVERY DAY 90 tablet 3    metoprolol tartrate (Lopressor) 50 mg tablet Take 1 tablet by mouth 2 times a day. 180 tablet 3    multivit-min/ferrous fumarate (MULTI VITAMIN ORAL) Take 1 tablet by mouth once daily. Indications: One a Day- supplement      mv-mn/om3/dha/epa/fish/lut/leanne (OCUVITE ADULT 50 PLUS ORAL) Take 1 tablet by mouth once daily. Indications: supplement      omeprazole (PriLOSEC) 20 mg tablet,delayed release (DR/EC) EC tablet Take 1 tablet (20 mg) by mouth once daily.      sertraline (Zoloft) 100 mg tablet Take 2 tablets (200 mg) by mouth once daily. 90 tablet 0        ASSESSMENT  ED Course as of 09/08/24 2314   Sun Sep 08, 2024   5167 IMPRESSION:  CT HEAD:  1. No acute intracranial abnormality or calvarial fracture.  2. Large left forehead/frontal scalp hematoma.          CT CERVICAL SPINE:  1. No acute fracture or traumatic malalignment of the cervical spine.  2. Large right pleural effusion.   [CF]   1800 IMPRESSION:  1. Large right pleural effusion with associated volume loss.  Superimposed airspace disease can not be  excluded.  2. Subtle age-indeterminate superior endplate deformity of T4.  Correlate for point tenderness on exam.  3. Mild septal thickening and scattered ground-glass opacities in the  lungs likely reflecting edema. Infectious or inflammatory process not  excluded.  4. Hepatomegaly.  5. Small volume perihepatic and splenic ascites.  6. Mild anasarca.   [CF]   1949 EKG on my read shows atrial placed rhythm at a rate of 72 bpm, incomplete right heart bundle branch block, no ST elevations TN interval is 315 and QTc is 582. [CF]      ED Course User Index  [CF] Shayna Adams MD         Diagnoses as of 09/08/24 2314   Pleural effusion on right   Fall, initial encounter   Laceration of forehead, initial encounter   Pleural effusion       Medications Currently Running:        Medications Given:  ED Medication Administration from 09/08/2024 1649 to 09/08/2024 2314         Date/Time Order Dose Route Action Action by     09/08/2024 1856 EDT diphth,pertus(acell),tetanus (BoostRIX) 2.5-8-5 Lf-mcg-Lf/0.5mL vaccine 0.5 mL 0.5 mL intramuscular Not Given Settanni, A     09/08/2024 1923 EDT potassium chloride CR (Klor-Con) ER tablet 40 mEq 40 mEq oral Given Settanni, A 09/08/2024 1930 EDT amiodarone (Pacerone) tablet 200 mg 200 mg oral Given Settanni, A 09/08/2024 1930 EDT cefTRIAXone (Rocephin) 2 g in dextrose (iso) IV 50 mL 2 g intravenous New Bag Settanni, A 09/08/2024 1930 EDT clopidogrel (Plavix) tablet 75 mg 75 mg oral Given Settanni, A     09/08/2024 1930 EDT gelatin sponge,absorb-porcine (Gelfoam) sponge  - Omnicell Override Pull 1 each  Given Settanni, A     09/08/2024 1930 EDT polyethylene glycol (Glycolax, Miralax) packet 17 g 17 g oral Given Settanni, A     09/08/2024 1930 EDT sertraline (Zoloft) tablet 200 mg 200 mg oral Given Settanni, A     09/08/2024 1931 EDT lidocaine-epinephrine (Xylocaine W/EPI) injection  - Omnicell Override Pull --  Given MONTY Schuler     09/08/2024 1944 EDT azithromycin 500 mg  in dextrose 5% 250 mL  mg intravenous Not Given Settanni, A     09/08/2024 1955 EDT ipratropium-albuteroL (Duo-Neb) 0.5-2.5 mg/3 mL nebulizer solution 3 mL 3 mL nebulization Not Given Settanni, A     09/08/2024 1956 EDT cefTRIAXone (Rocephin) 2 g in dextrose (iso) IV 50 mL 0 g intravenous Stopped Settanni, A     09/08/2024 1956 EDT doxycycline (Vibramycin) 100 mg in sodium chloride 0.9%  mL 100 mg intravenous Not Given Settanni, A     09/08/2024 2014 EDT apixaban (Eliquis) tablet 2.5 mg 2.5 mg oral Given Settanni, A     09/08/2024 2014 EDT metoprolol tartrate (Lopressor) tablet 50 mg 50 mg oral Given Settanni, A     09/08/2024 2015 EDT furosemide (Lasix) injection 40 mg 40 mg intravenous Given Settanni, A                 RESULTS    Imaging:  XR wrist left 3+ views   Final Result   1. No evidence of acute fracture or dislocation.   2. Degenerative changes, as described above.        MACRO:   None.        Signed by: Ludwin Dunlap 9/8/2024 6:29 PM   Dictation workstation:   [id number]      XR hand left 3+ views   Final Result   1. No evidence of acute fracture or dislocation.   2. Degenerative changes, as described above.        MACRO:   None.        Signed by: Ludwin Dunlap 9/8/2024 6:29 PM   Dictation workstation:   [id number]      CT chest abdomen pelvis wo IV contrast   Final Result   1. Large right pleural effusion with associated volume loss.   Superimposed airspace disease can not be excluded.   2. Subtle age-indeterminate superior endplate deformity of T4.   Correlate for point tenderness on exam.   3. Mild septal thickening and scattered ground-glass opacities in the   lungs likely reflecting edema. Infectious or inflammatory process not   excluded.   4. Hepatomegaly.   5. Small volume perihepatic and splenic ascites.   6. Mild anasarca.        MACRO:   None        Signed by: Bro Doyle 9/8/2024 5:52 PM   Dictation workstation:   [id number]      CT head wo IV contrast   Final Result   CT  HEAD:   1. No acute intracranial abnormality or calvarial fracture.   2. Large left forehead/frontal scalp hematoma.             CT CERVICAL SPINE:   1. No acute fracture or traumatic malalignment of the cervical spine.   2. Large right pleural effusion.        MACRO:   None        Signed by: Bro Doyle 9/8/2024 5:35 PM   Dictation workstation:   [id number]      CT cervical spine wo IV contrast   Final Result   CT HEAD:   1. No acute intracranial abnormality or calvarial fracture.   2. Large left forehead/frontal scalp hematoma.             CT CERVICAL SPINE:   1. No acute fracture or traumatic malalignment of the cervical spine.   2. Large right pleural effusion.        MACRO:   None        Signed by: Bro Doyle 9/8/2024 5:35 PM   Dictation workstation:   [id number]         }  Labs ::99  Abnormal Labs Reviewed   CBC WITH AUTO DIFFERENTIAL - Abnormal; Notable for the following components:       Result Value    Hemoglobin 10.9 (*)     Hematocrit 35.5 (*)     MCHC 30.7 (*)     RDW 17.6 (*)     Lymphocytes Absolute 0.77 (*)     All other components within normal limits   COMPREHENSIVE METABOLIC PANEL - Abnormal; Notable for the following components:    Glucose 117 (*)     Potassium 3.1 (*)     Creatinine 1.32 (*)     eGFR 40 (*)     AST 86 (*)     Bilirubin, Total 1.5 (*)     ALT 69 (*)     All other components within normal limits   URINALYSIS WITH REFLEX CULTURE AND MICROSCOPIC - Abnormal; Notable for the following components:    Protein, Urine 30 (1+) (*)     Nitrite, Urine 2+ (*)     Leukocyte Esterase, Urine 250 Rick/µL (*)     All other components within normal limits   B-TYPE NATRIURETIC PEPTIDE - Abnormal; Notable for the following components:    BNP 2,509 (*)     All other components within normal limits    Narrative:        <100 pg/mL - Heart failure unlikely                  100-299 pg/mL - Intermediate probability of acute heart                                  failure exacerbation. Correlate with  clinical                                  context and patient history.                    >=300 pg/mL - Heart Failure likely. Correlate with clinical                                  context and patient history.                                    BNP testing is performed using different testing methodology at [facility] than at other [facility] hospitals. Direct result comparisons should only be made within the same method.                      MICROSCOPIC ONLY, URINE - Abnormal; Notable for the following components:    WBC, Urine 21-50 (*)     Bacteria, Urine 2+ (*)     Hyaline Casts, Urine OCCASIONAL (*)     All other components within normal limits                Chasity Olvera RN  09/08/24 [id number]

## 2024-09-09 NOTE — CARE PLAN
Problem: Mobility  Goal: STG - Patient will ambulate  Description: FWW MOD A X1  Outcome: Not Progressing  Goal: Goal 1  Description: 20 REPS AROM/RROM INCREASING STRENGTH TO STABILIZE OOB ACTIVITIES  Outcome: Not Progressing     Problem: PT Transfers  Goal: STG - Transfer from bed to chair  Description: FWW MOD X1  Outcome: Not Progressing  Goal: STG - Patient to transfer to and from sit to supine  Description: MOD A X1  Outcome: Not Progressing  Goal: STG - Patient will transfer sit to and from stand  Description: FWW MOD X1 A USING PROPER TECHNIQUE  Outcome: Not Progressing

## 2024-09-09 NOTE — PROGRESS NOTES
Linette Doyle is a 83 y.o. female admitted for Pleural effusion. Pharmacy reviewed the patient's fmdkq-xr-bznohvxgm medications and allergies for accuracy.    The list below reflects the PTA list prior to pharmacy medication history. A summary a changes to the PTA medication list has been listed below. Please review each medication in order reconciliation for additional clarification and justification.    Source of information: [facility]    Medications added:  TYLENOL 325MG 1 Q 6 H PRN PAIN   TYLENOL 650 SUPPOSITORY 1 RECTALLY Q 4 H PRN FEVER/PAIN  LIPITOR 80MG 1 every day   Bisacodyl suppository 1 daily prn constipation   Anaspaz .125mg 1 q 2 h prn increased secretions  Ativan 0.5mg 1 q 4 h prn anxiety/restleddness    Melatonin 5mg 1 at bedtime for sleep  Mom suspension 30ml po every day prn constipation   Morphine 20mg/ml .5-1ml q 2 h prn pain/dyyspnea  Compazine 10mg 1 t po or rectally q 6 h prn nausea/vomiting     Medications modified:    Medications to be removed:  COLLAGEN PLUS VITAMIN C   TYLENOL PM   SYNTHOID 100MCG   MULTIVITAMIN ORAL     Medications of concern:      Prior to Admission Medications   Prescriptions Last Dose Informant Patient Reported? Taking?   amiodarone (Pacerone) 200 mg tablet   Yes No   Sig: Take 1 tablet (200 mg) by mouth once daily.   apixaban (Eliquis) 2.5 mg tablet   No No   Sig: Take 1 tablet (2.5 mg) by mouth 2 times a day.   ascorbic acid/collagen hydr (COLLAGEN PLUS VITAMIN C ORAL)   Yes No   Sig: Take 1 capsule by mouth once daily. Indications: Instaflex collegen supplement   cholecalciferol (Vitamin D3) 50 MCG (2000 UT) tablet   Yes No   Sig: Take 1 tablet (2,000 Units) by mouth once daily.   clopidogrel (Plavix) 75 mg tablet   No No   Sig: Take 1 tablet (75 mg) by mouth once daily.   diphenhydrAMINE-acetaminophen (Tylenol PM Extra Strength)  mg per tablet   Yes No   Sig: Take 1 tablet by mouth as needed at bedtime for sleep.   levothyroxine (Synthroid, Levoxyl)  100 mcg tablet   No No   Sig: TAKE ONE TABLET BY MOUTH EVERY DAY   metoprolol tartrate (Lopressor) 50 mg tablet   No No   Sig: Take 1 tablet by mouth 2 times a day.   multivit-min/ferrous fumarate (MULTI VITAMIN ORAL)   Yes No   Sig: Take 1 tablet by mouth once daily. Indications: One a Day- supplement   mv-mn/om3/dha/epa/fish/lut/leanne (OCUVITE ADULT 50 PLUS ORAL)   Yes No   Sig: Take 1 tablet by mouth once daily. Indications: supplement   omeprazole (PriLOSEC) 20 mg tablet,delayed release (DR/EC) EC tablet   Yes No   Sig: Take 1 tablet (20 mg) by mouth once daily.   sertraline (Zoloft) 100 mg tablet  Self No No   Sig: Take 2 tablets (200 mg) by mouth once daily.      Facility-Administered Medications: None       JANELLE RIVER

## 2024-09-09 NOTE — POST-PROCEDURE NOTE
Interventional Radiology Brief Postprocedure Note    Attending: Jessee Rivera MD      Assistant: none    Diagnosis: pleural effusion    Description of procedure: thoracentesis     Anesthesia:  Local    Complications: None    Estimated Blood Loss: none    specimens collected      See detailed result report with images in PACS.    The patient tolerated the procedure well without incident or complication.

## 2024-09-10 ENCOUNTER — APPOINTMENT (OUTPATIENT)
Dept: CARDIOLOGY | Facility: CLINIC | Age: 84
End: 2024-09-10
Payer: MEDICARE

## 2024-09-10 LAB
ALBUMIN SERPL BCP-MCNC: 3.4 G/DL (ref 3.4–5)
ALP SERPL-CCNC: 86 U/L (ref 33–136)
ALT SERPL W P-5'-P-CCNC: 59 U/L (ref 7–45)
ANION GAP SERPL CALC-SCNC: 12 MMOL/L (ref 10–20)
APPEARANCE UR: CLEAR
AST SERPL W P-5'-P-CCNC: 70 U/L (ref 9–39)
BACTERIA UR CULT: ABNORMAL
BILIRUB SERPL-MCNC: 1 MG/DL (ref 0–1.2)
BILIRUB UR STRIP.AUTO-MCNC: NEGATIVE MG/DL
BUN SERPL-MCNC: 16 MG/DL (ref 6–23)
CALCIUM SERPL-MCNC: 8.7 MG/DL (ref 8.6–10.3)
CHLORIDE SERPL-SCNC: 101 MMOL/L (ref 98–107)
CO2 SERPL-SCNC: 34 MMOL/L (ref 21–32)
COLOR UR: ABNORMAL
CREAT SERPL-MCNC: 1.3 MG/DL (ref 0.5–1.05)
EGFRCR SERPLBLD CKD-EPI 2021: 40 ML/MIN/1.73M*2
ERYTHROCYTE [DISTWIDTH] IN BLOOD BY AUTOMATED COUNT: 17.7 % (ref 11.5–14.5)
GLUCOSE SERPL-MCNC: 100 MG/DL (ref 74–99)
GLUCOSE UR STRIP.AUTO-MCNC: ABNORMAL MG/DL
HCT VFR BLD AUTO: 30.4 % (ref 36–46)
HGB BLD-MCNC: 9.4 G/DL (ref 12–16)
KETONES UR STRIP.AUTO-MCNC: NEGATIVE MG/DL
LEUKOCYTE ESTERASE UR QL STRIP.AUTO: NEGATIVE
MAGNESIUM SERPL-MCNC: 2.09 MG/DL (ref 1.6–2.4)
MCH RBC QN AUTO: 26.1 PG (ref 26–34)
MCHC RBC AUTO-ENTMCNC: 30.9 G/DL (ref 32–36)
MCV RBC AUTO: 84 FL (ref 80–100)
NITRITE UR QL STRIP.AUTO: NEGATIVE
NRBC BLD-RTO: 0 /100 WBCS (ref 0–0)
PH UR STRIP.AUTO: 5.5 [PH]
PLATELET # BLD AUTO: 157 X10*3/UL (ref 150–450)
POTASSIUM SERPL-SCNC: 3.5 MMOL/L (ref 3.5–5.3)
PROT SERPL-MCNC: 5.7 G/DL (ref 6.4–8.2)
PROT UR STRIP.AUTO-MCNC: NEGATIVE MG/DL
RBC # BLD AUTO: 3.6 X10*6/UL (ref 4–5.2)
RBC # UR STRIP.AUTO: NEGATIVE /UL
SODIUM SERPL-SCNC: 143 MMOL/L (ref 136–145)
SP GR UR STRIP.AUTO: 1.01
T3FREE SERPL-MCNC: 1.3 PG/ML (ref 2.3–4.2)
UROBILINOGEN UR STRIP.AUTO-MCNC: NORMAL MG/DL
WBC # BLD AUTO: 5.7 X10*3/UL (ref 4.4–11.3)

## 2024-09-10 PROCEDURE — 2500000001 HC RX 250 WO HCPCS SELF ADMINISTERED DRUGS (ALT 637 FOR MEDICARE OP): Performed by: INTERNAL MEDICINE

## 2024-09-10 PROCEDURE — 81003 URINALYSIS AUTO W/O SCOPE: CPT | Performed by: INTERNAL MEDICINE

## 2024-09-10 PROCEDURE — 2500000002 HC RX 250 W HCPCS SELF ADMINISTERED DRUGS (ALT 637 FOR MEDICARE OP, ALT 636 FOR OP/ED): Performed by: INTERNAL MEDICINE

## 2024-09-10 PROCEDURE — 2500000004 HC RX 250 GENERAL PHARMACY W/ HCPCS (ALT 636 FOR OP/ED)

## 2024-09-10 PROCEDURE — 2500000004 HC RX 250 GENERAL PHARMACY W/ HCPCS (ALT 636 FOR OP/ED): Performed by: INTERNAL MEDICINE

## 2024-09-10 PROCEDURE — 1200000002 HC GENERAL ROOM WITH TELEMETRY DAILY

## 2024-09-10 PROCEDURE — 97530 THERAPEUTIC ACTIVITIES: CPT | Mod: GP,CQ | Performed by: PHYSICAL THERAPY ASSISTANT

## 2024-09-10 PROCEDURE — 99232 SBSQ HOSP IP/OBS MODERATE 35: CPT | Performed by: NURSE PRACTITIONER

## 2024-09-10 PROCEDURE — 99232 SBSQ HOSP IP/OBS MODERATE 35: CPT | Performed by: INTERNAL MEDICINE

## 2024-09-10 PROCEDURE — 2500000001 HC RX 250 WO HCPCS SELF ADMINISTERED DRUGS (ALT 637 FOR MEDICARE OP): Performed by: NURSE PRACTITIONER

## 2024-09-10 PROCEDURE — 36415 COLL VENOUS BLD VENIPUNCTURE: CPT | Performed by: INTERNAL MEDICINE

## 2024-09-10 PROCEDURE — 2500000002 HC RX 250 W HCPCS SELF ADMINISTERED DRUGS (ALT 637 FOR MEDICARE OP, ALT 636 FOR OP/ED)

## 2024-09-10 PROCEDURE — 97530 THERAPEUTIC ACTIVITIES: CPT | Mod: GO | Performed by: OCCUPATIONAL THERAPIST

## 2024-09-10 PROCEDURE — 85027 COMPLETE CBC AUTOMATED: CPT | Performed by: INTERNAL MEDICINE

## 2024-09-10 PROCEDURE — 97166 OT EVAL MOD COMPLEX 45 MIN: CPT | Mod: GO | Performed by: OCCUPATIONAL THERAPIST

## 2024-09-10 PROCEDURE — 2500000001 HC RX 250 WO HCPCS SELF ADMINISTERED DRUGS (ALT 637 FOR MEDICARE OP)

## 2024-09-10 PROCEDURE — 97110 THERAPEUTIC EXERCISES: CPT | Mod: GP,CQ | Performed by: PHYSICAL THERAPY ASSISTANT

## 2024-09-10 PROCEDURE — 83735 ASSAY OF MAGNESIUM: CPT | Performed by: INTERNAL MEDICINE

## 2024-09-10 PROCEDURE — 80053 COMPREHEN METABOLIC PANEL: CPT | Performed by: INTERNAL MEDICINE

## 2024-09-10 RX ORDER — FUROSEMIDE 40 MG/1
40 TABLET ORAL DAILY
Status: DISCONTINUED | OUTPATIENT
Start: 2024-09-10 | End: 2024-09-11

## 2024-09-10 RX ORDER — POTASSIUM CHLORIDE 20 MEQ/1
40 TABLET, EXTENDED RELEASE ORAL ONCE
Status: COMPLETED | OUTPATIENT
Start: 2024-09-10 | End: 2024-09-10

## 2024-09-10 RX ADMIN — METOPROLOL TARTRATE 50 MG: 50 TABLET, FILM COATED ORAL at 08:17

## 2024-09-10 RX ADMIN — POLYETHYLENE GLYCOL 3350 17 G: 17 POWDER, FOR SOLUTION ORAL at 08:17

## 2024-09-10 RX ADMIN — FUROSEMIDE 40 MG: 40 TABLET ORAL at 12:06

## 2024-09-10 RX ADMIN — Medication 3 MG: at 22:25

## 2024-09-10 RX ADMIN — EMPAGLIFLOZIN 10 MG: 10 TABLET, FILM COATED ORAL at 08:17

## 2024-09-10 RX ADMIN — AMIODARONE HYDROCHLORIDE 200 MG: 200 TABLET ORAL at 08:17

## 2024-09-10 RX ADMIN — POTASSIUM CHLORIDE 40 MEQ: 1500 TABLET, EXTENDED RELEASE ORAL at 12:06

## 2024-09-10 RX ADMIN — APIXABAN 2.5 MG: 5 TABLET, FILM COATED ORAL at 22:25

## 2024-09-10 RX ADMIN — METOPROLOL TARTRATE 50 MG: 50 TABLET, FILM COATED ORAL at 22:25

## 2024-09-10 RX ADMIN — APIXABAN 2.5 MG: 5 TABLET, FILM COATED ORAL at 12:06

## 2024-09-10 RX ADMIN — CEFTRIAXONE SODIUM 1 G: 1 INJECTION, SOLUTION INTRAVENOUS at 22:25

## 2024-09-10 RX ADMIN — PANTOPRAZOLE SODIUM 40 MG: 40 TABLET, DELAYED RELEASE ORAL at 05:43

## 2024-09-10 RX ADMIN — LEVOTHYROXINE SODIUM 100 MCG: 0.1 TABLET ORAL at 05:43

## 2024-09-10 RX ADMIN — SPIRONOLACTONE 12.5 MG: 25 TABLET ORAL at 08:17

## 2024-09-10 RX ADMIN — SERTRALINE HYDROCHLORIDE 200 MG: 50 TABLET ORAL at 08:17

## 2024-09-10 ASSESSMENT — COGNITIVE AND FUNCTIONAL STATUS - GENERAL
DRESSING REGULAR UPPER BODY CLOTHING: A LOT
DAILY ACTIVITIY SCORE: 14
EATING MEALS: A LITTLE
HELP NEEDED FOR BATHING: A LOT
STANDING UP FROM CHAIR USING ARMS: A LOT
MOVING TO AND FROM BED TO CHAIR: A LOT
MOVING FROM LYING ON BACK TO SITTING ON SIDE OF FLAT BED WITH BEDRAILS: A LOT
STANDING UP FROM CHAIR USING ARMS: A LOT
DRESSING REGULAR UPPER BODY CLOTHING: A LITTLE
EATING MEALS: A LITTLE
DRESSING REGULAR LOWER BODY CLOTHING: A LOT
WALKING IN HOSPITAL ROOM: A LOT
TOILETING: A LOT
CLIMB 3 TO 5 STEPS WITH RAILING: TOTAL
MOBILITY SCORE: 12
HELP NEEDED FOR BATHING: A LOT
WALKING IN HOSPITAL ROOM: TOTAL
DAILY ACTIVITIY SCORE: 14
MOBILITY SCORE: 11
PERSONAL GROOMING: A LITTLE
TURNING FROM BACK TO SIDE WHILE IN FLAT BAD: A LITTLE
TOILETING: TOTAL
CLIMB 3 TO 5 STEPS WITH RAILING: TOTAL
MOVING FROM LYING ON BACK TO SITTING ON SIDE OF FLAT BED WITH BEDRAILS: A LITTLE
TURNING FROM BACK TO SIDE WHILE IN FLAT BAD: A LOT
MOVING TO AND FROM BED TO CHAIR: A LOT
PERSONAL GROOMING: A LITTLE
DRESSING REGULAR LOWER BODY CLOTHING: A LOT

## 2024-09-10 ASSESSMENT — ACTIVITIES OF DAILY LIVING (ADL)
TOILETING_ASSISTANCE: STAND BY
LACK_OF_TRANSPORTATION: NO
ADL_ASSISTANCE: NEEDS ASSISTANCE

## 2024-09-10 ASSESSMENT — PAIN - FUNCTIONAL ASSESSMENT
PAIN_FUNCTIONAL_ASSESSMENT: 0-10
PAIN_FUNCTIONAL_ASSESSMENT: 0-10

## 2024-09-10 ASSESSMENT — PAIN SCALES - GENERAL
PAINLEVEL_OUTOF10: 0 - NO PAIN

## 2024-09-10 NOTE — DOCUMENTATION CLARIFICATION NOTE
"    PATIENT:               SAMUEL VILLALOBOS  ACCT #:                  [id number]  MRN:                       [id number]  :                       1940  ADMIT DATE:       2024 4:49 PM  DISCH DATE:  RESPONDING PROVIDER #:        [id number]          PROVIDER RESPONSE TEXT:    I agree with dietician diagnosis of severe protein calorie malnutrition documented on 24 By [employer]    CDI QUERY TEXT:    Clarification        Instruction:    Based on your assessment of the patient and the clinical information, please provide the requested documentation by clicking on the appropriate radio button and enter any additional information if prompted.    Question: Please further clarify this patient nutritional status as    When answering this query, please exercise your independent professional judgment. The fact that a question is being asked, does not imply that any particular answer is desired or expected.    The patient's clinical indicators include:  Clinical Information: 24 PN: Admit with: \"Acute congestive heart failure, Right pleural effusion, Recurrent falls, hypokalemia, HTN, Paroxysmal a fib, and CKD 3a.\"    Clinical Indicators:  24 Nutrition: \"Malnutrition Diagnosis: Severe malnutrition related to chronic disease or condition  As Evidenced by: moderate to severe muscle/fat loss per NFPE, PO intake less than 75 percent for greater than1 month, and sig weight loss of 21perxent for 1 month. BMI 21.05\"      Treatment: 24 Nutrition: \" Con Ensure plus high protein BID,  Liberalize diet, Monitor weight, and I's and O's.    Risk Factors: Dementia.  Multiple medical issues. Depression  Options provided:  -- I agree with dietician diagnosis of severe protein calorie malnutrition documented on 24 By [employer]  -- Other - I will add my own diagnosis  -- Refer to Clinical Documentation Reviewer    Query created by: Lima Cook on 9/10/2024 10:02 AM      Electronically signed by:  SAHIL FOREMAN MD 9/10/2024 " 11:42 AM

## 2024-09-10 NOTE — PROGRESS NOTES
[facility] Card[facility]  Patient Name: Linette Doyle  Patient : 1940  Room/Bed: 2307/2307-A    HPI:  Linette Doyle is a 83 y.o. female presenting with weakness and fall and found to have a possible UTI as well as acute on chronic diastolic heart failure.     Appear she has had multiple falls.  In 2024 she was found to have moderate to severe mitral regurgitation and had a AUGUSTIN that demonstrated possible functional MR.  No abnormality was noted with the mitral valve apparatus.     Patient herself is somewhat confused unable to put a good story together.  She states she slipped and fell in the bathroom and hit her head her head hurts at this time.  She does not have shortness of breath.  She does have some ankle swelling.     It appears that after an admission in [facility] in May 2024 her diuretics were discontinued because of GARY.  At this time those are not on her home medication list and unlikely that she is on any diuretics.     PMHx s/f CAD s/p PCI of RCA in 2024 on plavix, sick sinus syndrome s/p pacemaker, HTN, hypercholesterolemia, pAfib on eliquis, CKD stage IIIa, hypothyroidism, anxiety with depression presenting with unwitnessed fall     Allergies:  Iodinated contrast media    Subjective Data:  9/10/24: patient confused.  She is able to tell me the current year, but did not know that she was in the hospital.  S/p right thoracentesis yesterday.    Overnight Events:  None    Objective Data:  Vitals:    24 1759 24 2112 09/10/24 0049 09/10/24 0527   BP: 133/83 140/86 133/83 126/78   BP Location: Left arm Left arm Left arm Left arm   Patient Position: Lying Lying Lying Lying   Pulse: (!) 113 108 108 103   Resp: 18 18 18 18   Temp: 36.1 °C (97 °F) 35.8 °C (96.5 °F) 35.1 °C (95.2 °F) 35.6 °C (96 °F)   TempSrc: Temporal Temporal Temporal Temporal   SpO2: 96% 95% 95% 95%   Weight:       Height:           Last Labs:  Results from last 7 days   Lab Units  "09/10/24  0538 09/09/24  0559 09/08/24  1707   WBC AUTO x10*3/uL 5.7 5.1 5.8   HEMOGLOBIN g/dL 9.4* 9.0* 10.9*   HEMATOCRIT % 30.4* 29.5* 35.5*   PLATELETS AUTO x10*3/uL 157 134* 177       Results from last 7 days   Lab Units 09/10/24  0538 09/09/24  1541 09/09/24  0559 09/08/24  1707   SODIUM mmol/L 143 142 144 140   POTASSIUM mmol/L 3.5 3.2* 3.1* 3.1*   CHLORIDE mmol/L 101 101 103 104   CO2 mmol/L 34* 33* 31 28   BUN mg/dL 16 16 17 18   CREATININE mg/dL 1.30* 1.12* 1.20* 1.32*   CALCIUM mg/dL 8.7 8.7 8.6 8.9   PROTEIN TOTAL g/dL 5.7*  --  5.6*  5.8* 6.6   BILIRUBIN TOTAL mg/dL 1.0  --  1.1 1.5*   ALK PHOS U/L 86  --  86 101   ALT U/L 59*  --  59* 69*   AST U/L 70*  --  75* 86*   GLUCOSE mg/dL 100* 95 99 117*       Results from last 7 days   Lab Units 09/10/24  0538 09/09/24  1541 09/08/24  1707   MAGNESIUM mg/dL 2.09 1.57* 1.73       No results found for: \"LDLF\"     Lab Results   Component Value Date    BNP 2,509 (H) 09/08/2024       No results found for: \"TROPHS\"     Lab Results   Component Value Date    TSH 5.57 (H) 09/09/2024           Lab Results   Component Value Date    HGBA1C 6.0 (H) 02/01/2024       Intake/Output    Intake/Output Summary (Last 24 hours) at 9/10/2024 0931  Last data filed at 9/10/2024 0600  Gross per 24 hour   Intake 655 ml   Output 1800 ml   Net -1145 ml      I/O last 2 completed shifts:  In: 655 (12.5 mL/kg) [P.O.:555; I.V.:50 (1 mL/kg); IV Piggyback:50]  Out: 2600 (49.8 mL/kg) [Urine:2600 (2.1 mL/kg/hr)]  Weight: 52.2 kg     Past Medical History:  She has a past medical history of Angina pectoris (CMS-HCC) (10/22/2023), Atherosclerosis of coronary artery (08/21/2019), Atherosclerotic heart disease of native coronary artery with other forms of angina pectoris (CMS-HCC) (10/22/2023), Hypercholesterolemia (12/28/2018), Hyperlipidemia (10/22/2023), Presence of cardiac pacemaker (10/22/2023), Primary hypertension (12/28/2018), Shortness of breath (11/26/2019), Sick sinus syndrome (Multi) " (10/22/2023), Sinus bradycardia (10/22/2023), and Stage 3a chronic kidney disease (Multi) (11/25/2019).    Past Surgical History:  She has a past surgical history that includes Cardiac catheterization (N/A, 02/02/2024); Cardiac catheterization (N/A, 02/02/2024); Cardiac catheterization (N/A, 02/08/2024); Cardiac catheterization (N/A, 02/08/2024); and pacemaker placement (07/04/2019).      Social History:  She reports that she has never smoked. She has never been exposed to tobacco smoke. She has never used smokeless tobacco. She reports that she does not drink alcohol and does not use drugs.    Family History:  Family History   Problem Relation Name Age of Onset    No Known Problems Mother      No Known Problems Father         Outpatient Medications  No current facility-administered medications on file prior to encounter.     Current Outpatient Medications on File Prior to Encounter   Medication Sig Dispense Refill    acetaminophen (Tylenol) 325 mg tablet Take 2 tablets (650 mg) by mouth every 6 hours if needed for mild pain (1 - 3).      acetaminophen (Tylenol) 650 mg suppository Insert 1 suppository (650 mg) into the rectum every 4 hours if needed for mild pain (1 - 3) or fever (temp greater than 38.0 C).      amiodarone (Pacerone) 200 mg tablet Take 1 tablet (200 mg) by mouth once daily.      apixaban (Eliquis) 2.5 mg tablet Take 1 tablet (2.5 mg) by mouth 2 times a day. 180 tablet 1    atorvastatin (Lipitor) 80 mg tablet Take 1 tablet (80 mg) by mouth once daily.      bisacodyl (Dulcolax) 10 mg suppository Insert 1 suppository (10 mg) into the rectum once daily as needed for constipation.      cholecalciferol (Vitamin D3) 50 MCG (2000 UT) tablet Take 1 tablet (2,000 Units) by mouth once daily.      clopidogrel (Plavix) 75 mg tablet Take 1 tablet (75 mg) by mouth once daily. 90 tablet 1    hyoscyamine (Anaspaz) 0.125 mg disintegrating tablet Take 1 tablet (0.125 mg) by mouth every 2 hours if needed (for increased  secretions).      levothyroxine (Synthroid, Levoxyl) 100 mcg tablet TAKE ONE TABLET BY MOUTH EVERY DAY (Patient not taking: Reported on 9/9/2024) 90 tablet 3    levothyroxine (Synthroid, Levoxyl) 125 mcg tablet Take 1 tablet (125 mcg) by mouth early in the morning.. Take on an empty stomach at the same time each day, either 30 to 60 minutes prior to breakfast      LORazepam (Ativan) 0.5 mg tablet Take 1 tablet (0.5 mg) by mouth every 4 hours if needed for anxiety (restlessness).      magnesium hydroxide (Milk of Magnesia) 400 mg/5 mL suspension Take 30 mL by mouth once daily as needed for constipation.      melatonin 5 mg tablet Take 1 tablet (5 mg) by mouth as needed at bedtime for sleep.      metoprolol tartrate (Lopressor) 50 mg tablet Take 1 tablet by mouth 2 times a day. 180 tablet 3    morphine 20 mg/mL concentrated oral solution Take 0.5-1 mL (10-20 mg) by mouth every 2 hours if needed for severe pain (7 - 10) (or dyspnea).      mv-mn/om3/dha/epa/fish/lut/leanne (OCUVITE ADULT 50 PLUS ORAL) Take 1 tablet by mouth once daily. Indications: supplement      omeprazole (PriLOSEC) 20 mg tablet,delayed release (DR/EC) EC tablet Take 1 tablet (20 mg) by mouth once daily.      prochlorperazine (Compazine) 10 mg tablet Take 1 t po or insert into rectum q 6 h prn nausea/vomiting      sertraline (Zoloft) 100 mg tablet Take 2 tablets (200 mg) by mouth once daily. 90 tablet 0    [DISCONTINUED] ascorbic acid/collagen hydr (COLLAGEN PLUS VITAMIN C ORAL) Take 1 capsule by mouth once daily. Indications: Instaflex collegen supplement      [DISCONTINUED] diphenhydrAMINE-acetaminophen (Tylenol PM Extra Strength)  mg per tablet Take 1 tablet by mouth as needed at bedtime for sleep.      [DISCONTINUED] multivit-min/ferrous fumarate (MULTI VITAMIN ORAL) Take 1 tablet by mouth once daily. Indications: One a Day- supplement         Scheduled medications  amiodarone, 200 mg, oral, Daily  [Held by provider] apixaban, 2.5 mg, oral,  BID  cefTRIAXone, 1 g, intravenous, q24h  clopidogrel, 75 mg, oral, Daily  empagliflozin, 10 mg, oral, Daily  furosemide, 40 mg, intravenous, q24h  levothyroxine, 100 mcg, oral, Daily  metoprolol tartrate, 50 mg, oral, BID  pantoprazole, 40 mg, oral, Daily before breakfast   Or  pantoprazole, 40 mg, intravenous, Daily before breakfast  polyethylene glycol, 17 g, oral, Daily  sertraline, 200 mg, oral, Daily  spironolactone, 12.5 mg, oral, Daily      Continuous medications     PRN medications  PRN medications: acetaminophen, bisacodyl, guaiFENesin, melatonin, ondansetron   Medications Prior to Admission   Medication Sig Dispense Refill Last Dose    acetaminophen (Tylenol) 325 mg tablet Take 2 tablets (650 mg) by mouth every 6 hours if needed for mild pain (1 - 3).       acetaminophen (Tylenol) 650 mg suppository Insert 1 suppository (650 mg) into the rectum every 4 hours if needed for mild pain (1 - 3) or fever (temp greater than 38.0 C).       amiodarone (Pacerone) 200 mg tablet Take 1 tablet (200 mg) by mouth once daily.       apixaban (Eliquis) 2.5 mg tablet Take 1 tablet (2.5 mg) by mouth 2 times a day. 180 tablet 1     atorvastatin (Lipitor) 80 mg tablet Take 1 tablet (80 mg) by mouth once daily.       bisacodyl (Dulcolax) 10 mg suppository Insert 1 suppository (10 mg) into the rectum once daily as needed for constipation.       cholecalciferol (Vitamin D3) 50 MCG (2000 UT) tablet Take 1 tablet (2,000 Units) by mouth once daily.       clopidogrel (Plavix) 75 mg tablet Take 1 tablet (75 mg) by mouth once daily. 90 tablet 1     hyoscyamine (Anaspaz) 0.125 mg disintegrating tablet Take 1 tablet (0.125 mg) by mouth every 2 hours if needed (for increased secretions).       levothyroxine (Synthroid, Levoxyl) 100 mcg tablet TAKE ONE TABLET BY MOUTH EVERY DAY (Patient not taking: Reported on 9/9/2024) 90 tablet 3 Not Taking    levothyroxine (Synthroid, Levoxyl) 125 mcg tablet Take 1 tablet (125 mcg) by mouth early in the  morning.. Take on an empty stomach at the same time each day, either 30 to 60 minutes prior to breakfast       LORazepam (Ativan) 0.5 mg tablet Take 1 tablet (0.5 mg) by mouth every 4 hours if needed for anxiety (restlessness).       magnesium hydroxide (Milk of Magnesia) 400 mg/5 mL suspension Take 30 mL by mouth once daily as needed for constipation.       melatonin 5 mg tablet Take 1 tablet (5 mg) by mouth as needed at bedtime for sleep.       metoprolol tartrate (Lopressor) 50 mg tablet Take 1 tablet by mouth 2 times a day. 180 tablet 3     morphine 20 mg/mL concentrated oral solution Take 0.5-1 mL (10-20 mg) by mouth every 2 hours if needed for severe pain (7 - 10) (or dyspnea).       mv-mn/om3/dha/epa/fish/lut/leanne (OCUVITE ADULT 50 PLUS ORAL) Take 1 tablet by mouth once daily. Indications: supplement       omeprazole (PriLOSEC) 20 mg tablet,delayed release (DR/EC) EC tablet Take 1 tablet (20 mg) by mouth once daily.       prochlorperazine (Compazine) 10 mg tablet Take 1 t po or insert into rectum q 6 h prn nausea/vomiting       sertraline (Zoloft) 100 mg tablet Take 2 tablets (200 mg) by mouth once daily. 90 tablet 0        AUGUSTIN 4/10/24:   1. Left ventricular systolic function is normal with a 55-60% estimated ejection fraction.   2. There is moderate mitral valve regurgitation at the most central jet the mitral valve structure within normal limit.   3. Moderate mitral valve regurgitation.   4. Mild tricuspid regurgitation is visualized.  EF   Date/Time Value Ref Range Status   02/09/2024 08:59 AM 58 %    02/01/2024 11:43 AM 59 %         LHC/PCI 2/8/24:  1. Total contrast used 50 mL due to underlying CKD.   2. We used no torque right coronary guide. PT graphic wire was placed in the distal right coronary artery      Over the wire we applied first 2.5 x 18 mm drug-eluting stents at the proximal right coronary artery deployed for 12 cindy.      After that we applied second stents 2.5 x 18 mm distal RCA segment  which is about 80% lesions. Stent was deployed for about 12 cindy      Post PCI excellent flows and distal right coronary.      Patient received 180 mg Brilinta in the Cath Lab.      Patient also received about 5000's of heparin and Cath Lab.      Advised patient to continue dual antiplatelet epi with Eliquis for first month after 1 month continue only on Plavix and Eliquis.      Post PCI patient probably needs a cardiac rehab after 2 weeks.      Sheath was removed and Angio-Seal applied to right groin.      Patient tolerated procedure fairly well.      Patient had a diagnostic catheterization without LV gram and a PCI of her proximal and mid to distal RCA done.      Patient also received 400 mics of intracoronary nitroglycerin.    Physical Exam:  Vitals reviewed.   Constitutional:       Appearance: Not in distress. Frail. Chronically ill-appearing.   Neck:      Vascular: No carotid bruit.   Pulmonary:      Effort: Pulmonary effort is normal.      Breath sounds: Normal breath sounds.   Cardiovascular:      PMI at left midclavicular line. Normal rate. Regular rhythm. S1 with normal intensity. S2 with normal intensity.       Murmurs: There is no murmur.   Edema:     Peripheral edema absent.   Abdominal:      General: Bowel sounds are normal.   Skin:     Findings: Ecchymosis (left forehead/eye with swelling) present.   Neurological:      Mental Status: Alert. Exhibits a cognitive deficit.   Psychiatric:         Cognition and Memory: Memory is impaired.         Assessment/Plan:   1-acute on chronic diastolic heart failure-multifactorial but primarily due to discontinuation of diuretics due to GARY.  This patient is going to need reinstitution of diuretics also recommend adding Aldactone at low-dose and SGLT2 inhibitors.  Patient will require a close follow-up not only with her cardiologist Dr. Ibrahim but also with advanced heart failure specialist.  I recommend 1 week follow-up upon discharge.     Monitor renal function and  switch to oral diuretics once patient is euvolemic.    9/10/24: She does not appear significantly volume overloaded on exam.  Creatinine trended up some to 1.3  Continue current medications which include:  Beta blocker: Metoprolol tartrate 50 mg BID  ACE inhibitor/ARB/ARNI: None  MRA: spironolactone 12.5 mg daily  SGLT2i:  empagliflozin 10 mg daily  Diuretic:  Stop IV furosemide. Start furosemide 40 mg daily PO  Hydralazine/Nitrate: None  Device: PPM  AUGUSTIN April 2024 with LVEF 55-60%      2-functional moderate to severe MR-likely secondary to heart failure, medical management is recommended- follow-up with advanced heart failure specialist and if this persists, despite aggressive medical therapy and follow-up, she COULD be a candidate for MitraClip(if she is willing).     3-coronary artery disease-continue dual antiplatelet therapy, if you are planning on holding Eliquis due to recurrent falls.  Patient had stent to the RCA in April 2024.    9/10/24:  She is s/p NSTEMI with PCI RCA Feb 2024. Unhold clopidogrel. Apixaban is currently on hold likely d/t high bleeding risk d/t recurrent falls. If apixaban is held long term, recommend starting aspirin 81 mg daily in addition to clopidogrel.     4-persistent atrial fibrillation-maintaining sinus rhythm with amiodarone, Eliquis being held recommend starting dual antiplatelet therapy.  Patient with very high bleeding risk due to recurrent falls but needs to follow-up with her cardiologist for risk-benefit discussion for long-term maintenance therapy.    9/10/24:  AF on telemetry with -110's.  Continue Amiodarone and beta blocker.  Apixaban currently on hold - had thoracentesis yesterday and patient with very high bleeding risk due to recurrent falls but needs to follow-up with her cardiologist for risk-benefit discussion for long-term maintenance therapy.     5-presence of permanent pacemaker-follow through pacer clinic..    Code Status  DNR and No  Intubation      Velma Bustamante, APRN-CNP

## 2024-09-10 NOTE — PROGRESS NOTES
Physical Therapy    Physical Therapy Treatment    Patient Name: Linette Doyle  MRN: [id number]  Today's Date: 9/10/2024  Time Calculation  Start Time: 1321  Stop Time: 1345  Time Calculation (min): 24 min         Assessment/Plan   PT Assessment  End of Session Communication:  (RN staff notified. Antoinette RN walkied Raeann Rn to let her know she was in chair with alarm in place,)  Assessment Comment: pt demonstrating good progress and will benefit from further skilled PT services.  End of Session Patient Position: Up in chair, Alarm on     PT Plan  Treatment/Interventions: Bed mobility, Transfer training, Gait training, Strengthening, Endurance training  PT Plan: Ongoing PT  PT Frequency: 3 times per week  PT Discharge Recommendations: Moderate intensity level of continued care  Equipment Recommended upon Discharge:  (TBD)  PT Recommended Transfer Status: Assist x2, Assist x1 (BEDLEVEL)  PT - OK to Discharge: Yes (WHENMEDICALLY CLEARED)      General Visit Information:   PT  Visit  PT Received On: 09/10/24  Response to Previous Treatment: Patient with no complaints from previous session.  General  Reason for Referral: IMPAIRED MOBILITY  Referred By: EUGENE EDWARD  Past Medical History Relevant to Rehab: FALL; DX: PLEURAL EFFUSION, CONTUSION L FOREHEAD, ACUTE HF,HYPOKALEMIA; HX: DEMENTIA, CAD, HTN, PACER, SSS, SINUS MAURICIO, FALLS, CKD, MI  Prior to Session Communication: Bedside nurse  Patient Position Received: Bed, 3 rail up, Alarm on  Preferred Learning Style: verbal  General Comment: pt agreeable to PT. She reported feeling fatqiued and weak at end of session.      Precautions:  Precautions  Medical Precautions: Fall precautions                Pain:  Pain Assessment  Pain Assessment: 0-10  0-10 (Numeric) Pain Score: 0 - No pain  Cognition:  Cognition  Orientation Level: Disoriented to place, Disoriented to time, Disoriented to situation            Treatments:  Therapeutic Exercise  Therapeutic Exercise Performed:  Yes  Therapeutic Exercise Activity 1: ankle pumps x10  Therapeutic Exercise Activity 2: Marches x 10  Therapeutic Exercise Activity 3: LAQ x10  Therapeutic Exercise Activity 4: hip ab/adduction x 10    Bed Mobility  Bed Mobility: Yes  Bed Mobility 1  Bed Mobility 1: Supine to sitting  Level of Assistance 1: Minimum assistance, +2, Minimal verbal cues  Bed Mobility Comments 1: cues for improved safety and technique    Ambulation/Gait Training  Ambulation/Gait Training Performed: Yes  Ambulation/Gait Training 1  Surface 1: Level tile  Device 1: Rolling walker  Assistance 1: Moderate assistance, Moderate verbal cues (Ax2 with cues for safety and technique. pt fatigued easily)  Quality of Gait 1: Shuffling gait, Decreased step length, Diminished heel strike, Forward flexed posture  Comments/Distance (ft) 1: 4 steps forward and back x 2  Transfers  Transfer: Yes  Transfer 1  Technique 1: Sit to stand, Stand to sit  Transfer Device 1: Walker  Transfer Level of Assistance 1: Moderate assistance, +2, Moderate verbal cues  Trials/Comments 1: cues for hand placement and safety.  Transfers 2  Technique 2: Stand pivot  Transfer Device 2: Walker  Transfer Level of Assistance 2: Moderate assistance, +2, Moderate verbal cues  Trials/Comments 2: cues for walker placement and technique to decrease fall risk.    Outcome Measures:  [facility] Basic Mobility  Turning from your back to your side while in a flat bed without using bedrails: A lot  Moving from lying on your back to sitting on the side of a flat bed without using bedrails: A lot  Moving to and from bed to chair (including a wheelchair): A lot  Standing up from a chair using your arms (e.g. wheelchair or bedside chair): A lot  To walk in hospital room: A lot  Climbing 3-5 steps with railing: Total  Basic Mobility - Total Score: 11    Education Documentation  Mobility Training, taught by Marcelle Jara PTA at 9/10/2024  1:59 PM.  Learner: Patient  Readiness: Acceptance  Method:  Explanation  Response: Verbalizes Understanding, Needs Reinforcement    Education Comments  No comments found.               Encounter Problems       Encounter Problems (Active)       Mobility       STG - Patient will ambulate (Progressing)       Start:  09/09/24    Expected End:  09/23/24       FWW MOD A X1         Goal 1 (Progressing)       Start:  09/09/24    Expected End:  09/30/24       20 REPS AROM/RROM INCREASING STRENGTH TO STABILIZE OOB ACTIVITIES            PT Transfers       STG - Transfer from bed to chair (Progressing)       Start:  09/09/24    Expected End:  09/13/24       FWW MOD X1         STG - Patient to transfer to and from sit to supine (Progressing)       Start:  09/09/24    Expected End:  09/13/24       MOD A X1         STG - Patient will transfer sit to and from stand (Progressing)       Start:  09/09/24    Expected End:  09/12/24       FWW MOD X1 A USING PROPER TECHNIQUE            Pain - Adult

## 2024-09-10 NOTE — CARE PLAN
The clinical goals for the shift include no falls or injury    Problem: Pain - Adult  Goal: Verbalizes/displays adequate comfort level or baseline comfort level  Outcome: Progressing     Problem: Safety - Adult  Goal: Free from fall injury  Outcome: Progressing     Problem: Discharge Planning  Goal: Discharge to home or other facility with appropriate resources  Outcome: Progressing     Problem: Chronic Conditions and Co-morbidities  Goal: Patient's chronic conditions and co-morbidity symptoms are monitored and maintained or improved  Outcome: Progressing     Problem: Skin  Goal: Decreased wound size/increased tissue granulation at next dressing change  Outcome: Progressing  Flowsheets (Taken 9/9/2024 2351)  Decreased wound size/increased tissue granulation at next dressing change: Promote sleep for wound healing  Goal: Participates in plan/prevention/treatment measures  Outcome: Progressing  Flowsheets (Taken 9/9/2024 2351)  Participates in plan/prevention/treatment measures: Discuss with provider PT/OT consult  Goal: Prevent/manage excess moisture  Outcome: Progressing  Flowsheets (Taken 9/9/2024 2351)  Prevent/manage excess moisture: Cleanse incontinence/protect with barrier cream  Goal: Prevent/minimize sheer/friction injuries  Outcome: Progressing  Flowsheets (Taken 9/9/2024 2351)  Prevent/minimize sheer/friction injuries: Complete micro-shifts as needed if patient unable. Adjust patient position to relieve pressure points, not a full turn  Goal: Promote/optimize nutrition  Outcome: Progressing  Flowsheets (Taken 9/9/2024 2351)  Promote/optimize nutrition: Monitor/record intake including meals  Goal: Promote skin healing  Outcome: Progressing  Flowsheets (Taken 9/9/2024 2351)  Promote skin healing: Protective dressings over bony prominences     Problem: Fall/Injury  Goal: Not fall by end of shift  Outcome: Progressing  Goal: Be free from injury by end of the shift  Outcome: Progressing  Goal: Verbalize  understanding of personal risk factors for fall in the hospital  Outcome: Progressing  Goal: Verbalize understanding of risk factor reduction measures to prevent injury from fall in the home  Outcome: Progressing  Goal: Use assistive devices by end of the shift  Outcome: Progressing  Goal: Pace activities to prevent fatigue by end of the shift  Outcome: Progressing     Problem: Pain  Goal: Takes deep breaths with improved pain control throughout the shift  Outcome: Progressing  Goal: Turns in bed with improved pain control throughout the shift  Outcome: Progressing  Goal: Walks with improved pain control throughout the shift  Outcome: Progressing  Goal: Performs ADL's with improved pain control throughout shift  Outcome: Progressing  Goal: Participates in PT with improved pain control throughout the shift  Outcome: Progressing  Goal: Free from opioid side effects throughout the shift  Outcome: Progressing  Goal: Free from acute confusion related to pain meds throughout the shift  Outcome: Progressing     Problem: Nutrition  Goal: Oral intake greater than 50%  Outcome: Progressing  Goal: Consume prescribed supplement  Outcome: Progressing  Goal: Lab values WNL  Outcome: Progressing  Goal: Promote healing  Outcome: Progressing  Goal: Maintain stable weight  Outcome: Progressing

## 2024-09-10 NOTE — PROGRESS NOTES
Occupational Therapy    Evaluation    Patient Name: Linette Doyle  MRN: [id number]  Today's Date: 9/10/2024  Time Calculation  Start Time: 1320  Stop Time: 1345  Time Calculation (min): 25 min  2307/2307-A    Assessment  IP OT Assessment  OT Assessment: high fall risk, assist of 2 for trfrs. with FWW  Prognosis: Fair  Barriers to Discharge: Decreased caregiver support  End of Session Patient Position: Up in chair, Alarm on       09/10/24 1320   Inpatient Plan   Treatment Interventions Functional transfer training;Endurance training;ADL retraining   OT Frequency 3 times per week   OT - OK to Discharge Yes  ((when medically approp.))   OT Discharge Recommendations Moderate intensity level of continued care   OT Recommended Transfer Status Assist of 2   Subjective     Current Problem:  1. Pleural effusion        2. Pleural effusion on right        3. Fall, initial encounter        4. Laceration of forehead, initial encounter            General:  General  Reason for Referral: fall with L forehead hematoma  Referred By: Damian  Past Medical History Relevant to Rehab: Hx. of dementia, CAD, pacemaker, CKD, MI  Co-Treatment: PT  Co-Treatment Reason: to maximize safe mobility  Patient Position Received: Bed, 3 rail up, Alarm on  General Comment: large L forehead laceration and bruising, R lower back bruise    Precautions:  Medical Precautions: Fall precautions    Vital Signs:see nurses notes      Pain: some belly discomfort , per pt.      Objective     Cognition:  Overall Cognitive Status: Impaired at baseline (O X 1)  Memory: Exceptions to WFL  Memory Comments: unable to state where she resides now  Processing Speed: Delayed         Home Living:  Type of Home: Assisted living ([facility])  Home Adaptive Equipment: Walker rolling or standard  Home Layout: One level     Prior Function:  Level of [place]: Needs assistance with ADLs  ADL Assistance: Needs assistance  Toileting: Stand by  Ambulatory Assistance:   (not known if pt. was amb. independently PTA)    IADL History:  Homemaking Responsibilities: No    ADL:  LE Dressing Assistance: Minimal  Toileting Assistance with Device:  (assist of 2 to/from [facility] with FWW)    Activity Tolerance:  Endurance: Decreased tolerance for upright activites (c/o fatigue after trfr. and amb.)    Bed Mobility/Transfers:   Bed Mobility  Bed Mobility: Yes (Min.A supine-to-sit)  Transfers  Transfer: Yes (Mod.A X 2 sit-to-stand and stand-to-sit with cuing)    Ambulation/Gait Training:  Functional Mobility  Functional Mobility Performed: Yes (Mod.A X 2 sit-to-stand with FWW and for few steps forward and back to chair, with cuing 2/2 posterior leaning and shakiness while on feet)    Sitting Balance:  Dynamic Sitting Balance  Dynamic Sitting-Balance Support:  (SBA EOB)    Standing Balance:  Static Standing Balance  Static Standing-Balance Support:  (Min.A support with FWW 2/2 posterior leaning)    Vision: Vision - Basic Assessment  Current Vision: No visual deficits (except a little blurriness in L eye)   Sensation:  Light Touch: No apparent deficits    Strength:  Strength Comments: UEs WFL    Perception:  Inattention/Neglect: Appears intact    Coordination:  Movements are Fluid and Coordinated: No (slow to take steps with FWW)     Hand Function:  Hand Function  Gross Grasp: Functional      Outcome Measures: [facility] Daily Activity  Putting on and taking off regular lower body clothing: A lot  Bathing (including washing, rinsing, drying): A lot  Putting on and taking off regular upper body clothing: A little  Toileting, which includes using toilet, bedpan or urinal: Total  Taking care of personal grooming such as brushing teeth: A little  Eating Meals: A little  Daily Activity - Total Score: 14                       EDUCATION:     Education Documentation  Precautions, taught by Landy Allen OT at 9/10/2024  2:15 PM.  Learner: Patient  Readiness: Acceptance  Method: Demonstration  Response:  Needs Reinforcement  Comment: use of call light, trfr. safety with FWW    Education Comments  No comments found.        Goals:   Encounter Problems       Encounter Problems (Active)       ADLs       Min.A for bathing, dressing with set-up  (Progressing)       Start:  09/10/24    Expected End:  09/17/24               BALANCE       Gayathri. at least 3 mins. static stand with FWW with Min.A  (Progressing)       Start:  09/10/24    Expected End:  09/17/24               TRANSFERS       Min.A func. trfrs. with FWW  (Progressing)       Start:  09/10/24    Expected End:  09/17/24

## 2024-09-10 NOTE — CARE PLAN
The patient's goals for the shift include      The clinical goals for the shift include pt environment will remain safe, free of clutter throughout this shift.

## 2024-09-10 NOTE — PROGRESS NOTES
09/10/24 0957   Discharge Planning   Living Arrangements Children   Support Systems Family members   Assistance Needed Yes   Type of Residence Private residence   Number of Stairs to Enter Residence 1   Number of Stairs Within Residence 0   Do you have animals or pets at home? No   Who is requesting discharge planning? Provider   Home or Post Acute Services Post acute facilities (Rehab/SNF/etc)   Type of Post Acute Facility Services Skilled nursing   Expected Discharge Disposition SNF   Does the patient need discharge transport arranged? Yes   RoundTrip coordination needed? Yes   Has discharge transport been arranged? No   Financial Resource Strain   How hard is it for you to pay for the very basics like food, housing, medical care, and heating? Not very   Housing Stability   In the last 12 months, was there a time when you were not able to pay the mortgage or rent on time? N   In the past 12 months, how many times have you moved where you were living? 2   At any time in the past 12 months, were you homeless or living in a shelter (including now)? N   Transportation Needs   In the past 12 months, has lack of transportation kept you from medical appointments or from getting medications? no   In the past 12 months, has lack of transportation kept you from meetings, work, or from getting things needed for daily living? No     Care Transitions: I spoke with Kathy nurse @ [phone] at [facility] and they have asked that we fax  notes to [phone] for them to review if they are able to accept the pt back on discharge. I also discovered this pt was on Hospice prior to arrival to our facility. Kathy is to review with her director the the notes that ROSALINO faxes and call me back with their decision. Depending on their decision will determine our next step. CT to follow. Iman Gross BSN/RN-TCC   [id number] Kathy reached back out to let me know that The patient can return as long as [facility]  can be there at the time of transfer back to [facility]. [facility] SW was contacting the family to discuss this as the pt was discharged from hospice to go to the hospital. Kathy is to call back with an update. Iman VELAZQUEZ/RN-TCC   [id number] I have not heard back from Kathy at [facility] and I attempted to call her at [phone] and did not get an answer when transferred to her station. Will attempt again at 4pm. This will need follow up. Prior to discharge. CT to follow. Iman VELAZQUEZ/RN-TCC

## 2024-09-10 NOTE — PROGRESS NOTES
Linette Doyle is a 83 y.o. female on day 2 of admission presenting with Pleural effusion.      Subjective     Background to Admission  Linette Doyle is an 83 y.o. female with PMHx s/f CAD s/p PCI of RCA in Feb 2024 on plavix, sick sinus syndrome s/p pacemaker, HTN, hypercholesterolemia, pAfib on eliquis, CKD stage IIIa, hypothyroidism, anxiety with depression who presented on 9/8 with unwitnessed fall.  Family stated that she had an unwitnessed fall in the bathroom and were unsure of how long she had been on the ground.  She lives at [facility].  They stated she had been having declining physical and mental health for the past few months.  She had been having frequent falls, the most recent one being the week prior to presentation.  She had a contusion on the left side of her head, denied loss of consciousness.  Patient was unclear about how she fell.  Denied lightheadedness, headache, fever and chills, nausea and vomiting, chest pain, shortness of breath, abdominal pain, changes in urinary or bowel symptoms, leg swelling. ED eval was notable for hypertension (), hypokalemia, creatinine 1.34, elevated LFTs. CT head and cervical spine showed a large frontal scalp hematoma but no acute intracranial abnormality. No cervical spine acute pathology. X-ray of the left wrist and hand without fracture or dislocation. CT C/A/P showed large right pleural effusion with associated volume loss. Small volume perihepatic and splenic ascites with mild anasarca noted. Patient was given oral Klor-con and admitted for further eval and treatment.      Interval History  Patient seen in follow up. Notes feeling better. Struggling with food choices in hospital but appetite is improving.        Objective     Last Recorded Vitals  /78 (BP Location: Left arm, Patient Position: Lying)   Pulse 103   Temp 35.6 °C (96 °F) (Temporal)   Resp 18   Wt 52.2 kg (115 lb 1.6 oz)   SpO2 95%   Intake/Output last 3  Shifts:    Intake/Output Summary (Last 24 hours) at 9/10/2024 0959  Last data filed at 9/10/2024 0600  Gross per 24 hour   Intake 655 ml   Output 1800 ml   Net -1145 ml       Admission Weight  Weight: 65.7 kg (144 lb 13.5 oz) (09/08/24 1654)    Daily Weight  09/08/24 : 52.2 kg (115 lb 1.6 oz)    Image Results  US thoracentesis  Narrative: Interpreted By:  Jessee Rivera,   STUDY:  US THORACENTESIS;  9/9/2024 2:24 pm      INDICATION:  Signs/Symptoms:pleural effusion.          COMPARISON:  None.      ACCESSION NUMBER(S):  [id number]      ORDERING CLINICIAN:  MELANIE MORENO      TECHNIQUE:      CONSENT:  The patient/patient's POA/next of kin was informed of the nature of  the proposed procedure. The purposes, alternatives, risks, and  benefits were explained and discussed. All questions were answered  and consent was obtained.      SEDATION:  None      MEDICATION/CONTRAST:  No additional      TIME OUT:  A time out was performed immediately prior to procedure start with  the interventional team, correctly identifying the patient name, date  of birth, MRN, procedure, anatomy (including marking of site and  side), patient position, procedure consent form, relevant laboratory  and imaging test results, antibiotic administration, safety  precautions, and procedure-specific equipment needs.      FINDINGS:  The patient was placed in the left lateral decubitus position.      The right pleural space was examined with grey scale ultrasound, and  the most accessible fluid identified and marked for thoracentesis.      The skin was prepped and draped in usual manner. Local anesthesia  with Lidocaine was administered and a  right-sided thoracentesis was  performed.  A 5 Maori One-Step thoracentesis needle/catheter was  then placed where marked under direct ultrasound guidance.  Approximately 800 mL of yellowish colored fluid was removed.  The  needle/catheter was then withdrawn.      The patient tolerated the procedure well and  there were no immediate  complications. Specimen(s) sent to the laboratory and pathology for  further evaluation, per the requesting team.      Impression: Uneventful  right-sided thoracentesis, as detailed above.          Performed and dictated at [facility].      MACRO:  None      Signed by: Jessee Rivera 9/9/2024 2:29 PM  Dictation workstation:   [id number]  ECG 12 lead  Atrial-paced complexes  Prolonged NE interval  Incomplete right bundle branch block  Nonspecific repol abnormality, lateral leads  Baseline wander in lead(s) II,III,aVL,aVF,V3      Physical Exam  Vitals and nursing note reviewed.   Constitutional:       General: She is not in acute distress.     Appearance: She is ill-appearing.   HENT:      Head: Normocephalic and atraumatic.      Comments: L head hematoma s/p laceration repair     Right Ear: External ear normal.      Left Ear: External ear normal.      Nose: Nose normal. No rhinorrhea.      Mouth/Throat:      Mouth: Mucous membranes are moist.      Pharynx: Oropharynx is clear. No oropharyngeal exudate.   Eyes:      General: No scleral icterus.        Right eye: No discharge.         Left eye: No discharge.      Conjunctiva/sclera: Conjunctivae normal.   Cardiovascular:      Rate and Rhythm: Normal rate and regular rhythm.      Pulses: Normal pulses.      Heart sounds: Normal heart sounds. No murmur heard.  Pulmonary:      Effort: Pulmonary effort is normal. No respiratory distress.      Breath sounds: Normal breath sounds. No wheezing or rales.   Abdominal:      General: Abdomen is flat. There is no distension.      Palpations: Abdomen is soft.      Tenderness: There is no abdominal tenderness.   Musculoskeletal:         General: No tenderness.      Right lower leg: Edema present.      Left lower leg: Edema present.   Skin:     General: Skin is warm and dry.      Capillary Refill: Capillary refill takes less than 2 seconds.      Findings: No rash.    Neurological:      General: No focal deficit present.      Mental Status: She is alert. Mental status is at baseline. She is disoriented.   Psychiatric:         Mood and Affect: Mood normal.         Behavior: Behavior normal.         Thought Content: Thought content normal.         Judgment: Judgment normal.         Relevant Results    Scheduled medications  amiodarone, 200 mg, oral, Daily  [Held by provider] apixaban, 2.5 mg, oral, BID  cefTRIAXone, 1 g, intravenous, q24h  clopidogrel, 75 mg, oral, Daily  empagliflozin, 10 mg, oral, Daily  furosemide, 40 mg, oral, Daily  levothyroxine, 100 mcg, oral, Daily  metoprolol tartrate, 50 mg, oral, BID  pantoprazole, 40 mg, oral, Daily before breakfast   Or  pantoprazole, 40 mg, intravenous, Daily before breakfast  polyethylene glycol, 17 g, oral, Daily  sertraline, 200 mg, oral, Daily  spironolactone, 12.5 mg, oral, Daily      Continuous medications     PRN medications  PRN medications: acetaminophen, bisacodyl, guaiFENesin, melatonin, ondansetron       Results for orders placed or performed during the hospital encounter of 09/08/24 (from the past 24 hour(s))   pH, Body Fluid   Result Value Ref Range    pH, Fluid 7.60 See Below   Sterile Fluid Culture/Smear    Specimen: Pleural; Fluid   Result Value Ref Range    Gram Stain (2+) Few Polymorphonuclear leukocytes     Gram Stain No organisms seen    Lactate Dehydrogenase, Fluid   Result Value Ref Range    LD, Fluid 76 Not established. U/L   Glucose, Fluid   Result Value Ref Range    Glucose, Fluid 113 Not established mg/dL   Protein, Total Fluid   Result Value Ref Range    Protein, Total Fluid 2.4 Not established g/dL   Body Fluid Cell Count   Result Value Ref Range    Color, Fluid Yellow Colorless, Straw, Yellow    Clarity, Fluid Clear Clear    WBC, Fluid 78 See Comment /uL    RBC, Fluid 200 0  /uL /uL   Body Fluid Differential   Result Value Ref Range    Neutrophils %, Manual, Fluid 3 <25 % %    Lymphocytes %, Manual,  Fluid 25 <75 % %    Mono/Macrophages %, Manual, Fluid 72 <70 % %    Eosinophils %, Manual, Fluid 0 0 % %    Basophils %, Manual, Fluid 0 0 % %    Immature Granulocytes %, Manual, Fluid 0 0 % %    Blasts %, Manual, Fluid 0 0 % %    Unclassified Cells %, Manual, Fluid 0 0 % %    Plasma Cells %, Manual, Fluid 0 0 % %    Total Cells Counted, Fluid 100    Renal function panel   Result Value Ref Range    Glucose 95 74 - 99 mg/dL    Sodium 142 136 - 145 mmol/L    Potassium 3.2 (L) 3.5 - 5.3 mmol/L    Chloride 101 98 - 107 mmol/L    Bicarbonate 33 (H) 21 - 32 mmol/L    Anion Gap 11 10 - 20 mmol/L    Urea Nitrogen 16 6 - 23 mg/dL    Creatinine 1.12 (H) 0.50 - 1.05 mg/dL    eGFR 48 (L) >60 mL/min/1.73m*2    Calcium 8.7 8.6 - 10.3 mg/dL    Phosphorus 3.4 2.5 - 4.9 mg/dL    Albumin 3.6 3.4 - 5.0 g/dL   Magnesium   Result Value Ref Range    Magnesium 1.57 (L) 1.60 - 2.40 mg/dL   TSH   Result Value Ref Range    Thyroid Stimulating Hormone 5.57 (H) 0.44 - 3.98 mIU/L   T4, free   Result Value Ref Range    Thyroxine, Free 1.96 (H) 0.61 - 1.12 ng/dL   T3, free   Result Value Ref Range    Triiodothyronine, Free 1.3 (L) 2.3 - 4.2 pg/mL   Ammonia   Result Value Ref Range    Ammonia 21 16 - 53 umol/L   CBC   Result Value Ref Range    WBC 5.7 4.4 - 11.3 x10*3/uL    nRBC 0.0 0.0 - 0.0 /100 WBCs    RBC 3.60 (L) 4.00 - 5.20 x10*6/uL    Hemoglobin 9.4 (L) 12.0 - 16.0 g/dL    Hematocrit 30.4 (L) 36.0 - 46.0 %    MCV 84 80 - 100 fL    MCH 26.1 26.0 - 34.0 pg    MCHC 30.9 (L) 32.0 - 36.0 g/dL    RDW 17.7 (H) 11.5 - 14.5 %    Platelets 157 150 - 450 x10*3/uL   Comprehensive Metabolic Panel   Result Value Ref Range    Glucose 100 (H) 74 - 99 mg/dL    Sodium 143 136 - 145 mmol/L    Potassium 3.5 3.5 - 5.3 mmol/L    Chloride 101 98 - 107 mmol/L    Bicarbonate 34 (H) 21 - 32 mmol/L    Anion Gap 12 10 - 20 mmol/L    Urea Nitrogen 16 6 - 23 mg/dL    Creatinine 1.30 (H) 0.50 - 1.05 mg/dL    eGFR 40 (L) >60 mL/min/1.73m*2    Calcium 8.7 8.6 - 10.3 mg/dL     Albumin 3.4 3.4 - 5.0 g/dL    Alkaline Phosphatase 86 33 - 136 U/L    Total Protein 5.7 (L) 6.4 - 8.2 g/dL    AST 70 (H) 9 - 39 U/L    Bilirubin, Total 1.0 0.0 - 1.2 mg/dL    ALT 59 (H) 7 - 45 U/L   Magnesium   Result Value Ref Range    Magnesium 2.09 1.60 - 2.40 mg/dL                Assessment/Plan        Acute Congestive Heart Failure  -Suggested by effusion, fluid overload  -Given furosemide 40 mg IV x 1 at admission  -Echo 4/10/24 showed normal EF and moderate MR  -1.5 L fluid restriction, 2 g sodium diet, strict I/O, daily weights  -Cardiology consult appreciated  -Telemetry  -Transition to furosemide 40 mg PO daily per cardiology  -Continue cardioprotective lopressor, spironolactone, Jardiance    Right Pleural Effusion  -Pulmonology consulted  -Consulted IR for thoracentesis, performed today  -Fluid most consistent with transudative process. Culture and cytology pending  -Continue treatment of CHF and monitor for reaccumulation.     Recurrent Falls  -PT/OT working with patient, recommend SNF    Hypokalemia  -Potassium 3.5 this morning.  -Will give klor-con 40 mEq PO x 1  -Recheck K and Mg tomorrow morning  -Telemetry    Coronary Artery Disease  -s/p PCI    Hypertension  -continue home metoprolol with hold parameters  -Monitor with diuresis    Paroxysmal Atrial Fibrillation  -Resume apixaban today given no evidence of bleeding complication after thoracentesis  -Continue home amiodarone and metoprolol    Chronic Kidney Disease stage 3a  -Creatinine baseline ~1.4  -Continue to monitor while hospitalized    Hypothyroidism  -Continue home levothyroxine  -TFTs with nonspecific pattern, recommend continuing current dose for now and recheck in six weeks from acute illness  -CT head on presentation showed no obvious pituitary lesion. Unable to obtain MRI due to PPM    Depression/Anxiety  -Continue home sertraline    Frequent Falls  -Was hospitalized on 06/04/24-06/07/24 for frequent falls of unknown etiology  -She  had lumbar MRI that shows multilevel degenerative changes with severe canal stenosis and neural foraminal narrowing, both neurology and ortho spine saw patient. Ultimately no medical cause was found for her frequent falls and was discharged as the patient being frail and having chronic gait issues  -PT/OT appreciated  -Trauma team also following    Abnormal LFTs  -Mild elevation of transaminases  -Total bilirubin normalized today  -Continue to trend, stable  -Normal ammonia level    Anemia  -Hemoglobin 10.9-9.0-9.4 since presentation  -Labs most suggestive of anemia of chronic disease  -B12, folate, iron levels normal    Urinary Tract Infection  -Suggested by UA  -Empiric ceftriaxone  -Await culture      Disposition  Awaiting urine culture. Other issues largely stabilized. Working on discharge destination. ADOD 9/11         Henry Leyva MD

## 2024-09-11 LAB
ALBUMIN SERPL BCP-MCNC: 3.4 G/DL (ref 3.4–5)
ALP SERPL-CCNC: 84 U/L (ref 33–136)
ALT SERPL W P-5'-P-CCNC: 57 U/L (ref 7–45)
ANION GAP SERPL CALC-SCNC: 11 MMOL/L (ref 10–20)
AST SERPL W P-5'-P-CCNC: 69 U/L (ref 9–39)
BILIRUB SERPL-MCNC: 0.8 MG/DL (ref 0–1.2)
BUN SERPL-MCNC: 18 MG/DL (ref 6–23)
CALCIUM SERPL-MCNC: 8.6 MG/DL (ref 8.6–10.3)
CHLORIDE SERPL-SCNC: 101 MMOL/L (ref 98–107)
CO2 SERPL-SCNC: 34 MMOL/L (ref 21–32)
CREAT SERPL-MCNC: 1.46 MG/DL (ref 0.5–1.05)
EGFRCR SERPLBLD CKD-EPI 2021: 35 ML/MIN/1.73M*2
ERYTHROCYTE [DISTWIDTH] IN BLOOD BY AUTOMATED COUNT: 17.8 % (ref 11.5–14.5)
GLUCOSE SERPL-MCNC: 97 MG/DL (ref 74–99)
HCT VFR BLD AUTO: 30.9 % (ref 36–46)
HGB BLD-MCNC: 9.4 G/DL (ref 12–16)
HOLD SPECIMEN: NORMAL
LABORATORY COMMENT REPORT: NORMAL
LABORATORY COMMENT REPORT: NORMAL
MAGNESIUM SERPL-MCNC: 2.07 MG/DL (ref 1.6–2.4)
MCH RBC QN AUTO: 25.7 PG (ref 26–34)
MCHC RBC AUTO-ENTMCNC: 30.4 G/DL (ref 32–36)
MCV RBC AUTO: 84 FL (ref 80–100)
NRBC BLD-RTO: 0 /100 WBCS (ref 0–0)
PATH REPORT.FINAL DX SPEC: NORMAL
PATH REPORT.GROSS SPEC: NORMAL
PATH REPORT.RELEVANT HX SPEC: NORMAL
PATH REPORT.TOTAL CANCER: NORMAL
PLATELET # BLD AUTO: 159 X10*3/UL (ref 150–450)
POTASSIUM SERPL-SCNC: 3.5 MMOL/L (ref 3.5–5.3)
PROT SERPL-MCNC: 5.7 G/DL (ref 6.4–8.2)
RBC # BLD AUTO: 3.66 X10*6/UL (ref 4–5.2)
SODIUM SERPL-SCNC: 142 MMOL/L (ref 136–145)
WBC # BLD AUTO: 5.3 X10*3/UL (ref 4.4–11.3)

## 2024-09-11 PROCEDURE — 2500000001 HC RX 250 WO HCPCS SELF ADMINISTERED DRUGS (ALT 637 FOR MEDICARE OP): Performed by: INTERNAL MEDICINE

## 2024-09-11 PROCEDURE — 2500000004 HC RX 250 GENERAL PHARMACY W/ HCPCS (ALT 636 FOR OP/ED): Performed by: INTERNAL MEDICINE

## 2024-09-11 PROCEDURE — 2500000001 HC RX 250 WO HCPCS SELF ADMINISTERED DRUGS (ALT 637 FOR MEDICARE OP)

## 2024-09-11 PROCEDURE — 85027 COMPLETE CBC AUTOMATED: CPT | Performed by: INTERNAL MEDICINE

## 2024-09-11 PROCEDURE — 2500000001 HC RX 250 WO HCPCS SELF ADMINISTERED DRUGS (ALT 637 FOR MEDICARE OP): Performed by: NURSE PRACTITIONER

## 2024-09-11 PROCEDURE — 2500000004 HC RX 250 GENERAL PHARMACY W/ HCPCS (ALT 636 FOR OP/ED)

## 2024-09-11 PROCEDURE — 1200000002 HC GENERAL ROOM WITH TELEMETRY DAILY

## 2024-09-11 PROCEDURE — 80053 COMPREHEN METABOLIC PANEL: CPT | Performed by: INTERNAL MEDICINE

## 2024-09-11 PROCEDURE — 2500000002 HC RX 250 W HCPCS SELF ADMINISTERED DRUGS (ALT 637 FOR MEDICARE OP, ALT 636 FOR OP/ED)

## 2024-09-11 PROCEDURE — 83735 ASSAY OF MAGNESIUM: CPT | Performed by: INTERNAL MEDICINE

## 2024-09-11 PROCEDURE — 99239 HOSP IP/OBS DSCHRG MGMT >30: CPT | Performed by: INTERNAL MEDICINE

## 2024-09-11 PROCEDURE — 36415 COLL VENOUS BLD VENIPUNCTURE: CPT | Performed by: INTERNAL MEDICINE

## 2024-09-11 PROCEDURE — 99232 SBSQ HOSP IP/OBS MODERATE 35: CPT | Performed by: NURSE PRACTITIONER

## 2024-09-11 PROCEDURE — 97535 SELF CARE MNGMENT TRAINING: CPT | Mod: GO,CO

## 2024-09-11 PROCEDURE — 2500000002 HC RX 250 W HCPCS SELF ADMINISTERED DRUGS (ALT 637 FOR MEDICARE OP, ALT 636 FOR OP/ED): Performed by: INTERNAL MEDICINE

## 2024-09-11 RX ORDER — FUROSEMIDE 20 MG/1
20 TABLET ORAL DAILY
Status: DISCONTINUED | OUTPATIENT
Start: 2024-09-11 | End: 2024-09-12 | Stop reason: HOSPADM

## 2024-09-11 RX ORDER — FUROSEMIDE 20 MG/1
20 TABLET ORAL DAILY
Qty: 30 TABLET | Refills: 0 | Status: SHIPPED | OUTPATIENT
Start: 2024-09-12

## 2024-09-11 RX ORDER — SPIRONOLACTONE 25 MG/1
12.5 TABLET ORAL DAILY
Qty: 15 TABLET | Refills: 0 | Status: SHIPPED | OUTPATIENT
Start: 2024-09-12

## 2024-09-11 RX ADMIN — PANTOPRAZOLE SODIUM 40 MG: 40 TABLET, DELAYED RELEASE ORAL at 06:44

## 2024-09-11 RX ADMIN — Medication 3 MG: at 23:33

## 2024-09-11 RX ADMIN — APIXABAN 2.5 MG: 5 TABLET, FILM COATED ORAL at 10:34

## 2024-09-11 RX ADMIN — APIXABAN 2.5 MG: 5 TABLET, FILM COATED ORAL at 20:56

## 2024-09-11 RX ADMIN — ACETAMINOPHEN 650 MG: 325 TABLET ORAL at 23:33

## 2024-09-11 RX ADMIN — BISACODYL 10 MG: 5 TABLET, COATED ORAL at 10:30

## 2024-09-11 RX ADMIN — FUROSEMIDE 20 MG: 20 TABLET ORAL at 10:30

## 2024-09-11 RX ADMIN — METOPROLOL TARTRATE 50 MG: 50 TABLET, FILM COATED ORAL at 10:31

## 2024-09-11 RX ADMIN — SERTRALINE HYDROCHLORIDE 200 MG: 50 TABLET ORAL at 10:31

## 2024-09-11 RX ADMIN — AMIODARONE HYDROCHLORIDE 200 MG: 200 TABLET ORAL at 10:31

## 2024-09-11 RX ADMIN — CEFTRIAXONE SODIUM 1 G: 1 INJECTION, SOLUTION INTRAVENOUS at 20:55

## 2024-09-11 RX ADMIN — METOPROLOL TARTRATE 50 MG: 50 TABLET, FILM COATED ORAL at 20:55

## 2024-09-11 RX ADMIN — LEVOTHYROXINE SODIUM 100 MCG: 0.1 TABLET ORAL at 06:44

## 2024-09-11 RX ADMIN — CLOPIDOGREL 75 MG: 75 TABLET ORAL at 10:31

## 2024-09-11 RX ADMIN — EMPAGLIFLOZIN 10 MG: 10 TABLET, FILM COATED ORAL at 10:31

## 2024-09-11 RX ADMIN — POLYETHYLENE GLYCOL 3350 17 G: 17 POWDER, FOR SOLUTION ORAL at 10:30

## 2024-09-11 RX ADMIN — SPIRONOLACTONE 12.5 MG: 25 TABLET ORAL at 10:30

## 2024-09-11 ASSESSMENT — COGNITIVE AND FUNCTIONAL STATUS - GENERAL
CLIMB 3 TO 5 STEPS WITH RAILING: A LOT
EATING MEALS: A LITTLE
MOVING TO AND FROM BED TO CHAIR: A LITTLE
DRESSING REGULAR LOWER BODY CLOTHING: A LOT
STANDING UP FROM CHAIR USING ARMS: A LITTLE
DAILY ACTIVITIY SCORE: 16
HELP NEEDED FOR BATHING: A LOT
TOILETING: A LITTLE
HELP NEEDED FOR BATHING: A LOT
DRESSING REGULAR UPPER BODY CLOTHING: A LOT
PERSONAL GROOMING: A LITTLE
WALKING IN HOSPITAL ROOM: A LITTLE
DRESSING REGULAR LOWER BODY CLOTHING: A LOT
DRESSING REGULAR UPPER BODY CLOTHING: A LOT
PERSONAL GROOMING: A LITTLE
DAILY ACTIVITIY SCORE: 14
TOILETING: A LOT
MOBILITY SCORE: 19

## 2024-09-11 ASSESSMENT — PAIN DESCRIPTION - LOCATION: LOCATION: SHOULDER

## 2024-09-11 ASSESSMENT — PAIN DESCRIPTION - ORIENTATION: ORIENTATION: LEFT

## 2024-09-11 ASSESSMENT — PAIN SCALES - GENERAL
PAINLEVEL_OUTOF10: 0 - NO PAIN
PAINLEVEL_OUTOF10: 3

## 2024-09-11 ASSESSMENT — ACTIVITIES OF DAILY LIVING (ADL): HOME_MANAGEMENT_TIME_ENTRY: 30

## 2024-09-11 NOTE — PROGRESS NOTES
Spoke to Terrance [facility] Director of Wellness whom states they can accept patient today before 4 PM. She will be visiting patient today. Will notify Dr. Leyva.     1229 Spoke to patients brother, due to her sisters phone being disconnected. Her Brother confirmed that she is to return to [facility] with [facility]. Notified RN that the floor is to set up transport and that [facility] is requesting patient be at the facility before 4PM.    [id number] Notified patients brother of transport time of 1530 to [facility]. Answered all questions and verbalized understanding.

## 2024-09-11 NOTE — CARE PLAN
The clinical goals for the shift include Pt will remain free from injury and falls overnight        Problem: Pain - Adult  Goal: Verbalizes/displays adequate comfort level or baseline comfort level  Outcome: Progressing     Problem: Safety - Adult  Goal: Free from fall injury  Outcome: Progressing     Problem: Discharge Planning  Goal: Discharge to home or other facility with appropriate resources  Outcome: Progressing     Problem: Chronic Conditions and Co-morbidities  Goal: Patient's chronic conditions and co-morbidity symptoms are monitored and maintained or improved  Outcome: Progressing     Problem: Skin  Goal: Decreased wound size/increased tissue granulation at next dressing change  Outcome: Progressing  Goal: Participates in plan/prevention/treatment measures  Outcome: Progressing  Goal: Prevent/manage excess moisture  Outcome: Progressing  Goal: Prevent/minimize sheer/friction injuries  Outcome: Progressing  Goal: Promote/optimize nutrition  Outcome: Progressing  Goal: Promote skin healing  Outcome: Progressing  Flowsheets (Taken 9/11/2024 0117)  Promote skin healing: Turn/reposition every 2 hours/use positioning/transfer devices     Problem: Fall/Injury  Goal: Not fall by end of shift  Outcome: Progressing  Goal: Be free from injury by end of the shift  Outcome: Progressing  Goal: Verbalize understanding of personal risk factors for fall in the hospital  Outcome: Progressing  Goal: Verbalize understanding of risk factor reduction measures to prevent injury from fall in the home  Outcome: Progressing  Goal: Use assistive devices by end of the shift  Outcome: Progressing  Goal: Pace activities to prevent fatigue by end of the shift  Outcome: Progressing     Problem: Pain  Goal: Takes deep breaths with improved pain control throughout the shift  Outcome: Progressing  Goal: Turns in bed with improved pain control throughout the shift  Outcome: Progressing  Goal: Walks with improved pain control throughout  the shift  Outcome: Progressing  Goal: Performs ADL's with improved pain control throughout shift  Outcome: Progressing  Goal: Participates in PT with improved pain control throughout the shift  Outcome: Progressing  Goal: Free from opioid side effects throughout the shift  Outcome: Progressing  Goal: Free from acute confusion related to pain meds throughout the shift  Outcome: Progressing     Problem: Nutrition  Goal: Oral intake greater than 50%  Outcome: Progressing  Goal: Consume prescribed supplement  Outcome: Progressing  Goal: Lab values WNL  Outcome: Progressing  Goal: Promote healing  Outcome: Progressing  Goal: Maintain stable weight  Outcome: Progressing

## 2024-09-11 NOTE — PROGRESS NOTES
[facility] Heart and Vascular Cardiology  Patient Name: Linette Doyle  Patient : 1940  Room/Bed: 2307/2307-A    HPI:  Linette Doyle is a 83 y.o. female presenting with weakness and fall and found to have a possible UTI as well as acute on chronic diastolic heart failure.     Appear she has had multiple falls.  In 2024 she was found to have moderate to severe mitral regurgitation and had a AUGUSTIN that demonstrated possible functional MR.  No abnormality was noted with the mitral valve apparatus.     Patient herself is somewhat confused unable to put a good story together.  She states she slipped and fell in the bathroom and hit her head her head hurts at this time.  She does not have shortness of breath.  She does have some ankle swelling.     It appears that after an admission in [facility] in May 2024 her diuretics were discontinued because of GARY.  At this time those are not on her home medication list and unlikely that she is on any diuretics.     PMHx s/f CAD s/p PCI of RCA in 2024 on plavix, sick sinus syndrome s/p pacemaker, HTN, hypercholesterolemia, pAfib on eliquis, CKD stage IIIa, hypothyroidism, anxiety with depression presenting with unwitnessed fall     Allergies:  Iodinated contrast media    Subjective Data:  9/10/24: patient confused.  She is able to tell me the current year, but did not know that she was in the hospital.  S/p right thoracentesis yesterday.    24: Patient remains confused.  Today she was able to tell me that she was in the hospital, but thought it was [facility].  Hospitalist restarted apixaban.  She is lying almost flat in bed with no SOB noted.  On RA.      Overnight Events:  None    Objective Data:  Vitals:    09/10/24 1832 09/10/24 2043 24 0045 24 0519   BP: 138/89 147/69 120/60 129/60   BP Location: Left arm Left arm Left arm Left arm   Patient Position: Sitting Lying Lying Lying   Pulse: 60 66 59 65   Resp: 18 18 18 18   Temp: 36.2  "°C (97.2 °F) 35.9 °C (96.6 °F) 36 °C (96.8 °F) 36.3 °C (97.4 °F)   TempSrc: Temporal Temporal Temporal Temporal   SpO2: 94% 93% 95% 97%   Weight:       Height:           Last Labs:  Results from last 7 days   Lab Units 09/11/24  0644 09/10/24  0538 09/09/24  0559   WBC AUTO x10*3/uL 5.3 5.7 5.1   HEMOGLOBIN g/dL 9.4* 9.4* 9.0*   HEMATOCRIT % 30.9* 30.4* 29.5*   PLATELETS AUTO x10*3/uL 159 157 134*       Results from last 7 days   Lab Units 09/11/24  0644 09/10/24  0538 09/09/24  1541 09/09/24  0559   SODIUM mmol/L 142 143 142 144   POTASSIUM mmol/L 3.5 3.5 3.2* 3.1*   CHLORIDE mmol/L 101 101 101 103   CO2 mmol/L 34* 34* 33* 31   BUN mg/dL 18 16 16 17   CREATININE mg/dL 1.46* 1.30* 1.12* 1.20*   CALCIUM mg/dL 8.6 8.7 8.7 8.6   PROTEIN TOTAL g/dL 5.7* 5.7*  --  5.6*  5.8*   BILIRUBIN TOTAL mg/dL 0.8 1.0  --  1.1   ALK PHOS U/L 84 86  --  86   ALT U/L 57* 59*  --  59*   AST U/L 69* 70*  --  75*   GLUCOSE mg/dL 97 100* 95 99       Results from last 7 days   Lab Units 09/11/24  0644 09/10/24  0538 09/09/24  1541   MAGNESIUM mg/dL 2.07 2.09 1.57*       No results found for: \"LDLF\"     Lab Results   Component Value Date    BNP 2,509 (H) 09/08/2024       No results found for: \"TROPHS\"     Lab Results   Component Value Date    TSH 5.57 (H) 09/09/2024           Lab Results   Component Value Date    HGBA1C 6.0 (H) 02/01/2024       Intake/Output    Intake/Output Summary (Last 24 hours) at 9/11/2024 0833  Last data filed at 9/10/2024 2307  Gross per 24 hour   Intake 750 ml   Output 100 ml   Net 650 ml      I/O last 2 completed shifts:  In: 750 (14.4 mL/kg) [P.O.:700; IV Piggyback:50]  Out: 100 (1.9 mL/kg) [Urine:100 (0.1 mL/kg/hr)]  Weight: 52.2 kg     Past Medical History:  She has a past medical history of Angina pectoris (CMS-HCC) (10/22/2023), Atherosclerosis of coronary artery (08/21/2019), Atherosclerotic heart disease of native coronary artery with other forms of angina pectoris (CMS-HCC) (10/22/2023), " Hypercholesterolemia (12/28/2018), Hyperlipidemia (10/22/2023), Presence of cardiac pacemaker (10/22/2023), Primary hypertension (12/28/2018), Shortness of breath (11/26/2019), Sick sinus syndrome (Multi) (10/22/2023), Sinus bradycardia (10/22/2023), and Stage 3a chronic kidney disease (Multi) (11/25/2019).    Past Surgical History:  She has a past surgical history that includes Cardiac catheterization (N/A, 02/02/2024); Cardiac catheterization (N/A, 02/02/2024); Cardiac catheterization (N/A, 02/08/2024); Cardiac catheterization (N/A, 02/08/2024); and pacemaker placement (07/04/2019).      Social History:  She reports that she has never smoked. She has never been exposed to tobacco smoke. She has never used smokeless tobacco. She reports that she does not drink alcohol and does not use drugs.    Family History:  Family History   Problem Relation Name Age of Onset    No Known Problems Mother      No Known Problems Father         Outpatient Medications  No current facility-administered medications on file prior to encounter.     Current Outpatient Medications on File Prior to Encounter   Medication Sig Dispense Refill    acetaminophen (Tylenol) 325 mg tablet Take 2 tablets (650 mg) by mouth every 6 hours if needed for mild pain (1 - 3).      acetaminophen (Tylenol) 650 mg suppository Insert 1 suppository (650 mg) into the rectum every 4 hours if needed for mild pain (1 - 3) or fever (temp greater than 38.0 C).      amiodarone (Pacerone) 200 mg tablet Take 1 tablet (200 mg) by mouth once daily.      apixaban (Eliquis) 2.5 mg tablet Take 1 tablet (2.5 mg) by mouth 2 times a day. 180 tablet 1    atorvastatin (Lipitor) 80 mg tablet Take 1 tablet (80 mg) by mouth once daily.      bisacodyl (Dulcolax) 10 mg suppository Insert 1 suppository (10 mg) into the rectum once daily as needed for constipation.      cholecalciferol (Vitamin D3) 50 MCG (2000 UT) tablet Take 1 tablet (2,000 Units) by mouth once daily.      clopidogrel  (Plavix) 75 mg tablet Take 1 tablet (75 mg) by mouth once daily. 90 tablet 1    hyoscyamine (Anaspaz) 0.125 mg disintegrating tablet Take 1 tablet (0.125 mg) by mouth every 2 hours if needed (for increased secretions).      levothyroxine (Synthroid, Levoxyl) 100 mcg tablet TAKE ONE TABLET BY MOUTH EVERY DAY (Patient not taking: Reported on 9/9/2024) 90 tablet 3    levothyroxine (Synthroid, Levoxyl) 125 mcg tablet Take 1 tablet (125 mcg) by mouth early in the morning.. Take on an empty stomach at the same time each day, either 30 to 60 minutes prior to breakfast      LORazepam (Ativan) 0.5 mg tablet Take 1 tablet (0.5 mg) by mouth every 4 hours if needed for anxiety (restlessness).      magnesium hydroxide (Milk of Magnesia) 400 mg/5 mL suspension Take 30 mL by mouth once daily as needed for constipation.      melatonin 5 mg tablet Take 1 tablet (5 mg) by mouth as needed at bedtime for sleep.      metoprolol tartrate (Lopressor) 50 mg tablet Take 1 tablet by mouth 2 times a day. 180 tablet 3    morphine 20 mg/mL concentrated oral solution Take 0.5-1 mL (10-20 mg) by mouth every 2 hours if needed for severe pain (7 - 10) (or dyspnea).      mv-mn/om3/dha/epa/fish/lut/leanne (OCUVITE ADULT 50 PLUS ORAL) Take 1 tablet by mouth once daily. Indications: supplement      omeprazole (PriLOSEC) 20 mg tablet,delayed release (DR/EC) EC tablet Take 1 tablet (20 mg) by mouth once daily.      prochlorperazine (Compazine) 10 mg tablet Take 1 t po or insert into rectum q 6 h prn nausea/vomiting      sertraline (Zoloft) 100 mg tablet Take 2 tablets (200 mg) by mouth once daily. 90 tablet 0    [DISCONTINUED] ascorbic acid/collagen hydr (COLLAGEN PLUS VITAMIN C ORAL) Take 1 capsule by mouth once daily. Indications: Instaflex collegen supplement      [DISCONTINUED] diphenhydrAMINE-acetaminophen (Tylenol PM Extra Strength)  mg per tablet Take 1 tablet by mouth as needed at bedtime for sleep.      [DISCONTINUED] multivit-min/ferrous  fumarate (MULTI VITAMIN ORAL) Take 1 tablet by mouth once daily. Indications: One a Day- supplement         Scheduled medications  amiodarone, 200 mg, oral, Daily  apixaban, 2.5 mg, oral, BID  cefTRIAXone, 1 g, intravenous, q24h  clopidogrel, 75 mg, oral, Daily  empagliflozin, 10 mg, oral, Daily  furosemide, 20 mg, oral, Daily  levothyroxine, 100 mcg, oral, Daily  metoprolol tartrate, 50 mg, oral, BID  pantoprazole, 40 mg, oral, Daily before breakfast   Or  pantoprazole, 40 mg, intravenous, Daily before breakfast  polyethylene glycol, 17 g, oral, Daily  sertraline, 200 mg, oral, Daily  spironolactone, 12.5 mg, oral, Daily      Continuous medications     PRN medications  PRN medications: acetaminophen, bisacodyl, guaiFENesin, melatonin, ondansetron   Medications Prior to Admission   Medication Sig Dispense Refill Last Dose    acetaminophen (Tylenol) 325 mg tablet Take 2 tablets (650 mg) by mouth every 6 hours if needed for mild pain (1 - 3).       acetaminophen (Tylenol) 650 mg suppository Insert 1 suppository (650 mg) into the rectum every 4 hours if needed for mild pain (1 - 3) or fever (temp greater than 38.0 C).       amiodarone (Pacerone) 200 mg tablet Take 1 tablet (200 mg) by mouth once daily.       apixaban (Eliquis) 2.5 mg tablet Take 1 tablet (2.5 mg) by mouth 2 times a day. 180 tablet 1     atorvastatin (Lipitor) 80 mg tablet Take 1 tablet (80 mg) by mouth once daily.       bisacodyl (Dulcolax) 10 mg suppository Insert 1 suppository (10 mg) into the rectum once daily as needed for constipation.       cholecalciferol (Vitamin D3) 50 MCG (2000 UT) tablet Take 1 tablet (2,000 Units) by mouth once daily.       clopidogrel (Plavix) 75 mg tablet Take 1 tablet (75 mg) by mouth once daily. 90 tablet 1     hyoscyamine (Anaspaz) 0.125 mg disintegrating tablet Take 1 tablet (0.125 mg) by mouth every 2 hours if needed (for increased secretions).       levothyroxine (Synthroid, Levoxyl) 100 mcg tablet TAKE ONE TABLET  BY MOUTH EVERY DAY (Patient not taking: Reported on 9/9/2024) 90 tablet 3 Not Taking    levothyroxine (Synthroid, Levoxyl) 125 mcg tablet Take 1 tablet (125 mcg) by mouth early in the morning.. Take on an empty stomach at the same time each day, either 30 to 60 minutes prior to breakfast       LORazepam (Ativan) 0.5 mg tablet Take 1 tablet (0.5 mg) by mouth every 4 hours if needed for anxiety (restlessness).       magnesium hydroxide (Milk of Magnesia) 400 mg/5 mL suspension Take 30 mL by mouth once daily as needed for constipation.       melatonin 5 mg tablet Take 1 tablet (5 mg) by mouth as needed at bedtime for sleep.       metoprolol tartrate (Lopressor) 50 mg tablet Take 1 tablet by mouth 2 times a day. 180 tablet 3     morphine 20 mg/mL concentrated oral solution Take 0.5-1 mL (10-20 mg) by mouth every 2 hours if needed for severe pain (7 - 10) (or dyspnea).       mv-mn/om3/dha/epa/fish/lut/leanne (OCUVITE ADULT 50 PLUS ORAL) Take 1 tablet by mouth once daily. Indications: supplement       omeprazole (PriLOSEC) 20 mg tablet,delayed release (DR/EC) EC tablet Take 1 tablet (20 mg) by mouth once daily.       prochlorperazine (Compazine) 10 mg tablet Take 1 t po or insert into rectum q 6 h prn nausea/vomiting       sertraline (Zoloft) 100 mg tablet Take 2 tablets (200 mg) by mouth once daily. 90 tablet 0        AUGUSTIN 4/10/24:   1. Left ventricular systolic function is normal with a 55-60% estimated ejection fraction.   2. There is moderate mitral valve regurgitation at the most central jet the mitral valve structure within normal limit.   3. Moderate mitral valve regurgitation.   4. Mild tricuspid regurgitation is visualized.  EF   Date/Time Value Ref Range Status   02/09/2024 08:59 AM 58 %    02/01/2024 11:43 AM 59 %         LHC/PCI 2/8/24:  1. Total contrast used 50 mL due to underlying CKD.   2. We used no torque right coronary guide. PT graphic wire was placed in the distal right coronary artery      Over the wire  we applied first 2.5 x 18 mm drug-eluting stents at the proximal right coronary artery deployed for 12 cindy.      After that we applied second stents 2.5 x 18 mm distal RCA segment which is about 80% lesions. Stent was deployed for about 12 cindy      Post PCI excellent flows and distal right coronary.      Patient received 180 mg Brilinta in the Cath Lab.      Patient also received about 5000's of heparin and Cath Lab.      Advised patient to continue dual antiplatelet epi with Eliquis for first month after 1 month continue only on Plavix and Eliquis.      Post PCI patient probably needs a cardiac rehab after 2 weeks.      Sheath was removed and Angio-Seal applied to right groin.      Patient tolerated procedure fairly well.      Patient had a diagnostic catheterization without LV gram and a PCI of her proximal and mid to distal RCA done.      Patient also received 400 mics of intracoronary nitroglycerin.    Physical Exam:  Vitals reviewed.   Constitutional:       Appearance: Not in distress. Frail. Chronically ill-appearing.   Neck:      Vascular: No carotid bruit.   Pulmonary:      Effort: Pulmonary effort is normal.      Breath sounds: Normal breath sounds.   Cardiovascular:      PMI at left midclavicular line. Normal rate. Regular rhythm. S1 with normal intensity. S2 with normal intensity.       Murmurs: There is no murmur.   Edema:     Peripheral edema absent.   Abdominal:      General: Bowel sounds are normal.   Skin:     Findings: Ecchymosis (left forehead/eye with swelling) present.   Neurological:      Mental Status: Alert. Exhibits a cognitive deficit.   Psychiatric:         Cognition and Memory: Memory is impaired.         Assessment/Plan:   1-acute on chronic diastolic heart failure-multifactorial but primarily due to discontinuation of diuretics due to GARY.  This patient is going to need reinstitution of diuretics also recommend adding Aldactone at low-dose and SGLT2 inhibitors.  Patient will require a  close follow-up not only with her cardiologist Dr. Ibrahim but also with advanced heart failure specialist.  I recommend 1 week follow-up upon discharge.     Monitor renal function and switch to oral diuretics once patient is euvolemic.    9/10/24: She does not appear significantly volume overloaded on exam.  Creatinine trended up some to 1.3.   Continue current medications which include:  Beta blocker: Metoprolol tartrate 50 mg BID  ACE inhibitor/ARB/ARNI: None  MRA: spironolactone 12.5 mg daily  SGLT2i:  empagliflozin 10 mg daily  Diuretic:  Stop IV furosemide. Start furosemide 40 mg daily PO  Hydralazine/Nitrate: None  Device: PPM  AUGUSTIN April 2024 with LVEF 55-60%     9/11/24: s/p right thoracentesis. Creatinine trended up to 1.46.  Will reduce furosemide 20 mg daily PO.  Recommend f/u with her usual cardiologist within 1 week.      2-functional moderate to severe MR-likely secondary to heart failure, medical management is recommended- follow-up with advanced heart failure specialist and if this persists, despite aggressive medical therapy and follow-up, she COULD be a candidate for MitraClip(if she is willing).     3-coronary artery disease-continue dual antiplatelet therapy, if you are planning on holding Eliquis due to recurrent falls.  Patient had stent to the RCA in April 2024.    9/10/24:  She is s/p NSTEMI with PCI RCA Feb 2024. Unhold clopidogrel. Apixaban is currently on hold likely d/t high bleeding risk d/t recurrent falls. If apixaban is held long term, recommend starting aspirin 81 mg daily in addition to clopidogrel.    9/11/24: Patient denies any chest pain.  Continue clopidogrel.  Hospitalist has restarted apixaban.  Statin has not been continued on admission - ? D/t elevated LFTs.  Recommend restarting as able.       4-persistent atrial fibrillation-maintaining sinus rhythm with amiodarone, Eliquis being held recommend starting dual antiplatelet therapy.  Patient with very high bleeding risk due to  recurrent falls but needs to follow-up with her cardiologist for risk-benefit discussion for long-term maintenance therapy.    9/10/24:  AF on telemetry with -110's.  Continue Amiodarone and beta blocker.  Apixaban currently on hold - had thoracentesis yesterday and patient with very high bleeding risk due to recurrent falls but needs to follow-up with her cardiologist for risk-benefit discussion for long-term maintenance therapy.    9/11/24: Apixaban has been restarted. Continue Amiodarone and metoprolol tartrate. As above, very high bleeding risk due to recurrent falls but needs to follow-up with her cardiologist for risk-benefit discussion for long-term maintenance therapy.     5-presence of permanent pacemaker-follow through pacer clinic.  Currently Apaced on telemetry with HR 67 bpm.    Code Status  DNR and No Intubation      Velma Bustamante, APRN-CNP

## 2024-09-11 NOTE — NURSING NOTE
Director from [facility] came in to get pt status update for her discharge today back to [facility] memory care unit. After which she asked if it were possible to have pt transported so that she arrives by 1600 hours today or tomorrow morning.     CT set up transportation to [facility] with [facility].  time scheduled for 1530.    Terrance ([phone]) director at [facility] updated.

## 2024-09-11 NOTE — CARE PLAN
The patient's goals for the shift include      The clinical goals for the shift include pateint will remain safe and free from falls during this shift      Problem: Pain - Adult  Goal: Verbalizes/displays adequate comfort level or baseline comfort level  9/11/2024 1936 by Veronika Xie RN  Outcome: Progressing  9/11/2024 1936 by Veronika Xie RN  Outcome: Progressing     Problem: Safety - Adult  Goal: Free from fall injury  9/11/2024 1936 by Veronika Xie RN  Outcome: Progressing  9/11/2024 1936 by Veronika Xie RN  Outcome: Progressing     Problem: Discharge Planning  Goal: Discharge to home or other facility with appropriate resources  9/11/2024 1936 by Veronika Xie RN  Outcome: Progressing  9/11/2024 1936 by Veronika Xie RN  Outcome: Progressing     Problem: Chronic Conditions and Co-morbidities  Goal: Patient's chronic conditions and co-morbidity symptoms are monitored and maintained or improved  9/11/2024 1936 by Veronika Xie RN  Outcome: Progressing  9/11/2024 1936 by Veronika Xie RN  Outcome: Progressing     Problem: Skin  Goal: Decreased wound size/increased tissue granulation at next dressing change  9/11/2024 1936 by Veronika Xie RN  Outcome: Progressing  Flowsheets (Taken 9/11/2024 1936)  Decreased wound size/increased tissue granulation at next dressing change: Protective dressings over bony prominences  9/11/2024 1936 by Veronika Xie RN  Outcome: Progressing  Flowsheets (Taken 9/11/2024 1936)  Decreased wound size/increased tissue granulation at next dressing change: Protective dressings over bony prominences  Goal: Participates in plan/prevention/treatment measures  9/11/2024 1936 by Veronika Xie RN  Outcome: Progressing  Flowsheets (Taken 9/11/2024 1936)  Participates in plan/prevention/treatment measures: Elevate heels  9/11/2024 1936 by Veronika Xie RN  Outcome: Progressing  Flowsheets (Taken 9/11/2024 1936)  Participates in  plan/prevention/treatment measures: Elevate heels  Goal: Prevent/manage excess moisture  9/11/2024 1936 by Veronika Xie RN  Outcome: Progressing  Flowsheets (Taken 9/11/2024 1936)  Prevent/manage excess moisture: Cleanse incontinence/protect with barrier cream  9/11/2024 1936 by Veronika Xie RN  Outcome: Progressing  Flowsheets (Taken 9/11/2024 1936)  Prevent/manage excess moisture: Cleanse incontinence/protect with barrier cream  Goal: Prevent/minimize sheer/friction injuries  9/11/2024 1936 by Veronika Xie RN  Outcome: Progressing  Flowsheets (Taken 9/11/2024 1936)  Prevent/minimize sheer/friction injuries: Use pull sheet  9/11/2024 1936 by Veronika Xie RN  Outcome: Progressing  Flowsheets (Taken 9/11/2024 1936)  Prevent/minimize sheer/friction injuries: Use pull sheet  Goal: Promote/optimize nutrition  9/11/2024 1936 by Veronika Xie RN  Outcome: Progressing  Flowsheets (Taken 9/11/2024 1936)  Promote/optimize nutrition: Assist with feeding  9/11/2024 1936 by Veronika Xie RN  Outcome: Progressing  Flowsheets (Taken 9/11/2024 1936)  Promote/optimize nutrition: Assist with feeding  Goal: Promote skin healing  9/11/2024 1936 by Veronika Xie RN  Outcome: Progressing  Flowsheets (Taken 9/11/2024 1936)  Promote skin healing: Assess skin/pad under line(s)/device(s)  9/11/2024 1936 by Veronika Xie RN  Outcome: Progressing  Flowsheets (Taken 9/11/2024 1936)  Promote skin healing: Assess skin/pad under line(s)/device(s)     Problem: Fall/Injury  Goal: Not fall by end of shift  9/11/2024 1936 by Veronika Xie RN  Outcome: Progressing  9/11/2024 1936 by Veronika Xie RN  Outcome: Progressing  Goal: Be free from injury by end of the shift  9/11/2024 1936 by Veronika Xie RN  Outcome: Progressing  9/11/2024 1936 by Veronika A Ferdinand, RN  Outcome: Progressing  Goal: Verbalize understanding of personal risk factors for fall in the hospital  9/11/2024 1936 by Veronika Xie,  RN  Outcome: Progressing  9/11/2024 1936 by Veronika Xie RN  Outcome: Progressing  Goal: Verbalize understanding of risk factor reduction measures to prevent injury from fall in the home  9/11/2024 1936 by Veronika Xie, RN  Outcome: Progressing  9/11/2024 1936 by Veronika Xie, RN  Outcome: Progressing  Goal: Use assistive devices by end of the shift  9/11/2024 1936 by Veronika Xie, RN  Outcome: Progressing  9/11/2024 1936 by Veronika Xie, RN  Outcome: Progressing  Goal: Pace activities to prevent fatigue by end of the shift  9/11/2024 1936 by Veronika Xie, RN  Outcome: Progressing  9/11/2024 1936 by Veronika Xie RN  Outcome: Progressing     Problem: Pain  Goal: Takes deep breaths with improved pain control throughout the shift  9/11/2024 1936 by Veronika Xie, RN  Outcome: Progressing  9/11/2024 1936 by Veronika Xie, RN  Outcome: Progressing  Goal: Turns in bed with improved pain control throughout the shift  9/11/2024 1936 by Veronika Xie, RN  Outcome: Progressing  9/11/2024 1936 by Veronika Xie, RN  Outcome: Progressing  Goal: Walks with improved pain control throughout the shift  9/11/2024 1936 by Veronika Xie, RN  Outcome: Progressing  9/11/2024 1936 by Veronika Xie, RN  Outcome: Progressing  Goal: Performs ADL's with improved pain control throughout shift  9/11/2024 1936 by Veronika Xie, RN  Outcome: Progressing  9/11/2024 1936 by Veronika Xie RN  Outcome: Progressing  Goal: Participates in PT with improved pain control throughout the shift  9/11/2024 1936 by Veronika Xie, RN  Outcome: Progressing  9/11/2024 1936 by Veronika Xie, RN  Outcome: Progressing  Goal: Free from opioid side effects throughout the shift  9/11/2024 1936 by Veronika Xie, RN  Outcome: Progressing  9/11/2024 1936 by Veronika Xie, RN  Outcome: Progressing  Goal: Free from acute confusion related to pain meds throughout the shift  9/11/2024 1936 by Veronika Xie,  RN  Outcome: Progressing  9/11/2024 1936 by Veronika Xie, RN  Outcome: Progressing     Problem: Nutrition  Goal: Oral intake greater than 50%  9/11/2024 1936 by Veronika Xie, RN  Outcome: Progressing  9/11/2024 1936 by Veronika Xie, RN  Outcome: Progressing  Goal: Consume prescribed supplement  9/11/2024 1936 by Veronika Xie, RN  Outcome: Progressing  9/11/2024 1936 by Veronika Xie, RN  Outcome: Progressing  Goal: Lab values WNL  9/11/2024 1936 by Veronika Xie, RN  Outcome: Progressing  9/11/2024 1936 by Veronika Xie, RN  Outcome: Progressing  Goal: Promote healing  9/11/2024 1936 by Veronika Xie, RN  Outcome: Progressing  9/11/2024 1936 by Veronika Xie, RN  Outcome: Progressing  Goal: Maintain stable weight  9/11/2024 1936 by Veronika Xie, RN  Outcome: Progressing  9/11/2024 1936 by Veronika Xie RN  Outcome: Progressing

## 2024-09-11 NOTE — DISCHARGE SUMMARY
Discharge Diagnosis  Acute congestive heart failure  Right pleural effusion  Recurrent falls  Dementia with behavioral disturbance  Diarrhea to thrive  Hypokalemia  Coronary artery disease  Hypertension  Paroxysmal atrial fibrillation  Chronic kidney disease stage IIIa  Hypothyroidism  Depression/anxiety  Abnormal LFTs  Anemia  Abnormal urinalysis    Issues Requiring Follow-Up  [facility] to resume care upon return to memory care facility    Discharge Meds     Medication List      START taking these medications     empagliflozin 10 mg; Commonly known as: Jardiance; Take 1 tablet (10 mg)   by mouth once daily.; Start taking on: September 12, 2024   furosemide 20 mg tablet; Commonly known as: Lasix; Take 1 tablet (20 mg)   by mouth once daily.; Start taking on: September 12, 2024   spironolactone 25 mg tablet; Commonly known as: Aldactone; Take 0.5   tablets (12.5 mg) by mouth once daily.; Start taking on: September 12, 2024     CONTINUE taking these medications     * acetaminophen 325 mg tablet; Commonly known as: Tylenol   * acetaminophen 650 mg suppository; Commonly known as: Tylenol   amiodarone 200 mg tablet; Commonly known as: Pacerone   apixaban 2.5 mg tablet; Commonly known as: Eliquis; Take 1 tablet (2.5   mg) by mouth 2 times a day.   Ativan 0.5 mg tablet; Generic drug: LORazepam   atorvastatin 80 mg tablet; Commonly known as: Lipitor   bisacodyl 10 mg suppository; Commonly known as: Dulcolax   clopidogrel 75 mg tablet; Commonly known as: Plavix; Take 1 tablet (75   mg) by mouth once daily.   hyoscyamine 0.125 mg disintegrating tablet; Commonly known as: Anaspaz   levothyroxine 125 mcg tablet; Commonly known as: Synthroid, Levoxyl   magnesium hydroxide 400 mg/5 mL suspension; Commonly known as: Milk of   Magnesia   melatonin 5 mg tablet   metoprolol tartrate 50 mg tablet; Commonly known as: Lopressor; Take 1   tablet by mouth 2 times a day.   morphine 20 mg/mL concentrated oral solution   OCUVITE  ADULT 50 PLUS ORAL   omeprazole 20 mg tablet,delayed release (DR/EC) EC tablet; Commonly   known as: PriLOSEC   prochlorperazine 10 mg tablet; Commonly known as: Compazine   sertraline 100 mg tablet; Commonly known as: Zoloft; Take 2 tablets (200   mg) by mouth once daily.   Vitamin D3 50 MCG (2000 UT) tablet; Generic drug: cholecalciferol  * This list has 2 medication(s) that are the same as other medications   prescribed for you. Read the directions carefully, and ask your doctor or   other care provider to review them with you.       Test Results Pending At Discharge  Pending Labs       Order Current Status    Cytology (Non-Gynecologic) In process    Sterile Fluid Culture/Smear Preliminary result            Hospital Course   Linette Doyle is an 83 y.o. female with PMHx s/f CAD s/p PCI of RCA in Feb 2024 on plavix, sick sinus syndrome s/p pacemaker, HTN, hypercholesterolemia, pAfib on eliquis, CKD stage IIIa, hypothyroidism, anxiety with depression who presented on 9/8 with unwitnessed fall.  Family stated that she had an unwitnessed fall in the bathroom and were unsure of how long she had been on the ground.  She lives at [facility].  They stated she had been having declining physical and mental health for the past few months.  She had been having frequent falls, the most recent one being the week prior to presentation.  She had a contusion on the left side of her head, denied loss of consciousness.  Patient was unclear about how she fell.  Denied lightheadedness, headache, fever and chills, nausea and vomiting, chest pain, shortness of breath, abdominal pain, changes in urinary or bowel symptoms, leg swelling. ED eval was notable for hypertension (), hypokalemia, creatinine 1.34, elevated LFTs. CT head and cervical spine showed a large frontal scalp hematoma but no acute intracranial abnormality. No cervical spine acute pathology. X-ray of the left wrist and hand without fracture or  dislocation. CT C/A/P showed large right pleural effusion with associated volume loss. Small volume perihepatic and splenic ascites with mild anasarca noted. Patient was given oral Klor-con and admitted for further eval and treatment.     After admission, patient was found to have evidence of fluid overload.  She was started on IV furosemide.  Echocardiogram in April 2024 showed preserved EF and moderate mitral regurgitation.  Cardiology was consulted.  Patient was placed on a fluid restriction with 2 g sodium diet.  Interventional radiology was consulted for patient's pleural effusion and performed thoracentesis without complication.  Fluid appeared to be transudative in nature.  Urinalysis was abnormal and suggestive of urinary tract infection.  Urine culture came back as contaminated.  Repeat urinalysis showed resolution of abnormalities and antibiotics were discontinued.  While hospitalized, it was discovered that patient had actually been on hospice care at her memory care facility.  The case was discussed with her primary advocate, Sister Krista, who ultimately requested a return to hospice care on discharge from the hospital.  We discussed the implications of hospice care as being a comfort based approach with opportunities to avoid hospitalization in the future.  Medications were optimized with her CHF stabilized.  On the day of discharge, her breathing was back to baseline.  She required no supplemental oxygen.  She will be discharged back to her memory care facility with reinitiation of hospice care.    Pertinent Physical Exam At Time of Discharge  Vitals and nursing note reviewed.   Constitutional:       General: She is not in acute distress.     Appearance: She is ill-appearing.   HENT:      Head: Normocephalic and atraumatic.      Comments: L head hematoma s/p laceration repair     Right Ear: External ear normal.      Left Ear: External ear normal.      Nose: Nose normal. No rhinorrhea.       Mouth/Throat:      Mouth: Mucous membranes are moist.      Pharynx: Oropharynx is clear. No oropharyngeal exudate.   Eyes:      General: No scleral icterus.        Right eye: No discharge.         Left eye: No discharge.      Conjunctiva/sclera: Conjunctivae normal.   Cardiovascular:      Rate and Rhythm: Normal rate and regular rhythm.      Pulses: Normal pulses.      Heart sounds: Normal heart sounds. No murmur heard.  Pulmonary:      Effort: Pulmonary effort is normal. No respiratory distress.      Breath sounds: Normal breath sounds. No wheezing or rales.   Abdominal:      General: Abdomen is flat. There is no distension.      Palpations: Abdomen is soft.      Tenderness: There is no abdominal tenderness.   Musculoskeletal:         General: No tenderness.      Right lower leg: Edema present.      Left lower leg: Edema present.   Skin:     General: Skin is warm and dry.      Capillary Refill: Capillary refill takes less than 2 seconds.      Findings: No rash.   Neurological:      General: No focal deficit present.      Mental Status: She is alert. Mental status is at baseline. She is disoriented.   Psychiatric:         Mood and Affect: Mood normal.         Behavior: Behavior normal.         Thought Content: Thought content normal.         Judgment: Judgment normal.         Outpatient Follow-Up  Future Appointments   Date Time Provider Department Center   12/16/2024 11:30 AM Frida Hines MD [id number]       35 minutes was spent in discharge planning and implementation including phone calls to patient's Sister Krista.      Henry Leyva MD

## 2024-09-11 NOTE — CARE PLAN
The patient's goals for the shift include  ; pt unable to express  at this time.    The clinical goals for the shift include patient will continue to remain safe throughout this shift.

## 2024-09-11 NOTE — PROGRESS NOTES
Per [facility] family wants pt to return to [facility] with [facility], SW placed referral in [facility], agency to contact family to arrange services.    Bladimir Arrieta, MSW, LSW ([id number])   Care Transitions

## 2024-09-11 NOTE — PROGRESS NOTES
Occupational Therapy    OT Treatment    Patient Name: Linette Doyle  MRN: [id number]  Department: [facility]  Room: [address] [place] [address]  Today's Date: 9/11/2024  Time Calculation  Start Time: 1033  Stop Time: 1103  Time Calculation (min): 30 min        Assessment:  OT Assessment: Patient completed bed to bedside commode transfer with Min assist of 1 and walker. Patient completed bedside commode to chair transfer with Min assist of 2.  End of Session Communication: Bedside nurse  End of Session Patient Position: Bed, 0 rail up     Plan:  Treatment Interventions: Functional transfer training, Endurance training, ADL retraining  OT Frequency: 3 times per week  OT Discharge Recommendations: Moderate intensity level of continued care  OT Recommended Transfer Status: Assist of 2  OT - OK to Discharge: Yes ((when medically approp.))  Treatment Interventions: Functional transfer training, Endurance training, ADL retraining    Subjective   Previous Visit Info:  OT Last Visit  OT Received On: 09/11/24  General:  General  Prior to Session Communication: Bedside nurse  Patient Position Received: Bed, 3 rail up, Alarm on  Precautions:  Medical Precautions: Fall precautions    Vital Signs (Past 2hrs)                 Pain:  Pain Assessment  0-10 (Numeric) Pain Score: 0 - No pain    Objective    Cognition:  Cognition  Orientation Level: Disoriented to place, Disoriented to time, Disoriented to situation (patient required cues to recall A&O info)  Coordination:     Activities of Daily Living: LE Dressing  LE Dressing: Yes  Adult Briefs Level of Assistance: Minimum assistance  LE Dressing Where Assessed: Chair    Toileting  Toileting Level of Assistance: Moderate assistance  Where Assessed: Bedside commode  Functional Standing Tolerance:     Bed Mobility/Transfers: Bed Mobility  Bed Mobility: Yes  Bed Mobility 1  Bed Mobility 1: Supine to sitting  Level of Assistance 1: Contact guard    Transfers  Transfer: Yes  Transfer 1  Transfer From 1: Bed  to  Transfer to 1: Commode-standard  Transfer Device 1: Walker  Transfer Level of Assistance 1: Moderate assistance    Toilet Transfers  Toilet Transfer From: Bed  Toilet Transfer Type: To  Toilet Transfer to: Standard bedside commode  Toilet Transfers: Minimal assistance (assist of 2)    Outcome Measures:[facility] Daily Activity  Putting on and taking off regular lower body clothing: A lot  Bathing (including washing, rinsing, drying): A lot  Putting on and taking off regular upper body clothing: A lot  Toileting, which includes using toilet, bedpan or urinal: A lot  Taking care of personal grooming such as brushing teeth: A little  Eating Meals: A little  Daily Activity - Total Score: 14    Education Documentation  Precautions, taught by COLLEEN Almazan at 9/11/2024  1:20 PM.  Learner: Patient  Readiness: Acceptance  Method: Explanation  Response: Verbalizes Understanding    Education Comments  No comments found.           Goals:  Encounter Problems       Encounter Problems (Active)       ADLs       Min.A for bathing, dressing with set-up  (Progressing)       Start:  09/10/24    Expected End:  09/17/24               BALANCE       Gayathri. at least 3 mins. static stand with FWW with Min.A  (Progressing)       Start:  09/10/24    Expected End:  09/17/24               TRANSFERS       Min.A func. trfrs. with FWW  (Progressing)       Start:  09/10/24    Expected End:  09/17/24

## 2024-09-12 VITALS
RESPIRATION RATE: 16 BRPM | OXYGEN SATURATION: 91 % | HEART RATE: 59 BPM | DIASTOLIC BLOOD PRESSURE: 94 MMHG | BODY MASS INDEX: 21.18 KG/M2 | TEMPERATURE: 97.4 F | HEIGHT: 62 IN | WEIGHT: 115.1 LBS | SYSTOLIC BLOOD PRESSURE: 149 MMHG

## 2024-09-12 LAB
ALBUMIN SERPL BCP-MCNC: 3.6 G/DL (ref 3.4–5)
ALP SERPL-CCNC: 94 U/L (ref 33–136)
ALT SERPL W P-5'-P-CCNC: 56 U/L (ref 7–45)
ANION GAP SERPL CALC-SCNC: 10 MMOL/L (ref 10–20)
AST SERPL W P-5'-P-CCNC: 62 U/L (ref 9–39)
BACTERIA FLD CULT: NORMAL
BILIRUB SERPL-MCNC: 0.6 MG/DL (ref 0–1.2)
BUN SERPL-MCNC: 20 MG/DL (ref 6–23)
CALCIUM SERPL-MCNC: 8.6 MG/DL (ref 8.6–10.3)
CHLORIDE SERPL-SCNC: 99 MMOL/L (ref 98–107)
CO2 SERPL-SCNC: 33 MMOL/L (ref 21–32)
CREAT SERPL-MCNC: 1.47 MG/DL (ref 0.5–1.05)
EGFRCR SERPLBLD CKD-EPI 2021: 35 ML/MIN/1.73M*2
ERYTHROCYTE [DISTWIDTH] IN BLOOD BY AUTOMATED COUNT: 17.6 % (ref 11.5–14.5)
GLUCOSE SERPL-MCNC: 95 MG/DL (ref 74–99)
GRAM STN SPEC: NORMAL
GRAM STN SPEC: NORMAL
HCT VFR BLD AUTO: 32.4 % (ref 36–46)
HGB BLD-MCNC: 9.7 G/DL (ref 12–16)
MAGNESIUM SERPL-MCNC: 2.06 MG/DL (ref 1.6–2.4)
MCH RBC QN AUTO: 25.7 PG (ref 26–34)
MCHC RBC AUTO-ENTMCNC: 29.9 G/DL (ref 32–36)
MCV RBC AUTO: 86 FL (ref 80–100)
NRBC BLD-RTO: 0 /100 WBCS (ref 0–0)
PLATELET # BLD AUTO: 175 X10*3/UL (ref 150–450)
POTASSIUM SERPL-SCNC: 3.3 MMOL/L (ref 3.5–5.3)
PROT SERPL-MCNC: 6.1 G/DL (ref 6.4–8.2)
RBC # BLD AUTO: 3.78 X10*6/UL (ref 4–5.2)
SODIUM SERPL-SCNC: 139 MMOL/L (ref 136–145)
WBC # BLD AUTO: 5.5 X10*3/UL (ref 4.4–11.3)

## 2024-09-12 PROCEDURE — 2500000002 HC RX 250 W HCPCS SELF ADMINISTERED DRUGS (ALT 637 FOR MEDICARE OP, ALT 636 FOR OP/ED)

## 2024-09-12 PROCEDURE — 36415 COLL VENOUS BLD VENIPUNCTURE: CPT | Performed by: INTERNAL MEDICINE

## 2024-09-12 PROCEDURE — 2500000004 HC RX 250 GENERAL PHARMACY W/ HCPCS (ALT 636 FOR OP/ED)

## 2024-09-12 PROCEDURE — 2500000001 HC RX 250 WO HCPCS SELF ADMINISTERED DRUGS (ALT 637 FOR MEDICARE OP): Performed by: NURSE PRACTITIONER

## 2024-09-12 PROCEDURE — 2500000001 HC RX 250 WO HCPCS SELF ADMINISTERED DRUGS (ALT 637 FOR MEDICARE OP)

## 2024-09-12 PROCEDURE — 2500000001 HC RX 250 WO HCPCS SELF ADMINISTERED DRUGS (ALT 637 FOR MEDICARE OP): Performed by: INTERNAL MEDICINE

## 2024-09-12 PROCEDURE — 2500000002 HC RX 250 W HCPCS SELF ADMINISTERED DRUGS (ALT 637 FOR MEDICARE OP, ALT 636 FOR OP/ED): Performed by: INTERNAL MEDICINE

## 2024-09-12 PROCEDURE — 85027 COMPLETE CBC AUTOMATED: CPT | Performed by: INTERNAL MEDICINE

## 2024-09-12 PROCEDURE — 84075 ASSAY ALKALINE PHOSPHATASE: CPT | Performed by: INTERNAL MEDICINE

## 2024-09-12 PROCEDURE — 83735 ASSAY OF MAGNESIUM: CPT | Performed by: INTERNAL MEDICINE

## 2024-09-12 RX ADMIN — CLOPIDOGREL 75 MG: 75 TABLET ORAL at 08:21

## 2024-09-12 RX ADMIN — PANTOPRAZOLE SODIUM 40 MG: 40 TABLET, DELAYED RELEASE ORAL at 05:33

## 2024-09-12 RX ADMIN — POLYETHYLENE GLYCOL 3350 17 G: 17 POWDER, FOR SOLUTION ORAL at 08:21

## 2024-09-12 RX ADMIN — EMPAGLIFLOZIN 10 MG: 10 TABLET, FILM COATED ORAL at 08:21

## 2024-09-12 RX ADMIN — LEVOTHYROXINE SODIUM 100 MCG: 0.1 TABLET ORAL at 05:33

## 2024-09-12 RX ADMIN — METOPROLOL TARTRATE 50 MG: 50 TABLET, FILM COATED ORAL at 08:21

## 2024-09-12 RX ADMIN — APIXABAN 2.5 MG: 5 TABLET, FILM COATED ORAL at 08:21

## 2024-09-12 RX ADMIN — FUROSEMIDE 20 MG: 20 TABLET ORAL at 08:21

## 2024-09-12 RX ADMIN — SPIRONOLACTONE 12.5 MG: 25 TABLET ORAL at 08:20

## 2024-09-12 RX ADMIN — AMIODARONE HYDROCHLORIDE 200 MG: 200 TABLET ORAL at 08:21

## 2024-09-12 RX ADMIN — SERTRALINE HYDROCHLORIDE 200 MG: 50 TABLET ORAL at 08:21

## 2024-09-12 ASSESSMENT — PAIN SCALES - GENERAL
PAINLEVEL_OUTOF10: 0 - NO PAIN
PAINLEVEL_OUTOF10: 0 - NO PAIN

## 2024-09-12 NOTE — CARE PLAN
The clinical goals for the shift include Pt will remain free from falls and injury    Problem: Pain - Adult  Goal: Verbalizes/displays adequate comfort level or baseline comfort level  Outcome: Progressing     Problem: Safety - Adult  Goal: Free from fall injury  Outcome: Progressing     Problem: Skin  Goal: Prevent/manage excess moisture  Outcome: Progressing  Flowsheets (Taken 9/12/2024 0914)  Prevent/manage excess moisture: Cleanse incontinence/protect with barrier cream  Goal: Prevent/minimize sheer/friction injuries  Outcome: Progressing  Flowsheets (Taken 9/12/2024 0914)  Prevent/minimize sheer/friction injuries: Use pull sheet

## 2024-09-12 NOTE — NURSING NOTE
Patient picked up by [facility] and transported to [facility]. IV removed. All personal belongings sent with pt. Pt discharged in stable condition

## 2024-09-12 NOTE — NURSING NOTE
Report called to Dat PATE at [facility]. All questions answered. Pickup scheduled for 1230. Facility cannot accept patient after 1600 due to lack of admission staffing

## 2024-09-15 LAB
ATRIAL RATE: 0 BPM
PR INTERVAL: 315 MS
Q ONSET: 249 MS
QRS COUNT: 11 BEATS
QRS DURATION: 116 MS
QT INTERVAL: 531 MS
QTC CALCULATION(BAZETT): 582 MS
QTC FREDERICIA: 564 MS
R AXIS: 65 DEGREES
T AXIS: -76 DEGREES
T OFFSET: 515 MS
VENTRICULAR RATE: 72 BPM

## 2024-09-17 ENCOUNTER — HOSPITAL ENCOUNTER (EMERGENCY)
Facility: HOSPITAL | Age: 84
Discharge: HOME | End: 2024-09-18
Attending: EMERGENCY MEDICINE
Payer: MEDICARE

## 2024-09-17 DIAGNOSIS — I60.9 SAH (SUBARACHNOID HEMORRHAGE) (MULTI): Primary | ICD-10-CM

## 2024-09-17 DIAGNOSIS — Z51.5 HOSPICE CARE PATIENT: ICD-10-CM

## 2024-09-17 DIAGNOSIS — S80.12XA HEMATOMA OF LEFT LOWER EXTREMITY, INITIAL ENCOUNTER: ICD-10-CM

## 2024-09-17 PROCEDURE — 99291 CRITICAL CARE FIRST HOUR: CPT | Mod: 25 | Performed by: EMERGENCY MEDICINE

## 2024-09-17 PROCEDURE — 12011 RPR F/E/E/N/L/M 2.5 CM/<: CPT

## 2024-09-17 PROCEDURE — G0390 TRAUMA RESPONS W/HOSP CRITI: HCPCS

## 2024-09-17 PROCEDURE — 99285 EMERGENCY DEPT VISIT HI MDM: CPT | Mod: 25

## 2024-09-17 RX ORDER — LIDOCAINE HYDROCHLORIDE AND EPINEPHRINE 10; 10 UG/ML; MG/ML
5 INJECTION, SOLUTION INFILTRATION; PERINEURAL ONCE
Status: COMPLETED | OUTPATIENT
Start: 2024-09-18 | End: 2024-09-18

## 2024-09-17 RX ORDER — LIDOCAINE HYDROCHLORIDE AND EPINEPHRINE 10; 10 UG/ML; MG/ML
INJECTION, SOLUTION INFILTRATION; PERINEURAL
Status: COMPLETED
Start: 2024-09-17 | End: 2024-09-18

## 2024-09-17 ASSESSMENT — PAIN SCALES - GENERAL: PAINLEVEL_OUTOF10: 0 - NO PAIN

## 2024-09-17 ASSESSMENT — PAIN - FUNCTIONAL ASSESSMENT: PAIN_FUNCTIONAL_ASSESSMENT: 0-10

## 2024-09-17 ASSESSMENT — LIFESTYLE VARIABLES
EVER FELT BAD OR GUILTY ABOUT YOUR DRINKING: NO
HAVE YOU EVER FELT YOU SHOULD CUT DOWN ON YOUR DRINKING: NO
EVER HAD A DRINK FIRST THING IN THE MORNING TO STEADY YOUR NERVES TO GET RID OF A HANGOVER: NO
TOTAL SCORE: 0
HAVE PEOPLE ANNOYED YOU BY CRITICIZING YOUR DRINKING: NO

## 2024-09-18 ENCOUNTER — APPOINTMENT (OUTPATIENT)
Dept: RADIOLOGY | Facility: HOSPITAL | Age: 84
End: 2024-09-18
Payer: MEDICARE

## 2024-09-18 VITALS
SYSTOLIC BLOOD PRESSURE: 116 MMHG | OXYGEN SATURATION: 96 % | DIASTOLIC BLOOD PRESSURE: 67 MMHG | BODY MASS INDEX: 22.7 KG/M2 | HEIGHT: 64 IN | RESPIRATION RATE: 20 BRPM | TEMPERATURE: 98.4 F | WEIGHT: 132.94 LBS | HEART RATE: 62 BPM

## 2024-09-18 LAB
ANION GAP SERPL CALC-SCNC: 11 MMOL/L (ref 10–20)
APTT PPP: 33 SECONDS (ref 27–38)
BASOPHILS # BLD AUTO: 0.05 X10*3/UL (ref 0–0.1)
BASOPHILS NFR BLD AUTO: 0.8 %
BUN SERPL-MCNC: 13 MG/DL (ref 6–23)
CALCIUM SERPL-MCNC: 8.8 MG/DL (ref 8.6–10.3)
CHLORIDE SERPL-SCNC: 100 MMOL/L (ref 98–107)
CO2 SERPL-SCNC: 31 MMOL/L (ref 21–32)
CREAT SERPL-MCNC: 1.24 MG/DL (ref 0.5–1.05)
EGFRCR SERPLBLD CKD-EPI 2021: 43 ML/MIN/1.73M*2
EOSINOPHIL # BLD AUTO: 0.15 X10*3/UL (ref 0–0.4)
EOSINOPHIL NFR BLD AUTO: 2.3 %
ERYTHROCYTE [DISTWIDTH] IN BLOOD BY AUTOMATED COUNT: 18 % (ref 11.5–14.5)
GLUCOSE SERPL-MCNC: 111 MG/DL (ref 74–99)
HCT VFR BLD AUTO: 29.2 % (ref 36–46)
HGB BLD-MCNC: 8.9 G/DL (ref 12–16)
IMM GRANULOCYTES # BLD AUTO: 0.04 X10*3/UL (ref 0–0.5)
IMM GRANULOCYTES NFR BLD AUTO: 0.6 % (ref 0–0.9)
INR PPP: 1.2 (ref 0.9–1.1)
LYMPHOCYTES # BLD AUTO: 0.96 X10*3/UL (ref 0.8–3)
LYMPHOCYTES NFR BLD AUTO: 14.6 %
MCH RBC QN AUTO: 26.4 PG (ref 26–34)
MCHC RBC AUTO-ENTMCNC: 30.5 G/DL (ref 32–36)
MCV RBC AUTO: 87 FL (ref 80–100)
MONOCYTES # BLD AUTO: 0.56 X10*3/UL (ref 0.05–0.8)
MONOCYTES NFR BLD AUTO: 8.5 %
NEUTROPHILS # BLD AUTO: 4.8 X10*3/UL (ref 1.6–5.5)
NEUTROPHILS NFR BLD AUTO: 73.2 %
NRBC BLD-RTO: 0 /100 WBCS (ref 0–0)
PLATELET # BLD AUTO: 191 X10*3/UL (ref 150–450)
POTASSIUM SERPL-SCNC: 3.6 MMOL/L (ref 3.5–5.3)
PROTHROMBIN TIME: 13.2 SECONDS (ref 9.8–12.8)
RBC # BLD AUTO: 3.37 X10*6/UL (ref 4–5.2)
SODIUM SERPL-SCNC: 138 MMOL/L (ref 136–145)
WBC # BLD AUTO: 6.6 X10*3/UL (ref 4.4–11.3)

## 2024-09-18 PROCEDURE — 90715 TDAP VACCINE 7 YRS/> IM: CPT | Performed by: EMERGENCY MEDICINE

## 2024-09-18 PROCEDURE — 96361 HYDRATE IV INFUSION ADD-ON: CPT

## 2024-09-18 PROCEDURE — 72170 X-RAY EXAM OF PELVIS: CPT

## 2024-09-18 PROCEDURE — 71250 CT THORAX DX C-: CPT | Performed by: RADIOLOGY

## 2024-09-18 PROCEDURE — 70450 CT HEAD/BRAIN W/O DYE: CPT | Performed by: RADIOLOGY

## 2024-09-18 PROCEDURE — 74176 CT ABD & PELVIS W/O CONTRAST: CPT | Performed by: RADIOLOGY

## 2024-09-18 PROCEDURE — 72125 CT NECK SPINE W/O DYE: CPT | Performed by: RADIOLOGY

## 2024-09-18 PROCEDURE — 80048 BASIC METABOLIC PNL TOTAL CA: CPT | Performed by: EMERGENCY MEDICINE

## 2024-09-18 PROCEDURE — 96374 THER/PROPH/DIAG INJ IV PUSH: CPT

## 2024-09-18 PROCEDURE — 73700 CT LOWER EXTREMITY W/O DYE: CPT | Mod: RT

## 2024-09-18 PROCEDURE — 74176 CT ABD & PELVIS W/O CONTRAST: CPT

## 2024-09-18 PROCEDURE — 73552 X-RAY EXAM OF FEMUR 2/>: CPT | Mod: RT

## 2024-09-18 PROCEDURE — 2500000005 HC RX 250 GENERAL PHARMACY W/O HCPCS

## 2024-09-18 PROCEDURE — 72170 X-RAY EXAM OF PELVIS: CPT | Performed by: RADIOLOGY

## 2024-09-18 PROCEDURE — 70486 CT MAXILLOFACIAL W/O DYE: CPT

## 2024-09-18 PROCEDURE — 73552 X-RAY EXAM OF FEMUR 2/>: CPT | Performed by: RADIOLOGY

## 2024-09-18 PROCEDURE — 76377 3D RENDER W/INTRP POSTPROCES: CPT

## 2024-09-18 PROCEDURE — 72125 CT NECK SPINE W/O DYE: CPT

## 2024-09-18 PROCEDURE — 2500000004 HC RX 250 GENERAL PHARMACY W/ HCPCS (ALT 636 FOR OP/ED): Performed by: EMERGENCY MEDICINE

## 2024-09-18 PROCEDURE — 76377 3D RENDER W/INTRP POSTPROCES: CPT | Performed by: RADIOLOGY

## 2024-09-18 PROCEDURE — 73700 CT LOWER EXTREMITY W/O DYE: CPT | Performed by: RADIOLOGY

## 2024-09-18 PROCEDURE — 36415 COLL VENOUS BLD VENIPUNCTURE: CPT | Performed by: EMERGENCY MEDICINE

## 2024-09-18 PROCEDURE — 90471 IMMUNIZATION ADMIN: CPT | Performed by: EMERGENCY MEDICINE

## 2024-09-18 PROCEDURE — 85610 PROTHROMBIN TIME: CPT | Performed by: EMERGENCY MEDICINE

## 2024-09-18 PROCEDURE — 85025 COMPLETE CBC W/AUTO DIFF WBC: CPT | Performed by: EMERGENCY MEDICINE

## 2024-09-18 PROCEDURE — 2500000004 HC RX 250 GENERAL PHARMACY W/ HCPCS (ALT 636 FOR OP/ED)

## 2024-09-18 PROCEDURE — 70486 CT MAXILLOFACIAL W/O DYE: CPT | Performed by: RADIOLOGY

## 2024-09-18 PROCEDURE — 70450 CT HEAD/BRAIN W/O DYE: CPT

## 2024-09-18 RX ORDER — FENTANYL CITRATE 50 UG/ML
INJECTION, SOLUTION INTRAMUSCULAR; INTRAVENOUS
Status: COMPLETED
Start: 2024-09-18 | End: 2024-09-18

## 2024-09-18 RX ORDER — FENTANYL CITRATE 50 UG/ML
25 INJECTION, SOLUTION INTRAMUSCULAR; INTRAVENOUS ONCE
Status: COMPLETED | OUTPATIENT
Start: 2024-09-18 | End: 2024-09-18

## 2024-09-18 RX ORDER — OXYCODONE AND ACETAMINOPHEN 5; 325 MG/1; MG/1
1 TABLET ORAL ONCE
Status: DISCONTINUED | OUTPATIENT
Start: 2024-09-18 | End: 2024-09-18 | Stop reason: HOSPADM

## 2024-09-18 RX ORDER — FENTANYL CITRATE 50 UG/ML
25 INJECTION, SOLUTION INTRAMUSCULAR; INTRAVENOUS ONCE AS NEEDED
Status: COMPLETED | OUTPATIENT
Start: 2024-09-18 | End: 2024-09-18

## 2024-09-18 RX ADMIN — FENTANYL CITRATE 25 MCG: 50 INJECTION INTRAMUSCULAR; INTRAVENOUS at 00:35

## 2024-09-18 RX ADMIN — TETANUS TOXOID, REDUCED DIPHTHERIA TOXOID AND ACELLULAR PERTUSSIS VACCINE, ADSORBED 0.5 ML: 5; 2.5; 8; 8; 2.5 SUSPENSION INTRAMUSCULAR at 00:04

## 2024-09-18 RX ADMIN — FENTANYL CITRATE 25 MCG: 50 INJECTION, SOLUTION INTRAMUSCULAR; INTRAVENOUS at 00:35

## 2024-09-18 RX ADMIN — SODIUM CHLORIDE, POTASSIUM CHLORIDE, SODIUM LACTATE AND CALCIUM CHLORIDE 500 ML: 600; 310; 30; 20 INJECTION, SOLUTION INTRAVENOUS at 01:10

## 2024-09-18 RX ADMIN — FENTANYL CITRATE 25 MCG: 50 INJECTION INTRAMUSCULAR; INTRAVENOUS at 01:46

## 2024-09-18 RX ADMIN — LIDOCAINE HYDROCHLORIDE AND EPINEPHRINE 5 ML: 10; 10 INJECTION, SOLUTION INFILTRATION; PERINEURAL at 00:03

## 2024-09-18 RX ADMIN — LIDOCAINE HYDROCHLORIDE,EPINEPHRINE BITARTRATE 5 ML: 10; .01 INJECTION, SOLUTION INFILTRATION; PERINEURAL at 00:03

## 2024-09-18 ASSESSMENT — PAIN SCALES - GENERAL
PAINLEVEL_OUTOF10: 7
PAINLEVEL_OUTOF10: 10 - WORST POSSIBLE PAIN

## 2024-09-18 ASSESSMENT — PAIN DESCRIPTION - LOCATION: LOCATION: OTHER (COMMENT)

## 2024-09-18 NOTE — ED TRIAGE NOTES
Pt. Arrived by [facility] from [facility] after pt. Had call light on for 30 minutes after a fall. Upon arrival patient has contusions to both eyes, brusing on left side of face with hemmatoma above eyebrow and a 1cm lac on right side of head. Contusions also noted on both hands. Pt. Denies any pain and notes multiple falls in the past. She is a dementia patient and is at her baseline per [facility].

## 2024-09-18 NOTE — ED PROVIDER NOTES
HPI   Chief Complaint   Patient presents with   • HIA       HPI    HISTORY OF PRESENT ILLNESS:  Patient is a 83-year-old who presents to the emergency department status post unwitnessed fall.  Patient has history of dementia and was at a dementia unit.  Patient was found on the ground for maybe up to 30 minutes.  Unwitnessed fall.  Bleeding on the right head. Patient anticoagulated on Eliquis and Plavix    Past Medical History: trial fibrillation on Eliquis, heart failure, CAD status post PCI, Plavix, sick sinus syndrome month pacemaker, hypertension hyperlipidemia,    __________________________________________________________  PHYSICAL EXAM:    Appearance: Elderly  female, multiple bruising noted, bleeding coming from right scalp.  Alert and oriented to name only cooperative   Skin: 1 cm laceration to right temple with expanding hematoma and arterial bleed noted.  Diffuse bleeding intact,  dry skin, no lesions, rash, petechiae or purpura.     Eyes: PERRLA, EOMs intact,  Conjunctiva pink with no redness or exudates.    HENT: Normocephalic, atraumatic. Nares patent.  Right periorbital edema and bruising.  Positive raccoon eyes.  Extraocular motions were intact.  Gross visual acuity also intact.  Neck: Supple. Trachea at midline.   Pulmonary: Lung sounds are clear bilaterally.  There is no rales, rhonchi, or wheezing.  Cardiac: Regular rate and rhythm, no rubs, murmurs, or gallops. No JVD,   Abdomen: Abdomen is soft, nontender, and nondistended.  No palpable organomegaly.  No rebound or guarding.  No CVA tenderness. Nonsurgical abdomen  Genitourinary: Exam deferred.  Musculoskeletal: Hematoma noted to the right posterior hip.  No edema, pain, cyanosis, or deformity in extremities. Pulses full and equal.             Neurological:  Cranial nerves are grossly intact, grossly normal sensation, no weakness, no focal findings identified.    __________________________________________________________  MEDICAL  DECISION MAKING:    Patient presented to the emergency department for a fall.  The patient was reported to be on anticoagulation and was seen immediately as a HI a trauma activation.  Upon arrival, patient was noted to have arterial bleeding coming from the right scalp lack.  Direct pressure was applied and the eye directly repaired using stitches to obtain hemostasis.  Trauma evaluation notable for multiple contusions and bruises.  Some of which were likely old.  I did order labs.  Then in the chart, and noted that the patient was DNR comfort care.  I had a conversation with the patient's sister, Krista Deleon, who is the patient's power of .  She confirmed DNR status and comfort care.  However, she did want CT imaging after prolonged discussion regarding whether or not it would .. Patient imaging ultimately found to have old fractures in addition to new trace subarachnoid and subdural hemorrhage, and a thigh hematoma.  I discussed the results with the Krista again.  Per discussion, patient will remain in comfort care.  Patient will be discharged.  They understood that there is a high probability that the patient can have decompensation with her history of anticoagulation and were comfortable with allowing her to pass with respect of her goals of care.  Hospice company and nursing home were contacted.  Patient will have transportation arranged and will be discharged.  Given that the patient was still taking her anticoagulants, I wrote clear instructions for anticoagulation to be discontinued.      Chronic Medical Conditions Significantly Affecting Care: Atrial fibrillation on Eliquis, heart failure, CAD status post PCI, Plavix, sick sinus syndrome month pacemaker, hypertension hyperlipidemia,    External Records Reviewed: I reviewed recent and relevant outside records including: Patient discharge summary from September 11, 2024    Marcellus Lund  Emergency Medicine    Patient History   Past  Medical History:   Diagnosis Date   • Angina pectoris (CMS-HCC) 10/22/2023   • Atherosclerosis of coronary artery 08/21/2019   • Atherosclerotic heart disease of native coronary artery with other forms of angina pectoris (CMS-HCC) 10/22/2023   • Hypercholesterolemia 12/28/2018   • Hyperlipidemia 10/22/2023   • Presence of cardiac pacemaker 10/22/2023   • Primary hypertension 12/28/2018   • Shortness of breath 11/26/2019   • Sick sinus syndrome (Multi) 10/22/2023   • Sinus bradycardia 10/22/2023   • Stage 3a chronic kidney disease (Multi) 11/25/2019     Past Surgical History:   Procedure Laterality Date   • CARDIAC CATHETERIZATION N/A 02/02/2024    Procedure: Left Heart Cath;  Surgeon: Becky Vela MD;  Location: [facility] Cardiac Cath Lab;  Service: Cardiovascular;  Laterality: N/A;   • CARDIAC CATHETERIZATION N/A 02/02/2024    Procedure: PCI;  Surgeon: Becky Vela MD;  Location: [facility] Cardiac Cath Lab;  Service: Cardiovascular;  Laterality: N/A;   • CARDIAC CATHETERIZATION N/A 02/08/2024    Procedure: Left Heart Cath;  Surgeon: Darrius Ibrahim MD;  Location: [facility] Cardiac Cath Lab;  Service: Cardiovascular;  Laterality: N/A;   • CARDIAC CATHETERIZATION N/A 02/08/2024    Procedure: PCI;  Surgeon: Darrius Ibrahim MD;  Location: [facility] Cardiac Cath Lab;  Service: Cardiovascular;  Laterality: N/A;   • PACEMAKER PLACEMENT  07/04/2019    [employer] PACEMAKE AND STENT PLACEMENT     Family History   Problem Relation Name Age of Onset   • No Known Problems Mother     • No Known Problems Father       Social History     Tobacco Use   • Smoking status: Never     Passive exposure: Never   • Smokeless tobacco: Never   Vaping Use   • Vaping status: Never Used   Substance Use Topics   • Alcohol use: Never   • Drug use: Never       Physical Exam   ED Triage Vitals [09/17/24 2335]   Temperature Heart Rate Respirations BP   36.9 °C (98.4 °F) 60 18 144/83      Pulse Ox Temp src Heart Rate Source Patient Position   96 % -- -- --      BP  Location FiO2 (%)     -- --       Physical Exam      ED Course & MDM   ED Course as of 09/18/24 0449   Wed Sep 18, 2024   0101 Radiology - hematoma on head. No fx. Trace SAH on R, R SDH. C spine - no new fracture. Old  [WJ]   0123 Spoke at Length with Krista Deleon - confirmed DNRCC and hospice. Will honor. Not escalate care [WJ]   0243 Injury list:  Redemonstration of mild compression fractures of C7, T1, T2, T3, T4, fracture at T2 extends anterior T2  Large sized soft tissue hematoma at the right hip, measuring 8 x 3 x 9 cm  Trace subarachnoid and subdural hemorrhage   [WJ]   0302 Spoke to Dr Naqvi, on call surgeon regarding patient case and outcome with returning the patient to hospice and DNR comfort care. [WJ]      ED Course User Index  [WJ] Marcellus Lund DO         Diagnoses as of 09/18/24 0449   SAH (subarachnoid hemorrhage) (Multi)   Hospice care patient   Hematoma of left lower extremity, initial encounter                 No data recorded                                 Medical Decision Making      Procedure  Laceration Repair    Performed by: Marcellus Lund DO  Authorized by: Marcellus Lund DO    Consent:     Consent obtained:  Verbal    Consent given by:  Patient    Risks, benefits, and alternatives were discussed: yes      Risks discussed:  Infection, need for additional repair, nerve damage, pain and poor cosmetic result    Alternatives discussed:  Delayed treatment and no treatment  Universal protocol:     Patient identity confirmed:  Verbally with patient, arm band and provided demographic data  Anesthesia:     Anesthesia method:  Local infiltration    Local anesthetic:  Lidocaine 1% WITH epi  Laceration details:     Location:  Scalp    Scalp location:  R temporal    Length (cm):  1  Exploration:     Hemostasis achieved with:  Epinephrine and direct pressure    Imaging outcome: foreign body not noted      Wound exploration: wound explored through full range of motion      Wound extent: no areolar  tissue violation noted, no fascia violation noted, no foreign bodies/material noted and no muscle damage noted      Contaminated: no    Treatment:     Area cleansed with:  Saline and soap and water    Amount of cleaning:  Standard    Irrigation solution:  Sterile saline    Irrigation volume:  200    Irrigation method:  Pressure wash  Skin repair:     Repair method:  Sutures    Suture size:  4-0    Suture material:  Chromic gut    Suture technique:  Simple interrupted    Number of sutures:  3  Approximation:     Approximation:  Close  Repair type:     Repair type:  Intermediate  Post-procedure details:     Dressing:  Open (no dressing)  Critical Care    Performed by: Marcellus Lund DO  Authorized by: Marcellus Lund DO    Critical care provider statement:     Critical care time (minutes):  30    Critical care time was exclusive of:  Separately billable procedures and treating other patients and teaching time    Critical care was necessary to treat or prevent imminent or life-threatening deterioration of the following conditions:  Trauma (hospice)    Critical care was time spent personally by me on the following activities:  Blood draw for specimens, development of treatment plan with patient or surrogate, evaluation of patient's response to treatment, examination of patient, ordering and review of laboratory studies, ordering and performing treatments and interventions, ordering and review of radiographic studies, re-evaluation of patient's condition, review of old charts, discussions with consultants and obtaining history from patient or surrogate       Marcellus Lund DO  09/18/24 7781

## 2024-09-18 NOTE — ED NOTES
Pt arrives to ED via guy from [facility]    Code Status:  DNR Comfort Measures Only    HPI     Chief Complaint   Patient presents with    HIA       /67   Pulse 62   Temp 36.9 °C (98.4 °F)   Resp 20   Wt 60.3 kg (132 lb 15 oz)   SpO2 96%     No data recorded    LDA:   Peripheral IV 09/18/24 20 G Proximal;Right;Anterior Forearm (Active)   Placement Date/Time: 09/18/24 0110   Hand Hygiene Completed: Yes  Size (Gauge): 20 G  Orientation: Proximal;Right;Anterior  Location: Forearm  Site Prep: Chlorhexidine    Number of days: 0        BACKGROUND  Past Medical History:   Diagnosis Date    Angina pectoris (CMS-HCC) 10/22/2023    Atherosclerosis of coronary artery 08/21/2019    Atherosclerotic heart disease of native coronary artery with other forms of angina pectoris (CMS-HCC) 10/22/2023    Hypercholesterolemia 12/28/2018    Hyperlipidemia 10/22/2023    Presence of cardiac pacemaker 10/22/2023    Primary hypertension 12/28/2018    Shortness of breath 11/26/2019    Sick sinus syndrome (Multi) 10/22/2023    Sinus bradycardia 10/22/2023    Stage 3a chronic kidney disease (Multi) 11/25/2019     Past Surgical History:   Procedure Laterality Date    CARDIAC CATHETERIZATION N/A 02/02/2024    Procedure: Left Heart Cath;  Surgeon: Becky Vela MD;  Location: [facility] Cardiac Cath Lab;  Service: Cardiovascular;  Laterality: N/A;    CARDIAC CATHETERIZATION N/A 02/02/2024    Procedure: PCI;  Surgeon: Becky Vela MD;  Location: [facility] Cardiac Cath Lab;  Service: Cardiovascular;  Laterality: N/A;    CARDIAC CATHETERIZATION N/A 02/08/2024    Procedure: Left Heart Cath;  Surgeon: Darrius Ibrahim MD;  Location: [facility] Cardiac Cath Lab;  Service: Cardiovascular;  Laterality: N/A;    CARDIAC CATHETERIZATION N/A 02/08/2024    Procedure: PCI;  Surgeon: Darrius Ibrahim MD;  Location: [facility] Cardiac Cath Lab;  Service: Cardiovascular;  Laterality: N/A;    PACEMAKER PLACEMENT  07/04/2019    [employer]TR[employer] PACEMAKE AND STENT PLACEMENT     No  current facility-administered medications on file prior to encounter.     Current Outpatient Medications on File Prior to Encounter   Medication Sig Dispense Refill    acetaminophen (Tylenol) 325 mg tablet Take 2 tablets (650 mg) by mouth every 6 hours if needed for mild pain (1 - 3).      acetaminophen (Tylenol) 650 mg suppository Insert 1 suppository (650 mg) into the rectum every 4 hours if needed for mild pain (1 - 3) or fever (temp greater than 38.0 C).      amiodarone (Pacerone) 200 mg tablet Take 1 tablet (200 mg) by mouth once daily.      atorvastatin (Lipitor) 80 mg tablet Take 1 tablet (80 mg) by mouth once daily.      bisacodyl (Dulcolax) 10 mg suppository Insert 1 suppository (10 mg) into the rectum once daily as needed for constipation.      cholecalciferol (Vitamin D3) 50 MCG (2000 UT) tablet Take 1 tablet (2,000 Units) by mouth once daily.      empagliflozin (Jardiance) 10 mg Take 1 tablet (10 mg) by mouth once daily. 30 tablet 0    furosemide (Lasix) 20 mg tablet Take 1 tablet (20 mg) by mouth once daily. 30 tablet 0    hyoscyamine (Anaspaz) 0.125 mg disintegrating tablet Take 1 tablet (0.125 mg) by mouth every 2 hours if needed (for increased secretions).      levothyroxine (Synthroid, Levoxyl) 125 mcg tablet Take 1 tablet (125 mcg) by mouth early in the morning.. Take on an empty stomach at the same time each day, either 30 to 60 minutes prior to breakfast      LORazepam (Ativan) 0.5 mg tablet Take 1 tablet (0.5 mg) by mouth every 4 hours if needed for anxiety (restlessness).      magnesium hydroxide (Milk of Magnesia) 400 mg/5 mL suspension Take 30 mL by mouth once daily as needed for constipation.      melatonin 5 mg tablet Take 1 tablet (5 mg) by mouth as needed at bedtime for sleep.      metoprolol tartrate (Lopressor) 50 mg tablet Take 1 tablet by mouth 2 times a day. 180 tablet 3    morphine 20 mg/mL concentrated oral solution Take 0.5-1 mL (10-20 mg) by mouth every 2 hours if needed for  severe pain (7 - 10) (or dyspnea).      mv-mn/om3/dha/epa/fish/lut/leanne (OCUVITE ADULT 50 PLUS ORAL) Take 1 tablet by mouth once daily. Indications: supplement      omeprazole (PriLOSEC) 20 mg tablet,delayed release (DR/EC) EC tablet Take 1 tablet (20 mg) by mouth once daily.      prochlorperazine (Compazine) 10 mg tablet Take 1 t po or insert into rectum q 6 h prn nausea/vomiting      sertraline (Zoloft) 100 mg tablet Take 2 tablets (200 mg) by mouth once daily. 90 tablet 0    spironolactone (Aldactone) 25 mg tablet Take 0.5 tablets (12.5 mg) by mouth once daily. 15 tablet 0    [DISCONTINUED] apixaban (Eliquis) 2.5 mg tablet Take 1 tablet (2.5 mg) by mouth 2 times a day. 180 tablet 1    [DISCONTINUED] clopidogrel (Plavix) 75 mg tablet Take 1 tablet (75 mg) by mouth once daily. 90 tablet 1    [DISCONTINUED] levothyroxine (Synthroid, Levoxyl) 100 mcg tablet TAKE ONE TABLET BY MOUTH EVERY DAY (Patient not taking: Reported on 9/9/2024) 90 tablet 3        ASSESSMENT  ED Course as of 09/18/24 0419   Wed Sep 18, 2024   0101 Radiology - hematoma on head. No fx. Trace SAH on R, R SDH. C spine - no new fracture. Old  [WJ]   0123 Spoke at Length with Krista Deleon - confirmed DNRCC and hospice. Will honor. Not escalate care [WJ]   0243 Injury list:  Redemonstration of mild compression fractures of C7, T1, T2, T3, T4, fracture at T2 extends anterior T2  Large sized soft tissue hematoma at the right hip, measuring 8 x 3 x 9 cm  Trace subarachnoid and subdural hemorrhage   [WJ]   0302 Spoke to Dr Naqvi, on call surgeon [WJ]      ED Course User Index  [WJ] Marcellus Lund DO         Diagnoses as of 09/18/24 0419   SAH (subarachnoid hemorrhage) (Multi)   Hospice care patient   Hematoma of left lower extremity, initial encounter       Medications Currently Running:        Medications Given:  ED Medication Administration from 09/17/2024 6263 to 09/18/2024 0419         Date/Time Order Dose Route Action Action by     09/18/2024 0003  EDT lidocaine-epinephrine (Xylocaine W/EPI) 1 %-1:100,000 injection 5 mL 5 mL infiltration Given Dasha, A     09/18/2024 0004 EDT diphth,pertus(acell),tetanus (BoostRIX) 2.5-8-5 Lf-mcg-Lf/0.5mL vaccine 0.5 mL 0.5 mL intramuscular Given Dasha, A     09/18/2024 0035 EDT fentaNYL PF (Sublimaze) injection 25 mcg 25 mcg intravenous Given Dasha, A     09/18/2024 0110 EDT lactated Ringer's bolus 500 mL 500 mL intravenous New Bag Dasha, A     09/18/2024 0144 EDT oxyCODONE-acetaminophen (Percocet) 5-325 mg per tablet 1 tablet 1 tablet oral Not Given Dasha, A     09/18/2024 0146 EDT fentaNYL PF (Sublimaze) injection 25 mcg 25 mcg intravenous Given Dasha, A     09/18/2024 0210 EDT lactated Ringer's bolus 500 mL 0 mL intravenous Stopped Dasha, A                 RESULTS    Imaging:  CT femur right wo IV contrast   Final Result   CHEST   1.  Small right pleural effusion. Mild bibasilar atelectasis.   2. Cardiomegaly. Dilated main pulmonary artery, please correlate for   pulmonary arterial hypertension.   3. Redemonstration of mild compression fractures at T1, T2, T3 and   T4. The fracture at T2 extends through the anterior aspect of the T2   vertebral body from the superior endplate to the inferior endplate.        ABDOMEN-PELVIS   1.  No acute findings on unenhanced exam.   2. Cholelithiasis.   3. Colonic diverticulosis.             RIGHT FEMUR        1. No acute fracture.   2. Large soft tissue hematoma overlying the right hip with   intramuscular hematoma at the right gluteus campbell muscle. The soft   tissue hematoma extends along the posterolateral aspect of the right   upper thigh.   3. Mild degenerative changes at the right hip. Moderate degenerative   changes at the right knee.        MACRO:        None.        Signed by: Ema Coats 9/18/2024 1:43 AM   Dictation workstation:   [id number]      XR pelvis 1-2 views   Final Result   1. No radiographic evidence for acute fracture.   2. Soft tissue swelling overlying the right  hip, probably   representing hematoma given the history of trauma.   3. Mild degenerative changes at the right hip. Moderate degenerative   changes at the right knee.                  MACRO:   None.        Signed by: Ema Coats 9/18/2024 1:25 AM   Dictation workstation:   [id number]      XR femur right 2+ views   Final Result   1. No radiographic evidence for acute fracture.   2. Soft tissue swelling overlying the right hip, probably   representing hematoma given the history of trauma.   3. Mild degenerative changes at the right hip. Moderate degenerative   changes at the right knee.                  MACRO:   None.        Signed by: Ema Coats 9/18/2024 1:25 AM   Dictation workstation:   [id number]      CT chest abdomen pelvis wo IV contrast   Final Result   CHEST   1.  Small right pleural effusion. Mild bibasilar atelectasis.   2. Cardiomegaly. Dilated main pulmonary artery, please correlate for   pulmonary arterial hypertension.   3. Redemonstration of mild compression fractures at T1, T2, T3 and   T4. The fracture at T2 extends through the anterior aspect of the T2   vertebral body from the superior endplate to the inferior endplate.        ABDOMEN-PELVIS   1.  No acute findings on unenhanced exam.   2. Cholelithiasis.   3. Colonic diverticulosis.             RIGHT FEMUR        1. No acute fracture.   2. Large soft tissue hematoma overlying the right hip with   intramuscular hematoma at the right gluteus campbell muscle. The soft   tissue hematoma extends along the posterolateral aspect of the right   upper thigh.   3. Mild degenerative changes at the right hip. Moderate degenerative   changes at the right knee.        MACRO:        None.        Signed by: Ema Coats 9/18/2024 1:43 AM   Dictation workstation:   [id number]      CT maxillofacial bones wo IV contrast   Final Result   1. Trace amount of acute subarachnoid hemorrhage at the right frontal   lobe.   2. Trace amount of acute subdural  hemorrhage at the right   frontotemporal region.   3. New soft tissue laceration with large soft tissue hematoma at the   right frontotemporal region. The soft tissue hematoma extends along   the right periorbital region and the right side of the face.   4. Interval decrease in the scalp hematoma at the left frontal region.   5.  No acute facial bone fracture.   6. Redemonstration of the previously described mild compression   fractures at the superior endplates of C7, T1 and T2. The fracture at   T2 extends through the anterior aspect of the T2 vertebral body from   the superior endplate to the inferior endplate. No new acute fracture   is identified at the cervical spine. Multilevel degenerative changes.                  MACRO:        Ema Coats discussed the significance and urgency of this   critical finding by telephone with  MCKENNA RIVAS on 9/18/2024 at 1:03   am.  (**-RCF-**) Findings:  See findings.             Signed by: Ema Coats 9/18/2024 1:06 AM   Dictation workstation:   [id number]      CT 3D reconstruction   Final Result   1. Trace amount of acute subarachnoid hemorrhage at the right frontal   lobe.   2. Trace amount of acute subdural hemorrhage at the right   frontotemporal region.   3. New soft tissue laceration with large soft tissue hematoma at the   right frontotemporal region. The soft tissue hematoma extends along   the right periorbital region and the right side of the face.   4. Interval decrease in the scalp hematoma at the left frontal region.   5.  No acute facial bone fracture.   6. Redemonstration of the previously described mild compression   fractures at the superior endplates of C7, T1 and T2. The fracture at   T2 extends through the anterior aspect of the T2 vertebral body from   the superior endplate to the inferior endplate. No new acute fracture   is identified at the cervical spine. Multilevel degenerative changes.                  MACRO:        Ema Coats  discussed the significance and urgency of this   critical finding by telephone with  MCKENNA RIVAS on 9/18/2024 at 1:03   am.  (**-RCF-**) Findings:  See findings.             Signed by: Ema Coats 9/18/2024 1:06 AM   Dictation workstation:   [id number]      CT cervical spine wo IV contrast   Final Result   1. Trace amount of acute subarachnoid hemorrhage at the right frontal   lobe.   2. Trace amount of acute subdural hemorrhage at the right   frontotemporal region.   3. New soft tissue laceration with large soft tissue hematoma at the   right frontotemporal region. The soft tissue hematoma extends along   the right periorbital region and the right side of the face.   4. Interval decrease in the scalp hematoma at the left frontal region.   5.  No acute facial bone fracture.   6. Redemonstration of the previously described mild compression   fractures at the superior endplates of C7, T1 and T2. The fracture at   T2 extends through the anterior aspect of the T2 vertebral body from   the superior endplate to the inferior endplate. No new acute fracture   is identified at the cervical spine. Multilevel degenerative changes.                  MACRO:        Ema Coats discussed the significance and urgency of this   critical finding by telephone with  MCKENNA RIVAS on 9/18/2024 at 1:03   am.  (**-RCF-**) Findings:  See findings.             Signed by: Ema Coats 9/18/2024 1:06 AM   Dictation workstation:   [id number]      CT head wo IV contrast   Final Result   1. Trace amount of acute subarachnoid hemorrhage at the right frontal   lobe.   2. Trace amount of acute subdural hemorrhage at the right   frontotemporal region.   3. New soft tissue laceration with large soft tissue hematoma at the   right frontotemporal region. The soft tissue hematoma extends along   the right periorbital region and the right side of the face.   4. Interval decrease in the scalp hematoma at the left frontal region.   5.  No  acute facial bone fracture.   6. Redemonstration of the previously described mild compression   fractures at the superior endplates of C7, T1 and T2. The fracture at   T2 extends through the anterior aspect of the T2 vertebral body from   the superior endplate to the inferior endplate. No new acute fracture   is identified at the cervical spine. Multilevel degenerative changes.                  MACRO:        Ema Coats discussed the significance and urgency of this   critical finding by telephone with  MCKENNA RIVAS on 9/18/2024 at 1:03   am.  (**-RCF-**) Findings:  See findings.             Signed by: Ema Coats 9/18/2024 1:06 AM   Dictation workstation:   [id number]         }  Labs ::99  Abnormal Labs Reviewed   CBC WITH AUTO DIFFERENTIAL - Abnormal; Notable for the following components:       Result Value    RBC 3.37 (*)     Hemoglobin 8.9 (*)     Hematocrit 29.2 (*)     MCHC 30.5 (*)     RDW 18.0 (*)     All other components within normal limits   COAGULATION SCREEN - Abnormal; Notable for the following components:    Protime 13.2 (*)     INR 1.2 (*)     All other components within normal limits    Narrative:     The APTT is no longer used for monitoring Unfractionated Heparin Therapy. For monitoring Heparin Therapy, use the Heparin Assay.   BASIC METABOLIC PANEL - Abnormal; Notable for the following components:    Glucose 111 (*)     Creatinine 1.24 (*)     eGFR 43 (*)     All other components within normal limits                 Kimmy Lou RN  09/18/24 1477

## 2024-09-25 ENCOUNTER — APPOINTMENT (OUTPATIENT)
Dept: CARDIOLOGY | Facility: CLINIC | Age: 84
End: 2024-09-25
Payer: MEDICARE

## 2024-10-07 ENCOUNTER — TELEPHONE (OUTPATIENT)
Dept: PRIMARY CARE | Facility: CLINIC | Age: 84
End: 2024-10-07
Payer: MEDICARE

## 2024-10-07 NOTE — TELEPHONE ENCOUNTER
Pts sister, Krista Deleon, [phone], called to let [facility] know that pt passed away on 9/20/24, due to a serious fall that caused a brain bleed.

## 2024-12-16 ENCOUNTER — APPOINTMENT (OUTPATIENT)
Dept: HEMATOLOGY/ONCOLOGY | Facility: CLINIC | Age: 84
End: 2024-12-16
Payer: MEDICARE

## (undated) DEVICE — GUIDEWIRE, STRAIGHT, INTERMEDIATE, CHOICE PT GRAPHIX, 0.014 IN X 182 CM

## (undated) DEVICE — Device

## (undated) DEVICE — CATHETER, DIAGNOSTIC, INFINITI, 6 FR, JL 4.0

## (undated) DEVICE — GUIDEWIRE, J TIP, 3 MM, 0.035 IN X 150 CM, PTFE

## (undated) DEVICE — PACK, ANGIO P2, CUSTOM, LAKE

## (undated) DEVICE — TR BAND, RADIAL COMPRESSION, STANDARD, 24CM

## (undated) DEVICE — KIT, NAMIC STANDARD LEFT HEART, CUSTOM, LWMC

## (undated) DEVICE — INTRODUCER, SHEATH, AVANTI PLUS, W/MINI WIRE, STANDARD, 6MS FR, 11 CM

## (undated) DEVICE — GUIDEWIRE, RUN THROUGH WIRE, 180CM

## (undated) DEVICE — GUIDEWIRE, J TIP, 3 MM, 0.035 IN X 260 CM, PTFE

## (undated) DEVICE — INTRODUCER SHEATH, GLIDESHEATH, 6FR 25CM

## (undated) DEVICE — VALVE, HEMOSTASIS, HONOR

## (undated) DEVICE — INFLATION KIT, ADVANTAGE, ENCORE 26 (1/BOX)

## (undated) DEVICE — CATHETER, DIAG, EXPO, 5FR FR4

## (undated) DEVICE — CATHETER, GUIDE, RUNWAY, CLS3 CLS, 6FR X 100CM, NO SIDE HOLES, CRV

## (undated) DEVICE — GUIDEWIRE, J -TIP, CHOICE FLOPPY, .014 X 182CM

## (undated) DEVICE — STOPCOCK, MORSE CLASSIC

## (undated) DEVICE — CATHETER, OPTITORQUE, 5FR, TIG, 1H/100CM

## (undated) DEVICE — COVER, EQUIPMENT, ZERO GRAVITY

## (undated) DEVICE — CATHETER, BALLOON DILATION, EUPHORA SEMICOMPLIANT 2.0  X 10 MM X 142CM

## (undated) DEVICE — CLOSURE DEVICE, VASCULAR, ANGIO-SEAL, VIP, 6FR, LF

## (undated) DEVICE — CATHETER, EXPO, AMPLATZ, AR2, 6 FR (5 BX)